# Patient Record
Sex: MALE | Race: BLACK OR AFRICAN AMERICAN | NOT HISPANIC OR LATINO | Employment: OTHER | URBAN - METROPOLITAN AREA
[De-identification: names, ages, dates, MRNs, and addresses within clinical notes are randomized per-mention and may not be internally consistent; named-entity substitution may affect disease eponyms.]

---

## 2023-01-01 ENCOUNTER — TELEPHONE (OUTPATIENT)
Dept: TRANSPLANT | Facility: HOSPITAL | Age: 68
End: 2023-01-01
Payer: COMMERCIAL

## 2023-01-01 ENCOUNTER — APPOINTMENT (OUTPATIENT)
Dept: RADIOLOGY | Facility: HOSPITAL | Age: 68
DRG: 326 | End: 2023-01-01
Payer: COMMERCIAL

## 2023-01-01 ENCOUNTER — ANESTHESIA (OUTPATIENT)
Dept: OPERATING ROOM | Facility: HOSPITAL | Age: 68
DRG: 326 | End: 2023-01-01
Payer: COMMERCIAL

## 2023-01-01 ENCOUNTER — ANESTHESIA (OUTPATIENT)
Dept: GASTROENTEROLOGY | Facility: HOSPITAL | Age: 68
DRG: 326 | End: 2023-01-01
Payer: COMMERCIAL

## 2023-01-01 ENCOUNTER — APPOINTMENT (OUTPATIENT)
Dept: RADIOLOGY | Facility: HOSPITAL | Age: 68
End: 2023-01-01
Payer: COMMERCIAL

## 2023-01-01 ENCOUNTER — APPOINTMENT (OUTPATIENT)
Dept: HEMATOLOGY/ONCOLOGY | Facility: CLINIC | Age: 68
End: 2023-01-01
Payer: MEDICARE

## 2023-01-01 ENCOUNTER — APPOINTMENT (OUTPATIENT)
Dept: HEMATOLOGY/ONCOLOGY | Facility: CLINIC | Age: 68
End: 2023-01-01
Payer: COMMERCIAL

## 2023-01-01 ENCOUNTER — ANESTHESIA EVENT (OUTPATIENT)
Dept: GASTROENTEROLOGY | Facility: HOSPITAL | Age: 68
DRG: 326 | End: 2023-01-01
Payer: COMMERCIAL

## 2023-01-01 ENCOUNTER — HOSPITAL ENCOUNTER (INPATIENT)
Facility: HOSPITAL | Age: 68
LOS: 20 days | Discharge: NURSING, REHAB OR LONG-TERM CARE | DRG: 326 | End: 2023-10-27
Attending: HOSPITALIST | Admitting: SURGERY
Payer: COMMERCIAL

## 2023-01-01 ENCOUNTER — HOSPITAL ENCOUNTER (OUTPATIENT)
Dept: DATA CONVERSION | Facility: HOSPITAL | Age: 68
End: 2023-08-15
Attending: STUDENT IN AN ORGANIZED HEALTH CARE EDUCATION/TRAINING PROGRAM | Admitting: STUDENT IN AN ORGANIZED HEALTH CARE EDUCATION/TRAINING PROGRAM
Payer: COMMERCIAL

## 2023-01-01 ENCOUNTER — HOSPITAL ENCOUNTER (INPATIENT)
Dept: DATA CONVERSION | Facility: HOSPITAL | Age: 68
Discharge: HOME HEALTH | End: 2023-05-31
Attending: SURGERY | Admitting: SURGERY

## 2023-01-01 ENCOUNTER — APPOINTMENT (OUTPATIENT)
Dept: LAB | Facility: LAB | Age: 68
End: 2023-01-01

## 2023-01-01 ENCOUNTER — APPOINTMENT (OUTPATIENT)
Dept: GASTROENTEROLOGY | Facility: HOSPITAL | Age: 68
DRG: 326 | End: 2023-01-01
Payer: COMMERCIAL

## 2023-01-01 ENCOUNTER — ANESTHESIA EVENT (OUTPATIENT)
Dept: OPERATING ROOM | Facility: HOSPITAL | Age: 68
DRG: 326 | End: 2023-01-01
Payer: COMMERCIAL

## 2023-01-01 ENCOUNTER — HOSPITAL ENCOUNTER (EMERGENCY)
Facility: HOSPITAL | Age: 68
Discharge: OTHER INSTITUTION | End: 2023-10-06
Attending: EMERGENCY MEDICINE
Payer: COMMERCIAL

## 2023-01-01 ENCOUNTER — HOSPITAL ENCOUNTER (INPATIENT)
Dept: DATA CONVERSION | Facility: HOSPITAL | Age: 68
LOS: 6 days | Discharge: HOME | End: 2023-09-29
Attending: EMERGENCY MEDICINE | Admitting: STUDENT IN AN ORGANIZED HEALTH CARE EDUCATION/TRAINING PROGRAM
Payer: COMMERCIAL

## 2023-01-01 VITALS
HEIGHT: 69 IN | RESPIRATION RATE: 10 BRPM | TEMPERATURE: 99.3 F | DIASTOLIC BLOOD PRESSURE: 82 MMHG | WEIGHT: 171.96 LBS | OXYGEN SATURATION: 95 % | BODY MASS INDEX: 25.47 KG/M2 | SYSTOLIC BLOOD PRESSURE: 106 MMHG | HEART RATE: 72 BPM

## 2023-01-01 VITALS — BODY MASS INDEX: 26.12 KG/M2 | HEIGHT: 69 IN | WEIGHT: 176.37 LBS

## 2023-01-01 VITALS
SYSTOLIC BLOOD PRESSURE: 148 MMHG | TEMPERATURE: 98.4 F | HEIGHT: 69 IN | RESPIRATION RATE: 20 BRPM | WEIGHT: 155.42 LBS | BODY MASS INDEX: 23.02 KG/M2 | OXYGEN SATURATION: 95 % | DIASTOLIC BLOOD PRESSURE: 81 MMHG | HEART RATE: 95 BPM

## 2023-01-01 VITALS — HEIGHT: 69 IN | BODY MASS INDEX: 26.12 KG/M2 | WEIGHT: 176.37 LBS

## 2023-01-01 DIAGNOSIS — K21.00 GASTRO-ESOPHAGEAL REFLUX DISEASE WITH ESOPHAGITIS, WITHOUT BLEEDING: ICD-10-CM

## 2023-01-01 DIAGNOSIS — Z79.621 LONG TERM (CURRENT) USE OF CALCINEURIN INHIBITOR: ICD-10-CM

## 2023-01-01 DIAGNOSIS — B37.0 THRUSH: ICD-10-CM

## 2023-01-01 DIAGNOSIS — G47.00 INSOMNIA, UNSPECIFIED TYPE: ICD-10-CM

## 2023-01-01 DIAGNOSIS — T81.30XS: ICD-10-CM

## 2023-01-01 DIAGNOSIS — J06.9 ACUTE RESPIRATORY DISEASE: ICD-10-CM

## 2023-01-01 DIAGNOSIS — Z79.52 LONG TERM (CURRENT) USE OF SYSTEMIC STEROIDS: ICD-10-CM

## 2023-01-01 DIAGNOSIS — C79.89 SECONDARY MALIGNANT NEOPLASM OF OTHER SPECIFIED SITES (MULTI): ICD-10-CM

## 2023-01-01 DIAGNOSIS — R78.81 GRAM-NEGATIVE BACTEREMIA: ICD-10-CM

## 2023-01-01 DIAGNOSIS — K83.09 CHOLANGITIS (CMS-HCC): ICD-10-CM

## 2023-01-01 DIAGNOSIS — E86.0 DEHYDRATION: ICD-10-CM

## 2023-01-01 DIAGNOSIS — K56.609 SBO (SMALL BOWEL OBSTRUCTION) (MULTI): Primary | ICD-10-CM

## 2023-01-01 DIAGNOSIS — Z87.891 PERSONAL HISTORY OF NICOTINE DEPENDENCE: ICD-10-CM

## 2023-01-01 DIAGNOSIS — E46 PROTEIN-CALORIE MALNUTRITION, UNSPECIFIED SEVERITY (MULTI): ICD-10-CM

## 2023-01-01 DIAGNOSIS — E87.70 FLUID OVERLOAD, UNSPECIFIED: ICD-10-CM

## 2023-01-01 DIAGNOSIS — E11.40 TYPE 2 DIABETES MELLITUS WITH DIABETIC NEUROPATHY, UNSPECIFIED (MULTI): ICD-10-CM

## 2023-01-01 DIAGNOSIS — R19.8 PERFORATED ABDOMINAL VISCUS: ICD-10-CM

## 2023-01-01 DIAGNOSIS — T46.2X5A ADVERSE EFFECT OF OTHER ANTIDYSRHYTHMIC DRUGS, INITIAL ENCOUNTER: ICD-10-CM

## 2023-01-01 DIAGNOSIS — I12.9 HYPERTENSIVE CHRONIC KIDNEY DISEASE WITH STAGE 1 THROUGH STAGE 4 CHRONIC KIDNEY DISEASE, OR UNSPECIFIED CHRONIC KIDNEY DISEASE: ICD-10-CM

## 2023-01-01 DIAGNOSIS — Z94.1 HEART TRANSPLANT STATUS (MULTI): ICD-10-CM

## 2023-01-01 DIAGNOSIS — K56.609 UNSPECIFIED INTESTINAL OBSTRUCTION, UNSPECIFIED AS TO PARTIAL VERSUS COMPLETE OBSTRUCTION (MULTI): ICD-10-CM

## 2023-01-01 DIAGNOSIS — E11.22 TYPE 2 DIABETES MELLITUS WITH DIABETIC CHRONIC KIDNEY DISEASE (MULTI): ICD-10-CM

## 2023-01-01 DIAGNOSIS — Z94.1 HISTORY OF HEART TRANSPLANT (MULTI): ICD-10-CM

## 2023-01-01 DIAGNOSIS — F32.A DEPRESSION, UNSPECIFIED: ICD-10-CM

## 2023-01-01 DIAGNOSIS — K31.1: ICD-10-CM

## 2023-01-01 DIAGNOSIS — E11.9 TYPE 2 DIABETES MELLITUS WITHOUT COMPLICATIONS (MULTI): ICD-10-CM

## 2023-01-01 DIAGNOSIS — Z79.4 TYPE 2 DIABETES MELLITUS WITH STAGE 3A CHRONIC KIDNEY DISEASE, WITH LONG-TERM CURRENT USE OF INSULIN (MULTI): ICD-10-CM

## 2023-01-01 DIAGNOSIS — R41.82 ALTERED MENTAL STATUS, UNSPECIFIED ALTERED MENTAL STATUS TYPE: ICD-10-CM

## 2023-01-01 DIAGNOSIS — C25.9 MALIGNANT NEOPLASM OF PANCREAS, UNSPECIFIED (MULTI): ICD-10-CM

## 2023-01-01 DIAGNOSIS — T81.30XA DEHISCENCE OF FASCIA, INITIAL ENCOUNTER: ICD-10-CM

## 2023-01-01 DIAGNOSIS — E05.80 OTHER THYROTOXICOSIS WITHOUT THYROTOXIC CRISIS OR STORM: ICD-10-CM

## 2023-01-01 DIAGNOSIS — N17.0 ACUTE KIDNEY FAILURE WITH TUBULAR NECROSIS (CMS-HCC): ICD-10-CM

## 2023-01-01 DIAGNOSIS — T86.19 OTHER COMPLICATION OF KIDNEY TRANSPLANT (HHS-HCC): ICD-10-CM

## 2023-01-01 DIAGNOSIS — I25.10 ATHEROSCLEROTIC HEART DISEASE OF NATIVE CORONARY ARTERY WITHOUT ANGINA PECTORIS: ICD-10-CM

## 2023-01-01 DIAGNOSIS — R09.02 HYPOXIA: ICD-10-CM

## 2023-01-01 DIAGNOSIS — E11.22 TYPE 2 DIABETES MELLITUS WITH STAGE 3A CHRONIC KIDNEY DISEASE, WITH LONG-TERM CURRENT USE OF INSULIN (MULTI): ICD-10-CM

## 2023-01-01 DIAGNOSIS — C25.9 MALIGNANT NEOPLASM OF PANCREAS, UNSPECIFIED LOCATION OF MALIGNANCY (MULTI): Primary | ICD-10-CM

## 2023-01-01 DIAGNOSIS — C25.0 MALIGNANT NEOPLASM OF HEAD OF PANCREAS (MULTI): ICD-10-CM

## 2023-01-01 DIAGNOSIS — K83.1 OBSTRUCTION OF BILE DUCT (CMS-HCC): ICD-10-CM

## 2023-01-01 DIAGNOSIS — D62 ACUTE POSTHEMORRHAGIC ANEMIA: ICD-10-CM

## 2023-01-01 DIAGNOSIS — C77.2 SECONDARY AND UNSPECIFIED MALIGNANT NEOPLASM OF INTRA-ABDOMINAL LYMPH NODES (MULTI): ICD-10-CM

## 2023-01-01 DIAGNOSIS — N18.31 TYPE 2 DIABETES MELLITUS WITH STAGE 3A CHRONIC KIDNEY DISEASE, WITH LONG-TERM CURRENT USE OF INSULIN (MULTI): ICD-10-CM

## 2023-01-01 DIAGNOSIS — E87.5 HYPERKALEMIA: ICD-10-CM

## 2023-01-01 DIAGNOSIS — K56.609 SMALL BOWEL OBSTRUCTION (MULTI): Primary | ICD-10-CM

## 2023-01-01 DIAGNOSIS — I95.9 HYPOTENSION, UNSPECIFIED: ICD-10-CM

## 2023-01-01 DIAGNOSIS — M10.30 GOUT DUE TO RENAL IMPAIRMENT, UNSPECIFIED SITE: ICD-10-CM

## 2023-01-01 DIAGNOSIS — I97.711 INTRAOPERATIVE CARDIAC ARREST DURING OTHER SURGERY: ICD-10-CM

## 2023-01-01 DIAGNOSIS — Z79.82 LONG TERM (CURRENT) USE OF ASPIRIN: ICD-10-CM

## 2023-01-01 DIAGNOSIS — C25.0 MALIGNANT NEOPLASM OF HEAD OF PANCREAS (MULTI): Primary | ICD-10-CM

## 2023-01-01 DIAGNOSIS — Z79.4 LONG TERM (CURRENT) USE OF INSULIN (MULTI): ICD-10-CM

## 2023-01-01 DIAGNOSIS — E78.5 HYPERLIPIDEMIA, UNSPECIFIED: ICD-10-CM

## 2023-01-01 DIAGNOSIS — C78.4 SECONDARY MALIGNANT NEOPLASM OF SMALL INTESTINE (MULTI): ICD-10-CM

## 2023-01-01 DIAGNOSIS — C25.9 MALIGNANT NEOPLASM OF PANCREAS, UNSPECIFIED LOCATION OF MALIGNANCY (MULTI): ICD-10-CM

## 2023-01-01 DIAGNOSIS — Z29.9 ENCOUNTER FOR DEEP VEIN THROMBOSIS (DVT) PROPHYLAXIS: ICD-10-CM

## 2023-01-01 DIAGNOSIS — F41.1 GENERALIZED ANXIETY DISORDER: ICD-10-CM

## 2023-01-01 DIAGNOSIS — K86.1 OTHER CHRONIC PANCREATITIS (MULTI): ICD-10-CM

## 2023-01-01 DIAGNOSIS — K86.81 EXOCRINE PANCREATIC INSUFFICIENCY (HHS-HCC): ICD-10-CM

## 2023-01-01 DIAGNOSIS — K63.1 BOWEL PERFORATION (MULTI): ICD-10-CM

## 2023-01-01 DIAGNOSIS — D63.1 ANEMIA IN CHRONIC KIDNEY DISEASE (CODE): ICD-10-CM

## 2023-01-01 DIAGNOSIS — R10.84 GENERALIZED ABDOMINAL PAIN: ICD-10-CM

## 2023-01-01 DIAGNOSIS — N18.32 CHRONIC KIDNEY DISEASE, STAGE 3B (MULTI): ICD-10-CM

## 2023-01-01 DIAGNOSIS — Z45.2 ENCOUNTER FOR CENTRAL LINE CARE: ICD-10-CM

## 2023-01-01 DIAGNOSIS — D84.821 IMMUNODEFICIENCY DUE TO DRUGS (CODE) (MULTI): ICD-10-CM

## 2023-01-01 LAB
25(OH)D3 SERPL-MCNC: 53 NG/ML (ref 30–100)
ABO GROUP (TYPE) IN BLOOD: NORMAL
ACTIVATED PARTIAL THROMBOPLASTIN TIME IN PPP BY COAGULATION ASSAY: 23 SEC (ref 26–39)
ACTIVATED PARTIAL THROMBOPLASTIN TIME IN PPP BY COAGULATION ASSAY: 24 SEC (ref 26–39)
ACTIVATED PARTIAL THROMBOPLASTIN TIME IN PPP BY COAGULATION ASSAY: 24 SEC (ref 26–39)
ACTIVATED PARTIAL THROMBOPLASTIN TIME IN PPP BY COAGULATION ASSAY: 26 SEC (ref 26–39)
ACTIVATED PARTIAL THROMBOPLASTIN TIME IN PPP BY COAGULATION ASSAY: 27 SEC (ref 26–39)
ACTIVATED PARTIAL THROMBOPLASTIN TIME IN PPP BY COAGULATION ASSAY: 29 SEC (ref 26–39)
ACTIVATED PARTIAL THROMBOPLASTIN TIME IN PPP BY COAGULATION ASSAY: 30 SEC (ref 26–39)
ACTIVATED PARTIAL THROMBOPLASTIN TIME IN PPP BY COAGULATION ASSAY: 31 SEC (ref 26–39)
ALANINE AMINOTRANSFERASE (SGPT) (U/L) IN SER/PLAS: 117 U/L (ref 10–52)
ALANINE AMINOTRANSFERASE (SGPT) (U/L) IN SER/PLAS: 134 U/L (ref 10–52)
ALANINE AMINOTRANSFERASE (SGPT) (U/L) IN SER/PLAS: 143 U/L (ref 10–52)
ALANINE AMINOTRANSFERASE (SGPT) (U/L) IN SER/PLAS: 158 U/L (ref 10–52)
ALANINE AMINOTRANSFERASE (SGPT) (U/L) IN SER/PLAS: 19 U/L (ref 10–52)
ALANINE AMINOTRANSFERASE (SGPT) (U/L) IN SER/PLAS: 19 U/L (ref 10–52)
ALANINE AMINOTRANSFERASE (SGPT) (U/L) IN SER/PLAS: 23 U/L (ref 10–52)
ALANINE AMINOTRANSFERASE (SGPT) (U/L) IN SER/PLAS: 23 U/L (ref 10–52)
ALANINE AMINOTRANSFERASE (SGPT) (U/L) IN SER/PLAS: 26 U/L (ref 10–52)
ALANINE AMINOTRANSFERASE (SGPT) (U/L) IN SER/PLAS: 29 U/L (ref 10–52)
ALANINE AMINOTRANSFERASE (SGPT) (U/L) IN SER/PLAS: 34 U/L (ref 10–52)
ALANINE AMINOTRANSFERASE (SGPT) (U/L) IN SER/PLAS: 37 U/L (ref 10–52)
ALANINE AMINOTRANSFERASE (SGPT) (U/L) IN SER/PLAS: 37 U/L (ref 10–52)
ALANINE AMINOTRANSFERASE (SGPT) (U/L) IN SER/PLAS: 374 U/L (ref 10–52)
ALANINE AMINOTRANSFERASE (SGPT) (U/L) IN SER/PLAS: 39 U/L (ref 10–52)
ALANINE AMINOTRANSFERASE (SGPT) (U/L) IN SER/PLAS: 42 U/L (ref 10–52)
ALANINE AMINOTRANSFERASE (SGPT) (U/L) IN SER/PLAS: 45 U/L (ref 10–52)
ALANINE AMINOTRANSFERASE (SGPT) (U/L) IN SER/PLAS: 45 U/L (ref 10–52)
ALANINE AMINOTRANSFERASE (SGPT) (U/L) IN SER/PLAS: 57 U/L (ref 10–52)
ALANINE AMINOTRANSFERASE (SGPT) (U/L) IN SER/PLAS: 77 U/L (ref 10–52)
ALANINE AMINOTRANSFERASE (SGPT) (U/L) IN SER/PLAS: NORMAL
ALBUMIN (G/DL) IN SER/PLAS: 2.7 G/DL (ref 3.4–5)
ALBUMIN (G/DL) IN SER/PLAS: 2.8 G/DL (ref 3.4–5)
ALBUMIN (G/DL) IN SER/PLAS: 2.9 G/DL (ref 3.4–5)
ALBUMIN (G/DL) IN SER/PLAS: 3 G/DL (ref 3.4–5)
ALBUMIN (G/DL) IN SER/PLAS: 3.1 G/DL (ref 3.4–5)
ALBUMIN (G/DL) IN SER/PLAS: 3.2 G/DL (ref 3.4–5)
ALBUMIN (G/DL) IN SER/PLAS: 3.3 G/DL (ref 3.4–5)
ALBUMIN (G/DL) IN SER/PLAS: 3.5 G/DL (ref 3.4–5)
ALBUMIN (G/DL) IN SER/PLAS: 3.6 G/DL (ref 3.4–5)
ALBUMIN (G/DL) IN SER/PLAS: 3.7 G/DL (ref 3.4–5)
ALBUMIN (G/DL) IN SER/PLAS: 4 G/DL (ref 3.4–5)
ALBUMIN (G/DL) IN SER/PLAS: 4.1 G/DL (ref 3.4–5)
ALBUMIN (G/DL) IN SER/PLAS: NORMAL
ALBUMIN SERPL BCP-MCNC: 2.4 G/DL (ref 3.4–5)
ALBUMIN SERPL BCP-MCNC: 2.5 G/DL (ref 3.4–5)
ALBUMIN SERPL BCP-MCNC: 2.5 G/DL (ref 3.4–5)
ALBUMIN SERPL BCP-MCNC: 2.6 G/DL (ref 3.4–5)
ALBUMIN SERPL BCP-MCNC: 2.7 G/DL (ref 3.4–5)
ALBUMIN SERPL BCP-MCNC: 2.8 G/DL (ref 3.4–5)
ALBUMIN SERPL BCP-MCNC: 2.8 G/DL (ref 3.4–5)
ALBUMIN SERPL BCP-MCNC: 2.9 G/DL (ref 3.4–5)
ALBUMIN SERPL BCP-MCNC: 3 G/DL (ref 3.4–5)
ALBUMIN SERPL BCP-MCNC: 3.1 G/DL (ref 3.4–5)
ALBUMIN SERPL BCP-MCNC: 3.2 G/DL (ref 3.4–5)
ALBUMIN SERPL BCP-MCNC: 3.6 G/DL (ref 3.4–5)
ALBUMIN SERPL BCP-MCNC: 3.7 G/DL (ref 3.4–5)
ALBUMIN SERPL BCP-MCNC: 3.7 G/DL (ref 3.4–5)
ALKALINE PHOSPHATASE (U/L) IN SER/PLAS: 152 U/L (ref 33–136)
ALKALINE PHOSPHATASE (U/L) IN SER/PLAS: 463 U/L (ref 33–136)
ALKALINE PHOSPHATASE (U/L) IN SER/PLAS: 51 U/L (ref 33–136)
ALKALINE PHOSPHATASE (U/L) IN SER/PLAS: 60 U/L (ref 33–136)
ALKALINE PHOSPHATASE (U/L) IN SER/PLAS: 62 U/L (ref 33–136)
ALKALINE PHOSPHATASE (U/L) IN SER/PLAS: 63 U/L (ref 33–136)
ALKALINE PHOSPHATASE (U/L) IN SER/PLAS: 67 U/L (ref 33–136)
ALKALINE PHOSPHATASE (U/L) IN SER/PLAS: 68 U/L (ref 33–136)
ALKALINE PHOSPHATASE (U/L) IN SER/PLAS: 68 U/L (ref 33–136)
ALKALINE PHOSPHATASE (U/L) IN SER/PLAS: 70 U/L (ref 33–136)
ALKALINE PHOSPHATASE (U/L) IN SER/PLAS: 72 U/L (ref 33–136)
ALKALINE PHOSPHATASE (U/L) IN SER/PLAS: 77 U/L (ref 33–136)
ALKALINE PHOSPHATASE (U/L) IN SER/PLAS: 79 U/L (ref 33–136)
ALKALINE PHOSPHATASE (U/L) IN SER/PLAS: 79 U/L (ref 33–136)
ALKALINE PHOSPHATASE (U/L) IN SER/PLAS: 80 U/L (ref 33–136)
ALKALINE PHOSPHATASE (U/L) IN SER/PLAS: 80 U/L (ref 33–136)
ALKALINE PHOSPHATASE (U/L) IN SER/PLAS: 83 U/L (ref 33–136)
ALKALINE PHOSPHATASE (U/L) IN SER/PLAS: 85 U/L (ref 33–136)
ALKALINE PHOSPHATASE (U/L) IN SER/PLAS: NORMAL
ALP SERPL-CCNC: 110 U/L (ref 33–136)
ALP SERPL-CCNC: 111 U/L (ref 33–136)
ALP SERPL-CCNC: 114 U/L (ref 33–136)
ALP SERPL-CCNC: 145 U/L (ref 33–136)
ALP SERPL-CCNC: 169 U/L (ref 33–136)
ALP SERPL-CCNC: 170 U/L (ref 33–136)
ALP SERPL-CCNC: 172 U/L (ref 33–136)
ALP SERPL-CCNC: 202 U/L (ref 33–136)
ALP SERPL-CCNC: 238 U/L (ref 33–136)
ALP SERPL-CCNC: 239 U/L (ref 33–136)
ALP SERPL-CCNC: 263 U/L (ref 33–136)
ALP SERPL-CCNC: 319 U/L (ref 33–136)
ALP SERPL-CCNC: 365 U/L (ref 33–136)
ALP SERPL-CCNC: 490 U/L (ref 33–136)
ALP SERPL-CCNC: 494 U/L (ref 33–136)
ALP SERPL-CCNC: 601 U/L (ref 33–136)
ALP SERPL-CCNC: 601 U/L (ref 33–136)
ALT SERPL W P-5'-P-CCNC: 108 U/L (ref 10–52)
ALT SERPL W P-5'-P-CCNC: 115 U/L (ref 10–52)
ALT SERPL W P-5'-P-CCNC: 14 U/L (ref 10–52)
ALT SERPL W P-5'-P-CCNC: 140 U/L (ref 10–52)
ALT SERPL W P-5'-P-CCNC: 16 U/L (ref 10–52)
ALT SERPL W P-5'-P-CCNC: 161 U/L (ref 10–52)
ALT SERPL W P-5'-P-CCNC: 163 U/L (ref 10–52)
ALT SERPL W P-5'-P-CCNC: 17 U/L (ref 10–52)
ALT SERPL W P-5'-P-CCNC: 23 U/L (ref 10–52)
ALT SERPL W P-5'-P-CCNC: 28 U/L (ref 10–52)
ALT SERPL W P-5'-P-CCNC: 30 U/L (ref 10–52)
ALT SERPL W P-5'-P-CCNC: 38 U/L (ref 10–52)
ALT SERPL W P-5'-P-CCNC: 44 U/L (ref 10–52)
ALT SERPL W P-5'-P-CCNC: 68 U/L (ref 10–52)
ALT SERPL W P-5'-P-CCNC: 79 U/L (ref 10–52)
ALT SERPL W P-5'-P-CCNC: 88 U/L (ref 10–52)
ALT SERPL W P-5'-P-CCNC: 92 U/L (ref 10–52)
AMYLASE (U/L) IN BODY FLUID: 102 U/L
AMYLASE (U/L) IN BODY FLUID: 356 U/L
AMYLASE (U/L) IN BODY FLUID: 411 U/L
AMYLASE (U/L) IN BODY FLUID: >6000 U/L
ANION GAP BLDA CALCULATED.4IONS-SCNC: 10 MMO/L (ref 10–25)
ANION GAP BLDA CALCULATED.4IONS-SCNC: 11 MMO/L (ref 10–25)
ANION GAP BLDA CALCULATED.4IONS-SCNC: 12 MMO/L (ref 10–25)
ANION GAP BLDA CALCULATED.4IONS-SCNC: 7 MMO/L (ref 10–25)
ANION GAP BLDA CALCULATED.4IONS-SCNC: 8 MMO/L (ref 10–25)
ANION GAP BLDV CALCULATED.4IONS-SCNC: 10 MMOL/L (ref 10–25)
ANION GAP BLDV CALCULATED.4IONS-SCNC: 11 MMOL/L (ref 10–25)
ANION GAP IN SER/PLAS: 10 MMOL/L (ref 10–20)
ANION GAP IN SER/PLAS: 10 MMOL/L (ref 10–20)
ANION GAP IN SER/PLAS: 11 MMOL/L (ref 10–20)
ANION GAP IN SER/PLAS: 12 MMOL/L (ref 10–20)
ANION GAP IN SER/PLAS: 13 MMOL/L (ref 10–20)
ANION GAP IN SER/PLAS: 14 MMOL/L (ref 10–20)
ANION GAP IN SER/PLAS: 15 MMOL/L (ref 10–20)
ANION GAP IN SER/PLAS: 16 MMOL/L (ref 10–20)
ANION GAP IN SER/PLAS: 17 MMOL/L (ref 10–20)
ANION GAP IN SER/PLAS: 19 MMOL/L (ref 10–20)
ANION GAP IN SER/PLAS: 21 MMOL/L (ref 10–20)
ANION GAP IN SER/PLAS: NORMAL
ANION GAP SERPL CALC-SCNC: 12 MMOL/L (ref 10–20)
ANION GAP SERPL CALC-SCNC: 14 MMOL/L (ref 10–20)
ANION GAP SERPL CALC-SCNC: 14 MMOL/L (ref 10–20)
ANION GAP SERPL CALC-SCNC: 15 MMOL/L (ref 10–20)
ANION GAP SERPL CALC-SCNC: 16 MMOL/L (ref 10–20)
ANION GAP SERPL CALC-SCNC: 17 MMOL/L (ref 10–20)
ANION GAP SERPL CALC-SCNC: 18 MMOL/L (ref 10–20)
ANION GAP SERPL CALC-SCNC: 19 MMOL/L (ref 10–20)
ANION GAP SERPL CALC-SCNC: 22 MMOL/L (ref 10–20)
ANION GAP SERPL CALC-SCNC: 24 MMOL/L (ref 10–20)
ANION GAP SERPL CALC-SCNC: 30 MMOL/L (ref 10–20)
ANTIBODY SCREEN: NORMAL
APPEARANCE UR: ABNORMAL
APPEARANCE UR: CLEAR
APTT PPP: 25 SECONDS (ref 27–38)
APTT PPP: 26 SECONDS (ref 27–38)
APTT PPP: 27 SECONDS (ref 27–38)
APTT PPP: 27 SECONDS (ref 27–38)
APTT PPP: 28 SECONDS (ref 27–38)
APTT PPP: 28 SECONDS (ref 27–38)
APTT PPP: 30 SECONDS (ref 27–38)
APTT PPP: 32 SECONDS (ref 27–38)
ASPARTATE AMINOTRANSFERASE (SGOT) (U/L) IN SER/PLAS: 133 U/L (ref 9–39)
ASPARTATE AMINOTRANSFERASE (SGOT) (U/L) IN SER/PLAS: 155 U/L (ref 9–39)
ASPARTATE AMINOTRANSFERASE (SGOT) (U/L) IN SER/PLAS: 205 U/L (ref 9–39)
ASPARTATE AMINOTRANSFERASE (SGOT) (U/L) IN SER/PLAS: 24 U/L (ref 9–39)
ASPARTATE AMINOTRANSFERASE (SGOT) (U/L) IN SER/PLAS: 24 U/L (ref 9–39)
ASPARTATE AMINOTRANSFERASE (SGOT) (U/L) IN SER/PLAS: 25 U/L (ref 9–39)
ASPARTATE AMINOTRANSFERASE (SGOT) (U/L) IN SER/PLAS: 25 U/L (ref 9–39)
ASPARTATE AMINOTRANSFERASE (SGOT) (U/L) IN SER/PLAS: 29 U/L (ref 9–39)
ASPARTATE AMINOTRANSFERASE (SGOT) (U/L) IN SER/PLAS: 29 U/L (ref 9–39)
ASPARTATE AMINOTRANSFERASE (SGOT) (U/L) IN SER/PLAS: 30 U/L (ref 9–39)
ASPARTATE AMINOTRANSFERASE (SGOT) (U/L) IN SER/PLAS: 32 U/L (ref 9–39)
ASPARTATE AMINOTRANSFERASE (SGOT) (U/L) IN SER/PLAS: 34 U/L (ref 9–39)
ASPARTATE AMINOTRANSFERASE (SGOT) (U/L) IN SER/PLAS: 36 U/L (ref 9–39)
ASPARTATE AMINOTRANSFERASE (SGOT) (U/L) IN SER/PLAS: 375 U/L (ref 9–39)
ASPARTATE AMINOTRANSFERASE (SGOT) (U/L) IN SER/PLAS: 39 U/L (ref 9–39)
ASPARTATE AMINOTRANSFERASE (SGOT) (U/L) IN SER/PLAS: 40 U/L (ref 9–39)
ASPARTATE AMINOTRANSFERASE (SGOT) (U/L) IN SER/PLAS: 42 U/L (ref 9–39)
ASPARTATE AMINOTRANSFERASE (SGOT) (U/L) IN SER/PLAS: 43 U/L (ref 9–39)
ASPARTATE AMINOTRANSFERASE (SGOT) (U/L) IN SER/PLAS: 52 U/L (ref 9–39)
ASPARTATE AMINOTRANSFERASE (SGOT) (U/L) IN SER/PLAS: 99 U/L (ref 9–39)
ASPARTATE AMINOTRANSFERASE (SGOT) (U/L) IN SER/PLAS: NORMAL
AST SERPL W P-5'-P-CCNC: 125 U/L (ref 9–39)
AST SERPL W P-5'-P-CCNC: 15 U/L (ref 9–39)
AST SERPL W P-5'-P-CCNC: 150 U/L (ref 9–39)
AST SERPL W P-5'-P-CCNC: 162 U/L (ref 9–39)
AST SERPL W P-5'-P-CCNC: 17 U/L (ref 9–39)
AST SERPL W P-5'-P-CCNC: 177 U/L (ref 9–39)
AST SERPL W P-5'-P-CCNC: 188 U/L (ref 9–39)
AST SERPL W P-5'-P-CCNC: 19 U/L (ref 9–39)
AST SERPL W P-5'-P-CCNC: 20 U/L (ref 9–39)
AST SERPL W P-5'-P-CCNC: 21 U/L (ref 9–39)
AST SERPL W P-5'-P-CCNC: 22 U/L (ref 9–39)
AST SERPL W P-5'-P-CCNC: 221 U/L (ref 9–39)
AST SERPL W P-5'-P-CCNC: 228 U/L (ref 9–39)
AST SERPL W P-5'-P-CCNC: 28 U/L (ref 9–39)
AST SERPL W P-5'-P-CCNC: 38 U/L (ref 9–39)
AST SERPL W P-5'-P-CCNC: 72 U/L (ref 9–39)
AST SERPL W P-5'-P-CCNC: 80 U/L (ref 9–39)
ATRIAL RATE: 101 BPM
ATRIAL RATE: 107 BPM
BACTERIA BLD AEROBE CULT: ABNORMAL
BACTERIA BLD AEROBE CULT: ABNORMAL
BACTERIA BLD CULT: ABNORMAL
BACTERIA BLD CULT: ABNORMAL
BACTERIA BLD CULT: NORMAL
BASE EXCESS BLDA CALC-SCNC: -1.6 MMOL/L (ref -2–3)
BASE EXCESS BLDA CALC-SCNC: 1.2 MMOL/L (ref -2–3)
BASE EXCESS BLDA CALC-SCNC: 1.3 MMOL/L (ref -2–3)
BASE EXCESS BLDA CALC-SCNC: 1.7 MMOL/L (ref -2–3)
BASE EXCESS BLDA CALC-SCNC: 1.8 MMOL/L (ref -2–3)
BASE EXCESS BLDA CALC-SCNC: 2.2 MMOL/L (ref -2–3)
BASE EXCESS BLDA CALC-SCNC: 2.9 MMOL/L (ref -2–3)
BASE EXCESS BLDA CALC-SCNC: 4 MMOL/L (ref -2–3)
BASE EXCESS BLDA CALC-SCNC: 4.1 MMOL/L (ref -2–3)
BASE EXCESS BLDA CALC-SCNC: 4.4 MMOL/L (ref -2–3)
BASE EXCESS BLDA CALC-SCNC: 4.4 MMOL/L (ref -2–3)
BASE EXCESS BLDA CALC-SCNC: 6.2 MMOL/L (ref -2–3)
BASE EXCESS BLDV CALC-SCNC: -0.1 MMOL/L (ref -2–3)
BASE EXCESS BLDV CALC-SCNC: 4.2 MMOL/L (ref -2–3)
BASOPHILS # BLD AUTO: 0.01 X10*3/UL (ref 0–0.1)
BASOPHILS # BLD AUTO: 0.01 X10*3/UL (ref 0–0.1)
BASOPHILS # BLD AUTO: 0.03 X10*3/UL (ref 0–0.1)
BASOPHILS # BLD MANUAL: 0 X10*3/UL (ref 0–0.1)
BASOPHILS (10*3/UL) IN BLOOD BY AUTOMATED COUNT: 0.02 X10E9/L (ref 0–0.1)
BASOPHILS NFR BLD AUTO: 0.2 %
BASOPHILS NFR BLD AUTO: 0.2 %
BASOPHILS NFR BLD AUTO: 0.5 %
BASOPHILS NFR BLD MANUAL: 0 %
BASOPHILS/100 LEUKOCYTES IN BLOOD BY AUTOMATED COUNT: 0.3 % (ref 0–2)
BASOPHILS/100 LEUKOCYTES IN BLOOD BY AUTOMATED COUNT: 0.3 % (ref 0–2)
BASOPHILS/100 LEUKOCYTES IN BLOOD BY AUTOMATED COUNT: 0.4 % (ref 0–2)
BILIRUB DIRECT SERPL-MCNC: 0.3 MG/DL (ref 0–0.3)
BILIRUB DIRECT SERPL-MCNC: 0.7 MG/DL (ref 0–0.3)
BILIRUB DIRECT SERPL-MCNC: 0.8 MG/DL (ref 0–0.3)
BILIRUB DIRECT SERPL-MCNC: 0.8 MG/DL (ref 0–0.3)
BILIRUB DIRECT SERPL-MCNC: 1.4 MG/DL (ref 0–0.3)
BILIRUB DIRECT SERPL-MCNC: 1.8 MG/DL (ref 0–0.3)
BILIRUB DIRECT SERPL-MCNC: 1.8 MG/DL (ref 0–0.3)
BILIRUB DIRECT SERPL-MCNC: 2.6 MG/DL (ref 0–0.3)
BILIRUB SERPL-MCNC: 0.5 MG/DL (ref 0–1.2)
BILIRUB SERPL-MCNC: 0.9 MG/DL (ref 0–1.2)
BILIRUB SERPL-MCNC: 0.9 MG/DL (ref 0–1.2)
BILIRUB SERPL-MCNC: 1.1 MG/DL (ref 0–1.2)
BILIRUB SERPL-MCNC: 1.2 MG/DL (ref 0–1.2)
BILIRUB SERPL-MCNC: 1.2 MG/DL (ref 0–1.2)
BILIRUB SERPL-MCNC: 1.5 MG/DL (ref 0–1.2)
BILIRUB SERPL-MCNC: 1.6 MG/DL (ref 0–1.2)
BILIRUB SERPL-MCNC: 2.4 MG/DL (ref 0–1.2)
BILIRUB SERPL-MCNC: 2.9 MG/DL (ref 0–1.2)
BILIRUB SERPL-MCNC: 3.8 MG/DL (ref 0–1.2)
BILIRUB SERPL-MCNC: 3.8 MG/DL (ref 0–1.2)
BILIRUB SERPL-MCNC: 3.9 MG/DL (ref 0–1.2)
BILIRUB SERPL-MCNC: 4.2 MG/DL (ref 0–1.2)
BILIRUB SERPL-MCNC: 5.2 MG/DL (ref 0–1.2)
BILIRUB UR STRIP.AUTO-MCNC: NEGATIVE MG/DL
BILIRUB UR STRIP.AUTO-MCNC: NEGATIVE MG/DL
BILIRUBIN DIRECT (MG/DL) IN SER/PLAS: 0.3 MG/DL (ref 0–0.3)
BILIRUBIN DIRECT (MG/DL) IN SER/PLAS: 0.5 MG/DL (ref 0–0.3)
BILIRUBIN DIRECT (MG/DL) IN SER/PLAS: 0.5 MG/DL (ref 0–0.3)
BILIRUBIN DIRECT (MG/DL) IN SER/PLAS: 0.6 MG/DL (ref 0–0.3)
BILIRUBIN DIRECT (MG/DL) IN SER/PLAS: 0.6 MG/DL (ref 0–0.3)
BILIRUBIN TOTAL (MG/DL) IN SER/PLAS: 0.3 MG/DL (ref 0–1.2)
BILIRUBIN TOTAL (MG/DL) IN SER/PLAS: 0.6 MG/DL (ref 0–1.2)
BILIRUBIN TOTAL (MG/DL) IN SER/PLAS: 0.7 MG/DL (ref 0–1.2)
BILIRUBIN TOTAL (MG/DL) IN SER/PLAS: 0.8 MG/DL (ref 0–1.2)
BILIRUBIN TOTAL (MG/DL) IN SER/PLAS: 0.8 MG/DL (ref 0–1.2)
BILIRUBIN TOTAL (MG/DL) IN SER/PLAS: 0.9 MG/DL (ref 0–1.2)
BILIRUBIN TOTAL (MG/DL) IN SER/PLAS: 1 MG/DL (ref 0–1.2)
BILIRUBIN TOTAL (MG/DL) IN SER/PLAS: 1.1 MG/DL (ref 0–1.2)
BILIRUBIN TOTAL (MG/DL) IN SER/PLAS: 1.2 MG/DL (ref 0–1.2)
BILIRUBIN TOTAL (MG/DL) IN SER/PLAS: 1.2 MG/DL (ref 0–1.2)
BILIRUBIN TOTAL (MG/DL) IN SER/PLAS: 1.4 MG/DL (ref 0–1.2)
BILIRUBIN TOTAL (MG/DL) IN SER/PLAS: 4.7 MG/DL (ref 0–1.2)
BILIRUBIN TOTAL (MG/DL) IN SER/PLAS: NORMAL
BLOOD EXPIRATION DATE: NORMAL
BODY TEMPERATURE: 37 DEGREES CELSIUS
BUN SERPL-MCNC: 34 MG/DL (ref 6–23)
BUN SERPL-MCNC: 39 MG/DL (ref 6–23)
BUN SERPL-MCNC: 40 MG/DL (ref 6–23)
BUN SERPL-MCNC: 41 MG/DL (ref 6–23)
BUN SERPL-MCNC: 41 MG/DL (ref 6–23)
BUN SERPL-MCNC: 44 MG/DL (ref 6–23)
BUN SERPL-MCNC: 44 MG/DL (ref 6–23)
BUN SERPL-MCNC: 47 MG/DL (ref 6–23)
BUN SERPL-MCNC: 48 MG/DL (ref 6–23)
BUN SERPL-MCNC: 48 MG/DL (ref 6–23)
BUN SERPL-MCNC: 50 MG/DL (ref 6–23)
BUN SERPL-MCNC: 51 MG/DL (ref 6–23)
BUN SERPL-MCNC: 52 MG/DL (ref 6–23)
BUN SERPL-MCNC: 53 MG/DL (ref 6–23)
BUN SERPL-MCNC: 54 MG/DL (ref 6–23)
BUN SERPL-MCNC: 54 MG/DL (ref 6–23)
BUN SERPL-MCNC: 57 MG/DL (ref 6–23)
BUN SERPL-MCNC: 58 MG/DL (ref 6–23)
BUN SERPL-MCNC: 59 MG/DL (ref 6–23)
BUN SERPL-MCNC: 61 MG/DL (ref 6–23)
BUN SERPL-MCNC: 62 MG/DL (ref 6–23)
BUN SERPL-MCNC: 64 MG/DL (ref 6–23)
BUN SERPL-MCNC: 65 MG/DL (ref 6–23)
BUN SERPL-MCNC: 66 MG/DL (ref 6–23)
BUN SERPL-MCNC: 70 MG/DL (ref 6–23)
BUN SERPL-MCNC: 76 MG/DL (ref 6–23)
CA-I BLD-SCNC: 1.11 MMOL/L (ref 1.1–1.33)
CA-I BLD-SCNC: 1.12 MMOL/L (ref 1.1–1.33)
CA-I BLD-SCNC: 1.14 MMOL/L (ref 1.1–1.33)
CA-I BLD-SCNC: 1.17 MMOL/L (ref 1.1–1.33)
CA-I BLD-SCNC: 1.26 MMOL/L (ref 1.1–1.33)
CA-I BLD-SCNC: 1.34 MMOL/L (ref 1.1–1.33)
CA-I BLD-SCNC: 1.35 MMOL/L (ref 1.1–1.33)
CA-I BLD-SCNC: 1.36 MMOL/L (ref 1.1–1.33)
CA-I BLDA-SCNC: 1.12 MMOL/L (ref 1.1–1.33)
CA-I BLDA-SCNC: 1.13 MMOL/L (ref 1.1–1.33)
CA-I BLDA-SCNC: 1.15 MMOL/L (ref 1.1–1.33)
CA-I BLDA-SCNC: 1.21 MMOL/L (ref 1.1–1.33)
CA-I BLDA-SCNC: 1.23 MMOL/L (ref 1.1–1.33)
CA-I BLDA-SCNC: 1.23 MMOL/L (ref 1.1–1.33)
CA-I BLDA-SCNC: 1.31 MMOL/L (ref 1.1–1.33)
CA-I BLDA-SCNC: 1.35 MMOL/L (ref 1.1–1.33)
CA-I BLDA-SCNC: 1.36 MMOL/L (ref 1.1–1.33)
CA-I BLDA-SCNC: 1.37 MMOL/L (ref 1.1–1.33)
CA-I BLDA-SCNC: 1.37 MMOL/L (ref 1.1–1.33)
CA-I BLDA-SCNC: 1.38 MMOL/L (ref 1.1–1.33)
CA-I BLDV-SCNC: 1.18 MMOL/L (ref 1.1–1.33)
CA-I BLDV-SCNC: 1.28 MMOL/L (ref 1.1–1.33)
CALCIDIOL (25 OH VITAMIN D3) (NG/ML) IN SER/PLAS: 50 NG/ML
CALCITONIN: <2 PG/ML (ref 0–7.5)
CALCIUM (MG/DL) IN SER/PLAS: 10.1 MG/DL (ref 8.6–10.6)
CALCIUM (MG/DL) IN SER/PLAS: 7.9 MG/DL (ref 8.6–10.6)
CALCIUM (MG/DL) IN SER/PLAS: 8 MG/DL (ref 8.6–10.6)
CALCIUM (MG/DL) IN SER/PLAS: 8.1 MG/DL (ref 8.6–10.6)
CALCIUM (MG/DL) IN SER/PLAS: 8.1 MG/DL (ref 8.6–10.6)
CALCIUM (MG/DL) IN SER/PLAS: 8.2 MG/DL (ref 8.6–10.6)
CALCIUM (MG/DL) IN SER/PLAS: 8.3 MG/DL (ref 8.6–10.6)
CALCIUM (MG/DL) IN SER/PLAS: 8.3 MG/DL (ref 8.6–10.6)
CALCIUM (MG/DL) IN SER/PLAS: 8.4 MG/DL (ref 8.6–10.3)
CALCIUM (MG/DL) IN SER/PLAS: 8.4 MG/DL (ref 8.6–10.6)
CALCIUM (MG/DL) IN SER/PLAS: 8.4 MG/DL (ref 8.6–10.6)
CALCIUM (MG/DL) IN SER/PLAS: 8.6 MG/DL (ref 8.6–10.6)
CALCIUM (MG/DL) IN SER/PLAS: 8.7 MG/DL (ref 8.6–10.6)
CALCIUM (MG/DL) IN SER/PLAS: 8.8 MG/DL (ref 8.6–10.6)
CALCIUM (MG/DL) IN SER/PLAS: 8.9 MG/DL (ref 8.6–10.6)
CALCIUM (MG/DL) IN SER/PLAS: 9.1 MG/DL (ref 8.6–10.6)
CALCIUM (MG/DL) IN SER/PLAS: 9.3 MG/DL (ref 8.6–10.6)
CALCIUM (MG/DL) IN SER/PLAS: 9.4 MG/DL (ref 8.6–10.6)
CALCIUM (MG/DL) IN SER/PLAS: 9.8 MG/DL (ref 8.6–10.3)
CALCIUM (MG/DL) IN SER/PLAS: NORMAL
CALCIUM SERPL-MCNC: 10 MG/DL (ref 8.6–10.6)
CALCIUM SERPL-MCNC: 10.5 MG/DL (ref 8.6–10.6)
CALCIUM SERPL-MCNC: 8.2 MG/DL (ref 8.6–10.6)
CALCIUM SERPL-MCNC: 8.4 MG/DL (ref 8.6–10.6)
CALCIUM SERPL-MCNC: 8.5 MG/DL (ref 8.6–10.6)
CALCIUM SERPL-MCNC: 8.6 MG/DL (ref 8.6–10.6)
CALCIUM SERPL-MCNC: 8.7 MG/DL (ref 8.6–10.6)
CALCIUM SERPL-MCNC: 8.8 MG/DL (ref 8.6–10.3)
CALCIUM SERPL-MCNC: 8.9 MG/DL (ref 8.6–10.6)
CALCIUM SERPL-MCNC: 9 MG/DL (ref 8.6–10.6)
CALCIUM SERPL-MCNC: 9.1 MG/DL (ref 8.6–10.6)
CALCIUM SERPL-MCNC: 9.2 MG/DL (ref 8.6–10.6)
CALCIUM SERPL-MCNC: 9.4 MG/DL (ref 8.6–10.6)
CALCIUM SERPL-MCNC: 9.4 MG/DL (ref 8.6–10.6)
CALCIUM SERPL-MCNC: 9.5 MG/DL (ref 8.6–10.6)
CALCIUM SERPL-MCNC: 9.5 MG/DL (ref 8.6–10.6)
CALCIUM SERPL-MCNC: 9.6 MG/DL (ref 8.6–10.6)
CALCIUM SERPL-MCNC: 9.7 MG/DL (ref 8.6–10.6)
CALCIUM SERPL-MCNC: 9.8 MG/DL (ref 8.6–10.6)
CALCIUM SERPL-MCNC: 9.8 MG/DL (ref 8.6–10.6)
CALCIUM SERPL-MCNC: 9.9 MG/DL (ref 8.6–10.3)
CALCIUM SERPL-MCNC: 9.9 MG/DL (ref 8.6–10.6)
CANCER AG 19-9 (U/ML) IN SER/PLAS: 450.8 U/ML
CARBON DIOXIDE, TOTAL (MMOL/L) IN SER/PLAS: 19 MMOL/L (ref 21–32)
CARBON DIOXIDE, TOTAL (MMOL/L) IN SER/PLAS: 23 MMOL/L (ref 21–32)
CARBON DIOXIDE, TOTAL (MMOL/L) IN SER/PLAS: 23 MMOL/L (ref 21–32)
CARBON DIOXIDE, TOTAL (MMOL/L) IN SER/PLAS: 24 MMOL/L (ref 21–32)
CARBON DIOXIDE, TOTAL (MMOL/L) IN SER/PLAS: 25 MMOL/L (ref 21–32)
CARBON DIOXIDE, TOTAL (MMOL/L) IN SER/PLAS: 26 MMOL/L (ref 21–32)
CARBON DIOXIDE, TOTAL (MMOL/L) IN SER/PLAS: 27 MMOL/L (ref 21–32)
CARBON DIOXIDE, TOTAL (MMOL/L) IN SER/PLAS: 28 MMOL/L (ref 21–32)
CARBON DIOXIDE, TOTAL (MMOL/L) IN SER/PLAS: 29 MMOL/L (ref 21–32)
CARBON DIOXIDE, TOTAL (MMOL/L) IN SER/PLAS: NORMAL
CHLORIDE (MMOL/L) IN SER/PLAS: 102 MMOL/L (ref 98–107)
CHLORIDE (MMOL/L) IN SER/PLAS: 102 MMOL/L (ref 98–107)
CHLORIDE (MMOL/L) IN SER/PLAS: 103 MMOL/L (ref 98–107)
CHLORIDE (MMOL/L) IN SER/PLAS: 104 MMOL/L (ref 98–107)
CHLORIDE (MMOL/L) IN SER/PLAS: 104 MMOL/L (ref 98–107)
CHLORIDE (MMOL/L) IN SER/PLAS: 105 MMOL/L (ref 98–107)
CHLORIDE (MMOL/L) IN SER/PLAS: 106 MMOL/L (ref 98–107)
CHLORIDE (MMOL/L) IN SER/PLAS: 106 MMOL/L (ref 98–107)
CHLORIDE (MMOL/L) IN SER/PLAS: 107 MMOL/L (ref 98–107)
CHLORIDE (MMOL/L) IN SER/PLAS: 107 MMOL/L (ref 98–107)
CHLORIDE (MMOL/L) IN SER/PLAS: 108 MMOL/L (ref 98–107)
CHLORIDE (MMOL/L) IN SER/PLAS: 109 MMOL/L (ref 98–107)
CHLORIDE (MMOL/L) IN SER/PLAS: 110 MMOL/L (ref 98–107)
CHLORIDE (MMOL/L) IN SER/PLAS: 110 MMOL/L (ref 98–107)
CHLORIDE (MMOL/L) IN SER/PLAS: 111 MMOL/L (ref 98–107)
CHLORIDE (MMOL/L) IN SER/PLAS: 111 MMOL/L (ref 98–107)
CHLORIDE (MMOL/L) IN SER/PLAS: 112 MMOL/L (ref 98–107)
CHLORIDE (MMOL/L) IN SER/PLAS: 112 MMOL/L (ref 98–107)
CHLORIDE (MMOL/L) IN SER/PLAS: 113 MMOL/L (ref 98–107)
CHLORIDE (MMOL/L) IN SER/PLAS: 98 MMOL/L (ref 98–107)
CHLORIDE (MMOL/L) IN SER/PLAS: NORMAL
CHLORIDE BLDA-SCNC: 107 MMOL/L (ref 98–107)
CHLORIDE BLDA-SCNC: 109 MMOL/L (ref 98–107)
CHLORIDE BLDA-SCNC: 110 MMOL/L (ref 98–107)
CHLORIDE BLDA-SCNC: 111 MMOL/L (ref 98–107)
CHLORIDE BLDA-SCNC: 111 MMOL/L (ref 98–107)
CHLORIDE BLDA-SCNC: 112 MMOL/L (ref 98–107)
CHLORIDE BLDA-SCNC: 112 MMOL/L (ref 98–107)
CHLORIDE BLDV-SCNC: 106 MMOL/L (ref 98–107)
CHLORIDE BLDV-SCNC: 99 MMOL/L (ref 98–107)
CHLORIDE SERPL-SCNC: 100 MMOL/L (ref 98–107)
CHLORIDE SERPL-SCNC: 103 MMOL/L (ref 98–107)
CHLORIDE SERPL-SCNC: 104 MMOL/L (ref 98–107)
CHLORIDE SERPL-SCNC: 105 MMOL/L (ref 98–107)
CHLORIDE SERPL-SCNC: 106 MMOL/L (ref 98–107)
CHLORIDE SERPL-SCNC: 106 MMOL/L (ref 98–107)
CHLORIDE SERPL-SCNC: 107 MMOL/L (ref 98–107)
CHLORIDE SERPL-SCNC: 107 MMOL/L (ref 98–107)
CHLORIDE SERPL-SCNC: 108 MMOL/L (ref 98–107)
CHLORIDE SERPL-SCNC: 109 MMOL/L (ref 98–107)
CHLORIDE SERPL-SCNC: 109 MMOL/L (ref 98–107)
CHLORIDE SERPL-SCNC: 110 MMOL/L (ref 98–107)
CHLORIDE SERPL-SCNC: 111 MMOL/L (ref 98–107)
CHLORIDE SERPL-SCNC: 111 MMOL/L (ref 98–107)
CHLORIDE SERPL-SCNC: 112 MMOL/L (ref 98–107)
CHLORIDE SERPL-SCNC: 112 MMOL/L (ref 98–107)
CHLORIDE SERPL-SCNC: 96 MMOL/L (ref 98–107)
CHOLESTEROL (MG/DL) IN SER/PLAS: 121 MG/DL (ref 0–199)
CHOLESTEROL (MG/DL) IN SER/PLAS: 172 MG/DL (ref 0–199)
CHOLESTEROL (MG/DL) IN SER/PLAS: 200 MG/DL (ref 0–199)
CHOLESTEROL IN HDL (MG/DL) IN SER/PLAS: 45.2 MG/DL
CHOLESTEROL IN HDL (MG/DL) IN SER/PLAS: 45.8 MG/DL
CHOLESTEROL IN HDL (MG/DL) IN SER/PLAS: 57.3 MG/DL
CHOLESTEROL/HDL RATIO: 2.6
CHOLESTEROL/HDL RATIO: 3
CHOLESTEROL/HDL RATIO: 4.4
CO2 SERPL-SCNC: 19 MMOL/L (ref 21–32)
CO2 SERPL-SCNC: 20 MMOL/L (ref 21–32)
CO2 SERPL-SCNC: 20 MMOL/L (ref 21–32)
CO2 SERPL-SCNC: 21 MMOL/L (ref 21–32)
CO2 SERPL-SCNC: 22 MMOL/L (ref 21–32)
CO2 SERPL-SCNC: 23 MMOL/L (ref 21–32)
CO2 SERPL-SCNC: 23 MMOL/L (ref 21–32)
CO2 SERPL-SCNC: 24 MMOL/L (ref 21–32)
CO2 SERPL-SCNC: 25 MMOL/L (ref 21–32)
CO2 SERPL-SCNC: 26 MMOL/L (ref 21–32)
CO2 SERPL-SCNC: 27 MMOL/L (ref 21–32)
CO2 SERPL-SCNC: 28 MMOL/L (ref 21–32)
CO2 SERPL-SCNC: 29 MMOL/L (ref 21–32)
COLOR UR: ABNORMAL
COLOR UR: YELLOW
COMPLETE PATHOLOGY REPORT: NORMAL
COMPLETE PATHOLOGY REPORT: NORMAL
CONVERTED ADDENDUM COMMENT 2: NORMAL
CONVERTED CLINICAL DIAGNOSIS-HISTORY: NORMAL
CONVERTED CLINICAL DIAGNOSIS-HISTORY: NORMAL
CONVERTED FINAL DIAGNOSIS: NORMAL
CONVERTED FINAL DIAGNOSIS: NORMAL
CONVERTED FINAL REPORT PDF LINK TO COPY AND PASTE: NORMAL
CONVERTED FINAL REPORT PDF LINK TO COPY AND PASTE: NORMAL
CONVERTED GROSS DESCRIPTION: NORMAL
CONVERTED GROSS DESCRIPTION: NORMAL
CONVERTED INTRAOPERATIVE DIAGNOSIS: NORMAL
CONVERTED PHYSICIAN NOTIFICATION: NORMAL
CONVERTED SYNOPTIC DIAGNOSIS: NORMAL
CREAT SERPL-MCNC: 2.14 MG/DL (ref 0.5–1.3)
CREAT SERPL-MCNC: 2.17 MG/DL (ref 0.5–1.3)
CREAT SERPL-MCNC: 2.23 MG/DL (ref 0.5–1.3)
CREAT SERPL-MCNC: 2.28 MG/DL (ref 0.5–1.3)
CREAT SERPL-MCNC: 2.36 MG/DL (ref 0.5–1.3)
CREAT SERPL-MCNC: 2.36 MG/DL (ref 0.5–1.3)
CREAT SERPL-MCNC: 2.4 MG/DL (ref 0.5–1.3)
CREAT SERPL-MCNC: 2.48 MG/DL (ref 0.5–1.3)
CREAT SERPL-MCNC: 2.53 MG/DL (ref 0.5–1.3)
CREAT SERPL-MCNC: 2.53 MG/DL (ref 0.5–1.3)
CREAT SERPL-MCNC: 2.54 MG/DL (ref 0.5–1.3)
CREAT SERPL-MCNC: 2.59 MG/DL (ref 0.5–1.3)
CREAT SERPL-MCNC: 2.68 MG/DL (ref 0.5–1.3)
CREAT SERPL-MCNC: 2.69 MG/DL (ref 0.5–1.3)
CREAT SERPL-MCNC: 2.76 MG/DL (ref 0.5–1.3)
CREAT SERPL-MCNC: 2.8 MG/DL (ref 0.5–1.3)
CREAT SERPL-MCNC: 2.87 MG/DL (ref 0.5–1.3)
CREAT SERPL-MCNC: 2.89 MG/DL (ref 0.5–1.3)
CREAT SERPL-MCNC: 2.98 MG/DL (ref 0.5–1.3)
CREAT SERPL-MCNC: 3.19 MG/DL (ref 0.5–1.3)
CREAT SERPL-MCNC: 3.21 MG/DL (ref 0.5–1.3)
CREAT SERPL-MCNC: 3.22 MG/DL (ref 0.5–1.3)
CREAT SERPL-MCNC: 3.39 MG/DL (ref 0.5–1.3)
CREAT SERPL-MCNC: 3.39 MG/DL (ref 0.5–1.3)
CREAT SERPL-MCNC: 3.53 MG/DL (ref 0.5–1.3)
CREAT SERPL-MCNC: 3.53 MG/DL (ref 0.5–1.3)
CREATININE (MG/DL) IN SER/PLAS: 1.8 MG/DL (ref 0.5–1.3)
CREATININE (MG/DL) IN SER/PLAS: 1.82 MG/DL (ref 0.5–1.3)
CREATININE (MG/DL) IN SER/PLAS: 1.87 MG/DL (ref 0.5–1.3)
CREATININE (MG/DL) IN SER/PLAS: 1.99 MG/DL (ref 0.5–1.3)
CREATININE (MG/DL) IN SER/PLAS: 2.09 MG/DL (ref 0.5–1.3)
CREATININE (MG/DL) IN SER/PLAS: 2.13 MG/DL (ref 0.5–1.3)
CREATININE (MG/DL) IN SER/PLAS: 2.13 MG/DL (ref 0.5–1.3)
CREATININE (MG/DL) IN SER/PLAS: 2.14 MG/DL (ref 0.5–1.3)
CREATININE (MG/DL) IN SER/PLAS: 2.16 MG/DL (ref 0.5–1.3)
CREATININE (MG/DL) IN SER/PLAS: 2.16 MG/DL (ref 0.5–1.3)
CREATININE (MG/DL) IN SER/PLAS: 2.18 MG/DL (ref 0.5–1.3)
CREATININE (MG/DL) IN SER/PLAS: 2.2 MG/DL (ref 0.5–1.3)
CREATININE (MG/DL) IN SER/PLAS: 2.21 MG/DL (ref 0.5–1.3)
CREATININE (MG/DL) IN SER/PLAS: 2.29 MG/DL (ref 0.5–1.3)
CREATININE (MG/DL) IN SER/PLAS: 2.56 MG/DL (ref 0.5–1.3)
CREATININE (MG/DL) IN SER/PLAS: 2.57 MG/DL (ref 0.5–1.3)
CREATININE (MG/DL) IN SER/PLAS: 2.62 MG/DL (ref 0.5–1.3)
CREATININE (MG/DL) IN SER/PLAS: 2.69 MG/DL (ref 0.5–1.3)
CREATININE (MG/DL) IN SER/PLAS: 2.75 MG/DL (ref 0.5–1.3)
CREATININE (MG/DL) IN SER/PLAS: 2.81 MG/DL (ref 0.5–1.3)
CREATININE (MG/DL) IN SER/PLAS: 2.93 MG/DL (ref 0.5–1.3)
CREATININE (MG/DL) IN SER/PLAS: 2.94 MG/DL (ref 0.5–1.3)
CREATININE (MG/DL) IN SER/PLAS: 2.96 MG/DL (ref 0.5–1.3)
CREATININE (MG/DL) IN SER/PLAS: 3.06 MG/DL (ref 0.5–1.3)
CREATININE (MG/DL) IN SER/PLAS: 3.15 MG/DL (ref 0.5–1.3)
CREATININE (MG/DL) IN SER/PLAS: 3.21 MG/DL (ref 0.5–1.3)
CREATININE (MG/DL) IN SER/PLAS: 3.22 MG/DL (ref 0.5–1.3)
CREATININE (MG/DL) IN SER/PLAS: NORMAL
DISPENSE STATUS: NORMAL
EOSINOPHIL # BLD AUTO: 0.02 X10*3/UL (ref 0–0.7)
EOSINOPHIL # BLD AUTO: 0.19 X10*3/UL (ref 0–0.7)
EOSINOPHIL # BLD AUTO: 0.29 X10*3/UL (ref 0–0.7)
EOSINOPHIL # BLD MANUAL: 0 X10*3/UL (ref 0–0.7)
EOSINOPHIL NFR BLD AUTO: 0.5 %
EOSINOPHIL NFR BLD AUTO: 3.3 %
EOSINOPHIL NFR BLD AUTO: 5.1 %
EOSINOPHIL NFR BLD MANUAL: 0 %
EOSINOPHILS (10*3/UL) IN BLOOD BY AUTOMATED COUNT: 0.17 X10E9/L (ref 0–0.7)
EOSINOPHILS (10*3/UL) IN BLOOD BY AUTOMATED COUNT: 0.18 X10E9/L (ref 0–0.7)
EOSINOPHILS (10*3/UL) IN BLOOD BY AUTOMATED COUNT: 0.18 X10E9/L (ref 0–0.7)
EOSINOPHILS/100 LEUKOCYTES IN BLOOD BY AUTOMATED COUNT: 2.9 % (ref 0–6)
EOSINOPHILS/100 LEUKOCYTES IN BLOOD BY AUTOMATED COUNT: 2.9 % (ref 0–6)
EOSINOPHILS/100 LEUKOCYTES IN BLOOD BY AUTOMATED COUNT: 3.3 % (ref 0–6)
ERYTHROCYTE DISTRIBUTION WIDTH (RATIO) BY AUTOMATED COUNT: 13.1 % (ref 11.5–14.5)
ERYTHROCYTE DISTRIBUTION WIDTH (RATIO) BY AUTOMATED COUNT: 13.1 % (ref 11.5–14.5)
ERYTHROCYTE DISTRIBUTION WIDTH (RATIO) BY AUTOMATED COUNT: 13.2 % (ref 11.5–14.5)
ERYTHROCYTE DISTRIBUTION WIDTH (RATIO) BY AUTOMATED COUNT: 13.3 % (ref 11.5–14.5)
ERYTHROCYTE DISTRIBUTION WIDTH (RATIO) BY AUTOMATED COUNT: 13.5 % (ref 11.5–14.5)
ERYTHROCYTE DISTRIBUTION WIDTH (RATIO) BY AUTOMATED COUNT: 13.6 % (ref 11.5–14.5)
ERYTHROCYTE DISTRIBUTION WIDTH (RATIO) BY AUTOMATED COUNT: 13.7 % (ref 11.5–14.5)
ERYTHROCYTE DISTRIBUTION WIDTH (RATIO) BY AUTOMATED COUNT: 13.9 % (ref 11.5–14.5)
ERYTHROCYTE DISTRIBUTION WIDTH (RATIO) BY AUTOMATED COUNT: 14 % (ref 11.5–14.5)
ERYTHROCYTE DISTRIBUTION WIDTH (RATIO) BY AUTOMATED COUNT: 14.1 % (ref 11.5–14.5)
ERYTHROCYTE DISTRIBUTION WIDTH (RATIO) BY AUTOMATED COUNT: 14.1 % (ref 11.5–14.5)
ERYTHROCYTE DISTRIBUTION WIDTH (RATIO) BY AUTOMATED COUNT: 14.2 % (ref 11.5–14.5)
ERYTHROCYTE DISTRIBUTION WIDTH (RATIO) BY AUTOMATED COUNT: 14.3 % (ref 11.5–14.5)
ERYTHROCYTE DISTRIBUTION WIDTH (RATIO) BY AUTOMATED COUNT: 14.4 % (ref 11.5–14.5)
ERYTHROCYTE DISTRIBUTION WIDTH (RATIO) BY AUTOMATED COUNT: 14.5 % (ref 11.5–14.5)
ERYTHROCYTE DISTRIBUTION WIDTH (RATIO) BY AUTOMATED COUNT: 14.6 % (ref 11.5–14.5)
ERYTHROCYTE DISTRIBUTION WIDTH (RATIO) BY AUTOMATED COUNT: 15.1 % (ref 11.5–14.5)
ERYTHROCYTE DISTRIBUTION WIDTH (RATIO) BY AUTOMATED COUNT: NORMAL
ERYTHROCYTE MEAN CORPUSCULAR HEMOGLOBIN CONCENTRATION (G/DL) BY AUTOMATED: 30.5 G/DL (ref 32–36)
ERYTHROCYTE MEAN CORPUSCULAR HEMOGLOBIN CONCENTRATION (G/DL) BY AUTOMATED: 30.6 G/DL (ref 32–36)
ERYTHROCYTE MEAN CORPUSCULAR HEMOGLOBIN CONCENTRATION (G/DL) BY AUTOMATED: 30.6 G/DL (ref 32–36)
ERYTHROCYTE MEAN CORPUSCULAR HEMOGLOBIN CONCENTRATION (G/DL) BY AUTOMATED: 31.8 G/DL (ref 32–36)
ERYTHROCYTE MEAN CORPUSCULAR HEMOGLOBIN CONCENTRATION (G/DL) BY AUTOMATED: 32.1 G/DL (ref 32–36)
ERYTHROCYTE MEAN CORPUSCULAR HEMOGLOBIN CONCENTRATION (G/DL) BY AUTOMATED: 32.1 G/DL (ref 32–36)
ERYTHROCYTE MEAN CORPUSCULAR HEMOGLOBIN CONCENTRATION (G/DL) BY AUTOMATED: 32.2 G/DL (ref 32–36)
ERYTHROCYTE MEAN CORPUSCULAR HEMOGLOBIN CONCENTRATION (G/DL) BY AUTOMATED: 32.7 G/DL (ref 32–36)
ERYTHROCYTE MEAN CORPUSCULAR HEMOGLOBIN CONCENTRATION (G/DL) BY AUTOMATED: 32.8 G/DL (ref 32–36)
ERYTHROCYTE MEAN CORPUSCULAR HEMOGLOBIN CONCENTRATION (G/DL) BY AUTOMATED: 32.9 G/DL (ref 32–36)
ERYTHROCYTE MEAN CORPUSCULAR HEMOGLOBIN CONCENTRATION (G/DL) BY AUTOMATED: 33 G/DL (ref 32–36)
ERYTHROCYTE MEAN CORPUSCULAR HEMOGLOBIN CONCENTRATION (G/DL) BY AUTOMATED: 33 G/DL (ref 32–36)
ERYTHROCYTE MEAN CORPUSCULAR HEMOGLOBIN CONCENTRATION (G/DL) BY AUTOMATED: 33.2 G/DL (ref 32–36)
ERYTHROCYTE MEAN CORPUSCULAR HEMOGLOBIN CONCENTRATION (G/DL) BY AUTOMATED: 33.2 G/DL (ref 32–36)
ERYTHROCYTE MEAN CORPUSCULAR HEMOGLOBIN CONCENTRATION (G/DL) BY AUTOMATED: 33.5 G/DL (ref 32–36)
ERYTHROCYTE MEAN CORPUSCULAR HEMOGLOBIN CONCENTRATION (G/DL) BY AUTOMATED: 33.6 G/DL (ref 32–36)
ERYTHROCYTE MEAN CORPUSCULAR HEMOGLOBIN CONCENTRATION (G/DL) BY AUTOMATED: 33.6 G/DL (ref 32–36)
ERYTHROCYTE MEAN CORPUSCULAR HEMOGLOBIN CONCENTRATION (G/DL) BY AUTOMATED: 33.7 G/DL (ref 32–36)
ERYTHROCYTE MEAN CORPUSCULAR HEMOGLOBIN CONCENTRATION (G/DL) BY AUTOMATED: 33.8 G/DL (ref 32–36)
ERYTHROCYTE MEAN CORPUSCULAR HEMOGLOBIN CONCENTRATION (G/DL) BY AUTOMATED: 34.2 G/DL (ref 32–36)
ERYTHROCYTE MEAN CORPUSCULAR HEMOGLOBIN CONCENTRATION (G/DL) BY AUTOMATED: 34.7 G/DL (ref 32–36)
ERYTHROCYTE MEAN CORPUSCULAR HEMOGLOBIN CONCENTRATION (G/DL) BY AUTOMATED: 35.7 G/DL (ref 32–36)
ERYTHROCYTE MEAN CORPUSCULAR HEMOGLOBIN CONCENTRATION (G/DL) BY AUTOMATED: 36 G/DL (ref 32–36)
ERYTHROCYTE MEAN CORPUSCULAR HEMOGLOBIN CONCENTRATION (G/DL) BY AUTOMATED: NORMAL
ERYTHROCYTE MEAN CORPUSCULAR VOLUME (FL) BY AUTOMATED COUNT: 85 FL (ref 80–100)
ERYTHROCYTE MEAN CORPUSCULAR VOLUME (FL) BY AUTOMATED COUNT: 86 FL (ref 80–100)
ERYTHROCYTE MEAN CORPUSCULAR VOLUME (FL) BY AUTOMATED COUNT: 87 FL (ref 80–100)
ERYTHROCYTE MEAN CORPUSCULAR VOLUME (FL) BY AUTOMATED COUNT: 88 FL (ref 80–100)
ERYTHROCYTE MEAN CORPUSCULAR VOLUME (FL) BY AUTOMATED COUNT: 89 FL (ref 80–100)
ERYTHROCYTE MEAN CORPUSCULAR VOLUME (FL) BY AUTOMATED COUNT: 89 FL (ref 80–100)
ERYTHROCYTE MEAN CORPUSCULAR VOLUME (FL) BY AUTOMATED COUNT: 90 FL (ref 80–100)
ERYTHROCYTE MEAN CORPUSCULAR VOLUME (FL) BY AUTOMATED COUNT: 91 FL (ref 80–100)
ERYTHROCYTE MEAN CORPUSCULAR VOLUME (FL) BY AUTOMATED COUNT: 92 FL (ref 80–100)
ERYTHROCYTE MEAN CORPUSCULAR VOLUME (FL) BY AUTOMATED COUNT: 95 FL (ref 80–100)
ERYTHROCYTE MEAN CORPUSCULAR VOLUME (FL) BY AUTOMATED COUNT: 96 FL (ref 80–100)
ERYTHROCYTE MEAN CORPUSCULAR VOLUME (FL) BY AUTOMATED COUNT: 97 FL (ref 80–100)
ERYTHROCYTE MEAN CORPUSCULAR VOLUME (FL) BY AUTOMATED COUNT: 98 FL (ref 80–100)
ERYTHROCYTE MEAN CORPUSCULAR VOLUME (FL) BY AUTOMATED COUNT: NORMAL
ERYTHROCYTE [DISTWIDTH] IN BLOOD BY AUTOMATED COUNT: 14.5 % (ref 11.5–14.5)
ERYTHROCYTE [DISTWIDTH] IN BLOOD BY AUTOMATED COUNT: 14.7 % (ref 11.5–14.5)
ERYTHROCYTE [DISTWIDTH] IN BLOOD BY AUTOMATED COUNT: 14.8 % (ref 11.5–14.5)
ERYTHROCYTE [DISTWIDTH] IN BLOOD BY AUTOMATED COUNT: 15 % (ref 11.5–14.5)
ERYTHROCYTE [DISTWIDTH] IN BLOOD BY AUTOMATED COUNT: 15.1 % (ref 11.5–14.5)
ERYTHROCYTE [DISTWIDTH] IN BLOOD BY AUTOMATED COUNT: 15.4 % (ref 11.5–14.5)
ERYTHROCYTE [DISTWIDTH] IN BLOOD BY AUTOMATED COUNT: 15.8 % (ref 11.5–14.5)
ERYTHROCYTE [DISTWIDTH] IN BLOOD BY AUTOMATED COUNT: 16.3 % (ref 11.5–14.5)
ERYTHROCYTE [DISTWIDTH] IN BLOOD BY AUTOMATED COUNT: 16.3 % (ref 11.5–14.5)
ERYTHROCYTE [DISTWIDTH] IN BLOOD BY AUTOMATED COUNT: 16.4 % (ref 11.5–14.5)
ERYTHROCYTE [DISTWIDTH] IN BLOOD BY AUTOMATED COUNT: 16.8 % (ref 11.5–14.5)
ERYTHROCYTE [DISTWIDTH] IN BLOOD BY AUTOMATED COUNT: 16.8 % (ref 11.5–14.5)
ERYTHROCYTE [DISTWIDTH] IN BLOOD BY AUTOMATED COUNT: 16.9 % (ref 11.5–14.5)
ERYTHROCYTE [DISTWIDTH] IN BLOOD BY AUTOMATED COUNT: 16.9 % (ref 11.5–14.5)
ERYTHROCYTE [DISTWIDTH] IN BLOOD BY AUTOMATED COUNT: 17 % (ref 11.5–14.5)
ERYTHROCYTE [DISTWIDTH] IN BLOOD BY AUTOMATED COUNT: 17.3 % (ref 11.5–14.5)
ERYTHROCYTE [DISTWIDTH] IN BLOOD BY AUTOMATED COUNT: 17.4 % (ref 11.5–14.5)
ERYTHROCYTE [DISTWIDTH] IN BLOOD BY AUTOMATED COUNT: 17.7 % (ref 11.5–14.5)
ERYTHROCYTE [DISTWIDTH] IN BLOOD BY AUTOMATED COUNT: 17.7 % (ref 11.5–14.5)
ERYTHROCYTE [DISTWIDTH] IN BLOOD BY AUTOMATED COUNT: 17.9 % (ref 11.5–14.5)
ERYTHROCYTE [DISTWIDTH] IN BLOOD BY AUTOMATED COUNT: 18 % (ref 11.5–14.5)
ERYTHROCYTE [DISTWIDTH] IN BLOOD BY AUTOMATED COUNT: 18.1 % (ref 11.5–14.5)
ERYTHROCYTE [DISTWIDTH] IN BLOOD BY AUTOMATED COUNT: 18.4 % (ref 11.5–14.5)
ERYTHROCYTE [DISTWIDTH] IN BLOOD BY AUTOMATED COUNT: 18.6 % (ref 11.5–14.5)
ERYTHROCYTES (10*6/UL) IN BLOOD BY AUTOMATED COUNT: 2.33 X10E12/L (ref 4.5–5.9)
ERYTHROCYTES (10*6/UL) IN BLOOD BY AUTOMATED COUNT: 2.41 X10E12/L (ref 4.5–5.9)
ERYTHROCYTES (10*6/UL) IN BLOOD BY AUTOMATED COUNT: 2.45 X10E12/L (ref 4.5–5.9)
ERYTHROCYTES (10*6/UL) IN BLOOD BY AUTOMATED COUNT: 2.48 X10E12/L (ref 4.5–5.9)
ERYTHROCYTES (10*6/UL) IN BLOOD BY AUTOMATED COUNT: 2.5 X10E12/L (ref 4.5–5.9)
ERYTHROCYTES (10*6/UL) IN BLOOD BY AUTOMATED COUNT: 2.53 X10E12/L (ref 4.5–5.9)
ERYTHROCYTES (10*6/UL) IN BLOOD BY AUTOMATED COUNT: 2.56 X10E12/L (ref 4.5–5.9)
ERYTHROCYTES (10*6/UL) IN BLOOD BY AUTOMATED COUNT: 2.76 X10E12/L (ref 4.5–5.9)
ERYTHROCYTES (10*6/UL) IN BLOOD BY AUTOMATED COUNT: 2.76 X10E12/L (ref 4.5–5.9)
ERYTHROCYTES (10*6/UL) IN BLOOD BY AUTOMATED COUNT: 2.8 X10E12/L (ref 4.5–5.9)
ERYTHROCYTES (10*6/UL) IN BLOOD BY AUTOMATED COUNT: 3.04 X10E12/L (ref 4.5–5.9)
ERYTHROCYTES (10*6/UL) IN BLOOD BY AUTOMATED COUNT: 3.08 X10E12/L (ref 4.5–5.9)
ERYTHROCYTES (10*6/UL) IN BLOOD BY AUTOMATED COUNT: 3.11 X10E12/L (ref 4.5–5.9)
ERYTHROCYTES (10*6/UL) IN BLOOD BY AUTOMATED COUNT: 3.12 X10E12/L (ref 4.5–5.9)
ERYTHROCYTES (10*6/UL) IN BLOOD BY AUTOMATED COUNT: 3.19 X10E12/L (ref 4.5–5.9)
ERYTHROCYTES (10*6/UL) IN BLOOD BY AUTOMATED COUNT: 3.22 X10E12/L (ref 4.5–5.9)
ERYTHROCYTES (10*6/UL) IN BLOOD BY AUTOMATED COUNT: 3.26 X10E12/L (ref 4.5–5.9)
ERYTHROCYTES (10*6/UL) IN BLOOD BY AUTOMATED COUNT: 3.29 X10E12/L (ref 4.5–5.9)
ERYTHROCYTES (10*6/UL) IN BLOOD BY AUTOMATED COUNT: 3.41 X10E12/L (ref 4.5–5.9)
ERYTHROCYTES (10*6/UL) IN BLOOD BY AUTOMATED COUNT: 3.66 X10E12/L (ref 4.5–5.9)
ERYTHROCYTES (10*6/UL) IN BLOOD BY AUTOMATED COUNT: 3.7 X10E12/L (ref 4.5–5.9)
ERYTHROCYTES (10*6/UL) IN BLOOD BY AUTOMATED COUNT: 3.72 X10E12/L (ref 4.5–5.9)
ERYTHROCYTES (10*6/UL) IN BLOOD BY AUTOMATED COUNT: 4.27 X10E12/L (ref 4.5–5.9)
ERYTHROCYTES (10*6/UL) IN BLOOD BY AUTOMATED COUNT: NORMAL
EST. AVERAGE GLUCOSE BLD GHB EST-MCNC: 163 MG/DL
ESTIMATED AVERAGE GLUCOSE FOR HBA1C: 166 MG/DL
ESTIMATED AVERAGE GLUCOSE FOR HBA1C: 183 MG/DL
ESTIMATED AVERAGE GLUCOSE FOR HBA1C: 192 MG/DL
FIBRINOGEN (MG/DL) IN PPP BY COAGULATION ASSAY: 362 MG/DL (ref 200–400)
FOLATE SERPL-MCNC: 11.1 NG/ML
FRUCTOSAMINE SER/PLAS: 302 UMOL/L (ref 205–285)
GFR FEMALE: NORMAL
GFR MALE: 20 ML/MIN/1.73M2
GFR MALE: 20 ML/MIN/1.73M2
GFR MALE: 21 ML/MIN/1.73M2
GFR MALE: 21 ML/MIN/1.73M2
GFR MALE: 22 ML/MIN/1.73M2
GFR MALE: 23 ML/MIN/1.73M2
GFR MALE: 23 ML/MIN/1.73M2
GFR MALE: 24 ML/MIN/1.73M2
GFR MALE: 24 ML/MIN/1.73M2
GFR MALE: 25 ML/MIN/1.73M2
GFR MALE: 26 ML/MIN/1.73M2
GFR MALE: 27 ML/MIN/1.73M2
GFR MALE: 27 ML/MIN/1.73M2
GFR MALE: 30 ML/MIN/1.73M2
GFR MALE: 32 ML/MIN/1.73M2
GFR MALE: 33 ML/MIN/1.73M2
GFR MALE: 34 ML/MIN/1.73M2
GFR MALE: 36 ML/MIN/1.73M2
GFR MALE: 39 ML/MIN/1.73M2
GFR MALE: 40 ML/MIN/1.73M2
GFR MALE: 41 ML/MIN/1.73M2
GFR MALE: NORMAL
GFR SERPL CREATININE-BSD FRML MDRD: 18 ML/MIN/1.73M*2
GFR SERPL CREATININE-BSD FRML MDRD: 18 ML/MIN/1.73M*2
GFR SERPL CREATININE-BSD FRML MDRD: 19 ML/MIN/1.73M*2
GFR SERPL CREATININE-BSD FRML MDRD: 19 ML/MIN/1.73M*2
GFR SERPL CREATININE-BSD FRML MDRD: 20 ML/MIN/1.73M*2
GFR SERPL CREATININE-BSD FRML MDRD: 20 ML/MIN/1.73M*2
GFR SERPL CREATININE-BSD FRML MDRD: 21 ML/MIN/1.73M*2
GFR SERPL CREATININE-BSD FRML MDRD: 22 ML/MIN/1.73M*2
GFR SERPL CREATININE-BSD FRML MDRD: 23 ML/MIN/1.73M*2
GFR SERPL CREATININE-BSD FRML MDRD: 23 ML/MIN/1.73M*2
GFR SERPL CREATININE-BSD FRML MDRD: 24 ML/MIN/1.73M*2
GFR SERPL CREATININE-BSD FRML MDRD: 24 ML/MIN/1.73M*2
GFR SERPL CREATININE-BSD FRML MDRD: 25 ML/MIN/1.73M*2
GFR SERPL CREATININE-BSD FRML MDRD: 25 ML/MIN/1.73M*2
GFR SERPL CREATININE-BSD FRML MDRD: 26 ML/MIN/1.73M*2
GFR SERPL CREATININE-BSD FRML MDRD: 27 ML/MIN/1.73M*2
GFR SERPL CREATININE-BSD FRML MDRD: 28 ML/MIN/1.73M*2
GFR SERPL CREATININE-BSD FRML MDRD: 29 ML/MIN/1.73M*2
GFR SERPL CREATININE-BSD FRML MDRD: 31 ML/MIN/1.73M*2
GFR SERPL CREATININE-BSD FRML MDRD: 32 ML/MIN/1.73M*2
GFR SERPL CREATININE-BSD FRML MDRD: 33 ML/MIN/1.73M*2
GFR SERPL CREATININE-BSD FRML MDRD: 33 ML/MIN/1.73M*2
GLUCOSE (MG/DL) IN SER/PLAS: 102 MG/DL (ref 74–99)
GLUCOSE (MG/DL) IN SER/PLAS: 109 MG/DL (ref 74–99)
GLUCOSE (MG/DL) IN SER/PLAS: 109 MG/DL (ref 74–99)
GLUCOSE (MG/DL) IN SER/PLAS: 113 MG/DL (ref 74–99)
GLUCOSE (MG/DL) IN SER/PLAS: 115 MG/DL (ref 74–99)
GLUCOSE (MG/DL) IN SER/PLAS: 118 MG/DL (ref 74–99)
GLUCOSE (MG/DL) IN SER/PLAS: 121 MG/DL (ref 74–99)
GLUCOSE (MG/DL) IN SER/PLAS: 123 MG/DL (ref 74–99)
GLUCOSE (MG/DL) IN SER/PLAS: 124 MG/DL (ref 74–99)
GLUCOSE (MG/DL) IN SER/PLAS: 130 MG/DL (ref 74–99)
GLUCOSE (MG/DL) IN SER/PLAS: 136 MG/DL (ref 74–99)
GLUCOSE (MG/DL) IN SER/PLAS: 138 MG/DL (ref 74–99)
GLUCOSE (MG/DL) IN SER/PLAS: 139 MG/DL (ref 74–99)
GLUCOSE (MG/DL) IN SER/PLAS: 144 MG/DL (ref 74–99)
GLUCOSE (MG/DL) IN SER/PLAS: 145 MG/DL (ref 74–99)
GLUCOSE (MG/DL) IN SER/PLAS: 153 MG/DL (ref 74–99)
GLUCOSE (MG/DL) IN SER/PLAS: 159 MG/DL (ref 74–99)
GLUCOSE (MG/DL) IN SER/PLAS: 163 MG/DL (ref 74–99)
GLUCOSE (MG/DL) IN SER/PLAS: 164 MG/DL (ref 74–99)
GLUCOSE (MG/DL) IN SER/PLAS: 179 MG/DL (ref 74–99)
GLUCOSE (MG/DL) IN SER/PLAS: 179 MG/DL (ref 74–99)
GLUCOSE (MG/DL) IN SER/PLAS: 199 MG/DL (ref 74–99)
GLUCOSE (MG/DL) IN SER/PLAS: 206 MG/DL (ref 74–99)
GLUCOSE (MG/DL) IN SER/PLAS: 233 MG/DL (ref 74–99)
GLUCOSE (MG/DL) IN SER/PLAS: 245 MG/DL (ref 74–99)
GLUCOSE (MG/DL) IN SER/PLAS: 275 MG/DL (ref 74–99)
GLUCOSE (MG/DL) IN SER/PLAS: 95 MG/DL (ref 74–99)
GLUCOSE (MG/DL) IN SER/PLAS: NORMAL
GLUCOSE BLD MANUAL STRIP-MCNC: 101 MG/DL (ref 74–99)
GLUCOSE BLD MANUAL STRIP-MCNC: 102 MG/DL (ref 74–99)
GLUCOSE BLD MANUAL STRIP-MCNC: 103 MG/DL (ref 74–99)
GLUCOSE BLD MANUAL STRIP-MCNC: 103 MG/DL (ref 74–99)
GLUCOSE BLD MANUAL STRIP-MCNC: 105 MG/DL (ref 74–99)
GLUCOSE BLD MANUAL STRIP-MCNC: 106 MG/DL (ref 74–99)
GLUCOSE BLD MANUAL STRIP-MCNC: 107 MG/DL (ref 74–99)
GLUCOSE BLD MANUAL STRIP-MCNC: 108 MG/DL (ref 74–99)
GLUCOSE BLD MANUAL STRIP-MCNC: 109 MG/DL (ref 74–99)
GLUCOSE BLD MANUAL STRIP-MCNC: 109 MG/DL (ref 74–99)
GLUCOSE BLD MANUAL STRIP-MCNC: 112 MG/DL (ref 74–99)
GLUCOSE BLD MANUAL STRIP-MCNC: 113 MG/DL (ref 74–99)
GLUCOSE BLD MANUAL STRIP-MCNC: 114 MG/DL (ref 74–99)
GLUCOSE BLD MANUAL STRIP-MCNC: 115 MG/DL (ref 74–99)
GLUCOSE BLD MANUAL STRIP-MCNC: 116 MG/DL (ref 74–99)
GLUCOSE BLD MANUAL STRIP-MCNC: 116 MG/DL (ref 74–99)
GLUCOSE BLD MANUAL STRIP-MCNC: 117 MG/DL (ref 74–99)
GLUCOSE BLD MANUAL STRIP-MCNC: 118 MG/DL (ref 74–99)
GLUCOSE BLD MANUAL STRIP-MCNC: 127 MG/DL (ref 74–99)
GLUCOSE BLD MANUAL STRIP-MCNC: 128 MG/DL (ref 74–99)
GLUCOSE BLD MANUAL STRIP-MCNC: 128 MG/DL (ref 74–99)
GLUCOSE BLD MANUAL STRIP-MCNC: 129 MG/DL (ref 74–99)
GLUCOSE BLD MANUAL STRIP-MCNC: 130 MG/DL (ref 74–99)
GLUCOSE BLD MANUAL STRIP-MCNC: 130 MG/DL (ref 74–99)
GLUCOSE BLD MANUAL STRIP-MCNC: 131 MG/DL (ref 74–99)
GLUCOSE BLD MANUAL STRIP-MCNC: 132 MG/DL (ref 74–99)
GLUCOSE BLD MANUAL STRIP-MCNC: 135 MG/DL (ref 74–99)
GLUCOSE BLD MANUAL STRIP-MCNC: 137 MG/DL (ref 74–99)
GLUCOSE BLD MANUAL STRIP-MCNC: 138 MG/DL (ref 74–99)
GLUCOSE BLD MANUAL STRIP-MCNC: 138 MG/DL (ref 74–99)
GLUCOSE BLD MANUAL STRIP-MCNC: 139 MG/DL (ref 74–99)
GLUCOSE BLD MANUAL STRIP-MCNC: 141 MG/DL (ref 74–99)
GLUCOSE BLD MANUAL STRIP-MCNC: 141 MG/DL (ref 74–99)
GLUCOSE BLD MANUAL STRIP-MCNC: 145 MG/DL (ref 74–99)
GLUCOSE BLD MANUAL STRIP-MCNC: 146 MG/DL (ref 74–99)
GLUCOSE BLD MANUAL STRIP-MCNC: 148 MG/DL (ref 74–99)
GLUCOSE BLD MANUAL STRIP-MCNC: 149 MG/DL (ref 74–99)
GLUCOSE BLD MANUAL STRIP-MCNC: 149 MG/DL (ref 74–99)
GLUCOSE BLD MANUAL STRIP-MCNC: 151 MG/DL (ref 74–99)
GLUCOSE BLD MANUAL STRIP-MCNC: 152 MG/DL (ref 74–99)
GLUCOSE BLD MANUAL STRIP-MCNC: 155 MG/DL (ref 74–99)
GLUCOSE BLD MANUAL STRIP-MCNC: 156 MG/DL (ref 74–99)
GLUCOSE BLD MANUAL STRIP-MCNC: 157 MG/DL (ref 74–99)
GLUCOSE BLD MANUAL STRIP-MCNC: 157 MG/DL (ref 74–99)
GLUCOSE BLD MANUAL STRIP-MCNC: 159 MG/DL (ref 74–99)
GLUCOSE BLD MANUAL STRIP-MCNC: 159 MG/DL (ref 74–99)
GLUCOSE BLD MANUAL STRIP-MCNC: 160 MG/DL (ref 74–99)
GLUCOSE BLD MANUAL STRIP-MCNC: 161 MG/DL (ref 74–99)
GLUCOSE BLD MANUAL STRIP-MCNC: 161 MG/DL (ref 74–99)
GLUCOSE BLD MANUAL STRIP-MCNC: 162 MG/DL (ref 74–99)
GLUCOSE BLD MANUAL STRIP-MCNC: 164 MG/DL (ref 74–99)
GLUCOSE BLD MANUAL STRIP-MCNC: 167 MG/DL (ref 74–99)
GLUCOSE BLD MANUAL STRIP-MCNC: 168 MG/DL (ref 74–99)
GLUCOSE BLD MANUAL STRIP-MCNC: 168 MG/DL (ref 74–99)
GLUCOSE BLD MANUAL STRIP-MCNC: 169 MG/DL (ref 74–99)
GLUCOSE BLD MANUAL STRIP-MCNC: 169 MG/DL (ref 74–99)
GLUCOSE BLD MANUAL STRIP-MCNC: 172 MG/DL (ref 74–99)
GLUCOSE BLD MANUAL STRIP-MCNC: 173 MG/DL (ref 74–99)
GLUCOSE BLD MANUAL STRIP-MCNC: 175 MG/DL (ref 74–99)
GLUCOSE BLD MANUAL STRIP-MCNC: 176 MG/DL (ref 74–99)
GLUCOSE BLD MANUAL STRIP-MCNC: 176 MG/DL (ref 74–99)
GLUCOSE BLD MANUAL STRIP-MCNC: 177 MG/DL (ref 74–99)
GLUCOSE BLD MANUAL STRIP-MCNC: 177 MG/DL (ref 74–99)
GLUCOSE BLD MANUAL STRIP-MCNC: 178 MG/DL (ref 74–99)
GLUCOSE BLD MANUAL STRIP-MCNC: 179 MG/DL (ref 74–99)
GLUCOSE BLD MANUAL STRIP-MCNC: 184 MG/DL (ref 74–99)
GLUCOSE BLD MANUAL STRIP-MCNC: 184 MG/DL (ref 74–99)
GLUCOSE BLD MANUAL STRIP-MCNC: 186 MG/DL (ref 74–99)
GLUCOSE BLD MANUAL STRIP-MCNC: 187 MG/DL (ref 74–99)
GLUCOSE BLD MANUAL STRIP-MCNC: 188 MG/DL (ref 74–99)
GLUCOSE BLD MANUAL STRIP-MCNC: 188 MG/DL (ref 74–99)
GLUCOSE BLD MANUAL STRIP-MCNC: 189 MG/DL (ref 74–99)
GLUCOSE BLD MANUAL STRIP-MCNC: 190 MG/DL (ref 74–99)
GLUCOSE BLD MANUAL STRIP-MCNC: 193 MG/DL (ref 74–99)
GLUCOSE BLD MANUAL STRIP-MCNC: 194 MG/DL (ref 74–99)
GLUCOSE BLD MANUAL STRIP-MCNC: 196 MG/DL (ref 74–99)
GLUCOSE BLD MANUAL STRIP-MCNC: 196 MG/DL (ref 74–99)
GLUCOSE BLD MANUAL STRIP-MCNC: 198 MG/DL (ref 74–99)
GLUCOSE BLD MANUAL STRIP-MCNC: 200 MG/DL (ref 74–99)
GLUCOSE BLD MANUAL STRIP-MCNC: 202 MG/DL (ref 74–99)
GLUCOSE BLD MANUAL STRIP-MCNC: 202 MG/DL (ref 74–99)
GLUCOSE BLD MANUAL STRIP-MCNC: 204 MG/DL (ref 74–99)
GLUCOSE BLD MANUAL STRIP-MCNC: 205 MG/DL (ref 74–99)
GLUCOSE BLD MANUAL STRIP-MCNC: 207 MG/DL (ref 74–99)
GLUCOSE BLD MANUAL STRIP-MCNC: 207 MG/DL (ref 74–99)
GLUCOSE BLD MANUAL STRIP-MCNC: 209 MG/DL (ref 74–99)
GLUCOSE BLD MANUAL STRIP-MCNC: 209 MG/DL (ref 74–99)
GLUCOSE BLD MANUAL STRIP-MCNC: 211 MG/DL (ref 74–99)
GLUCOSE BLD MANUAL STRIP-MCNC: 215 MG/DL (ref 74–99)
GLUCOSE BLD MANUAL STRIP-MCNC: 216 MG/DL (ref 74–99)
GLUCOSE BLD MANUAL STRIP-MCNC: 219 MG/DL (ref 74–99)
GLUCOSE BLD MANUAL STRIP-MCNC: 219 MG/DL (ref 74–99)
GLUCOSE BLD MANUAL STRIP-MCNC: 222 MG/DL (ref 74–99)
GLUCOSE BLD MANUAL STRIP-MCNC: 222 MG/DL (ref 74–99)
GLUCOSE BLD MANUAL STRIP-MCNC: 225 MG/DL (ref 74–99)
GLUCOSE BLD MANUAL STRIP-MCNC: 229 MG/DL (ref 74–99)
GLUCOSE BLD MANUAL STRIP-MCNC: 229 MG/DL (ref 74–99)
GLUCOSE BLD MANUAL STRIP-MCNC: 231 MG/DL (ref 74–99)
GLUCOSE BLD MANUAL STRIP-MCNC: 235 MG/DL (ref 74–99)
GLUCOSE BLD MANUAL STRIP-MCNC: 242 MG/DL (ref 74–99)
GLUCOSE BLD MANUAL STRIP-MCNC: 243 MG/DL (ref 74–99)
GLUCOSE BLD MANUAL STRIP-MCNC: 244 MG/DL (ref 74–99)
GLUCOSE BLD MANUAL STRIP-MCNC: 248 MG/DL (ref 74–99)
GLUCOSE BLD MANUAL STRIP-MCNC: 253 MG/DL (ref 74–99)
GLUCOSE BLD MANUAL STRIP-MCNC: 253 MG/DL (ref 74–99)
GLUCOSE BLD MANUAL STRIP-MCNC: 258 MG/DL (ref 74–99)
GLUCOSE BLD MANUAL STRIP-MCNC: 258 MG/DL (ref 74–99)
GLUCOSE BLD MANUAL STRIP-MCNC: 269 MG/DL (ref 74–99)
GLUCOSE BLD MANUAL STRIP-MCNC: 279 MG/DL (ref 74–99)
GLUCOSE BLD MANUAL STRIP-MCNC: 281 MG/DL (ref 74–99)
GLUCOSE BLD MANUAL STRIP-MCNC: 283 MG/DL (ref 74–99)
GLUCOSE BLD MANUAL STRIP-MCNC: 284 MG/DL (ref 74–99)
GLUCOSE BLD MANUAL STRIP-MCNC: 294 MG/DL (ref 74–99)
GLUCOSE BLD MANUAL STRIP-MCNC: 300 MG/DL (ref 74–99)
GLUCOSE BLD MANUAL STRIP-MCNC: 309 MG/DL (ref 74–99)
GLUCOSE BLD MANUAL STRIP-MCNC: 310 MG/DL (ref 74–99)
GLUCOSE BLD MANUAL STRIP-MCNC: 311 MG/DL (ref 74–99)
GLUCOSE BLD MANUAL STRIP-MCNC: 314 MG/DL (ref 74–99)
GLUCOSE BLD MANUAL STRIP-MCNC: 317 MG/DL (ref 74–99)
GLUCOSE BLD MANUAL STRIP-MCNC: 318 MG/DL (ref 74–99)
GLUCOSE BLD MANUAL STRIP-MCNC: 326 MG/DL (ref 74–99)
GLUCOSE BLD MANUAL STRIP-MCNC: 338 MG/DL (ref 74–99)
GLUCOSE BLD MANUAL STRIP-MCNC: 341 MG/DL (ref 74–99)
GLUCOSE BLD MANUAL STRIP-MCNC: 349 MG/DL (ref 74–99)
GLUCOSE BLD MANUAL STRIP-MCNC: 368 MG/DL (ref 74–99)
GLUCOSE BLD MANUAL STRIP-MCNC: 378 MG/DL (ref 74–99)
GLUCOSE BLD MANUAL STRIP-MCNC: 51 MG/DL (ref 74–99)
GLUCOSE BLD MANUAL STRIP-MCNC: 59 MG/DL (ref 74–99)
GLUCOSE BLD MANUAL STRIP-MCNC: 68 MG/DL (ref 74–99)
GLUCOSE BLD MANUAL STRIP-MCNC: 69 MG/DL (ref 74–99)
GLUCOSE BLD MANUAL STRIP-MCNC: 76 MG/DL (ref 74–99)
GLUCOSE BLD MANUAL STRIP-MCNC: 79 MG/DL (ref 74–99)
GLUCOSE BLD MANUAL STRIP-MCNC: 88 MG/DL (ref 74–99)
GLUCOSE BLD MANUAL STRIP-MCNC: 97 MG/DL (ref 74–99)
GLUCOSE BLD MANUAL STRIP-MCNC: 97 MG/DL (ref 74–99)
GLUCOSE BLD MANUAL STRIP-MCNC: 98 MG/DL (ref 74–99)
GLUCOSE BLDA-MCNC: 107 MG/DL (ref 74–99)
GLUCOSE BLDA-MCNC: 121 MG/DL (ref 74–99)
GLUCOSE BLDA-MCNC: 136 MG/DL (ref 74–99)
GLUCOSE BLDA-MCNC: 141 MG/DL (ref 74–99)
GLUCOSE BLDA-MCNC: 152 MG/DL (ref 74–99)
GLUCOSE BLDA-MCNC: 166 MG/DL (ref 74–99)
GLUCOSE BLDA-MCNC: 199 MG/DL (ref 74–99)
GLUCOSE BLDA-MCNC: 222 MG/DL (ref 74–99)
GLUCOSE BLDA-MCNC: 230 MG/DL (ref 74–99)
GLUCOSE BLDA-MCNC: 258 MG/DL (ref 74–99)
GLUCOSE BLDA-MCNC: 293 MG/DL (ref 74–99)
GLUCOSE BLDA-MCNC: 336 MG/DL (ref 74–99)
GLUCOSE BLDV-MCNC: 117 MG/DL (ref 74–99)
GLUCOSE BLDV-MCNC: 233 MG/DL (ref 74–99)
GLUCOSE SERPL-MCNC: 102 MG/DL (ref 74–99)
GLUCOSE SERPL-MCNC: 107 MG/DL (ref 74–99)
GLUCOSE SERPL-MCNC: 119 MG/DL (ref 74–99)
GLUCOSE SERPL-MCNC: 122 MG/DL (ref 74–99)
GLUCOSE SERPL-MCNC: 128 MG/DL (ref 74–99)
GLUCOSE SERPL-MCNC: 133 MG/DL (ref 74–99)
GLUCOSE SERPL-MCNC: 136 MG/DL (ref 74–99)
GLUCOSE SERPL-MCNC: 138 MG/DL (ref 74–99)
GLUCOSE SERPL-MCNC: 140 MG/DL (ref 74–99)
GLUCOSE SERPL-MCNC: 147 MG/DL (ref 74–99)
GLUCOSE SERPL-MCNC: 166 MG/DL (ref 74–99)
GLUCOSE SERPL-MCNC: 174 MG/DL (ref 74–99)
GLUCOSE SERPL-MCNC: 175 MG/DL (ref 74–99)
GLUCOSE SERPL-MCNC: 186 MG/DL (ref 74–99)
GLUCOSE SERPL-MCNC: 189 MG/DL (ref 74–99)
GLUCOSE SERPL-MCNC: 210 MG/DL (ref 74–99)
GLUCOSE SERPL-MCNC: 218 MG/DL (ref 74–99)
GLUCOSE SERPL-MCNC: 225 MG/DL (ref 74–99)
GLUCOSE SERPL-MCNC: 239 MG/DL (ref 74–99)
GLUCOSE SERPL-MCNC: 240 MG/DL (ref 74–99)
GLUCOSE SERPL-MCNC: 240 MG/DL (ref 74–99)
GLUCOSE SERPL-MCNC: 255 MG/DL (ref 74–99)
GLUCOSE SERPL-MCNC: 279 MG/DL (ref 74–99)
GLUCOSE SERPL-MCNC: 304 MG/DL (ref 74–99)
GLUCOSE SERPL-MCNC: 385 MG/DL (ref 74–99)
GLUCOSE SERPL-MCNC: 396 MG/DL (ref 74–99)
GLUCOSE UR STRIP.AUTO-MCNC: ABNORMAL MG/DL
GLUCOSE UR STRIP.AUTO-MCNC: NEGATIVE MG/DL
GRAM STN SPEC: ABNORMAL
GRAM STN SPEC: ABNORMAL
HBA1C MFR BLD: 7.3 %
HCO3 BLDA-SCNC: 22.8 MMOL/L (ref 22–26)
HCO3 BLDA-SCNC: 24.9 MMOL/L (ref 22–26)
HCO3 BLDA-SCNC: 25.3 MMOL/L (ref 22–26)
HCO3 BLDA-SCNC: 25.7 MMOL/L (ref 22–26)
HCO3 BLDA-SCNC: 25.9 MMOL/L (ref 22–26)
HCO3 BLDA-SCNC: 26.5 MMOL/L (ref 22–26)
HCO3 BLDA-SCNC: 27.1 MMOL/L (ref 22–26)
HCO3 BLDA-SCNC: 27.3 MMOL/L (ref 22–26)
HCO3 BLDA-SCNC: 28.4 MMOL/L (ref 22–26)
HCO3 BLDA-SCNC: 28.5 MMOL/L (ref 22–26)
HCO3 BLDA-SCNC: 29.2 MMOL/L (ref 22–26)
HCO3 BLDA-SCNC: 30.6 MMOL/L (ref 22–26)
HCO3 BLDV-SCNC: 25.9 MMOL/L (ref 22–26)
HCO3 BLDV-SCNC: 26.9 MMOL/L (ref 22–26)
HCT VFR BLD AUTO: 20.9 % (ref 41–52)
HCT VFR BLD AUTO: 21.9 % (ref 41–52)
HCT VFR BLD AUTO: 22.5 % (ref 41–52)
HCT VFR BLD AUTO: 22.7 % (ref 41–52)
HCT VFR BLD AUTO: 23.3 % (ref 41–52)
HCT VFR BLD AUTO: 23.3 % (ref 41–52)
HCT VFR BLD AUTO: 23.4 % (ref 41–52)
HCT VFR BLD AUTO: 23.5 % (ref 41–52)
HCT VFR BLD AUTO: 23.8 % (ref 41–52)
HCT VFR BLD AUTO: 24.2 % (ref 41–52)
HCT VFR BLD AUTO: 24.4 % (ref 41–52)
HCT VFR BLD AUTO: 24.4 % (ref 41–52)
HCT VFR BLD AUTO: 24.7 % (ref 41–52)
HCT VFR BLD AUTO: 24.8 % (ref 41–52)
HCT VFR BLD AUTO: 24.9 % (ref 41–52)
HCT VFR BLD AUTO: 25.2 % (ref 41–52)
HCT VFR BLD AUTO: 25.5 % (ref 41–52)
HCT VFR BLD AUTO: 25.5 % (ref 41–52)
HCT VFR BLD AUTO: 25.8 % (ref 41–52)
HCT VFR BLD AUTO: 26.5 % (ref 41–52)
HCT VFR BLD AUTO: 26.9 % (ref 41–52)
HCT VFR BLD AUTO: 27.2 % (ref 41–52)
HCT VFR BLD AUTO: 27.6 % (ref 41–52)
HCT VFR BLD AUTO: 28.4 % (ref 41–52)
HCT VFR BLD AUTO: 28.9 % (ref 41–52)
HCT VFR BLD AUTO: 32.2 % (ref 41–52)
HCT VFR BLD AUTO: 34.4 % (ref 41–52)
HCT VFR BLD EST: 24 % (ref 41–52)
HCT VFR BLD EST: 24 % (ref 41–52)
HCT VFR BLD EST: 25 % (ref 41–52)
HCT VFR BLD EST: 26 % (ref 41–52)
HCT VFR BLD EST: 28 % (ref 41–52)
HCT VFR BLD EST: 29 % (ref 41–52)
HCT VFR BLD EST: 30 % (ref 41–52)
HCT VFR BLD EST: 33 % (ref 41–52)
HCT VFR BLD EST: 35 % (ref 41–52)
HCT VFR BLD EST: 35 % (ref 41–52)
HCT VFR BLD EST: 41 % (ref 41–52)
HCT VFR BLD EST: 43 % (ref 41–52)
HEMATOCRIT (%) IN BLOOD BY AUTOMATED COUNT: 21.1 % (ref 41–52)
HEMATOCRIT (%) IN BLOOD BY AUTOMATED COUNT: 21.5 % (ref 41–52)
HEMATOCRIT (%) IN BLOOD BY AUTOMATED COUNT: 21.8 % (ref 41–52)
HEMATOCRIT (%) IN BLOOD BY AUTOMATED COUNT: 22.1 % (ref 41–52)
HEMATOCRIT (%) IN BLOOD BY AUTOMATED COUNT: 22.6 % (ref 41–52)
HEMATOCRIT (%) IN BLOOD BY AUTOMATED COUNT: 23.3 % (ref 41–52)
HEMATOCRIT (%) IN BLOOD BY AUTOMATED COUNT: 24.2 % (ref 41–52)
HEMATOCRIT (%) IN BLOOD BY AUTOMATED COUNT: 24.7 % (ref 41–52)
HEMATOCRIT (%) IN BLOOD BY AUTOMATED COUNT: 24.9 % (ref 41–52)
HEMATOCRIT (%) IN BLOOD BY AUTOMATED COUNT: 26.8 % (ref 41–52)
HEMATOCRIT (%) IN BLOOD BY AUTOMATED COUNT: 26.9 % (ref 41–52)
HEMATOCRIT (%) IN BLOOD BY AUTOMATED COUNT: 27 % (ref 41–52)
HEMATOCRIT (%) IN BLOOD BY AUTOMATED COUNT: 27.2 % (ref 41–52)
HEMATOCRIT (%) IN BLOOD BY AUTOMATED COUNT: 27.7 % (ref 41–52)
HEMATOCRIT (%) IN BLOOD BY AUTOMATED COUNT: 27.7 % (ref 41–52)
HEMATOCRIT (%) IN BLOOD BY AUTOMATED COUNT: 28.7 % (ref 41–52)
HEMATOCRIT (%) IN BLOOD BY AUTOMATED COUNT: 29.4 % (ref 41–52)
HEMATOCRIT (%) IN BLOOD BY AUTOMATED COUNT: 29.6 % (ref 41–52)
HEMATOCRIT (%) IN BLOOD BY AUTOMATED COUNT: 30.2 % (ref 41–52)
HEMATOCRIT (%) IN BLOOD BY AUTOMATED COUNT: 31.4 % (ref 41–52)
HEMATOCRIT (%) IN BLOOD BY AUTOMATED COUNT: 33.9 % (ref 41–52)
HEMATOCRIT (%) IN BLOOD BY AUTOMATED COUNT: 35.2 % (ref 41–52)
HEMATOCRIT (%) IN BLOOD BY AUTOMATED COUNT: 40.9 % (ref 41–52)
HEMATOCRIT (%) IN BLOOD BY AUTOMATED COUNT: NORMAL
HEMOGLOBIN (G/DL) IN BLOOD: 10 G/DL (ref 13.5–17.5)
HEMOGLOBIN (G/DL) IN BLOOD: 11.1 G/DL (ref 13.5–17.5)
HEMOGLOBIN (G/DL) IN BLOOD: 11.2 G/DL (ref 13.5–17.5)
HEMOGLOBIN (G/DL) IN BLOOD: 11.3 G/DL (ref 13.5–17.5)
HEMOGLOBIN (G/DL) IN BLOOD: 12.5 G/DL (ref 13.5–17.5)
HEMOGLOBIN (G/DL) IN BLOOD: 6.9 G/DL (ref 13.5–17.5)
HEMOGLOBIN (G/DL) IN BLOOD: 7.1 G/DL (ref 13.5–17.5)
HEMOGLOBIN (G/DL) IN BLOOD: 7.2 G/DL (ref 13.5–17.5)
HEMOGLOBIN (G/DL) IN BLOOD: 7.4 G/DL (ref 13.5–17.5)
HEMOGLOBIN (G/DL) IN BLOOD: 7.4 G/DL (ref 13.5–17.5)
HEMOGLOBIN (G/DL) IN BLOOD: 7.5 G/DL (ref 13.5–17.5)
HEMOGLOBIN (G/DL) IN BLOOD: 7.5 G/DL (ref 13.5–17.5)
HEMOGLOBIN (G/DL) IN BLOOD: 8.2 G/DL (ref 13.5–17.5)
HEMOGLOBIN (G/DL) IN BLOOD: 9 G/DL (ref 13.5–17.5)
HEMOGLOBIN (G/DL) IN BLOOD: 9.1 G/DL (ref 13.5–17.5)
HEMOGLOBIN (G/DL) IN BLOOD: 9.3 G/DL (ref 13.5–17.5)
HEMOGLOBIN (G/DL) IN BLOOD: 9.4 G/DL (ref 13.5–17.5)
HEMOGLOBIN (G/DL) IN BLOOD: 9.6 G/DL (ref 13.5–17.5)
HEMOGLOBIN (G/DL) IN BLOOD: 9.7 G/DL (ref 13.5–17.5)
HEMOGLOBIN (G/DL) IN BLOOD: 9.7 G/DL (ref 13.5–17.5)
HEMOGLOBIN (G/DL) IN BLOOD: 9.9 G/DL (ref 13.5–17.5)
HEMOGLOBIN (G/DL) IN BLOOD: NORMAL
HEMOGLOBIN A1C/HEMOGLOBIN TOTAL IN BLOOD: 7.4 %
HEMOGLOBIN A1C/HEMOGLOBIN TOTAL IN BLOOD: 8 %
HEMOGLOBIN A1C/HEMOGLOBIN TOTAL IN BLOOD: 8.3 %
HGB BLD-MCNC: 10.8 G/DL (ref 13.5–17.5)
HGB BLD-MCNC: 11.2 G/DL (ref 13.5–17.5)
HGB BLD-MCNC: 6.9 G/DL (ref 13.5–17.5)
HGB BLD-MCNC: 6.9 G/DL (ref 13.5–17.5)
HGB BLD-MCNC: 7 G/DL (ref 13.5–17.5)
HGB BLD-MCNC: 7.2 G/DL (ref 13.5–17.5)
HGB BLD-MCNC: 7.3 G/DL (ref 13.5–17.5)
HGB BLD-MCNC: 7.3 G/DL (ref 13.5–17.5)
HGB BLD-MCNC: 7.4 G/DL (ref 13.5–17.5)
HGB BLD-MCNC: 7.5 G/DL (ref 13.5–17.5)
HGB BLD-MCNC: 7.6 G/DL (ref 13.5–17.5)
HGB BLD-MCNC: 8 G/DL (ref 13.5–17.5)
HGB BLD-MCNC: 8.1 G/DL (ref 13.5–17.5)
HGB BLD-MCNC: 8.2 G/DL (ref 13.5–17.5)
HGB BLD-MCNC: 8.2 G/DL (ref 13.5–17.5)
HGB BLD-MCNC: 8.5 G/DL (ref 13.5–17.5)
HGB BLD-MCNC: 8.7 G/DL (ref 13.5–17.5)
HGB BLD-MCNC: 8.9 G/DL (ref 13.5–17.5)
HGB BLD-MCNC: 8.9 G/DL (ref 13.5–17.5)
HGB BLD-MCNC: 9.1 G/DL (ref 13.5–17.5)
HGB BLD-MCNC: 9.2 G/DL (ref 13.5–17.5)
HGB BLD-MCNC: 9.6 G/DL (ref 13.5–17.5)
HGB BLDA-MCNC: 11.6 G/DL (ref 13.5–17.5)
HGB BLDA-MCNC: 13.8 G/DL (ref 13.5–17.5)
HGB BLDA-MCNC: 14.3 G/DL (ref 13.5–17.5)
HGB BLDA-MCNC: 8 G/DL (ref 13.5–17.5)
HGB BLDA-MCNC: 8.1 G/DL (ref 13.5–17.5)
HGB BLDA-MCNC: 8.2 G/DL (ref 13.5–17.5)
HGB BLDA-MCNC: 8.6 G/DL (ref 13.5–17.5)
HGB BLDA-MCNC: 9.2 G/DL (ref 13.5–17.5)
HGB BLDA-MCNC: 9.3 G/DL (ref 13.5–17.5)
HGB BLDA-MCNC: 9.3 G/DL (ref 13.5–17.5)
HGB BLDA-MCNC: 9.6 G/DL (ref 13.5–17.5)
HGB BLDA-MCNC: 9.9 G/DL (ref 13.5–17.5)
HGB BLDV-MCNC: 11 G/DL (ref 13.5–17.5)
HGB BLDV-MCNC: 11.5 G/DL (ref 13.5–17.5)
HOLD SPECIMEN: NORMAL
IMM GRANULOCYTES # BLD AUTO: 0.02 X10*3/UL (ref 0–0.7)
IMM GRANULOCYTES # BLD AUTO: 0.02 X10*3/UL (ref 0–0.7)
IMM GRANULOCYTES # BLD AUTO: 0.03 X10*3/UL (ref 0–0.7)
IMM GRANULOCYTES # BLD AUTO: 0.04 X10*3/UL (ref 0–0.7)
IMM GRANULOCYTES NFR BLD AUTO: 0.2 % (ref 0–0.9)
IMM GRANULOCYTES NFR BLD AUTO: 0.4 % (ref 0–0.9)
IMM GRANULOCYTES NFR BLD AUTO: 0.5 % (ref 0–0.9)
IMM GRANULOCYTES NFR BLD AUTO: 0.7 % (ref 0–0.9)
IMMATURE GRANULOCYTES/100 LEUKOCYTES IN BLOOD BY AUTOMATED COUNT: 0.3 % (ref 0–0.9)
IMMATURE GRANULOCYTES/100 LEUKOCYTES IN BLOOD BY AUTOMATED COUNT: 0.4 % (ref 0–0.9)
IMMATURE GRANULOCYTES/100 LEUKOCYTES IN BLOOD BY AUTOMATED COUNT: 0.7 % (ref 0–0.9)
INHALED O2 CONCENTRATION: 0 %
INHALED O2 CONCENTRATION: 30 %
INHALED O2 CONCENTRATION: 32 %
INHALED O2 CONCENTRATION: 32 %
INHALED O2 CONCENTRATION: 50 %
INR IN PPP BY COAGULATION ASSAY: 1.1 (ref 0.9–1.1)
INR IN PPP BY COAGULATION ASSAY: 1.2 (ref 0.9–1.1)
INR IN PPP BY COAGULATION ASSAY: 1.3 (ref 0.9–1.1)
INR IN PPP BY COAGULATION ASSAY: 1.5 (ref 0.9–1.1)
INR PPP: 1 (ref 0.9–1.1)
INR PPP: 1.1 (ref 0.9–1.1)
INR PPP: 1.2 (ref 0.9–1.1)
INR PPP: 1.2 (ref 0.9–1.1)
INR PPP: 1.3 (ref 0.9–1.1)
KETONES UR STRIP.AUTO-MCNC: NEGATIVE MG/DL
KETONES UR STRIP.AUTO-MCNC: NEGATIVE MG/DL
LABORATORY COMMENT REPORT: NORMAL
LACTATE BLDA-SCNC: 0.8 MMOL/L (ref 0.4–2)
LACTATE BLDA-SCNC: 0.9 MMOL/L (ref 0.4–2)
LACTATE BLDA-SCNC: 0.9 MMOL/L (ref 0.4–2)
LACTATE BLDA-SCNC: 1.1 MMOL/L (ref 0.4–2)
LACTATE BLDA-SCNC: 1.2 MMOL/L (ref 0.4–2)
LACTATE BLDA-SCNC: 1.4 MMOL/L (ref 0.4–2)
LACTATE BLDA-SCNC: 1.5 MMOL/L (ref 0.4–2)
LACTATE BLDA-SCNC: 1.5 MMOL/L (ref 0.4–2)
LACTATE BLDA-SCNC: 1.6 MMOL/L (ref 0.4–2)
LACTATE BLDA-SCNC: 1.7 MMOL/L (ref 0.4–2)
LACTATE BLDA-SCNC: 2.1 MMOL/L (ref 0.4–2)
LACTATE BLDA-SCNC: 2.2 MMOL/L (ref 0.4–2)
LACTATE BLDV-SCNC: 0.9 MMOL/L (ref 0.4–2)
LACTATE BLDV-SCNC: 1.6 MMOL/L (ref 0.4–2)
LACTATE BLDV-SCNC: 2.5 MMOL/L (ref 0.4–2)
LACTATE DEHYDROGENASE (U/L) IN SER/PLAS BY LAC->PYR RXN: 147 U/L (ref 84–246)
LACTATE SERPL-SCNC: 0.9 MMOL/L (ref 0.4–2)
LACTATE SERPL-SCNC: 1 MMOL/L (ref 0.4–2)
LACTATE SERPL-SCNC: 3.4 MMOL/L (ref 0.4–2)
LACTATE SERPL-SCNC: 4.2 MMOL/L (ref 0.4–2)
LDH SERPL L TO P-CCNC: 305 U/L (ref 84–246)
LDL: 117 MG/DL (ref 0–99)
LDL: 61 MG/DL (ref 0–99)
LDL: 95 MG/DL (ref 0–99)
LEUKOCYTE ESTERASE UR QL STRIP.AUTO: NEGATIVE
LEUKOCYTE ESTERASE UR QL STRIP.AUTO: NEGATIVE
LEUKOCYTES (10*3/UL) IN BLOOD BY AUTOMATED COUNT: 10.4 X10E9/L (ref 4.4–11.3)
LEUKOCYTES (10*3/UL) IN BLOOD BY AUTOMATED COUNT: 10.9 X10E9/L (ref 4.4–11.3)
LEUKOCYTES (10*3/UL) IN BLOOD BY AUTOMATED COUNT: 14.6 X10E9/L (ref 4.4–11.3)
LEUKOCYTES (10*3/UL) IN BLOOD BY AUTOMATED COUNT: 18.5 X10E9/L (ref 4.4–11.3)
LEUKOCYTES (10*3/UL) IN BLOOD BY AUTOMATED COUNT: 21 X10E9/L (ref 4.4–11.3)
LEUKOCYTES (10*3/UL) IN BLOOD BY AUTOMATED COUNT: 21.2 X10E9/L (ref 4.4–11.3)
LEUKOCYTES (10*3/UL) IN BLOOD BY AUTOMATED COUNT: 24.4 X10E9/L (ref 4.4–11.3)
LEUKOCYTES (10*3/UL) IN BLOOD BY AUTOMATED COUNT: 3.9 X10E9/L (ref 4.4–11.3)
LEUKOCYTES (10*3/UL) IN BLOOD BY AUTOMATED COUNT: 4.7 X10E9/L (ref 4.4–11.3)
LEUKOCYTES (10*3/UL) IN BLOOD BY AUTOMATED COUNT: 4.8 X10E9/L (ref 4.4–11.3)
LEUKOCYTES (10*3/UL) IN BLOOD BY AUTOMATED COUNT: 4.9 X10E9/L (ref 4.4–11.3)
LEUKOCYTES (10*3/UL) IN BLOOD BY AUTOMATED COUNT: 5.2 X10E9/L (ref 4.4–11.3)
LEUKOCYTES (10*3/UL) IN BLOOD BY AUTOMATED COUNT: 6.1 X10E9/L (ref 4.4–11.3)
LEUKOCYTES (10*3/UL) IN BLOOD BY AUTOMATED COUNT: 6.2 X10E9/L (ref 4.4–11.3)
LEUKOCYTES (10*3/UL) IN BLOOD BY AUTOMATED COUNT: 6.7 X10E9/L (ref 4.4–11.3)
LEUKOCYTES (10*3/UL) IN BLOOD BY AUTOMATED COUNT: 7 X10E9/L (ref 4.4–11.3)
LEUKOCYTES (10*3/UL) IN BLOOD BY AUTOMATED COUNT: 7.1 X10E9/L (ref 4.4–11.3)
LEUKOCYTES (10*3/UL) IN BLOOD BY AUTOMATED COUNT: 7.2 X10E9/L (ref 4.4–11.3)
LEUKOCYTES (10*3/UL) IN BLOOD BY AUTOMATED COUNT: 7.3 X10E9/L (ref 4.4–11.3)
LEUKOCYTES (10*3/UL) IN BLOOD BY AUTOMATED COUNT: 8.1 X10E9/L (ref 4.4–11.3)
LEUKOCYTES (10*3/UL) IN BLOOD BY AUTOMATED COUNT: 8.5 X10E9/L (ref 4.4–11.3)
LEUKOCYTES (10*3/UL) IN BLOOD BY AUTOMATED COUNT: 8.8 X10E9/L (ref 4.4–11.3)
LEUKOCYTES (10*3/UL) IN BLOOD BY AUTOMATED COUNT: 9.6 X10E9/L (ref 4.4–11.3)
LEUKOCYTES (10*3/UL) IN BLOOD BY AUTOMATED COUNT: NORMAL
LIPASE (U/L) IN SER/PLAS: 2036 U/L (ref 9–82)
LIPASE (U/L) IN SER/PLAS: 3297 U/L (ref 9–82)
LIPASE SERPL-CCNC: 210 U/L (ref 9–82)
LYMPHOCYTES # BLD AUTO: 0.18 X10*3/UL (ref 1.2–4.8)
LYMPHOCYTES # BLD AUTO: 0.53 X10*3/UL (ref 1.2–4.8)
LYMPHOCYTES # BLD AUTO: 0.64 X10*3/UL (ref 1.2–4.8)
LYMPHOCYTES # BLD MANUAL: 0.25 X10*3/UL (ref 1.2–4.8)
LYMPHOCYTES (10*3/UL) IN BLOOD BY AUTOMATED COUNT: 1 X10E9/L (ref 1.2–4.8)
LYMPHOCYTES (10*3/UL) IN BLOOD BY AUTOMATED COUNT: 1.1 X10E9/L (ref 1.2–4.8)
LYMPHOCYTES (10*3/UL) IN BLOOD BY AUTOMATED COUNT: 1.11 X10E9/L (ref 1.2–4.8)
LYMPHOCYTES NFR BLD AUTO: 11.3 %
LYMPHOCYTES NFR BLD AUTO: 4.2 %
LYMPHOCYTES NFR BLD AUTO: 9.3 %
LYMPHOCYTES NFR BLD MANUAL: 1.6 %
LYMPHOCYTES/100 LEUKOCYTES IN BLOOD BY AUTOMATED COUNT: 16.3 % (ref 13–44)
LYMPHOCYTES/100 LEUKOCYTES IN BLOOD BY AUTOMATED COUNT: 17.9 % (ref 13–44)
LYMPHOCYTES/100 LEUKOCYTES IN BLOOD BY AUTOMATED COUNT: 21.3 % (ref 13–44)
Lab: NORMAL
MAGNESIUM (MG/DL) IN SER/PLAS: 1.36 MG/DL (ref 1.6–2.4)
MAGNESIUM (MG/DL) IN SER/PLAS: 1.46 MG/DL (ref 1.6–2.4)
MAGNESIUM (MG/DL) IN SER/PLAS: 1.59 MG/DL (ref 1.6–2.4)
MAGNESIUM (MG/DL) IN SER/PLAS: 1.7 MG/DL (ref 1.6–2.4)
MAGNESIUM (MG/DL) IN SER/PLAS: 1.7 MG/DL (ref 1.6–2.4)
MAGNESIUM (MG/DL) IN SER/PLAS: 1.8 MG/DL (ref 1.6–2.4)
MAGNESIUM (MG/DL) IN SER/PLAS: 1.84 MG/DL (ref 1.6–2.4)
MAGNESIUM (MG/DL) IN SER/PLAS: 1.9 MG/DL (ref 1.6–2.4)
MAGNESIUM (MG/DL) IN SER/PLAS: 1.9 MG/DL (ref 1.6–2.4)
MAGNESIUM (MG/DL) IN SER/PLAS: 1.97 MG/DL (ref 1.6–2.4)
MAGNESIUM (MG/DL) IN SER/PLAS: 1.98 MG/DL (ref 1.6–2.4)
MAGNESIUM (MG/DL) IN SER/PLAS: 1.99 MG/DL (ref 1.6–2.4)
MAGNESIUM (MG/DL) IN SER/PLAS: 2.05 MG/DL (ref 1.6–2.4)
MAGNESIUM (MG/DL) IN SER/PLAS: 2.06 MG/DL (ref 1.6–2.4)
MAGNESIUM (MG/DL) IN SER/PLAS: 2.07 MG/DL (ref 1.6–2.4)
MAGNESIUM (MG/DL) IN SER/PLAS: 2.17 MG/DL (ref 1.6–2.4)
MAGNESIUM (MG/DL) IN SER/PLAS: 2.22 MG/DL (ref 1.6–2.4)
MAGNESIUM (MG/DL) IN SER/PLAS: 2.34 MG/DL (ref 1.6–2.4)
MAGNESIUM (MG/DL) IN SER/PLAS: 2.44 MG/DL (ref 1.6–2.4)
MAGNESIUM (MG/DL) IN SER/PLAS: 2.46 MG/DL (ref 1.6–2.4)
MAGNESIUM (MG/DL) IN SER/PLAS: 2.82 MG/DL (ref 1.6–2.4)
MAGNESIUM (MG/DL) IN SER/PLAS: NORMAL
MAGNESIUM (MG/DL) IN SER/PLAS: NORMAL
MAGNESIUM SERPL-MCNC: 1.36 MG/DL (ref 1.6–2.4)
MAGNESIUM SERPL-MCNC: 1.5 MG/DL (ref 1.6–2.4)
MAGNESIUM SERPL-MCNC: 1.65 MG/DL (ref 1.6–2.4)
MAGNESIUM SERPL-MCNC: 1.75 MG/DL (ref 1.6–2.4)
MAGNESIUM SERPL-MCNC: 1.76 MG/DL (ref 1.6–2.4)
MAGNESIUM SERPL-MCNC: 1.77 MG/DL (ref 1.6–2.4)
MAGNESIUM SERPL-MCNC: 1.84 MG/DL (ref 1.6–2.4)
MAGNESIUM SERPL-MCNC: 1.88 MG/DL (ref 1.6–2.4)
MAGNESIUM SERPL-MCNC: 1.91 MG/DL (ref 1.6–2.4)
MAGNESIUM SERPL-MCNC: 1.91 MG/DL (ref 1.6–2.4)
MAGNESIUM SERPL-MCNC: 1.98 MG/DL (ref 1.6–2.4)
MAGNESIUM SERPL-MCNC: 2.03 MG/DL (ref 1.6–2.4)
MAGNESIUM SERPL-MCNC: 2.03 MG/DL (ref 1.6–2.4)
MAGNESIUM SERPL-MCNC: 2.05 MG/DL (ref 1.6–2.4)
MAGNESIUM SERPL-MCNC: 2.08 MG/DL (ref 1.6–2.4)
MAGNESIUM SERPL-MCNC: 2.19 MG/DL (ref 1.6–2.4)
MAGNESIUM SERPL-MCNC: 2.34 MG/DL (ref 1.6–2.4)
MAGNESIUM SERPL-MCNC: 2.36 MG/DL (ref 1.6–2.4)
MAGNESIUM SERPL-MCNC: 2.36 MG/DL (ref 1.6–2.4)
MAGNESIUM SERPL-MCNC: 2.43 MG/DL (ref 1.6–2.4)
MAGNESIUM SERPL-MCNC: 2.58 MG/DL (ref 1.6–2.4)
MAGNESIUM SERPL-MCNC: 2.93 MG/DL (ref 1.6–2.4)
MCH RBC QN AUTO: 28.5 PG (ref 26–34)
MCH RBC QN AUTO: 28.7 PG (ref 26–34)
MCH RBC QN AUTO: 28.8 PG (ref 26–34)
MCH RBC QN AUTO: 28.8 PG (ref 26–34)
MCH RBC QN AUTO: 28.9 PG (ref 26–34)
MCH RBC QN AUTO: 29.1 PG (ref 26–34)
MCH RBC QN AUTO: 29.1 PG (ref 26–34)
MCH RBC QN AUTO: 29.2 PG (ref 26–34)
MCH RBC QN AUTO: 29.3 PG (ref 26–34)
MCH RBC QN AUTO: 29.4 PG (ref 26–34)
MCH RBC QN AUTO: 29.5 PG (ref 26–34)
MCH RBC QN AUTO: 29.6 PG (ref 26–34)
MCH RBC QN AUTO: 29.7 PG (ref 26–34)
MCH RBC QN AUTO: 29.8 PG (ref 26–34)
MCH RBC QN AUTO: 30 PG (ref 26–34)
MCH RBC QN AUTO: 30 PG (ref 26–34)
MCH RBC QN AUTO: 30.3 PG (ref 26–34)
MCH RBC QN AUTO: 30.6 PG (ref 26–34)
MCH RBC QN AUTO: 31 PG (ref 26–34)
MCH RBC QN AUTO: 31.9 PG (ref 26–34)
MCHC RBC AUTO-ENTMCNC: 30.4 G/DL (ref 32–36)
MCHC RBC AUTO-ENTMCNC: 30.8 G/DL (ref 32–36)
MCHC RBC AUTO-ENTMCNC: 30.9 G/DL (ref 32–36)
MCHC RBC AUTO-ENTMCNC: 31.3 G/DL (ref 32–36)
MCHC RBC AUTO-ENTMCNC: 31.5 G/DL (ref 32–36)
MCHC RBC AUTO-ENTMCNC: 31.8 G/DL (ref 32–36)
MCHC RBC AUTO-ENTMCNC: 31.9 G/DL (ref 32–36)
MCHC RBC AUTO-ENTMCNC: 32.3 G/DL (ref 32–36)
MCHC RBC AUTO-ENTMCNC: 32.4 G/DL (ref 32–36)
MCHC RBC AUTO-ENTMCNC: 32.5 G/DL (ref 32–36)
MCHC RBC AUTO-ENTMCNC: 32.6 G/DL (ref 32–36)
MCHC RBC AUTO-ENTMCNC: 32.7 G/DL (ref 32–36)
MCHC RBC AUTO-ENTMCNC: 32.8 G/DL (ref 32–36)
MCHC RBC AUTO-ENTMCNC: 32.9 G/DL (ref 32–36)
MCHC RBC AUTO-ENTMCNC: 33 G/DL (ref 32–36)
MCHC RBC AUTO-ENTMCNC: 33 G/DL (ref 32–36)
MCHC RBC AUTO-ENTMCNC: 33.1 G/DL (ref 32–36)
MCHC RBC AUTO-ENTMCNC: 33.2 G/DL (ref 32–36)
MCHC RBC AUTO-ENTMCNC: 33.3 G/DL (ref 32–36)
MCHC RBC AUTO-ENTMCNC: 33.3 G/DL (ref 32–36)
MCHC RBC AUTO-ENTMCNC: 33.5 G/DL (ref 32–36)
MCHC RBC AUTO-ENTMCNC: 33.5 G/DL (ref 32–36)
MCHC RBC AUTO-ENTMCNC: 33.6 G/DL (ref 32–36)
MCHC RBC AUTO-ENTMCNC: 33.7 G/DL (ref 32–36)
MCHC RBC AUTO-ENTMCNC: 33.8 G/DL (ref 32–36)
MCHC RBC AUTO-ENTMCNC: 35 G/DL (ref 32–36)
MCV RBC AUTO: 86 FL (ref 80–100)
MCV RBC AUTO: 86 FL (ref 80–100)
MCV RBC AUTO: 87 FL (ref 80–100)
MCV RBC AUTO: 88 FL (ref 80–100)
MCV RBC AUTO: 89 FL (ref 80–100)
MCV RBC AUTO: 90 FL (ref 80–100)
MCV RBC AUTO: 91 FL (ref 80–100)
MCV RBC AUTO: 92 FL (ref 80–100)
MCV RBC AUTO: 92 FL (ref 80–100)
MCV RBC AUTO: 93 FL (ref 80–100)
MCV RBC AUTO: 93 FL (ref 80–100)
MCV RBC AUTO: 95 FL (ref 80–100)
MCV RBC AUTO: 97 FL (ref 80–100)
MCV RBC AUTO: 97 FL (ref 80–100)
MONOCYTES # BLD AUTO: 0.32 X10*3/UL (ref 0.1–1)
MONOCYTES # BLD AUTO: 0.69 X10*3/UL (ref 0.1–1)
MONOCYTES # BLD AUTO: 0.83 X10*3/UL (ref 0.1–1)
MONOCYTES # BLD MANUAL: 0.6 X10*3/UL (ref 0.1–1)
MONOCYTES (10*3/UL) IN BLOOD BY AUTOMATED COUNT: 0.41 X10E9/L (ref 0.1–1)
MONOCYTES (10*3/UL) IN BLOOD BY AUTOMATED COUNT: 0.5 X10E9/L (ref 0.1–1)
MONOCYTES (10*3/UL) IN BLOOD BY AUTOMATED COUNT: 0.63 X10E9/L (ref 0.1–1)
MONOCYTES NFR BLD AUTO: 12.1 %
MONOCYTES NFR BLD AUTO: 14.6 %
MONOCYTES NFR BLD AUTO: 7.4 %
MONOCYTES NFR BLD MANUAL: 3.9 %
MONOCYTES/100 LEUKOCYTES IN BLOOD BY AUTOMATED COUNT: 12.1 % (ref 2–10)
MONOCYTES/100 LEUKOCYTES IN BLOOD BY AUTOMATED COUNT: 6.7 % (ref 2–10)
MONOCYTES/100 LEUKOCYTES IN BLOOD BY AUTOMATED COUNT: 8.1 % (ref 2–10)
MUCOUS THREADS #/AREA URNS AUTO: NORMAL /LPF
MYELOCYTES # BLD MANUAL: 0.12 X10*3/UL
MYELOCYTES NFR BLD MANUAL: 0.8 %
NATRIURETIC PEPTIDE B (PG/ML) IN SER/PLAS: 373 PG/ML (ref 0–99)
NEUTROPHILS # BLD AUTO: 3.76 X10*3/UL (ref 1.2–7.7)
NEUTROPHILS # BLD AUTO: 3.85 X10*3/UL (ref 1.2–7.7)
NEUTROPHILS # BLD AUTO: 4.24 X10*3/UL (ref 1.2–7.7)
NEUTROPHILS # BLD MANUAL: 14.53 X10*3/UL (ref 1.2–7.7)
NEUTROPHILS (10*3/UL) IN BLOOD BY AUTOMATED COUNT: 3.27 X10E9/L (ref 1.2–7.7)
NEUTROPHILS (10*3/UL) IN BLOOD BY AUTOMATED COUNT: 4.31 X10E9/L (ref 1.2–7.7)
NEUTROPHILS (10*3/UL) IN BLOOD BY AUTOMATED COUNT: 4.51 X10E9/L (ref 1.2–7.7)
NEUTROPHILS NFR BLD AUTO: 67.8 %
NEUTROPHILS NFR BLD AUTO: 74.7 %
NEUTROPHILS NFR BLD AUTO: 87.2 %
NEUTROPHILS/100 LEUKOCYTES IN BLOOD BY AUTOMATED COUNT: 62.5 % (ref 40–80)
NEUTROPHILS/100 LEUKOCYTES IN BLOOD BY AUTOMATED COUNT: 70.1 % (ref 40–80)
NEUTROPHILS/100 LEUKOCYTES IN BLOOD BY AUTOMATED COUNT: 73.5 % (ref 40–80)
NEUTS BAND # BLD MANUAL: 3.88 X10*3/UL (ref 0–0.7)
NEUTS BAND NFR BLD MANUAL: 25 %
NEUTS SEG # BLD MANUAL: 10.65 X10*3/UL (ref 1.2–7)
NEUTS SEG NFR BLD MANUAL: 68.7 %
NITRITE UR QL STRIP.AUTO: NEGATIVE
NITRITE UR QL STRIP.AUTO: NEGATIVE
NRBC (PER 100 WBCS) BY AUTOMATED COUNT: 0 /100 WBC (ref 0–0)
NRBC (PER 100 WBCS) BY AUTOMATED COUNT: 0.2 /100 WBC (ref 0–0)
NRBC (PER 100 WBCS) BY AUTOMATED COUNT: 0.3 /100 WBC (ref 0–0)
NRBC (PER 100 WBCS) BY AUTOMATED COUNT: NORMAL
NRBC BLD-RTO: 0 /100 WBCS (ref 0–0)
NRBC BLD-RTO: 0.1 /100 WBCS (ref 0–0)
NRBC BLD-RTO: 0.1 /100 WBCS (ref 0–0)
NRBC BLD-RTO: 0.2 /100 WBCS (ref 0–0)
OXYHGB MFR BLDA: 96.4 % (ref 94–98)
OXYHGB MFR BLDA: 96.5 % (ref 94–98)
OXYHGB MFR BLDA: 96.7 % (ref 94–98)
OXYHGB MFR BLDA: 96.7 % (ref 94–98)
OXYHGB MFR BLDA: 97 % (ref 94–98)
OXYHGB MFR BLDA: 97 % (ref 94–98)
OXYHGB MFR BLDA: 97.1 % (ref 94–98)
OXYHGB MFR BLDA: 97.3 % (ref 94–98)
OXYHGB MFR BLDA: 97.3 % (ref 94–98)
OXYHGB MFR BLDA: 97.4 % (ref 94–98)
OXYHGB MFR BLDA: 97.5 % (ref 94–98)
OXYHGB MFR BLDA: 97.7 % (ref 94–98)
OXYHGB MFR BLDV: 64.7 % (ref 45–75)
OXYHGB MFR BLDV: 97.7 % (ref 45–75)
P AXIS: 59 DEGREES
P AXIS: 60 DEGREES
P OFFSET: 192 MS
P OFFSET: 197 MS
P ONSET: 129 MS
P ONSET: 141 MS
PARATHYRIN INTACT (PG/ML) IN SER/PLAS: 63.1 PG/ML (ref 18.5–88)
PATH REPORT.COMMENTS IMP SPEC: NORMAL
PATH REPORT.FINAL DX SPEC: NORMAL
PATH REPORT.GROSS SPEC: NORMAL
PATH REPORT.RELEVANT HX SPEC: NORMAL
PATH REPORT.TOTAL CANCER: NORMAL
PATIENT TEMPERATURE: 37 DEGREES C
PCO2 BLDA: 34 MM HG (ref 38–42)
PCO2 BLDA: 35 MM HG (ref 38–42)
PCO2 BLDA: 35 MM HG (ref 38–42)
PCO2 BLDA: 36 MM HG (ref 38–42)
PCO2 BLDA: 37 MM HG (ref 38–42)
PCO2 BLDA: 39 MM HG (ref 38–42)
PCO2 BLDA: 40 MM HG (ref 38–42)
PCO2 BLDA: 41 MM HG (ref 38–42)
PCO2 BLDA: 42 MM HG (ref 38–42)
PCO2 BLDA: 43 MM HG (ref 38–42)
PCO2 BLDV: 33 MM HG (ref 41–51)
PCO2 BLDV: 47 MM HG (ref 41–51)
PH BLDA: 7.41 PH (ref 7.38–7.42)
PH BLDA: 7.42 PH (ref 7.38–7.42)
PH BLDA: 7.44 PH (ref 7.38–7.42)
PH BLDA: 7.44 PH (ref 7.38–7.42)
PH BLDA: 7.45 PH (ref 7.38–7.42)
PH BLDA: 7.46 PH (ref 7.38–7.42)
PH BLDA: 7.47 PH (ref 7.38–7.42)
PH BLDA: 7.47 PH (ref 7.38–7.42)
PH BLDA: 7.48 PH (ref 7.38–7.42)
PH BLDA: 7.5 PH (ref 7.38–7.42)
PH BLDV: 7.35 PH (ref 7.33–7.43)
PH BLDV: 7.52 PH (ref 7.33–7.43)
PH UR STRIP.AUTO: 5 [PH]
PH UR STRIP.AUTO: 6 [PH]
PHOSPHATE (MG/DL) IN SER/PLAS: 2.3 MG/DL (ref 2.5–4.9)
PHOSPHATE (MG/DL) IN SER/PLAS: 2.4 MG/DL (ref 2.5–4.9)
PHOSPHATE (MG/DL) IN SER/PLAS: 2.5 MG/DL (ref 2.5–4.9)
PHOSPHATE (MG/DL) IN SER/PLAS: 2.6 MG/DL (ref 2.5–4.9)
PHOSPHATE (MG/DL) IN SER/PLAS: 2.9 MG/DL (ref 2.5–4.9)
PHOSPHATE (MG/DL) IN SER/PLAS: 3 MG/DL (ref 2.5–4.9)
PHOSPHATE (MG/DL) IN SER/PLAS: 3 MG/DL (ref 2.5–4.9)
PHOSPHATE (MG/DL) IN SER/PLAS: 3.4 MG/DL (ref 2.5–4.9)
PHOSPHATE (MG/DL) IN SER/PLAS: 3.9 MG/DL (ref 2.5–4.9)
PHOSPHATE (MG/DL) IN SER/PLAS: 4 MG/DL (ref 2.5–4.9)
PHOSPHATE SERPL-MCNC: 2.7 MG/DL (ref 2.5–4.9)
PHOSPHATE SERPL-MCNC: 2.7 MG/DL (ref 2.5–4.9)
PHOSPHATE SERPL-MCNC: 2.8 MG/DL (ref 2.5–4.9)
PHOSPHATE SERPL-MCNC: 2.9 MG/DL (ref 2.5–4.9)
PHOSPHATE SERPL-MCNC: 3 MG/DL (ref 2.5–4.9)
PHOSPHATE SERPL-MCNC: 3.1 MG/DL (ref 2.5–4.9)
PHOSPHATE SERPL-MCNC: 3.4 MG/DL (ref 2.5–4.9)
PHOSPHATE SERPL-MCNC: 3.4 MG/DL (ref 2.5–4.9)
PHOSPHATE SERPL-MCNC: 3.7 MG/DL (ref 2.5–4.9)
PHOSPHATE SERPL-MCNC: 3.8 MG/DL (ref 2.5–4.9)
PHOSPHATE SERPL-MCNC: 3.9 MG/DL (ref 2.5–4.9)
PHOSPHATE SERPL-MCNC: 4 MG/DL (ref 2.5–4.9)
PHOSPHATE SERPL-MCNC: 4.2 MG/DL (ref 2.5–4.9)
PHOSPHATE SERPL-MCNC: 4.3 MG/DL (ref 2.5–4.9)
PHOSPHATE SERPL-MCNC: 4.4 MG/DL (ref 2.5–4.9)
PHOSPHATE SERPL-MCNC: 4.5 MG/DL (ref 2.5–4.9)
PHOSPHATE SERPL-MCNC: 4.6 MG/DL (ref 2.5–4.9)
PHOSPHATE SERPL-MCNC: 4.8 MG/DL (ref 2.5–4.9)
PHOSPHATE SERPL-MCNC: 4.8 MG/DL (ref 2.5–4.9)
PHOSPHATE SERPL-MCNC: 5.2 MG/DL (ref 2.5–4.9)
PHOSPHATE SERPL-MCNC: 6.3 MG/DL (ref 2.5–4.9)
PLATELET # BLD AUTO: 182 X10*3/UL (ref 150–450)
PLATELET # BLD AUTO: 189 X10*3/UL (ref 150–450)
PLATELET # BLD AUTO: 191 X10*3/UL (ref 150–450)
PLATELET # BLD AUTO: 193 X10*3/UL (ref 150–450)
PLATELET # BLD AUTO: 198 X10*3/UL (ref 150–450)
PLATELET # BLD AUTO: 200 X10*3/UL (ref 150–450)
PLATELET # BLD AUTO: 205 X10*3/UL (ref 150–450)
PLATELET # BLD AUTO: 208 X10*3/UL (ref 150–450)
PLATELET # BLD AUTO: 209 X10*3/UL (ref 150–450)
PLATELET # BLD AUTO: 232 X10*3/UL (ref 150–450)
PLATELET # BLD AUTO: 261 X10*3/UL (ref 150–450)
PLATELET # BLD AUTO: 266 X10*3/UL (ref 150–450)
PLATELET # BLD AUTO: 276 X10*3/UL (ref 150–450)
PLATELET # BLD AUTO: 284 X10*3/UL (ref 150–450)
PLATELET # BLD AUTO: 288 X10*3/UL (ref 150–450)
PLATELET # BLD AUTO: 292 X10*3/UL (ref 150–450)
PLATELET # BLD AUTO: 316 X10*3/UL (ref 150–450)
PLATELET # BLD AUTO: 317 X10*3/UL (ref 150–450)
PLATELET # BLD AUTO: 339 X10*3/UL (ref 150–450)
PLATELET # BLD AUTO: 356 X10*3/UL (ref 150–450)
PLATELET # BLD AUTO: 357 X10*3/UL (ref 150–450)
PLATELET # BLD AUTO: 374 X10*3/UL (ref 150–450)
PLATELET # BLD AUTO: 382 X10*3/UL (ref 150–450)
PLATELET # BLD AUTO: 418 X10*3/UL (ref 150–450)
PLATELET # BLD AUTO: 424 X10*3/UL (ref 150–450)
PLATELET # BLD AUTO: 433 X10*3/UL (ref 150–450)
PLATELETS (10*3/UL) IN BLOOD AUTOMATED COUNT: 175 X10E9/L (ref 150–450)
PLATELETS (10*3/UL) IN BLOOD AUTOMATED COUNT: 179 X10E9/L (ref 150–450)
PLATELETS (10*3/UL) IN BLOOD AUTOMATED COUNT: 187 X10E9/L (ref 150–450)
PLATELETS (10*3/UL) IN BLOOD AUTOMATED COUNT: 194 X10E9/L (ref 150–450)
PLATELETS (10*3/UL) IN BLOOD AUTOMATED COUNT: 196 X10E9/L (ref 150–450)
PLATELETS (10*3/UL) IN BLOOD AUTOMATED COUNT: 206 X10E9/L (ref 150–450)
PLATELETS (10*3/UL) IN BLOOD AUTOMATED COUNT: 209 X10E9/L (ref 150–450)
PLATELETS (10*3/UL) IN BLOOD AUTOMATED COUNT: 211 X10E9/L (ref 150–450)
PLATELETS (10*3/UL) IN BLOOD AUTOMATED COUNT: 214 X10E9/L (ref 150–450)
PLATELETS (10*3/UL) IN BLOOD AUTOMATED COUNT: 225 X10E9/L (ref 150–450)
PLATELETS (10*3/UL) IN BLOOD AUTOMATED COUNT: 228 X10E9/L (ref 150–450)
PLATELETS (10*3/UL) IN BLOOD AUTOMATED COUNT: 229 X10E9/L (ref 150–450)
PLATELETS (10*3/UL) IN BLOOD AUTOMATED COUNT: 235 X10E9/L (ref 150–450)
PLATELETS (10*3/UL) IN BLOOD AUTOMATED COUNT: 236 X10E9/L (ref 150–450)
PLATELETS (10*3/UL) IN BLOOD AUTOMATED COUNT: 247 X10E9/L (ref 150–450)
PLATELETS (10*3/UL) IN BLOOD AUTOMATED COUNT: 248 X10E9/L (ref 150–450)
PLATELETS (10*3/UL) IN BLOOD AUTOMATED COUNT: 256 X10E9/L (ref 150–450)
PLATELETS (10*3/UL) IN BLOOD AUTOMATED COUNT: 277 X10E9/L (ref 150–450)
PLATELETS (10*3/UL) IN BLOOD AUTOMATED COUNT: 280 X10E9/L (ref 150–450)
PLATELETS (10*3/UL) IN BLOOD AUTOMATED COUNT: 285 X10E9/L (ref 150–450)
PLATELETS (10*3/UL) IN BLOOD AUTOMATED COUNT: 288 X10E9/L (ref 150–450)
PLATELETS (10*3/UL) IN BLOOD AUTOMATED COUNT: 296 X10E9/L (ref 150–450)
PLATELETS (10*3/UL) IN BLOOD AUTOMATED COUNT: 325 X10E9/L (ref 150–450)
PLATELETS (10*3/UL) IN BLOOD AUTOMATED COUNT: NORMAL
PMV BLD AUTO: 10.1 FL (ref 7.5–11.5)
PMV BLD AUTO: 10.2 FL (ref 7.5–11.5)
PMV BLD AUTO: 10.3 FL (ref 7.5–11.5)
PMV BLD AUTO: 10.4 FL (ref 7.5–11.5)
PMV BLD AUTO: 10.5 FL (ref 7.5–11.5)
PMV BLD AUTO: 10.5 FL (ref 7.5–11.5)
PMV BLD AUTO: 10.7 FL (ref 7.5–11.5)
PMV BLD AUTO: 10.9 FL (ref 7.5–11.5)
PMV BLD AUTO: 10.9 FL (ref 7.5–11.5)
PMV BLD AUTO: 11 FL (ref 7.5–11.5)
PMV BLD AUTO: 11.1 FL (ref 7.5–11.5)
PMV BLD AUTO: 11.2 FL (ref 7.5–11.5)
PMV BLD AUTO: 11.3 FL (ref 7.5–11.5)
PMV BLD AUTO: 11.4 FL (ref 7.5–11.5)
PMV BLD AUTO: 11.6 FL (ref 7.5–11.5)
PMV BLD AUTO: 9.9 FL (ref 7.5–11.5)
PO2 BLDA: 109 MM HG (ref 85–95)
PO2 BLDA: 110 MM HG (ref 85–95)
PO2 BLDA: 110 MM HG (ref 85–95)
PO2 BLDA: 127 MM HG (ref 85–95)
PO2 BLDA: 135 MM HG (ref 85–95)
PO2 BLDA: 142 MM HG (ref 85–95)
PO2 BLDA: 144 MM HG (ref 85–95)
PO2 BLDA: 145 MM HG (ref 85–95)
PO2 BLDA: 151 MM HG (ref 85–95)
PO2 BLDA: 155 MM HG (ref 85–95)
PO2 BLDA: 165 MM HG (ref 85–95)
PO2 BLDA: 187 MM HG (ref 85–95)
PO2 BLDV: 129 MM HG (ref 35–45)
PO2 BLDV: 45 MM HG (ref 35–45)
POC CALCIUM IONIZED (MMOL/L) IN BLOOD: 1.12 MMOL/L (ref 1.1–1.33)
POC CALCIUM IONIZED (MMOL/L) IN BLOOD: 1.12 MMOL/L (ref 1.1–1.33)
POC CALCIUM IONIZED (MMOL/L) IN BLOOD: 1.14 MMOL/L (ref 1.1–1.33)
POC CALCIUM IONIZED (MMOL/L) IN BLOOD: 1.14 MMOL/L (ref 1.1–1.33)
POC CALCIUM IONIZED (MMOL/L) IN BLOOD: 1.15 MMOL/L (ref 1.1–1.33)
POCT ANION GAP, ARTERIAL: 10 MMOL/L (ref 10–25)
POCT ANION GAP, ARTERIAL: 11 MMOL/L (ref 10–25)
POCT ANION GAP, ARTERIAL: 12 MMOL/L (ref 10–25)
POCT ANION GAP, ARTERIAL: 12 MMOL/L (ref 10–25)
POCT ANION GAP, ARTERIAL: 6 MMOL/L (ref 10–25)
POCT ANION GAP, ARTERIAL: 7 MMOL/L (ref 10–25)
POCT ANION GAP, ARTERIAL: 8 MMOL/L (ref 10–25)
POCT ANION GAP, ARTERIAL: 9 MMOL/L (ref 10–25)
POCT BASE EXCESS, ARTERIAL: -0.5 MMOL/L (ref -2–3)
POCT BASE EXCESS, ARTERIAL: -1 MMOL/L (ref -2–3)
POCT BASE EXCESS, ARTERIAL: -3.1 MMOL/L (ref -2–3)
POCT BASE EXCESS, ARTERIAL: -3.2 MMOL/L (ref -2–3)
POCT BASE EXCESS, ARTERIAL: -3.7 MMOL/L (ref -2–3)
POCT BASE EXCESS, ARTERIAL: -4.3 MMOL/L (ref -2–3)
POCT BASE EXCESS, ARTERIAL: -4.7 MMOL/L (ref -2–3)
POCT BASE EXCESS, ARTERIAL: -6.1 MMOL/L (ref -2–3)
POCT BASE EXCESS, ARTERIAL: 0.5 MMOL/L (ref -2–3)
POCT BASE EXCESS, ARTERIAL: 1.1 MMOL/L (ref -2–3)
POCT CHLORIDE, ARTERIAL: 103 MMOL/L (ref 98–107)
POCT CHLORIDE, ARTERIAL: 104 MMOL/L (ref 98–107)
POCT CHLORIDE, ARTERIAL: 104 MMOL/L (ref 98–107)
POCT CHLORIDE, ARTERIAL: 105 MMOL/L (ref 98–107)
POCT CHLORIDE, ARTERIAL: 106 MMOL/L (ref 98–107)
POCT CHLORIDE, ARTERIAL: 108 MMOL/L (ref 98–107)
POCT CHLORIDE, ARTERIAL: 111 MMOL/L (ref 98–107)
POCT GLUCOSE, ARTERIAL: 111 MG/DL (ref 74–99)
POCT GLUCOSE, ARTERIAL: 171 MG/DL (ref 74–99)
POCT GLUCOSE, ARTERIAL: 173 MG/DL (ref 74–99)
POCT GLUCOSE, ARTERIAL: 184 MG/DL (ref 74–99)
POCT GLUCOSE, ARTERIAL: 211 MG/DL (ref 74–99)
POCT GLUCOSE, ARTERIAL: 212 MG/DL (ref 74–99)
POCT GLUCOSE, ARTERIAL: 218 MG/DL (ref 74–99)
POCT GLUCOSE, ARTERIAL: 232 MG/DL (ref 74–99)
POCT GLUCOSE, ARTERIAL: 247 MG/DL (ref 74–99)
POCT GLUCOSE, ARTERIAL: 92 MG/DL (ref 74–99)
POCT GLUCOSE: 106 MG/DL (ref 74–99)
POCT GLUCOSE: 106 MG/DL (ref 74–99)
POCT GLUCOSE: 109 MG/DL (ref 74–99)
POCT GLUCOSE: 109 MG/DL (ref 74–99)
POCT GLUCOSE: 110 MG/DL (ref 74–99)
POCT GLUCOSE: 113 MG/DL (ref 74–99)
POCT GLUCOSE: 114 MG/DL (ref 74–99)
POCT GLUCOSE: 114 MG/DL (ref 74–99)
POCT GLUCOSE: 115 MG/DL (ref 74–99)
POCT GLUCOSE: 118 MG/DL (ref 74–99)
POCT GLUCOSE: 118 MG/DL (ref 74–99)
POCT GLUCOSE: 119 MG/DL (ref 74–99)
POCT GLUCOSE: 121 MG/DL (ref 74–99)
POCT GLUCOSE: 122 MG/DL (ref 74–99)
POCT GLUCOSE: 123 MG/DL (ref 74–99)
POCT GLUCOSE: 123 MG/DL (ref 74–99)
POCT GLUCOSE: 124 MG/DL (ref 74–99)
POCT GLUCOSE: 125 MG/DL (ref 74–99)
POCT GLUCOSE: 125 MG/DL (ref 74–99)
POCT GLUCOSE: 128 MG/DL (ref 74–99)
POCT GLUCOSE: 129 MG/DL (ref 74–99)
POCT GLUCOSE: 130 MG/DL (ref 74–99)
POCT GLUCOSE: 131 MG/DL (ref 74–99)
POCT GLUCOSE: 131 MG/DL (ref 74–99)
POCT GLUCOSE: 132 MG/DL (ref 74–99)
POCT GLUCOSE: 132 MG/DL (ref 74–99)
POCT GLUCOSE: 133 MG/DL (ref 74–99)
POCT GLUCOSE: 133 MG/DL (ref 74–99)
POCT GLUCOSE: 134 MG/DL (ref 74–99)
POCT GLUCOSE: 135 MG/DL (ref 74–99)
POCT GLUCOSE: 135 MG/DL (ref 74–99)
POCT GLUCOSE: 136 MG/DL (ref 74–99)
POCT GLUCOSE: 137 MG/DL (ref 74–99)
POCT GLUCOSE: 138 MG/DL (ref 74–99)
POCT GLUCOSE: 139 MG/DL (ref 74–99)
POCT GLUCOSE: 140 MG/DL (ref 74–99)
POCT GLUCOSE: 141 MG/DL (ref 74–99)
POCT GLUCOSE: 144 MG/DL (ref 74–99)
POCT GLUCOSE: 145 MG/DL (ref 74–99)
POCT GLUCOSE: 146 MG/DL (ref 74–99)
POCT GLUCOSE: 146 MG/DL (ref 74–99)
POCT GLUCOSE: 147 MG/DL (ref 74–99)
POCT GLUCOSE: 148 MG/DL (ref 74–99)
POCT GLUCOSE: 149 MG/DL (ref 74–99)
POCT GLUCOSE: 150 MG/DL (ref 74–99)
POCT GLUCOSE: 151 MG/DL (ref 74–99)
POCT GLUCOSE: 151 MG/DL (ref 74–99)
POCT GLUCOSE: 152 MG/DL (ref 74–99)
POCT GLUCOSE: 152 MG/DL (ref 74–99)
POCT GLUCOSE: 153 MG/DL (ref 74–99)
POCT GLUCOSE: 154 MG/DL (ref 74–99)
POCT GLUCOSE: 155 MG/DL (ref 74–99)
POCT GLUCOSE: 155 MG/DL (ref 74–99)
POCT GLUCOSE: 162 MG/DL (ref 74–99)
POCT GLUCOSE: 164 MG/DL (ref 74–99)
POCT GLUCOSE: 166 MG/DL (ref 74–99)
POCT GLUCOSE: 167 MG/DL (ref 74–99)
POCT GLUCOSE: 168 MG/DL (ref 74–99)
POCT GLUCOSE: 168 MG/DL (ref 74–99)
POCT GLUCOSE: 169 MG/DL (ref 74–99)
POCT GLUCOSE: 169 MG/DL (ref 74–99)
POCT GLUCOSE: 170 MG/DL (ref 74–99)
POCT GLUCOSE: 172 MG/DL (ref 74–99)
POCT GLUCOSE: 172 MG/DL (ref 74–99)
POCT GLUCOSE: 176 MG/DL (ref 74–99)
POCT GLUCOSE: 178 MG/DL (ref 74–99)
POCT GLUCOSE: 178 MG/DL (ref 74–99)
POCT GLUCOSE: 186 MG/DL (ref 74–99)
POCT GLUCOSE: 188 MG/DL (ref 74–99)
POCT GLUCOSE: 197 MG/DL (ref 74–99)
POCT GLUCOSE: 201 MG/DL (ref 74–99)
POCT GLUCOSE: 202 MG/DL (ref 74–99)
POCT GLUCOSE: 205 MG/DL (ref 74–99)
POCT GLUCOSE: 207 MG/DL (ref 74–99)
POCT GLUCOSE: 207 MG/DL (ref 74–99)
POCT GLUCOSE: 209 MG/DL (ref 74–99)
POCT GLUCOSE: 210 MG/DL (ref 74–99)
POCT GLUCOSE: 211 MG/DL (ref 74–99)
POCT GLUCOSE: 215 MG/DL (ref 74–99)
POCT GLUCOSE: 217 MG/DL (ref 74–99)
POCT GLUCOSE: 217 MG/DL (ref 74–99)
POCT GLUCOSE: 221 MG/DL (ref 74–99)
POCT GLUCOSE: 227 MG/DL (ref 74–99)
POCT GLUCOSE: 238 MG/DL (ref 74–99)
POCT GLUCOSE: 252 MG/DL (ref 74–99)
POCT GLUCOSE: 319 MG/DL (ref 74–99)
POCT GLUCOSE: 78 MG/DL (ref 74–99)
POCT HCO3, ARTERIAL: 21.1 MMOL/L (ref 22–26)
POCT HCO3, ARTERIAL: 21.4 MMOL/L (ref 22–26)
POCT HCO3, ARTERIAL: 21.6 MMOL/L (ref 22–26)
POCT HCO3, ARTERIAL: 22 MMOL/L (ref 22–26)
POCT HCO3, ARTERIAL: 22.2 MMOL/L (ref 22–26)
POCT HCO3, ARTERIAL: 22.7 MMOL/L (ref 22–26)
POCT HCO3, ARTERIAL: 24.2 MMOL/L (ref 22–26)
POCT HCO3, ARTERIAL: 24.3 MMOL/L (ref 22–26)
POCT HCO3, ARTERIAL: 25.4 MMOL/L (ref 22–26)
POCT HCO3, ARTERIAL: 26 MMOL/L (ref 22–26)
POCT HEMATOCRIT, ARTERIAL: 24 % (ref 41–52)
POCT HEMATOCRIT, ARTERIAL: 25 % (ref 41–52)
POCT HEMATOCRIT, ARTERIAL: 28 % (ref 41–52)
POCT HEMATOCRIT, ARTERIAL: 28 % (ref 41–52)
POCT HEMATOCRIT, ARTERIAL: 29 % (ref 41–52)
POCT HEMATOCRIT, ARTERIAL: 29 % (ref 41–52)
POCT HEMATOCRIT, ARTERIAL: 31 % (ref 41–52)
POCT HEMATOCRIT, ARTERIAL: 32 % (ref 41–52)
POCT HEMOGLOBIN, ARTERIAL: 10.4 G/DL (ref 13.5–17.5)
POCT HEMOGLOBIN, ARTERIAL: 10.5 G/DL (ref 13.5–17.5)
POCT HEMOGLOBIN, ARTERIAL: 10.5 G/DL (ref 13.5–17.5)
POCT HEMOGLOBIN, ARTERIAL: 10.8 G/DL (ref 13.5–17.5)
POCT HEMOGLOBIN, ARTERIAL: 7.9 G/DL (ref 13.5–17.5)
POCT HEMOGLOBIN, ARTERIAL: 8.4 G/DL (ref 13.5–17.5)
POCT HEMOGLOBIN, ARTERIAL: 9.3 G/DL (ref 13.5–17.5)
POCT HEMOGLOBIN, ARTERIAL: 9.4 G/DL (ref 13.5–17.5)
POCT HEMOGLOBIN, ARTERIAL: 9.7 G/DL (ref 13.5–17.5)
POCT HEMOGLOBIN, ARTERIAL: 9.7 G/DL (ref 13.5–17.5)
POCT IONIZED CALCIUM, ARTERIAL: 1.12 MMOL/L (ref 1.1–1.33)
POCT IONIZED CALCIUM, ARTERIAL: 1.13 MMOL/L (ref 1.1–1.33)
POCT IONIZED CALCIUM, ARTERIAL: 1.14 MMOL/L (ref 1.1–1.33)
POCT IONIZED CALCIUM, ARTERIAL: 1.14 MMOL/L (ref 1.1–1.33)
POCT IONIZED CALCIUM, ARTERIAL: 1.15 MMOL/L (ref 1.1–1.33)
POCT IONIZED CALCIUM, ARTERIAL: 1.17 MMOL/L (ref 1.1–1.33)
POCT IONIZED CALCIUM, ARTERIAL: 1.18 MMOL/L (ref 1.1–1.33)
POCT IONIZED CALCIUM, ARTERIAL: 1.2 MMOL/L (ref 1.1–1.33)
POCT IONIZED CALCIUM, ARTERIAL: 1.24 MMOL/L (ref 1.1–1.33)
POCT IONIZED CALCIUM, ARTERIAL: 1.26 MMOL/L (ref 1.1–1.33)
POCT LACTATE, ARTERIAL: 0.9 MMOL/L (ref 0.4–2)
POCT LACTATE, ARTERIAL: 1.1 MMOL/L (ref 0.4–2)
POCT LACTATE, ARTERIAL: 1.5 MMOL/L (ref 0.4–2)
POCT LACTATE, ARTERIAL: 1.5 MMOL/L (ref 0.4–2)
POCT LACTATE, ARTERIAL: 1.6 MMOL/L (ref 0.4–2)
POCT LACTATE, ARTERIAL: 2.3 MMOL/L (ref 0.4–2)
POCT LACTATE, ARTERIAL: 2.5 MMOL/L (ref 0.4–2)
POCT LACTATE, ARTERIAL: 3.2 MMOL/L (ref 0.4–2)
POCT LACTATE, ARTERIAL: 3.2 MMOL/L (ref 0.4–2)
POCT LACTATE, ARTERIAL: 3.7 MMOL/L (ref 0.4–2)
POCT OXY HEMOGLOBIN, ARTERIAL: 90.7 % (ref 94–98)
POCT OXY HEMOGLOBIN, ARTERIAL: 92.1 % (ref 94–98)
POCT OXY HEMOGLOBIN, ARTERIAL: 93.4 % (ref 94–98)
POCT OXY HEMOGLOBIN, ARTERIAL: 94.6 % (ref 94–98)
POCT OXY HEMOGLOBIN, ARTERIAL: 97.3 % (ref 94–98)
POCT OXY HEMOGLOBIN, ARTERIAL: 97.4 % (ref 94–98)
POCT OXY HEMOGLOBIN, ARTERIAL: 97.4 % (ref 94–98)
POCT OXY HEMOGLOBIN, ARTERIAL: 97.7 % (ref 94–98)
POCT OXY HEMOGLOBIN, ARTERIAL: 97.7 % (ref 94–98)
POCT OXY HEMOGLOBIN, ARTERIAL: 98.4 % (ref 94–98)
POCT PCO2, ARTERIAL: 39 MMHG (ref 38–42)
POCT PCO2, ARTERIAL: 39 MMHG (ref 38–42)
POCT PCO2, ARTERIAL: 41 MMHG (ref 38–42)
POCT PCO2, ARTERIAL: 41 MMHG (ref 38–42)
POCT PCO2, ARTERIAL: 42 MMHG (ref 38–42)
POCT PCO2, ARTERIAL: 43 MMHG (ref 38–42)
POCT PCO2, ARTERIAL: 43 MMHG (ref 38–42)
POCT PCO2, ARTERIAL: 51 MMHG (ref 38–42)
POCT PH, ARTERIAL: 7.23 (ref 7.38–7.42)
POCT PH, ARTERIAL: 7.32 (ref 7.38–7.42)
POCT PH, ARTERIAL: 7.33 (ref 7.38–7.42)
POCT PH, ARTERIAL: 7.36 (ref 7.38–7.42)
POCT PH, ARTERIAL: 7.37 (ref 7.38–7.42)
POCT PH, ARTERIAL: 7.4 (ref 7.38–7.42)
POCT PO2, ARTERIAL: 100 MMHG (ref 85–95)
POCT PO2, ARTERIAL: 100 MMHG (ref 85–95)
POCT PO2, ARTERIAL: 119 MMHG (ref 85–95)
POCT PO2, ARTERIAL: 161 MMHG (ref 85–95)
POCT PO2, ARTERIAL: 163 MMHG (ref 85–95)
POCT PO2, ARTERIAL: 234 MMHG (ref 85–95)
POCT PO2, ARTERIAL: 66 MMHG (ref 85–95)
POCT PO2, ARTERIAL: 67 MMHG (ref 85–95)
POCT PO2, ARTERIAL: 75 MMHG (ref 85–95)
POCT PO2, ARTERIAL: 83 MMHG (ref 85–95)
POCT POTASSIUM, ARTERIAL: 3.8 MMOL/L (ref 3.5–5.3)
POCT POTASSIUM, ARTERIAL: 3.8 MMOL/L (ref 3.5–5.3)
POCT POTASSIUM, ARTERIAL: 4.4 MMOL/L (ref 3.5–5.3)
POCT POTASSIUM, ARTERIAL: 4.5 MMOL/L (ref 3.5–5.3)
POCT POTASSIUM, ARTERIAL: 4.7 MMOL/L (ref 3.5–5.3)
POCT POTASSIUM, ARTERIAL: 5.2 MMOL/L (ref 3.5–5.3)
POCT POTASSIUM, ARTERIAL: 5.4 MMOL/L (ref 3.5–5.3)
POCT POTASSIUM, ARTERIAL: 5.4 MMOL/L (ref 3.5–5.3)
POCT POTASSIUM, ARTERIAL: 5.8 MMOL/L (ref 3.5–5.3)
POCT POTASSIUM, ARTERIAL: 6.6 MMOL/L (ref 3.5–5.3)
POCT SO2, ARTERIAL: 100 % (ref 94–100)
POCT SO2, ARTERIAL: 94 % (ref 94–100)
POCT SO2, ARTERIAL: 94 % (ref 94–100)
POCT SO2, ARTERIAL: 95 % (ref 94–100)
POCT SO2, ARTERIAL: 96 % (ref 94–100)
POCT SO2, ARTERIAL: 99 % (ref 94–100)
POCT SODIUM, ARTERIAL: 132 MMOL/L (ref 136–145)
POCT SODIUM, ARTERIAL: 133 MMOL/L (ref 136–145)
POCT SODIUM, ARTERIAL: 135 MMOL/L (ref 136–145)
POCT SODIUM, ARTERIAL: 135 MMOL/L (ref 136–145)
POCT SODIUM, ARTERIAL: 136 MMOL/L (ref 136–145)
POCT SODIUM, ARTERIAL: 140 MMOL/L (ref 136–145)
POTASSIUM (MMOL/L) IN SER/PLAS: 3.6 MMOL/L (ref 3.5–5.3)
POTASSIUM (MMOL/L) IN SER/PLAS: 3.7 MMOL/L (ref 3.5–5.3)
POTASSIUM (MMOL/L) IN SER/PLAS: 3.8 MMOL/L (ref 3.5–5.3)
POTASSIUM (MMOL/L) IN SER/PLAS: 3.9 MMOL/L (ref 3.5–5.3)
POTASSIUM (MMOL/L) IN SER/PLAS: 3.9 MMOL/L (ref 3.5–5.3)
POTASSIUM (MMOL/L) IN SER/PLAS: 4 MMOL/L (ref 3.5–5.3)
POTASSIUM (MMOL/L) IN SER/PLAS: 4 MMOL/L (ref 3.5–5.3)
POTASSIUM (MMOL/L) IN SER/PLAS: 4.1 MMOL/L (ref 3.5–5.3)
POTASSIUM (MMOL/L) IN SER/PLAS: 4.1 MMOL/L (ref 3.5–5.3)
POTASSIUM (MMOL/L) IN SER/PLAS: 4.2 MMOL/L (ref 3.5–5.3)
POTASSIUM (MMOL/L) IN SER/PLAS: 4.4 MMOL/L (ref 3.5–5.3)
POTASSIUM (MMOL/L) IN SER/PLAS: 4.6 MMOL/L (ref 3.5–5.3)
POTASSIUM (MMOL/L) IN SER/PLAS: 4.8 MMOL/L (ref 3.5–5.3)
POTASSIUM (MMOL/L) IN SER/PLAS: 5.1 MMOL/L (ref 3.5–5.3)
POTASSIUM (MMOL/L) IN SER/PLAS: 6.3 MMOL/L (ref 3.5–5.3)
POTASSIUM (MMOL/L) IN SER/PLAS: NORMAL
POTASSIUM BLDA-SCNC: 3.2 MMOL/L (ref 3.5–5.3)
POTASSIUM BLDA-SCNC: 3.2 MMOL/L (ref 3.5–5.3)
POTASSIUM BLDA-SCNC: 3.4 MMOL/L (ref 3.5–5.3)
POTASSIUM BLDA-SCNC: 3.5 MMOL/L (ref 3.5–5.3)
POTASSIUM BLDA-SCNC: 3.6 MMOL/L (ref 3.5–5.3)
POTASSIUM BLDA-SCNC: 3.6 MMOL/L (ref 3.5–5.3)
POTASSIUM BLDA-SCNC: 3.8 MMOL/L (ref 3.5–5.3)
POTASSIUM BLDA-SCNC: 3.8 MMOL/L (ref 3.5–5.3)
POTASSIUM BLDA-SCNC: 4 MMOL/L (ref 3.5–5.3)
POTASSIUM BLDA-SCNC: 4.2 MMOL/L (ref 3.5–5.3)
POTASSIUM BLDA-SCNC: 4.3 MMOL/L (ref 3.5–5.3)
POTASSIUM BLDA-SCNC: 4.9 MMOL/L (ref 3.5–5.3)
POTASSIUM BLDV-SCNC: 4 MMOL/L (ref 3.5–5.3)
POTASSIUM BLDV-SCNC: 4.6 MMOL/L (ref 3.5–5.3)
POTASSIUM SERPL-SCNC: 3.4 MMOL/L (ref 3.5–5.3)
POTASSIUM SERPL-SCNC: 3.4 MMOL/L (ref 3.5–5.3)
POTASSIUM SERPL-SCNC: 3.6 MMOL/L (ref 3.5–5.3)
POTASSIUM SERPL-SCNC: 3.8 MMOL/L (ref 3.5–5.3)
POTASSIUM SERPL-SCNC: 3.8 MMOL/L (ref 3.5–5.3)
POTASSIUM SERPL-SCNC: 4 MMOL/L (ref 3.5–5.3)
POTASSIUM SERPL-SCNC: 4.1 MMOL/L (ref 3.5–5.3)
POTASSIUM SERPL-SCNC: 4.3 MMOL/L (ref 3.5–5.3)
POTASSIUM SERPL-SCNC: 4.4 MMOL/L (ref 3.5–5.3)
POTASSIUM SERPL-SCNC: 4.4 MMOL/L (ref 3.5–5.3)
POTASSIUM SERPL-SCNC: 4.5 MMOL/L (ref 3.5–5.3)
POTASSIUM SERPL-SCNC: 4.5 MMOL/L (ref 3.5–5.3)
POTASSIUM SERPL-SCNC: 4.7 MMOL/L (ref 3.5–5.3)
POTASSIUM SERPL-SCNC: 4.7 MMOL/L (ref 3.5–5.3)
POTASSIUM SERPL-SCNC: 4.9 MMOL/L (ref 3.5–5.3)
POTASSIUM SERPL-SCNC: 5 MMOL/L (ref 3.5–5.3)
POTASSIUM SERPL-SCNC: 5 MMOL/L (ref 3.5–5.3)
PR INTERVAL: 162 MS
PR INTERVAL: 166 MS
PRODUCT BLOOD TYPE: 7300
PRODUCT CODE: NORMAL
PROSTATE SPECIFIC AG (NG/ML) IN SER/PLAS: 0.47 NG/ML (ref 0–4)
PROT SERPL-MCNC: 4.6 G/DL (ref 6.4–8.2)
PROT SERPL-MCNC: 4.7 G/DL (ref 6.4–8.2)
PROT SERPL-MCNC: 5 G/DL (ref 6.4–8.2)
PROT SERPL-MCNC: 5 G/DL (ref 6.4–8.2)
PROT SERPL-MCNC: 5.2 G/DL (ref 6.4–8.2)
PROT SERPL-MCNC: 5.3 G/DL (ref 6.4–8.2)
PROT SERPL-MCNC: 5.4 G/DL (ref 6.4–8.2)
PROT SERPL-MCNC: 5.5 G/DL (ref 6.4–8.2)
PROT SERPL-MCNC: 5.6 G/DL (ref 6.4–8.2)
PROT SERPL-MCNC: 5.6 G/DL (ref 6.4–8.2)
PROT SERPL-MCNC: 5.7 G/DL (ref 6.4–8.2)
PROT SERPL-MCNC: 6.5 G/DL (ref 6.4–8.2)
PROT SERPL-MCNC: 6.7 G/DL (ref 6.4–8.2)
PROT SERPL-MCNC: 6.9 G/DL (ref 6.4–8.2)
PROT UR STRIP.AUTO-MCNC: ABNORMAL MG/DL
PROT UR STRIP.AUTO-MCNC: ABNORMAL MG/DL
PROTEIN TOTAL: 4.8 G/DL (ref 6.4–8.2)
PROTEIN TOTAL: 5 G/DL (ref 6.4–8.2)
PROTEIN TOTAL: 5.1 G/DL (ref 6.4–8.2)
PROTEIN TOTAL: 5.1 G/DL (ref 6.4–8.2)
PROTEIN TOTAL: 5.2 G/DL (ref 6.4–8.2)
PROTEIN TOTAL: 5.5 G/DL (ref 6.4–8.2)
PROTEIN TOTAL: 5.6 G/DL (ref 6.4–8.2)
PROTEIN TOTAL: 5.7 G/DL (ref 6.4–8.2)
PROTEIN TOTAL: 5.9 G/DL (ref 6.4–8.2)
PROTEIN TOTAL: 6 G/DL (ref 6.4–8.2)
PROTEIN TOTAL: 6.2 G/DL (ref 6.4–8.2)
PROTEIN TOTAL: 7.2 G/DL (ref 6.4–8.2)
PROTEIN TOTAL: NORMAL
PROTHROMBIN TIME (PT) IN PPP BY COAGULATION ASSAY: 12.5 SEC (ref 9.8–13.4)
PROTHROMBIN TIME (PT) IN PPP BY COAGULATION ASSAY: 12.9 SEC (ref 9.8–13.4)
PROTHROMBIN TIME (PT) IN PPP BY COAGULATION ASSAY: 13.2 SEC (ref 9.8–13.4)
PROTHROMBIN TIME (PT) IN PPP BY COAGULATION ASSAY: 13.3 SEC (ref 9.8–13.4)
PROTHROMBIN TIME (PT) IN PPP BY COAGULATION ASSAY: 13.4 SEC (ref 9.8–13.4)
PROTHROMBIN TIME (PT) IN PPP BY COAGULATION ASSAY: 13.9 SEC (ref 9.8–13.4)
PROTHROMBIN TIME (PT) IN PPP BY COAGULATION ASSAY: 14.4 SEC (ref 9.8–13.4)
PROTHROMBIN TIME (PT) IN PPP BY COAGULATION ASSAY: 15.3 SEC (ref 9.8–13.4)
PROTHROMBIN TIME (PT) IN PPP BY COAGULATION ASSAY: 17.6 SEC (ref 9.8–13.4)
PROTHROMBIN TIME: 11.5 SECONDS (ref 9.8–12.8)
PROTHROMBIN TIME: 11.9 SECONDS (ref 9.8–12.8)
PROTHROMBIN TIME: 12.1 SECONDS (ref 9.8–12.8)
PROTHROMBIN TIME: 12.6 SECONDS (ref 9.8–12.8)
PROTHROMBIN TIME: 12.7 SECONDS (ref 9.8–12.8)
PROTHROMBIN TIME: 13.1 SECONDS (ref 9.8–12.8)
PROTHROMBIN TIME: 14 SECONDS (ref 9.8–12.8)
PROTHROMBIN TIME: 15.2 SECONDS (ref 9.8–12.8)
Q ONSET: 210 MS
Q ONSET: 224 MS
QRS COUNT: 17 BEATS
QRS COUNT: 17 BEATS
QRS DURATION: 104 MS
QRS DURATION: 104 MS
QT INTERVAL: 356 MS
QT INTERVAL: 382 MS
QTC CALCULATION(BAZETT): 475 MS
QTC CALCULATION(BAZETT): 495 MS
QTC FREDERICIA: 431 MS
QTC FREDERICIA: 454 MS
R AXIS: -43 DEGREES
R AXIS: -49 DEGREES
RBC # BLD AUTO: 2.32 X10*6/UL (ref 4.5–5.9)
RBC # BLD AUTO: 2.33 X10*6/UL (ref 4.5–5.9)
RBC # BLD AUTO: 2.35 X10*6/UL (ref 4.5–5.9)
RBC # BLD AUTO: 2.45 X10*6/UL (ref 4.5–5.9)
RBC # BLD AUTO: 2.46 X10*6/UL (ref 4.5–5.9)
RBC # BLD AUTO: 2.5 X10*6/UL (ref 4.5–5.9)
RBC # BLD AUTO: 2.56 X10*6/UL (ref 4.5–5.9)
RBC # BLD AUTO: 2.57 X10*6/UL (ref 4.5–5.9)
RBC # BLD AUTO: 2.58 X10*6/UL (ref 4.5–5.9)
RBC # BLD AUTO: 2.75 X10*6/UL (ref 4.5–5.9)
RBC # BLD AUTO: 2.77 X10*6/UL (ref 4.5–5.9)
RBC # BLD AUTO: 2.78 X10*6/UL (ref 4.5–5.9)
RBC # BLD AUTO: 2.81 X10*6/UL (ref 4.5–5.9)
RBC # BLD AUTO: 2.84 X10*6/UL (ref 4.5–5.9)
RBC # BLD AUTO: 2.87 X10*6/UL (ref 4.5–5.9)
RBC # BLD AUTO: 2.87 X10*6/UL (ref 4.5–5.9)
RBC # BLD AUTO: 2.92 X10*6/UL (ref 4.5–5.9)
RBC # BLD AUTO: 2.92 X10*6/UL (ref 4.5–5.9)
RBC # BLD AUTO: 2.97 X10*6/UL (ref 4.5–5.9)
RBC # BLD AUTO: 2.97 X10*6/UL (ref 4.5–5.9)
RBC # BLD AUTO: 2.98 X10*6/UL (ref 4.5–5.9)
RBC # BLD AUTO: 3.03 X10*6/UL (ref 4.5–5.9)
RBC # BLD AUTO: 3.14 X10*6/UL (ref 4.5–5.9)
RBC # BLD AUTO: 3.25 X10*6/UL (ref 4.5–5.9)
RBC # BLD AUTO: 3.56 X10*6/UL (ref 4.5–5.9)
RBC # BLD AUTO: 3.84 X10*6/UL (ref 4.5–5.9)
RBC # UR STRIP.AUTO: ABNORMAL /UL
RBC # UR STRIP.AUTO: NEGATIVE /UL
RBC #/AREA URNS AUTO: NORMAL /HPF
RBC #/AREA URNS AUTO: NORMAL /HPF
RBC MORPH BLD: ABNORMAL
RH FACTOR (ANTIGEN D): NORMAL
RH FACTOR: NORMAL
RH FACTOR: NORMAL
SAO2 % BLDA: 100 % (ref 94–100)
SAO2 % BLDA: 99 % (ref 94–100)
SAO2 % BLDV: 100 % (ref 45–75)
SAO2 % BLDV: 66 % (ref 45–75)
SARS-COV-2 RNA RESP QL NAA+PROBE: NOT DETECTED
SODIUM (MMOL/L) IN SER/PLAS: 134 MMOL/L (ref 136–145)
SODIUM (MMOL/L) IN SER/PLAS: 135 MMOL/L (ref 136–145)
SODIUM (MMOL/L) IN SER/PLAS: 137 MMOL/L (ref 136–145)
SODIUM (MMOL/L) IN SER/PLAS: 137 MMOL/L (ref 136–145)
SODIUM (MMOL/L) IN SER/PLAS: 138 MMOL/L (ref 136–145)
SODIUM (MMOL/L) IN SER/PLAS: 139 MMOL/L (ref 136–145)
SODIUM (MMOL/L) IN SER/PLAS: 140 MMOL/L (ref 136–145)
SODIUM (MMOL/L) IN SER/PLAS: 140 MMOL/L (ref 136–145)
SODIUM (MMOL/L) IN SER/PLAS: 141 MMOL/L (ref 136–145)
SODIUM (MMOL/L) IN SER/PLAS: 142 MMOL/L (ref 136–145)
SODIUM (MMOL/L) IN SER/PLAS: 145 MMOL/L (ref 136–145)
SODIUM (MMOL/L) IN SER/PLAS: 146 MMOL/L (ref 136–145)
SODIUM (MMOL/L) IN SER/PLAS: 147 MMOL/L (ref 136–145)
SODIUM (MMOL/L) IN SER/PLAS: 148 MMOL/L (ref 136–145)
SODIUM (MMOL/L) IN SER/PLAS: NORMAL
SODIUM BLDA-SCNC: 139 MMOL/L (ref 136–145)
SODIUM BLDA-SCNC: 141 MMOL/L (ref 136–145)
SODIUM BLDA-SCNC: 142 MMOL/L (ref 136–145)
SODIUM BLDA-SCNC: 143 MMOL/L (ref 136–145)
SODIUM BLDA-SCNC: 143 MMOL/L (ref 136–145)
SODIUM BLDA-SCNC: 144 MMOL/L (ref 136–145)
SODIUM BLDA-SCNC: 144 MMOL/L (ref 136–145)
SODIUM BLDA-SCNC: 145 MMOL/L (ref 136–145)
SODIUM BLDV-SCNC: 132 MMOL/L (ref 136–145)
SODIUM BLDV-SCNC: 138 MMOL/L (ref 136–145)
SODIUM SERPL-SCNC: 133 MMOL/L (ref 136–145)
SODIUM SERPL-SCNC: 136 MMOL/L (ref 136–145)
SODIUM SERPL-SCNC: 138 MMOL/L (ref 136–145)
SODIUM SERPL-SCNC: 139 MMOL/L (ref 136–145)
SODIUM SERPL-SCNC: 141 MMOL/L (ref 136–145)
SODIUM SERPL-SCNC: 141 MMOL/L (ref 136–145)
SODIUM SERPL-SCNC: 142 MMOL/L (ref 136–145)
SODIUM SERPL-SCNC: 143 MMOL/L (ref 136–145)
SODIUM SERPL-SCNC: 143 MMOL/L (ref 136–145)
SODIUM SERPL-SCNC: 144 MMOL/L (ref 136–145)
SODIUM SERPL-SCNC: 145 MMOL/L (ref 136–145)
SODIUM SERPL-SCNC: 146 MMOL/L (ref 136–145)
SODIUM SERPL-SCNC: 147 MMOL/L (ref 136–145)
SODIUM SERPL-SCNC: 147 MMOL/L (ref 136–145)
SODIUM SERPL-SCNC: 148 MMOL/L (ref 136–145)
SODIUM SERPL-SCNC: 149 MMOL/L (ref 136–145)
SP GR UR STRIP.AUTO: 1.02
SP GR UR STRIP.AUTO: 1.02
SQUAMOUS #/AREA URNS AUTO: NORMAL /HPF
STAPHYLOCOCCUS SPEC CULT: NORMAL
T AXIS: -10 DEGREES
T AXIS: 59 DEGREES
T OFFSET: 401 MS
T OFFSET: 402 MS
TACROLIMUS (NG/ML) IN BLOOD: 2.7 NG/ML (ref 2–15)
TACROLIMUS (NG/ML) IN BLOOD: 3 NG/ML (ref 2–15)
TACROLIMUS (NG/ML) IN BLOOD: 3.7 NG/ML (ref 2–15)
TACROLIMUS (NG/ML) IN BLOOD: 4.1 NG/ML (ref 2–15)
TACROLIMUS (NG/ML) IN BLOOD: 4.4 NG/ML (ref 2–15)
TACROLIMUS (NG/ML) IN BLOOD: 4.5 NG/ML (ref 2–15)
TACROLIMUS (NG/ML) IN BLOOD: 4.6 NG/ML (ref 2–15)
TACROLIMUS (NG/ML) IN BLOOD: 4.9 NG/ML (ref 2–15)
TACROLIMUS (NG/ML) IN BLOOD: 5.1 NG/ML (ref 2–15)
TACROLIMUS (NG/ML) IN BLOOD: 5.5 NG/ML (ref 2–15)
TACROLIMUS (NG/ML) IN BLOOD: 5.5 NG/ML (ref 2–15)
TACROLIMUS (NG/ML) IN BLOOD: 5.6 NG/ML (ref 2–15)
TACROLIMUS (NG/ML) IN BLOOD: 5.8 NG/ML (ref 2–15)
TACROLIMUS (NG/ML) IN BLOOD: 7.1 NG/ML (ref 2–15)
TACROLIMUS (NG/ML) IN BLOOD: 7.6 NG/ML (ref 2–15)
TACROLIMUS (NG/ML) IN BLOOD: 8 NG/ML (ref 2–15)
TACROLIMUS (NG/ML) IN BLOOD: 8 NG/ML (ref 2–15)
TACROLIMUS (NG/ML) IN BLOOD: 8.8 NG/ML (ref 2–15)
TACROLIMUS (NG/ML) IN BLOOD: NORMAL
TACROLIMUS BLD-MCNC: 10.1 NG/ML
TACROLIMUS BLD-MCNC: 3.6 NG/ML
TACROLIMUS BLD-MCNC: 4 NG/ML
TACROLIMUS BLD-MCNC: 4 NG/ML
TACROLIMUS BLD-MCNC: 4.5 NG/ML
TACROLIMUS BLD-MCNC: 4.6 NG/ML
TACROLIMUS BLD-MCNC: 4.9 NG/ML
TACROLIMUS BLD-MCNC: 4.9 NG/ML
TACROLIMUS BLD-MCNC: 5.3 NG/ML
TACROLIMUS BLD-MCNC: 5.5 NG/ML
TACROLIMUS BLD-MCNC: 5.7 NG/ML
TACROLIMUS BLD-MCNC: 5.9 NG/ML
TACROLIMUS BLD-MCNC: 6.1 NG/ML
TACROLIMUS BLD-MCNC: 6.1 NG/ML
TACROLIMUS BLD-MCNC: 6.2 NG/ML
TACROLIMUS BLD-MCNC: 6.6 NG/ML
TACROLIMUS BLD-MCNC: 7.7 NG/ML
TACROLIMUS BLD-MCNC: 7.9 NG/ML
TACROLIMUS BLD-MCNC: 8.4 NG/ML
TACROLIMUS BLD-MCNC: 8.5 NG/ML
TACROLIMUS BLD-MCNC: 9.1 NG/ML
TARGETS BLD QL SMEAR: ABNORMAL
THYROTROPIN (MIU/L) IN SER/PLAS BY DETECTION LIMIT <= 0.05 MIU/L: 0.69 MIU/L (ref 0.44–3.98)
THYROTROPIN (MIU/L) IN SER/PLAS BY DETECTION LIMIT <= 0.05 MIU/L: 1.21 MIU/L (ref 0.44–3.98)
TOTAL CELLS COUNTED BLD: 128
TRIGL SERPL-MCNC: 213 MG/DL (ref 0–149)
TRIGL SERPL-MCNC: 345 MG/DL (ref 0–149)
TRIGLYCERIDE (MG/DL) IN SER/PLAS: 189 MG/DL (ref 0–149)
TRIGLYCERIDE (MG/DL) IN SER/PLAS: 73 MG/DL (ref 0–149)
TRIGLYCERIDE (MG/DL) IN SER/PLAS: 99 MG/DL (ref 0–149)
TROPONIN I, HIGH SENSITIVITY: 18 NG/L (ref 0–53)
TSH SERPL-ACNC: 0.79 MIU/L (ref 0.44–3.98)
UNIT ABO: NORMAL
UNIT NUMBER: NORMAL
UNIT RH: NORMAL
UNIT VOLUME: 350
UNIT VOLUME: 400
UREA NITROGEN (MG/DL) IN SER/PLAS: 19 MG/DL (ref 6–23)
UREA NITROGEN (MG/DL) IN SER/PLAS: 21 MG/DL (ref 6–23)
UREA NITROGEN (MG/DL) IN SER/PLAS: 27 MG/DL (ref 6–23)
UREA NITROGEN (MG/DL) IN SER/PLAS: 29 MG/DL (ref 6–23)
UREA NITROGEN (MG/DL) IN SER/PLAS: 30 MG/DL (ref 6–23)
UREA NITROGEN (MG/DL) IN SER/PLAS: 31 MG/DL (ref 6–23)
UREA NITROGEN (MG/DL) IN SER/PLAS: 31 MG/DL (ref 6–23)
UREA NITROGEN (MG/DL) IN SER/PLAS: 33 MG/DL (ref 6–23)
UREA NITROGEN (MG/DL) IN SER/PLAS: 33 MG/DL (ref 6–23)
UREA NITROGEN (MG/DL) IN SER/PLAS: 34 MG/DL (ref 6–23)
UREA NITROGEN (MG/DL) IN SER/PLAS: 34 MG/DL (ref 6–23)
UREA NITROGEN (MG/DL) IN SER/PLAS: 37 MG/DL (ref 6–23)
UREA NITROGEN (MG/DL) IN SER/PLAS: 38 MG/DL (ref 6–23)
UREA NITROGEN (MG/DL) IN SER/PLAS: 44 MG/DL (ref 6–23)
UREA NITROGEN (MG/DL) IN SER/PLAS: 45 MG/DL (ref 6–23)
UREA NITROGEN (MG/DL) IN SER/PLAS: 46 MG/DL (ref 6–23)
UREA NITROGEN (MG/DL) IN SER/PLAS: 49 MG/DL (ref 6–23)
UREA NITROGEN (MG/DL) IN SER/PLAS: 50 MG/DL (ref 6–23)
UREA NITROGEN (MG/DL) IN SER/PLAS: 51 MG/DL (ref 6–23)
UREA NITROGEN (MG/DL) IN SER/PLAS: 53 MG/DL (ref 6–23)
UREA NITROGEN (MG/DL) IN SER/PLAS: NORMAL
UROBILINOGEN UR STRIP.AUTO-MCNC: <2 MG/DL
UROBILINOGEN UR STRIP.AUTO-MCNC: <2 MG/DL
VENTRICULAR RATE: 101 BPM
VENTRICULAR RATE: 107 BPM
VIT B12 SERPL-MCNC: 1954 PG/ML (ref 211–911)
VLDL: 15 MG/DL (ref 0–40)
VLDL: 20 MG/DL (ref 0–40)
VLDL: 38 MG/DL (ref 0–40)
WBC # BLD AUTO: 11.3 X10*3/UL (ref 4.4–11.3)
WBC # BLD AUTO: 12.5 X10*3/UL (ref 4.4–11.3)
WBC # BLD AUTO: 12.9 X10*3/UL (ref 4.4–11.3)
WBC # BLD AUTO: 12.9 X10*3/UL (ref 4.4–11.3)
WBC # BLD AUTO: 14.7 X10*3/UL (ref 4.4–11.3)
WBC # BLD AUTO: 15 X10*3/UL (ref 4.4–11.3)
WBC # BLD AUTO: 15.1 X10*3/UL (ref 4.4–11.3)
WBC # BLD AUTO: 15.4 X10*3/UL (ref 4.4–11.3)
WBC # BLD AUTO: 15.5 X10*3/UL (ref 4.4–11.3)
WBC # BLD AUTO: 15.8 X10*3/UL (ref 4.4–11.3)
WBC # BLD AUTO: 16 X10*3/UL (ref 4.4–11.3)
WBC # BLD AUTO: 16.3 X10*3/UL (ref 4.4–11.3)
WBC # BLD AUTO: 16.4 X10*3/UL (ref 4.4–11.3)
WBC # BLD AUTO: 16.5 X10*3/UL (ref 4.4–11.3)
WBC # BLD AUTO: 16.7 X10*3/UL (ref 4.4–11.3)
WBC # BLD AUTO: 17 X10*3/UL (ref 4.4–11.3)
WBC # BLD AUTO: 17.3 X10*3/UL (ref 4.4–11.3)
WBC # BLD AUTO: 17.4 X10*3/UL (ref 4.4–11.3)
WBC # BLD AUTO: 17.9 X10*3/UL (ref 4.4–11.3)
WBC # BLD AUTO: 17.9 X10*3/UL (ref 4.4–11.3)
WBC # BLD AUTO: 18.2 X10*3/UL (ref 4.4–11.3)
WBC # BLD AUTO: 4.3 X10*3/UL (ref 4.4–11.3)
WBC # BLD AUTO: 5.5 X10*3/UL (ref 4.4–11.3)
WBC # BLD AUTO: 5.7 X10*3/UL (ref 4.4–11.3)
WBC # BLD AUTO: 5.7 X10*3/UL (ref 4.4–11.3)
WBC # BLD AUTO: 8.7 X10*3/UL (ref 4.4–11.3)
WBC #/AREA URNS AUTO: NORMAL /HPF
WBC #/AREA URNS AUTO: NORMAL /HPF
XM INTEP: NORMAL

## 2023-01-01 PROCEDURE — 85025 COMPLETE CBC W/AUTO DIFF WBC: CPT | Performed by: EMERGENCY MEDICINE

## 2023-01-01 PROCEDURE — 85610 PROTHROMBIN TIME: CPT

## 2023-01-01 PROCEDURE — 2500000005 HC RX 250 GENERAL PHARMACY W/O HCPCS

## 2023-01-01 PROCEDURE — 2500000004 HC RX 250 GENERAL PHARMACY W/ HCPCS (ALT 636 FOR OP/ED)

## 2023-01-01 PROCEDURE — 2500000004 HC RX 250 GENERAL PHARMACY W/ HCPCS (ALT 636 FOR OP/ED): Performed by: STUDENT IN AN ORGANIZED HEALTH CARE EDUCATION/TRAINING PROGRAM

## 2023-01-01 PROCEDURE — 96366 THER/PROPH/DIAG IV INF ADDON: CPT

## 2023-01-01 PROCEDURE — 83735 ASSAY OF MAGNESIUM: CPT | Mod: CMCLAB

## 2023-01-01 PROCEDURE — 85027 COMPLETE CBC AUTOMATED: CPT | Performed by: STUDENT IN AN ORGANIZED HEALTH CARE EDUCATION/TRAINING PROGRAM

## 2023-01-01 PROCEDURE — 84100 ASSAY OF PHOSPHORUS: CPT | Mod: CMCLAB

## 2023-01-01 PROCEDURE — 84132 ASSAY OF SERUM POTASSIUM: CPT | Mod: CMCLAB

## 2023-01-01 PROCEDURE — 83605 ASSAY OF LACTIC ACID: CPT | Mod: 91 | Performed by: EMERGENCY MEDICINE

## 2023-01-01 PROCEDURE — 99233 SBSQ HOSP IP/OBS HIGH 50: CPT

## 2023-01-01 PROCEDURE — 2500000005 HC RX 250 GENERAL PHARMACY W/O HCPCS: Performed by: STUDENT IN AN ORGANIZED HEALTH CARE EDUCATION/TRAINING PROGRAM

## 2023-01-01 PROCEDURE — 2580000001 HC RX 258 IV SOLUTIONS

## 2023-01-01 PROCEDURE — 36569 INSJ PICC 5 YR+ W/O IMAGING: CPT | Mod: 52

## 2023-01-01 PROCEDURE — 96372 THER/PROPH/DIAG INJ SC/IM: CPT

## 2023-01-01 PROCEDURE — 80197 ASSAY OF TACROLIMUS: CPT

## 2023-01-01 PROCEDURE — 84100 ASSAY OF PHOSPHORUS: CPT

## 2023-01-01 PROCEDURE — 85610 PROTHROMBIN TIME: CPT | Mod: CMCLAB

## 2023-01-01 PROCEDURE — 37799 UNLISTED PX VASCULAR SURGERY: CPT | Performed by: STUDENT IN AN ORGANIZED HEALTH CARE EDUCATION/TRAINING PROGRAM

## 2023-01-01 PROCEDURE — 2020000001 HC ICU ROOM DAILY

## 2023-01-01 PROCEDURE — 2500000001 HC RX 250 WO HCPCS SELF ADMINISTERED DRUGS (ALT 637 FOR MEDICARE OP)

## 2023-01-01 PROCEDURE — 74240 X-RAY XM UPR GI TRC 1CNTRST: CPT | Performed by: RADIOLOGY

## 2023-01-01 PROCEDURE — 99221 1ST HOSP IP/OBS SF/LOW 40: CPT | Performed by: STUDENT IN AN ORGANIZED HEALTH CARE EDUCATION/TRAINING PROGRAM

## 2023-01-01 PROCEDURE — 96372 THER/PROPH/DIAG INJ SC/IM: CPT | Performed by: STUDENT IN AN ORGANIZED HEALTH CARE EDUCATION/TRAINING PROGRAM

## 2023-01-01 PROCEDURE — C1751 CATH, INF, PER/CENT/MIDLINE: HCPCS

## 2023-01-01 PROCEDURE — 74018 RADEX ABDOMEN 1 VIEW: CPT | Performed by: STUDENT IN AN ORGANIZED HEALTH CARE EDUCATION/TRAINING PROGRAM

## 2023-01-01 PROCEDURE — 83735 ASSAY OF MAGNESIUM: CPT

## 2023-01-01 PROCEDURE — 1170000001 HC PRIVATE ONCOLOGY ROOM DAILY

## 2023-01-01 PROCEDURE — 44120 REMOVAL OF SMALL INTESTINE: CPT | Performed by: SURGERY

## 2023-01-01 PROCEDURE — 80069 RENAL FUNCTION PANEL: CPT | Mod: CCI

## 2023-01-01 PROCEDURE — 96375 TX/PRO/DX INJ NEW DRUG ADDON: CPT

## 2023-01-01 PROCEDURE — 2500000004 HC RX 250 GENERAL PHARMACY W/ HCPCS (ALT 636 FOR OP/ED): Performed by: NURSE PRACTITIONER

## 2023-01-01 PROCEDURE — 2500000002 HC RX 250 W HCPCS SELF ADMINISTERED DRUGS (ALT 637 FOR MEDICARE OP, ALT 636 FOR OP/ED): Performed by: STUDENT IN AN ORGANIZED HEALTH CARE EDUCATION/TRAINING PROGRAM

## 2023-01-01 PROCEDURE — 80048 BASIC METABOLIC PNL TOTAL CA: CPT | Mod: CMCLAB

## 2023-01-01 PROCEDURE — 74018 RADEX ABDOMEN 1 VIEW: CPT | Performed by: RADIOLOGY

## 2023-01-01 PROCEDURE — 97162 PT EVAL MOD COMPLEX 30 MIN: CPT | Mod: GP | Performed by: PHYSICAL THERAPIST

## 2023-01-01 PROCEDURE — 80069 RENAL FUNCTION PANEL: CPT

## 2023-01-01 PROCEDURE — 99232 SBSQ HOSP IP/OBS MODERATE 35: CPT | Performed by: INTERNAL MEDICINE

## 2023-01-01 PROCEDURE — 82435 ASSAY OF BLOOD CHLORIDE: CPT

## 2023-01-01 PROCEDURE — 99233 SBSQ HOSP IP/OBS HIGH 50: CPT | Performed by: PHYSICIAN ASSISTANT

## 2023-01-01 PROCEDURE — 2500000002 HC RX 250 W HCPCS SELF ADMINISTERED DRUGS (ALT 637 FOR MEDICARE OP, ALT 636 FOR OP/ED): Performed by: NURSE PRACTITIONER

## 2023-01-01 PROCEDURE — 99233 SBSQ HOSP IP/OBS HIGH 50: CPT | Performed by: SURGERY

## 2023-01-01 PROCEDURE — 85027 COMPLETE CBC AUTOMATED: CPT | Mod: CMCLAB

## 2023-01-01 PROCEDURE — 82805 BLOOD GASES W/O2 SATURATION: CPT | Mod: CMCLAB

## 2023-01-01 PROCEDURE — 82947 ASSAY GLUCOSE BLOOD QUANT: CPT

## 2023-01-01 PROCEDURE — G0316 PR PROLONGED INPATIENT/OBSERVATION EM SVC EA 15 MIN: HCPCS

## 2023-01-01 PROCEDURE — 74176 CT ABD & PELVIS W/O CONTRAST: CPT | Mod: MG

## 2023-01-01 PROCEDURE — 2500000001 HC RX 250 WO HCPCS SELF ADMINISTERED DRUGS (ALT 637 FOR MEDICARE OP): Performed by: EMERGENCY MEDICINE

## 2023-01-01 PROCEDURE — 74176 CT ABD & PELVIS W/O CONTRAST: CPT | Performed by: RADIOLOGY

## 2023-01-01 PROCEDURE — 99232 SBSQ HOSP IP/OBS MODERATE 35: CPT | Performed by: STUDENT IN AN ORGANIZED HEALTH CARE EDUCATION/TRAINING PROGRAM

## 2023-01-01 PROCEDURE — 85730 THROMBOPLASTIN TIME PARTIAL: CPT

## 2023-01-01 PROCEDURE — 2500000004 HC RX 250 GENERAL PHARMACY W/ HCPCS (ALT 636 FOR OP/ED): Performed by: SURGERY

## 2023-01-01 PROCEDURE — C9113 INJ PANTOPRAZOLE SODIUM, VIA: HCPCS

## 2023-01-01 PROCEDURE — 94002 VENT MGMT INPAT INIT DAY: CPT

## 2023-01-01 PROCEDURE — C1769 GUIDE WIRE: HCPCS | Performed by: INTERNAL MEDICINE

## 2023-01-01 PROCEDURE — 86850 RBC ANTIBODY SCREEN: CPT | Performed by: NURSE PRACTITIONER

## 2023-01-01 PROCEDURE — 99223 1ST HOSP IP/OBS HIGH 75: CPT | Performed by: INTERNAL MEDICINE

## 2023-01-01 PROCEDURE — 99231 SBSQ HOSP IP/OBS SF/LOW 25: CPT

## 2023-01-01 PROCEDURE — 88307 TISSUE EXAM BY PATHOLOGIST: CPT | Performed by: STUDENT IN AN ORGANIZED HEALTH CARE EDUCATION/TRAINING PROGRAM

## 2023-01-01 PROCEDURE — 99291 CRITICAL CARE FIRST HOUR: CPT | Performed by: EMERGENCY MEDICINE

## 2023-01-01 PROCEDURE — 2500000004 HC RX 250 GENERAL PHARMACY W/ HCPCS (ALT 636 FOR OP/ED): Performed by: PHYSICIAN ASSISTANT

## 2023-01-01 PROCEDURE — 93010 ELECTROCARDIOGRAM REPORT: CPT | Performed by: INTERNAL MEDICINE

## 2023-01-01 PROCEDURE — 71045 X-RAY EXAM CHEST 1 VIEW: CPT

## 2023-01-01 PROCEDURE — 88305 TISSUE EXAM BY PATHOLOGIST: CPT | Performed by: STUDENT IN AN ORGANIZED HEALTH CARE EDUCATION/TRAINING PROGRAM

## 2023-01-01 PROCEDURE — 37799 UNLISTED PX VASCULAR SURGERY: CPT | Mod: CMCLAB

## 2023-01-01 PROCEDURE — P9045 ALBUMIN (HUMAN), 5%, 250 ML: HCPCS | Mod: JZ

## 2023-01-01 PROCEDURE — 99233 SBSQ HOSP IP/OBS HIGH 50: CPT | Performed by: INTERNAL MEDICINE

## 2023-01-01 PROCEDURE — 80197 ASSAY OF TACROLIMUS: CPT | Performed by: STUDENT IN AN ORGANIZED HEALTH CARE EDUCATION/TRAINING PROGRAM

## 2023-01-01 PROCEDURE — 2500000001 HC RX 250 WO HCPCS SELF ADMINISTERED DRUGS (ALT 637 FOR MEDICARE OP): Performed by: STUDENT IN AN ORGANIZED HEALTH CARE EDUCATION/TRAINING PROGRAM

## 2023-01-01 PROCEDURE — 87075 CULTR BACTERIA EXCEPT BLOOD: CPT

## 2023-01-01 PROCEDURE — 71045 X-RAY EXAM CHEST 1 VIEW: CPT | Performed by: STUDENT IN AN ORGANIZED HEALTH CARE EDUCATION/TRAINING PROGRAM

## 2023-01-01 PROCEDURE — 2500000004 HC RX 250 GENERAL PHARMACY W/ HCPCS (ALT 636 FOR OP/ED): Mod: JZ

## 2023-01-01 PROCEDURE — 82330 ASSAY OF CALCIUM: CPT

## 2023-01-01 PROCEDURE — 82330 ASSAY OF CALCIUM: CPT | Performed by: STUDENT IN AN ORGANIZED HEALTH CARE EDUCATION/TRAINING PROGRAM

## 2023-01-01 PROCEDURE — 74018 RADEX ABDOMEN 1 VIEW: CPT | Mod: FOREIGN READ | Performed by: RADIOLOGY

## 2023-01-01 PROCEDURE — 85027 COMPLETE CBC AUTOMATED: CPT | Performed by: SURGERY

## 2023-01-01 PROCEDURE — 83735 ASSAY OF MAGNESIUM: CPT | Performed by: STUDENT IN AN ORGANIZED HEALTH CARE EDUCATION/TRAINING PROGRAM

## 2023-01-01 PROCEDURE — 85027 COMPLETE CBC AUTOMATED: CPT

## 2023-01-01 PROCEDURE — 96372 THER/PROPH/DIAG INJ SC/IM: CPT | Performed by: SURGERY

## 2023-01-01 PROCEDURE — 2580000001 HC RX 258 IV SOLUTIONS: Performed by: STUDENT IN AN ORGANIZED HEALTH CARE EDUCATION/TRAINING PROGRAM

## 2023-01-01 PROCEDURE — 94762 N-INVAS EAR/PLS OXIMTRY CONT: CPT

## 2023-01-01 PROCEDURE — 3600000008 HC OR TIME - EACH INCREMENTAL 1 MINUTE - PROCEDURE LEVEL THREE: Performed by: SURGERY

## 2023-01-01 PROCEDURE — 74018 RADEX ABDOMEN 1 VIEW: CPT | Mod: 76,FY

## 2023-01-01 PROCEDURE — 99497 ADVNCD CARE PLAN 30 MIN: CPT | Performed by: INTERNAL MEDICINE

## 2023-01-01 PROCEDURE — 2500000004 HC RX 250 GENERAL PHARMACY W/ HCPCS (ALT 636 FOR OP/ED): Performed by: EMERGENCY MEDICINE

## 2023-01-01 PROCEDURE — 84075 ASSAY ALKALINE PHOSPHATASE: CPT

## 2023-01-01 PROCEDURE — C1760 CLOSURE DEV, VASC: HCPCS | Performed by: SURGERY

## 2023-01-01 PROCEDURE — 82248 BILIRUBIN DIRECT: CPT

## 2023-01-01 PROCEDURE — 85018 HEMOGLOBIN: CPT

## 2023-01-01 PROCEDURE — 82330 ASSAY OF CALCIUM: CPT | Mod: CMCLAB

## 2023-01-01 PROCEDURE — 37799 UNLISTED PX VASCULAR SURGERY: CPT

## 2023-01-01 PROCEDURE — 3600000003 HC OR TIME - INITIAL BASE CHARGE - PROCEDURE LEVEL THREE: Performed by: SURGERY

## 2023-01-01 PROCEDURE — 99233 SBSQ HOSP IP/OBS HIGH 50: CPT | Performed by: STUDENT IN AN ORGANIZED HEALTH CARE EDUCATION/TRAINING PROGRAM

## 2023-01-01 PROCEDURE — 80053 COMPREHEN METABOLIC PANEL: CPT | Performed by: EMERGENCY MEDICINE

## 2023-01-01 PROCEDURE — 36569 INSJ PICC 5 YR+ W/O IMAGING: CPT | Performed by: RADIOLOGY

## 2023-01-01 PROCEDURE — 74018 RADEX ABDOMEN 1 VIEW: CPT | Mod: FY

## 2023-01-01 PROCEDURE — 2500000002 HC RX 250 W HCPCS SELF ADMINISTERED DRUGS (ALT 637 FOR MEDICARE OP, ALT 636 FOR OP/ED)

## 2023-01-01 PROCEDURE — 94003 VENT MGMT INPAT SUBQ DAY: CPT

## 2023-01-01 PROCEDURE — 84443 ASSAY THYROID STIM HORMONE: CPT | Performed by: STUDENT IN AN ORGANIZED HEALTH CARE EDUCATION/TRAINING PROGRAM

## 2023-01-01 PROCEDURE — 83605 ASSAY OF LACTIC ACID: CPT

## 2023-01-01 PROCEDURE — 88331 PATH CONSLTJ SURG 1 BLK 1SPC: CPT | Mod: TC,SUR,PARLAB | Performed by: STUDENT IN AN ORGANIZED HEALTH CARE EDUCATION/TRAINING PROGRAM

## 2023-01-01 PROCEDURE — 7100000002 HC RECOVERY ROOM TIME - EACH INCREMENTAL 1 MINUTE: Performed by: SURGERY

## 2023-01-01 PROCEDURE — 82947 ASSAY GLUCOSE BLOOD QUANT: CPT | Mod: CMCLAB

## 2023-01-01 PROCEDURE — 86850 RBC ANTIBODY SCREEN: CPT

## 2023-01-01 PROCEDURE — 85007 BL SMEAR W/DIFF WBC COUNT: CPT | Performed by: SURGERY

## 2023-01-01 PROCEDURE — 96372 THER/PROPH/DIAG INJ SC/IM: CPT | Performed by: PHYSICIAN ASSISTANT

## 2023-01-01 PROCEDURE — 0D9600Z DRAINAGE OF STOMACH WITH DRAINAGE DEVICE, OPEN APPROACH: ICD-10-PCS | Performed by: SURGERY

## 2023-01-01 PROCEDURE — P9040 RBC LEUKOREDUCED IRRADIATED: HCPCS

## 2023-01-01 PROCEDURE — 86901 BLOOD TYPING SEROLOGIC RH(D): CPT

## 2023-01-01 PROCEDURE — 82746 ASSAY OF FOLIC ACID SERUM: CPT | Mod: CMCLAB | Performed by: STUDENT IN AN ORGANIZED HEALTH CARE EDUCATION/TRAINING PROGRAM

## 2023-01-01 PROCEDURE — 49905 OMENTAL FLAP INTRA-ABDOM: CPT | Performed by: SURGERY

## 2023-01-01 PROCEDURE — 94640 AIRWAY INHALATION TREATMENT: CPT

## 2023-01-01 PROCEDURE — 86900 BLOOD TYPING SEROLOGIC ABO: CPT | Performed by: PHYSICIAN ASSISTANT

## 2023-01-01 PROCEDURE — 43830 GSTRST OPEN WO CONSTJ TUBE: CPT | Performed by: SURGERY

## 2023-01-01 PROCEDURE — S0166 INJ OLANZAPINE 2.5MG: HCPCS

## 2023-01-01 PROCEDURE — 80053 COMPREHEN METABOLIC PANEL: CPT

## 2023-01-01 PROCEDURE — 36415 COLL VENOUS BLD VENIPUNCTURE: CPT

## 2023-01-01 PROCEDURE — 0DH60UZ INSERTION OF FEEDING DEVICE INTO STOMACH, OPEN APPROACH: ICD-10-PCS | Performed by: SURGERY

## 2023-01-01 PROCEDURE — 84132 ASSAY OF SERUM POTASSIUM: CPT

## 2023-01-01 PROCEDURE — 71045 X-RAY EXAM CHEST 1 VIEW: CPT | Performed by: RADIOLOGY

## 2023-01-01 PROCEDURE — 87040 BLOOD CULTURE FOR BACTERIA: CPT

## 2023-01-01 PROCEDURE — 2720000007 HC OR 272 NO HCPCS: Performed by: SURGERY

## 2023-01-01 PROCEDURE — 99253 IP/OBS CNSLTJ NEW/EST LOW 45: CPT | Performed by: SURGERY

## 2023-01-01 PROCEDURE — 81001 URINALYSIS AUTO W/SCOPE: CPT | Performed by: STUDENT IN AN ORGANIZED HEALTH CARE EDUCATION/TRAINING PROGRAM

## 2023-01-01 PROCEDURE — 36430 TRANSFUSION BLD/BLD COMPNT: CPT

## 2023-01-01 PROCEDURE — 36556 INSERT NON-TUNNEL CV CATH: CPT | Performed by: STUDENT IN AN ORGANIZED HEALTH CARE EDUCATION/TRAINING PROGRAM

## 2023-01-01 PROCEDURE — 83735 ASSAY OF MAGNESIUM: CPT | Mod: CMCLAB | Performed by: STUDENT IN AN ORGANIZED HEALTH CARE EDUCATION/TRAINING PROGRAM

## 2023-01-01 PROCEDURE — 96372 THER/PROPH/DIAG INJ SC/IM: CPT | Performed by: NURSE PRACTITIONER

## 2023-01-01 PROCEDURE — 99291 CRITICAL CARE FIRST HOUR: CPT | Performed by: ANESTHESIOLOGY

## 2023-01-01 PROCEDURE — 85610 PROTHROMBIN TIME: CPT | Performed by: STUDENT IN AN ORGANIZED HEALTH CARE EDUCATION/TRAINING PROGRAM

## 2023-01-01 PROCEDURE — 3700000001 HC GENERAL ANESTHESIA TIME - INITIAL BASE CHARGE: Performed by: SURGERY

## 2023-01-01 PROCEDURE — 71045 X-RAY EXAM CHEST 1 VIEW: CPT | Mod: FY

## 2023-01-01 PROCEDURE — 74018 RADEX ABDOMEN 1 VIEW: CPT

## 2023-01-01 PROCEDURE — 2580000001 HC RX 258 IV SOLUTIONS: Performed by: ANESTHESIOLOGY

## 2023-01-01 PROCEDURE — 43235 EGD DIAGNOSTIC BRUSH WASH: CPT | Performed by: INTERNAL MEDICINE

## 2023-01-01 PROCEDURE — 0DBA0ZZ EXCISION OF JEJUNUM, OPEN APPROACH: ICD-10-PCS | Performed by: SURGERY

## 2023-01-01 PROCEDURE — 71046 X-RAY EXAM CHEST 2 VIEWS: CPT | Performed by: STUDENT IN AN ORGANIZED HEALTH CARE EDUCATION/TRAINING PROGRAM

## 2023-01-01 PROCEDURE — 2500000005 HC RX 250 GENERAL PHARMACY W/O HCPCS: Performed by: ANESTHESIOLOGY

## 2023-01-01 PROCEDURE — 83735 ASSAY OF MAGNESIUM: CPT | Performed by: NURSE PRACTITIONER

## 2023-01-01 PROCEDURE — 80053 COMPREHEN METABOLIC PANEL: CPT | Performed by: STUDENT IN AN ORGANIZED HEALTH CARE EDUCATION/TRAINING PROGRAM

## 2023-01-01 PROCEDURE — 80069 RENAL FUNCTION PANEL: CPT | Performed by: STUDENT IN AN ORGANIZED HEALTH CARE EDUCATION/TRAINING PROGRAM

## 2023-01-01 PROCEDURE — 84478 ASSAY OF TRIGLYCERIDES: CPT

## 2023-01-01 PROCEDURE — 2500000002 HC RX 250 W HCPCS SELF ADMINISTERED DRUGS (ALT 637 FOR MEDICARE OP, ALT 636 FOR OP/ED): Performed by: PHYSICIAN ASSISTANT

## 2023-01-01 PROCEDURE — 99223 1ST HOSP IP/OBS HIGH 75: CPT | Performed by: STUDENT IN AN ORGANIZED HEALTH CARE EDUCATION/TRAINING PROGRAM

## 2023-01-01 PROCEDURE — 2720000007 HC OR 272 NO HCPCS: Performed by: INTERNAL MEDICINE

## 2023-01-01 PROCEDURE — 86920 COMPATIBILITY TEST SPIN: CPT

## 2023-01-01 PROCEDURE — A4217 STERILE WATER/SALINE, 500 ML: HCPCS | Performed by: SURGERY

## 2023-01-01 PROCEDURE — S0166 INJ OLANZAPINE 2.5MG: HCPCS | Performed by: SURGERY

## 2023-01-01 PROCEDURE — A4217 STERILE WATER/SALINE, 500 ML: HCPCS

## 2023-01-01 PROCEDURE — 87186 SC STD MICRODIL/AGAR DIL: CPT | Mod: AHULAB,CMCLAB | Performed by: EMERGENCY MEDICINE

## 2023-01-01 PROCEDURE — 99291 CRITICAL CARE FIRST HOUR: CPT | Performed by: STUDENT IN AN ORGANIZED HEALTH CARE EDUCATION/TRAINING PROGRAM

## 2023-01-01 PROCEDURE — 99238 HOSP IP/OBS DSCHRG MGMT 30/<: CPT | Performed by: NURSE PRACTITIONER

## 2023-01-01 PROCEDURE — 74019 RADEX ABDOMEN 2 VIEWS: CPT | Mod: FY

## 2023-01-01 PROCEDURE — 36415 COLL VENOUS BLD VENIPUNCTURE: CPT | Performed by: EMERGENCY MEDICINE

## 2023-01-01 PROCEDURE — C9113 INJ PANTOPRAZOLE SODIUM, VIA: HCPCS | Performed by: STUDENT IN AN ORGANIZED HEALTH CARE EDUCATION/TRAINING PROGRAM

## 2023-01-01 PROCEDURE — 82947 ASSAY GLUCOSE BLOOD QUANT: CPT | Mod: 59

## 2023-01-01 PROCEDURE — A49000 PR EXPLORATORY OF ABDOMEN: Performed by: ANESTHESIOLOGY

## 2023-01-01 PROCEDURE — 99222 1ST HOSP IP/OBS MODERATE 55: CPT | Performed by: STUDENT IN AN ORGANIZED HEALTH CARE EDUCATION/TRAINING PROGRAM

## 2023-01-01 PROCEDURE — 88331 PATH CONSLTJ SURG 1 BLK 1SPC: CPT | Performed by: STUDENT IN AN ORGANIZED HEALTH CARE EDUCATION/TRAINING PROGRAM

## 2023-01-01 PROCEDURE — 80197 ASSAY OF TACROLIMUS: CPT | Mod: CMCLAB | Performed by: STUDENT IN AN ORGANIZED HEALTH CARE EDUCATION/TRAINING PROGRAM

## 2023-01-01 PROCEDURE — 80197 ASSAY OF TACROLIMUS: CPT | Mod: CMCLAB

## 2023-01-01 PROCEDURE — 3700000002 HC GENERAL ANESTHESIA TIME - EACH INCREMENTAL 1 MINUTE: Performed by: SURGERY

## 2023-01-01 PROCEDURE — 97110 THERAPEUTIC EXERCISES: CPT | Mod: GP

## 2023-01-01 PROCEDURE — 97530 THERAPEUTIC ACTIVITIES: CPT | Mod: GO

## 2023-01-01 PROCEDURE — 99140 ANES COMP EMERGENCY COND: CPT | Performed by: ANESTHESIOLOGY

## 2023-01-01 PROCEDURE — 7100000001 HC RECOVERY ROOM TIME - INITIAL BASE CHARGE: Performed by: SURGERY

## 2023-01-01 PROCEDURE — 2550000001 HC RX 255 CONTRASTS: Performed by: SURGERY

## 2023-01-01 PROCEDURE — 71250 CT THORAX DX C-: CPT | Performed by: RADIOLOGY

## 2023-01-01 PROCEDURE — 97110 THERAPEUTIC EXERCISES: CPT | Mod: GP,CQ

## 2023-01-01 PROCEDURE — 82040 ASSAY OF SERUM ALBUMIN: CPT

## 2023-01-01 PROCEDURE — 85025 COMPLETE CBC W/AUTO DIFF WBC: CPT

## 2023-01-01 PROCEDURE — 97116 GAIT TRAINING THERAPY: CPT | Mod: GP,CQ

## 2023-01-01 PROCEDURE — 83690 ASSAY OF LIPASE: CPT

## 2023-01-01 PROCEDURE — 96376 TX/PRO/DX INJ SAME DRUG ADON: CPT

## 2023-01-01 PROCEDURE — 87635 SARS-COV-2 COVID-19 AMP PRB: CPT | Performed by: NURSE PRACTITIONER

## 2023-01-01 PROCEDURE — 96361 HYDRATE IV INFUSION ADD-ON: CPT

## 2023-01-01 PROCEDURE — 82947 ASSAY GLUCOSE BLOOD QUANT: CPT | Mod: 91 | Performed by: EMERGENCY MEDICINE

## 2023-01-01 PROCEDURE — 2780000003 HC OR 278 NO HCPCS

## 2023-01-01 PROCEDURE — 74240 X-RAY XM UPR GI TRC 1CNTRST: CPT

## 2023-01-01 PROCEDURE — 82805 BLOOD GASES W/O2 SATURATION: CPT

## 2023-01-01 PROCEDURE — 96365 THER/PROPH/DIAG IV INF INIT: CPT

## 2023-01-01 PROCEDURE — 2500000004 HC RX 250 GENERAL PHARMACY W/ HCPCS (ALT 636 FOR OP/ED): Performed by: RADIOLOGY

## 2023-01-01 PROCEDURE — 99223 1ST HOSP IP/OBS HIGH 75: CPT

## 2023-01-01 PROCEDURE — 85027 COMPLETE CBC AUTOMATED: CPT | Performed by: NURSE PRACTITIONER

## 2023-01-01 PROCEDURE — 82306 VITAMIN D 25 HYDROXY: CPT | Mod: CMCLAB | Performed by: STUDENT IN AN ORGANIZED HEALTH CARE EDUCATION/TRAINING PROGRAM

## 2023-01-01 PROCEDURE — 99497 ADVNCD CARE PLAN 30 MIN: CPT | Performed by: SURGERY

## 2023-01-01 PROCEDURE — 71045 X-RAY EXAM CHEST 1 VIEW: CPT | Mod: FOREIGN READ | Performed by: RADIOLOGY

## 2023-01-01 PROCEDURE — 83036 HEMOGLOBIN GLYCOSYLATED A1C: CPT | Mod: CMCLAB | Performed by: STUDENT IN AN ORGANIZED HEALTH CARE EDUCATION/TRAINING PROGRAM

## 2023-01-01 PROCEDURE — 85027 COMPLETE CBC AUTOMATED: CPT | Mod: CMCLAB | Performed by: STUDENT IN AN ORGANIZED HEALTH CARE EDUCATION/TRAINING PROGRAM

## 2023-01-01 PROCEDURE — 84075 ASSAY ALKALINE PHOSPHATASE: CPT | Mod: CMCLAB

## 2023-01-01 PROCEDURE — 2500000005 HC RX 250 GENERAL PHARMACY W/O HCPCS: Performed by: SURGERY

## 2023-01-01 PROCEDURE — 84295 ASSAY OF SERUM SODIUM: CPT

## 2023-01-01 PROCEDURE — 87081 CULTURE SCREEN ONLY: CPT | Mod: CMCLAB

## 2023-01-01 PROCEDURE — 87075 CULTR BACTERIA EXCEPT BLOOD: CPT | Mod: 59,AHULAB,CMCLAB | Performed by: EMERGENCY MEDICINE

## 2023-01-01 PROCEDURE — 99231 SBSQ HOSP IP/OBS SF/LOW 25: CPT | Performed by: INTERNAL MEDICINE

## 2023-01-01 PROCEDURE — 77001 FLUOROGUIDE FOR VEIN DEVICE: CPT | Performed by: RADIOLOGY

## 2023-01-01 PROCEDURE — 74360 X-RAY GUIDE GI DILATION: CPT | Performed by: INTERNAL MEDICINE

## 2023-01-01 PROCEDURE — 71250 CT THORAX DX C-: CPT | Mod: MG

## 2023-01-01 PROCEDURE — 81001 URINALYSIS AUTO W/SCOPE: CPT | Performed by: EMERGENCY MEDICINE

## 2023-01-01 PROCEDURE — 2500000001 HC RX 250 WO HCPCS SELF ADMINISTERED DRUGS (ALT 637 FOR MEDICARE OP): Performed by: NURSE PRACTITIONER

## 2023-01-01 PROCEDURE — 80076 HEPATIC FUNCTION PANEL: CPT

## 2023-01-01 PROCEDURE — 82607 VITAMIN B-12: CPT | Mod: CMCLAB | Performed by: STUDENT IN AN ORGANIZED HEALTH CARE EDUCATION/TRAINING PROGRAM

## 2023-01-01 PROCEDURE — 82805 BLOOD GASES W/O2 SATURATION: CPT | Performed by: EMERGENCY MEDICINE

## 2023-01-01 PROCEDURE — 49084 PERITONEAL LAVAGE: CPT | Performed by: SURGERY

## 2023-01-01 PROCEDURE — 83615 LACTATE (LD) (LDH) ENZYME: CPT | Performed by: EMERGENCY MEDICINE

## 2023-01-01 PROCEDURE — 80053 COMPREHEN METABOLIC PANEL: CPT | Mod: CMCLAB | Performed by: NURSE PRACTITIONER

## 2023-01-01 PROCEDURE — 87040 BLOOD CULTURE FOR BACTERIA: CPT | Mod: AHULAB | Performed by: EMERGENCY MEDICINE

## 2023-01-01 PROCEDURE — 99231 SBSQ HOSP IP/OBS SF/LOW 25: CPT | Performed by: PHYSICIAN ASSISTANT

## 2023-01-01 PROCEDURE — 97166 OT EVAL MOD COMPLEX 45 MIN: CPT | Mod: GO

## 2023-01-01 PROCEDURE — 99222 1ST HOSP IP/OBS MODERATE 55: CPT

## 2023-01-01 PROCEDURE — 2580000001 HC RX 258 IV SOLUTIONS: Performed by: PHYSICIAN ASSISTANT

## 2023-01-01 PROCEDURE — 37799 UNLISTED PX VASCULAR SURGERY: CPT | Performed by: PHYSICIAN ASSISTANT

## 2023-01-01 PROCEDURE — 74019 RADEX ABDOMEN 2 VIEWS: CPT | Performed by: RADIOLOGY

## 2023-01-01 PROCEDURE — 36558 INSERT TUNNELED CV CATH: CPT | Mod: RT,GC | Performed by: RADIOLOGY

## 2023-01-01 PROCEDURE — 0DQL0ZZ REPAIR TRANSVERSE COLON, OPEN APPROACH: ICD-10-PCS | Performed by: SURGERY

## 2023-01-01 PROCEDURE — 99232 SBSQ HOSP IP/OBS MODERATE 35: CPT | Performed by: PHYSICIAN ASSISTANT

## 2023-01-01 PROCEDURE — 99232 SBSQ HOSP IP/OBS MODERATE 35: CPT

## 2023-01-01 PROCEDURE — 83605 ASSAY OF LACTIC ACID: CPT | Mod: 59 | Performed by: EMERGENCY MEDICINE

## 2023-01-01 RX ORDER — DEXTROSE MONOHYDRATE 100 MG/ML
0.3 INJECTION, SOLUTION INTRAVENOUS ONCE AS NEEDED
Status: DISCONTINUED | OUTPATIENT
Start: 2023-01-01 | End: 2023-01-01 | Stop reason: HOSPADM

## 2023-01-01 RX ORDER — ACETAMINOPHEN 10 MG/ML
1000 INJECTION, SOLUTION INTRAVENOUS EVERY 6 HOURS SCHEDULED
Status: DISPENSED | OUTPATIENT
Start: 2023-01-01 | End: 2023-01-01

## 2023-01-01 RX ORDER — HYDROMORPHONE HYDROCHLORIDE 1 MG/ML
1 INJECTION, SOLUTION INTRAMUSCULAR; INTRAVENOUS; SUBCUTANEOUS
Status: DISCONTINUED | OUTPATIENT
Start: 2023-01-01 | End: 2023-01-01

## 2023-01-01 RX ORDER — HYDRALAZINE HYDROCHLORIDE 20 MG/ML
10 INJECTION INTRAMUSCULAR; INTRAVENOUS ONCE
Status: COMPLETED | OUTPATIENT
Start: 2023-01-01 | End: 2023-01-01

## 2023-01-01 RX ORDER — FENOFIBRATE 160 MG/1
1 TABLET ORAL DAILY
COMMUNITY
Start: 2017-12-12 | End: 2023-01-01 | Stop reason: HOSPADM

## 2023-01-01 RX ORDER — CALCIUM GLUCONATE 20 MG/ML
2 INJECTION, SOLUTION INTRAVENOUS EVERY 6 HOURS PRN
Status: DISCONTINUED | OUTPATIENT
Start: 2023-01-01 | End: 2023-01-01

## 2023-01-01 RX ORDER — OXYCODONE HYDROCHLORIDE 5 MG/1
5 TABLET ORAL EVERY 6 HOURS PRN
Qty: 28 TABLET | Refills: 0 | Status: SHIPPED | OUTPATIENT
Start: 2023-01-01 | End: 2023-01-01 | Stop reason: ENTERED-IN-ERROR

## 2023-01-01 RX ORDER — DEXMEDETOMIDINE HYDROCHLORIDE 4 UG/ML
.1-1.5 INJECTION, SOLUTION INTRAVENOUS CONTINUOUS
Status: DISCONTINUED | OUTPATIENT
Start: 2023-01-01 | End: 2023-01-01

## 2023-01-01 RX ORDER — CHOLECALCIFEROL (VITAMIN D3) 125 MCG
1 CAPSULE ORAL DAILY
COMMUNITY
Start: 2017-09-29 | End: 2023-01-01 | Stop reason: HOSPADM

## 2023-01-01 RX ORDER — ROCURONIUM BROMIDE 10 MG/ML
INJECTION, SOLUTION INTRAVENOUS AS NEEDED
Status: DISCONTINUED | OUTPATIENT
Start: 2023-01-01 | End: 2023-01-01

## 2023-01-01 RX ORDER — CITALOPRAM 40 MG/1
TABLET, FILM COATED ORAL
COMMUNITY
End: 2023-01-01 | Stop reason: HOSPADM

## 2023-01-01 RX ORDER — SODIUM CHLORIDE 9 MG/ML
INJECTION, SOLUTION INTRAVENOUS
COMMUNITY
End: 2023-01-01 | Stop reason: ENTERED-IN-ERROR

## 2023-01-01 RX ORDER — INSULIN HUMAN 100 [IU]/ML
INJECTION, SUSPENSION SUBCUTANEOUS
COMMUNITY
End: 2023-01-01 | Stop reason: ENTERED-IN-ERROR

## 2023-01-01 RX ORDER — PANTOPRAZOLE SODIUM 40 MG/10ML
40 INJECTION, POWDER, LYOPHILIZED, FOR SOLUTION INTRAVENOUS EVERY 12 HOURS
Status: DISCONTINUED | OUTPATIENT
Start: 2023-01-01 | End: 2023-01-01

## 2023-01-01 RX ORDER — NAPROXEN SODIUM 220 MG/1
TABLET, FILM COATED ORAL
COMMUNITY
Start: 2017-05-11 | End: 2023-01-01 | Stop reason: HOSPADM

## 2023-01-01 RX ORDER — HYDROMORPHONE HYDROCHLORIDE 1 MG/ML
0.2 INJECTION, SOLUTION INTRAMUSCULAR; INTRAVENOUS; SUBCUTANEOUS
Status: DISCONTINUED | OUTPATIENT
Start: 2023-01-01 | End: 2023-01-01

## 2023-01-01 RX ORDER — HEPARIN SODIUM 5000 [USP'U]/ML
5000 INJECTION, SOLUTION INTRAVENOUS; SUBCUTANEOUS EVERY 8 HOURS
Start: 2023-01-01 | End: 2023-12-20

## 2023-01-01 RX ORDER — NYSTATIN 100000 [USP'U]/ML
4 SUSPENSION ORAL 2 TIMES DAILY
Status: CANCELLED
Start: 2023-01-01

## 2023-01-01 RX ORDER — SCOLOPAMINE TRANSDERMAL SYSTEM 1 MG/1
1 PATCH, EXTENDED RELEASE TRANSDERMAL ONCE
Status: DISCONTINUED | OUTPATIENT
Start: 2023-01-01 | End: 2023-01-01

## 2023-01-01 RX ORDER — FENTANYL 50 UG/1
1 PATCH TRANSDERMAL
Status: CANCELLED
Start: 2023-01-01

## 2023-01-01 RX ORDER — PROPOFOL 10 MG/ML
INJECTION, EMULSION INTRAVENOUS AS NEEDED
Status: DISCONTINUED | OUTPATIENT
Start: 2023-01-01 | End: 2023-01-01

## 2023-01-01 RX ORDER — ALBUMIN HUMAN 50 G/1000ML
SOLUTION INTRAVENOUS
Status: COMPLETED
Start: 2023-01-01 | End: 2023-01-01

## 2023-01-01 RX ORDER — TACROLIMUS 1 MG/1
2 CAPSULE ORAL DAILY
Status: DISCONTINUED | OUTPATIENT
Start: 2023-01-01 | End: 2023-01-01 | Stop reason: WASHOUT

## 2023-01-01 RX ORDER — PIPERACILLIN SODIUM, TAZOBACTAM SODIUM 3; .375 G/15ML; G/15ML
3.38 INJECTION, POWDER, LYOPHILIZED, FOR SOLUTION INTRAVENOUS EVERY 6 HOURS
Start: 2023-01-01 | End: 2023-12-20

## 2023-01-01 RX ORDER — ISOSORBIDE MONONITRATE 20 MG/1
TABLET ORAL
COMMUNITY
Start: 2023-01-01 | End: 2023-01-01 | Stop reason: ENTERED-IN-ERROR

## 2023-01-01 RX ORDER — HYDROMORPHONE HYDROCHLORIDE 1 MG/ML
1 INJECTION, SOLUTION INTRAMUSCULAR; INTRAVENOUS; SUBCUTANEOUS EVERY 2 HOUR PRN
Status: COMPLETED | OUTPATIENT
Start: 2023-01-01 | End: 2023-01-01

## 2023-01-01 RX ORDER — ACETAMINOPHEN 500 MG
5 TABLET ORAL NIGHTLY PRN
Status: DISCONTINUED | OUTPATIENT
Start: 2023-01-01 | End: 2023-01-01 | Stop reason: HOSPADM

## 2023-01-01 RX ORDER — HYDROMORPHONE HYDROCHLORIDE 1 MG/ML
0.4 INJECTION, SOLUTION INTRAMUSCULAR; INTRAVENOUS; SUBCUTANEOUS EVERY 4 HOURS PRN
Status: DISCONTINUED | OUTPATIENT
Start: 2023-01-01 | End: 2023-01-01

## 2023-01-01 RX ORDER — PROPOFOL 10 MG/ML
5-50 INJECTION, EMULSION INTRAVENOUS CONTINUOUS
Status: DISCONTINUED | OUTPATIENT
Start: 2023-01-01 | End: 2023-01-01

## 2023-01-01 RX ORDER — HYDRALAZINE HYDROCHLORIDE 50 MG/1
1 TABLET, FILM COATED ORAL 2 TIMES DAILY
COMMUNITY
Start: 2021-06-07 | End: 2023-01-01 | Stop reason: HOSPADM

## 2023-01-01 RX ORDER — MYCOPHENOLATE MOFETIL 500 MG/1
TABLET ORAL
COMMUNITY
Start: 2019-06-13 | End: 2023-01-01 | Stop reason: ENTERED-IN-ERROR

## 2023-01-01 RX ORDER — WATER 1000 ML/1000ML
INJECTION, SOLUTION INTRAVENOUS
Status: COMPLETED
Start: 2023-01-01 | End: 2023-01-01

## 2023-01-01 RX ORDER — HYDROMORPHONE HCL/0.9% NACL/PF 15 MG/30ML
PATIENT CONTROLLED ANALGESIA SYRINGE INTRAVENOUS CONTINUOUS
Status: DISCONTINUED | OUTPATIENT
Start: 2023-01-01 | End: 2023-01-01

## 2023-01-01 RX ORDER — OLANZAPINE 10 MG/2ML
2.5 INJECTION, POWDER, FOR SOLUTION INTRAMUSCULAR ONCE
Status: COMPLETED | OUTPATIENT
Start: 2023-01-01 | End: 2023-01-01

## 2023-01-01 RX ORDER — KETOCONAZOLE 20 MG/G
CREAM TOPICAL
COMMUNITY
Start: 2019-04-02 | End: 2023-01-01 | Stop reason: ENTERED-IN-ERROR

## 2023-01-01 RX ORDER — METOCLOPRAMIDE HYDROCHLORIDE 5 MG/ML
10 INJECTION INTRAMUSCULAR; INTRAVENOUS EVERY 6 HOURS SCHEDULED
Status: DISCONTINUED | OUTPATIENT
Start: 2023-01-01 | End: 2023-01-01

## 2023-01-01 RX ORDER — HYDROMORPHONE HYDROCHLORIDE 1 MG/ML
0.2 INJECTION, SOLUTION INTRAMUSCULAR; INTRAVENOUS; SUBCUTANEOUS EVERY 2 HOUR PRN
Status: DISCONTINUED | OUTPATIENT
Start: 2023-01-01 | End: 2023-01-01

## 2023-01-01 RX ORDER — INSULIN LISPRO 100 [IU]/ML
0-20 INJECTION, SOLUTION INTRAVENOUS; SUBCUTANEOUS EVERY 4 HOURS
Status: DISCONTINUED | OUTPATIENT
Start: 2023-01-01 | End: 2023-01-01

## 2023-01-01 RX ORDER — DEXTROSE MONOHYDRATE 100 MG/ML
0.3 INJECTION, SOLUTION INTRAVENOUS ONCE AS NEEDED
Status: DISCONTINUED | OUTPATIENT
Start: 2023-01-01 | End: 2023-01-01

## 2023-01-01 RX ORDER — FENTANYL CITRATE 50 UG/ML
INJECTION, SOLUTION INTRAMUSCULAR; INTRAVENOUS AS NEEDED
Status: DISCONTINUED | OUTPATIENT
Start: 2023-01-01 | End: 2023-01-01

## 2023-01-01 RX ORDER — ACETAMINOPHEN 500 MG
5 TABLET ORAL NIGHTLY
Status: CANCELLED
Start: 2023-01-01

## 2023-01-01 RX ORDER — TACROLIMUS 1 MG/1
CAPSULE ORAL
COMMUNITY
Start: 2021-10-12 | End: 2023-01-01 | Stop reason: HOSPADM

## 2023-01-01 RX ORDER — ALBUMIN HUMAN 50 G/1000ML
SOLUTION INTRAVENOUS AS NEEDED
Status: DISCONTINUED | OUTPATIENT
Start: 2023-01-01 | End: 2023-01-01

## 2023-01-01 RX ORDER — PREDNISONE 5 MG/1
TABLET ORAL
COMMUNITY
Start: 2023-01-01 | End: 2023-01-01 | Stop reason: ENTERED-IN-ERROR

## 2023-01-01 RX ORDER — INSULIN LISPRO 100 [IU]/ML
INJECTION, SOLUTION INTRAVENOUS; SUBCUTANEOUS
COMMUNITY
End: 2023-01-01 | Stop reason: HOSPADM

## 2023-01-01 RX ORDER — DAPSONE 100 MG/1
TABLET ORAL
COMMUNITY
End: 2023-01-01 | Stop reason: ENTERED-IN-ERROR

## 2023-01-01 RX ORDER — LIDOCAINE HYDROCHLORIDE 10 MG/ML
0.1 INJECTION, SOLUTION EPIDURAL; INFILTRATION; INTRACAUDAL; PERINEURAL ONCE
Status: DISCONTINUED | OUTPATIENT
Start: 2023-01-01 | End: 2023-01-01 | Stop reason: HOSPADM

## 2023-01-01 RX ORDER — SODIUM CHLORIDE 9 MG/ML
125 INJECTION, SOLUTION INTRAVENOUS CONTINUOUS
Status: DISCONTINUED | OUTPATIENT
Start: 2023-01-01 | End: 2023-01-01 | Stop reason: HOSPADM

## 2023-01-01 RX ORDER — OXYCODONE HCL 5 MG/5 ML
10 SOLUTION, ORAL ORAL EVERY 6 HOURS PRN
Qty: 90 ML | Refills: 0 | Status: CANCELLED
Start: 2023-01-01

## 2023-01-01 RX ORDER — TACROLIMUS 1 MG/1
3 CAPSULE ORAL NIGHTLY
Status: DISCONTINUED | OUTPATIENT
Start: 2023-01-01 | End: 2023-01-01

## 2023-01-01 RX ORDER — INSULIN LISPRO 100 [IU]/ML
0-10 INJECTION, SOLUTION INTRAVENOUS; SUBCUTANEOUS EVERY 4 HOURS
Status: DISCONTINUED | OUTPATIENT
Start: 2023-01-01 | End: 2023-01-01 | Stop reason: HOSPADM

## 2023-01-01 RX ORDER — IPRATROPIUM BROMIDE AND ALBUTEROL SULFATE 2.5; .5 MG/3ML; MG/3ML
3 SOLUTION RESPIRATORY (INHALATION)
Status: DISCONTINUED | OUTPATIENT
Start: 2023-01-01 | End: 2023-01-01

## 2023-01-01 RX ORDER — MAGNESIUM SULFATE HEPTAHYDRATE 40 MG/ML
2 INJECTION, SOLUTION INTRAVENOUS EVERY 6 HOURS PRN
Status: DISCONTINUED | OUTPATIENT
Start: 2023-01-01 | End: 2023-01-01

## 2023-01-01 RX ORDER — LIDOCAINE HYDROCHLORIDE 10 MG/ML
1 INJECTION INFILTRATION; PERINEURAL ONCE
Status: DISCONTINUED | OUTPATIENT
Start: 2023-01-01 | End: 2023-01-01

## 2023-01-01 RX ORDER — POTASSIUM CHLORIDE 14.9 MG/ML
20 INJECTION INTRAVENOUS
Status: COMPLETED | OUTPATIENT
Start: 2023-01-01 | End: 2023-01-01

## 2023-01-01 RX ORDER — HYDRALAZINE HYDROCHLORIDE 20 MG/ML
20 INJECTION INTRAMUSCULAR; INTRAVENOUS EVERY 6 HOURS PRN
Qty: 1 ML | Status: CANCELLED
Start: 2023-01-01

## 2023-01-01 RX ORDER — INSULIN LISPRO 200 [IU]/ML
INJECTION, SOLUTION SUBCUTANEOUS
COMMUNITY
Start: 2021-03-18 | End: 2023-01-01 | Stop reason: ENTERED-IN-ERROR

## 2023-01-01 RX ORDER — DEXTROSE MONOHYDRATE 50 MG/ML
100 INJECTION, SOLUTION INTRAVENOUS CONTINUOUS
Status: DISCONTINUED | OUTPATIENT
Start: 2023-01-01 | End: 2023-01-01

## 2023-01-01 RX ORDER — HYDROMORPHONE HYDROCHLORIDE 1 MG/ML
1 INJECTION, SOLUTION INTRAMUSCULAR; INTRAVENOUS; SUBCUTANEOUS
Status: DISCONTINUED | OUTPATIENT
Start: 2023-01-01 | End: 2023-01-01 | Stop reason: HOSPADM

## 2023-01-01 RX ORDER — SODIUM CHLORIDE, SODIUM LACTATE, POTASSIUM CHLORIDE, CALCIUM CHLORIDE 600; 310; 30; 20 MG/100ML; MG/100ML; MG/100ML; MG/100ML
100 INJECTION, SOLUTION INTRAVENOUS CONTINUOUS
Status: DISCONTINUED | OUTPATIENT
Start: 2023-01-01 | End: 2023-01-01

## 2023-01-01 RX ORDER — SODIUM CHLORIDE, SODIUM LACTATE, POTASSIUM CHLORIDE, CALCIUM CHLORIDE 600; 310; 30; 20 MG/100ML; MG/100ML; MG/100ML; MG/100ML
75 INJECTION, SOLUTION INTRAVENOUS CONTINUOUS
Status: ACTIVE | OUTPATIENT
Start: 2023-01-01 | End: 2023-01-01

## 2023-01-01 RX ORDER — DIVALPROEX SODIUM 500 MG/1
TABLET, FILM COATED, EXTENDED RELEASE ORAL
COMMUNITY
End: 2023-01-01 | Stop reason: ENTERED-IN-ERROR

## 2023-01-01 RX ORDER — NALOXONE HYDROCHLORIDE 0.4 MG/ML
0.2 INJECTION, SOLUTION INTRAMUSCULAR; INTRAVENOUS; SUBCUTANEOUS AS NEEDED
Status: DISCONTINUED | OUTPATIENT
Start: 2023-01-01 | End: 2023-01-01

## 2023-01-01 RX ORDER — ONDANSETRON HYDROCHLORIDE 2 MG/ML
4 INJECTION, SOLUTION INTRAVENOUS EVERY 30 MIN PRN
Status: COMPLETED | OUTPATIENT
Start: 2023-01-01 | End: 2023-01-01

## 2023-01-01 RX ORDER — OXYCODONE HCL 5 MG/5 ML
5 SOLUTION, ORAL ORAL EVERY 6 HOURS PRN
Status: DISCONTINUED | OUTPATIENT
Start: 2023-01-01 | End: 2023-01-01 | Stop reason: HOSPADM

## 2023-01-01 RX ORDER — ACETAMINOPHEN 10 MG/ML
1000 INJECTION, SOLUTION INTRAVENOUS ONCE
Status: COMPLETED | OUTPATIENT
Start: 2023-01-01 | End: 2023-01-01

## 2023-01-01 RX ORDER — LABETALOL HYDROCHLORIDE 5 MG/ML
10 INJECTION, SOLUTION INTRAVENOUS EVERY 4 HOURS PRN
Status: DISCONTINUED | OUTPATIENT
Start: 2023-01-01 | End: 2023-01-01

## 2023-01-01 RX ORDER — HYDROMORPHONE HYDROCHLORIDE 1 MG/ML
0.4 INJECTION, SOLUTION INTRAMUSCULAR; INTRAVENOUS; SUBCUTANEOUS EVERY 4 HOURS PRN
Status: DISCONTINUED | OUTPATIENT
Start: 2023-01-01 | End: 2023-01-01 | Stop reason: HOSPADM

## 2023-01-01 RX ORDER — SODIUM CHLORIDE, SODIUM LACTATE, POTASSIUM CHLORIDE, CALCIUM CHLORIDE 600; 310; 30; 20 MG/100ML; MG/100ML; MG/100ML; MG/100ML
75 INJECTION, SOLUTION INTRAVENOUS CONTINUOUS
Status: DISCONTINUED | OUTPATIENT
Start: 2023-01-01 | End: 2023-01-01

## 2023-01-01 RX ORDER — ALBUMIN HUMAN 50 G/1000ML
12.5 SOLUTION INTRAVENOUS ONCE
Status: COMPLETED | OUTPATIENT
Start: 2023-01-01 | End: 2023-01-01

## 2023-01-01 RX ORDER — CICLOPIROX 80 MG/ML
SOLUTION TOPICAL
COMMUNITY
End: 2023-01-01 | Stop reason: ENTERED-IN-ERROR

## 2023-01-01 RX ORDER — MULTIVITAMIN
1 TABLET ORAL DAILY
COMMUNITY
End: 2023-01-01 | Stop reason: ENTERED-IN-ERROR

## 2023-01-01 RX ORDER — HYDROMORPHONE HYDROCHLORIDE 1 MG/ML
0.4 INJECTION, SOLUTION INTRAMUSCULAR; INTRAVENOUS; SUBCUTANEOUS
Status: DISCONTINUED | OUTPATIENT
Start: 2023-01-01 | End: 2023-01-01

## 2023-01-01 RX ORDER — OXYCODONE HYDROCHLORIDE 5 MG/1
TABLET ORAL
COMMUNITY
Start: 2023-01-01 | End: 2023-01-01 | Stop reason: ENTERED-IN-ERROR

## 2023-01-01 RX ORDER — LIDOCAINE HYDROCHLORIDE 20 MG/ML
INJECTION, SOLUTION INFILTRATION; PERINEURAL AS NEEDED
Status: DISCONTINUED | OUTPATIENT
Start: 2023-01-01 | End: 2023-01-01

## 2023-01-01 RX ORDER — LABETALOL HYDROCHLORIDE 5 MG/ML
20 INJECTION, SOLUTION INTRAVENOUS ONCE
Status: COMPLETED | OUTPATIENT
Start: 2023-01-01 | End: 2023-01-01

## 2023-01-01 RX ORDER — MAGNESIUM SULFATE HEPTAHYDRATE 40 MG/ML
2 INJECTION, SOLUTION INTRAVENOUS ONCE
Status: COMPLETED | OUTPATIENT
Start: 2023-01-01 | End: 2023-01-01

## 2023-01-01 RX ORDER — ICOSAPENT ETHYL 1 G/1
1 CAPSULE ORAL 2 TIMES DAILY
COMMUNITY
End: 2023-01-01 | Stop reason: HOSPADM

## 2023-01-01 RX ORDER — HEPARIN SODIUM (PORCINE) LOCK FLUSH IV SOLN 100 UNIT/ML 100 UNIT/ML
500 SOLUTION INTRAVENOUS ONCE AS NEEDED
Status: DISCONTINUED | OUTPATIENT
Start: 2023-01-01 | End: 2023-01-01 | Stop reason: HOSPADM

## 2023-01-01 RX ORDER — METOPROLOL SUCCINATE 100 MG/1
TABLET, EXTENDED RELEASE ORAL
COMMUNITY
End: 2023-01-01 | Stop reason: ENTERED-IN-ERROR

## 2023-01-01 RX ORDER — INSULIN GLARGINE 100 [IU]/ML
10 INJECTION, SOLUTION SUBCUTANEOUS NIGHTLY
Status: DISCONTINUED | OUTPATIENT
Start: 2023-01-01 | End: 2023-01-01

## 2023-01-01 RX ORDER — INSULIN GLARGINE 100 [IU]/ML
15 INJECTION, SOLUTION SUBCUTANEOUS NIGHTLY
Status: CANCELLED | OUTPATIENT
Start: 2023-01-01

## 2023-01-01 RX ORDER — SODIUM CHLORIDE, SODIUM LACTATE, POTASSIUM CHLORIDE, CALCIUM CHLORIDE 600; 310; 30; 20 MG/100ML; MG/100ML; MG/100ML; MG/100ML
100 INJECTION, SOLUTION INTRAVENOUS CONTINUOUS
Status: ACTIVE | OUTPATIENT
Start: 2023-01-01 | End: 2023-01-01

## 2023-01-01 RX ORDER — PANCRELIPASE LIPASE, PANCRELIPASE PROTEASE, PANCRELIPASE AMYLASE 40000; 126000; 168000 [USP'U]/1; [USP'U]/1; [USP'U]/1
CAPSULE, DELAYED RELEASE ORAL
COMMUNITY
Start: 2023-01-01 | End: 2023-01-01 | Stop reason: ENTERED-IN-ERROR

## 2023-01-01 RX ORDER — HEPARIN SODIUM,PORCINE/PF 10 UNIT/ML
50 SYRINGE (ML) INTRAVENOUS EVERY 12 HOURS
Status: DISCONTINUED | OUTPATIENT
Start: 2023-01-01 | End: 2023-01-01

## 2023-01-01 RX ORDER — SODIUM CHLORIDE 0.9 % (FLUSH) 0.9 %
10 SYRINGE (ML) INJECTION AS NEEDED
Status: DISCONTINUED | OUTPATIENT
Start: 2023-01-01 | End: 2023-01-01 | Stop reason: HOSPADM

## 2023-01-01 RX ORDER — PANTOPRAZOLE SODIUM 40 MG/1
40 TABLET, DELAYED RELEASE ORAL 2 TIMES DAILY
COMMUNITY
Start: 2019-11-07

## 2023-01-01 RX ORDER — SIMETHICONE 80 MG
TABLET,CHEWABLE ORAL
COMMUNITY
Start: 2022-05-26 | End: 2023-01-01 | Stop reason: ENTERED-IN-ERROR

## 2023-01-01 RX ORDER — PROPOFOL 10 MG/ML
INJECTION, EMULSION INTRAVENOUS CONTINUOUS PRN
Status: DISCONTINUED | OUTPATIENT
Start: 2023-01-01 | End: 2023-01-01

## 2023-01-01 RX ORDER — PANTOPRAZOLE SODIUM 40 MG/10ML
40 INJECTION, POWDER, LYOPHILIZED, FOR SOLUTION INTRAVENOUS 2 TIMES DAILY
Status: DISCONTINUED | OUTPATIENT
Start: 2023-01-01 | End: 2023-01-01 | Stop reason: HOSPADM

## 2023-01-01 RX ORDER — INSULIN LISPRO 100 [IU]/ML
0-5 INJECTION, SOLUTION INTRAVENOUS; SUBCUTANEOUS EVERY 4 HOURS
Status: DISCONTINUED | OUTPATIENT
Start: 2023-01-01 | End: 2023-01-01 | Stop reason: HOSPADM

## 2023-01-01 RX ORDER — FUROSEMIDE 10 MG/ML
40 INJECTION INTRAMUSCULAR; INTRAVENOUS ONCE
Status: COMPLETED | OUTPATIENT
Start: 2023-01-01 | End: 2023-01-01

## 2023-01-01 RX ORDER — MICAFUNGIN 10 MG/ML
100 INJECTION, POWDER, LYOPHILIZED, FOR SOLUTION INTRAVENOUS EVERY 24 HOURS
Status: DISCONTINUED | OUTPATIENT
Start: 2023-01-01 | End: 2023-01-01

## 2023-01-01 RX ORDER — INSULIN LISPRO 100 [IU]/ML
0-5 INJECTION, SOLUTION INTRAVENOUS; SUBCUTANEOUS
Status: DISCONTINUED | OUTPATIENT
Start: 2023-01-01 | End: 2023-01-01

## 2023-01-01 RX ORDER — TACROLIMUS 1 MG/1
4 CAPSULE ORAL DAILY
Status: DISCONTINUED | OUTPATIENT
Start: 2023-01-01 | End: 2023-01-01 | Stop reason: HOSPADM

## 2023-01-01 RX ORDER — OLANZAPINE 10 MG/2ML
2.5 INJECTION, POWDER, FOR SOLUTION INTRAMUSCULAR EVERY 6 HOURS PRN
Qty: 1 EACH
Start: 2023-01-01 | End: 2023-12-20

## 2023-01-01 RX ORDER — LABETALOL HYDROCHLORIDE 5 MG/ML
10 INJECTION, SOLUTION INTRAVENOUS ONCE
Status: DISCONTINUED | OUTPATIENT
Start: 2023-01-01 | End: 2023-01-01

## 2023-01-01 RX ORDER — HYDRALAZINE HYDROCHLORIDE 10 MG/1
TABLET, FILM COATED ORAL
COMMUNITY
End: 2023-01-01 | Stop reason: ENTERED-IN-ERROR

## 2023-01-01 RX ORDER — GLIMEPIRIDE 1 MG/1
TABLET ORAL
COMMUNITY
End: 2023-01-01 | Stop reason: ENTERED-IN-ERROR

## 2023-01-01 RX ORDER — IPRATROPIUM BROMIDE AND ALBUTEROL SULFATE 2.5; .5 MG/3ML; MG/3ML
3 SOLUTION RESPIRATORY (INHALATION) EVERY 4 HOURS PRN
Status: DISCONTINUED | OUTPATIENT
Start: 2023-01-01 | End: 2023-01-01

## 2023-01-01 RX ORDER — OXYCODONE HCL 5 MG/5 ML
5 SOLUTION, ORAL ORAL EVERY 6 HOURS PRN
Qty: 90 ML | Refills: 0
Start: 2023-01-01 | End: 2023-12-20

## 2023-01-01 RX ORDER — HYDROMORPHONE HYDROCHLORIDE 1 MG/ML
0.2 INJECTION, SOLUTION INTRAMUSCULAR; INTRAVENOUS; SUBCUTANEOUS EVERY 2 HOUR PRN
Status: DISCONTINUED | OUTPATIENT
Start: 2023-01-01 | End: 2023-01-01 | Stop reason: HOSPADM

## 2023-01-01 RX ORDER — HEPARIN SODIUM,PORCINE/PF 10 UNIT/ML
50 SYRINGE (ML) INTRAVENOUS EVERY 12 HOURS
Status: DISCONTINUED | OUTPATIENT
Start: 2023-01-01 | End: 2023-01-01 | Stop reason: HOSPADM

## 2023-01-01 RX ORDER — ONDANSETRON HYDROCHLORIDE 2 MG/ML
4 INJECTION, SOLUTION INTRAVENOUS ONCE AS NEEDED
Status: DISCONTINUED | OUTPATIENT
Start: 2023-01-01 | End: 2023-01-01 | Stop reason: HOSPADM

## 2023-01-01 RX ORDER — TORSEMIDE 20 MG/1
TABLET ORAL
COMMUNITY
End: 2023-01-01 | Stop reason: ENTERED-IN-ERROR

## 2023-01-01 RX ORDER — AMLODIPINE BESYLATE 5 MG/1
TABLET ORAL
COMMUNITY
End: 2023-01-01 | Stop reason: ENTERED-IN-ERROR

## 2023-01-01 RX ORDER — TACROLIMUS 1 MG/1
4 CAPSULE ORAL EVERY MORNING
Status: DISCONTINUED | OUTPATIENT
Start: 2023-01-01 | End: 2023-01-01

## 2023-01-01 RX ORDER — GUAIFENESIN 600 MG/1
TABLET, EXTENDED RELEASE ORAL
COMMUNITY
Start: 2009-11-18 | End: 2023-01-01 | Stop reason: ENTERED-IN-ERROR

## 2023-01-01 RX ORDER — HYDROMORPHONE HYDROCHLORIDE 1 MG/ML
0.4 INJECTION, SOLUTION INTRAMUSCULAR; INTRAVENOUS; SUBCUTANEOUS ONCE
Status: COMPLETED | OUTPATIENT
Start: 2023-01-01 | End: 2023-01-01

## 2023-01-01 RX ORDER — DEXTROSE 50 % IN WATER (D50W) INTRAVENOUS SYRINGE
25
Status: DISCONTINUED | OUTPATIENT
Start: 2023-01-01 | End: 2023-01-01 | Stop reason: HOSPADM

## 2023-01-01 RX ORDER — ASPIRIN 300 MG/1
150 SUPPOSITORY RECTAL DAILY
Status: DISCONTINUED | OUTPATIENT
Start: 2023-01-01 | End: 2023-01-01 | Stop reason: HOSPADM

## 2023-01-01 RX ORDER — OLANZAPINE 10 MG/2ML
2.5 INJECTION, POWDER, FOR SOLUTION INTRAMUSCULAR EVERY 6 HOURS PRN
Qty: 1 EACH | Status: CANCELLED
Start: 2023-01-01

## 2023-01-01 RX ORDER — ROSUVASTATIN CALCIUM 40 MG/1
40 TABLET, COATED ORAL NIGHTLY
COMMUNITY
Start: 2017-10-11

## 2023-01-01 RX ORDER — ACETAMINOPHEN 500 MG
5 TABLET ORAL NIGHTLY
Start: 2023-01-01 | End: 2023-12-20

## 2023-01-01 RX ORDER — INSULIN LISPRO 100 [IU]/ML
INJECTION, SOLUTION INTRAVENOUS; SUBCUTANEOUS
COMMUNITY
End: 2023-01-01 | Stop reason: ENTERED-IN-ERROR

## 2023-01-01 RX ORDER — SUCCINYLCHOLINE CHLORIDE 20 MG/ML
INJECTION INTRAMUSCULAR; INTRAVENOUS AS NEEDED
Status: DISCONTINUED | OUTPATIENT
Start: 2023-01-01 | End: 2023-01-01

## 2023-01-01 RX ORDER — EZETIMIBE 10 MG/1
1 TABLET ORAL DAILY
COMMUNITY
Start: 2019-11-07 | End: 2023-01-01 | Stop reason: ENTERED-IN-ERROR

## 2023-01-01 RX ORDER — OLANZAPINE 10 MG/2ML
2.5 INJECTION, POWDER, FOR SOLUTION INTRAMUSCULAR EVERY 6 HOURS PRN
Status: DISCONTINUED | OUTPATIENT
Start: 2023-01-01 | End: 2023-01-01 | Stop reason: HOSPADM

## 2023-01-01 RX ORDER — INSULIN GLARGINE 100 [IU]/ML
15 INJECTION, SOLUTION SUBCUTANEOUS NIGHTLY
Status: DISCONTINUED | OUTPATIENT
Start: 2023-01-01 | End: 2023-01-01

## 2023-01-01 RX ORDER — METHOCARBAMOL 500 MG/1
500 TABLET, FILM COATED ORAL EVERY 8 HOURS SCHEDULED
Status: DISCONTINUED | OUTPATIENT
Start: 2023-01-01 | End: 2023-01-01

## 2023-01-01 RX ORDER — LORAZEPAM 0.5 MG/1
TABLET ORAL
COMMUNITY
End: 2023-01-01 | Stop reason: ENTERED-IN-ERROR

## 2023-01-01 RX ORDER — PRAVASTATIN SODIUM 20 MG/1
TABLET ORAL
COMMUNITY
End: 2023-01-01 | Stop reason: ENTERED-IN-ERROR

## 2023-01-01 RX ORDER — HEPARIN SODIUM (PORCINE) LOCK FLUSH IV SOLN 100 UNIT/ML 100 UNIT/ML
500 SOLUTION INTRAVENOUS ONCE AS NEEDED
Status: DISCONTINUED | OUTPATIENT
Start: 2023-01-01 | End: 2023-01-01

## 2023-01-01 RX ORDER — TACROLIMUS 0.5 MG/1
1.5 CAPSULE, GELATIN COATED ORAL
Start: 2023-01-01 | End: 2023-12-20

## 2023-01-01 RX ORDER — FENTANYL 50 UG/1
1 PATCH TRANSDERMAL
Start: 2023-01-01 | End: 2023-12-20

## 2023-01-01 RX ORDER — FUROSEMIDE 10 MG/ML
20 INJECTION INTRAMUSCULAR; INTRAVENOUS ONCE
Status: COMPLETED | OUTPATIENT
Start: 2023-01-01 | End: 2023-01-01

## 2023-01-01 RX ORDER — METHOCARBAMOL 100 MG/ML
1000 INJECTION, SOLUTION INTRAMUSCULAR; INTRAVENOUS 3 TIMES DAILY
Status: DISCONTINUED | OUTPATIENT
Start: 2023-01-01 | End: 2023-01-01

## 2023-01-01 RX ORDER — OXYCODONE HCL 5 MG/5 ML
5 SOLUTION, ORAL ORAL EVERY 6 HOURS PRN
Status: DISCONTINUED | OUTPATIENT
Start: 2023-01-01 | End: 2023-01-01

## 2023-01-01 RX ORDER — SODIUM CHLORIDE 0.9 G/100ML
IRRIGANT IRRIGATION AS NEEDED
Status: DISCONTINUED | OUTPATIENT
Start: 2023-01-01 | End: 2023-01-01 | Stop reason: HOSPADM

## 2023-01-01 RX ORDER — IPRATROPIUM BROMIDE AND ALBUTEROL SULFATE 2.5; .5 MG/3ML; MG/3ML
3 SOLUTION RESPIRATORY (INHALATION) EVERY 4 HOURS PRN
Qty: 180 ML | Refills: 11 | Status: SHIPPED | OUTPATIENT
Start: 2023-01-01 | End: 2023-12-20

## 2023-01-01 RX ORDER — PROPOFOL 10 MG/ML
INJECTION, EMULSION INTRAVENOUS
Status: COMPLETED
Start: 2023-01-01 | End: 2023-01-01

## 2023-01-01 RX ORDER — ONDANSETRON HYDROCHLORIDE 2 MG/ML
INJECTION, SOLUTION INTRAVENOUS AS NEEDED
Status: DISCONTINUED | OUTPATIENT
Start: 2023-01-01 | End: 2023-01-01

## 2023-01-01 RX ORDER — ACETAMINOPHEN 325 MG/1
975 TABLET ORAL ONCE
Status: COMPLETED | OUTPATIENT
Start: 2023-01-01 | End: 2023-01-01

## 2023-01-01 RX ORDER — SODIUM CHLORIDE, SODIUM LACTATE, POTASSIUM CHLORIDE, CALCIUM CHLORIDE 600; 310; 30; 20 MG/100ML; MG/100ML; MG/100ML; MG/100ML
INJECTION, SOLUTION INTRAVENOUS CONTINUOUS PRN
Status: DISCONTINUED | OUTPATIENT
Start: 2023-01-01 | End: 2023-01-01

## 2023-01-01 RX ORDER — POTASSIUM CHLORIDE 14.9 MG/ML
20 INJECTION INTRAVENOUS EVERY 6 HOURS PRN
Status: DISCONTINUED | OUTPATIENT
Start: 2023-01-01 | End: 2023-01-01

## 2023-01-01 RX ORDER — ACETAMINOPHEN 500 MG
5 TABLET ORAL NIGHTLY
Status: DISCONTINUED | OUTPATIENT
Start: 2023-01-01 | End: 2023-01-01 | Stop reason: HOSPADM

## 2023-01-01 RX ORDER — FUROSEMIDE 10 MG/ML
INJECTION INTRAMUSCULAR; INTRAVENOUS
Status: COMPLETED
Start: 2023-01-01 | End: 2023-01-01

## 2023-01-01 RX ORDER — SOTALOL HYDROCHLORIDE 120 MG/1
TABLET ORAL
COMMUNITY
End: 2023-01-01 | Stop reason: ENTERED-IN-ERROR

## 2023-01-01 RX ORDER — IPRATROPIUM BROMIDE AND ALBUTEROL SULFATE 2.5; .5 MG/3ML; MG/3ML
3 SOLUTION RESPIRATORY (INHALATION) EVERY 4 HOURS PRN
Status: DISCONTINUED | OUTPATIENT
Start: 2023-01-01 | End: 2023-01-01 | Stop reason: HOSPADM

## 2023-01-01 RX ORDER — HEPARIN SODIUM,PORCINE/PF 10 UNIT/ML
50 SYRINGE (ML) INTRAVENOUS AS NEEDED
Status: DISCONTINUED | OUTPATIENT
Start: 2023-01-01 | End: 2023-01-01 | Stop reason: HOSPADM

## 2023-01-01 RX ORDER — METHOCARBAMOL 100 MG/ML
1000 INJECTION, SOLUTION INTRAMUSCULAR; INTRAVENOUS 3 TIMES DAILY PRN
Status: DISCONTINUED | OUTPATIENT
Start: 2023-01-01 | End: 2023-01-01

## 2023-01-01 RX ORDER — OXYCODONE HCL 5 MG/5 ML
10 SOLUTION, ORAL ORAL EVERY 6 HOURS PRN
Status: DISCONTINUED | OUTPATIENT
Start: 2023-01-01 | End: 2023-01-01 | Stop reason: HOSPADM

## 2023-01-01 RX ORDER — MICAFUNGIN SODIUM 100 MG/5ML
100 INJECTION, POWDER, LYOPHILIZED, FOR SOLUTION INTRAVENOUS EVERY 24 HOURS
Status: DISCONTINUED | OUTPATIENT
Start: 2023-01-01 | End: 2023-01-01

## 2023-01-01 RX ORDER — NYSTATIN 100000 [USP'U]/ML
4 SUSPENSION ORAL 2 TIMES DAILY
Start: 2023-01-01 | End: 2023-12-20

## 2023-01-01 RX ORDER — HYDROMORPHONE HYDROCHLORIDE 1 MG/ML
0.4 INJECTION, SOLUTION INTRAMUSCULAR; INTRAVENOUS; SUBCUTANEOUS EVERY 2 HOUR PRN
Status: DISCONTINUED | OUTPATIENT
Start: 2023-01-01 | End: 2023-01-01

## 2023-01-01 RX ORDER — ALBUMIN HUMAN 50 G/1000ML
25 SOLUTION INTRAVENOUS ONCE
Status: COMPLETED | OUTPATIENT
Start: 2023-01-01 | End: 2023-01-01

## 2023-01-01 RX ORDER — PREDNISONE 5 MG/1
5 TABLET ORAL DAILY
Status: DISCONTINUED | OUTPATIENT
Start: 2023-01-01 | End: 2023-01-01 | Stop reason: HOSPADM

## 2023-01-01 RX ORDER — ONDANSETRON HYDROCHLORIDE 2 MG/ML
4 INJECTION, SOLUTION INTRAVENOUS ONCE AS NEEDED
Status: DISCONTINUED | OUTPATIENT
Start: 2023-01-01 | End: 2023-01-01

## 2023-01-01 RX ORDER — LIDOCAINE 560 MG/1
1 PATCH PERCUTANEOUS; TOPICAL; TRANSDERMAL DAILY
Status: DISCONTINUED | OUTPATIENT
Start: 2023-01-01 | End: 2023-01-01 | Stop reason: HOSPADM

## 2023-01-01 RX ORDER — ALBUTEROL SULFATE 90 UG/1
2 AEROSOL, METERED RESPIRATORY (INHALATION) EVERY 4 HOURS PRN
Status: DISCONTINUED | OUTPATIENT
Start: 2023-01-01 | End: 2023-01-01

## 2023-01-01 RX ORDER — TACROLIMUS 0.5 MG/1
1.5 CAPSULE, GELATIN COATED ORAL
Status: CANCELLED
Start: 2023-01-01

## 2023-01-01 RX ORDER — CITALOPRAM 20 MG/1
20 TABLET, FILM COATED ORAL
COMMUNITY
Start: 2023-01-01 | End: 2023-01-01 | Stop reason: ENTERED-IN-ERROR

## 2023-01-01 RX ORDER — SPIRONOLACTONE 25 MG/1
TABLET ORAL
COMMUNITY
End: 2023-01-01 | Stop reason: ENTERED-IN-ERROR

## 2023-01-01 RX ORDER — LABETALOL HYDROCHLORIDE 5 MG/ML
10 INJECTION, SOLUTION INTRAVENOUS ONCE
Status: COMPLETED | OUTPATIENT
Start: 2023-01-01 | End: 2023-01-01

## 2023-01-01 RX ORDER — NALOXONE HYDROCHLORIDE 0.4 MG/ML
0.2 INJECTION, SOLUTION INTRAMUSCULAR; INTRAVENOUS; SUBCUTANEOUS AS NEEDED
Status: DISCONTINUED | OUTPATIENT
Start: 2023-01-01 | End: 2023-01-01 | Stop reason: HOSPADM

## 2023-01-01 RX ORDER — VANCOMYCIN HYDROCHLORIDE 1 G/200ML
1 INJECTION, SOLUTION INTRAVENOUS ONCE AS NEEDED
Status: DISCONTINUED | OUTPATIENT
Start: 2023-01-01 | End: 2023-01-01

## 2023-01-01 RX ORDER — FUROSEMIDE 20 MG/1
TABLET ORAL
COMMUNITY
End: 2023-01-01 | Stop reason: ENTERED-IN-ERROR

## 2023-01-01 RX ORDER — METHYLPREDNISOLONE SODIUM SUCCINATE 40 MG/ML
4 INJECTION INTRAMUSCULAR; INTRAVENOUS DAILY
Status: DISCONTINUED | OUTPATIENT
Start: 2023-01-01 | End: 2023-01-01 | Stop reason: WASHOUT

## 2023-01-01 RX ORDER — SODIUM CHLORIDE, SODIUM LACTATE, POTASSIUM CHLORIDE, CALCIUM CHLORIDE 600; 310; 30; 20 MG/100ML; MG/100ML; MG/100ML; MG/100ML
100 INJECTION, SOLUTION INTRAVENOUS CONTINUOUS
Status: DISCONTINUED | OUTPATIENT
Start: 2023-01-01 | End: 2023-01-01 | Stop reason: HOSPADM

## 2023-01-01 RX ORDER — TACROLIMUS 1 MG/1
2 CAPSULE ORAL
Status: DISCONTINUED | OUTPATIENT
Start: 2023-01-01 | End: 2023-01-01

## 2023-01-01 RX ORDER — METOCLOPRAMIDE HYDROCHLORIDE 5 MG/ML
5 INJECTION INTRAMUSCULAR; INTRAVENOUS EVERY 6 HOURS SCHEDULED
Status: DISCONTINUED | OUTPATIENT
Start: 2023-01-01 | End: 2023-01-01

## 2023-01-01 RX ORDER — GABAPENTIN 100 MG/1
200 CAPSULE ORAL NIGHTLY
Status: DISCONTINUED | OUTPATIENT
Start: 2023-01-01 | End: 2023-01-01 | Stop reason: HOSPADM

## 2023-01-01 RX ORDER — INSULIN LISPRO 100 [IU]/ML
0-15 INJECTION, SOLUTION INTRAVENOUS; SUBCUTANEOUS EVERY 4 HOURS
Status: DISCONTINUED | OUTPATIENT
Start: 2023-01-01 | End: 2023-01-01

## 2023-01-01 RX ORDER — POTASSIUM CHLORIDE 29.8 MG/ML
40 INJECTION INTRAVENOUS ONCE
Status: COMPLETED | OUTPATIENT
Start: 2023-01-01 | End: 2023-01-01

## 2023-01-01 RX ORDER — HYDRALAZINE HYDROCHLORIDE 20 MG/ML
20 INJECTION INTRAMUSCULAR; INTRAVENOUS EVERY 6 HOURS PRN
Status: DISCONTINUED | OUTPATIENT
Start: 2023-01-01 | End: 2023-01-01 | Stop reason: HOSPADM

## 2023-01-01 RX ORDER — CALCIUM GLUCONATE 20 MG/ML
1 INJECTION, SOLUTION INTRAVENOUS EVERY 6 HOURS PRN
Status: DISCONTINUED | OUTPATIENT
Start: 2023-01-01 | End: 2023-01-01

## 2023-01-01 RX ORDER — VANCOMYCIN HYDROCHLORIDE 1 G/20ML
INJECTION, POWDER, LYOPHILIZED, FOR SOLUTION INTRAVENOUS AS NEEDED
Status: DISCONTINUED | OUTPATIENT
Start: 2023-01-01 | End: 2023-01-01

## 2023-01-01 RX ORDER — ACETAMINOPHEN 325 MG/1
TABLET ORAL
COMMUNITY
Start: 2019-03-29 | End: 2023-01-01 | Stop reason: HOSPADM

## 2023-01-01 RX ORDER — MYCOPHENOLIC ACID 360 MG/1
1 TABLET, DELAYED RELEASE ORAL 2 TIMES DAILY
COMMUNITY
Start: 2020-08-27 | End: 2023-01-01 | Stop reason: ENTERED-IN-ERROR

## 2023-01-01 RX ORDER — OXYCODONE HCL 5 MG/5 ML
10 SOLUTION, ORAL ORAL EVERY 6 HOURS PRN
Qty: 90 ML | Refills: 0
Start: 2023-01-01 | End: 2023-12-20

## 2023-01-01 RX ORDER — DIPHENOXYLATE HYDROCHLORIDE AND ATROPINE SULFATE 2.5; .025 MG/1; MG/1
2 TABLET ORAL 4 TIMES DAILY PRN
COMMUNITY
Start: 2023-01-01 | End: 2023-01-01 | Stop reason: HOSPADM

## 2023-01-01 RX ORDER — PROPOFOL 10 MG/ML
5-20 INJECTION, EMULSION INTRAVENOUS CONTINUOUS
Status: DISCONTINUED | OUTPATIENT
Start: 2023-01-01 | End: 2023-01-01

## 2023-01-01 RX ORDER — HEPARIN SODIUM,PORCINE/PF 10 UNIT/ML
50 SYRINGE (ML) INTRAVENOUS ONCE AS NEEDED
Status: DISCONTINUED | OUTPATIENT
Start: 2023-01-01 | End: 2023-01-01 | Stop reason: HOSPADM

## 2023-01-01 RX ORDER — FUROSEMIDE 40 MG/1
TABLET ORAL
COMMUNITY
End: 2023-01-01 | Stop reason: ENTERED-IN-ERROR

## 2023-01-01 RX ORDER — PIPERACILLIN SODIUM, TAZOBACTAM SODIUM 4; .5 G/20ML; G/20ML
INJECTION, POWDER, LYOPHILIZED, FOR SOLUTION INTRAVENOUS
COMMUNITY
End: 2023-01-01 | Stop reason: ENTERED-IN-ERROR

## 2023-01-01 RX ORDER — MAGNESIUM SULFATE HEPTAHYDRATE 40 MG/ML
4 INJECTION, SOLUTION INTRAVENOUS ONCE
Status: DISCONTINUED | OUTPATIENT
Start: 2023-01-01 | End: 2023-01-01 | Stop reason: HOSPADM

## 2023-01-01 RX ORDER — DIPHENHYDRAMINE HYDROCHLORIDE 50 MG/ML
25 INJECTION INTRAMUSCULAR; INTRAVENOUS ONCE
Status: COMPLETED | OUTPATIENT
Start: 2023-01-01 | End: 2023-01-01

## 2023-01-01 RX ORDER — TACROLIMUS 0.5 MG/1
0.5 CAPSULE ORAL EVERY 24 HOURS
Status: DISCONTINUED | OUTPATIENT
Start: 2023-01-01 | End: 2023-01-01 | Stop reason: HOSPADM

## 2023-01-01 RX ORDER — HEPARIN SODIUM (PORCINE) LOCK FLUSH IV SOLN 100 UNIT/ML 100 UNIT/ML
5 SOLUTION INTRAVENOUS ONCE
Status: COMPLETED | OUTPATIENT
Start: 2023-01-01 | End: 2023-01-01

## 2023-01-01 RX ORDER — PIPERACILLIN SODIUM, TAZOBACTAM SODIUM 3; .375 G/15ML; G/15ML
INJECTION, POWDER, LYOPHILIZED, FOR SOLUTION INTRAVENOUS AS NEEDED
Status: DISCONTINUED | OUTPATIENT
Start: 2023-01-01 | End: 2023-01-01

## 2023-01-01 RX ORDER — ONDANSETRON HYDROCHLORIDE 2 MG/ML
4 INJECTION, SOLUTION INTRAVENOUS EVERY 6 HOURS PRN
Status: DISCONTINUED | OUTPATIENT
Start: 2023-01-01 | End: 2023-01-01 | Stop reason: HOSPADM

## 2023-01-01 RX ORDER — INSULIN GLARGINE 100 [IU]/ML
10 INJECTION, SOLUTION SUBCUTANEOUS EVERY MORNING
COMMUNITY
End: 2023-01-01 | Stop reason: HOSPADM

## 2023-01-01 RX ORDER — TACROLIMUS 0.5 MG/1
0.5 CAPSULE, GELATIN COATED ORAL EVERY 24 HOURS
Start: 2023-01-01 | End: 2023-12-20

## 2023-01-01 RX ORDER — POLYETHYLENE GLYCOL 3350 17 G/17G
POWDER, FOR SOLUTION ORAL
COMMUNITY
End: 2023-01-01 | Stop reason: ENTERED-IN-ERROR

## 2023-01-01 RX ORDER — ESOMEPRAZOLE MAGNESIUM 40 MG/1
40 GRANULE, DELAYED RELEASE ORAL
Start: 2023-01-01 | End: 2023-12-20

## 2023-01-01 RX ORDER — DIGOXIN 125 MCG
TABLET ORAL
COMMUNITY
End: 2023-01-01 | Stop reason: ENTERED-IN-ERROR

## 2023-01-01 RX ORDER — LIDOCAINE HYDROCHLORIDE 20 MG/ML
1 JELLY TOPICAL ONCE
Status: COMPLETED | OUTPATIENT
Start: 2023-01-01 | End: 2023-01-01

## 2023-01-01 RX ORDER — WATER 1000 ML/1000ML
INJECTION, SOLUTION INTRAVENOUS
Status: DISPENSED
Start: 2023-01-01 | End: 2023-01-01

## 2023-01-01 RX ORDER — FERROUS SULFATE 325(65) MG
1 TABLET ORAL DAILY
COMMUNITY
Start: 2017-12-12 | End: 2023-01-01 | Stop reason: HOSPADM

## 2023-01-01 RX ORDER — BENZONATATE 100 MG/1
100 CAPSULE ORAL 3 TIMES DAILY PRN
Status: DISCONTINUED | OUTPATIENT
Start: 2023-01-01 | End: 2023-01-01

## 2023-01-01 RX ORDER — WARFARIN SODIUM 5 MG/1
TABLET ORAL
COMMUNITY
End: 2023-01-01 | Stop reason: ENTERED-IN-ERROR

## 2023-01-01 RX ORDER — HEPARIN SODIUM 5000 [USP'U]/ML
5000 INJECTION, SOLUTION INTRAVENOUS; SUBCUTANEOUS EVERY 8 HOURS
Status: DISPENSED | OUTPATIENT
Start: 2023-01-01 | End: 2023-01-01

## 2023-01-01 RX ORDER — BACITRACIN 500 [USP'U]/G
OINTMENT OPHTHALMIC
COMMUNITY
End: 2023-01-01 | Stop reason: ENTERED-IN-ERROR

## 2023-01-01 RX ORDER — VORICONAZOLE 200 MG/1
TABLET, FILM COATED ORAL
COMMUNITY
End: 2023-01-01 | Stop reason: ENTERED-IN-ERROR

## 2023-01-01 RX ORDER — HEPARIN SODIUM 5000 [USP'U]/ML
5000 INJECTION, SOLUTION INTRAVENOUS; SUBCUTANEOUS EVERY 8 HOURS
Status: DISCONTINUED | OUTPATIENT
Start: 2023-01-01 | End: 2023-01-01 | Stop reason: HOSPADM

## 2023-01-01 RX ORDER — DEXTROSE 50 % IN WATER (D50W) INTRAVENOUS SYRINGE
25
Status: DISCONTINUED | OUTPATIENT
Start: 2023-01-01 | End: 2023-01-01

## 2023-01-01 RX ORDER — LIDOCAINE HYDROCHLORIDE 10 MG/ML
5 INJECTION INFILTRATION; PERINEURAL ONCE
Status: COMPLETED | OUTPATIENT
Start: 2023-01-01 | End: 2023-01-01

## 2023-01-01 RX ORDER — OXYCODONE HCL 5 MG/5 ML
5 SOLUTION, ORAL ORAL EVERY 6 HOURS PRN
Qty: 90 ML | Refills: 0 | Status: CANCELLED
Start: 2023-01-01

## 2023-01-01 RX ORDER — ALBUTEROL SULFATE 90 UG/1
AEROSOL, METERED RESPIRATORY (INHALATION)
COMMUNITY
Start: 2022-01-01

## 2023-01-01 RX ORDER — INSULIN GLARGINE 100 [IU]/ML
20 INJECTION, SOLUTION SUBCUTANEOUS NIGHTLY
Status: DISCONTINUED | OUTPATIENT
Start: 2023-01-01 | End: 2023-01-01

## 2023-01-01 RX ORDER — HYDROMORPHONE HYDROCHLORIDE 1 MG/ML
0.1 INJECTION, SOLUTION INTRAMUSCULAR; INTRAVENOUS; SUBCUTANEOUS EVERY 6 HOURS PRN
Status: DISCONTINUED | OUTPATIENT
Start: 2023-01-01 | End: 2023-01-01

## 2023-01-01 RX ORDER — NYSTATIN 100000 [USP'U]/ML
4 SUSPENSION ORAL 2 TIMES DAILY
Status: DISCONTINUED | OUTPATIENT
Start: 2023-01-01 | End: 2023-01-01 | Stop reason: HOSPADM

## 2023-01-01 RX ORDER — QUETIAPINE FUMARATE 25 MG/1
TABLET, FILM COATED ORAL
COMMUNITY
End: 2023-01-01 | Stop reason: ENTERED-IN-ERROR

## 2023-01-01 RX ORDER — TACROLIMUS 0.5 MG/1
0.5 CAPSULE, GELATIN COATED ORAL EVERY 24 HOURS
Status: CANCELLED
Start: 2023-01-01

## 2023-01-01 RX ORDER — BUSPIRONE HYDROCHLORIDE 5 MG/1
5 TABLET ORAL 3 TIMES DAILY
COMMUNITY
Start: 2021-05-14 | End: 2023-01-01 | Stop reason: HOSPADM

## 2023-01-01 RX ORDER — ACYCLOVIR 200 MG/1
CAPSULE ORAL
COMMUNITY
End: 2023-01-01 | Stop reason: ENTERED-IN-ERROR

## 2023-01-01 RX ORDER — HALOPERIDOL 5 MG/ML
5 INJECTION INTRAMUSCULAR ONCE
Status: COMPLETED | OUTPATIENT
Start: 2023-01-01 | End: 2023-01-01

## 2023-01-01 RX ORDER — HYDRALAZINE HYDROCHLORIDE 25 MG/1
50 TABLET, FILM COATED ORAL EVERY 12 HOURS
Status: DISCONTINUED | OUTPATIENT
Start: 2023-01-01 | End: 2023-01-01 | Stop reason: HOSPADM

## 2023-01-01 RX ORDER — HYDROMORPHONE HYDROCHLORIDE 1 MG/ML
1 INJECTION, SOLUTION INTRAMUSCULAR; INTRAVENOUS; SUBCUTANEOUS EVERY 30 MIN PRN
Status: COMPLETED | OUTPATIENT
Start: 2023-01-01 | End: 2023-01-01

## 2023-01-01 RX ORDER — LIDOCAINE HYDROCHLORIDE 10 MG/ML
1 INJECTION INFILTRATION; PERINEURAL ONCE
Status: DISCONTINUED | OUTPATIENT
Start: 2023-01-01 | End: 2023-01-01 | Stop reason: HOSPADM

## 2023-01-01 RX ORDER — FENOFIBRATE 48 MG/1
TABLET, FILM COATED ORAL
COMMUNITY
End: 2023-01-01 | Stop reason: ENTERED-IN-ERROR

## 2023-01-01 RX ORDER — OMEGA-3-ACID ETHYL ESTERS 1 G/1
1 CAPSULE, LIQUID FILLED ORAL
COMMUNITY
Start: 2022-08-09 | End: 2023-01-01 | Stop reason: ENTERED-IN-ERROR

## 2023-01-01 RX ORDER — CALCIUM CARBONATE 600 MG
1 TABLET ORAL 2 TIMES DAILY
COMMUNITY
Start: 2022-05-24 | End: 2023-01-01 | Stop reason: HOSPADM

## 2023-01-01 RX ORDER — SUCRALFATE 1 G/10ML
SUSPENSION ORAL
COMMUNITY
End: 2023-01-01 | Stop reason: ENTERED-IN-ERROR

## 2023-01-01 RX ORDER — HEPARIN SODIUM,PORCINE/PF 10 UNIT/ML
5 SYRINGE (ML) INTRAVENOUS EVERY 12 HOURS
Start: 2023-01-01 | End: 2023-12-20

## 2023-01-01 RX ORDER — LIDOCAINE AND PRILOCAINE 25; 25 MG/G; MG/G
CREAM TOPICAL
COMMUNITY
Start: 2023-01-01

## 2023-01-01 RX ORDER — VANCOMYCIN HYDROCHLORIDE 1 G/200ML
1000 INJECTION, SOLUTION INTRAVENOUS ONCE
Status: COMPLETED | OUTPATIENT
Start: 2023-01-01 | End: 2023-01-01

## 2023-01-01 RX ORDER — ENOXAPARIN SODIUM 100 MG/ML
INJECTION SUBCUTANEOUS
COMMUNITY
Start: 2023-01-01 | End: 2023-01-01 | Stop reason: ENTERED-IN-ERROR

## 2023-01-01 RX ORDER — BUPROPION HYDROCHLORIDE 150 MG/1
150 TABLET ORAL
COMMUNITY
Start: 2023-01-01 | End: 2023-01-01 | Stop reason: HOSPADM

## 2023-01-01 RX ORDER — ALENDRONATE SODIUM 70 MG/1
TABLET ORAL
COMMUNITY
End: 2023-01-01 | Stop reason: ENTERED-IN-ERROR

## 2023-01-01 RX ORDER — METHIMAZOLE 10 MG/1
TABLET ORAL
COMMUNITY
End: 2023-01-01 | Stop reason: ENTERED-IN-ERROR

## 2023-01-01 RX ORDER — ATORVASTATIN CALCIUM 80 MG/1
TABLET, FILM COATED ORAL
COMMUNITY
End: 2023-01-01 | Stop reason: ENTERED-IN-ERROR

## 2023-01-01 RX ORDER — GABAPENTIN 100 MG/1
200 CAPSULE ORAL NIGHTLY
COMMUNITY
Start: 2019-04-15 | End: 2023-01-01 | Stop reason: HOSPADM

## 2023-01-01 RX ORDER — METHIMAZOLE 5 MG/1
TABLET ORAL
COMMUNITY
End: 2023-01-01 | Stop reason: ENTERED-IN-ERROR

## 2023-01-01 RX ORDER — ACETAMINOPHEN 325 MG/1
975 TABLET ORAL ONCE
Status: DISCONTINUED | OUTPATIENT
Start: 2023-01-01 | End: 2023-01-01

## 2023-01-01 RX ORDER — FENTANYL CITRATE 50 UG/ML
INJECTION, SOLUTION INTRAMUSCULAR; INTRAVENOUS AS NEEDED
Status: COMPLETED | OUTPATIENT
Start: 2023-01-01 | End: 2023-01-01

## 2023-01-01 RX ORDER — SODIUM CHLORIDE, SODIUM LACTATE, POTASSIUM CHLORIDE, CALCIUM CHLORIDE 600; 310; 30; 20 MG/100ML; MG/100ML; MG/100ML; MG/100ML
100 INJECTION, SOLUTION INTRAVENOUS CONTINUOUS
Status: DISCONTINUED | OUTPATIENT
Start: 2023-01-01 | End: 2023-01-01 | Stop reason: WASHOUT

## 2023-01-01 RX ORDER — PANTOPRAZOLE SODIUM 40 MG/10ML
40 INJECTION, POWDER, LYOPHILIZED, FOR SOLUTION INTRAVENOUS
Status: DISCONTINUED | OUTPATIENT
Start: 2023-01-01 | End: 2023-01-01

## 2023-01-01 RX ORDER — HYDRALAZINE HYDROCHLORIDE 20 MG/ML
10 INJECTION INTRAMUSCULAR; INTRAVENOUS EVERY 6 HOURS PRN
Status: DISCONTINUED | OUTPATIENT
Start: 2023-01-01 | End: 2023-01-01 | Stop reason: WASHOUT

## 2023-01-01 RX ORDER — OXYCODONE HCL 5 MG/5 ML
10 SOLUTION, ORAL ORAL EVERY 6 HOURS PRN
Status: DISCONTINUED | OUTPATIENT
Start: 2023-01-01 | End: 2023-01-01

## 2023-01-01 RX ORDER — TACROLIMUS 1 MG/1
1 CAPSULE ORAL EVERY 24 HOURS
Status: DISCONTINUED | OUTPATIENT
Start: 2023-01-01 | End: 2023-01-01

## 2023-01-01 RX ORDER — HYDRALAZINE HYDROCHLORIDE 20 MG/ML
10 INJECTION INTRAMUSCULAR; INTRAVENOUS EVERY 6 HOURS
Status: DISCONTINUED | OUTPATIENT
Start: 2023-01-01 | End: 2023-01-01

## 2023-01-01 RX ORDER — PANTOPRAZOLE SODIUM 40 MG/10ML
40 INJECTION, POWDER, LYOPHILIZED, FOR SOLUTION INTRAVENOUS 2 TIMES DAILY
Status: CANCELLED
Start: 2023-01-01

## 2023-01-01 RX ORDER — LANOLIN ALCOHOL/MO/W.PET/CERES
800 CREAM (GRAM) TOPICAL 2 TIMES DAILY
COMMUNITY
Start: 2017-09-28 | End: 2023-01-01 | Stop reason: HOSPADM

## 2023-01-01 RX ORDER — HYDROMORPHONE HYDROCHLORIDE 1 MG/ML
0.2 INJECTION, SOLUTION INTRAMUSCULAR; INTRAVENOUS; SUBCUTANEOUS EVERY 4 HOURS PRN
Status: DISCONTINUED | OUTPATIENT
Start: 2023-01-01 | End: 2023-01-01

## 2023-01-01 RX ORDER — HYDROMORPHONE HCL/0.9% NACL/PF 15 MG/30ML
PATIENT CONTROLLED ANALGESIA SYRINGE INTRAVENOUS CONTINUOUS
Status: CANCELLED | OUTPATIENT
Start: 2023-01-01

## 2023-01-01 RX ORDER — WARFARIN 7.5 MG/1
TABLET ORAL
COMMUNITY
End: 2023-01-01 | Stop reason: ENTERED-IN-ERROR

## 2023-01-01 RX ORDER — POTASSIUM CHLORIDE 20 MEQ/1
TABLET, EXTENDED RELEASE ORAL
COMMUNITY
Start: 2008-04-16 | End: 2023-01-01 | Stop reason: ENTERED-IN-ERROR

## 2023-01-01 RX ORDER — FENTANYL 50 UG/1
1 PATCH TRANSDERMAL
Status: DISCONTINUED | OUTPATIENT
Start: 2023-01-01 | End: 2023-01-01 | Stop reason: HOSPADM

## 2023-01-01 RX ORDER — MAGNESIUM SULFATE HEPTAHYDRATE 40 MG/ML
4 INJECTION, SOLUTION INTRAVENOUS EVERY 6 HOURS PRN
Status: DISCONTINUED | OUTPATIENT
Start: 2023-01-01 | End: 2023-01-01

## 2023-01-01 RX ORDER — TACROLIMUS 1 MG/1
3 CAPSULE ORAL DAILY
Status: DISCONTINUED | OUTPATIENT
Start: 2023-01-01 | End: 2023-01-01 | Stop reason: HOSPADM

## 2023-01-01 RX ORDER — MAGNESIUM SULFATE 1 G/100ML
1 INJECTION INTRAVENOUS ONCE
Status: COMPLETED | OUTPATIENT
Start: 2023-01-01 | End: 2023-01-01

## 2023-01-01 RX ORDER — CALCIUM CARB/VITAMIN D3/VIT K1 500-100-40
TABLET,CHEWABLE ORAL
COMMUNITY
End: 2023-01-01 | Stop reason: ENTERED-IN-ERROR

## 2023-01-01 RX ORDER — VANCOMYCIN HYDROCHLORIDE 1 G/200ML
1 INJECTION, SOLUTION INTRAVENOUS ONCE AS NEEDED
Status: COMPLETED | OUTPATIENT
Start: 2023-01-01 | End: 2023-01-01

## 2023-01-01 RX ORDER — LABETALOL HYDROCHLORIDE 5 MG/ML
5 INJECTION, SOLUTION INTRAVENOUS ONCE AS NEEDED
Status: COMPLETED | OUTPATIENT
Start: 2023-01-01 | End: 2023-01-01

## 2023-01-01 RX ORDER — CALCIUM CARBONATE/VITAMIN D3 600MG-5MCG
1 TABLET ORAL 2 TIMES DAILY
COMMUNITY
End: 2023-01-01 | Stop reason: ENTERED-IN-ERROR

## 2023-01-01 RX ORDER — ALLOPURINOL 100 MG/1
TABLET ORAL
COMMUNITY
Start: 2019-11-07 | End: 2023-01-01 | Stop reason: HOSPADM

## 2023-01-01 RX ORDER — LIDOCAINE HYDROCHLORIDE 10 MG/ML
0.1 INJECTION, SOLUTION EPIDURAL; INFILTRATION; INTRACAUDAL; PERINEURAL ONCE
Status: DISCONTINUED | OUTPATIENT
Start: 2023-01-01 | End: 2023-01-01

## 2023-01-01 RX ORDER — HYDROMORPHONE HYDROCHLORIDE 1 MG/ML
1 INJECTION, SOLUTION INTRAMUSCULAR; INTRAVENOUS; SUBCUTANEOUS ONCE
Status: COMPLETED | OUTPATIENT
Start: 2023-01-01 | End: 2023-01-01

## 2023-01-01 RX ORDER — TRAMADOL HYDROCHLORIDE 50 MG/1
50 TABLET ORAL EVERY 12 HOURS PRN
COMMUNITY
Start: 2023-01-01 | End: 2023-01-01 | Stop reason: ENTERED-IN-ERROR

## 2023-01-01 RX ORDER — TERBUTALINE SULFATE 5 MG/1
TABLET ORAL
COMMUNITY
End: 2023-01-01 | Stop reason: ENTERED-IN-ERROR

## 2023-01-01 RX ORDER — HYDROMORPHONE HYDROCHLORIDE 1 MG/ML
0.2 INJECTION, SOLUTION INTRAMUSCULAR; INTRAVENOUS; SUBCUTANEOUS EVERY 4 HOURS PRN
Status: DISCONTINUED | OUTPATIENT
Start: 2023-01-01 | End: 2023-01-01 | Stop reason: HOSPADM

## 2023-01-01 RX ORDER — BLOOD-GLUCOSE METER
EACH MISCELLANEOUS
COMMUNITY
Start: 2021-04-05

## 2023-01-01 RX ORDER — POLYETHYLENE GLYCOL 3350 17 G/17G
17 POWDER, FOR SOLUTION ORAL DAILY
Status: DISCONTINUED | OUTPATIENT
Start: 2023-01-01 | End: 2023-01-01

## 2023-01-01 RX ORDER — TORSEMIDE 100 MG/1
TABLET ORAL
COMMUNITY
End: 2023-01-01 | Stop reason: ENTERED-IN-ERROR

## 2023-01-01 RX ADMIN — TACROLIMUS 1.5 MG: 0.5 CAPSULE ORAL at 09:14

## 2023-01-01 RX ADMIN — INSULIN LISPRO 6 UNITS: 100 INJECTION, SOLUTION INTRAVENOUS; SUBCUTANEOUS at 15:57

## 2023-01-01 RX ADMIN — NYSTATIN 400000 UNITS: 100000 SUSPENSION ORAL at 21:53

## 2023-01-01 RX ADMIN — SODIUM CHLORIDE, POTASSIUM CHLORIDE, SODIUM LACTATE AND CALCIUM CHLORIDE: 600; 310; 30; 20 INJECTION, SOLUTION INTRAVENOUS at 05:16

## 2023-01-01 RX ADMIN — INSULIN HUMAN 4 UNITS: 100 INJECTION, SOLUTION PARENTERAL at 06:01

## 2023-01-01 RX ADMIN — PIPERACILLIN SODIUM AND TAZOBACTAM SODIUM 3.38 G: 3; .375 INJECTION, SOLUTION INTRAVENOUS at 02:54

## 2023-01-01 RX ADMIN — SODIUM CHLORIDE, POTASSIUM CHLORIDE, SODIUM LACTATE AND CALCIUM CHLORIDE 75 ML/HR: 600; 310; 30; 20 INJECTION, SOLUTION INTRAVENOUS at 22:04

## 2023-01-01 RX ADMIN — HEPARIN SODIUM 5000 UNITS: 5000 INJECTION INTRAVENOUS; SUBCUTANEOUS at 13:40

## 2023-01-01 RX ADMIN — TACROLIMUS 2 MG: 1 CAPSULE ORAL at 06:10

## 2023-01-01 RX ADMIN — TACROLIMUS 2 MG: 1 CAPSULE ORAL at 06:45

## 2023-01-01 RX ADMIN — INSULIN LISPRO 9 UNITS: 100 INJECTION, SOLUTION INTRAVENOUS; SUBCUTANEOUS at 23:42

## 2023-01-01 RX ADMIN — LIDOCAINE HYDROCHLORIDE 1 APPLICATION: 20 JELLY TOPICAL at 10:20

## 2023-01-01 RX ADMIN — INSULIN GLARGINE 20 UNITS: 100 INJECTION, SOLUTION SUBCUTANEOUS at 21:31

## 2023-01-01 RX ADMIN — TACROLIMUS 2 MG: 1 CAPSULE ORAL at 07:13

## 2023-01-01 RX ADMIN — ASCORBIC ACID, VITAMIN A PALMITATE, CHOLECALCIFEROL, THIAMINE HYDROCHLORIDE, RIBOFLAVIN-5 PHOSPHATE SODIUM, PYRIDOXINE HYDROCHLORIDE, NIACINAMIDE, DEXPANTHENOL, ALPHA-TOCOPHEROL ACETATE, VITAMIN K1, FOLIC ACID, BIOTIN, CYANOCOBALAMIN: 200; 3300; 200; 6; 3.6; 6; 40; 15; 10; 150; 600; 60; 5 INJECTION, SOLUTION INTRAVENOUS at 20:00

## 2023-01-01 RX ADMIN — DEXAMETHASONE SODIUM PHOSPHATE 4 MG: 4 INJECTION, SOLUTION INTRAMUSCULAR; INTRAVENOUS at 05:39

## 2023-01-01 RX ADMIN — ASPIRIN 150 MG: 300 SUPPOSITORY RECTAL at 14:00

## 2023-01-01 RX ADMIN — HEPARIN SODIUM 5000 UNITS: 5000 INJECTION INTRAVENOUS; SUBCUTANEOUS at 11:22

## 2023-01-01 RX ADMIN — PIPERACILLIN SODIUM AND TAZOBACTAM SODIUM 3.38 G: 3; .375 INJECTION, SOLUTION INTRAVENOUS at 04:16

## 2023-01-01 RX ADMIN — PIPERACILLIN SODIUM AND TAZOBACTAM SODIUM 3.38 G: 3; .375 INJECTION, SOLUTION INTRAVENOUS at 09:43

## 2023-01-01 RX ADMIN — INSULIN LISPRO 6 UNITS: 100 INJECTION, SOLUTION INTRAVENOUS; SUBCUTANEOUS at 23:56

## 2023-01-01 RX ADMIN — PIPERACILLIN SODIUM AND TAZOBACTAM SODIUM 3.38 G: 3; .375 INJECTION, SOLUTION INTRAVENOUS at 04:15

## 2023-01-01 RX ADMIN — TACROLIMUS 2 MG: 1 CAPSULE ORAL at 06:27

## 2023-01-01 RX ADMIN — SMOFLIPID 50 G: 6; 6; 5; 3 INJECTION, EMULSION INTRAVENOUS at 23:12

## 2023-01-01 RX ADMIN — FUROSEMIDE 40 MG: 10 INJECTION, SOLUTION INTRAVENOUS at 22:52

## 2023-01-01 RX ADMIN — DEXAMETHASONE SODIUM PHOSPHATE 4 MG: 4 INJECTION, SOLUTION INTRA-ARTICULAR; INTRALESIONAL; INTRAMUSCULAR; INTRAVENOUS; SOFT TISSUE at 12:36

## 2023-01-01 RX ADMIN — HEPARIN SODIUM 5000 UNITS: 5000 INJECTION INTRAVENOUS; SUBCUTANEOUS at 02:09

## 2023-01-01 RX ADMIN — SMOFLIPID 50 G: 6; 6; 5; 3 INJECTION, EMULSION INTRAVENOUS at 20:46

## 2023-01-01 RX ADMIN — INSULIN HUMAN 4 UNITS: 100 INJECTION, SOLUTION PARENTERAL at 17:35

## 2023-01-01 RX ADMIN — OLANZAPINE 2.5 MG: 10 INJECTION, POWDER, LYOPHILIZED, FOR SOLUTION INTRAMUSCULAR at 21:55

## 2023-01-01 RX ADMIN — IPRATROPIUM BROMIDE AND ALBUTEROL SULFATE 3 ML: .5; 3 SOLUTION RESPIRATORY (INHALATION) at 09:01

## 2023-01-01 RX ADMIN — HYDROMORPHONE HYDROCHLORIDE 0.4 MG: 1 INJECTION, SOLUTION INTRAMUSCULAR; INTRAVENOUS; SUBCUTANEOUS at 07:14

## 2023-01-01 RX ADMIN — METOCLOPRAMIDE 10 MG: 5 INJECTION, SOLUTION INTRAMUSCULAR; INTRAVENOUS at 23:36

## 2023-01-01 RX ADMIN — HYDROMORPHONE HYDROCHLORIDE 0.2 MG: 1 INJECTION, SOLUTION INTRAMUSCULAR; INTRAVENOUS; SUBCUTANEOUS at 17:55

## 2023-01-01 RX ADMIN — HYDROMORPHONE HYDROCHLORIDE 1 MG: 1 INJECTION, SOLUTION INTRAMUSCULAR; INTRAVENOUS; SUBCUTANEOUS at 16:29

## 2023-01-01 RX ADMIN — INSULIN HUMAN 10 UNITS: 100 INJECTION, SUSPENSION SUBCUTANEOUS at 23:54

## 2023-01-01 RX ADMIN — FENTANYL 1 PATCH: 50 PATCH TRANSDERMAL at 15:30

## 2023-01-01 RX ADMIN — HYDROMORPHONE HYDROCHLORIDE 0.4 MG: 1 INJECTION, SOLUTION INTRAMUSCULAR; INTRAVENOUS; SUBCUTANEOUS at 19:09

## 2023-01-01 RX ADMIN — LIDOCAINE HYDROCHLORIDE 80 MG: 20 INJECTION, SOLUTION INFILTRATION; PERINEURAL at 05:20

## 2023-01-01 RX ADMIN — OLANZAPINE 2.5 MG: 10 INJECTION, POWDER, LYOPHILIZED, FOR SOLUTION INTRAMUSCULAR at 02:44

## 2023-01-01 RX ADMIN — FENTANYL CITRATE 50 MCG: 50 INJECTION, SOLUTION INTRAMUSCULAR; INTRAVENOUS at 16:52

## 2023-01-01 RX ADMIN — PANTOPRAZOLE SODIUM 40 MG: 40 INJECTION, POWDER, FOR SOLUTION INTRAVENOUS at 08:16

## 2023-01-01 RX ADMIN — INSULIN HUMAN 2 UNITS: 100 INJECTION, SOLUTION PARENTERAL at 01:22

## 2023-01-01 RX ADMIN — Medication: at 09:00

## 2023-01-01 RX ADMIN — SMOFLIPID 50 G: 6; 6; 5; 3 INJECTION, EMULSION INTRAVENOUS at 08:21

## 2023-01-01 RX ADMIN — ASCORBIC ACID, VITAMIN A PALMITATE, CHOLECALCIFEROL, THIAMINE HYDROCHLORIDE, RIBOFLAVIN-5 PHOSPHATE SODIUM, PYRIDOXINE HYDROCHLORIDE, NIACINAMIDE, DEXPANTHENOL, ALPHA-TOCOPHEROL ACETATE, VITAMIN K1, FOLIC ACID, BIOTIN, CYANOCOBALAMIN: 200; 3300; 200; 6; 3.6; 6; 40; 15; 10; 150; 600; 60; 5 INJECTION, SOLUTION INTRAVENOUS at 23:13

## 2023-01-01 RX ADMIN — PANTOPRAZOLE SODIUM 40 MG: 40 INJECTION, POWDER, FOR SOLUTION INTRAVENOUS at 21:02

## 2023-01-01 RX ADMIN — HYDROMORPHONE HYDROCHLORIDE 1 MG: 1 INJECTION, SOLUTION INTRAMUSCULAR; INTRAVENOUS; SUBCUTANEOUS at 23:47

## 2023-01-01 RX ADMIN — TACROLIMUS 0.5 MG: 0.5 CAPSULE ORAL at 18:49

## 2023-01-01 RX ADMIN — PIPERACILLIN SODIUM AND TAZOBACTAM SODIUM 3.38 G: 3; .375 INJECTION, SOLUTION INTRAVENOUS at 10:29

## 2023-01-01 RX ADMIN — PANTOPRAZOLE SODIUM 40 MG: 40 INJECTION, POWDER, FOR SOLUTION INTRAVENOUS at 09:33

## 2023-01-01 RX ADMIN — HYDROMORPHONE HYDROCHLORIDE 0.2 MG: 1 INJECTION, SOLUTION INTRAMUSCULAR; INTRAVENOUS; SUBCUTANEOUS at 10:50

## 2023-01-01 RX ADMIN — HEPARIN SODIUM 5000 UNITS: 5000 INJECTION INTRAVENOUS; SUBCUTANEOUS at 20:03

## 2023-01-01 RX ADMIN — WATER 10 ML: 1 INJECTION INTRAMUSCULAR; INTRAVENOUS; SUBCUTANEOUS at 01:19

## 2023-01-01 RX ADMIN — TRACE ELEMENTS INJECTION 4: 7.4; .75; 98; 151 INJECTION, SOLUTION INTRAVENOUS at 22:12

## 2023-01-01 RX ADMIN — INSULIN LISPRO 3 UNITS: 100 INJECTION, SOLUTION INTRAVENOUS; SUBCUTANEOUS at 07:41

## 2023-01-01 RX ADMIN — HYDRALAZINE HYDROCHLORIDE 20 MG: 20 INJECTION INTRAMUSCULAR; INTRAVENOUS at 13:52

## 2023-01-01 RX ADMIN — PIPERACILLIN SODIUM AND TAZOBACTAM SODIUM 3.38 G: 3; .375 INJECTION, SOLUTION INTRAVENOUS at 10:25

## 2023-01-01 RX ADMIN — HEPARIN SODIUM 5000 UNITS: 5000 INJECTION INTRAVENOUS; SUBCUTANEOUS at 11:21

## 2023-01-01 RX ADMIN — ACETAMINOPHEN 1000 MG: 10 INJECTION INTRAVENOUS at 11:53

## 2023-01-01 RX ADMIN — METOCLOPRAMIDE 5 MG: 5 INJECTION, SOLUTION INTRAMUSCULAR; INTRAVENOUS at 12:11

## 2023-01-01 RX ADMIN — TRACE ELEMENTS INJECTION 4: 7.4; .75; 98; 151 INJECTION, SOLUTION INTRAVENOUS at 22:06

## 2023-01-01 RX ADMIN — HYDROMORPHONE HYDROCHLORIDE 0.4 MG: 1 INJECTION, SOLUTION INTRAMUSCULAR; INTRAVENOUS; SUBCUTANEOUS at 18:32

## 2023-01-01 RX ADMIN — ACETAMINOPHEN 1000 MG: 10 INJECTION INTRAVENOUS at 17:24

## 2023-01-01 RX ADMIN — ACETAMINOPHEN 1000 MG: 10 INJECTION INTRAVENOUS at 13:26

## 2023-01-01 RX ADMIN — HEPARIN SODIUM 5000 UNITS: 5000 INJECTION INTRAVENOUS; SUBCUTANEOUS at 04:42

## 2023-01-01 RX ADMIN — LIDOCAINE 1 PATCH: 4 PATCH TOPICAL at 09:31

## 2023-01-01 RX ADMIN — INSULIN HUMAN 3 UNITS: 100 INJECTION, SOLUTION PARENTERAL at 14:21

## 2023-01-01 RX ADMIN — MAGNESIUM SULFATE HEPTAHYDRATE 2 G: 40 INJECTION, SOLUTION INTRAVENOUS at 08:20

## 2023-01-01 RX ADMIN — PROPOFOL 90 MG: 10 INJECTION, EMULSION INTRAVENOUS at 05:20

## 2023-01-01 RX ADMIN — ASCORBIC ACID, VITAMIN A PALMITATE, CHOLECALCIFEROL, THIAMINE HYDROCHLORIDE, RIBOFLAVIN-5 PHOSPHATE SODIUM, PYRIDOXINE HYDROCHLORIDE, NIACINAMIDE, DEXPANTHENOL, ALPHA-TOCOPHEROL ACETATE, VITAMIN K1, FOLIC ACID, BIOTIN, CYANOCOBALAMIN: 200; 3300; 200; 6; 3.6; 6; 40; 15; 10; 150; 600; 60; 5 INJECTION, SOLUTION INTRAVENOUS at 19:56

## 2023-01-01 RX ADMIN — PROPOFOL 150 MG: 10 INJECTION, EMULSION INTRAVENOUS at 12:23

## 2023-01-01 RX ADMIN — Medication 30 PERCENT: at 07:59

## 2023-01-01 RX ADMIN — Medication 250 MG: at 23:36

## 2023-01-01 RX ADMIN — METHYLPREDNISOLONE SODIUM SUCCINATE 4 MG: 40 INJECTION, POWDER, LYOPHILIZED, FOR SOLUTION INTRAMUSCULAR; INTRAVENOUS at 00:56

## 2023-01-01 RX ADMIN — NYSTATIN 400000 UNITS: 100000 SUSPENSION ORAL at 09:30

## 2023-01-01 RX ADMIN — HYDROMORPHONE HYDROCHLORIDE 1 MG: 1 INJECTION, SOLUTION INTRAMUSCULAR; INTRAVENOUS; SUBCUTANEOUS at 04:43

## 2023-01-01 RX ADMIN — INSULIN HUMAN 2 UNITS: 100 INJECTION, SOLUTION PARENTERAL at 17:13

## 2023-01-01 RX ADMIN — METOCLOPRAMIDE 10 MG: 5 INJECTION, SOLUTION INTRAMUSCULAR; INTRAVENOUS at 07:12

## 2023-01-01 RX ADMIN — HYDROMORPHONE HYDROCHLORIDE 1 MG: 1 INJECTION, SOLUTION INTRAMUSCULAR; INTRAVENOUS; SUBCUTANEOUS at 18:45

## 2023-01-01 RX ADMIN — Medication 10 L/MIN: at 07:00

## 2023-01-01 RX ADMIN — Medication 100 MG: at 14:05

## 2023-01-01 RX ADMIN — HYDROMORPHONE HYDROCHLORIDE 0.2 MG: 1 INJECTION, SOLUTION INTRAMUSCULAR; INTRAVENOUS; SUBCUTANEOUS at 16:44

## 2023-01-01 RX ADMIN — Medication: at 14:15

## 2023-01-01 RX ADMIN — FENTANYL CITRATE 25 MCG: 50 INJECTION, SOLUTION INTRAMUSCULAR; INTRAVENOUS at 13:00

## 2023-01-01 RX ADMIN — HEPARIN SODIUM 5000 UNITS: 5000 INJECTION INTRAVENOUS; SUBCUTANEOUS at 06:44

## 2023-01-01 RX ADMIN — TACROLIMUS 3 MG: 1 CAPSULE ORAL at 22:09

## 2023-01-01 RX ADMIN — HYDROMORPHONE HYDROCHLORIDE 0.2 MG: 1 INJECTION, SOLUTION INTRAMUSCULAR; INTRAVENOUS; SUBCUTANEOUS at 06:53

## 2023-01-01 RX ADMIN — ONDANSETRON 4 MG: 2 INJECTION INTRAMUSCULAR; INTRAVENOUS at 08:38

## 2023-01-01 RX ADMIN — Medication 100 MG: at 14:25

## 2023-01-01 RX ADMIN — INSULIN HUMAN 4 UNITS: 100 INJECTION, SOLUTION PARENTERAL at 00:16

## 2023-01-01 RX ADMIN — INSULIN HUMAN 4 UNITS: 100 INJECTION, SOLUTION PARENTERAL at 17:47

## 2023-01-01 RX ADMIN — HEPARIN SODIUM 5000 UNITS: 5000 INJECTION INTRAVENOUS; SUBCUTANEOUS at 05:49

## 2023-01-01 RX ADMIN — PIPERACILLIN SODIUM AND TAZOBACTAM SODIUM 3.38 G: 3; .375 INJECTION, SOLUTION INTRAVENOUS at 16:09

## 2023-01-01 RX ADMIN — INSULIN LISPRO 6 UNITS: 100 INJECTION, SOLUTION INTRAVENOUS; SUBCUTANEOUS at 04:46

## 2023-01-01 RX ADMIN — METHYLPREDNISOLONE SODIUM SUCCINATE 4 MG: 40 INJECTION, POWDER, LYOPHILIZED, FOR SOLUTION INTRAMUSCULAR; INTRAVENOUS at 00:00

## 2023-01-01 RX ADMIN — SMOFLIPID 50 G: 6; 6; 5; 3 INJECTION, EMULSION INTRAVENOUS at 20:00

## 2023-01-01 RX ADMIN — METHYLPREDNISOLONE SODIUM SUCCINATE 4 MG: 40 INJECTION, POWDER, LYOPHILIZED, FOR SOLUTION INTRAMUSCULAR; INTRAVENOUS at 01:25

## 2023-01-01 RX ADMIN — SMOFLIPID 50 G: 6; 6; 5; 3 INJECTION, EMULSION INTRAVENOUS at 22:35

## 2023-01-01 RX ADMIN — SODIUM CHLORIDE, POTASSIUM CHLORIDE, SODIUM LACTATE AND CALCIUM CHLORIDE 500 ML: 600; 310; 30; 20 INJECTION, SOLUTION INTRAVENOUS at 01:58

## 2023-01-01 RX ADMIN — PANTOPRAZOLE SODIUM 40 MG: 40 INJECTION, POWDER, FOR SOLUTION INTRAVENOUS at 22:35

## 2023-01-01 RX ADMIN — LABETALOL HYDROCHLORIDE 10 MG: 5 INJECTION, SOLUTION INTRAVENOUS at 23:42

## 2023-01-01 RX ADMIN — HYDRALAZINE HYDROCHLORIDE 10 MG: 20 INJECTION INTRAMUSCULAR; INTRAVENOUS at 16:08

## 2023-01-01 RX ADMIN — HYDRALAZINE HYDROCHLORIDE 10 MG: 20 INJECTION INTRAMUSCULAR; INTRAVENOUS at 16:50

## 2023-01-01 RX ADMIN — PIPERACILLIN SODIUM AND TAZOBACTAM SODIUM 3.38 G: 3; .375 INJECTION, SOLUTION INTRAVENOUS at 14:37

## 2023-01-01 RX ADMIN — PIPERACILLIN SODIUM AND TAZOBACTAM SODIUM 3.38 G: 3; .375 INJECTION, SOLUTION INTRAVENOUS at 04:45

## 2023-01-01 RX ADMIN — TACROLIMUS 2 MG: 1 CAPSULE ORAL at 06:40

## 2023-01-01 RX ADMIN — ROCURONIUM BROMIDE 10 MG: 10 INJECTION, SOLUTION INTRAVENOUS at 18:48

## 2023-01-01 RX ADMIN — HYDROMORPHONE HYDROCHLORIDE 1 MG: 1 INJECTION, SOLUTION INTRAMUSCULAR; INTRAVENOUS; SUBCUTANEOUS at 16:49

## 2023-01-01 RX ADMIN — ASPIRIN 150 MG: 300 SUPPOSITORY RECTAL at 14:09

## 2023-01-01 RX ADMIN — TACROLIMUS 2 MG: 1 CAPSULE ORAL at 06:49

## 2023-01-01 RX ADMIN — Medication 250 MG: at 18:38

## 2023-01-01 RX ADMIN — HYDROMORPHONE HYDROCHLORIDE 0.2 MG: 1 INJECTION, SOLUTION INTRAMUSCULAR; INTRAVENOUS; SUBCUTANEOUS at 17:48

## 2023-01-01 RX ADMIN — THIAMINE HYDROCHLORIDE 500 MG: 100 INJECTION, SOLUTION INTRAMUSCULAR; INTRAVENOUS at 22:55

## 2023-01-01 RX ADMIN — TACROLIMUS 2 MG: 1 CAPSULE ORAL at 06:26

## 2023-01-01 RX ADMIN — TACROLIMUS 1 MG: 1 CAPSULE ORAL at 17:56

## 2023-01-01 RX ADMIN — INSULIN LISPRO 6 UNITS: 100 INJECTION, SOLUTION INTRAVENOUS; SUBCUTANEOUS at 23:54

## 2023-01-01 RX ADMIN — INSULIN HUMAN 5 UNITS: 100 INJECTION, SOLUTION PARENTERAL at 17:13

## 2023-01-01 RX ADMIN — Medication 100 MG: at 14:11

## 2023-01-01 RX ADMIN — SUGAMMADEX 200 MG: 100 INJECTION, SOLUTION INTRAVENOUS at 20:33

## 2023-01-01 RX ADMIN — HYDROMORPHONE HYDROCHLORIDE 1 MG: 1 INJECTION, SOLUTION INTRAMUSCULAR; INTRAVENOUS; SUBCUTANEOUS at 14:16

## 2023-01-01 RX ADMIN — HYDROMORPHONE HYDROCHLORIDE 0.2 MG: 1 INJECTION, SOLUTION INTRAMUSCULAR; INTRAVENOUS; SUBCUTANEOUS at 17:12

## 2023-01-01 RX ADMIN — MAGNESIUM SULFATE HEPTAHYDRATE 2 G: 40 INJECTION, SOLUTION INTRAVENOUS at 09:34

## 2023-01-01 RX ADMIN — HYDRALAZINE HYDROCHLORIDE 10 MG: 20 INJECTION INTRAMUSCULAR; INTRAVENOUS at 11:11

## 2023-01-01 RX ADMIN — HEPARIN SODIUM 5000 UNITS: 5000 INJECTION INTRAVENOUS; SUBCUTANEOUS at 18:27

## 2023-01-01 RX ADMIN — IPRATROPIUM BROMIDE AND ALBUTEROL SULFATE 3 ML: .5; 3 SOLUTION RESPIRATORY (INHALATION) at 09:11

## 2023-01-01 RX ADMIN — HEPARIN SODIUM 5000 UNITS: 5000 INJECTION INTRAVENOUS; SUBCUTANEOUS at 04:16

## 2023-01-01 RX ADMIN — HYDROMORPHONE HYDROCHLORIDE 0.2 MG: 1 INJECTION, SOLUTION INTRAMUSCULAR; INTRAVENOUS; SUBCUTANEOUS at 08:21

## 2023-01-01 RX ADMIN — INSULIN HUMAN 4 UNITS: 100 INJECTION, SOLUTION PARENTERAL at 05:44

## 2023-01-01 RX ADMIN — PIPERACILLIN SODIUM AND TAZOBACTAM SODIUM 3.38 G: 3; .375 INJECTION, SOLUTION INTRAVENOUS at 06:15

## 2023-01-01 RX ADMIN — ONDANSETRON 4 MG: 2 INJECTION INTRAMUSCULAR; INTRAVENOUS at 19:23

## 2023-01-01 RX ADMIN — SODIUM CHLORIDE, POTASSIUM CHLORIDE, SODIUM LACTATE AND CALCIUM CHLORIDE 500 ML: 600; 310; 30; 20 INJECTION, SOLUTION INTRAVENOUS at 06:25

## 2023-01-01 RX ADMIN — TACROLIMUS 2 MG: 1 CAPSULE ORAL at 06:44

## 2023-01-01 RX ADMIN — Medication 10 L/MIN: at 06:59

## 2023-01-01 RX ADMIN — INSULIN HUMAN 4 UNITS: 100 INJECTION, SOLUTION PARENTERAL at 13:34

## 2023-01-01 RX ADMIN — LIDOCAINE 1 PATCH: 4 PATCH TOPICAL at 09:00

## 2023-01-01 RX ADMIN — IPRATROPIUM BROMIDE AND ALBUTEROL SULFATE 3 ML: .5; 3 SOLUTION RESPIRATORY (INHALATION) at 04:00

## 2023-01-01 RX ADMIN — INSULIN HUMAN 2 UNITS: 100 INJECTION, SOLUTION PARENTERAL at 11:47

## 2023-01-01 RX ADMIN — INSULIN LISPRO 6 UNITS: 100 INJECTION, SOLUTION INTRAVENOUS; SUBCUTANEOUS at 23:31

## 2023-01-01 RX ADMIN — HYDROMORPHONE HYDROCHLORIDE 0.2 MG: 1 INJECTION, SOLUTION INTRAMUSCULAR; INTRAVENOUS; SUBCUTANEOUS at 01:06

## 2023-01-01 RX ADMIN — DEXTROSE MONOHYDRATE 25 G: 25 INJECTION, SOLUTION INTRAVENOUS at 16:28

## 2023-01-01 RX ADMIN — MICAFUNGIN SODIUM 100 MG: 100 INJECTION, POWDER, LYOPHILIZED, FOR SOLUTION INTRAVENOUS at 03:30

## 2023-01-01 RX ADMIN — HEPARIN SODIUM 5000 UNITS: 5000 INJECTION INTRAVENOUS; SUBCUTANEOUS at 22:35

## 2023-01-01 RX ADMIN — METHYLPREDNISOLONE SODIUM SUCCINATE 40 MG: 40 INJECTION, POWDER, FOR SOLUTION INTRAMUSCULAR; INTRAVENOUS at 10:29

## 2023-01-01 RX ADMIN — LABETALOL HYDROCHLORIDE 10 MG: 5 INJECTION, SOLUTION INTRAVENOUS at 07:07

## 2023-01-01 RX ADMIN — HYDROMORPHONE HYDROCHLORIDE 0.2 MG: 1 INJECTION, SOLUTION INTRAMUSCULAR; INTRAVENOUS; SUBCUTANEOUS at 20:03

## 2023-01-01 RX ADMIN — TACROLIMUS 1.5 MG: 1 CAPSULE ORAL at 18:35

## 2023-01-01 RX ADMIN — ALBUMIN HUMAN 250 ML: 0.05 INJECTION, SOLUTION INTRAVENOUS at 14:51

## 2023-01-01 RX ADMIN — SMOFLIPID 50 G: 6; 6; 5; 3 INJECTION, EMULSION INTRAVENOUS at 20:45

## 2023-01-01 RX ADMIN — INSULIN HUMAN 5 UNITS: 100 INJECTION, SOLUTION PARENTERAL at 14:22

## 2023-01-01 RX ADMIN — ACETAMINOPHEN 1000 MG: 10 INJECTION INTRAVENOUS at 01:10

## 2023-01-01 RX ADMIN — ACETAMINOPHEN 1000 MG: 10 INJECTION INTRAVENOUS at 18:37

## 2023-01-01 RX ADMIN — HYDROMORPHONE HYDROCHLORIDE 0.2 MG: 1 INJECTION, SOLUTION INTRAMUSCULAR; INTRAVENOUS; SUBCUTANEOUS at 20:27

## 2023-01-01 RX ADMIN — METOCLOPRAMIDE 10 MG: 5 INJECTION, SOLUTION INTRAMUSCULAR; INTRAVENOUS at 23:48

## 2023-01-01 RX ADMIN — TRACE ELEMENTS INJECTION 4: 7.4; .75; 98; 151 INJECTION, SOLUTION INTRAVENOUS at 20:40

## 2023-01-01 RX ADMIN — PANTOPRAZOLE SODIUM 40 MG: 40 INJECTION, POWDER, FOR SOLUTION INTRAVENOUS at 20:25

## 2023-01-01 RX ADMIN — METOCLOPRAMIDE 5 MG: 5 INJECTION, SOLUTION INTRAMUSCULAR; INTRAVENOUS at 17:56

## 2023-01-01 RX ADMIN — INSULIN LISPRO 6 UNITS: 100 INJECTION, SOLUTION INTRAVENOUS; SUBCUTANEOUS at 12:17

## 2023-01-01 RX ADMIN — PIPERACILLIN SODIUM AND TAZOBACTAM SODIUM 3.38 G: 3; .375 INJECTION, SOLUTION INTRAVENOUS at 05:34

## 2023-01-01 RX ADMIN — PIPERACILLIN SODIUM AND TAZOBACTAM SODIUM 3.38 G: 3; .375 INJECTION, SOLUTION INTRAVENOUS at 16:27

## 2023-01-01 RX ADMIN — PIPERACILLIN SODIUM AND TAZOBACTAM SODIUM 3.38 G: 3; .375 INJECTION, SOLUTION INTRAVENOUS at 04:51

## 2023-01-01 RX ADMIN — IPRATROPIUM BROMIDE AND ALBUTEROL SULFATE 3 ML: .5; 3 SOLUTION RESPIRATORY (INHALATION) at 20:17

## 2023-01-01 RX ADMIN — HYDRALAZINE HYDROCHLORIDE 10 MG: 20 INJECTION INTRAMUSCULAR; INTRAVENOUS at 09:30

## 2023-01-01 RX ADMIN — INSULIN HUMAN 20 UNITS: 100 INJECTION, SUSPENSION SUBCUTANEOUS at 11:52

## 2023-01-01 RX ADMIN — SODIUM CHLORIDE, POTASSIUM CHLORIDE, SODIUM LACTATE AND CALCIUM CHLORIDE 75 ML/HR: 600; 310; 30; 20 INJECTION, SOLUTION INTRAVENOUS at 10:00

## 2023-01-01 RX ADMIN — INSULIN HUMAN 4 UNITS: 100 INJECTION, SOLUTION PARENTERAL at 18:16

## 2023-01-01 RX ADMIN — INSULIN HUMAN 6 UNITS: 100 INJECTION, SOLUTION PARENTERAL at 11:00

## 2023-01-01 RX ADMIN — HEPARIN SODIUM 5000 UNITS: 5000 INJECTION INTRAVENOUS; SUBCUTANEOUS at 18:31

## 2023-01-01 RX ADMIN — METHYLPREDNISOLONE SODIUM SUCCINATE 4 MG: 40 INJECTION, POWDER, LYOPHILIZED, FOR SOLUTION INTRAMUSCULAR; INTRAVENOUS at 00:32

## 2023-01-01 RX ADMIN — INSULIN HUMAN 4 UNITS: 100 INJECTION, SOLUTION PARENTERAL at 12:17

## 2023-01-01 RX ADMIN — LIDOCAINE 1 PATCH: 4 PATCH TOPICAL at 09:29

## 2023-01-01 RX ADMIN — Medication: at 13:34

## 2023-01-01 RX ADMIN — PANTOPRAZOLE SODIUM 40 MG: 40 INJECTION, POWDER, FOR SOLUTION INTRAVENOUS at 10:40

## 2023-01-01 RX ADMIN — INSULIN HUMAN 4 UNITS: 100 INJECTION, SOLUTION PARENTERAL at 00:17

## 2023-01-01 RX ADMIN — PIPERACILLIN SODIUM AND TAZOBACTAM SODIUM 3.38 G: 3; .375 INJECTION, SOLUTION INTRAVENOUS at 18:12

## 2023-01-01 RX ADMIN — HEPARIN SODIUM 5000 UNITS: 5000 INJECTION INTRAVENOUS; SUBCUTANEOUS at 11:18

## 2023-01-01 RX ADMIN — METOCLOPRAMIDE 5 MG: 5 INJECTION, SOLUTION INTRAMUSCULAR; INTRAVENOUS at 06:39

## 2023-01-01 RX ADMIN — ASPIRIN 150 MG: 300 SUPPOSITORY RECTAL at 14:22

## 2023-01-01 RX ADMIN — HYDRALAZINE HYDROCHLORIDE 10 MG: 20 INJECTION INTRAMUSCULAR; INTRAVENOUS at 14:22

## 2023-01-01 RX ADMIN — METHOCARBAMOL 1000 MG: 1000 INJECTION, SOLUTION INTRAMUSCULAR; INTRAVENOUS at 23:50

## 2023-01-01 RX ADMIN — INSULIN LISPRO 12 UNITS: 100 INJECTION, SOLUTION INTRAVENOUS; SUBCUTANEOUS at 20:00

## 2023-01-01 RX ADMIN — TACROLIMUS 0.5 MG: 0.5 CAPSULE ORAL at 18:43

## 2023-01-01 RX ADMIN — HYDROMORPHONE HYDROCHLORIDE 0.4 MG: 1 INJECTION, SOLUTION INTRAMUSCULAR; INTRAVENOUS; SUBCUTANEOUS at 09:08

## 2023-01-01 RX ADMIN — IPRATROPIUM BROMIDE AND ALBUTEROL SULFATE 3 ML: .5; 3 SOLUTION RESPIRATORY (INHALATION) at 07:59

## 2023-01-01 RX ADMIN — NYSTATIN 400000 UNITS: 100000 SUSPENSION ORAL at 20:43

## 2023-01-01 RX ADMIN — INSULIN LISPRO 3 UNITS: 100 INJECTION, SOLUTION INTRAVENOUS; SUBCUTANEOUS at 16:22

## 2023-01-01 RX ADMIN — PANTOPRAZOLE SODIUM 40 MG: 40 INJECTION, POWDER, FOR SOLUTION INTRAVENOUS at 20:44

## 2023-01-01 RX ADMIN — SUGAMMADEX 400 MG: 100 INJECTION, SOLUTION INTRAVENOUS at 06:47

## 2023-01-01 RX ADMIN — HEPARIN SODIUM 5000 UNITS: 5000 INJECTION INTRAVENOUS; SUBCUTANEOUS at 05:45

## 2023-01-01 RX ADMIN — PIPERACILLIN SODIUM AND TAZOBACTAM SODIUM 3.38 G: 3; .375 INJECTION, SOLUTION INTRAVENOUS at 03:46

## 2023-01-01 RX ADMIN — OXYCODONE HYDROCHLORIDE 10 MG: 5 SOLUTION ORAL at 08:35

## 2023-01-01 RX ADMIN — PIPERACILLIN SODIUM AND TAZOBACTAM SODIUM 3.38 G: 3; .375 INJECTION, SOLUTION INTRAVENOUS at 10:40

## 2023-01-01 RX ADMIN — ACETAMINOPHEN 1000 MG: 10 INJECTION INTRAVENOUS at 03:06

## 2023-01-01 RX ADMIN — PANTOPRAZOLE SODIUM 40 MG: 40 INJECTION, POWDER, FOR SOLUTION INTRAVENOUS at 20:53

## 2023-01-01 RX ADMIN — HYDROMORPHONE HYDROCHLORIDE 1 MG: 1 INJECTION, SOLUTION INTRAMUSCULAR; INTRAVENOUS; SUBCUTANEOUS at 04:00

## 2023-01-01 RX ADMIN — HYDRALAZINE HYDROCHLORIDE 10 MG: 20 INJECTION INTRAMUSCULAR; INTRAVENOUS at 21:43

## 2023-01-01 RX ADMIN — INSULIN GLARGINE 10 UNITS: 100 INJECTION, SOLUTION SUBCUTANEOUS at 20:25

## 2023-01-01 RX ADMIN — INSULIN HUMAN 2 UNITS: 100 INJECTION, SOLUTION PARENTERAL at 17:14

## 2023-01-01 RX ADMIN — HEPARIN, PORCINE (PF) 10 UNIT/ML INTRAVENOUS SYRINGE 50 UNITS: at 21:16

## 2023-01-01 RX ADMIN — ASCORBIC ACID, VITAMIN A PALMITATE, CHOLECALCIFEROL, THIAMINE HYDROCHLORIDE, RIBOFLAVIN-5 PHOSPHATE SODIUM, PYRIDOXINE HYDROCHLORIDE, NIACINAMIDE, DEXPANTHENOL, ALPHA-TOCOPHEROL ACETATE, VITAMIN K1, FOLIC ACID, BIOTIN, CYANOCOBALAMIN: 200; 3300; 200; 6; 3.6; 6; 40; 15; 10; 150; 600; 60; 5 INJECTION, SOLUTION INTRAVENOUS at 21:14

## 2023-01-01 RX ADMIN — NYSTATIN 400000 UNITS: 100000 SUSPENSION ORAL at 22:57

## 2023-01-01 RX ADMIN — PANTOPRAZOLE SODIUM 40 MG: 40 INJECTION, POWDER, FOR SOLUTION INTRAVENOUS at 20:30

## 2023-01-01 RX ADMIN — ROCURONIUM BROMIDE 10 MG: 10 INJECTION, SOLUTION INTRAVENOUS at 14:47

## 2023-01-01 RX ADMIN — PIPERACILLIN SODIUM AND TAZOBACTAM SODIUM 3.38 G: 3; .375 INJECTION, SOLUTION INTRAVENOUS at 10:10

## 2023-01-01 RX ADMIN — HYDRALAZINE HYDROCHLORIDE 10 MG: 20 INJECTION INTRAMUSCULAR; INTRAVENOUS at 10:40

## 2023-01-01 RX ADMIN — HYDRALAZINE HYDROCHLORIDE 10 MG: 20 INJECTION INTRAMUSCULAR; INTRAVENOUS at 01:14

## 2023-01-01 RX ADMIN — IPRATROPIUM BROMIDE AND ALBUTEROL SULFATE 3 ML: .5; 3 SOLUTION RESPIRATORY (INHALATION) at 13:34

## 2023-01-01 RX ADMIN — Medication: at 03:00

## 2023-01-01 RX ADMIN — NYSTATIN 400000 UNITS: 100000 SUSPENSION ORAL at 22:35

## 2023-01-01 RX ADMIN — HEPARIN SODIUM 5000 UNITS: 5000 INJECTION INTRAVENOUS; SUBCUTANEOUS at 18:30

## 2023-01-01 RX ADMIN — HEPARIN SODIUM 5000 UNITS: 5000 INJECTION INTRAVENOUS; SUBCUTANEOUS at 10:29

## 2023-01-01 RX ADMIN — SODIUM CHLORIDE 125 ML/HR: 9 INJECTION, SOLUTION INTRAVENOUS at 00:00

## 2023-01-01 RX ADMIN — TACROLIMUS 1 MG: 1 CAPSULE ORAL at 18:30

## 2023-01-01 RX ADMIN — LIDOCAINE 1 PATCH: 4 PATCH TOPICAL at 08:32

## 2023-01-01 RX ADMIN — HYDROMORPHONE HYDROCHLORIDE 0.2 MG: 1 INJECTION, SOLUTION INTRAMUSCULAR; INTRAVENOUS; SUBCUTANEOUS at 20:34

## 2023-01-01 RX ADMIN — ROCURONIUM BROMIDE 10 MG: 10 INJECTION, SOLUTION INTRAVENOUS at 15:24

## 2023-01-01 RX ADMIN — METOCLOPRAMIDE 5 MG: 5 INJECTION, SOLUTION INTRAMUSCULAR; INTRAVENOUS at 18:37

## 2023-01-01 RX ADMIN — INSULIN HUMAN 4 UNITS: 100 INJECTION, SOLUTION PARENTERAL at 17:42

## 2023-01-01 RX ADMIN — PANTOPRAZOLE SODIUM 40 MG: 40 INJECTION, POWDER, FOR SOLUTION INTRAVENOUS at 08:57

## 2023-01-01 RX ADMIN — INSULIN HUMAN 4 UNITS: 100 INJECTION, SOLUTION PARENTERAL at 12:13

## 2023-01-01 RX ADMIN — ASPIRIN 150 MG: 300 SUPPOSITORY RECTAL at 16:03

## 2023-01-01 RX ADMIN — HEPARIN SODIUM 5000 UNITS: 5000 INJECTION INTRAVENOUS; SUBCUTANEOUS at 22:57

## 2023-01-01 RX ADMIN — TACROLIMUS 1.5 MG: 0.5 CAPSULE ORAL at 06:16

## 2023-01-01 RX ADMIN — ASCORBIC ACID, VITAMIN A PALMITATE, CHOLECALCIFEROL, THIAMINE HYDROCHLORIDE, RIBOFLAVIN-5 PHOSPHATE SODIUM, PYRIDOXINE HYDROCHLORIDE, NIACINAMIDE, DEXPANTHENOL, ALPHA-TOCOPHEROL ACETATE, VITAMIN K1, FOLIC ACID, BIOTIN, CYANOCOBALAMIN: 200; 3300; 200; 6; 3.6; 6; 40; 15; 10; 150; 600; 60; 5 INJECTION, SOLUTION INTRAVENOUS at 20:46

## 2023-01-01 RX ADMIN — ROCURONIUM BROMIDE 50 MG: 10 INJECTION, SOLUTION INTRAVENOUS at 05:20

## 2023-01-01 RX ADMIN — LABETALOL HYDROCHLORIDE 10 MG: 5 INJECTION, SOLUTION INTRAVENOUS at 14:08

## 2023-01-01 RX ADMIN — HYDRALAZINE HYDROCHLORIDE 10 MG: 20 INJECTION INTRAMUSCULAR; INTRAVENOUS at 16:21

## 2023-01-01 RX ADMIN — METOCLOPRAMIDE 10 MG: 5 INJECTION, SOLUTION INTRAMUSCULAR; INTRAVENOUS at 11:46

## 2023-01-01 RX ADMIN — HEPARIN SODIUM 5000 UNITS: 5000 INJECTION INTRAVENOUS; SUBCUTANEOUS at 10:19

## 2023-01-01 RX ADMIN — PANTOPRAZOLE SODIUM 40 MG: 40 INJECTION, POWDER, FOR SOLUTION INTRAVENOUS at 08:47

## 2023-01-01 RX ADMIN — FENTANYL 1 PATCH: 50 PATCH TRANSDERMAL at 15:40

## 2023-01-01 RX ADMIN — INSULIN HUMAN 4 UNITS: 100 INJECTION, SOLUTION PARENTERAL at 01:21

## 2023-01-01 RX ADMIN — TACROLIMUS 1 MG: 1 CAPSULE ORAL at 18:37

## 2023-01-01 RX ADMIN — HEPARIN SODIUM 5000 UNITS: 5000 INJECTION INTRAVENOUS; SUBCUTANEOUS at 20:56

## 2023-01-01 RX ADMIN — SMOFLIPID 50 G: 6; 6; 5; 3 INJECTION, EMULSION INTRAVENOUS at 08:31

## 2023-01-01 RX ADMIN — INSULIN HUMAN 5 UNITS: 100 INJECTION, SOLUTION PARENTERAL at 05:47

## 2023-01-01 RX ADMIN — SODIUM CHLORIDE, SODIUM LACTATE, POTASSIUM CHLORIDE, AND CALCIUM CHLORIDE: 600; 310; 30; 20 INJECTION, SOLUTION INTRAVENOUS at 16:06

## 2023-01-01 RX ADMIN — OXYCODONE HYDROCHLORIDE 5 MG: 5 SOLUTION ORAL at 12:22

## 2023-01-01 RX ADMIN — HYDROMORPHONE HYDROCHLORIDE 0.1 MG: 1 INJECTION, SOLUTION INTRAMUSCULAR; INTRAVENOUS; SUBCUTANEOUS at 22:53

## 2023-01-01 RX ADMIN — OLANZAPINE 2.5 MG: 10 INJECTION, POWDER, LYOPHILIZED, FOR SOLUTION INTRAMUSCULAR at 01:20

## 2023-01-01 RX ADMIN — PIPERACILLIN SODIUM AND TAZOBACTAM SODIUM 3.38 G: 3; .375 INJECTION, SOLUTION INTRAVENOUS at 22:40

## 2023-01-01 RX ADMIN — PANTOPRAZOLE SODIUM 40 MG: 40 INJECTION, POWDER, FOR SOLUTION INTRAVENOUS at 23:12

## 2023-01-01 RX ADMIN — PIPERACILLIN SODIUM AND TAZOBACTAM SODIUM 3.38 G: 3; .375 INJECTION, SOLUTION INTRAVENOUS at 04:24

## 2023-01-01 RX ADMIN — Medication 5 MG: at 21:40

## 2023-01-01 RX ADMIN — HEPARIN SODIUM 5000 UNITS: 5000 INJECTION INTRAVENOUS; SUBCUTANEOUS at 12:14

## 2023-01-01 RX ADMIN — HEPARIN SODIUM 5000 UNITS: 5000 INJECTION INTRAVENOUS; SUBCUTANEOUS at 02:48

## 2023-01-01 RX ADMIN — LIDOCAINE 1 PATCH: 4 PATCH TOPICAL at 09:33

## 2023-01-01 RX ADMIN — TACROLIMUS 0.5 MG: 0.5 CAPSULE ORAL at 18:13

## 2023-01-01 RX ADMIN — METOCLOPRAMIDE 10 MG: 5 INJECTION, SOLUTION INTRAMUSCULAR; INTRAVENOUS at 05:34

## 2023-01-01 RX ADMIN — ONDANSETRON 4 MG: 2 INJECTION INTRAMUSCULAR; INTRAVENOUS at 14:16

## 2023-01-01 RX ADMIN — POTASSIUM CHLORIDE 20 MEQ: 14.9 INJECTION, SOLUTION INTRAVENOUS at 08:54

## 2023-01-01 RX ADMIN — HYDROMORPHONE HYDROCHLORIDE 0.2 MG: 1 INJECTION, SOLUTION INTRAMUSCULAR; INTRAVENOUS; SUBCUTANEOUS at 06:09

## 2023-01-01 RX ADMIN — METOCLOPRAMIDE 10 MG: 5 INJECTION, SOLUTION INTRAMUSCULAR; INTRAVENOUS at 11:53

## 2023-01-01 RX ADMIN — PIPERACILLIN SODIUM AND TAZOBACTAM SODIUM 3.38 G: 3; .375 INJECTION, SOLUTION INTRAVENOUS at 16:50

## 2023-01-01 RX ADMIN — METOCLOPRAMIDE 10 MG: 5 INJECTION, SOLUTION INTRAMUSCULAR; INTRAVENOUS at 17:12

## 2023-01-01 RX ADMIN — OLANZAPINE 2.5 MG: 10 INJECTION, POWDER, LYOPHILIZED, FOR SOLUTION INTRAMUSCULAR at 20:36

## 2023-01-01 RX ADMIN — SODIUM CHLORIDE, SODIUM LACTATE, POTASSIUM CHLORIDE, AND CALCIUM CHLORIDE 500 ML: 600; 310; 30; 20 INJECTION, SOLUTION INTRAVENOUS at 05:15

## 2023-01-01 RX ADMIN — HEPARIN SODIUM 5000 UNITS: 5000 INJECTION INTRAVENOUS; SUBCUTANEOUS at 05:33

## 2023-01-01 RX ADMIN — PANTOPRAZOLE SODIUM 40 MG: 40 INJECTION, POWDER, FOR SOLUTION INTRAVENOUS at 20:56

## 2023-01-01 RX ADMIN — PIPERACILLIN SODIUM AND TAZOBACTAM SODIUM 3.38 G: 3; .375 INJECTION, SOLUTION INTRAVENOUS at 16:08

## 2023-01-01 RX ADMIN — METHYLPREDNISOLONE SODIUM SUCCINATE 40 MG: 40 INJECTION, POWDER, FOR SOLUTION INTRAMUSCULAR; INTRAVENOUS at 11:15

## 2023-01-01 RX ADMIN — HYDRALAZINE HYDROCHLORIDE 10 MG: 20 INJECTION INTRAMUSCULAR; INTRAVENOUS at 10:25

## 2023-01-01 RX ADMIN — INSULIN HUMAN 4 UNITS: 100 INJECTION, SOLUTION PARENTERAL at 00:28

## 2023-01-01 RX ADMIN — HYDROMORPHONE HYDROCHLORIDE 0.2 MG: 1 INJECTION, SOLUTION INTRAMUSCULAR; INTRAVENOUS; SUBCUTANEOUS at 12:30

## 2023-01-01 RX ADMIN — LIDOCAINE 1 PATCH: 4 PATCH TOPICAL at 09:13

## 2023-01-01 RX ADMIN — HYDRALAZINE HYDROCHLORIDE 10 MG: 20 INJECTION INTRAMUSCULAR; INTRAVENOUS at 05:34

## 2023-01-01 RX ADMIN — HYDROMORPHONE HYDROCHLORIDE 0.2 MG: 1 INJECTION, SOLUTION INTRAMUSCULAR; INTRAVENOUS; SUBCUTANEOUS at 11:39

## 2023-01-01 RX ADMIN — SMOFLIPID 50 G: 6; 6; 5; 3 INJECTION, EMULSION INTRAVENOUS at 21:27

## 2023-01-01 RX ADMIN — PANTOPRAZOLE SODIUM 40 MG: 40 INJECTION, POWDER, FOR SOLUTION INTRAVENOUS at 09:08

## 2023-01-01 RX ADMIN — HYDRALAZINE HYDROCHLORIDE 10 MG: 20 INJECTION INTRAMUSCULAR; INTRAVENOUS at 16:29

## 2023-01-01 RX ADMIN — INSULIN HUMAN 4 UNITS: 100 INJECTION, SOLUTION PARENTERAL at 13:39

## 2023-01-01 RX ADMIN — METHYLPREDNISOLONE SODIUM SUCCINATE 4 MG: 40 INJECTION, POWDER, LYOPHILIZED, FOR SOLUTION INTRAMUSCULAR; INTRAVENOUS at 00:07

## 2023-01-01 RX ADMIN — METOCLOPRAMIDE 5 MG: 5 INJECTION, SOLUTION INTRAMUSCULAR; INTRAVENOUS at 01:24

## 2023-01-01 RX ADMIN — PANTOPRAZOLE SODIUM 40 MG: 40 INJECTION, POWDER, FOR SOLUTION INTRAVENOUS at 08:24

## 2023-01-01 RX ADMIN — PANTOPRAZOLE SODIUM 40 MG: 40 INJECTION, POWDER, FOR SOLUTION INTRAVENOUS at 22:48

## 2023-01-01 RX ADMIN — LABETALOL HYDROCHLORIDE 10 MG: 5 INJECTION, SOLUTION INTRAVENOUS at 06:56

## 2023-01-01 RX ADMIN — HEPARIN SODIUM 5000 UNITS: 5000 INJECTION INTRAVENOUS; SUBCUTANEOUS at 21:02

## 2023-01-01 RX ADMIN — Medication 3 L/MIN: at 08:15

## 2023-01-01 RX ADMIN — FUROSEMIDE 40 MG: 10 INJECTION, SOLUTION INTRAVENOUS at 16:39

## 2023-01-01 RX ADMIN — ROCURONIUM BROMIDE 10 MG: 10 INJECTION, SOLUTION INTRAVENOUS at 06:09

## 2023-01-01 RX ADMIN — TACROLIMUS 1.5 MG: 1 CAPSULE ORAL at 18:32

## 2023-01-01 RX ADMIN — HYDROMORPHONE HYDROCHLORIDE 0.2 MG: 1 INJECTION, SOLUTION INTRAMUSCULAR; INTRAVENOUS; SUBCUTANEOUS at 14:30

## 2023-01-01 RX ADMIN — PIPERACILLIN SODIUM AND TAZOBACTAM SODIUM 3.38 G: 3; .375 INJECTION, SOLUTION INTRAVENOUS at 15:45

## 2023-01-01 RX ADMIN — INSULIN HUMAN 2 UNITS: 100 INJECTION, SOLUTION PARENTERAL at 00:10

## 2023-01-01 RX ADMIN — INSULIN HUMAN 4 UNITS: 100 INJECTION, SOLUTION PARENTERAL at 00:58

## 2023-01-01 RX ADMIN — DIATRIZOATE MEGLUMINE AND DIATRIZOATE SODIUM 60 ML: 600; 100 SOLUTION ORAL; RECTAL at 11:00

## 2023-01-01 RX ADMIN — TACROLIMUS 1 MG: 1 CAPSULE ORAL at 18:08

## 2023-01-01 RX ADMIN — PIPERACILLIN SODIUM AND TAZOBACTAM SODIUM 3.38 G: 3; .375 INJECTION, SOLUTION INTRAVENOUS at 16:39

## 2023-01-01 RX ADMIN — LIDOCAINE HYDROCHLORIDE 5 ML: 10 INJECTION, SOLUTION INFILTRATION; PERINEURAL at 17:10

## 2023-01-01 RX ADMIN — NYSTATIN 400000 UNITS: 100000 SUSPENSION ORAL at 20:44

## 2023-01-01 RX ADMIN — Medication 5 MG: at 21:54

## 2023-01-01 RX ADMIN — HEPARIN SODIUM 5000 UNITS: 5000 INJECTION INTRAVENOUS; SUBCUTANEOUS at 13:52

## 2023-01-01 RX ADMIN — PANTOPRAZOLE SODIUM 40 MG: 40 INJECTION, POWDER, FOR SOLUTION INTRAVENOUS at 08:48

## 2023-01-01 RX ADMIN — PANTOPRAZOLE SODIUM 40 MG: 40 INJECTION, POWDER, FOR SOLUTION INTRAVENOUS at 08:32

## 2023-01-01 RX ADMIN — INSULIN HUMAN 2 UNITS: 100 INJECTION, SOLUTION PARENTERAL at 11:55

## 2023-01-01 RX ADMIN — PANTOPRAZOLE SODIUM 40 MG: 40 INJECTION, POWDER, FOR SOLUTION INTRAVENOUS at 08:35

## 2023-01-01 RX ADMIN — IPRATROPIUM BROMIDE AND ALBUTEROL SULFATE 3 ML: .5; 3 SOLUTION RESPIRATORY (INHALATION) at 19:36

## 2023-01-01 RX ADMIN — LIDOCAINE 1 PATCH: 4 PATCH TOPICAL at 08:37

## 2023-01-01 RX ADMIN — HEPARIN SODIUM 5000 UNITS: 5000 INJECTION INTRAVENOUS; SUBCUTANEOUS at 21:25

## 2023-01-01 RX ADMIN — PIPERACILLIN SODIUM AND TAZOBACTAM SODIUM 3.38 G: 3; .375 INJECTION, SOLUTION INTRAVENOUS at 13:56

## 2023-01-01 RX ADMIN — METHYLPREDNISOLONE SODIUM SUCCINATE 4 MG: 40 INJECTION, POWDER, LYOPHILIZED, FOR SOLUTION INTRAMUSCULAR; INTRAVENOUS at 00:30

## 2023-01-01 RX ADMIN — INSULIN HUMAN 2 UNITS: 100 INJECTION, SOLUTION PARENTERAL at 12:12

## 2023-01-01 RX ADMIN — FENTANYL CITRATE 100 MCG: 50 INJECTION, SOLUTION INTRAMUSCULAR; INTRAVENOUS at 05:20

## 2023-01-01 RX ADMIN — HYDRALAZINE HYDROCHLORIDE 10 MG: 20 INJECTION INTRAMUSCULAR; INTRAVENOUS at 05:33

## 2023-01-01 RX ADMIN — ROCURONIUM BROMIDE 10 MG: 10 INJECTION, SOLUTION INTRAVENOUS at 14:07

## 2023-01-01 RX ADMIN — OLANZAPINE 2.5 MG: 10 INJECTION, POWDER, LYOPHILIZED, FOR SOLUTION INTRAMUSCULAR at 22:52

## 2023-01-01 RX ADMIN — PIPERACILLIN SODIUM AND TAZOBACTAM SODIUM 3.38 G: 3; .375 INJECTION, SOLUTION INTRAVENOUS at 08:26

## 2023-01-01 RX ADMIN — INSULIN LISPRO 12 UNITS: 100 INJECTION, SOLUTION INTRAVENOUS; SUBCUTANEOUS at 00:41

## 2023-01-01 RX ADMIN — SODIUM CHLORIDE, POTASSIUM CHLORIDE, SODIUM LACTATE AND CALCIUM CHLORIDE 100 ML/HR: 600; 310; 30; 20 INJECTION, SOLUTION INTRAVENOUS at 07:00

## 2023-01-01 RX ADMIN — PANTOPRAZOLE SODIUM 40 MG: 40 INJECTION, POWDER, FOR SOLUTION INTRAVENOUS at 20:37

## 2023-01-01 RX ADMIN — HYDROMORPHONE HYDROCHLORIDE 1 MG: 1 INJECTION, SOLUTION INTRAMUSCULAR; INTRAVENOUS; SUBCUTANEOUS at 08:41

## 2023-01-01 RX ADMIN — HEPARIN SODIUM 5000 UNITS: 5000 INJECTION INTRAVENOUS; SUBCUTANEOUS at 03:10

## 2023-01-01 RX ADMIN — PANTOPRAZOLE SODIUM 40 MG: 40 INJECTION, POWDER, FOR SOLUTION INTRAVENOUS at 09:29

## 2023-01-01 RX ADMIN — HYDRALAZINE HYDROCHLORIDE 10 MG: 20 INJECTION INTRAMUSCULAR; INTRAVENOUS at 22:04

## 2023-01-01 RX ADMIN — HEPARIN SODIUM 5000 UNITS: 5000 INJECTION INTRAVENOUS; SUBCUTANEOUS at 03:59

## 2023-01-01 RX ADMIN — PANTOPRAZOLE SODIUM 40 MG: 40 INJECTION, POWDER, FOR SOLUTION INTRAVENOUS at 09:58

## 2023-01-01 RX ADMIN — DEXMEDETOMIDINE HYDROCHLORIDE 0.2 MCG/KG/HR: 400 INJECTION INTRAVENOUS at 12:14

## 2023-01-01 RX ADMIN — ALBUMIN HUMAN 12.5 G: 0.05 INJECTION, SOLUTION INTRAVENOUS at 17:39

## 2023-01-01 RX ADMIN — PIPERACILLIN SODIUM AND TAZOBACTAM SODIUM 3.38 G: 3; .375 INJECTION, SOLUTION INTRAVENOUS at 20:46

## 2023-01-01 RX ADMIN — HALOPERIDOL LACTATE 5 MG: 5 INJECTION, SOLUTION INTRAMUSCULAR at 04:38

## 2023-01-01 RX ADMIN — PIPERACILLIN SODIUM AND TAZOBACTAM SODIUM 3.38 G: 3; .375 INJECTION, SOLUTION INTRAVENOUS at 08:34

## 2023-01-01 RX ADMIN — TACROLIMUS 2 MG: 1 CAPSULE ORAL at 06:25

## 2023-01-01 RX ADMIN — PIPERACILLIN SODIUM AND TAZOBACTAM SODIUM 3.38 G: 3; .375 INJECTION, SOLUTION INTRAVENOUS at 23:08

## 2023-01-01 RX ADMIN — THIAMINE HYDROCHLORIDE 500 MG: 100 INJECTION, SOLUTION INTRAMUSCULAR; INTRAVENOUS at 15:28

## 2023-01-01 RX ADMIN — PIPERACILLIN SODIUM AND TAZOBACTAM SODIUM 3.38 G: 3; .375 INJECTION, SOLUTION INTRAVENOUS at 10:39

## 2023-01-01 RX ADMIN — INSULIN HUMAN 4 UNITS: 100 INJECTION, SOLUTION PARENTERAL at 11:48

## 2023-01-01 RX ADMIN — HYDROMORPHONE HYDROCHLORIDE 0.4 MG: 1 INJECTION, SOLUTION INTRAMUSCULAR; INTRAVENOUS; SUBCUTANEOUS at 17:59

## 2023-01-01 RX ADMIN — TRACE ELEMENTS INJECTION 4: 7.4; .75; 98; 151 INJECTION, SOLUTION INTRAVENOUS at 22:28

## 2023-01-01 RX ADMIN — IPRATROPIUM BROMIDE AND ALBUTEROL SULFATE 3 ML: .5; 3 SOLUTION RESPIRATORY (INHALATION) at 02:48

## 2023-01-01 RX ADMIN — PIPERACILLIN SODIUM AND TAZOBACTAM SODIUM 3.38 G: 3; .375 INJECTION, SOLUTION INTRAVENOUS at 04:20

## 2023-01-01 RX ADMIN — PIPERACILLIN SODIUM AND TAZOBACTAM SODIUM 3.38 G: 3; .375 INJECTION, SOLUTION INTRAVENOUS at 16:21

## 2023-01-01 RX ADMIN — ASPIRIN 150 MG: 300 SUPPOSITORY RECTAL at 13:32

## 2023-01-01 RX ADMIN — IPRATROPIUM BROMIDE AND ALBUTEROL SULFATE 3 ML: .5; 3 SOLUTION RESPIRATORY (INHALATION) at 03:23

## 2023-01-01 RX ADMIN — ACETAMINOPHEN 1000 MG: 10 INJECTION INTRAVENOUS at 01:52

## 2023-01-01 RX ADMIN — PIPERACILLIN SODIUM AND TAZOBACTAM SODIUM 3.38 G: 3; .375 INJECTION, SOLUTION INTRAVENOUS at 22:30

## 2023-01-01 RX ADMIN — Medication 5 MG: at 22:57

## 2023-01-01 RX ADMIN — TACROLIMUS 1.5 MG: 1 CAPSULE ORAL at 18:30

## 2023-01-01 RX ADMIN — DEXTROSE MONOHYDRATE 25 G: 25 INJECTION, SOLUTION INTRAVENOUS at 06:06

## 2023-01-01 RX ADMIN — HYDRALAZINE HYDROCHLORIDE 10 MG: 20 INJECTION INTRAMUSCULAR; INTRAVENOUS at 16:28

## 2023-01-01 RX ADMIN — THIAMINE HYDROCHLORIDE 500 MG: 100 INJECTION, SOLUTION INTRAMUSCULAR; INTRAVENOUS at 16:00

## 2023-01-01 RX ADMIN — DIPHENHYDRAMINE HYDROCHLORIDE 25 MG: 50 INJECTION, SOLUTION INTRAMUSCULAR; INTRAVENOUS at 19:47

## 2023-01-01 RX ADMIN — NYSTATIN 400000 UNITS: 100000 SUSPENSION ORAL at 08:57

## 2023-01-01 RX ADMIN — HEPARIN SODIUM 5000 UNITS: 5000 INJECTION INTRAVENOUS; SUBCUTANEOUS at 21:36

## 2023-01-01 RX ADMIN — ROCURONIUM BROMIDE 40 MG: 10 INJECTION, SOLUTION INTRAVENOUS at 15:53

## 2023-01-01 RX ADMIN — PROPOFOL 50 MCG/KG/MIN: 10 INJECTION, EMULSION INTRAVENOUS at 22:07

## 2023-01-01 RX ADMIN — METHYLPREDNISOLONE SODIUM SUCCINATE 4 MG: 40 INJECTION, POWDER, FOR SOLUTION INTRAMUSCULAR; INTRAVENOUS at 14:31

## 2023-01-01 RX ADMIN — INSULIN HUMAN 2 UNITS: 100 INJECTION, SOLUTION PARENTERAL at 11:52

## 2023-01-01 RX ADMIN — PANTOPRAZOLE SODIUM 40 MG: 40 INJECTION, POWDER, FOR SOLUTION INTRAVENOUS at 08:21

## 2023-01-01 RX ADMIN — PIPERACILLIN SODIUM AND TAZOBACTAM SODIUM 3.38 G: 3; .375 INJECTION, SOLUTION INTRAVENOUS at 16:22

## 2023-01-01 RX ADMIN — Medication 100 MG: at 15:36

## 2023-01-01 RX ADMIN — HYDROMORPHONE HYDROCHLORIDE 0.2 MG: 1 INJECTION, SOLUTION INTRAMUSCULAR; INTRAVENOUS; SUBCUTANEOUS at 09:49

## 2023-01-01 RX ADMIN — TACROLIMUS 1 MG: 1 CAPSULE ORAL at 17:47

## 2023-01-01 RX ADMIN — HYDROMORPHONE HYDROCHLORIDE 1 MG: 1 INJECTION, SOLUTION INTRAMUSCULAR; INTRAVENOUS; SUBCUTANEOUS at 08:24

## 2023-01-01 RX ADMIN — SUCCINYLCHOLINE CHLORIDE 120 MG: 20 INJECTION, SOLUTION INTRAMUSCULAR; INTRAVENOUS at 12:23

## 2023-01-01 RX ADMIN — PANTOPRAZOLE SODIUM 40 MG: 40 INJECTION, POWDER, FOR SOLUTION INTRAVENOUS at 20:45

## 2023-01-01 RX ADMIN — METOCLOPRAMIDE 10 MG: 5 INJECTION, SOLUTION INTRAMUSCULAR; INTRAVENOUS at 00:07

## 2023-01-01 RX ADMIN — PROPOFOL 50 MCG/KG/MIN: 10 INJECTION, EMULSION INTRAVENOUS at 01:55

## 2023-01-01 RX ADMIN — TACROLIMUS 1.5 MG: 0.5 CAPSULE ORAL at 05:30

## 2023-01-01 RX ADMIN — METHYLPREDNISOLONE SODIUM SUCCINATE 4 MG: 40 INJECTION, POWDER, LYOPHILIZED, FOR SOLUTION INTRAMUSCULAR; INTRAVENOUS at 00:15

## 2023-01-01 RX ADMIN — HYDRALAZINE HYDROCHLORIDE 20 MG: 20 INJECTION INTRAMUSCULAR; INTRAVENOUS at 01:25

## 2023-01-01 RX ADMIN — INSULIN LISPRO 6 UNITS: 100 INJECTION, SOLUTION INTRAVENOUS; SUBCUTANEOUS at 04:04

## 2023-01-01 RX ADMIN — METOCLOPRAMIDE 10 MG: 5 INJECTION, SOLUTION INTRAMUSCULAR; INTRAVENOUS at 12:15

## 2023-01-01 RX ADMIN — FUROSEMIDE 40 MG: 10 INJECTION, SOLUTION INTRAVENOUS at 12:17

## 2023-01-01 RX ADMIN — ACETAMINOPHEN 1000 MG: 10 INJECTION INTRAVENOUS at 13:50

## 2023-01-01 RX ADMIN — HEPARIN SODIUM 5000 UNITS: 5000 INJECTION INTRAVENOUS; SUBCUTANEOUS at 19:59

## 2023-01-01 RX ADMIN — MAGNESIUM SULFATE HEPTAHYDRATE 2 G: 40 INJECTION, SOLUTION INTRAVENOUS at 18:56

## 2023-01-01 RX ADMIN — ASPIRIN 150 MG: 300 SUPPOSITORY RECTAL at 14:04

## 2023-01-01 RX ADMIN — HEPARIN SODIUM 5000 UNITS: 5000 INJECTION INTRAVENOUS; SUBCUTANEOUS at 04:12

## 2023-01-01 RX ADMIN — INSULIN HUMAN 11.25 UNITS/HR: 1 INJECTION, SOLUTION INTRAVENOUS at 17:56

## 2023-01-01 RX ADMIN — HYDRALAZINE HYDROCHLORIDE 10 MG: 20 INJECTION INTRAMUSCULAR; INTRAVENOUS at 22:21

## 2023-01-01 RX ADMIN — PIPERACILLIN SODIUM AND TAZOBACTAM SODIUM 3.38 G: 3; .375 INJECTION, SOLUTION INTRAVENOUS at 15:39

## 2023-01-01 RX ADMIN — SMOFLIPID 50 G: 6; 6; 5; 3 INJECTION, EMULSION INTRAVENOUS at 21:43

## 2023-01-01 RX ADMIN — HEPARIN SODIUM 5000 UNITS: 5000 INJECTION INTRAVENOUS; SUBCUTANEOUS at 22:22

## 2023-01-01 RX ADMIN — PIPERACILLIN SODIUM AND TAZOBACTAM SODIUM 3.38 G: 3; .375 INJECTION, SOLUTION INTRAVENOUS at 11:01

## 2023-01-01 RX ADMIN — INSULIN HUMAN 4 UNITS: 100 INJECTION, SOLUTION PARENTERAL at 22:57

## 2023-01-01 RX ADMIN — Medication: at 03:28

## 2023-01-01 RX ADMIN — PIPERACILLIN SODIUM AND TAZOBACTAM SODIUM 3.38 G: 3; .375 INJECTION, SOLUTION INTRAVENOUS at 22:09

## 2023-01-01 RX ADMIN — SODIUM CHLORIDE, POTASSIUM CHLORIDE, SODIUM LACTATE AND CALCIUM CHLORIDE 100 ML/HR: 600; 310; 30; 20 INJECTION, SOLUTION INTRAVENOUS at 04:58

## 2023-01-01 RX ADMIN — TRACE ELEMENTS INJECTION 4: 7.4; .75; 98; 151 INJECTION, SOLUTION INTRAVENOUS at 22:46

## 2023-01-01 RX ADMIN — INSULIN HUMAN 4 UNITS: 100 INJECTION, SOLUTION PARENTERAL at 00:12

## 2023-01-01 RX ADMIN — Medication: at 03:11

## 2023-01-01 RX ADMIN — INSULIN LISPRO 3 UNITS: 100 INJECTION, SOLUTION INTRAVENOUS; SUBCUTANEOUS at 11:40

## 2023-01-01 RX ADMIN — HYDROMORPHONE HYDROCHLORIDE 0.2 MG: 1 INJECTION, SOLUTION INTRAMUSCULAR; INTRAVENOUS; SUBCUTANEOUS at 10:24

## 2023-01-01 RX ADMIN — TACROLIMUS 1.5 MG: 0.5 CAPSULE ORAL at 08:22

## 2023-01-01 RX ADMIN — INSULIN LISPRO 1 UNITS: 100 INJECTION, SOLUTION INTRAVENOUS; SUBCUTANEOUS at 22:09

## 2023-01-01 RX ADMIN — PIPERACILLIN SODIUM AND TAZOBACTAM SODIUM 3.38 G: 3; .375 INJECTION, SOLUTION INTRAVENOUS at 23:18

## 2023-01-01 RX ADMIN — HEPARIN SODIUM 5000 UNITS: 5000 INJECTION INTRAVENOUS; SUBCUTANEOUS at 20:43

## 2023-01-01 RX ADMIN — HYDRALAZINE HYDROCHLORIDE 10 MG: 20 INJECTION INTRAMUSCULAR; INTRAVENOUS at 22:46

## 2023-01-01 RX ADMIN — HEPARIN SODIUM 5000 UNITS: 5000 INJECTION INTRAVENOUS; SUBCUTANEOUS at 05:18

## 2023-01-01 RX ADMIN — INSULIN LISPRO 6 UNITS: 100 INJECTION, SOLUTION INTRAVENOUS; SUBCUTANEOUS at 09:35

## 2023-01-01 RX ADMIN — INSULIN HUMAN 2 UNITS: 100 INJECTION, SOLUTION PARENTERAL at 23:54

## 2023-01-01 RX ADMIN — METOCLOPRAMIDE 10 MG: 5 INJECTION, SOLUTION INTRAMUSCULAR; INTRAVENOUS at 17:26

## 2023-01-01 RX ADMIN — TACROLIMUS 2 MG: 1 CAPSULE ORAL at 06:04

## 2023-01-01 RX ADMIN — Medication 100 MG: at 14:10

## 2023-01-01 RX ADMIN — HYDROMORPHONE HYDROCHLORIDE 1 MG: 1 INJECTION, SOLUTION INTRAMUSCULAR; INTRAVENOUS; SUBCUTANEOUS at 09:37

## 2023-01-01 RX ADMIN — Medication 100 MG: at 13:50

## 2023-01-01 RX ADMIN — METOCLOPRAMIDE 10 MG: 5 INJECTION, SOLUTION INTRAMUSCULAR; INTRAVENOUS at 17:24

## 2023-01-01 RX ADMIN — INSULIN LISPRO 6 UNITS: 100 INJECTION, SOLUTION INTRAVENOUS; SUBCUTANEOUS at 15:54

## 2023-01-01 RX ADMIN — VANCOMYCIN HYDROCHLORIDE 1 G: 1 INJECTION, SOLUTION INTRAVENOUS at 11:20

## 2023-01-01 RX ADMIN — METOCLOPRAMIDE 5 MG: 5 INJECTION, SOLUTION INTRAMUSCULAR; INTRAVENOUS at 06:25

## 2023-01-01 RX ADMIN — TACROLIMUS 1 MG: 1 CAPSULE ORAL at 18:38

## 2023-01-01 RX ADMIN — SODIUM CHLORIDE, POTASSIUM CHLORIDE, SODIUM LACTATE AND CALCIUM CHLORIDE 100 ML/HR: 600; 310; 30; 20 INJECTION, SOLUTION INTRAVENOUS at 02:56

## 2023-01-01 RX ADMIN — HYDROMORPHONE HYDROCHLORIDE 0.4 MG: 1 INJECTION, SOLUTION INTRAMUSCULAR; INTRAVENOUS; SUBCUTANEOUS at 10:56

## 2023-01-01 RX ADMIN — HYDRALAZINE HYDROCHLORIDE 10 MG: 20 INJECTION INTRAMUSCULAR; INTRAVENOUS at 00:28

## 2023-01-01 RX ADMIN — SODIUM CHLORIDE, POTASSIUM CHLORIDE, SODIUM LACTATE AND CALCIUM CHLORIDE 500 ML: 600; 310; 30; 20 INJECTION, SOLUTION INTRAVENOUS at 18:30

## 2023-01-01 RX ADMIN — PANTOPRAZOLE SODIUM 40 MG: 40 INJECTION, POWDER, FOR SOLUTION INTRAVENOUS at 20:43

## 2023-01-01 RX ADMIN — HEPARIN SODIUM 5000 UNITS: 5000 INJECTION INTRAVENOUS; SUBCUTANEOUS at 10:10

## 2023-01-01 RX ADMIN — INSULIN LISPRO 3 UNITS: 100 INJECTION, SOLUTION INTRAVENOUS; SUBCUTANEOUS at 20:25

## 2023-01-01 RX ADMIN — HYDROMORPHONE HYDROCHLORIDE 0.4 MG: 1 INJECTION, SOLUTION INTRAMUSCULAR; INTRAVENOUS; SUBCUTANEOUS at 22:53

## 2023-01-01 RX ADMIN — HYDROMORPHONE HYDROCHLORIDE 0.2 MG: 1 INJECTION, SOLUTION INTRAMUSCULAR; INTRAVENOUS; SUBCUTANEOUS at 08:38

## 2023-01-01 RX ADMIN — ASPIRIN 150 MG: 300 SUPPOSITORY RECTAL at 14:18

## 2023-01-01 RX ADMIN — IPRATROPIUM BROMIDE AND ALBUTEROL SULFATE 3 ML: .5; 3 SOLUTION RESPIRATORY (INHALATION) at 19:59

## 2023-01-01 RX ADMIN — POTASSIUM CHLORIDE 40 MEQ: 29.8 INJECTION, SOLUTION INTRAVENOUS at 15:54

## 2023-01-01 RX ADMIN — HYDROMORPHONE HYDROCHLORIDE 0.2 MG: 1 INJECTION, SOLUTION INTRAMUSCULAR; INTRAVENOUS; SUBCUTANEOUS at 06:33

## 2023-01-01 RX ADMIN — HYDROMORPHONE HYDROCHLORIDE 0.2 MG: 1 INJECTION, SOLUTION INTRAMUSCULAR; INTRAVENOUS; SUBCUTANEOUS at 04:20

## 2023-01-01 RX ADMIN — MAGNESIUM SULFATE HEPTAHYDRATE 2 G: 40 INJECTION, SOLUTION INTRAVENOUS at 09:33

## 2023-01-01 RX ADMIN — PIPERACILLIN SODIUM AND TAZOBACTAM SODIUM 3.38 G: 3; .375 INJECTION, SOLUTION INTRAVENOUS at 08:48

## 2023-01-01 RX ADMIN — LIDOCAINE 1 PATCH: 4 PATCH TOPICAL at 08:47

## 2023-01-01 RX ADMIN — PIPERACILLIN SODIUM AND TAZOBACTAM SODIUM 3.38 G: 3; .375 INJECTION, SOLUTION INTRAVENOUS at 02:10

## 2023-01-01 RX ADMIN — NYSTATIN 400000 UNITS: 100000 SUSPENSION ORAL at 08:46

## 2023-01-01 RX ADMIN — INSULIN GLARGINE 15 UNITS: 100 INJECTION, SOLUTION SUBCUTANEOUS at 22:37

## 2023-01-01 RX ADMIN — PIPERACILLIN SODIUM AND TAZOBACTAM SODIUM 4.5 G: 4; .5 INJECTION, SOLUTION INTRAVENOUS at 09:34

## 2023-01-01 RX ADMIN — HEPARIN SODIUM (PORCINE) LOCK FLUSH IV SOLN 100 UNIT/ML 500 UNITS: 100 SOLUTION at 10:45

## 2023-01-01 RX ADMIN — Medication 250 MG: at 14:52

## 2023-01-01 RX ADMIN — INSULIN LISPRO 9 UNITS: 100 INJECTION, SOLUTION INTRAVENOUS; SUBCUTANEOUS at 21:14

## 2023-01-01 RX ADMIN — ACETAMINOPHEN 975 MG: 325 TABLET ORAL at 08:34

## 2023-01-01 RX ADMIN — INSULIN HUMAN 4 UNITS: 100 INJECTION, SOLUTION PARENTERAL at 11:10

## 2023-01-01 RX ADMIN — HYDROMORPHONE HYDROCHLORIDE 0.4 MG: 1 INJECTION, SOLUTION INTRAMUSCULAR; INTRAVENOUS; SUBCUTANEOUS at 19:46

## 2023-01-01 RX ADMIN — HYDROMORPHONE HYDROCHLORIDE 0.4 MG: 1 INJECTION, SOLUTION INTRAMUSCULAR; INTRAVENOUS; SUBCUTANEOUS at 02:45

## 2023-01-01 RX ADMIN — NYSTATIN 400000 UNITS: 100000 SUSPENSION ORAL at 08:31

## 2023-01-01 RX ADMIN — ASPIRIN 150 MG: 300 SUPPOSITORY RECTAL at 20:44

## 2023-01-01 RX ADMIN — INSULIN LISPRO 6 UNITS: 100 INJECTION, SOLUTION INTRAVENOUS; SUBCUTANEOUS at 12:31

## 2023-01-01 RX ADMIN — PANTOPRAZOLE SODIUM 40 MG: 40 INJECTION, POWDER, FOR SOLUTION INTRAVENOUS at 21:27

## 2023-01-01 RX ADMIN — PIPERACILLIN SODIUM AND TAZOBACTAM SODIUM 3.38 G: 3; .375 INJECTION, SOLUTION INTRAVENOUS at 22:04

## 2023-01-01 RX ADMIN — HEPARIN SODIUM 5000 UNITS: 5000 INJECTION INTRAVENOUS; SUBCUTANEOUS at 11:43

## 2023-01-01 RX ADMIN — PIPERACILLIN SODIUM AND TAZOBACTAM SODIUM 3.38 G: 3; .375 INJECTION, SOLUTION INTRAVENOUS at 22:49

## 2023-01-01 RX ADMIN — POTASSIUM CHLORIDE 40 MEQ: 29.8 INJECTION, SOLUTION INTRAVENOUS at 09:50

## 2023-01-01 RX ADMIN — INSULIN HUMAN 4 UNITS: 100 INJECTION, SOLUTION PARENTERAL at 05:54

## 2023-01-01 RX ADMIN — INSULIN GLARGINE 20 UNITS: 100 INJECTION, SOLUTION SUBCUTANEOUS at 20:00

## 2023-01-01 RX ADMIN — INSULIN HUMAN 4 UNITS: 100 INJECTION, SOLUTION PARENTERAL at 18:50

## 2023-01-01 RX ADMIN — SMOFLIPID 50 G: 6; 6; 5; 3 INJECTION, EMULSION INTRAVENOUS at 03:47

## 2023-01-01 RX ADMIN — PIPERACILLIN SODIUM AND TAZOBACTAM SODIUM 3.38 G: 3; .375 INJECTION, SOLUTION INTRAVENOUS at 10:06

## 2023-01-01 RX ADMIN — SMOFLIPID 50 G: 6; 6; 5; 3 INJECTION, EMULSION INTRAVENOUS at 19:55

## 2023-01-01 RX ADMIN — MAGNESIUM SULFATE HEPTAHYDRATE 4 G: 40 INJECTION, SOLUTION INTRAVENOUS at 01:38

## 2023-01-01 RX ADMIN — LABETALOL HYDROCHLORIDE 5 MG: 5 INJECTION INTRAVENOUS at 07:29

## 2023-01-01 RX ADMIN — HYDROMORPHONE HYDROCHLORIDE 1 MG: 1 INJECTION, SOLUTION INTRAMUSCULAR; INTRAVENOUS; SUBCUTANEOUS at 08:48

## 2023-01-01 RX ADMIN — METOCLOPRAMIDE 5 MG: 5 INJECTION, SOLUTION INTRAMUSCULAR; INTRAVENOUS at 00:31

## 2023-01-01 RX ADMIN — HYDRALAZINE HYDROCHLORIDE 10 MG: 20 INJECTION INTRAMUSCULAR; INTRAVENOUS at 04:45

## 2023-01-01 RX ADMIN — GABAPENTIN 200 MG: 100 CAPSULE ORAL at 22:09

## 2023-01-01 RX ADMIN — PIPERACILLIN SODIUM AND TAZOBACTAM SODIUM 3.38 G: 3; .375 INJECTION, SOLUTION INTRAVENOUS at 16:35

## 2023-01-01 RX ADMIN — IPRATROPIUM BROMIDE AND ALBUTEROL SULFATE 3 ML: .5; 3 SOLUTION RESPIRATORY (INHALATION) at 11:27

## 2023-01-01 RX ADMIN — PHENOL 1 SPRAY: 1.5 LIQUID ORAL at 01:05

## 2023-01-01 RX ADMIN — INSULIN HUMAN 2 UNITS: 100 INJECTION, SOLUTION PARENTERAL at 17:39

## 2023-01-01 RX ADMIN — METHYLPREDNISOLONE SODIUM SUCCINATE 4 MG: 40 INJECTION, POWDER, LYOPHILIZED, FOR SOLUTION INTRAMUSCULAR; INTRAVENOUS at 00:51

## 2023-01-01 RX ADMIN — TACROLIMUS 2 MG: 1 CAPSULE ORAL at 05:49

## 2023-01-01 RX ADMIN — SODIUM CHLORIDE, POTASSIUM CHLORIDE, SODIUM LACTATE AND CALCIUM CHLORIDE 100 ML/HR: 600; 310; 30; 20 INJECTION, SOLUTION INTRAVENOUS at 16:49

## 2023-01-01 RX ADMIN — SUGAMMADEX 200 MG: 100 INJECTION, SOLUTION INTRAVENOUS at 20:45

## 2023-01-01 RX ADMIN — INSULIN LISPRO 12 UNITS: 100 INJECTION, SOLUTION INTRAVENOUS; SUBCUTANEOUS at 01:12

## 2023-01-01 RX ADMIN — METOCLOPRAMIDE 10 MG: 5 INJECTION, SOLUTION INTRAMUSCULAR; INTRAVENOUS at 06:45

## 2023-01-01 RX ADMIN — FUROSEMIDE 40 MG: 10 INJECTION INTRAMUSCULAR; INTRAVENOUS at 04:25

## 2023-01-01 RX ADMIN — INSULIN HUMAN 2 UNITS: 100 INJECTION, SOLUTION PARENTERAL at 06:44

## 2023-01-01 RX ADMIN — HYDROMORPHONE HYDROCHLORIDE 0.2 MG: 1 INJECTION, SOLUTION INTRAMUSCULAR; INTRAVENOUS; SUBCUTANEOUS at 01:42

## 2023-01-01 RX ADMIN — PIPERACILLIN SODIUM AND TAZOBACTAM SODIUM 3.38 G: 3; .375 INJECTION, SOLUTION INTRAVENOUS at 20:36

## 2023-01-01 RX ADMIN — PIPERACILLIN SODIUM AND TAZOBACTAM SODIUM 3.38 G: 3; .375 INJECTION, SOLUTION INTRAVENOUS at 02:12

## 2023-01-01 RX ADMIN — ACETAMINOPHEN 1000 MG: 10 INJECTION INTRAVENOUS at 17:11

## 2023-01-01 RX ADMIN — PIPERACILLIN SODIUM AND TAZOBACTAM SODIUM 3.38 G: 3; .375 INJECTION, SOLUTION INTRAVENOUS at 11:00

## 2023-01-01 RX ADMIN — IPRATROPIUM BROMIDE AND ALBUTEROL SULFATE 3 ML: .5; 3 SOLUTION RESPIRATORY (INHALATION) at 16:41

## 2023-01-01 RX ADMIN — HEPARIN SODIUM 5000 UNITS: 5000 INJECTION INTRAVENOUS; SUBCUTANEOUS at 18:34

## 2023-01-01 RX ADMIN — HEPARIN SODIUM 5000 UNITS: 5000 INJECTION INTRAVENOUS; SUBCUTANEOUS at 04:51

## 2023-01-01 RX ADMIN — PANTOPRAZOLE SODIUM 40 MG: 40 INJECTION, POWDER, FOR SOLUTION INTRAVENOUS at 08:59

## 2023-01-01 RX ADMIN — THIAMINE HYDROCHLORIDE 500 MG: 100 INJECTION, SOLUTION INTRAMUSCULAR; INTRAVENOUS at 09:00

## 2023-01-01 RX ADMIN — INSULIN GLARGINE 20 UNITS: 100 INJECTION, SOLUTION SUBCUTANEOUS at 21:21

## 2023-01-01 RX ADMIN — HYDROMORPHONE HYDROCHLORIDE 0.2 MG: 1 INJECTION, SOLUTION INTRAMUSCULAR; INTRAVENOUS; SUBCUTANEOUS at 21:35

## 2023-01-01 RX ADMIN — HEPARIN SODIUM 5000 UNITS: 5000 INJECTION INTRAVENOUS; SUBCUTANEOUS at 15:37

## 2023-01-01 RX ADMIN — HYDROMORPHONE HYDROCHLORIDE 0.4 MG: 1 INJECTION, SOLUTION INTRAMUSCULAR; INTRAVENOUS; SUBCUTANEOUS at 13:20

## 2023-01-01 RX ADMIN — PANTOPRAZOLE SODIUM 40 MG: 40 INJECTION, POWDER, FOR SOLUTION INTRAVENOUS at 08:31

## 2023-01-01 RX ADMIN — NYSTATIN 400000 UNITS: 100000 SUSPENSION ORAL at 09:40

## 2023-01-01 RX ADMIN — PIPERACILLIN SODIUM AND TAZOBACTAM SODIUM 3.38 G: 3; .375 INJECTION, SOLUTION INTRAVENOUS at 14:30

## 2023-01-01 RX ADMIN — LIDOCAINE 1 PATCH: 4 PATCH TOPICAL at 08:51

## 2023-01-01 RX ADMIN — FUROSEMIDE 20 MG: 10 INJECTION, SOLUTION INTRAVENOUS at 11:22

## 2023-01-01 RX ADMIN — INSULIN HUMAN 2 UNITS: 100 INJECTION, SOLUTION PARENTERAL at 18:54

## 2023-01-01 RX ADMIN — PIPERACILLIN SODIUM AND TAZOBACTAM SODIUM 3.38 G: 3; .375 INJECTION, SOLUTION INTRAVENOUS at 22:43

## 2023-01-01 RX ADMIN — INSULIN LISPRO 9 UNITS: 100 INJECTION, SOLUTION INTRAVENOUS; SUBCUTANEOUS at 09:33

## 2023-01-01 RX ADMIN — PIPERACILLIN SODIUM AND TAZOBACTAM SODIUM 3.38 G: 3; .375 INJECTION, SOLUTION INTRAVENOUS at 17:53

## 2023-01-01 RX ADMIN — PIPERACILLIN SODIUM AND TAZOBACTAM SODIUM 3.38 G: 3; .375 INJECTION, SOLUTION INTRAVENOUS at 05:24

## 2023-01-01 RX ADMIN — INSULIN GLARGINE 20 UNITS: 100 INJECTION, SOLUTION SUBCUTANEOUS at 21:04

## 2023-01-01 RX ADMIN — TACROLIMUS 2 MG: 1 CAPSULE ORAL at 06:09

## 2023-01-01 RX ADMIN — INSULIN HUMAN 4 UNITS: 100 INJECTION, SOLUTION PARENTERAL at 23:55

## 2023-01-01 RX ADMIN — INSULIN HUMAN 10 UNITS: 100 INJECTION, SUSPENSION SUBCUTANEOUS at 14:08

## 2023-01-01 RX ADMIN — HEPARIN SODIUM 5000 UNITS: 5000 INJECTION INTRAVENOUS; SUBCUTANEOUS at 14:34

## 2023-01-01 RX ADMIN — INSULIN HUMAN 2 UNITS: 100 INJECTION, SOLUTION PARENTERAL at 00:27

## 2023-01-01 RX ADMIN — Medication 100 MG: at 14:04

## 2023-01-01 RX ADMIN — METOCLOPRAMIDE 10 MG: 5 INJECTION, SOLUTION INTRAMUSCULAR; INTRAVENOUS at 23:51

## 2023-01-01 RX ADMIN — HYDRALAZINE HYDROCHLORIDE 10 MG: 20 INJECTION INTRAMUSCULAR; INTRAVENOUS at 04:50

## 2023-01-01 RX ADMIN — PIPERACILLIN SODIUM AND TAZOBACTAM SODIUM 3.38 G: 3; .375 INJECTION, SOLUTION INTRAVENOUS at 08:24

## 2023-01-01 RX ADMIN — ALBUMIN HUMAN 250 ML: 0.05 INJECTION, SOLUTION INTRAVENOUS at 20:27

## 2023-01-01 RX ADMIN — FENTANYL CITRATE 50 MCG: 50 INJECTION, SOLUTION INTRAMUSCULAR; INTRAVENOUS at 16:31

## 2023-01-01 RX ADMIN — SODIUM CHLORIDE 2000 ML: 9 INJECTION, SOLUTION INTRAVENOUS at 08:36

## 2023-01-01 RX ADMIN — LABETALOL HYDROCHLORIDE 10 MG: 5 INJECTION, SOLUTION INTRAVENOUS at 09:48

## 2023-01-01 RX ADMIN — LIDOCAINE 1 PATCH: 4 PATCH TOPICAL at 08:57

## 2023-01-01 RX ADMIN — Medication 100 MG: at 14:41

## 2023-01-01 RX ADMIN — Medication: at 17:45

## 2023-01-01 RX ADMIN — ASCORBIC ACID, VITAMIN A PALMITATE, CHOLECALCIFEROL, THIAMINE HYDROCHLORIDE, RIBOFLAVIN-5 PHOSPHATE SODIUM, PYRIDOXINE HYDROCHLORIDE, NIACINAMIDE, DEXPANTHENOL, ALPHA-TOCOPHEROL ACETATE, VITAMIN K1, FOLIC ACID, BIOTIN, CYANOCOBALAMIN: 200; 3300; 200; 6; 3.6; 6; 40; 15; 10; 150; 600; 60; 5 INJECTION, SOLUTION INTRAVENOUS at 21:41

## 2023-01-01 RX ADMIN — LABETALOL HYDROCHLORIDE 10 MG: 5 INJECTION, SOLUTION INTRAVENOUS at 04:43

## 2023-01-01 RX ADMIN — ALBUMIN HUMAN 25 G: 50 SOLUTION INTRAVENOUS at 09:09

## 2023-01-01 RX ADMIN — PIPERACILLIN SODIUM AND TAZOBACTAM SODIUM 3.38 G: 3; .375 INJECTION, SOLUTION INTRAVENOUS at 15:43

## 2023-01-01 RX ADMIN — PIPERACILLIN SODIUM AND TAZOBACTAM SODIUM 3.38 G: 3; .375 INJECTION, SOLUTION INTRAVENOUS at 22:46

## 2023-01-01 RX ADMIN — TACROLIMUS 1 MG: 1 CAPSULE ORAL at 18:11

## 2023-01-01 RX ADMIN — VANCOMYCIN HYDROCHLORIDE 1000 MG: 1 INJECTION, SOLUTION INTRAVENOUS at 00:04

## 2023-01-01 RX ADMIN — PIPERACILLIN SODIUM AND TAZOBACTAM SODIUM 3.38 G: 3; .375 INJECTION, SOLUTION INTRAVENOUS at 22:23

## 2023-01-01 RX ADMIN — PIPERACILLIN SODIUM AND TAZOBACTAM SODIUM 3.38 G: 3; .375 INJECTION, SOLUTION INTRAVENOUS at 02:44

## 2023-01-01 RX ADMIN — INSULIN HUMAN 5 UNITS: 100 INJECTION, SOLUTION PARENTERAL at 22:45

## 2023-01-01 RX ADMIN — Medication 100 MG: at 17:59

## 2023-01-01 RX ADMIN — HEPARIN, PORCINE (PF) 10 UNIT/ML INTRAVENOUS SYRINGE 50 UNITS: at 21:15

## 2023-01-01 RX ADMIN — INSULIN GLARGINE 20 UNITS: 100 INJECTION, SOLUTION SUBCUTANEOUS at 20:31

## 2023-01-01 RX ADMIN — LIDOCAINE HYDROCHLORIDE 40 MG: 20 INJECTION, SOLUTION INFILTRATION; PERINEURAL at 12:23

## 2023-01-01 RX ADMIN — PIPERACILLIN SODIUM AND TAZOBACTAM SODIUM 3.38 G: 3; .375 INJECTION, SOLUTION INTRAVENOUS at 09:45

## 2023-01-01 RX ADMIN — PIPERACILLIN SODIUM AND TAZOBACTAM SODIUM 3.38 G: 3; .375 INJECTION, SOLUTION INTRAVENOUS at 09:58

## 2023-01-01 RX ADMIN — HYDROMORPHONE HYDROCHLORIDE 0.2 MG: 1 INJECTION, SOLUTION INTRAMUSCULAR; INTRAVENOUS; SUBCUTANEOUS at 20:00

## 2023-01-01 RX ADMIN — Medication 1 APPLICATION: at 14:21

## 2023-01-01 RX ADMIN — PANTOPRAZOLE SODIUM 40 MG: 40 INJECTION, POWDER, FOR SOLUTION INTRAVENOUS at 20:03

## 2023-01-01 RX ADMIN — ALBUMIN HUMAN 25 G: 0.05 INJECTION, SOLUTION INTRAVENOUS at 09:09

## 2023-01-01 RX ADMIN — PIPERACILLIN SODIUM AND TAZOBACTAM SODIUM 3.38 G: 3; .375 INJECTION, SOLUTION INTRAVENOUS at 08:59

## 2023-01-01 RX ADMIN — ALBUMIN HUMAN 250 ML: 0.05 INJECTION, SOLUTION INTRAVENOUS at 17:46

## 2023-01-01 RX ADMIN — INSULIN HUMAN 4 UNITS: 100 INJECTION, SOLUTION PARENTERAL at 05:23

## 2023-01-01 RX ADMIN — METHYLPREDNISOLONE SODIUM SUCCINATE 4 MG: 40 INJECTION, POWDER, LYOPHILIZED, FOR SOLUTION INTRAMUSCULAR; INTRAVENOUS at 23:51

## 2023-01-01 RX ADMIN — INSULIN HUMAN 4 UNITS: 100 INJECTION, SOLUTION PARENTERAL at 11:13

## 2023-01-01 RX ADMIN — INSULIN HUMAN 4 UNITS: 100 INJECTION, SOLUTION PARENTERAL at 01:29

## 2023-01-01 RX ADMIN — DEXMEDETOMIDINE HYDROCHLORIDE 0.2 MCG/KG/HR: 400 INJECTION INTRAVENOUS at 16:39

## 2023-01-01 RX ADMIN — Medication 100 MG: at 15:07

## 2023-01-01 RX ADMIN — PIPERACILLIN SODIUM AND TAZOBACTAM SODIUM 3.38 G: 3; .375 INJECTION, SOLUTION INTRAVENOUS at 20:19

## 2023-01-01 RX ADMIN — PANTOPRAZOLE SODIUM 40 MG: 40 INJECTION, POWDER, FOR SOLUTION INTRAVENOUS at 08:51

## 2023-01-01 RX ADMIN — INSULIN LISPRO 6 UNITS: 100 INJECTION, SOLUTION INTRAVENOUS; SUBCUTANEOUS at 04:56

## 2023-01-01 RX ADMIN — SMOFLIPID 50 G: 6; 6; 5; 3 INJECTION, EMULSION INTRAVENOUS at 20:02

## 2023-01-01 RX ADMIN — PANTOPRAZOLE SODIUM 40 MG: 40 INJECTION, POWDER, FOR SOLUTION INTRAVENOUS at 20:29

## 2023-01-01 RX ADMIN — PANTOPRAZOLE SODIUM 40 MG: 40 INJECTION, POWDER, FOR SOLUTION INTRAVENOUS at 22:58

## 2023-01-01 RX ADMIN — PANTOPRAZOLE SODIUM 40 MG: 40 INJECTION, POWDER, FOR SOLUTION INTRAVENOUS at 09:31

## 2023-01-01 RX ADMIN — NYSTATIN 400000 UNITS: 100000 SUSPENSION ORAL at 21:42

## 2023-01-01 RX ADMIN — PIPERACILLIN SODIUM AND TAZOBACTAM SODIUM 3.38 G: 3; .375 INJECTION, SOLUTION INTRAVENOUS at 22:03

## 2023-01-01 RX ADMIN — OXYCODONE HYDROCHLORIDE 10 MG: 5 SOLUTION ORAL at 17:23

## 2023-01-01 RX ADMIN — HYDROMORPHONE HYDROCHLORIDE 0.2 MG: 1 INJECTION, SOLUTION INTRAMUSCULAR; INTRAVENOUS; SUBCUTANEOUS at 17:18

## 2023-01-01 RX ADMIN — ACETAMINOPHEN 1000 MG: 10 INJECTION INTRAVENOUS at 23:51

## 2023-01-01 RX ADMIN — IPRATROPIUM BROMIDE AND ALBUTEROL SULFATE 3 ML: .5; 3 SOLUTION RESPIRATORY (INHALATION) at 09:06

## 2023-01-01 RX ADMIN — Medication 100 MG: at 14:31

## 2023-01-01 RX ADMIN — HYDROMORPHONE HYDROCHLORIDE 0.4 MG: 1 INJECTION, SOLUTION INTRAMUSCULAR; INTRAVENOUS; SUBCUTANEOUS at 06:15

## 2023-01-01 RX ADMIN — PIPERACILLIN SODIUM AND TAZOBACTAM SODIUM 3.38 G: 3; .375 INJECTION, SOLUTION INTRAVENOUS at 10:44

## 2023-01-01 RX ADMIN — PIPERACILLIN AND TAZOBACTAM 3.38 G: 3; .375 INJECTION, POWDER, LYOPHILIZED, FOR SOLUTION INTRAVENOUS at 14:39

## 2023-01-01 RX ADMIN — PIPERACILLIN SODIUM AND TAZOBACTAM SODIUM 3.38 G: 3; .375 INJECTION, SOLUTION INTRAVENOUS at 20:37

## 2023-01-01 RX ADMIN — SODIUM CHLORIDE, POTASSIUM CHLORIDE, SODIUM LACTATE AND CALCIUM CHLORIDE 100 ML/HR: 600; 310; 30; 20 INJECTION, SOLUTION INTRAVENOUS at 21:45

## 2023-01-01 RX ADMIN — LIDOCAINE 1 PATCH: 4 PATCH TOPICAL at 16:14

## 2023-01-01 RX ADMIN — INSULIN HUMAN 2 UNITS: 100 INJECTION, SOLUTION PARENTERAL at 06:00

## 2023-01-01 RX ADMIN — SODIUM CHLORIDE, POTASSIUM CHLORIDE, SODIUM LACTATE AND CALCIUM CHLORIDE 75 ML/HR: 600; 310; 30; 20 INJECTION, SOLUTION INTRAVENOUS at 19:37

## 2023-01-01 RX ADMIN — PANTOPRAZOLE SODIUM 40 MG: 40 INJECTION, POWDER, FOR SOLUTION INTRAVENOUS at 08:28

## 2023-01-01 RX ADMIN — HYDROMORPHONE HYDROCHLORIDE 0.2 MG: 1 INJECTION, SOLUTION INTRAMUSCULAR; INTRAVENOUS; SUBCUTANEOUS at 03:47

## 2023-01-01 RX ADMIN — INSULIN LISPRO 15 UNITS: 100 INJECTION, SOLUTION INTRAVENOUS; SUBCUTANEOUS at 04:21

## 2023-01-01 RX ADMIN — TACROLIMUS 1 MG: 1 CAPSULE ORAL at 18:04

## 2023-01-01 RX ADMIN — INSULIN LISPRO 12 UNITS: 100 INJECTION, SOLUTION INTRAVENOUS; SUBCUTANEOUS at 17:48

## 2023-01-01 RX ADMIN — INSULIN LISPRO 12 UNITS: 100 INJECTION, SOLUTION INTRAVENOUS; SUBCUTANEOUS at 20:32

## 2023-01-01 RX ADMIN — PANTOPRAZOLE SODIUM 40 MG: 40 INJECTION, POWDER, FOR SOLUTION INTRAVENOUS at 08:37

## 2023-01-01 RX ADMIN — ALBUMIN HUMAN 250 ML: 0.05 INJECTION, SOLUTION INTRAVENOUS at 13:08

## 2023-01-01 RX ADMIN — ROCURONIUM BROMIDE 20 MG: 10 INJECTION, SOLUTION INTRAVENOUS at 13:42

## 2023-01-01 RX ADMIN — PANTOPRAZOLE SODIUM 40 MG: 40 INJECTION, POWDER, FOR SOLUTION INTRAVENOUS at 20:00

## 2023-01-01 RX ADMIN — Medication: at 01:15

## 2023-01-01 RX ADMIN — INSULIN LISPRO 6 UNITS: 100 INJECTION, SOLUTION INTRAVENOUS; SUBCUTANEOUS at 07:32

## 2023-01-01 RX ADMIN — PIPERACILLIN SODIUM AND TAZOBACTAM SODIUM 3.38 G: 3; .375 INJECTION, SOLUTION INTRAVENOUS at 11:11

## 2023-01-01 RX ADMIN — PROPOFOL 45 MCG/KG/MIN: 10 INJECTION, EMULSION INTRAVENOUS at 15:35

## 2023-01-01 RX ADMIN — HYDRALAZINE HYDROCHLORIDE 10 MG: 20 INJECTION INTRAMUSCULAR; INTRAVENOUS at 09:31

## 2023-01-01 RX ADMIN — ASPIRIN 150 MG: 300 SUPPOSITORY RECTAL at 14:56

## 2023-01-01 RX ADMIN — HYDROMORPHONE HYDROCHLORIDE 0.2 MG: 1 INJECTION, SOLUTION INTRAMUSCULAR; INTRAVENOUS; SUBCUTANEOUS at 22:21

## 2023-01-01 RX ADMIN — HYDRALAZINE HYDROCHLORIDE 10 MG: 20 INJECTION INTRAMUSCULAR; INTRAVENOUS at 10:30

## 2023-01-01 RX ADMIN — PANTOPRAZOLE SODIUM 40 MG: 40 INJECTION, POWDER, FOR SOLUTION INTRAVENOUS at 21:43

## 2023-01-01 RX ADMIN — TACROLIMUS 1 MG: 1 CAPSULE ORAL at 18:27

## 2023-01-01 RX ADMIN — VANCOMYCIN HYDROCHLORIDE 1 G: 1 INJECTION, POWDER, LYOPHILIZED, FOR SOLUTION INTRAVENOUS at 12:06

## 2023-01-01 RX ADMIN — HYDRALAZINE HYDROCHLORIDE 10 MG: 20 INJECTION INTRAMUSCULAR; INTRAVENOUS at 22:03

## 2023-01-01 RX ADMIN — PIPERACILLIN SODIUM AND TAZOBACTAM SODIUM 3.38 G: 3; .375 INJECTION, SOLUTION INTRAVENOUS at 17:47

## 2023-01-01 RX ADMIN — PIPERACILLIN SODIUM AND TAZOBACTAM SODIUM 3.38 G: 3; .375 INJECTION, SOLUTION INTRAVENOUS at 00:51

## 2023-01-01 RX ADMIN — OXYCODONE HYDROCHLORIDE 10 MG: 5 SOLUTION ORAL at 00:20

## 2023-01-01 RX ADMIN — THIAMINE HYDROCHLORIDE 500 MG: 100 INJECTION, SOLUTION INTRAMUSCULAR; INTRAVENOUS at 22:40

## 2023-01-01 RX ADMIN — INSULIN HUMAN 4 UNITS: 100 INJECTION, SOLUTION PARENTERAL at 06:39

## 2023-01-01 RX ADMIN — HYDRALAZINE HYDROCHLORIDE 10 MG: 20 INJECTION INTRAMUSCULAR; INTRAVENOUS at 05:18

## 2023-01-01 RX ADMIN — HEPARIN SODIUM 5000 UNITS: 5000 INJECTION INTRAVENOUS; SUBCUTANEOUS at 13:26

## 2023-01-01 RX ADMIN — HYDRALAZINE HYDROCHLORIDE 10 MG: 20 INJECTION INTRAMUSCULAR; INTRAVENOUS at 10:39

## 2023-01-01 RX ADMIN — PIPERACILLIN SODIUM AND TAZOBACTAM SODIUM 3.38 G: 3; .375 INJECTION, SOLUTION INTRAVENOUS at 20:30

## 2023-01-01 RX ADMIN — HYDROMORPHONE HYDROCHLORIDE 0.2 MG: 1 INJECTION, SOLUTION INTRAMUSCULAR; INTRAVENOUS; SUBCUTANEOUS at 22:14

## 2023-01-01 RX ADMIN — LIDOCAINE 1 PATCH: 4 PATCH TOPICAL at 08:19

## 2023-01-01 RX ADMIN — METHYLPREDNISOLONE SODIUM SUCCINATE 40 MG: 40 INJECTION, POWDER, FOR SOLUTION INTRAMUSCULAR; INTRAVENOUS at 11:21

## 2023-01-01 RX ADMIN — HYDRALAZINE HYDROCHLORIDE 10 MG: 20 INJECTION INTRAMUSCULAR; INTRAVENOUS at 04:41

## 2023-01-01 RX ADMIN — HEPARIN SODIUM 5000 UNITS: 5000 INJECTION INTRAVENOUS; SUBCUTANEOUS at 15:00

## 2023-01-01 RX ADMIN — METHYLPREDNISOLONE SODIUM SUCCINATE 40 MG: 40 INJECTION, POWDER, FOR SOLUTION INTRAMUSCULAR; INTRAVENOUS at 11:22

## 2023-01-01 RX ADMIN — ROCURONIUM BROMIDE 20 MG: 10 INJECTION, SOLUTION INTRAVENOUS at 17:41

## 2023-01-01 RX ADMIN — ONDANSETRON 4 MG: 2 INJECTION INTRAMUSCULAR; INTRAVENOUS at 06:31

## 2023-01-01 RX ADMIN — HEPARIN SODIUM 5000 UNITS: 5000 INJECTION INTRAVENOUS; SUBCUTANEOUS at 20:45

## 2023-01-01 RX ADMIN — TACROLIMUS 2 MG: 1 CAPSULE ORAL at 06:23

## 2023-01-01 RX ADMIN — SODIUM CHLORIDE, POTASSIUM CHLORIDE, SODIUM LACTATE AND CALCIUM CHLORIDE 100 ML/HR: 600; 310; 30; 20 INJECTION, SOLUTION INTRAVENOUS at 04:49

## 2023-01-01 RX ADMIN — ASPIRIN 150 MG: 300 SUPPOSITORY RECTAL at 14:11

## 2023-01-01 RX ADMIN — TRACE ELEMENTS INJECTION 4: 7.4; .75; 98; 151 INJECTION, SOLUTION INTRAVENOUS at 20:46

## 2023-01-01 RX ADMIN — ROCURONIUM BROMIDE 10 MG: 10 INJECTION, SOLUTION INTRAVENOUS at 15:09

## 2023-01-01 RX ADMIN — ONDANSETRON 4 MG: 2 INJECTION INTRAMUSCULAR; INTRAVENOUS at 11:32

## 2023-01-01 RX ADMIN — ASCORBIC ACID, VITAMIN A PALMITATE, CHOLECALCIFEROL, THIAMINE HYDROCHLORIDE, RIBOFLAVIN-5 PHOSPHATE SODIUM, PYRIDOXINE HYDROCHLORIDE, NIACINAMIDE, DEXPANTHENOL, ALPHA-TOCOPHEROL ACETATE, VITAMIN K1, FOLIC ACID, BIOTIN, CYANOCOBALAMIN: 200; 3300; 200; 6; 3.6; 6; 40; 15; 10; 150; 600; 60; 5 INJECTION, SOLUTION INTRAVENOUS at 00:53

## 2023-01-01 RX ADMIN — SUGAMMADEX 200 MG: 100 INJECTION, SOLUTION INTRAVENOUS at 10:13

## 2023-01-01 RX ADMIN — PIPERACILLIN SODIUM AND TAZOBACTAM SODIUM 3.38 G: 3; .375 INJECTION, SOLUTION INTRAVENOUS at 02:41

## 2023-01-01 RX ADMIN — INSULIN HUMAN 2 UNITS: 100 INJECTION, SOLUTION PARENTERAL at 12:17

## 2023-01-01 RX ADMIN — MAGNESIUM SULFATE HEPTAHYDRATE 1 G: 1 INJECTION, SOLUTION INTRAVENOUS at 11:15

## 2023-01-01 RX ADMIN — ACETAMINOPHEN 1000 MG: 10 INJECTION INTRAVENOUS at 01:05

## 2023-01-01 RX ADMIN — ACETAMINOPHEN 975 MG: 325 TABLET, FILM COATED ORAL at 19:44

## 2023-01-01 RX ADMIN — HYDROMORPHONE HYDROCHLORIDE 1 MG: 1 INJECTION, SOLUTION INTRAMUSCULAR; INTRAVENOUS; SUBCUTANEOUS at 18:20

## 2023-01-01 RX ADMIN — HYDRALAZINE HYDROCHLORIDE 10 MG: 20 INJECTION INTRAMUSCULAR; INTRAVENOUS at 22:40

## 2023-01-01 RX ADMIN — HEPARIN SODIUM 5000 UNITS: 5000 INJECTION INTRAVENOUS; SUBCUTANEOUS at 10:43

## 2023-01-01 RX ADMIN — HYDROMORPHONE HYDROCHLORIDE 1 MG: 1 INJECTION, SOLUTION INTRAMUSCULAR; INTRAVENOUS; SUBCUTANEOUS at 11:31

## 2023-01-01 RX ADMIN — PIPERACILLIN SODIUM AND TAZOBACTAM SODIUM 3.38 G: 3; .375 INJECTION, SOLUTION INTRAVENOUS at 09:14

## 2023-01-01 RX ADMIN — Medication 5 MG: at 20:55

## 2023-01-01 RX ADMIN — HEPARIN SODIUM 5000 UNITS: 5000 INJECTION INTRAVENOUS; SUBCUTANEOUS at 12:11

## 2023-01-01 RX ADMIN — LABETALOL HYDROCHLORIDE 10 MG: 5 INJECTION, SOLUTION INTRAVENOUS at 04:54

## 2023-01-01 RX ADMIN — OLANZAPINE 2.5 MG: 10 INJECTION, POWDER, LYOPHILIZED, FOR SOLUTION INTRAMUSCULAR at 05:46

## 2023-01-01 RX ADMIN — METHYLPREDNISOLONE SODIUM SUCCINATE 4 MG: 40 INJECTION, POWDER, LYOPHILIZED, FOR SOLUTION INTRAMUSCULAR; INTRAVENOUS at 23:48

## 2023-01-01 RX ADMIN — HYDRALAZINE HYDROCHLORIDE 10 MG: 20 INJECTION INTRAMUSCULAR; INTRAVENOUS at 22:35

## 2023-01-01 RX ADMIN — PANTOPRAZOLE SODIUM 40 MG: 40 INJECTION, POWDER, FOR SOLUTION INTRAVENOUS at 21:22

## 2023-01-01 RX ADMIN — PIPERACILLIN SODIUM AND TAZOBACTAM SODIUM 3.38 G: 3; .375 INJECTION, SOLUTION INTRAVENOUS at 09:55

## 2023-01-01 RX ADMIN — HYDRALAZINE HYDROCHLORIDE 10 MG: 20 INJECTION INTRAMUSCULAR; INTRAVENOUS at 17:48

## 2023-01-01 RX ADMIN — HEPARIN SODIUM 5000 UNITS: 5000 INJECTION INTRAVENOUS; SUBCUTANEOUS at 13:32

## 2023-01-01 RX ADMIN — INSULIN LISPRO 12 UNITS: 100 INJECTION, SOLUTION INTRAVENOUS; SUBCUTANEOUS at 09:35

## 2023-01-01 RX ADMIN — TACROLIMUS 1 MG: 1 CAPSULE ORAL at 18:12

## 2023-01-01 RX ADMIN — TACROLIMUS 2 MG: 1 CAPSULE ORAL at 12:39

## 2023-01-01 RX ADMIN — LABETALOL HYDROCHLORIDE 20 MG: 5 INJECTION, SOLUTION INTRAVENOUS at 09:10

## 2023-01-01 RX ADMIN — AZITHROMYCIN 500 MG: 500 INJECTION, POWDER, LYOPHILIZED, FOR SOLUTION INTRAVENOUS at 15:31

## 2023-01-01 RX ADMIN — ACETAMINOPHEN 1000 MG: 10 INJECTION INTRAVENOUS at 07:25

## 2023-01-01 RX ADMIN — ACETAMINOPHEN 1000 MG: 10 INJECTION INTRAVENOUS at 06:43

## 2023-01-01 RX ADMIN — HEPARIN SODIUM 5000 UNITS: 5000 INJECTION INTRAVENOUS; SUBCUTANEOUS at 12:27

## 2023-01-01 RX ADMIN — INSULIN HUMAN 1 UNITS/HR: 1 INJECTION, SOLUTION INTRAVENOUS at 06:23

## 2023-01-01 RX ADMIN — POTASSIUM CHLORIDE 20 MEQ: 14.9 INJECTION, SOLUTION INTRAVENOUS at 06:46

## 2023-01-01 RX ADMIN — PIPERACILLIN SODIUM AND TAZOBACTAM SODIUM 3.38 G: 3; .375 INJECTION, SOLUTION INTRAVENOUS at 16:29

## 2023-01-01 RX ADMIN — TACROLIMUS 1.5 MG: 1 CAPSULE ORAL at 18:31

## 2023-01-01 RX ADMIN — MAGNESIUM SULFATE HEPTAHYDRATE 2 G: 40 INJECTION, SOLUTION INTRAVENOUS at 10:40

## 2023-01-01 RX ADMIN — OXYCODONE HYDROCHLORIDE 10 MG: 5 SOLUTION ORAL at 18:42

## 2023-01-01 RX ADMIN — FUROSEMIDE 40 MG: 10 INJECTION, SOLUTION INTRAVENOUS at 04:25

## 2023-01-01 RX ADMIN — INSULIN HUMAN 4 UNITS: 100 INJECTION, SOLUTION PARENTERAL at 06:45

## 2023-01-01 RX ADMIN — THIAMINE HYDROCHLORIDE 500 MG: 100 INJECTION, SOLUTION INTRAMUSCULAR; INTRAVENOUS at 21:38

## 2023-01-01 RX ADMIN — TRACE ELEMENTS INJECTION 4: 7.4; .75; 98; 151 INJECTION, SOLUTION INTRAVENOUS at 22:35

## 2023-01-01 RX ADMIN — PIPERACILLIN SODIUM AND TAZOBACTAM SODIUM 3.38 G: 3; .375 INJECTION, SOLUTION INTRAVENOUS at 17:02

## 2023-01-01 RX ADMIN — SODIUM CHLORIDE, POTASSIUM CHLORIDE, SODIUM LACTATE AND CALCIUM CHLORIDE 1000 ML: 600; 310; 30; 20 INJECTION, SOLUTION INTRAVENOUS at 02:32

## 2023-01-01 RX ADMIN — PANTOPRAZOLE SODIUM 40 MG: 40 INJECTION, POWDER, FOR SOLUTION INTRAVENOUS at 21:30

## 2023-01-01 RX ADMIN — ONDANSETRON 4 MG: 2 INJECTION INTRAMUSCULAR; INTRAVENOUS at 09:36

## 2023-01-01 RX ADMIN — THIAMINE HYDROCHLORIDE 500 MG: 100 INJECTION, SOLUTION INTRAMUSCULAR; INTRAVENOUS at 09:30

## 2023-01-01 RX ADMIN — Medication 100 MG: at 14:19

## 2023-01-01 RX ADMIN — ASCORBIC ACID, VITAMIN A PALMITATE, CHOLECALCIFEROL, THIAMINE HYDROCHLORIDE, RIBOFLAVIN-5 PHOSPHATE SODIUM, PYRIDOXINE HYDROCHLORIDE, NIACINAMIDE, DEXPANTHENOL, ALPHA-TOCOPHEROL ACETATE, VITAMIN K1, FOLIC ACID, BIOTIN, CYANOCOBALAMIN: 200; 3300; 200; 6; 3.6; 6; 40; 15; 10; 150; 600; 60; 5 INJECTION, SOLUTION INTRAVENOUS at 21:03

## 2023-01-01 RX ADMIN — INSULIN HUMAN 4 UNITS: 100 INJECTION, SOLUTION PARENTERAL at 11:52

## 2023-01-01 RX ADMIN — THIAMINE HYDROCHLORIDE 500 MG: 100 INJECTION, SOLUTION INTRAMUSCULAR; INTRAVENOUS at 14:34

## 2023-01-01 RX ADMIN — HYDROMORPHONE HYDROCHLORIDE 1 MG: 1 INJECTION, SOLUTION INTRAMUSCULAR; INTRAVENOUS; SUBCUTANEOUS at 20:28

## 2023-01-01 RX ADMIN — ASCORBIC ACID, VITAMIN A PALMITATE, CHOLECALCIFEROL, THIAMINE HYDROCHLORIDE, RIBOFLAVIN-5 PHOSPHATE SODIUM, PYRIDOXINE HYDROCHLORIDE, NIACINAMIDE, DEXPANTHENOL, ALPHA-TOCOPHEROL ACETATE, VITAMIN K1, FOLIC ACID, BIOTIN, CYANOCOBALAMIN: 200; 3300; 200; 6; 3.6; 6; 40; 15; 10; 150; 600; 60; 5 INJECTION, SOLUTION INTRAVENOUS at 20:05

## 2023-01-01 RX ADMIN — HYDRALAZINE HYDROCHLORIDE 10 MG: 20 INJECTION INTRAMUSCULAR; INTRAVENOUS at 03:59

## 2023-01-01 RX ADMIN — NYSTATIN 400000 UNITS: 100000 SUSPENSION ORAL at 09:00

## 2023-01-01 RX ADMIN — HYDRALAZINE HYDROCHLORIDE 10 MG: 20 INJECTION INTRAMUSCULAR; INTRAVENOUS at 16:09

## 2023-01-01 RX ADMIN — PIPERACILLIN SODIUM AND TAZOBACTAM SODIUM 3.38 G: 3; .375 INJECTION, SOLUTION INTRAVENOUS at 03:57

## 2023-01-01 RX ADMIN — THIAMINE HYDROCHLORIDE 500 MG: 100 INJECTION, SOLUTION INTRAMUSCULAR; INTRAVENOUS at 08:48

## 2023-01-01 RX ADMIN — HYDROCORTISONE SODIUM SUCCINATE 50 MG: 100 INJECTION, POWDER, FOR SOLUTION INTRAMUSCULAR; INTRAVENOUS at 16:31

## 2023-01-01 RX ADMIN — HYDROMORPHONE HYDROCHLORIDE 0.2 MG: 1 INJECTION, SOLUTION INTRAMUSCULAR; INTRAVENOUS; SUBCUTANEOUS at 04:51

## 2023-01-01 RX ADMIN — NYSTATIN 400000 UNITS: 100000 SUSPENSION ORAL at 09:11

## 2023-01-01 RX ADMIN — INSULIN LISPRO 6 UNITS: 100 INJECTION, SOLUTION INTRAVENOUS; SUBCUTANEOUS at 04:15

## 2023-01-01 RX ADMIN — LIDOCAINE 1 PATCH: 4 PATCH TOPICAL at 08:16

## 2023-01-01 RX ADMIN — MAGNESIUM SULFATE HEPTAHYDRATE 2 G: 40 INJECTION, SOLUTION INTRAVENOUS at 04:59

## 2023-01-01 RX ADMIN — SMOFLIPID 50 G: 6; 6; 5; 3 INJECTION, EMULSION INTRAVENOUS at 20:50

## 2023-01-01 RX ADMIN — PIPERACILLIN SODIUM AND TAZOBACTAM SODIUM 3.38 G: 3; .375 INJECTION, SOLUTION INTRAVENOUS at 03:59

## 2023-01-01 RX ADMIN — LABETALOL HYDROCHLORIDE 10 MG: 5 INJECTION, SOLUTION INTRAVENOUS at 07:36

## 2023-01-01 RX ADMIN — HEPARIN SODIUM 5000 UNITS: 5000 INJECTION INTRAVENOUS; SUBCUTANEOUS at 21:20

## 2023-01-01 RX ADMIN — HYDRALAZINE HYDROCHLORIDE 10 MG: 20 INJECTION INTRAMUSCULAR; INTRAVENOUS at 09:50

## 2023-01-01 RX ADMIN — FENTANYL CITRATE 25 MCG: 50 INJECTION, SOLUTION INTRAMUSCULAR; INTRAVENOUS at 17:41

## 2023-01-01 RX ADMIN — PIPERACILLIN SODIUM AND TAZOBACTAM SODIUM 3.38 G: 3; .375 INJECTION, SOLUTION INTRAVENOUS at 03:15

## 2023-01-01 RX ADMIN — HEPARIN SODIUM 5000 UNITS: 5000 INJECTION INTRAVENOUS; SUBCUTANEOUS at 05:34

## 2023-01-01 RX ADMIN — NYSTATIN 400000 UNITS: 100000 SUSPENSION ORAL at 22:19

## 2023-01-01 SDOH — SOCIAL STABILITY: SOCIAL INSECURITY: DO YOU FEEL UNSAFE GOING BACK TO THE PLACE WHERE YOU ARE LIVING?: NO

## 2023-01-01 SDOH — SOCIAL STABILITY: SOCIAL INSECURITY: ARE THERE ANY APPARENT SIGNS OF INJURIES/BEHAVIORS THAT COULD BE RELATED TO ABUSE/NEGLECT?: NO

## 2023-01-01 SDOH — HEALTH STABILITY: MENTAL HEALTH: HOW OFTEN DO YOU HAVE 6 OR MORE DRINKS ON ONE OCCASION?: NEVER

## 2023-01-01 SDOH — SOCIAL STABILITY: SOCIAL INSECURITY: DOES ANYONE TRY TO KEEP YOU FROM HAVING/CONTACTING OTHER FRIENDS OR DOING THINGS OUTSIDE YOUR HOME?: NO

## 2023-01-01 SDOH — ECONOMIC STABILITY: INCOME INSECURITY: IN THE LAST 12 MONTHS, WAS THERE A TIME WHEN YOU WERE NOT ABLE TO PAY THE MORTGAGE OR RENT ON TIME?: NO

## 2023-01-01 SDOH — ECONOMIC STABILITY: HOUSING INSECURITY
IN THE LAST 12 MONTHS, WAS THERE A TIME WHEN YOU DID NOT HAVE A STEADY PLACE TO SLEEP OR SLEPT IN A SHELTER (INCLUDING NOW)?: NO

## 2023-01-01 SDOH — ECONOMIC STABILITY: HOUSING INSECURITY: IN THE LAST 12 MONTHS, HOW MANY PLACES HAVE YOU LIVED?: 1

## 2023-01-01 SDOH — SOCIAL STABILITY: SOCIAL INSECURITY: HAVE YOU HAD THOUGHTS OF HARMING ANYONE ELSE?: NO

## 2023-01-01 SDOH — ECONOMIC STABILITY: INCOME INSECURITY: HOW HARD IS IT FOR YOU TO PAY FOR THE VERY BASICS LIKE FOOD, HOUSING, MEDICAL CARE, AND HEATING?: NOT HARD AT ALL

## 2023-01-01 SDOH — SOCIAL STABILITY: SOCIAL INSECURITY: HAS ANYONE EVER THREATENED TO HURT YOUR FAMILY OR YOUR PETS?: NO

## 2023-01-01 SDOH — ECONOMIC STABILITY: TRANSPORTATION INSECURITY
IN THE PAST 12 MONTHS, HAS THE LACK OF TRANSPORTATION KEPT YOU FROM MEDICAL APPOINTMENTS OR FROM GETTING MEDICATIONS?: NO

## 2023-01-01 SDOH — HEALTH STABILITY: MENTAL HEALTH: HOW MANY STANDARD DRINKS CONTAINING ALCOHOL DO YOU HAVE ON A TYPICAL DAY?: PATIENT DOES NOT DRINK

## 2023-01-01 SDOH — HEALTH STABILITY: MENTAL HEALTH: CURRENT SMOKER: 0

## 2023-01-01 SDOH — SOCIAL STABILITY: SOCIAL INSECURITY: DO YOU FEEL ANYONE HAS EXPLOITED OR TAKEN ADVANTAGE OF YOU FINANCIALLY OR OF YOUR PERSONAL PROPERTY?: NO

## 2023-01-01 SDOH — SOCIAL STABILITY: SOCIAL INSECURITY: ARE YOU OR HAVE YOU BEEN THREATENED OR ABUSED PHYSICALLY, EMOTIONALLY, OR SEXUALLY BY ANYONE?: NO

## 2023-01-01 SDOH — HEALTH STABILITY: MENTAL HEALTH: HOW OFTEN DO YOU HAVE A DRINK CONTAINING ALCOHOL?: NEVER

## 2023-01-01 ASSESSMENT — PAIN SCALES - PAIN ASSESSMENT IN ADVANCED DEMENTIA (PAINAD)
TOTALSCORE: REST
BREATHING: NORMAL
CONSOLABILITY: NO NEED TO CONSOLE
TOTALSCORE: ENVIRONMENTAL CHANGES;REPOSITIONED
CONSOLABILITY: DISTRACTED OR REASSURED BY VOICE/TOUCH
FACIALEXPRESSION: SMILING OR INEXPRESSIVE
NEGVOCALIZATION: OCCASIONAL MOAN/GROAN, LOW SPEECH, NEGATIVE/DISAPPROVING QUALITY
BREATHING: NORMAL
CONSOLABILITY: NO NEED TO CONSOLE
TOTALSCORE: 1
TOTALSCORE: 0
CONSOLABILITY: NO NEED TO CONSOLE
TOTALSCORE: 0
TOTALSCORE: EMOTIONAL SUPPORT;REPOSITIONED
CONSOLABILITY: NO NEED TO CONSOLE
FACIALEXPRESSION: SMILING OR INEXPRESSIVE
BREATHING: NORMAL
FACIALEXPRESSION: SMILING OR INEXPRESSIVE
TOTALSCORE: 0
BODYLANGUAGE: RELAXED
BODYLANGUAGE: RIGID, FISTS CLENCHED, KNEES UP, PUSHING/PULLING AWAY, STRIKES OUT
NEGVOCALIZATION: OCCASIONAL MOAN/GROAN, LOW SPEECH, NEGATIVE/DISAPPROVING QUALITY
BREATHING: NORMAL
FACIALEXPRESSION: FACIAL GRIMACING
BODYLANGUAGE: TENSE, DISTRESSED PACING, FIDGETING
BODYLANGUAGE: TENSE, DISTRESSED PACING, FIDGETING
FACIALEXPRESSION: SAD, FRIGHTENED, FROWN
TOTALSCORE: MEDICATION (SEE MAR)
TOTALSCORE: ENVIRONMENTAL CHANGES
TOTALSCORE: REPOSITIONED
CONSOLABILITY: NO NEED TO CONSOLE
TOTALSCORE: 1
FACIALEXPRESSION: FACIAL GRIMACING
TOTALSCORE: 4
CONSOLABILITY: DISTRACTED OR REASSURED BY VOICE/TOUCH
CONSOLABILITY: NO NEED TO CONSOLE
TOTALSCORE: MEDICATION (SEE MAR);REPOSITIONED
BODYLANGUAGE: RELAXED
CONSOLABILITY: DISTRACTED OR REASSURED BY VOICE/TOUCH
CONSOLABILITY: DISTRACTED OR REASSURED BY VOICE/TOUCH
TOTALSCORE: 0
CONSOLABILITY: DISTRACTED OR REASSURED BY VOICE/TOUCH
BODYLANGUAGE: TENSE, DISTRESSED PACING, FIDGETING
TOTALSCORE: REST
BREATHING: OCCASIONAL LABORED BREATHING, SHORT PERIOD OF HYPERVENTILATION
BODYLANGUAGE: RELAXED
FACIALEXPRESSION: SMILING OR INEXPRESSIVE
FACIALEXPRESSION: SMILING OR INEXPRESSIVE
TOTALSCORE: 4
FACIALEXPRESSION: FACIAL GRIMACING
CONSOLABILITY: NO NEED TO CONSOLE
TOTALSCORE: 0
TOTALSCORE: MEDICATION (SEE MAR)
BREATHING: NORMAL
BODYLANGUAGE: RELAXED
TOTALSCORE: EMOTIONAL SUPPORT;REPOSITIONED
BREATHING: NORMAL
BODYLANGUAGE: RIGID, FISTS CLENCHED, KNEES UP, PUSHING/PULLING AWAY, STRIKES OUT
NEGVOCALIZATION: OCCASIONAL MOAN/GROAN, LOW SPEECH, NEGATIVE/DISAPPROVING QUALITY
BREATHING: NORMAL
NEGVOCALIZATION: OCCASIONAL MOAN/GROAN, LOW SPEECH, NEGATIVE/DISAPPROVING QUALITY
FACIALEXPRESSION: SMILING OR INEXPRESSIVE
BODYLANGUAGE: RELAXED
CONSOLABILITY: NO NEED TO CONSOLE
BODYLANGUAGE: RELAXED
BODYLANGUAGE: RELAXED
TOTALSCORE: 1
FACIALEXPRESSION: SMILING OR INEXPRESSIVE
BODYLANGUAGE: TENSE, DISTRESSED PACING, FIDGETING
BREATHING: NORMAL
CONSOLABILITY: NO NEED TO CONSOLE
BODYLANGUAGE: RELAXED
BREATHING: NORMAL
BREATHING: NORMAL
TOTALSCORE: 0
BREATHING: OCCASIONAL LABORED BREATHING, SHORT PERIOD OF HYPERVENTILATION
FACIALEXPRESSION: SMILING OR INEXPRESSIVE
BREATHING: NORMAL
TOTALSCORE: 0
TOTALSCORE: MEDICATION (SEE MAR)
NEGVOCALIZATION: OCCASIONAL MOAN/GROAN, LOW SPEECH, NEGATIVE/DISAPPROVING QUALITY
FACIALEXPRESSION: SAD, FRIGHTENED, FROWN
TOTALSCORE: 0
FACIALEXPRESSION: SAD, FRIGHTENED, FROWN
BREATHING: NORMAL
NEGVOCALIZATION: OCCASIONAL MOAN/GROAN, LOW SPEECH, NEGATIVE/DISAPPROVING QUALITY
BREATHING: NORMAL
FACIALEXPRESSION: SMILING OR INEXPRESSIVE
CONSOLABILITY: NO NEED TO CONSOLE
TOTALSCORE: 1
CONSOLABILITY: NO NEED TO CONSOLE
BREATHING: NORMAL
FACIALEXPRESSION: SMILING OR INEXPRESSIVE
BODYLANGUAGE: TENSE, DISTRESSED PACING, FIDGETING
TOTALSCORE: 0
CONSOLABILITY: NO NEED TO CONSOLE
BREATHING: OCCASIONAL LABORED BREATHING, SHORT PERIOD OF HYPERVENTILATION
TOTALSCORE: 4
TOTALSCORE: 5
BODYLANGUAGE: RELAXED
BREATHING: NORMAL
BODYLANGUAGE: RELAXED
BODYLANGUAGE: RELAXED
FACIALEXPRESSION: SMILING OR INEXPRESSIVE
NEGVOCALIZATION: OCCASIONAL MOAN/GROAN, LOW SPEECH, NEGATIVE/DISAPPROVING QUALITY
TOTALSCORE: MEDICATION (SEE MAR)
BODYLANGUAGE: RELAXED
BREATHING: NORMAL
CONSOLABILITY: NO NEED TO CONSOLE
CONSOLABILITY: NO NEED TO CONSOLE
TOTALSCORE: 0
FACIALEXPRESSION: SMILING OR INEXPRESSIVE
FACIALEXPRESSION: SAD, FRIGHTENED, FROWN
BODYLANGUAGE: RELAXED
BREATHING: NORMAL
BREATHING: NORMAL
TOTALSCORE: 5
BODYLANGUAGE: TENSE, DISTRESSED PACING, FIDGETING
CONSOLABILITY: DISTRACTED OR REASSURED BY VOICE/TOUCH

## 2023-01-01 ASSESSMENT — COLUMBIA-SUICIDE SEVERITY RATING SCALE - C-SSRS
1. IN THE PAST MONTH, HAVE YOU WISHED YOU WERE DEAD OR WISHED YOU COULD GO TO SLEEP AND NOT WAKE UP?: NO
2. HAVE YOU ACTUALLY HAD ANY THOUGHTS OF KILLING YOURSELF?: NO
2. HAVE YOU ACTUALLY HAD ANY THOUGHTS OF KILLING YOURSELF?: NO
6. HAVE YOU EVER DONE ANYTHING, STARTED TO DO ANYTHING, OR PREPARED TO DO ANYTHING TO END YOUR LIFE?: NO
1. IN THE PAST MONTH, HAVE YOU WISHED YOU WERE DEAD OR WISHED YOU COULD GO TO SLEEP AND NOT WAKE UP?: NO
6. HAVE YOU EVER DONE ANYTHING, STARTED TO DO ANYTHING, OR PREPARED TO DO ANYTHING TO END YOUR LIFE?: NO

## 2023-01-01 ASSESSMENT — COGNITIVE AND FUNCTIONAL STATUS - GENERAL
DAILY ACTIVITIY SCORE: 12
DRESSING REGULAR LOWER BODY CLOTHING: A LOT
WALKING IN HOSPITAL ROOM: A LOT
WALKING IN HOSPITAL ROOM: A LOT
TURNING FROM BACK TO SIDE WHILE IN FLAT BAD: A LOT
DRESSING REGULAR UPPER BODY CLOTHING: TOTAL
TURNING FROM BACK TO SIDE WHILE IN FLAT BAD: A LITTLE
DRESSING REGULAR LOWER BODY CLOTHING: TOTAL
PERSONAL GROOMING: A LITTLE
HELP NEEDED FOR BATHING: A LITTLE
TURNING FROM BACK TO SIDE WHILE IN FLAT BAD: A LOT
TOILETING: TOTAL
CLIMB 3 TO 5 STEPS WITH RAILING: TOTAL
WALKING IN HOSPITAL ROOM: A LITTLE
CLIMB 3 TO 5 STEPS WITH RAILING: A LOT
MOVING FROM LYING ON BACK TO SITTING ON SIDE OF FLAT BED WITH BEDRAILS: A LOT
EATING MEALS: A LOT
MOBILITY SCORE: 19
EATING MEALS: TOTAL
TURNING FROM BACK TO SIDE WHILE IN FLAT BAD: A LITTLE
MOBILITY SCORE: 6
MOVING TO AND FROM BED TO CHAIR: A LITTLE
TOILETING: TOTAL
MOBILITY SCORE: 24
DRESSING REGULAR UPPER BODY CLOTHING: TOTAL
CLIMB 3 TO 5 STEPS WITH RAILING: A LOT
MOBILITY SCORE: 6
DRESSING REGULAR UPPER BODY CLOTHING: A LITTLE
MOBILITY SCORE: 12
MOBILITY SCORE: 19
DAILY ACTIVITIY SCORE: 10
DAILY ACTIVITIY SCORE: 7
DAILY ACTIVITIY SCORE: 24
HELP NEEDED FOR BATHING: A LOT
EATING MEALS: A LOT
DRESSING REGULAR LOWER BODY CLOTHING: TOTAL
CLIMB 3 TO 5 STEPS WITH RAILING: TOTAL
EATING MEALS: A LOT
MOVING FROM LYING ON BACK TO SITTING ON SIDE OF FLAT BED WITH BEDRAILS: TOTAL
DAILY ACTIVITIY SCORE: 6
MOVING FROM LYING ON BACK TO SITTING ON SIDE OF FLAT BED WITH BEDRAILS: A LOT
EATING MEALS: A LOT
DRESSING REGULAR LOWER BODY CLOTHING: TOTAL
DRESSING REGULAR UPPER BODY CLOTHING: A LOT
DRESSING REGULAR LOWER BODY CLOTHING: A LOT
HELP NEEDED FOR BATHING: A LOT
MOBILITY SCORE: 12
EATING MEALS: TOTAL
DRESSING REGULAR LOWER BODY CLOTHING: TOTAL
MOBILITY SCORE: 12
WALKING IN HOSPITAL ROOM: A LOT
HELP NEEDED FOR BATHING: A LOT
CLIMB 3 TO 5 STEPS WITH RAILING: A LOT
EATING MEALS: TOTAL
TURNING FROM BACK TO SIDE WHILE IN FLAT BAD: TOTAL
TOILETING: TOTAL
STANDING UP FROM CHAIR USING ARMS: A LOT
CLIMB 3 TO 5 STEPS WITH RAILING: TOTAL
TOILETING: TOTAL
CLIMB 3 TO 5 STEPS WITH RAILING: A LOT
STANDING UP FROM CHAIR USING ARMS: TOTAL
MOBILITY SCORE: 10
DAILY ACTIVITIY SCORE: 16
TURNING FROM BACK TO SIDE WHILE IN FLAT BAD: A LOT
DAILY ACTIVITIY SCORE: 6
STANDING UP FROM CHAIR USING ARMS: A LITTLE
EATING MEALS: A LITTLE
HELP NEEDED FOR BATHING: TOTAL
DRESSING REGULAR LOWER BODY CLOTHING: A LOT
PERSONAL GROOMING: A LOT
STANDING UP FROM CHAIR USING ARMS: A LOT
EATING MEALS: A LOT
CLIMB 3 TO 5 STEPS WITH RAILING: A LOT
TOILETING: TOTAL
DRESSING REGULAR UPPER BODY CLOTHING: TOTAL
TURNING FROM BACK TO SIDE WHILE IN FLAT BAD: TOTAL
TOILETING: TOTAL
TURNING FROM BACK TO SIDE WHILE IN FLAT BAD: A LITTLE
PERSONAL GROOMING: A LOT
PERSONAL GROOMING: TOTAL
DAILY ACTIVITIY SCORE: 6
MOVING FROM LYING ON BACK TO SITTING ON SIDE OF FLAT BED WITH BEDRAILS: A LITTLE
WALKING IN HOSPITAL ROOM: TOTAL
EATING MEALS: TOTAL
CLIMB 3 TO 5 STEPS WITH RAILING: TOTAL
DRESSING REGULAR LOWER BODY CLOTHING: A LITTLE
MOVING TO AND FROM BED TO CHAIR: TOTAL
WALKING IN HOSPITAL ROOM: A LOT
MOVING TO AND FROM BED TO CHAIR: A LOT
TURNING FROM BACK TO SIDE WHILE IN FLAT BAD: A LOT
WALKING IN HOSPITAL ROOM: TOTAL
STANDING UP FROM CHAIR USING ARMS: TOTAL
DRESSING REGULAR LOWER BODY CLOTHING: A LOT
DRESSING REGULAR UPPER BODY CLOTHING: A LOT
STANDING UP FROM CHAIR USING ARMS: A LOT
MOVING TO AND FROM BED TO CHAIR: A LOT
CLIMB 3 TO 5 STEPS WITH RAILING: TOTAL
DRESSING REGULAR LOWER BODY CLOTHING: A LOT
MOBILITY SCORE: 14
WALKING IN HOSPITAL ROOM: TOTAL
DRESSING REGULAR LOWER BODY CLOTHING: A LOT
MOBILITY SCORE: 6
HELP NEEDED FOR BATHING: TOTAL
TOILETING: A LOT
WALKING IN HOSPITAL ROOM: TOTAL
STANDING UP FROM CHAIR USING ARMS: A LOT
STANDING UP FROM CHAIR USING ARMS: TOTAL
TOILETING: TOTAL
DAILY ACTIVITIY SCORE: 24
MOVING FROM LYING ON BACK TO SITTING ON SIDE OF FLAT BED WITH BEDRAILS: A LOT
DRESSING REGULAR LOWER BODY CLOTHING: TOTAL
PERSONAL GROOMING: TOTAL
TURNING FROM BACK TO SIDE WHILE IN FLAT BAD: TOTAL
DAILY ACTIVITIY SCORE: 24
DRESSING REGULAR UPPER BODY CLOTHING: TOTAL
HELP NEEDED FOR BATHING: A LOT
DRESSING REGULAR UPPER BODY CLOTHING: TOTAL
TURNING FROM BACK TO SIDE WHILE IN FLAT BAD: A LITTLE
TURNING FROM BACK TO SIDE WHILE IN FLAT BAD: A LITTLE
EATING MEALS: TOTAL
TURNING FROM BACK TO SIDE WHILE IN FLAT BAD: A LITTLE
MOVING FROM LYING ON BACK TO SITTING ON SIDE OF FLAT BED WITH BEDRAILS: TOTAL
MOBILITY SCORE: 13
MOVING FROM LYING ON BACK TO SITTING ON SIDE OF FLAT BED WITH BEDRAILS: TOTAL
DAILY ACTIVITIY SCORE: 6
CLIMB 3 TO 5 STEPS WITH RAILING: A LITTLE
STANDING UP FROM CHAIR USING ARMS: A LOT
CLIMB 3 TO 5 STEPS WITH RAILING: A LITTLE
MOVING TO AND FROM BED TO CHAIR: TOTAL
HELP NEEDED FOR BATHING: A LOT
STANDING UP FROM CHAIR USING ARMS: A LITTLE
MOBILITY SCORE: 9
TURNING FROM BACK TO SIDE WHILE IN FLAT BAD: A LOT
MOBILITY SCORE: 24
MOVING TO AND FROM BED TO CHAIR: A LITTLE
DRESSING REGULAR UPPER BODY CLOTHING: A LOT
DRESSING REGULAR LOWER BODY CLOTHING: A LITTLE
PERSONAL GROOMING: TOTAL
PERSONAL GROOMING: A LOT
DRESSING REGULAR LOWER BODY CLOTHING: A LOT
DRESSING REGULAR LOWER BODY CLOTHING: TOTAL
MOVING TO AND FROM BED TO CHAIR: TOTAL
MOVING FROM LYING ON BACK TO SITTING ON SIDE OF FLAT BED WITH BEDRAILS: A LOT
MOVING FROM LYING ON BACK TO SITTING ON SIDE OF FLAT BED WITH BEDRAILS: A LOT
TURNING FROM BACK TO SIDE WHILE IN FLAT BAD: TOTAL
STANDING UP FROM CHAIR USING ARMS: TOTAL
TURNING FROM BACK TO SIDE WHILE IN FLAT BAD: TOTAL
STANDING UP FROM CHAIR USING ARMS: A LOT
MOVING TO AND FROM BED TO CHAIR: A LOT
MOBILITY SCORE: 12
DAILY ACTIVITIY SCORE: 11
DAILY ACTIVITIY SCORE: 22
PERSONAL GROOMING: A LOT
PERSONAL GROOMING: A LOT
PERSONAL GROOMING: TOTAL
MOVING TO AND FROM BED TO CHAIR: A LITTLE
DRESSING REGULAR UPPER BODY CLOTHING: TOTAL
DAILY ACTIVITIY SCORE: 12
WALKING IN HOSPITAL ROOM: A LITTLE
DAILY ACTIVITIY SCORE: 12
HELP NEEDED FOR BATHING: TOTAL
MOVING TO AND FROM BED TO CHAIR: A LITTLE
TURNING FROM BACK TO SIDE WHILE IN FLAT BAD: A LOT
EATING MEALS: A LOT
HELP NEEDED FOR BATHING: TOTAL
MOVING TO AND FROM BED TO CHAIR: A LITTLE
PERSONAL GROOMING: TOTAL
MOVING FROM LYING ON BACK TO SITTING ON SIDE OF FLAT BED WITH BEDRAILS: TOTAL
MOVING TO AND FROM BED TO CHAIR: TOTAL
HELP NEEDED FOR BATHING: TOTAL
EATING MEALS: TOTAL
STANDING UP FROM CHAIR USING ARMS: TOTAL
EATING MEALS: A LITTLE
DRESSING REGULAR LOWER BODY CLOTHING: TOTAL
PERSONAL GROOMING: A LOT
DRESSING REGULAR UPPER BODY CLOTHING: A LOT
WALKING IN HOSPITAL ROOM: A LOT
MOVING FROM LYING ON BACK TO SITTING ON SIDE OF FLAT BED WITH BEDRAILS: A LOT
PERSONAL GROOMING: A LOT
MOVING FROM LYING ON BACK TO SITTING ON SIDE OF FLAT BED WITH BEDRAILS: TOTAL
TOILETING: TOTAL
WALKING IN HOSPITAL ROOM: A LOT
TOILETING: TOTAL
MOBILITY SCORE: 6
HELP NEEDED FOR BATHING: A LITTLE
TOILETING: A LOT
MOVING TO AND FROM BED TO CHAIR: A LOT
STANDING UP FROM CHAIR USING ARMS: A LOT
DAILY ACTIVITIY SCORE: 12
WALKING IN HOSPITAL ROOM: A LITTLE
STANDING UP FROM CHAIR USING ARMS: TOTAL
HELP NEEDED FOR BATHING: TOTAL
MOBILITY SCORE: 6
CLIMB 3 TO 5 STEPS WITH RAILING: A LOT
PERSONAL GROOMING: TOTAL
HELP NEEDED FOR BATHING: A LOT
STANDING UP FROM CHAIR USING ARMS: TOTAL
TOILETING: A LOT
MOBILITY SCORE: 11
WALKING IN HOSPITAL ROOM: TOTAL
DRESSING REGULAR UPPER BODY CLOTHING: A LOT
MOVING TO AND FROM BED TO CHAIR: A LOT
DAILY ACTIVITIY SCORE: 12
MOVING FROM LYING ON BACK TO SITTING ON SIDE OF FLAT BED WITH BEDRAILS: A LOT
HELP NEEDED FOR BATHING: TOTAL
DRESSING REGULAR UPPER BODY CLOTHING: A LOT
EATING MEALS: A LOT
MOVING FROM LYING ON BACK TO SITTING ON SIDE OF FLAT BED WITH BEDRAILS: A LITTLE
TURNING FROM BACK TO SIDE WHILE IN FLAT BAD: A LOT
HELP NEEDED FOR BATHING: TOTAL
EATING MEALS: A LOT
TOILETING: A LOT
MOVING FROM LYING ON BACK TO SITTING ON SIDE OF FLAT BED WITH BEDRAILS: A LITTLE
MOVING FROM LYING ON BACK TO SITTING ON SIDE OF FLAT BED WITH BEDRAILS: A LITTLE
TOILETING: TOTAL
CLIMB 3 TO 5 STEPS WITH RAILING: A LITTLE
TOILETING: TOTAL
CLIMB 3 TO 5 STEPS WITH RAILING: A LITTLE
PERSONAL GROOMING: TOTAL
MOVING FROM LYING ON BACK TO SITTING ON SIDE OF FLAT BED WITH BEDRAILS: TOTAL
DRESSING REGULAR LOWER BODY CLOTHING: TOTAL
HELP NEEDED FOR BATHING: A LOT
WALKING IN HOSPITAL ROOM: TOTAL
PERSONAL GROOMING: TOTAL
MOBILITY SCORE: 24
PATIENT BASELINE BEDBOUND: NO
PERSONAL GROOMING: TOTAL
MOVING TO AND FROM BED TO CHAIR: TOTAL
DAILY ACTIVITIY SCORE: 6
TOILETING: A LOT
WALKING IN HOSPITAL ROOM: TOTAL
STANDING UP FROM CHAIR USING ARMS: A LOT
DRESSING REGULAR UPPER BODY CLOTHING: TOTAL
TURNING FROM BACK TO SIDE WHILE IN FLAT BAD: A LOT
CLIMB 3 TO 5 STEPS WITH RAILING: TOTAL
CLIMB 3 TO 5 STEPS WITH RAILING: TOTAL
DRESSING REGULAR UPPER BODY CLOTHING: TOTAL
MOVING FROM LYING ON BACK TO SITTING ON SIDE OF FLAT BED WITH BEDRAILS: A LITTLE
CLIMB 3 TO 5 STEPS WITH RAILING: A LOT
TURNING FROM BACK TO SIDE WHILE IN FLAT BAD: A LOT
EATING MEALS: TOTAL
MOVING TO AND FROM BED TO CHAIR: TOTAL
DAILY ACTIVITIY SCORE: 22
MOVING TO AND FROM BED TO CHAIR: A LOT
HELP NEEDED FOR BATHING: A LOT
MOVING TO AND FROM BED TO CHAIR: A LOT
MOVING TO AND FROM BED TO CHAIR: TOTAL
WALKING IN HOSPITAL ROOM: TOTAL
DRESSING REGULAR UPPER BODY CLOTHING: A LOT
WALKING IN HOSPITAL ROOM: TOTAL
STANDING UP FROM CHAIR USING ARMS: A LOT
DRESSING REGULAR LOWER BODY CLOTHING: A LITTLE
CLIMB 3 TO 5 STEPS WITH RAILING: TOTAL
MOBILITY SCORE: 20
DRESSING REGULAR UPPER BODY CLOTHING: A LOT
MOVING TO AND FROM BED TO CHAIR: A LOT
MOBILITY SCORE: 18
TOILETING: A LOT
WALKING IN HOSPITAL ROOM: TOTAL
DAILY ACTIVITIY SCORE: 12
TURNING FROM BACK TO SIDE WHILE IN FLAT BAD: TOTAL
MOBILITY SCORE: 19
TURNING FROM BACK TO SIDE WHILE IN FLAT BAD: TOTAL
STANDING UP FROM CHAIR USING ARMS: A LITTLE
MOVING FROM LYING ON BACK TO SITTING ON SIDE OF FLAT BED WITH BEDRAILS: TOTAL
MOVING TO AND FROM BED TO CHAIR: A LITTLE
CLIMB 3 TO 5 STEPS WITH RAILING: TOTAL
MOVING TO AND FROM BED TO CHAIR: TOTAL
HELP NEEDED FOR BATHING: A LITTLE
STANDING UP FROM CHAIR USING ARMS: TOTAL
CLIMB 3 TO 5 STEPS WITH RAILING: TOTAL
MOBILITY SCORE: 6
DRESSING REGULAR LOWER BODY CLOTHING: TOTAL
CLIMB 3 TO 5 STEPS WITH RAILING: TOTAL
DAILY ACTIVITIY SCORE: 6

## 2023-01-01 ASSESSMENT — ENCOUNTER SYMPTOMS
CHILLS: 0
NAUSEA: 1
NEW SYMPTOMS OF CORONARY ARTERY DISEASE: 1
PSYCHIATRIC NEGATIVE: 1
PSYCHIATRIC NEGATIVE: 1
DYSURIA: 0
SHORTNESS OF BREATH: 0
HYPOTENSION AFTER FLUID ADMINISTRATION: 0
DIZZINESS: 0
COUGH: 0
FEVER: 1
NEW SYMPTOMS OF CORONARY ARTERY DISEASE: 1
APPETITE CHANGE: 1
CARDIOVASCULAR NEGATIVE: 1
MUSCULOSKELETAL NEGATIVE: 1
EYES NEGATIVE: 1
NEUROLOGICAL NEGATIVE: 1
WOUND: 0
GASTROINTESTINAL NEGATIVE: 1
CONSTITUTIONAL NEGATIVE: 1
VOMITING: 1
EYE DISCHARGE: 0
PALPITATIONS: 0
EYE PAIN: 0
GASTROINTESTINAL NEGATIVE: 1
NEW SYMPTOMS OF CORONARY ARTERY DISEASE: 1
CARDIOVASCULAR NEGATIVE: 1
JOINT SWELLING: 0
AGITATION: 0
NEW SYMPTOMS OF CORONARY ARTERY DISEASE: 1
MUSCULOSKELETAL NEGATIVE: 1
HEADACHES: 0
CONSTITUTIONAL NEGATIVE: 1
NEUROLOGICAL NEGATIVE: 1
ENDOCRINE NEGATIVE: 1
EYES NEGATIVE: 1
ABDOMINAL PAIN: 1
CONFUSION: 0
VOMITING: 1
NEW SYMPTOMS OF CORONARY ARTERY DISEASE: 1
FEVER: 0
NAUSEA: 1
ABDOMINAL PAIN: 1
RHINORRHEA: 0
RESPIRATORY NEGATIVE: 1
FATIGUE: 1
ENDOCRINE NEGATIVE: 1
ABDOMINAL DISTENTION: 1
RESPIRATORY NEGATIVE: 1
NEW SYMPTOMS OF CORONARY ARTERY DISEASE: 1
SORE THROAT: 0

## 2023-01-01 ASSESSMENT — LIFESTYLE VARIABLES
SKIP TO QUESTIONS 9-10: 1
HOW OFTEN DO YOU HAVE A DRINK CONTAINING ALCOHOL: NEVER
AUDIT-C TOTAL SCORE: 0
SKIP TO QUESTIONS 9-10: 1
AUDIT-C TOTAL SCORE: 0
HOW OFTEN DO YOU HAVE 6 OR MORE DRINKS ON ONE OCCASION: NEVER
AUDIT-C TOTAL SCORE: 0
HOW MANY STANDARD DRINKS CONTAINING ALCOHOL DO YOU HAVE ON A TYPICAL DAY: PATIENT DOES NOT DRINK

## 2023-01-01 ASSESSMENT — PAIN - FUNCTIONAL ASSESSMENT
PAIN_FUNCTIONAL_ASSESSMENT: CPOT (CRITICAL CARE PAIN OBSERVATION TOOL)
PAIN_FUNCTIONAL_ASSESSMENT: PAINAD (PAIN ASSESSMENT IN ADVANCED DEMENTIA SCALE)
PAIN_FUNCTIONAL_ASSESSMENT: 0-10
PAIN_FUNCTIONAL_ASSESSMENT: CPOT (CRITICAL CARE PAIN OBSERVATION TOOL)
PAIN_FUNCTIONAL_ASSESSMENT: 0-10
PAIN_FUNCTIONAL_ASSESSMENT: 0-10
PAIN_FUNCTIONAL_ASSESSMENT: CPOT (CRITICAL CARE PAIN OBSERVATION TOOL)
PAIN_FUNCTIONAL_ASSESSMENT: 0-10
PAIN_FUNCTIONAL_ASSESSMENT: CPOT (CRITICAL CARE PAIN OBSERVATION TOOL)
PAIN_FUNCTIONAL_ASSESSMENT: 0-10
PAIN_FUNCTIONAL_ASSESSMENT: 0-10
PAIN_FUNCTIONAL_ASSESSMENT: CPOT (CRITICAL CARE PAIN OBSERVATION TOOL)
PAIN_FUNCTIONAL_ASSESSMENT: 0-10
PAIN_FUNCTIONAL_ASSESSMENT: 0-10
PAIN_FUNCTIONAL_ASSESSMENT: CPOT (CRITICAL CARE PAIN OBSERVATION TOOL)
PAIN_FUNCTIONAL_ASSESSMENT: 0-10
PAIN_FUNCTIONAL_ASSESSMENT: 0-10
PAIN_FUNCTIONAL_ASSESSMENT: CPOT (CRITICAL CARE PAIN OBSERVATION TOOL)
PAIN_FUNCTIONAL_ASSESSMENT: 0-10
PAIN_FUNCTIONAL_ASSESSMENT: 0-10
PAIN_FUNCTIONAL_ASSESSMENT: PAINAD (PAIN ASSESSMENT IN ADVANCED DEMENTIA SCALE)
PAIN_FUNCTIONAL_ASSESSMENT: 0-10
PAIN_FUNCTIONAL_ASSESSMENT: PAINAD (PAIN ASSESSMENT IN ADVANCED DEMENTIA SCALE)
PAIN_FUNCTIONAL_ASSESSMENT: 0-10
PAIN_FUNCTIONAL_ASSESSMENT: CPOT (CRITICAL CARE PAIN OBSERVATION TOOL)
PAIN_FUNCTIONAL_ASSESSMENT: PAINAD (PAIN ASSESSMENT IN ADVANCED DEMENTIA SCALE)
PAIN_FUNCTIONAL_ASSESSMENT: 0-10
PAIN_FUNCTIONAL_ASSESSMENT: CPOT (CRITICAL CARE PAIN OBSERVATION TOOL)
PAIN_FUNCTIONAL_ASSESSMENT: PAINAD (PAIN ASSESSMENT IN ADVANCED DEMENTIA SCALE)
PAIN_FUNCTIONAL_ASSESSMENT: 0-10
PAIN_FUNCTIONAL_ASSESSMENT: CPOT (CRITICAL CARE PAIN OBSERVATION TOOL)
PAIN_FUNCTIONAL_ASSESSMENT: 0-10
PAIN_FUNCTIONAL_ASSESSMENT: PAINAD (PAIN ASSESSMENT IN ADVANCED DEMENTIA SCALE)
PAIN_FUNCTIONAL_ASSESSMENT: 0-10
PAIN_FUNCTIONAL_ASSESSMENT: 0-10
PAIN_FUNCTIONAL_ASSESSMENT: PAINAD (PAIN ASSESSMENT IN ADVANCED DEMENTIA SCALE)
PAIN_FUNCTIONAL_ASSESSMENT: 0-10
PAIN_FUNCTIONAL_ASSESSMENT: CPOT (CRITICAL CARE PAIN OBSERVATION TOOL)
PAIN_FUNCTIONAL_ASSESSMENT: 0-10
PAIN_FUNCTIONAL_ASSESSMENT: CPOT (CRITICAL CARE PAIN OBSERVATION TOOL)
PAIN_FUNCTIONAL_ASSESSMENT: 0-10
PAIN_FUNCTIONAL_ASSESSMENT: 0-10
PAIN_FUNCTIONAL_ASSESSMENT: PAINAD (PAIN ASSESSMENT IN ADVANCED DEMENTIA SCALE)
PAIN_FUNCTIONAL_ASSESSMENT: 0-10
PAIN_FUNCTIONAL_ASSESSMENT: CPOT (CRITICAL CARE PAIN OBSERVATION TOOL)
PAIN_FUNCTIONAL_ASSESSMENT: 0-10
PAIN_FUNCTIONAL_ASSESSMENT: 0-10
PAIN_FUNCTIONAL_ASSESSMENT: CPOT (CRITICAL CARE PAIN OBSERVATION TOOL)
PAIN_FUNCTIONAL_ASSESSMENT: 0-10
PAIN_FUNCTIONAL_ASSESSMENT: CPOT (CRITICAL CARE PAIN OBSERVATION TOOL)
PAIN_FUNCTIONAL_ASSESSMENT: 0-10
PAIN_FUNCTIONAL_ASSESSMENT: CPOT (CRITICAL CARE PAIN OBSERVATION TOOL)
PAIN_FUNCTIONAL_ASSESSMENT: 0-10

## 2023-01-01 ASSESSMENT — PAIN SCALES - GENERAL
PAINLEVEL_OUTOF10: 9
PAINLEVEL_OUTOF10: 0 - NO PAIN
PAINLEVEL_OUTOF10: 6
PAINLEVEL_OUTOF10: 8
PAINLEVEL_OUTOF10: 5 - MODERATE PAIN
PAINLEVEL_OUTOF10: 10 - WORST POSSIBLE PAIN
PAINLEVEL_OUTOF10: 8
PAINLEVEL_OUTOF10: 0 - NO PAIN
PAINLEVEL_OUTOF10: 0 - NO PAIN
PAINLEVEL_OUTOF10: 10 - WORST POSSIBLE PAIN
PAINLEVEL_OUTOF10: 0 - NO PAIN
PAINLEVEL_OUTOF10: 7
PAINLEVEL_OUTOF10: 0 - NO PAIN
PAINLEVEL_OUTOF10: 8
PAINLEVEL_OUTOF10: 0 - NO PAIN
PAINLEVEL_OUTOF10: 10 - WORST POSSIBLE PAIN
PAINLEVEL_OUTOF10: 0 - NO PAIN
PAINLEVEL_OUTOF10: 4
PAINLEVEL_OUTOF10: 0 - NO PAIN
PAINLEVEL_OUTOF10: 3
PAINLEVEL_OUTOF10: 8
PAINLEVEL_OUTOF10: 0 - NO PAIN
PAINLEVEL_OUTOF10: 10 - WORST POSSIBLE PAIN
PAINLEVEL_OUTOF10: 0 - NO PAIN
PAINLEVEL_OUTOF10: 8
PAINLEVEL_OUTOF10: 8
PAINLEVEL_OUTOF10: 0 - NO PAIN
PAINLEVEL_OUTOF10: 0 - NO PAIN
PAINLEVEL_OUTOF10: 3
PAINLEVEL_OUTOF10: 10 - WORST POSSIBLE PAIN
PAINLEVEL_OUTOF10: 9
PAINLEVEL_OUTOF10: 0 - NO PAIN
PAINLEVEL_OUTOF10: 7
PAINLEVEL_OUTOF10: 0 - NO PAIN
PAINLEVEL_OUTOF10: 7
PAINLEVEL_OUTOF10: 0 - NO PAIN
PAINLEVEL_OUTOF10: 10 - WORST POSSIBLE PAIN
PAINLEVEL_OUTOF10: 8
PAINLEVEL_OUTOF10: 7
PAINLEVEL_OUTOF10: 0 - NO PAIN
PAINLEVEL_OUTOF10: 10 - WORST POSSIBLE PAIN
PAINLEVEL_OUTOF10: 0 - NO PAIN
PAINLEVEL_OUTOF10: 6
PAINLEVEL_OUTOF10: 0 - NO PAIN
PAINLEVEL_OUTOF10: 9
PAINLEVEL_OUTOF10: 6
PAINLEVEL_OUTOF10: 5 - MODERATE PAIN
PAINLEVEL_OUTOF10: 10 - WORST POSSIBLE PAIN
PAINLEVEL_OUTOF10: 0 - NO PAIN
PAINLEVEL_OUTOF10: 3
PAINLEVEL_OUTOF10: 0 - NO PAIN
PAINLEVEL_OUTOF10: 0 - NO PAIN
PAINLEVEL_OUTOF10: 3
PAINLEVEL_OUTOF10: 0 - NO PAIN
PAINLEVEL_OUTOF10: 0 - NO PAIN
PAINLEVEL_OUTOF10: 6
PAINLEVEL_OUTOF10: 5 - MODERATE PAIN
PAINLEVEL_OUTOF10: 8
PAINLEVEL_OUTOF10: 0 - NO PAIN
PAINLEVEL_OUTOF10: 10 - WORST POSSIBLE PAIN
PAINLEVEL_OUTOF10: 0 - NO PAIN
PAINLEVEL_OUTOF10: 6
PAINLEVEL_OUTOF10: 8
PAINLEVEL_OUTOF10: 5 - MODERATE PAIN
PAINLEVEL_OUTOF10: 8
PAINLEVEL_OUTOF10: 9
PAINLEVEL_OUTOF10: 0 - NO PAIN
PAINLEVEL_OUTOF10: 0 - NO PAIN
PAINLEVEL_OUTOF10: 6
PAINLEVEL_OUTOF10: 0 - NO PAIN
PAINLEVEL_OUTOF10: 5 - MODERATE PAIN
PAINLEVEL_OUTOF10: 0 - NO PAIN
PAINLEVEL_OUTOF10: 0 - NO PAIN
PAINLEVEL_OUTOF10: 9
PAINLEVEL_OUTOF10: 2
PAINLEVEL_OUTOF10: 0 - NO PAIN

## 2023-01-01 ASSESSMENT — ACTIVITIES OF DAILY LIVING (ADL)
DRESSING YOURSELF: INDEPENDENT
BATHING_ASSISTANCE: MAXIMAL
GROOMING: INDEPENDENT
ASSISTIVE_DEVICE: DENTURES UPPER;DENTURES LOWER
JUDGMENT_ADEQUATE_SAFELY_COMPLETE_DAILY_ACTIVITIES: YES
TOILETING: INDEPENDENT
PATIENT'S MEMORY ADEQUATE TO SAFELY COMPLETE DAILY ACTIVITIES?: YES
WALKS IN HOME: INDEPENDENT
HEARING - LEFT EAR: FUNCTIONAL
BATHING: INDEPENDENT
ADEQUATE_TO_COMPLETE_ADL: YES
HEARING - RIGHT EAR: FUNCTIONAL
ADL_ASSISTANCE: INDEPENDENT
FEEDING YOURSELF: INDEPENDENT
LACK_OF_TRANSPORTATION: NO

## 2023-01-01 ASSESSMENT — PAIN DESCRIPTION - DESCRIPTORS
DESCRIPTORS: ACHING;CRAMPING
DESCRIPTORS: ACHING;CRAMPING
DESCRIPTORS: DISCOMFORT

## 2023-01-01 ASSESSMENT — PAIN DESCRIPTION - FREQUENCY: FREQUENCY: CONSTANT/CONTINUOUS

## 2023-01-01 ASSESSMENT — PAIN SCALES - WONG BAKER
WONGBAKER_NUMERICALRESPONSE: HURTS LITTLE BIT
WONGBAKER_NUMERICALRESPONSE: HURTS LITTLE MORE
WONGBAKER_NUMERICALRESPONSE: HURTS WHOLE LOT
WONGBAKER_NUMERICALRESPONSE: HURTS LITTLE MORE
WONGBAKER_NUMERICALRESPONSE: HURTS LITTLE BIT
WONGBAKER_NUMERICALRESPONSE: HURTS LITTLE BIT
WONGBAKER_NUMERICALRESPONSE: HURTS WHOLE LOT

## 2023-01-01 ASSESSMENT — PAIN DESCRIPTION - PROGRESSION: CLINICAL_PROGRESSION: NOT CHANGED

## 2023-01-01 ASSESSMENT — PATIENT HEALTH QUESTIONNAIRE - PHQ9
2. FEELING DOWN, DEPRESSED OR HOPELESS: SEVERAL DAYS
1. LITTLE INTEREST OR PLEASURE IN DOING THINGS: SEVERAL DAYS
SUM OF ALL RESPONSES TO PHQ9 QUESTIONS 1 & 2: 2

## 2023-01-01 ASSESSMENT — PAIN DESCRIPTION - PAIN TYPE: TYPE: CHRONIC PAIN;ACUTE PAIN

## 2023-01-01 ASSESSMENT — PAIN DESCRIPTION - ONSET: ONSET: AWAKENED FROM SLEEP

## 2023-01-01 ASSESSMENT — PAIN DESCRIPTION - LOCATION: LOCATION: ABDOMEN

## 2023-01-01 ASSESSMENT — PAIN DESCRIPTION - ORIENTATION: ORIENTATION: LOWER

## 2023-09-21 PROBLEM — C25.9 PANCREATIC CANCER (MULTI): Status: ACTIVE | Noted: 2023-01-01

## 2023-09-28 PROBLEM — K56.609 SMALL BOWEL OBSTRUCTION (MULTI): Status: ACTIVE | Noted: 2023-01-01

## 2023-09-29 PROBLEM — Z79.82 LONG TERM (CURRENT) USE OF ASPIRIN: Status: ACTIVE | Noted: 2023-01-01

## 2023-09-29 PROBLEM — Z94.1 HISTORY OF HEART TRANSPLANT (MULTI): Status: ACTIVE | Noted: 2018-01-31

## 2023-09-29 PROBLEM — T38.0X5A ADVERSE EFFECT OF CORTICOSTEROIDS: Status: ACTIVE | Noted: 2023-01-01

## 2023-09-29 PROBLEM — I42.9 CARDIOMYOPATHY (MULTI): Status: ACTIVE | Noted: 2023-01-01

## 2023-09-29 PROBLEM — D18.01 HEMANGIOMA OF SKIN AND SUBCUTANEOUS TISSUE: Status: ACTIVE | Noted: 2022-10-06

## 2023-09-29 PROBLEM — Z86.59 HISTORY OF DEPRESSION: Status: ACTIVE | Noted: 2023-01-01

## 2023-09-29 PROBLEM — D48.5 NEOPLASM OF UNCERTAIN BEHAVIOR OF SKIN: Status: ACTIVE | Noted: 2022-10-06

## 2023-09-29 PROBLEM — H52.4 MYOPIA OF BOTH EYES WITH ASTIGMATISM AND PRESBYOPIA: Status: ACTIVE | Noted: 2023-01-01

## 2023-09-29 PROBLEM — I25.5 ISCHEMIC CARDIOMYOPATHY: Status: ACTIVE | Noted: 2023-01-01

## 2023-09-29 PROBLEM — L90.5 SCAR CONDITION AND FIBROSIS OF SKIN: Status: ACTIVE | Noted: 2022-10-06

## 2023-09-29 PROBLEM — N18.30 ANEMIA DUE TO STAGE 3 CHRONIC KIDNEY DISEASE (MULTI): Status: ACTIVE | Noted: 2019-06-13

## 2023-09-29 PROBLEM — D22.39 MELANOCYTIC NEVI OF OTHER PARTS OF FACE: Status: ACTIVE | Noted: 2022-10-06

## 2023-09-29 PROBLEM — F32.9 REACTIVE DEPRESSION: Status: RESOLVED | Noted: 2018-01-31 | Resolved: 2023-01-01

## 2023-09-29 PROBLEM — Z85.828 PERSONAL HISTORY OF OTHER MALIGNANT NEOPLASM OF SKIN: Status: ACTIVE | Noted: 2022-10-06

## 2023-09-29 PROBLEM — I51.9 LV DYSFUNCTION: Status: ACTIVE | Noted: 2023-01-01

## 2023-09-29 PROBLEM — F41.1 GENERALIZED ANXIETY DISORDER: Status: ACTIVE | Noted: 2023-01-01

## 2023-09-29 PROBLEM — N18.4 CHRONIC KIDNEY DISEASE, STAGE 4, SEVERELY DECREASED GFR (MULTI): Status: ACTIVE | Noted: 2020-10-22

## 2023-09-29 PROBLEM — R30.0 DYSURIA: Status: ACTIVE | Noted: 2023-01-01

## 2023-09-29 PROBLEM — R60.0 EDEMA OF LEFT LOWER EXTREMITY: Status: ACTIVE | Noted: 2023-01-01

## 2023-09-29 PROBLEM — L91.8 OTHER HYPERTROPHIC DISORDERS OF THE SKIN: Status: ACTIVE | Noted: 2022-10-06

## 2023-09-29 PROBLEM — M79.89 SWELLING OF LOWER EXTREMITY: Status: ACTIVE | Noted: 2023-01-01

## 2023-09-29 PROBLEM — Z95.810 CARDIAC DEFIBRILLATOR IN PLACE: Status: ACTIVE | Noted: 2018-01-31

## 2023-09-29 PROBLEM — Z87.898 HISTORY OF BRADYCARDIA: Status: ACTIVE | Noted: 2023-01-01

## 2023-09-29 PROBLEM — I10 BENIGN ESSENTIAL HTN: Status: ACTIVE | Noted: 2018-01-31

## 2023-09-29 PROBLEM — E05.80 AMIODARONE-INDUCED HYPERTHYROIDISM: Status: ACTIVE | Noted: 2018-01-31

## 2023-09-29 PROBLEM — E87.70 HYPERVOLEMIA: Status: ACTIVE | Noted: 2023-01-01

## 2023-09-29 PROBLEM — G62.9 NEUROPATHY: Status: ACTIVE | Noted: 2023-01-01

## 2023-09-29 PROBLEM — L57.9 SKIN CHANGES DUE TO CHRONIC EXPOSURE TO NONIONIZING RADIATION, UNSPECIFIED: Status: ACTIVE | Noted: 2022-10-06

## 2023-09-29 PROBLEM — D50.9 IRON DEFICIENCY ANEMIA: Status: ACTIVE | Noted: 2023-01-01

## 2023-09-29 PROBLEM — R79.89 LOW VITAMIN D LEVEL: Status: ACTIVE | Noted: 2023-01-01

## 2023-09-29 PROBLEM — C44.319 BASAL CELL CARCINOMA OF SKIN OF OTHER PARTS OF FACE: Status: ACTIVE | Noted: 2022-10-06

## 2023-09-29 PROBLEM — T46.2X5A AMIODARONE-INDUCED HYPERTHYROIDISM: Status: ACTIVE | Noted: 2018-01-31

## 2023-09-29 PROBLEM — D84.9 IMMUNOSUPPRESSION (MULTI): Status: ACTIVE | Noted: 2023-01-01

## 2023-09-29 PROBLEM — D22.60 MELANOCYTIC NEVI OF UNSPECIFIED UPPER LIMB, INCLUDING SHOULDER: Status: ACTIVE | Noted: 2022-10-06

## 2023-09-29 PROBLEM — G45.9 TIA (TRANSIENT ISCHEMIC ATTACK): Status: ACTIVE | Noted: 2023-01-01

## 2023-09-29 PROBLEM — E83.42 HYPOMAGNESEMIA: Status: ACTIVE | Noted: 2023-01-01

## 2023-09-29 PROBLEM — F32.9 REACTIVE DEPRESSION: Status: ACTIVE | Noted: 2018-01-31

## 2023-09-29 PROBLEM — Z87.891 PERSONAL HISTORY OF NICOTINE DEPENDENCE: Status: ACTIVE | Noted: 2023-01-01

## 2023-09-29 PROBLEM — H52.13 MYOPIA OF BOTH EYES WITH ASTIGMATISM AND PRESBYOPIA: Status: ACTIVE | Noted: 2023-01-01

## 2023-09-29 PROBLEM — E11.22 TYPE 2 DIABETES MELLITUS WITH DIABETIC CHRONIC KIDNEY DISEASE (MULTI): Status: ACTIVE | Noted: 2023-01-01

## 2023-09-29 PROBLEM — R19.5 CHANGE IN CONSISTENCY OF STOOL: Status: ACTIVE | Noted: 2023-01-01

## 2023-09-29 PROBLEM — L98.0 PYOGENIC GRANULOMA: Status: ACTIVE | Noted: 2023-01-01

## 2023-09-29 PROBLEM — I10 HYPERTENSION: Status: ACTIVE | Noted: 2023-01-01

## 2023-09-29 PROBLEM — Z91.81 HISTORY OF FALLING: Status: ACTIVE | Noted: 2023-01-01

## 2023-09-29 PROBLEM — K83.1 OBSTRUCTION OF BILE DUCT (CMS-HCC): Status: ACTIVE | Noted: 2023-01-01

## 2023-09-29 PROBLEM — L82.1 OTHER SEBORRHEIC KERATOSIS: Status: ACTIVE | Noted: 2022-10-06

## 2023-09-29 PROBLEM — H50.30 EXOTROPIA, INTERMITTENT: Status: ACTIVE | Noted: 2023-01-01

## 2023-09-29 PROBLEM — N17.9 ACUTE ON CHRONIC RENAL FAILURE (CMS-HCC): Status: ACTIVE | Noted: 2023-01-01

## 2023-09-29 PROBLEM — N18.9 ACUTE ON CHRONIC RENAL FAILURE (CMS-HCC): Status: ACTIVE | Noted: 2023-01-01

## 2023-09-29 PROBLEM — R14.0 ABDOMINAL BLOATING: Status: ACTIVE | Noted: 2023-01-01

## 2023-09-29 PROBLEM — H25.13 NUCLEAR SCLEROSIS OF BOTH EYES: Status: ACTIVE | Noted: 2023-01-01

## 2023-09-29 PROBLEM — G62.9 PERIPHERAL POLYNEUROPATHY: Status: ACTIVE | Noted: 2023-01-01

## 2023-09-29 PROBLEM — H50.15 ALTERNATING EXOTROPIA: Status: ACTIVE | Noted: 2023-01-01

## 2023-09-29 PROBLEM — H52.4 PRESBYOPIA OF BOTH EYES: Status: ACTIVE | Noted: 2023-01-01

## 2023-09-29 PROBLEM — J44.9 COPD (CHRONIC OBSTRUCTIVE PULMONARY DISEASE) (MULTI): Status: ACTIVE | Noted: 2023-01-01

## 2023-09-29 PROBLEM — H52.203 MYOPIA OF BOTH EYES WITH ASTIGMATISM AND PRESBYOPIA: Status: ACTIVE | Noted: 2023-01-01

## 2023-09-29 PROBLEM — N52.9 ERECTILE DISORDER: Status: ACTIVE | Noted: 2023-01-01

## 2023-09-29 PROBLEM — E03.9 HYPOTHYROIDISM: Status: RESOLVED | Noted: 2023-01-01 | Resolved: 2023-01-01

## 2023-09-29 PROBLEM — L81.4 OTHER MELANIN HYPERPIGMENTATION: Status: ACTIVE | Noted: 2022-10-06

## 2023-09-29 PROBLEM — E03.9 HYPOTHYROIDISM: Status: ACTIVE | Noted: 2023-01-01

## 2023-09-29 PROBLEM — D63.1 ANEMIA DUE TO STAGE 3 CHRONIC KIDNEY DISEASE (MULTI): Status: ACTIVE | Noted: 2019-06-13

## 2023-09-29 PROBLEM — E05.80 OTHER THYROTOXICOSIS WITHOUT THYROTOXIC CRISIS OR STORM: Status: ACTIVE | Noted: 2023-01-01

## 2023-09-29 PROBLEM — I48.92 ATRIAL FLUTTER (MULTI): Status: ACTIVE | Noted: 2023-01-01

## 2023-09-29 PROBLEM — E04.0 GOITER DIFFUSE: Status: ACTIVE | Noted: 2023-01-01

## 2023-09-29 PROBLEM — L91.0 HYPERTROPHIC SCAR: Status: ACTIVE | Noted: 2022-10-06

## 2023-09-29 PROBLEM — M89.9 BONE DISORDER: Status: ACTIVE | Noted: 2023-01-01

## 2023-09-29 PROBLEM — L03.039 CELLULITIS OF TOE: Status: ACTIVE | Noted: 2023-01-01

## 2023-09-29 PROBLEM — D22.5 MELANOCYTIC NEVI OF TRUNK: Status: ACTIVE | Noted: 2022-10-06

## 2023-09-29 PROBLEM — M85.80 OSTEOPENIA: Status: ACTIVE | Noted: 2023-01-01

## 2023-09-29 PROBLEM — L21.8 OTHER SEBORRHEIC DERMATITIS: Status: ACTIVE | Noted: 2022-10-06

## 2023-09-29 PROBLEM — D22.70 MELANOCYTIC NEVI OF UNSPECIFIED LOWER LIMB, INCLUDING HIP: Status: ACTIVE | Noted: 2022-10-06

## 2023-09-30 NOTE — PROGRESS NOTES
Service: Nephrology     Subjective Data:   ABRIL PÉREZ is a 67 year old Male who is Hospital Day # 7 and POD #6 for Whipple procedure (pylorus procedure).    Overnight Events: Patient had an uneventful night.   Additional Information:    No acute events overnight  Doing better   PICC line placed yesterday    Objective Data:     Objective Information:      T   P  R  BP   MAP  SpO2   Value  36.6  78  20  183/82      96%  Date/Time 5/25 13:02 5/25 13:02 5/25 13:02 5/25 13:02    5/25 13:02  Range  (36C - 36.8C )  (69 - 89 )  (18 - 26 )  (145 - 195 )/ (77 - 98 )    (91% - 100% )   As of 25-May-2023 13:02:00, patient is on 2 L/min of oxygen via nasal cannula.      Pain reported at 5/25 9:41: 3 = Mild    ---- Intake and Output  -----  Mn/Dy/Year Time  Intake   Output  Net  May 25, 2023 2:00 pm  270   0  270  May 25, 2023 6:00 am  0   910  -910  May 24, 2023 10:00 pm  555   610  -55    The Intake and Output Totals for the last 24 hours are:      Intake   Output  Net      604   1520  -916    Physical Exam Narrative:  ·  Physical Exam:    Neurological: Awake, alert, visibly uncomfortable, oriented x 3   Cardiovascular: RRR  Head/Neck: NCAT  Respiratory/Thorax: even, unlabored, well healed prior sternotomy scar  Genitourinary: Portillo not in place  Gastrointestinal: abdomen soft, appropriately tender to palpation, persistent moderate distension. Incision c/d/i without bleeding. RLQ IRVING drain with serosanguinous output, LLQ IRVING brown/murky.  Skin: warm, dry  Musculoskeletal: BAILEY  Eyes: non-icteric  Extremities: BAILEY x 3  Psychological: lethargic      Medication:    Medications:          Continuous Medications       --------------------------------  No continuous medications are active       Scheduled Medications       --------------------------------    1. Acetaminophen:  650  mg  Oral  Every 6 Hours    2. Allopurinol:  100  mg  Oral  Every 24 Hours    3. Citalopram (CELEXA):  40  mg  Oral  Daily    4. Cyclobenzaprine:   5  mg  Oral  3 Times a Day    5. Fenofibrate:  160  mg  Oral  Daily    6. Gabapentin:  200  mg  Oral  At Bedtime    7. Heparin SubCutaneous:  5000  unit(s)  SubCutaneous  Every 8 Hours    8. hydrALAZINE (APRESOLINE):  100  mg  Oral  Every 12 Hours    9. Insulin Glargine (Lantus) Injectable:  10  unit(s)  SubCutaneous  Every 24 Hours    10. Insulin Lispro Moderate Corrective Scale:  unit(s)  SubCutaneous  Every 4 Hours    11. Isosorbide Dinitrate:  20  mg  Oral  3 Times a Day    12. Lidocaine 4% TransDermal:  1  patch  TransDermal  Every 24 Hours    13. Pantoprazole:  40  mg  Oral  Daily    14. Piperacillin - Tazobactam 2.25 grams/Iso-osmotic 50 mL Premix IVPB:  50  mL  IntraVenous Piggyback  Every 6 Hours    15. predniSONE:  5  mg  Oral  Every 24 Hours    16. Rosuvastatin:  40  mg  Oral  Every Night    17. Sodium Chloride 0.9% Injectable Flush:  10  mL  IntraVenous Flush  Every 12 Hours    18. Tacrolimus:  3  mg  Oral  <User Schedule>    19. Tacrolimus:  4  mg  Oral  <User Schedule>         PRN Medications       --------------------------------    1. Albuterol 2.5 mg - Ipratropium 0.5 mg/ 3 mL Neb Soln:  3  mL  Inhalation  Every 6 Hours    2. Dextrose 50% in Water Injectable:  25  gram(s)  IntraVenous Push  Every 15 Minutes    3. Glucagon Injectable:  1  mg  IntraMuscular  Every 15 Minutes    4. Heparin Flush 10 unit/ mL PF Injectable:  5  mL  IntraVenous Flush  Every 12 Hours    5. Heparin Flush 10 unit/ mL PF Injectable PRN:  5  mL  IntraVenous Flush  According to Flush Policy    6. HYDROmorphone:  2  mg  Oral  Every 4 Hours    7. HYDROmorphone:  4  mg  Oral  Every 4 Hours    8. HYDROmorphone Injectable:  0.4  mg  IntraVenous Push  Every 4 Hours    9. Naloxone Injectable:  0.2  mg  IntraVenous Push  Once    10. Ondansetron Injectable:  4  mg  IntraVenous Push  Every 6 Hours    11. Sodium Chloride 0.9% Injectable Flush:  10  mL  IntraVenous Flush  Every 8 Hours and as Needed    12. Sodium Chloride 0.9% Injectable  Flush PRN:  10  mL  IntraVenous Flush  According to Flush Policy    13. Sodium Chloride 0.9% Injectable Flush PRN:  20  mL  IntraVenous Flush  According to Flush Policy         Conditional Medication Orders       --------------------------------    1. Perflutren Lipid Microsphere (Activated) 1.3 mL / NaCL 0.9% T.V. 10 mL Injectable:  0.5  mL  IntraVenous Push  Once      Recent Lab Results:    Results:    CBC: 5/25/2023 05:35              \     Hgb     /                              \     7.5 L    /  WBC  ----------------  Plt               8.5       ----------------    179              /     Hct     \                              /     22.6 L    \            RBC: 2.53 L    MCV: 89           CMP: 5/25/2023 05:35  NA+        Cl-     BUN  /                         147 H   112 H    50 H /  --------------------------------  Glucose                ---------------------------  130 H    K+     HCO3-   Creat \                         4.2    25    2.56 H \           \  T Bili  /                    \  0.8  /  AST  x ---- x ALT        32 x ---- x 45         /  Alk P   \               /  70  \  Calcium : 8.2 L    Anion Gap : 14     Albumin : 2.8 L     T Protein : 5.1 L          Radiology Results:    Results:        Impression:    1.  Moderate clearing bibasilar atelectatic changes.        Xray Chest 1 View [May 25 2023 11:46AM]      Assessment and Plan:   Code Status:  ·  Code Status Full Code     Assessment:    ABRIL PÉREZ is a 67 year old Male with a past medical history of nonischemic cardiomyopathy secondary to valvular disease s/p bicaval heart transplant on  3/24/2017, diabetes mellitus type 2, amiodarone induced hyperthyroidism, GERD, gout, CKD stage III (baseline creatinine around 2 ) likely from chronic CNI use, pancreatic cancer admitted for   whipple?s with  done on 5/19.  Had an episode of 5 to10   seconds PEA arrest during the operation and ROSC with epi and admitted to SICU postop for close  monitoring. Transplant nephrology consulted for JOSY on CKD with oliguria and hyperkalemia for assistance in management    #JOSY on CKD stage IIIb: Improving  JOSY likely secondary to hemodynamic JOSY in the setting of suspected brief PEA arrest and hypotension Intra-Op and poor oral intake leading to acute tubular injury  -CKD from chronic CNI use with baseline creatinine around 2   Today Cr is down to 2.5 after blood transfusion    Recommendations:  CXR still looks wet  - Albumin/Lasix 40 mg iv today  -     #NICM s/p bicaval heart transplantation: Immunosuppression as per heart transplant team    #Anemia: Likely secondary to recent surgery.  Check iron studies and will give AMANDA as indicated once iron replete    #BMD: Corrected calcium magnesium levels are optimal.  Slightly low phos levels continue to monitor and replete as needed    #Adenocarcinoma for pancreas s/p Whipple surgery  consider reduced immunosuppression, but will defer to heart tx team    Discussed with primary team.        Electronic Signatures:  Rebekah Godfrey)  (Signed 25-May-2023 14:33)   Authored: Service, Subjective Data, Objective Data, Assessment  and Plan, Note Completion      Last Updated: 25-May-2023 14:33 by Rebekah Godfrey)

## 2023-09-30 NOTE — PROGRESS NOTES
Service: Surgical Oncology     Subjective Data:   ABRIL PÉREZ is a 67 year old Male who is Hospital Day # 2.     Patient seen and examined. Feeling better after NGT placement. He is passing flatus and had a BM this morning.    Objective Data:     Objective Information:      T   P  R  BP   MAP  SpO2   Value  36.7  77  18  153/86   87  96%  Date/Time 9/24 5:03 9/24 5:03 9/24 5:03 9/24 5:03  9/23 18:09 9/24 5:03  Range  (36C - 36.7C )  (74 - 86 )  (16 - 18 )  (108 - 153 )/ (70 - 88 )  (84 - 90 )  (95% - 98% )      Pain reported at 9/24 8:25: 8 = Severe    Physical Exam Narrative:  ·  Physical Exam:    General: A&Ox3. No acute distress.  HEENT: Normocephalic/atraumatic. Moist mucous membranes.   Neck: Supple. Normal ROM  Cardiac: Regular rate  Respiratory: No acute respiratory distress.  GI: Soft. nondistended, minimally TTP, NGT in place. non bilious  Extremities: No edema noted.     Recent Lab Results:    Results:    CBC: 9/24/2023 07:50              \     Hgb     /                              \     9.3 L    /  WBC  ----------------  Plt               9.6       ----------------    194              /     Hct     \                              /     27.2 L    \            RBC: 3.08 L    MCV: 88           BMP: 9/24/2023 07:50  NA+        Cl-     BUN  /                         137    103    37 H /  --------------------------------  Glucose                ---------------------------  164 H    K+     HCO3-   Creat \                         4.6  25    2.62 H \  Calcium : 8.9     Anion Gap : 14      CMP: 9/23/2023 18:06  NA+        Cl-     BUN  /                         Canceled    Canceled    Canceled  /  --------------------------------  Glucose                ---------------------------  Canceled    K+     HCO3-   Creat \                         Canceled    Canceled    Canceled  \           \  T Bili  /                    \  Canceled  /  AST  x ---- x ALT        Canceled x ---- x Canceled  Patients treated with Sulfasalazine may generate    falsely decreased results for ALT.         /  Alk P   \               /  Canceled  \  Calcium : Canceled     Anion Gap : Canceled     Albumin : Canceled     T Protein : Canceled           Radiology Results:    Results:        Impression:     NG tube present with tip in mid stomach.        Signed by Haroldo James MD     Xray Abdomen AP View [Sep 23 2023  2:19PM]      Impression:    Findings consistent with mid and distal multifocal at least partial  small bowel obstruction, most likely from adhesions.     Previous Whipple procedure. The previous stent in the pancreatic  remnant is now in a loop of jejunum in the mid right abdomen.     Eventration of the right diaphragm. Stable scarring at the lung  bases, right greater than left, and along the right minor fissure.     Cardiomegaly.     Very mild stable nonspecific retroperitoneal adenopathy, most likely  reactive.     Nonspecific edematous changes in the mesenteric fat of the abdomen  and upper pelvis, right greater than left. Small amount of  nonspecific perihepatic ascites.     MACRO:  None     CT Abdomen and Pelvis with IV Contrast [Sep 23 2023 10:41AM]      Assessment and Plan:   Daily Risk Screen:  ·  Does patient have a central line? yes   ·  Central Line Type mediport     Code Status:  ·  Code Status Full Code     Assessment:    ABRIL PÉREZ is a 67 year old Male with history of heart transplant in 2017 and whipple for pancreatic cancer on 5/19/2023 who presents as a transfer from  [facility] for SBO. Patient presentation and imaging consistent with SBO.     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 Will need GI consulted for advanced endoscopy/EGD and possible stent. Will discuss with them    Plan:  Continue NGT to [facility]  -IVF  - heart failure consult given cardiac transplantation    Will discuss with GI      Patient discussed with Dr. Sarkar    Patient's history,  labs, imaging, and treatment plan will be discussed with attending    This note is incomplete until it is finalized by the attending. Please do not make medical decisions based on it until it is finalized.     Iglesia Barahona DO PGY-4  Surgery  [facility]       Attestation:   Note Completion:  I am a:  Resident/Fellow   Attending Attestation I saw and evaluated the patient.  I personally obtained the key and critical portions of the history and physical exam or was physically present for key and  critical portions performed by the resident/fellow. I reviewed the resident/fellow?s documentation and discussed the patient with the resident/fellow.  I agree with the resident/fellow?s medical decision making as documented in the note.     I personally evaluated the patient on 24-Sep-2023         Electronic Signatures:  Yunier Sarkar)  (Signed 24-Sep-2023 18:42)   Authored: Note Completion   Co-Signer: Service, Subjective Data, Objective Data, Assessment and Plan, Note Completion  Iglseia Barahona ( (Resident))  (Signed 24-Sep-2023 11:48)   Authored: Service, Subjective Data, Objective Data, Assessment  and Plan, Note Completion      Last Updated: 24-Sep-2023 18:42 by Yunier Sarkar)

## 2023-09-30 NOTE — PROGRESS NOTES
Service: Heart Failure     Subjective Data:   ABRIL PÉREZ is a 67 year old Male who is Hospital Day # 6.     Patient is visibly frustrated today and expresses wish to go home. Denies chest pain, shortness of breath, cough, palpitations, dizziness. Denies abdominal pain with full liquids this morning.    Objective Data:     Objective Information:      T   P  R  BP   MAP  SpO2   Value  36.5  85  18  125/81      97%  Date/Time 9/28 9:02 9/28 9:02 9/28 9:02 9/28 9:02 9/28 9:02  Range  (36.1C - 36.9C )  (71 - 95 )  (16 - 18 )  (122 - 159 )/ (66 - 93 )    (96% - 99% )  Highest temp of 36.9 C was recorded at 9/27 1:44      Pain reported at 9/28 9:02: 0 = None    ---- Intake and Output  -----  Mn/Dy/Year Time  Intake   Output  Net  Sep 28, 2023 6:00 am  900   0  900  Sep 27, 2023 10:00 pm  1200   0  1200    The Intake and Output Totals for the last 24 hours are:      Intake   Output  Net      2100   null  null    Physical Exam by System:    Constitutional: Well developed, middle-aged man,  awake/alert/oriented x3, no acute distress, alert and cooperative   Eyes: PERRL, EOMI, clear sclera   ENMT: NG tube has been removed, brown liquid in suction  container   Head/Neck: Neck supple, no apparent injury, no JVD,  no lymphadenopathy   Respiratory/Thorax: Clear to auscultation bilaterally,  no wheezes or crackles   Cardiovascular: Regular, rate and rhythm, no murmurs,  2+ equal pulses of the extremities, normal S1 and S2   Gastrointestinal: Nondistended, soft, mild tenderness  to palpation LLQ   Musculoskeletal: ROM intact, normal strength   Extremities: normal extremities, no cyanosis, edema,  contusions or wounds, no clubbing. Left foot it nontender to palpation, DP pulse is 2+ and equal.   Neurological: alert and oriented x3, intact senses,  motor, response and reflexes, normal strength   Psychological: Frustrated mood and behavior   Skin: Warm and dry, no lesions, no rashes     Medication:    Medications:           Continuous Medications       --------------------------------    1. Lactated Ringers Infusion:  1000  mL  IntraVenous  <Continuous>         Scheduled Medications       --------------------------------    1. Heparin Flush 100 unit/ mL Injectable:  5  mL  IntraVenous Flush  Once    2. Heparin SubCutaneous:  5000  unit(s)  SubCutaneous  Every 8 Hours    3. Insulin Lispro Mild Corrective Scale:  unit(s)  SubCutaneous  Every 4 Hours    4. methylPREDNISolone Sodium Succinate Injectable:  4  mg  IntraVenous Push  Every Morning    5. Pantoprazole Injectable:  40  mg  IntraVenous Push  Every 12 Hours    6. Tacrolimus:  2  mg  SubLingual  <User Schedule>    7. Tacrolimus:  1.5  mg  SubLingual  <User Schedule>         PRN Medications       --------------------------------    1. Glucagon Injectable:  1  mg  IntraMuscular  Every 15 Minutes    2. Heparin Flush 10 unit/ mL Injectable PRN:  5  mL  IntraVenous Flush  According to Flush Policy    3. hydrALAZINE (APRESOLINE) Injectable:  10  mg  IntraVenous Push  Every 6 Hours    4. HYDROmorphone Injectable:  0.2  mg  IntraVenous Push  Every 4 Hours    5. HYDROmorphone Injectable:  0.4  mg  IntraVenous Push  Every 4 Hours    6. Melatonin:  5  mg  Oral  Daily 1800    7. Sodium Chloride 0.9% Injectable Flush:  10  mL  IntraVenous Flush  Every 8 Hours and as Needed    8. Sodium Chloride 0.9% Injectable Flush PRN:  10  mL  IntraVenous Flush  According to Flush Policy        Recent Lab Results:    Results:    CBC: 9/28/2023 05:41              \     Hgb     /                              \     9.1 L    /  WBC  ----------------  Plt               4.7       ----------------    209              /     Hct     \                              /     27.0 L    \            RBC: 3.12 L    MCV: 87           CMP: 9/28/2023 05:41  NA+        Cl-     BUN  /                         141    105    27 H /  --------------------------------  Glucose                ---------------------------  109  H    K+     HCO3-   Creat \                         3.6    25    2.18 H \           \  T Bili  /                    \  0.6  /  AST  x ---- x ALT        34 x ---- x 34         /  Alk P   \               /  68  \  Calcium : 9.1     Anion Gap : 15     Albumin : 3.1 L    T Protein : 5.1 L           Radiology Results:    Results:        Impression:    1. Nonspecific relative paucity bowel gas.  2. Nonspecific bibasilar opacities which may reflect a component of  chronic lung disease with or without superimposed  infectious/inflammatory infiltrateand atelectasis.  3. Medical devices as above.      Xray Abdomen AP View [Sep 24 2023  8:19AM]      Impression:  Xray Abdomen AP View [Sep 24 2023  3:53AM]      Impression:  Xray Abdomen AP View [Sep 24 2023  3:51AM]      Impression:     NG tube present with tip in mid stomach.        Signed by Haroldo James MD     Xray Abdomen AP View [Sep 23 2023  2:19PM]      Impression:    Findings consistent with mid and distal multifocal at least partial  small bowel obstruction, most likely from adhesions.     Previous Whipple procedure. The previous stent in the pancreatic  remnant is now in a loop of jejunum in the mid right abdomen.     Eventration of the right diaphragm. Stable scarring at the lung  bases, right greater than left, and along the right minor fissure.     Cardiomegaly.     Very mild stable nonspecific retroperitoneal adenopathy, most likely  reactive.     Nonspecific edematous changes in the mesenteric fat of the abdomen  and upper pelvis, right greater than left. Small amount of  nonspecific perihepatic ascites.     MACRO:  None     CT Abdomen and Pelvis with IV Contrast [Sep 23 2023 10:41AM]      Impression:  CT Abdomen and Pelvis with IV Contrast [Sep 23 2023  9:33AM]      Assessment and Plan:   Daily Risk Screen:  ·  Does patient have an indwelling urinary catheter? n/a consulting service   ·  Does patient have a central line? n/a consulting service      Comorbidities:  ·  Comorbidity Other     Code Status:  ·  Code Status Full Code     Assessment:    Chung Goodman is a 67-year-old man with history of  NICM secondary to valvular disease status post bicaval OHT (3/24/2017; status post mitral valve repair with [employer] AnnuloFlex  band #36 mm with induction) complicated by atrial arrhythmias, volume overload, right ventricular failure, acute on chronic kidney disease requiring RRT, chronotropic incompetence,  CKD, T2DM, amiodarone induced hyperthyroidism, psychosis, and most recently pancreatic cancer status post Whipple procedure (5/19/2023),  who presented to [facility] for abdominal pain, admitted for small bowel obstruction. Heart Failure/Transplant service has been consulted for further management of post-cardiac transplant medications.     9/26/2023 TTE  CONCLUSIONS:  1. Left ventricular systolic function is normal with a 55-60% estimated ejection fraction.  2. Abnormal septal motion consistent with post-operative status.   3. There is mildly reduced right ventricular systolic function.  4. S/p MV repair with 36mm [employer] Annuloflex band with mean MV gradient of 3mmHg and mild MR.  5. Compared with the prior exam from 5/30/2023 there are no significant  changes, though the RV was better seen today.      Assessment  Acute small bowel obstruction - clinically appears to be improving  Pancreatic adenocarcinoma status post Whipple procedure and on chemotherapy  NICM status post bicaval OHT (2017)  CKD  Gastric ulcer      Recommendations    - Switch to home dose tacrolimus 4mg PO @0630 and tacrolimus 3mg PO @1830 since patient is tolerating full liquids well  - Check tacrolimus level 0600 daily  - Switch to home dose prednisone 5mg daily  - Switch from IV hydralazine to PO hydralazine 50mg BID (patient is prescribed hydralazine 100mg BID at home however he has not been taking it as prescribed, if blood not well-controlled with hydralazine 50mg  BID, then can increase dose to 100mg BID)  - Gastroenterology recommendations after EGD yesterday have been reviewed  - Heart Failure/Transplant service will follow along with you very closely   - Since patient may require invasive procedures, agree with remaining on surgical oncology service for now until stable for medical management at which time patient can be transferred to Heart Failure/Transplant service        Patient examined and discussed with attending physician, Dr. Lazarus Landa.    Signed,  Cheryl Velasco MD  Heart Failure Fellow PGY4          Attestation:   Note Completion:  I am a:  Resident/Fellow   Attending Attestation I saw and evaluated the patient.  I personally obtained the key and critical portions of the history and physical exam or was physically present for key and  critical portions performed by the resident/fellow. I reviewed the resident/fellow?s documentation and discussed the patient with the resident/fellow.  I agree with the resident/fellow?s medical decision making as documented in the note.     I personally evaluated the patient on 28-Sep-2023   Comments/ Additional Findings    67M s/p OHT 2017 and pancreatic CA s/p Whipple (May 2023) now admitted with SBO s/p NGT decompression and endoscopy with improvement in obstruction.  Advancing diet as tolerated.  On appropriate IS therapy (though converting between PO to SL/IV). Would resume home oral medications.     OF note quite frustrated/yelling at staff on examination and evaluation today, adamant to go home. Defer to surgical service on advisability. Would continue home medications if able to tolerate diet.           Electronic Signatures:  Lazarus Landa (MD)  (Signed 28-Sep-2023 16:25)   Authored: Note Completion   Co-Signer: Service, Subjective Data, Objective Data, Assessment and Plan, Note Completion  Cheryl Velasco (Fellow))  (Signed 28-Sep-2023 13:15)   Authored: Service, Subjective Data,  Objective Data, Assessment  and Plan, Note Completion      Last Updated: 28-Sep-2023 16:25 by Lazarus Landa)

## 2023-09-30 NOTE — DISCHARGE SUMMARY
Send Summary:   Discharge Summary Providers:  Provider Role Provider Name   · Attending Luis Armando Johnson   · Primary Elena Rodriguez       Note Recipients: Luis Armando Johnson MD       Discharge:    Summary:   Admission Date: .23-Sep-2023 14:31:00   Discharge Date: 29-Sep-2023   Attending Physician at Discharge: Luis Armando Johnson   Admission Reason: abdominal pain (1)   Final Discharge Diagnoses: Small bowel obstruction   Nutrition Diagnosis:      Agree with dietitian?s assessment and diagnoses as stated.  A new diagnosis of Moderate malnutrition related to chronic disease or condition related  to pancreatic cancer s/p whipple on chemo (c/b obstructive symptoms x 1 month)  as evidence by reported < 75% estimated energy requirement x at least 1 month, significant 15% weight loss x 7 months, moderate muscle wasting, & moderate fat loss.  Procedures: EGD   Condition at Discharge: Satisfactory   Disposition at Discharge: .Home   Vital Signs:        T   P  R  BP   MAP  SpO2   Value  36.4  86  18  161/87      96%  Date/Time 9/29 2:51 9/29 2:51 9/28 22:44 9/29 2:51    9/29 2:51  Range  (36.1C - 36.5C )  (71 - 86 )  (18 - 18 )  (125 - 166 )/ (66 - 87 )    (96% - 100% )    Date:            Weight/Scale Type:  Height:   26-Sep-2023 11:16  80  kg         176.5  cm  Physical Exam:    Neurological: Awake, alert, conversive  Respiratory/Thorax: even, unlabored  Genitourinary: voiding  Gastrointestinal: soft, NT, ND  Skin: warm, dry  Musculoskeletal: BAILEY  Eyes: non-icteric  Extremities: no edema  Psychological: appropriate mood/affect   Hospital Course:    67 year old Male with history of heart transplant in 2017 and whipple for pancreatic cancer on 5/19/2023 who presents as a transfer from [facility] for SBO. Patient presentation  and imaging consistent with SBO.  NGT placed for decompression.  GI consulted and underwent EGD.  EGD 9/26/23 with concern for benign stenosis of afferent limb that was traversed with gastroscope with noted  upstream jejunal dilation, afferent limb stenosis  much improved after endoscope removal with likely dilation effect from endoscope and unclear if narrowing was intrinsic or extrinsic in nature, no endoscopic evidence of malignant involvement in stenosis. no additional jejunal stenotic regions noted in  the afferent limb but unable to reach hepaticojejunostomy or pancreaticojejunostomy due to significant scope looping, patent efferent limb anastomosis that was easily traversable, clean based ulcer with pigmented spots noted in gastric body that was likely  related to recent NGT placement.  NGT removed and diet advanced as tolerated.  NM home 9/29.    Immunizations:    Immunizations:  22-Mar-2017   Hepatitis A and B: Immunizations, 22-Mar-2017  09-Feb-2017   Pneumonia- Pneumococcal polysaccharide vaccine: Immunizations,  09-Feb-2017 09-Feb-2017   .Influenza- Influenza Virus: Immunizations, 09-Feb-2017      Discharge Information:    and Continuing Care:   Lab Results - Pending:    None  Radiology Results - Pending: None   Discharge Instructions:    Activity:           activity as tolerated.          May shower..            May not return to school/work.            May not drive while taking narcotics.      Nutrition/Diet:           Diet Consistency/Texture:   soft    Follow Up Appointments:    Follow-Up Appointment 01:           Physician/Dept/Service:   Dr Sarkar          Reason for Referral:   follow up appointment          Call to Schedule in:   2 weeks          Phone Number:   [phone]    Discharge Medications: Home Medication   ferrous sulfate 325 mg oral tablet - 1 tab(s) oral once a day  citalopram 40 mg oral tablet - 1 tab(s) oral once a day  rosuvastatin 40 mg oral tablet - 1 tab(s) oral once a day  gabapentin 100 mg oral capsule - 2 cap(s) oral once a day (at bedtime)  pantoprazole 40 mg oral delayed release tablet - 1 tab(s) oral 2 times a day  HumaLOG 100 units/mL injectable solution - use per sliding  scale with meals (max 40 units/day)  allopurinol 100 mg oral tablet - 1 tab(s) oral once a day  calcium carbonate 1.5 g oral tablet, chewable - 1 tab(s) oral 2 times a day  aspirin 81 mg oral delayed release tablet - 1 tab(s) oral once a day  fenofibrate 160 mg oral tablet - 1 tab(s) oral once a day  hydrALAZINE 50 mg oral tablet - 2 tab(s) oral 2 times a day  Multiple Vitamins with Minerals oral tablet - 1 tab(s) oral once a day  omega-3 polyunsaturated fatty acids 1000 mg oral capsule - 1 cap(s) oral 2 times a day  tacrolimus 1 mg oral capsule - 4 cap(s) oral once a day (in the morning)  cholecalciferol 125 mcg oral capsule - 1 cap(s) oral once a day  acetaminophen 325 mg oral tablet - 2 tab(s) orally every 6 hours  insulin glargine 100 units/mL subcutaneous solution - 10 unit(s) subcutaneous every 24 hours  amLODIPine 5 mg oral tablet - 1 tab(s) orally once a day   predniSONE 5 mg oral tablet - 1 tab(s) oral once a day  Zenpep 40,000 units-126,000 units-168,000 units oral delayed release capsule - 2 cap(s) oral 2 times a day (with meals)  lidocaine-prilocaine 2.5%-2.5% topical cream - Apply topically to affected area 30 min prior to port   Lomotil 2.5 mg-0.025 mg oral tablet - 2 tab(s) orally four times a day     C25 - pancreas cancer   ondansetron 8 mg oral tablet - 1 tab(s) orally every 8 hrs as needed for nausea 30  prochlorperazine 10 mg oral tablet - 1 tab(s) orally every 6 hrs as needed for nausea   tacrolimus 1 mg oral capsule - 3.5 cap(s) oral once a day (in the evening)  magnesium oxide 400 mg oral tablet - 2 tab(s) orally 2 times a day  Carafate 1 g/10 mL oral suspension - 10 milliliter(s) orally 3 times a day      PRN Medication     DNR Status:   ·  Code Status Code Status order at time of discharge: Full Code       Electronic Signatures:  Tigist Veras (APRN-CNP)  (Signed 29-Sep-2023 10:22)   Authored: Send Summary, Summary Content, Immunizations,  Ongoing Care, DNR Status, Note  "Completion      Last Updated: 29-Sep-2023 10:22 by Tigist Veras (APRN-CNP)    References:  1.  Data Referenced From \"Consult-Heart Failure\" 24-Sep-2023 11:47   "

## 2023-09-30 NOTE — PROGRESS NOTES
Service: Heart Failure     Subjective Data:   ABRIL PÉREZ is a 67 year old Male who is Hospital Day # 4.     Overnight patient awoke from sleep with substernal chest pressure 10/10, non radiating, not associated with shortness of breath, diaphoresis, dizziness. The chest pressure lasted for 5 minutes then resolved  without nitroglycerin. His Blood pressures I was elevated to 190s/100s at that time, he received hydralazine 10mg IV x1 which seemed to have helped. Denies further similar episodes of chest pain, denies burning chest pain.    Patient also endorses foot pain this morning, states he fractured his left foot when he fell at home on Saturday. He endorses significant improvement of abdominal pain, denies nausea, vomiting. He endorses one loose bowel movement this morning, non-bloody.    Objective Data:     Objective Information:      T   P  R  BP   MAP  SpO2   Value  36.4  83  18  161/91   113  97%  Date/Time 9/26 5:18 9/26 5:30 9/26 5:18 9/26 5:30  9/25 9:19 9/26 5:30  Range  (36C - 36.9C )  (67 - 88 )  (16 - 19 )  (147 - 178 )/ (89 - 101 )  (113 - 113 )  (95% - 99% )  Highest temp of 36.9 C was recorded at 9/25 13:29      Pain reported at 9/26 11:27: 3 = Mild    ---- Intake and Output  -----  Mn/Dy/Year Time  Intake   Output  Net  Sep 26, 2023 6:00 am  800   500  300  Sep 25, 2023 10:00 pm  400   [phone]  [phone]    The Intake and Output Totals for the last 24 hours are:      Intake   Output  Net      [phone]  [phone]    Physical Exam by System:    Constitutional: Well developed, middle-aged man,  awake/alert/oriented x3, no acute distress, alert and cooperative   Eyes: PERRL, EOMI, clear sclera   ENMT: NG tube in place draining brown liquid to suction  container   Head/Neck: Neck supple, no apparent injury, no JVD,  no lymphadenopathy   Respiratory/Thorax: Clear to auscultation bilaterally,  no wheezes or crackles   Cardiovascular: Regular, rate and rhythm, no murmurs,  2+ equal pulses of the  extremities, normal S1 and S2   Gastrointestinal: Nondistended, soft, mild tenderness  to palpation LLQ   Musculoskeletal: ROM intact, normal strength   Extremities: normal extremities, no cyanosis, edema,  contusions or wounds, no clubbing. Left foot it nontender to palpation, DP pulse is 2+ and equal.   Neurological: alert and oriented x3, intact senses,  motor, response and reflexes, normal strength   Psychological: Appropriate mood and behavior   Skin: Warm and dry, no lesions, no rashes     Medication:    Medications:          Continuous Medications       --------------------------------    1. Lactated Ringers Infusion:  1000  mL  IntraVenous  <Continuous>    2. Lactated Ringers Infusion:  1000  mL  IntraVenous  <Continuous>         Scheduled Medications       --------------------------------    1. Insulin Lispro Mild Corrective Scale:  unit(s)  SubCutaneous  Every 4 Hours    2. methylPREDNISolone Sodium Succinate Injectable:  4  mg  IntraVenous Push  Every Morning    3. Pantoprazole Injectable:  40  mg  IntraVenous Push  Every 24 Hours    4. Tacrolimus:  2  mg  SubLingual  <User Schedule>    5. Tacrolimus:  1.5  mg  SubLingual  <User Schedule>         PRN Medications       --------------------------------    1. Glucagon Injectable:  1  mg  IntraMuscular  Every 15 Minutes    2. hydrALAZINE (APRESOLINE) Injectable:  10  mg  IntraVenous Push  Every 6 Hours    3. HYDROmorphone Injectable:  0.2  mg  IntraVenous Push  Every 4 Hours    4. Melatonin:  5  mg  Oral  Daily 1800    5. Naloxone Injectable:  0.2  mg  IntraVenous Push  Once    6. Phenol Topical Spray:  1  spray(s)  Topical  4 Times a Day    7. Sodium Chloride 0.9% Injectable Flush:  10  mL  IntraVenous Flush  Every 8 Hours and as Needed           Currently Suspended Medications       --------------------------------    1. Heparin SubCutaneous:  5000  unit(s)  SubCutaneous  Every 8 Hours      Recent Lab Results:    Results:    CBC: 9/26/2023 05:31               \     Hgb     /                              \     11.3 L    /  WBC  ----------------  Plt               4.8       ----------------    288              /     Hct     \                              /     31.4 L    \            RBC: 3.66 L    MCV: 86           CMP: 9/26/2023 05:31  NA+        Cl-     BUN  /                         142    102    30 H /  --------------------------------  Glucose                ---------------------------  113 H    K+     HCO3-   Creat \                         3.9    25    2.09 H \           \  T Bili  /                    \  0.7  /  AST  x ---- x ALT        36 x ---- x 37         /  Alk P   \               /  83  \  Calcium : 10.1     Anion Gap : 19     Albumin : 3.7     T Protein : 6.0 L          Radiology Results:    Results:        Impression:    1. Nonspecific relative paucity bowel gas.  2. Nonspecific bibasilar opacities which may reflect a component of  chronic lung disease with or without superimposed  infectious/inflammatory infiltrateand atelectasis.  3. Medical devices as above.      Xray Abdomen AP View [Sep 24 2023  8:19AM]      Impression:  Xray Abdomen AP View [Sep 24 2023  3:53AM]      Impression:  Xray Abdomen AP View [Sep 24 2023  3:51AM]      Impression:     NG tube present with tip in mid stomach.        Signed by Haroldo James MD     Xray Abdomen AP View [Sep 23 2023  2:19PM]      Impression:    Findings consistent with mid and distal multifocal at least partial  small bowel obstruction, most likely from adhesions.     Previous Whipple procedure. The previous stent in the pancreatic  remnant is now in a loop of jejunum in the mid right abdomen.     Eventration of the right diaphragm. Stable scarring at the lung  bases, right greater than left, and along the right minor fissure.     Cardiomegaly.     Very mild stable nonspecific retroperitoneal adenopathy, most likely  reactive.     Nonspecific edematous changes in the mesenteric fat of the abdomen  and  upper pelvis, right greater than left. Small amount of  nonspecific perihepatic ascites.     MACRO:  None     CT Abdomen and Pelvis with IV Contrast [Sep 23 2023 10:41AM]      Impression:  CT Abdomen and Pelvis with IV Contrast [Sep 23 2023  9:33AM]      Assessment and Plan:   Daily Risk Screen:  ·  Does patient have an indwelling urinary catheter? n/a consulting service   ·  Does patient have a central line? n/a consulting service     Code Status:  ·  Code Status Full Code     Assessment:    Chung Goodman is a 67-year-old man with history of  NICM secondary to valvular disease status post bicaval OHT (3/24/2017; status post mitral valve repair with [employer] AnnuloFlex  band #36 mm with induction) complicated by atrial arrhythmias, volume overload, right ventricular failure, acute on chronic kidney disease requiring RRT, chronotropic incompetence,  CKD, T2DM, amiodarone induced hyperthyroidism, psychosis, and most recently pancreatic cancer status post Whipple procedure (5/19/2023),  who presented to [facility] for abdominal pain, admitted for small bowel obstruction. Heart Failure/Transplant service has been consulted for further management of post-cardiac transplant medications.     9/26/2023 TTE  CONCLUSIONS:  1. Left ventricular systolic function is normal with a 55-60% estimated ejection fraction.  2. Abnormal septal motion consistent with post-operative status.   3. There is mildly reduced right ventricular systolic function.  4. S/p MV repair with 36mm [employer] Annuloflex band with mean MV gradient of 3mmHg and mild MR.  5. Compared with the prior exam from 5/30/2023 there are no significant  changes, though the RV was better seen today.      Assessment  Acute small bowel obstruction  Pancreatic adenocarcinoma status post Whipple procedure and on chemotherapy  NICM status post bicaval OHT (2017)  CKD      Recommendations    - Continue tacrolimus 2mg sublingual @0630 and 1.5mg @1830  - Check  tacrolimus level 0600 daily  - Continue methylprednisolone 4mg IV daily  - See separate clinical event note-HF update regarding RRT overnight for chest pain. At this time chest pressure may have been related to elevated blood pressure (190s/100s), troponin level and echocardiogram findings are reassuring, less likely ACS/graft  dysfunction.   - Agree with blood pressure control with hydralazine 10mg IV q6h PRN for SBP >160  - Plan is for EGD today, for which patient's risk is average (see separate clinical event note-HF update)  - Heart Failure/Transplant service will follow along with you very closely   - Since patient may require invasive procedures, agree with remaining on surgical oncology service for now until stable for medical management at which time patient can be transferred to Heart Failure/Transplant service        Patient examined and discussed with attending physician, Dr. Lazarus Landa.    Signed,  Cheryl Velasco MD  Heart Failure Fellow PGY4          Attestation:   Note Completion:  I am a:  Resident/Fellow   Attending Attestation I saw and evaluated the patient.  I personally obtained the key and critical portions of the history and physical exam or was physically present for key and  critical portions performed by the resident/fellow. I reviewed the resident/fellow?s documentation and discussed the patient with the resident/fellow.  I agree with the resident/fellow?s medical decision making as documented in the note.     I personally evaluated the patient on 26-Sep-2023   Comments/ Additional Findings    67M s/p OHT 2017 and pancreatic CA s/p Whipple (May 2023) now admitted with SBO with NGT decompression. On appropriate IS therapy (though converted  to SL/IV). BP elevated off oral antihypertensives. Defer to surgery for management of SBO. No transplant/cardiac contraindication to endoscopy.           Electronic Signatures:  Lazarus Landa)  (Signed 26-Sep-2023  14:56)   Authored: Note Completion   Co-Signer: Service, Subjective Data, Objective Data, Assessment and Plan, Note Completion  Cheryl Velasco (MD (Fellow))  (Signed 26-Sep-2023 13:41)   Authored: Service, Subjective Data, Objective Data, Assessment  and Plan, Note Completion      Last Updated: 26-Sep-2023 14:56 by Lazarus Landa)

## 2023-09-30 NOTE — H&P
Service:   Critical Care Service:  ·  Service SICU  Purple     History of Present Illness:   HPI:    Mr. Goodman is a 66 y/o male who presents to SICU sp a whipple with  for pancreatic adenocarcinoma    His PMHx is significant for CKD, IDDM2 DM, amiodarone induced hyperthyroidism, and NICM secondary to valvular  disease who is s/p bicaval a heart transplant (3/24/17; s/p mitral valve repair )        OR course:  EBL: 200 ml  UOP: 420 ml  Crystalloid: 4.5 L  Colloid: 500 ml  Products: None  Intubation: easy intubation  Access: PIV, Left brachial A line        PMHx: As above  PSHx  Laparoscopic converted to open appendectomy, repair of terminal ileum small bowel injury ( 2019)  Heart transplant ( 2017)  AVR ( 2012)  MVr 1992   FHx: None contributory   SHx: former smoker, no ETOH or illicits    Home meds:  Allopurinol 100 mg daily  Aspirin 81 mg daily  Citalopram 40 mg daily  Fenofibrate 160 mg daily  Ferrous sulfate 325 mg daily  Gabapentin 200 mg qhs  Hydralazine 100 mg BID  Lantus 18 units daily  Magnesium Oxide 800 mg BID  Multivitamins with minerals daily  Oxycodone PRN  Pantoprazole 40 mg BID  Prednisone 5 mg daily  Rosuvastatin 40 mg daily  Tacrolimus 4 mg qAM and 3 mg q PM  Zenpep 40.000 units-126.000- 168.000, takes 2 caps BID         Comorbidities:   Comorbidites:  ·  Comorbid Conditions chronic kidney disease, diabetes   ·  Chronic Kidney Disease diabetic   ·  CKD Stage Stage 3   ·  Diabetes Type Type 2   ·  Insulin Dependent unknown   ·  DM Acuity or Status unknown            Allergies:  ·  morphine : Itching    Medications Prior to Admission:   Admission Medication Reconciliation has not been completed for this patient.    Review of Systems:   Incomplete ROS: Drowsy post op       Objective:   Objective Information:    ·  Objective Information      T   P  R  BP   MAP  SpO2   Value  35.9  83  20  112/88   98  100%  Date/Time 5/19 18:00 5/19 18:00 5/19 18:00 5/19 18:00  5/19 18:00 5/19  18:00  Range  (35.9C - 35.9C )  (83 - 83 )  (20 - 20 )  (112 - 112 )/ (88 - 88 )  (98 - 98 )  (100% - 100% )      Pain reported at 5/19 8:25: 0 = None        Date:            Weight/Scale Type:  19-May-2023 08:25  86.1  kg           Physical Exam by System:    Neurological: Drowsy post op , moves all extremities   Cardiovascular: Regular, rate and rhythm, no murmurs,  2+ equal pulses of the extremities, normal S 1and S 2   Respiratory/Thorax: Patent airways, CTAB, normal  breath sounds with good chest expansion, thorax symmetric. on 10 L SFM   Genitourinary: Portillo draining clear urine   Gastrointestinal: Abdomen soft, NT, 2 drains with  serosanguinous drainage   Skin: No rashes or lesions   Constitutional: Drowsy   Head/Neck: AT/NC   Extremities: No edema   Psychological: unable to assess     Medications:    Medications:          Continuous Medications       --------------------------------    1. Lactated Ringers Infusion:  1000  mL  IntraVenous  <Continuous>    2. Ropivacaine 0.2%/ Ambit Pump 500 mL:  1000  mg  Peripheral Nerve  <Continuous>         Scheduled Medications       --------------------------------    1. Insulin Lispro Moderate Corrective Scale:  unit(s)  SubCutaneous  Every 4 Hours    2. Lactated Ringers IV Bolus:  500  mL  IntraVenous Piggyback  Once    3. Piperacillin - Tazobactam 3.375 gram/Iso-osmotic 50 mL Premix IVPB:  50  mL  IntraVenous Piggyback  Every 6 Hours         PRN Medications       --------------------------------    1. Calcium Gluconate 1 gram/ NaCL 0.67% 50 mL Premix IVPB:  50  mL  IntraVenous Piggyback  Every 6 Hours    2. Calcium Gluconate 2 gram/ NaCL 0.67% 100 mL Premix IVPB:  100  mL  IntraVenous Piggyback  Every 6 Hours    3. Dextrose 50% in Water Injectable:  25  gram(s)  IntraVenous Push  Every 15 Minutes    4. Glucagon Injectable:  1  mg  IntraMuscular  Every 15 Minutes    5. Magnesium Sulfate 2 gram/Sterile Water 50 mL Premix Soln:  2  gram(s)  IntraVenous Piggyback  Every 6  Hours    6. Magnesium Sulfate 4 gram/Sterile Water 100 mL Premix Soln:  4  gram(s)  IntraVenous Piggyback  Every 6 Hours    7. Potassium Chloride 20 mEq/Sterile Water 100 mL Premix IVPB:  20  mEq  IntraVenous Piggyback  Every 6 Hours    8. Sodium Chloride 0.9% Injectable Flush:  10  mL  IntraVenous Flush  Every 8 Hours and as Needed          Recent Lab Results:    Results:    CBC: 5/19/2023 18:10              \     Hgb     /                              \     9.4 L    /  WBC  ----------------  Plt               14.6 H    ----------------    248              /     Hct     \                              /     28.7 L    \            RBC: 3.11 L    MCV: 92             ---------- Recent Arterial Blood Gas Results----------     5/19/2023 18:06  pO2 100  24 h range: ( 100 - 234 )  pH 7.23  24 h range: ( 7.23 - 7.33 )  pCO2 51  24 h range: ( 41 - 51 )  SO2 100  24 h range: ( 99 - 100 )  Base Excess -6.1  24 h range: ( -6.1 - -3.1 )null    Assessment and Plan:   Other:  Diagnosis:    Mr. Goodman is a 68 y/o male who presents to SICU [facility]   with  for pancreatic adenocarcinoma    His PMHx is significant for CKD, IDDM2 DM, amiodarone induced hyperthyroidism, and NICM secondary to valvular  disease who is s/p bicaval a heart transplant (3/24/17)      NEURO: Drowsy but arousable. Hx Gout and depression.   - Start Toradol POD1 if appropriate.  - Ongoing neuro and pain assessments  -Hold home : Gabapentin, Citalpram, and Allopurinol     CV: NICM secondary to valvular s/p bicaval a heart transplant March 2017. Last Echo (4/29/23): TTE showed LVEF 50-55%, no wall  motion abnormality  - Goals MAP 65-85.  - Continuous EKG, ABP monitoring  -Hold home Aspirin, Fenofibrate, and Hydralazine  -Immunosuppression with : Tacrolimus 4 mg qAM/ 3 mg q PM and prednisone 5 mg daily à transplant service for consulted IS management  . Hold IS for tononight. Send a tacrolimus level at 0600 AM    PULM: Extubated postoperatively  without difficulty  - Wean FiO2 as tolerated.    - Q1h incentive spirometer while awake  - additional pulm toilet prn    - F/U post op CXR    GI: Pancreatic cancer sp a whipple 5/19/23 with  . Hx GERD  - NPO  - Advance diet per surgical service   - Do not manipulate NGT  - PPI for GI prophylaxis  - Obtain post op and daily LFTs.    : CKD stage III, baseline creatinine ~ 2-2.2   - Maintenance IVF.    - Volume resuscitation as needed.    - Maintain U/O >0.5ml/kg/hr.    - Check renal function panel post op and daily.   - Replete electrolytes to goal K>4, Mg>2, Phos>2.5, ionized Ca>1.10.    HEME: Acute surgical blood loss anemia  - Check CBC and coags post op, q6h overnight, then daily if stable  - lovenox sc starting POD 1 if appropriate    ENDO: IDDM2. At home 18 units lantus + lispro SSI. Received lantus 20 units intraop   - Q4h BG checks and SSI Lispro per ICU protocol.    ID: Afebrile. Post WBC pending   -Zosyn x 5 days per SurgOnc     Lines:    5/19 Left brachial A line   PIV      Dispo: continue ICU care. Reassess dispo in am    Pita Terry PA-C    Team PHONE [phone]                Code Status:  ·  Code Status Full Code       Electronic Signatures:  Pita Terry (PAC)  (Signed 19-May-2023 19:16)   Authored: Service, History of Present Illness, Comorbidities,  Allergies, Medications Prior to Admission, Review of Systems, Objective, Assessment and Plan, Note Completion      Last Updated: 19-May-2023 19:16 by Pita Terry (PAC)

## 2023-09-30 NOTE — PROGRESS NOTES
Service: Surgical Oncology     Subjective Data:   ABRIL PÉREZ is a 67 year old Male who is Hospital Day # 7 and POD #6 for Whipple procedure (pylorus procedure).    Overnight Events: Patient had an uneventful night.   Additional Information:    No acute events overnight  patient continuing to have issues with hypertension  pain better controlled  continues to complain of feeling bloated; is having BM; denies nausea/vomiting    Objective Data:     Objective Information:      T   P  R  BP   MAP  SpO2   Value  36.4  79  20  164/92      93%  Date/Time 5/25 15:36 5/25 15:36 5/25 15:36 5/25 15:36    5/25 15:36  Range  (36C - 36.8C )  (69 - 89 )  (18 - 26 )  (145 - 195 )/ (77 - 98 )    (91% - 100% )   As of 25-May-2023 13:02:00, patient is on 2 L/min of oxygen via nasal cannula.      Pain reported at 5/25 18:16: 8 = Severe    ---- Intake and Output  -----  Mn/Dy/Year Time  Intake   Output  Net  May 25, 2023 2:00 pm  270   0  270  May 25, 2023 6:00 am  0   910  -910  May 24, 2023 10:00 pm  555   610  -55    The Intake and Output Totals for the last 24 hours are:      Intake   Output  Net      608   1520  -917    Physical Exam Narrative:  ·  Physical Exam:    Neurological: Awake, alert, visibly uncomfortable, oriented x 3   Cardiovascular: RRR  Head/Neck: NCAT; nasal cannula  Respiratory/Thorax: even, shallow breaths, mildly improving, well healed prior sternotomy scar  Genitourinary: Portillo not in place  Gastrointestinal: abdomen soft, appropriately tender to palpation, persistent moderate distension. Incision c/d/i without bleeding. RLQ IRVING drain with serosanguinous output, LLQ IRVING brown/murky.  Skin: warm, dry  Musculoskeletal: BAILEY  Eyes: non-icteric  Extremities: BAILEY x 3  Psychological: lethargic      Medication:    Medications:          Continuous Medications       --------------------------------  No continuous medications are active       Scheduled Medications       --------------------------------    1.  Acetaminophen:  650  mg  Oral  Every 6 Hours    2. Allopurinol:  100  mg  Oral  Every 24 Hours    3. Citalopram (CELEXA):  40  mg  Oral  Daily    4. Cyclobenzaprine:  5  mg  Oral  3 Times a Day    5. Fenofibrate:  160  mg  Oral  Daily    6. Gabapentin:  200  mg  Oral  At Bedtime    7. Heparin SubCutaneous:  5000  unit(s)  SubCutaneous  Every 8 Hours    8. hydrALAZINE (APRESOLINE):  100  mg  Oral  Every 12 Hours    9. Insulin Glargine (Lantus) Injectable:  10  unit(s)  SubCutaneous  Every 24 Hours    10. Insulin Lispro Moderate Corrective Scale:  unit(s)  SubCutaneous  Every 4 Hours    11. Isosorbide Dinitrate:  20  mg  Oral  3 Times a Day    12. Lidocaine 4% TransDermal:  1  patch  TransDermal  Every 24 Hours    13. Pantoprazole:  40  mg  Oral  Daily    14. Piperacillin - Tazobactam 2.25 grams/Iso-osmotic 50 mL Premix IVPB:  50  mL  IntraVenous Piggyback  Every 6 Hours    15. predniSONE:  5  mg  Oral  Every 24 Hours    16. Rosuvastatin:  40  mg  Oral  Every Night    17. Sodium Chloride 0.9% Injectable Flush:  10  mL  IntraVenous Flush  Every 12 Hours    18. Tacrolimus:  3  mg  Oral  <User Schedule>    19. Tacrolimus:  4  mg  Oral  <User Schedule>         PRN Medications       --------------------------------    1. Albuterol 2.5 mg - Ipratropium 0.5 mg/ 3 mL Neb Soln:  3  mL  Inhalation  Every 6 Hours    2. Dextrose 50% in Water Injectable:  25  gram(s)  IntraVenous Push  Every 15 Minutes    3. Glucagon Injectable:  1  mg  IntraMuscular  Every 15 Minutes    4. Heparin Flush 10 unit/ mL PF Injectable:  5  mL  IntraVenous Flush  Every 12 Hours    5. Heparin Flush 10 unit/ mL PF Injectable PRN:  5  mL  IntraVenous Flush  According to Flush Policy    6. HYDROmorphone:  2  mg  Oral  Every 4 Hours    7. HYDROmorphone:  4  mg  Oral  Every 4 Hours    8. HYDROmorphone Injectable:  0.4  mg  IntraVenous Push  Every 4 Hours    9. Naloxone Injectable:  0.2  mg  IntraVenous Push  Once    10. Ondansetron Injectable:  4  mg   IntraVenous Push  Every 6 Hours    11. Sodium Chloride 0.9% Injectable Flush:  10  mL  IntraVenous Flush  Every 8 Hours and as Needed    12. Sodium Chloride 0.9% Injectable Flush PRN:  10  mL  IntraVenous Flush  According to Flush Policy    13. Sodium Chloride 0.9% Injectable Flush PRN:  20  mL  IntraVenous Flush  According to Flush Policy         Conditional Medication Orders       --------------------------------    1. Perflutren Lipid Microsphere (Activated) 1.3 mL / NaCL 0.9% T.V. 10 mL Injectable:  0.5  mL  IntraVenous Push  Once      Recent Lab Results:    Results:    CBC: 5/25/2023 05:35              \     Hgb     /                              \     7.5 L    /  WBC  ----------------  Plt               8.5       ----------------    179              /     Hct     \                              /     22.6 L    \            RBC: 2.53 L    MCV: 89           CMP: 5/25/2023 05:35  NA+        Cl-     BUN  /                         147 H   112 H    50 H /  --------------------------------  Glucose                ---------------------------  130 H    K+     HCO3-   Creat \                         4.2    25    2.56 H \           \  T Bili  /                    \  0.8  /  AST  x ---- x ALT        32 x ---- x 45         /  Alk P   \               /  70  \  Calcium : 8.2 L    Anion Gap : 14     Albumin : 2.8 L     T Protein : 5.1 L          Assessment and Plan:   Comorbidities:  ·  Comorbidity Other     Code Status:  ·  Code Status Full Code     Assessment:    68 yo M s/p paulette with Dr. Johnson for pancreatic adenocarcinoma Patient has a hx of heart transplant. Did have an episode of possible 5-10 second PEA arrest during  operation, regular rhythm resumed with epi. In ICU post-op for close monitoring given cardiac history. VSS, afebrile.    PLAN:  Neuro - d/c PCA, PO pain meds - hydromorphone per HF team,  tylenol, gabapentin, home Celexa, delirium precautions  CV - tacro management per transplant team, pred 5,  rosuvastatin, hydralazine 100 BID, adding isosorbide mononitrate per HF team; fenofibrate, cards  transplant team following, appreciate recs, echo pending; midline for access for tacrolimus  Resp - OOB, encourage IS, wean O2 as tolerated  GI - full liquid diet, PPI  /Renal - HLIVF, strict I/Os, appreciate nephrology recs, 40 IV lasix + albumin 25% 12.5g  Heme - no acute transfusion needs  ID - Zosyn x 5 days (end date 5/24)  Endo - SSI, home allopurinol, lantus 10u  Prophy - SCDs, Lovenox, Incentive Spirometer  Dispo - RNF    Discussed with attending physician Dr. Johnson.   Izabela Bowden MD [facility] PGY1  Surgical Oncology/uck pager [phone]      Attestation:   Note Completion:  I am a:  Resident/Fellow   Attending Attestation I saw and evaluated the patient.  I personally obtained the key and critical portions of the history and physical exam or was physically present for key and  critical portions performed by the resident/fellow. I reviewed the resident/fellow?s documentation and discussed the patient with the resident/fellow.  I agree with the resident/fellow?s medical decision making as documented in the note.     I personally evaluated the patient on 25-May-2023         Electronic Signatures:  Izabela Bowden (Resident))  (Signed 25-May-2023 18:58)   Authored: Service, Subjective Data, Objective Data, Assessment  and Plan, Note Completion  Luis Armando Johnson)  (Signed 26-May-2023 16:50)   Authored: Note Completion   Co-Signer: Service, Subjective Data, Objective Data, Assessment and Plan, Note Completion      Last Updated: 26-May-2023 16:50 by Luis Armando Johnson)

## 2023-09-30 NOTE — PROGRESS NOTES
Service: Heart Failure     Subjective Data:   ABRIL PÉREZ is a 67 year old Male who is Hospital Day # 5 and POD #4 for Whipple procedure (pylorus procedure).     off oxygen this AM   tolerating CLD   BP much better controlled.   FK AM 3.7 >>4.4   no stool documented since surgery  c/p abdominal pain/increasing.    Objective Data:     Objective Information:      T   P  R  BP   MAP  SpO2   Value  36.1  96  18  174/77   94  94%  Date/Time 5/23 13:12 5/23 13:12 5/23 13:12 5/23 13:12  5/22 20:22 5/23 13:12  Range  (35.9C - 36.8C )  (94 - 108 )  (18 - 32 )  (134 - 175 )/ (56 - 99 )  (83 - 110 )  (91% - 100% )   As of 23-May-2023 09:00:00, patient is on 2 L/min of oxygen via nasal cannula.      Pain reported at 5/23 12:14: 0 = None    ---- Intake and Output  -----  Mn/Dy/Year Time  Intake   Output  Net  May 23, 2023 6:00 am  128   475  -347  May 22, 2023 10:00 pm  80   180  -100  May 22, 2023 2:00 pm  275   415  -140    The Intake and Output Totals for the last 24 hours are:      Intake   Output  Net      242   1070  -587    Physical Exam Narrative:  ·  Physical Exam:    Gen: comfortably laying in hospital bed. No acute distress  HEENT: trachea midline. no JVD  CV: S1 S2 heard no murmur  Pulm: good air movement. Crackles b/l   Abd: soft, tender s/p surgery   Ext: warm and well-perfused no edema.   Neuro: CN II-XII grossly intact     Physical Exam by System:    Constitutional: Well developed, awake/alert/oriented  x3, no distress, alert and cooperative   Head/Neck: Neck supple, no apparent injury, thyroid  without mass or tenderness, No JVD, trachea midline, no bruits   Respiratory/Thorax: Patent airways, CTAB, normal  breath sounds with good chest expansion, thorax symmetric   Cardiovascular: Regular, rate and rhythm, no murmurs,  2+ equal pulses of the extremities, normal S 1and S 2   Gastrointestinal: Surgical drain in place with SS  fluid   Extremities: normal extremities, no cyanosis edema,  contusions or  wounds, no clubbing     Medication:    Medications:          Continuous Medications       --------------------------------    1. HYDROmorphone PCA 15 mg/ NaCL 0.9% 30 mL:  2.6  IV PCA  <Continuous>    2. Lactated Ringers Infusion:  1000  mL  IntraVenous  <Continuous>    3. Sodium Chloride 0.9% Infusion:  1000  mL  IntraVenous  <Continuous>         Scheduled Medications       --------------------------------    1. Acetaminophen:  650  mg  Oral  Every 6 Hours    2. Allopurinol:  100  mg  Oral  Every 24 Hours    3. Citalopram (CELEXA):  40  mg  Oral  Daily    4. Cyclobenzaprine:  5  mg  Oral  3 Times a Day    5. Fenofibrate:  160  mg  Oral  Daily    6. Gabapentin:  200  mg  Oral  At Bedtime    7. Heparin SubCutaneous:  5000  unit(s)  SubCutaneous  Every 8 Hours    8. hydrALAZINE (APRESOLINE):  100  mg  Oral  Every 12 Hours    9. Insulin Glargine (Lantus) Injectable:  10  unit(s)  SubCutaneous  Every 24 Hours    10. Insulin Lispro Moderate Corrective Scale:  unit(s)  SubCutaneous  Every 4 Hours    11. Lidocaine 4% TransDermal:  1  patch  TransDermal  Every 24 Hours    12. Pantoprazole:  40  mg  Oral  Daily    13. Piperacillin - Tazobactam 2.25 grams/Iso-osmotic 50 mL Premix IVPB:  50  mL  IntraVenous Piggyback  Every 6 Hours    14. predniSONE:  5  mg  Oral  Every 24 Hours    15. Rosuvastatin:  40  mg  Oral  Every Night    16. Sodium Chloride 0.9% Injectable Flush:  10  mL  IntraVenous Flush  Every 12 Hours    17. Tacrolimus:  3  mg  Oral  <User Schedule>    18. Tacrolimus:  4  mg  Oral  <User Schedule>         PRN Medications       --------------------------------    1. Dextrose 50% in Water Injectable:  25  gram(s)  IntraVenous Push  Every 15 Minutes    2. Glucagon Injectable:  1  mg  IntraMuscular  Every 15 Minutes    3. Heparin Flush 10 unit/ mL PF Injectable:  5  mL  IntraVenous Flush  Every 12 Hours    4. Heparin Flush 10 unit/ mL PF Injectable PRN:  5  mL  IntraVenous Flush  According to Flush Policy    5.  Naloxone Injectable:  0.2  mg  IntraVenous Push  Once    6. Ondansetron Injectable:  4  mg  IntraVenous Push  Every 6 Hours    7. oxyCODONE Immediate Release:  5  mg  Oral  Every 4 Hours    8. Sodium Chloride 0.9% Injectable Flush:  10  mL  IntraVenous Flush  Every 8 Hours and as Needed    9. Sodium Chloride 0.9% Injectable Flush PRN:  10  mL  IntraVenous Flush  According to Flush Policy    10. Sodium Chloride 0.9% Injectable Flush PRN:  20  mL  IntraVenous Flush  According to Flush Policy         Conditional Medication Orders       --------------------------------    1. Perflutren Lipid Microsphere (Activated) 1.3 mL / NaCL 0.9% T.V. 10 mL Injectable:  0.5  mL  IntraVenous Push  Once      Recent Lab Results:    Results:    CBC: 5/23/2023 06:49              \     Hgb     /                              \     6.9 L    /  WBC  ----------------  Plt               10.9       ----------------    196              /     Hct     \                              /     21.5 L    \            RBC: 2.33 L    MCV: 92           CMP: 5/23/2023 06:49  NA+        Cl-     BUN  /                         146 H   112 H    51 H /  --------------------------------  Glucose                ---------------------------  139 H    K+     HCO3-   Creat \                         4.6    26    2.94 H \           \  T Bili  /                    \  0.7  /  AST  x ---- x ALT        52 Hx ---- x 77 H         /  Alk P   \               /  72  \  Calcium : 8.0 L    Anion Gap : 13     Albumin : 2.9 L     T Protein : 5.0 L          Radiology Results:    Results:        Conclusion:  CONCLUSIONS:  1. Poorly visualized anatomical structures due to suboptimal image quality.  2. Left ventricular systolic function is low normal with a 50% estimated ejection fraction.  3. Abnormal septal motion consistent with post-operative status.    QUANTITATIVE DATA SUMMARY:  2D MEASUREMENTS:  Normal Ranges:  Ao Root d:     3.55 cm   (2.0-3.7cm)  LAs:           6.00 cm    (2.7-4.0cm)  IVSd:          1.10 cm   (0.6-1.1cm)  LVPWd:         0.80 cm   (0.6-1.1cm)  LVIDd:         5.30 cm   (3.9-5.9cm)  LVIDs:         4.30 cm  LV Mass Index: 91.9 g/m2  LV % FS        18.9 %    LA VOLUME:  Normal Ranges:  LA Vol A4C:        148.3 ml  (22+/-6mL/m2)  LA Vol Index A4C:  72.8ml/m2  LA Area A4C:       34.6 cm2  LA Major Axis A4C: 6.9 cm    M-MODE MEASUREMENTS:  Normal Ranges:  Ao Root: 4.20 cm (2.0-3.7cm)  LAs:     5.90 cm (2.7-4.0cm)    AORTA MEASUREMENTS:  Normal Ranges:  Ao Sinus, d: 4.00 cm (2.1-3.5cm)  Asc Ao, d:   4.00 cm (2.1-3.4cm)  Ao Arch:     3.00 cm (2.0-3.6cm)    LV SYSTOLIC FUNCTION BY 2D PLANIMETRY (MOD):  Normal Ranges:  EF-A4C View: 35.9 % (>=55%)  EF-A2C View: 53.2 %  EF-Biplane:  45.0 %    LV DIASTOLIC FUNCTION:  Normal Ranges:  MV Peak E:      1.22 m/s    (0.7-1.2 m/s)  MV Peak A:      0.66 m/s    (0.42-0.7 m/s)  E/A Ratio:      1.85        (1.0-2.2)  MV e'           0.08 m/s    (>8.0)  MV lateral e'   0.08 m/s  MV medial e'    0.09 m/s  MV A Dur:       114.00 msec  E/e' Ratio:     14.35       (<8.0)  a'              0.06 m/s  PulmV Sys Severo:  22.50 cm/s  PulmV Dolan Severo: 26.60 cm/s  PulmV S/D Severo:  0.80    MITRAL VALVE:  Normal Ranges:  MV DT: 148 msec (150-240msec)    MITRAL INSUFFICIENCY:  Normal Ranges:  MR VTI:  101.80 cm  MR Vmax: 465.25 cm/s    AORTIC VALVE:  Normal Ranges:  AoV Vmax:      1.35 m/s (<=1.7m/s)  AoV Peak P.3 mmHg (<20mmHg)  LVOT Max Severo:  0.92 m/s (<=1.1m/s)  LVOT VTI:      11.50 cm  LVOT Diameter: 2.30 cm  (1.8-2.4cm)  AoV Area,Vmax: 2.85 cm2 (2.5-4.5cm2)    RIGHT VENTRICLE:  RV 1   3.77 cm  RV 2   3.43 cm  RV 3   6.44 cm  TAPSE: 11.3 mm  RV s'  0.23 m/s    TRICUSPID VALVE/RVSP:  Normal Ranges:  IVC Diam: 1.50 cm    PULMONIC VALVE:  Normal Ranges:  PV Accel Time: 86 msec  (>120ms)  PV Max Severo:    1.4 m/s  (0.6-0.9m/s)  PV Max P.3 mmHg    Pulmonary Veins:  PulmV Dolan Severo: 26.60 cm/s  PulmV S/D Severo:  0.80  PulmV Sys Severo:  22.50 cm/s      [id number]  Frank Bui MD  Electronically signed on 5/20/2023 at 3:40:05 PM        *** Final ***     Echocardiogram [May 20 2023  3:40PM]      Assessment and Plan:   Daily Risk Screen:  ·  Does patient have an indwelling urinary catheter? n/a consulting service   ·  Does patient have a central line? 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   #recurrent Obstructive pancreatitis   -Recurrent admissions w/ pancreatitis (1/2023 and early 5/2023)   -Labs last admission suggestive of cholestatic pattern of liver injury, worsening LFTs/elevated lipase.   -RUQ US 3/2023 with body and tail of Pancreas obscured by gas. No gallstones visible   -RUQ US 4/28/23 showing dilation of CBD and intrahepatic bile ducts, no cholecystitis  -MRI pancreas showing high grade stricture of inferior and common bile ducts, and possible pancreatic mass  -ERCP EUS done which showed a 2.8 cm pancreatic mass in the pancreatic head s/p stent was placed to improve biliary drainage.   -Surgical oncology :EUS FNB: adenocarinoma.   -s/p Whipple surgery/ laparoscopic 5/19    - c/w clear liquid diet  - surgery following.   - awaiting return of bowel function/tolerating CLD  - pain/bowel regimen as per ACS team     # NICM 2/2 to valvular disease s/p bicaval OHT (3/24/17)  # s/p mitral valve repair w/[employer] AnnuloFlex band #36mm w/induction)   # HTN  -  Post transplant issues/complications: atrial arrhythmias, volume overload, right ventricular failure, acute on chronic kidney disease  requiring RRT, chronotropic incompetence, and psychosis  -Cath with mild CAD, no CAV (3/2019). Echo with mild allograft dysfunction (LVEF 45-50%); similar to prior imaging  -stress test 2022: negative.   -TTE 5/2- showed LVEF low normal 50-55%, no wall motion abnormality NRVEF, mild MR LVIDD 5.3 cm   -Lipids (7/2022): tChol 127, HDL 47, LDL 61, Trigs 97,    #Immunosuppression  -c/w tacrolimus 4 mg/3 at home (target 5-8) /CKD FK 4.4 (inaccurate - awaiting tomorrow's AM level ),  -prednisone 5 mg daily     #Antinfectives:  [CMV -/-; Toxo (-)]   none  post op abx: zosyn. x5 days     #other :  -C/w home rosuvastatin   -c/w home fenofibrate   -c/w home 100 BID hydralazine for elevated BP/MAP, if continues to be hypertensive >130 systolic can consider addition of ISDN, although current BP's may be elevated s/t pain. hold off on escalation,  untill optimal pain control   -Continue fish oil 1 gram bid  -continue ASA if okay with ACS team  -C/w Rafat/vitamin D and MVT    #Alisha on CKD stage IIIB/post op ?ATN  -Cr 3.2 peak   -AM crea 2.81 >>2.94  BL crea ~2 (last Cr 2.4/GFR 29 (1/27/23)  Nephrology following.     # Anemia/blood loss anemia likely post op  hgb 6.9<<<7.4   CTM, hold off on blood transfusion(IRINEO population).   if persistently trending down can consider leukoreduced PRBC's.      #T2DM  - At home 22 units lantus + lispro SSI  - Mild SSI  - CLD presently   - reduced dose lantus 10 units +mild SSI     #Depression/anxiety  -c/w citalopram, gabapentin(renal dosed)    #Gout  -allopurinol    #GERD/esophagitis  -continue home PPI    CODE STATUS: FULL CODE   NOK: Wife Krystle Goodman [phone]    This case was seen and discussed with Dr. Barone.    Attestation:   Note Completion:  I am a:  Resident/Fellow   Attending Attestation I saw and evaluated the patient.  I personally obtained the  key and critical portions of the history and physical exam or was physically present for key and  critical portions performed by the resident/fellow. I reviewed the resident/fellow?s documentation and discussed the patient with the resident/fellow.  I agree with the resident/fellow?s medical decision making as documented in the note.     I personally evaluated the patient on 23-May-2023         Electronic Signatures:  Tahira Pedro (Fellow))  (Signed 23-May-2023 13:57)   Authored: Service, Subjective Data, Objective Data, Assessment  and Plan, Note Completion  Daniel Barone)  (Signed 24-May-2023 18:34)   Authored: Note Completion   Co-Signer: Service, Subjective Data, Objective Data, Assessment and Plan, Note Completion      Last Updated: 24-May-2023 18:34 by Daniel Barone)

## 2023-09-30 NOTE — PROGRESS NOTES
Service: Surgical Oncology     Subjective Data:   ABRIL PÉREZ is a 67 year old Male who is Hospital Day # 12 and POD #11 for Whipple procedure (pylorus procedure).     got 1 RBC for Hb 7.1     denies n/v  feels better   passing gas, having BM.    Overnight Events: Patient had an uneventful night.     Objective Data:     Objective Information:      T   P  R  BP   MAP  SpO2   Value  36.4  79  18  154/71   100  96%  Date/Time 5/30 9:15 5/30 9:15 5/30 9:15 5/30 9:15  5/29 12:02 5/30 9:15  Range  (36.1C - 36.8C )  (71 - 93 )  (15 - 19 )  (124 - 167 )/ (67 - 79 )  (100 - 108 )  (91% - 96% )      Pain reported at 5/30 8:50: 3 = Mild    ---- Intake and Output  -----  Mn/Dy/Year Time  Intake   Output  Net  May 30, 2023 6:00 am  0   300  -300  May 29, 2023 2:00 pm  0   35  -35    The Intake and Output Totals for the last 24 hours are:      Intake   Output  Net      null   335  null    Physical Exam Narrative:  ·  Physical Exam:    Constitutional: no acute distress  Cardiac: regular rate  Respiratory: non labored breathing on room air  Abdomen: soft, not tender, baseline distended; incision c/d/i; staples - no signs of infection   Extremities: BAILEY  Skin: warm and dry  Neuro: alert and oriented x3 - currently at pt's baseline  Psych: appropriate mood  Tubes/Lines: L abdominal drain 15cc panc grey cc      Medication:    Medications:          Continuous Medications       --------------------------------  No continuous medications are active       Scheduled Medications       --------------------------------    1. Acetaminophen:  650  mg  Oral  Every 6 Hours    2. Allopurinol:  100  mg  Oral  Every 24 Hours    3. Calcitriol:  0.5  microgram(s)  Oral  Daily    4. Calcium 500 mg - Vitamin D 200 Units:  1  tablet(s)  Oral  2 Times a Day    5. Citalopram (CELEXA):  40  mg  Oral  Daily    6. Cyclobenzaprine:  5  mg  Oral  3 Times a Day    7. Fenofibrate:  160  mg  Oral  Daily    8. Gabapentin:  200  mg  Oral  At Bedtime     9. Heparin SubCutaneous:  5000  unit(s)  SubCutaneous  Every 8 Hours    10. hydrALAZINE (APRESOLINE):  100  mg  Oral  Every 8 Hours    11. Insulin Glargine (Lantus) Injectable:  10  unit(s)  SubCutaneous  Every 24 Hours    12. Insulin Lispro Moderate Corrective Scale:  unit(s)  SubCutaneous  Every 4 Hours    13. Isosorbide Dinitrate:  40  mg  Oral  3 Times a Day    14. Lidocaine 4% TransDermal:  1  patch  TransDermal  Every 24 Hours    15. Pantoprazole:  40  mg  Oral  Daily    16. predniSONE:  5  mg  Oral  Every 24 Hours    17. Rosuvastatin:  40  mg  Oral  Every Night    18. Sodium Chloride 0.9% Injectable Flush:  10  mL  IntraVenous Flush  Every 12 Hours    19. Tacrolimus:  3  mg  Oral  <User Schedule>    20. Tacrolimus:  4  mg  Oral  <User Schedule>         PRN Medications       --------------------------------    1. Albuterol 2.5 mg - Ipratropium 0.5 mg/ 3 mL Neb Soln:  3  mL  Inhalation  Every 6 Hours    2. Dextrose 50% in Water Injectable:  25  gram(s)  IntraVenous Push  Every 15 Minutes    3. Glucagon Injectable:  1  mg  IntraMuscular  Every 15 Minutes    4. Heparin Flush 10 unit/ mL PF Injectable:  5  mL  IntraVenous Flush  Every 12 Hours    5. Heparin Flush 10 unit/ mL PF Injectable PRN:  5  mL  IntraVenous Flush  According to Flush Policy    6. HYDROmorphone:  2  mg  Oral  Every 4 Hours    7. HYDROmorphone:  4  mg  Oral  Every 4 Hours    8. Naloxone Injectable:  0.2  mg  IntraVenous Push  Once    9. Ondansetron Injectable:  4  mg  IntraVenous Push  Every 6 Hours    10. Sodium Chloride 0.9% Injectable Flush:  10  mL  IntraVenous Flush  Every 8 Hours and as Needed    11. Sodium Chloride 0.9% Injectable Flush PRN:  10  mL  IntraVenous Flush  According to Flush Policy    12. Sodium Chloride 0.9% Injectable Flush PRN:  20  mL  IntraVenous Flush  According to Flush Policy         Conditional Medication Orders       --------------------------------    1. Perflutren Lipid Microsphere (Activated) 1.3 mL / NaCL  0.9% T.V. 10 mL Injectable:  0.5  mL  IntraVenous Push  Once      Recent Lab Results:    Results:    CBC: 5/30/2023 06:39              \     Hgb     /                              \     8.2 L    /  WBC  ----------------  Plt               7.2       ----------------    277              /     Hct     \                              /     24.7 L    \            RBC: 2.80 L    MCV: 88           CMP: 5/30/2023 06:39  NA+        Cl-     BUN  /                         142    106    29 H /  --------------------------------  Glucose                ---------------------------  163 H    K+     HCO3-   Creat \                         4.2    26    1.99 H \           \  T Bili  /                    \  0.7  /  AST  x ---- x ALT        24 x ---- x 19         /  Alk P   \               /  60  \  Calcium : 8.8     Anion Gap : 14     Albumin : 2.8 L    T Protein : 4.8 L           Assessment and Plan:   Code Status:  ·  Code Status Full Code     Assessment:    66 yo M s/p paulette with Dr. Johnson for pancreatic adenocarcinoma Patient has a hx of heart transplant. Did have an episode of possible 5-10 second PEA arrest during  operation, regular rhythm resumed with epi. In ICU post-op for close monitoring given cardiac history. VSS, afebrile.    PLAN:  Neuro - PO pain meds - hydromorphone per HF team, tylenol, gabapentin, home Celexa,  delirium precautions  CV - tacro management per transplant team, pred 5, rosuvastatin, hydralazine 100 BID, continue isosorbide mononitrate per HF team; fenofibrate,  cards transplant team following, appreciate recs, echo pending; midline for access for tacrolimus; repeat echo per HF team; okay to restart ASA  Resp - OOB, encourage IS, tolerating room air  GI - full liquid diet, ensures TID, PPI, CT A/P noncontrast on 5/28 showing multiple fluid collections; keep L abdominal drain in place  /Renal - strict I/Os, appreciate nephrology recs  Heme - 1 RBC yesterday, incremented appropriately   ID -  Zosyn, discontinue today (was in place for prophyaxis)   Endo - SSI, home allopurinol, lantus 10u  Prophy - SCDs, SQH, Incentive Spirometer  Dispo - RNF    Discussed with attending physician Dr. Elizabeth Chavez MD  [facility] Surgical Oncology Service  pager [phone]       Attestation:   Note Completion:  I am a:  Resident/Fellow   Attending Attestation I saw and evaluated the patient.  I personally obtained the key and critical portions of the history and physical exam or was physically present for key and  critical portions performed by the resident/fellow. I reviewed the resident/fellow?s documentation and discussed the patient with the resident/fellow.  I agree with the resident/fellow?s medical decision making as documented in the note.     I personally evaluated the patient on 30-May-2023         Electronic Signatures:  Jay Chavez (MD (Resident))  (Signed 30-May-2023 12:06)   Authored: Service, Subjective Data, Objective Data, Assessment  and Plan, Note Completion  Luis Armando Johnson)  (Signed 03-Jun-2023 07:39)   Authored: Note Completion   Co-Signer: Service, Subjective Data, Objective Data, Assessment and Plan, Note Completion      Last Updated: 03-Jun-2023 07:39 by Luis Armando Johnson)

## 2023-09-30 NOTE — PROGRESS NOTES
Service: Heart Failure     Subjective Data:   ABRIL PÉREZ is a 67 year old Male who is Hospital Day # 9 and POD #8 for Whipple procedure (pylorus procedure).     Patient still has abdominal pain 8/10, other ROS is unremarkable.    Objective Data:     Objective Information:      T   P  R  BP   MAP  SpO2   Value  36.4  88  18  156/67      93%  Date/Time 5/27 12:49 5/27 12:49 5/27 12:49 5/27 12:49    5/27 12:49  Range  (36.1C - 37C )  (75 - 88 )  (18 - 18 )  (115 - 185 )/ (64 - 95 )    (93% - 97% )   As of 26-May-2023 06:22:00, patient is on 2 L/min of oxygen via room air.  Highest temp of 37 C was recorded at 5/26 6:22      Pain reported at 5/27 8:35: 6 = Moderate    ---- Intake and Output  -----  Mn/Dy/Year Time  Intake   Output  Net  May 27, 2023 6:00 am  0   380  -380  May 26, 2023 10:00 pm  0   0  0  May 26, 2023 2:00 pm  0   470  -470    The Intake and Output Totals for the last 24 hours are:      Intake   Output  Net      null   850  null    Physical Exam Narrative:  ·  Physical Exam:    Gen: comfortably laying in hospital bed. No acute distress  HEENT: trachea midline.  CV: S1 S2 heard no murmur  Pulm: good air movement. Crackles b/l   Abd: soft, tender s/p surgery   Ext: warm and well-perfused, b/l edema upper and lower  Neuro: CN II-XII grossly intact     Recent Lab Results:    Results:    CBC: 5/27/2023 05:15              \     Hgb     /                              \     7.2 L    /  WBC  ----------------  Plt               7.0       ----------------    211              /     Hct     \                              /     21.8 L    \            RBC: 2.45 L    MCV: 89           CMP: 5/27/2023 05:15  NA+        Cl-     BUN  /                         145    111 H   46 H  /  --------------------------------  Glucose                ---------------------------  136 H    K+     HCO3-   Creat \                         4.2    25    2.16 H \           \  T Bili  /                    \  0.7  /  AST   x ---- x ALT        29 x ---- x 29         /  Alk P   \               /  62  \  Calcium : 8.2 L    Anion Gap : 13     Albumin : 2.7 L     T Protein : 5.0 L          Coagulation: 5/27/2023 05:15  PT  /                    13.2  /  -------<    INR          ----------<      1.1  PTT\                    23 L \                       Radiology Results:    Results:    Xray Chest 1 View [May 25 2023 11:46AM]  Echocardiogram [May 20 2023  3:40PM]      Assessment and Plan:   Daily Risk Screen:  ·  Does patient have an indwelling urinary catheter? n/a consulting service   ·  Does patient have a central line? 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   #recurrent Obstructive pancreatitis   -Recurrent admissions w/ pancreatitis (1/2023 and early 5/2023)   -Labs last admission suggestive of cholestatic pattern of liver injury, worsening LFTs/elevated lipase.   -RUQ US 3/2023 with body and tail of Pancreas obscured by gas. No gallstones visible   -RUQ US 4/28/23 showing dilation of CBD and intrahepatic bile ducts, no cholecystitis  -MRI pancreas showing high grade stricture of inferior and common bile ducts, and possible pancreatic mass  -ERCP EUS done which showed a 2.8 cm pancreatic mass in the pancreatic head s/p stent was placed to improve biliary drainage.   -Surgical oncology :EUS FNB: adenocarinoma.   -s/p Whipple surgery/  laparoscopic 5/19    - advanced diet, tolerating well   - BM++  - surgery following.   - pain control with dilaudid as needed, off PCA pump  - PT/OT and OOB     # NICM 2/2 to valvular disease s/p bicaval OHT (3/24/17)  # s/p mitral valve repair w/[employer] AnnuloFlex band #36mm w/induction)   # HTN  - Post transplant issues/complications: atrial arrhythmias, volume overload, right ventricular failure, acute on chronic kidney disease  requiring RRT, chronotropic incompetence, and psychosis  -Cath with mild CAD, no CAV (3/2019). Echo with mild allograft dysfunction (LVEF 45-50%); similar to prior imaging  -stress test 2022: negative.   -TTE 5/2- showed LVEF low normal 50-55%, no wall motion abnormality NRVEF, mild MR LVIDD 5.3 cm   -Lipids (7/2022): tChol 127, HDL 47, LDL 61, Trigs 97,   .     #Immunosuppression  -c/w tacrolimus 4 mg/3 at home (target 5-8) /CKD FK 8.0 at goal no changes.   -prednisone 5 mg daily     #Antinfectives:  [CMV -/-; Toxo (-)]   none  post op abx: zosyn. x 5 days     #other :  -repeat ECHO/limited echo for graft function.   -C/w home rosuvastatin   -c/w home fenofibrate   - Change hydralazine to 100 mg TID   - c/w ISDN 40 mg TID   -Continue fish oil 1 gram bid  -continue ASA if okay with ACS team  -C/w Rafat/vitamin D and MVT    #Alisha on CKD stage IIIB/post op ?ATN vs cardiorenal   -Cr 3.2 peak   -AM crea 2.81 >>2.94>>2.69>>2.56>>2.57  BL crea ~2 (last Cr 2.4/GFR 29 (1/27/23)  Nephrology following.     # Anemia/blood loss anemia likely post op  hgb 8.2<<7.5<<7.5<<<s/p PRBC<<<6.9<<<7.4     #T2DM  - At home 22 units lantus + lispro SSI  - Mild SSI  - consider increasing lantus when advancing diet and good oral intake.   - reduced dose lantus 10 units +mild SSI     #Depression/anxiety  -c/w citalopram, gabapentin(renal dosed)    #Gout  -allopurinol    #GERD/esophagitis  -continue home PPI    CODE STATUS: FULL CODE   NOK: Wife Krystle Goodman [phone]    This case was seen and  discussed with Dr. Quintanilla. Formal attestation to follow.     Dylan Styles MD.  Fellow.     Attestation:   Note Completion:  I am a:  Resident/Fellow   Attending Attestation I saw and evaluated the patient.  I personally obtained the key and critical portions of the history and physical exam or was physically present for key and  critical portions performed by the resident/fellow. I reviewed the resident/fellow?s documentation and discussed the patient with the resident/fellow.  I agree with the resident/fellow?s medical decision making as documented in their note  with the exception/addition of the following:    I personally evaluated the patient on 27-May-2023   Comments/ Additional Findings    As above.  Tacrolimus level at goal and will  continue current dose.    No immunosuppression changes today.  Plan for TTE next week, nonemergent.  We will follow with you          Electronic Signatures:  Dylan Styles (Fellow))  (Signed 27-May-2023 13:44)   Authored: Service, Subjective Data, Objective Data, Assessment  and Plan, Note Completion  Maliha Quintanilla)  (Signed 27-May-2023 17:38)   Authored: Note Completion   Co-Signer: Service, Subjective Data, Objective Data, Assessment and Plan, Note Completion      Last Updated: 27-May-2023 17:38 by Maliha Quintanilla)

## 2023-09-30 NOTE — H&P
History & Physical Reviewed:   I have reviewed the History and Physical dated:  01-Aug-2023   History and Physical reviewed and relevant findings noted. Patient examined to review pertinent physical  findings.: No significant changes   Home Medications Reviewed: no changes noted   Allergies Reviewed: no changes noted       Airway/Sedation Assessment:  ·  Emotional Status calm   ·  Neurologic alert & oriented x 3   ·  Respiratory clear to auscultation   ·  Cardiovascular rhythm & rate regular   ·  GI/ soft, nontender     · Pulses present: Pedal Right, Radial Left, Radial Right    AS [facility] phys assess pulse FT left foot in post-op shoe. left femoral pulse 2+     ·  Mouth Opening OK yes   ·  Neck Flexibility OK yes   ·  Loose Teeth no   ·  Oropharyngeal Classification Class III   ·  ASA PS Classification ASA III   ·  Sedation Plan moderate sedation       ERAS (Enhanced Recovery After Surgery):  ·  ERAS Patient: no     Consent:   COVID-19 Consent:  ·  COVID-19 Risk Consent Surgeon has reviewed key risks related to the risk of jason COVID-19 and if they contract COVID-19 what the risks are.     Assessment/Plan:   ·  Assessment and Plan    67 year old male hx of pancreatic cancer who presents for mediport placement with Dr. Shaffer      Electronic Signatures:  Mitchell Renae ([facility], APRN-CNP)  (Signed 15-Aug-2023 10:30)   Authored: History & Physical Reviewed, Airway/Sedation,  ERAS, Consent, Assessment/Plan, Note Completion      Last Updated: 15-Aug-2023 10:30 by Mitchell Renae ([facility], APRN-CNP)

## 2023-09-30 NOTE — PROGRESS NOTES
Service: Surgical Oncology     Subjective Data:   ABRIL PÉREZ is a 67 year old Male who is Hospital Day # 4 and POD #3 for Whipple procedure (pylorus procedure).    Overnight Events: Patient had an uneventful night.   Additional Information:    No acute events overnight  out of bed with nursing this morning  patient taken off of PCA and pain not well controlled over last 24 hrs    Objective Data:     Objective Information:      T   P  R  BP   MAP  SpO2   Value  36.5  97  20  170/99   96  94%  Date/Time 5/22 12:52 5/22 12:52 5/22 12:52 5/22 12:52  5/22 10:00 5/22 12:52  Range  (35.9C - 36.8C )  (97 - 108 )  (17 - 36 )  (131 - 191 )/ (56 - 99 )  (83 - 117 )  (90% - 100% )   As of 22-May-2023 11:09:00, patient is on 2 L/min of oxygen via nasal cannula.      Pain reported at 5/22 11:09: 5 = Moderate    ---- Intake and Output  -----  Mn/Dy/Year Time  Intake   Output  Net  May 22, 2023 2:00 pm  275   415  -140  May 22, 2023 6:00 am  852.5   765  87  May 21, 2023 10:00 pm  662.5   675  -13    The Intake and Output Totals for the last 24 hours are:      Intake   Output  Net      5406 [id number]  155    Physical Exam Narrative:  ·  Physical Exam:    Neurological: Awake, alert, conversive and sitting up in bed   Cardiovascular: RRR  Head/Neck: NCAT  Respiratory/Thorax: even, unlabored, well healed prior sternotomy scar  Genitourinary: Portillo in place with clear urine output  Gastrointestinal: abdomen soft, appropriately tender to palpation, mildly distended. Incision c/d/i without bleeding. Bilateral IRVING drains with SS output.  Skin: warm, dry  Musculoskeletal: BAILEY  Eyes: non-icteric  Extremities: BAILEY x 3  Psychological: appropriate mood/affect      Medication:    Medications:          Continuous Medications       --------------------------------    1. HYDROmorphone PCA 15 mg/ NaCL 0.9% 30 mL:  2.6  IV PCA  <Continuous>    2. Sodium Chloride 0.9% Infusion:  1000  mL  IntraVenous  <Continuous>         Scheduled  Medications       --------------------------------    1. Acetaminophen:  650  mg  Oral  Every 6 Hours    2. Allopurinol:  100  mg  Oral  Every 24 Hours    3. Citalopram (CELEXA):  40  mg  Oral  Daily    4. Cyclobenzaprine:  5  mg  Oral  3 Times a Day    5. Fenofibrate:  160  mg  Oral  Daily    6. Gabapentin:  200  mg  Oral  At Bedtime    7. Heparin SubCutaneous:  5000  unit(s)  SubCutaneous  Every 8 Hours    8. hydrALAZINE (APRESOLINE):  100  mg  Oral  Every 12 Hours    9. Insulin Glargine (Lantus) Injectable:  10  unit(s)  SubCutaneous  Every 24 Hours    10. Insulin Lispro Moderate Corrective Scale:  unit(s)  SubCutaneous  Every 4 Hours    11. Lidocaine 4% TransDermal:  1  patch  TransDermal  Every 24 Hours    12. Piperacillin - Tazobactam 2.25 grams/Iso-osmotic 50 mL Premix IVPB:  50  mL  IntraVenous Piggyback  Every 6 Hours    13. predniSONE:  5  mg  Oral  Every 24 Hours    14. Rosuvastatin:  40  mg  Oral  Every Night    15. Sodium Chloride 0.9% Injectable Flush:  10  mL  IntraVenous Flush  Every 12 Hours    16. Tacrolimus:  3  mg  Oral  <User Schedule>    17. Tacrolimus:  4  mg  Oral  <User Schedule>         PRN Medications       --------------------------------    1. Dextrose 50% in Water Injectable:  25  gram(s)  IntraVenous Push  Every 15 Minutes    2. Glucagon Injectable:  1  mg  IntraMuscular  Every 15 Minutes    3. Heparin Flush 10 unit/ mL PF Injectable:  5  mL  IntraVenous Flush  Every 12 Hours    4. Heparin Flush 10 unit/ mL PF Injectable PRN:  5  mL  IntraVenous Flush  According to Flush Policy    5. Naloxone Injectable:  0.2  mg  IntraVenous Push  Once    6. Ondansetron Injectable:  4  mg  IntraVenous Push  Every 6 Hours    7. oxyCODONE Immediate Release:  5  mg  Oral  Every 4 Hours    8. Sodium Chloride 0.9% Injectable Flush:  10  mL  IntraVenous Flush  Every 8 Hours and as Needed    9. Sodium Chloride 0.9% Injectable Flush PRN:  10  mL  IntraVenous Flush  According to Flush Policy    10. Sodium  Chloride 0.9% Injectable Flush PRN:  20  mL  IntraVenous Flush  According to Flush Policy         Conditional Medication Orders       --------------------------------    1. Perflutren Lipid Microsphere (Activated) 1.3 mL / NaCL 0.9% T.V. 10 mL Injectable:  0.5  mL  IntraVenous Push  Once      Recent Lab Results:    Results:    CBC: 5/22/2023 06:00              \     Hgb     /                              \     7.4 L    /  WBC  ----------------  Plt               18.5 H    ----------------    187              /     Hct     \                              /     24.2 L    \            RBC: 2.48 L    MCV: 98           RFP: 5/22/2023 06:00  NA+        Cl-     BUN  /                         142    110 H   45 H  /  --------------------------------  Glucose                ---------------------------  145 H    K+     HCO3-   Creat \                         4.4    24    2.81 H \  Calcium : 8.1 LAnion Gap : 12          Albumin : 2.9 L     Phos : 2.5      Coagulation: 5/22/2023 06:00  PT  /                    13.3  /  -------<    INR          ----------<      1.1  PTT\                    29  \                       ---------- Recent Arterial Blood Gas Results----------     5/22/2023 06:09  pO2 119  pH 7.40  pCO2 41  SO2 99  Base Excess 0.5null    Assessment and Plan:   Comorbidities:  ·  Comorbidity Other     Code Status:  ·  Code Status Full Code     Assessment:    68 yo M s/p paulette with Dr. Johnson for pancreatic adenocarcinoma Patient has a hx of heart transplant. Did have an episode of possible 5-10 second PEA arrest during  operation, regular rhythm resumed with epi. In ICU post-op for close monitoring given cardiac history. VSS, afebrile.    PLAN:  Neuro - r/s PCA, continue oxy, tylenol, gabapentin, home Celexa  CV - tacro management per transplant team, pred 5, rosuvastatin, hydralazine, fenofibrate, cards transplant team following, appreciate recs  Resp - OOB, encourage IS, wean O2 as tolerated  GI - advancing  to limited CLD, PPI  /Renal - decreased mIVF to 50/hr, strict I/Os, maintain rashid, please obtain daily LFT  Heme - no acute transfusion needs  ID - Zosyn x 5 days (end date 5/14)  Endo - SSI, home allopurinol, add lantus 10u  Prophy - SCDs, Lovenox, Incentive Spirometer  Dispo - ok to transfer to [facility]    Discussed with attending physician Dr. Johnson.   Izabela Bowden MD [facility] PGY1  Surgical Oncology/uck pager [phone]      Attestation:   Note Completion:  I am a:  Resident/Fellow   Attending Attestation I saw and evaluated the patient.  I personally obtained the key and critical portions of the history and physical exam or was physically present for key and  critical portions performed by the resident/fellow. I reviewed the resident/fellow?s documentation and discussed the patient with the resident/fellow.  I agree with the resident/fellow?s medical decision making as documented in the note.     I personally evaluated the patient on 22-May-2023         Electronic Signatures:  Izabela Bowden (Resident))  (Signed 22-May-2023 16:32)   Authored: Service, Subjective Data, Objective Data, Assessment  and Plan, Note Completion  Luis Armando Johnson)  (Signed 26-May-2023 16:46)   Authored: Note Completion   Co-Signer: Service, Subjective Data, Objective Data, Assessment and Plan, Note Completion      Last Updated: 26-May-2023 16:46 by Luis Armando Johnson)

## 2023-09-30 NOTE — PROGRESS NOTES
Service: Heart Failure     Subjective Data:   ABRIL PÉREZ is a 67 year old Male who is Hospital Day # 8 and POD #7 for Whipple procedure (pylorus procedure).     pain much better controlled   of pCA pump   on dilaudid PO/IV for pain   BM+  bloated+  FK 8.   s/p IV lasix x 40 and albumin.    Objective Data:     Objective Information:      T   P  R  BP   MAP  SpO2   Value  36.4  83  18  155/74      94%  Date/Time 5/26 12:14 5/26 12:14 5/26 12:14 5/26 12:14    5/26 12:14  Range  (36C - 37C )  (69 - 88 )  (18 - 20 )  (155 - 195 )/ (68 - 92 )    (92% - 97% )   As of 26-May-2023 06:22:00, patient is on 2 L/min of oxygen via room air.  Highest temp of 37 C was recorded at 5/26 6:22      Pain reported at 5/26 9:00: 3 = Mild    ---- Intake and Output  -----  Mn/Dy/Year Time  Intake   Output  Net  May 26, 2023 2:00 pm  0   170  -170  May 26, 2023 6:00 am  0   580  -580  May 25, 2023 10:00 pm  0   10  -10    The Intake and Output Totals for the last 24 hours are:      Intake   Output  Net      270   590  -320    Physical Exam Narrative:  ·  Physical Exam:    Gen: comfortably laying in hospital bed. No acute distress  HEENT: trachea midline.  CV: S1 S2 heard no murmur  Pulm: good air movement. Crackles b/l   Abd: soft, tender s/p surgery   Ext: warm and well-perfused, b/l edema upper and lower  Neuro: CN II-XII grossly intact     Medication:    Medications:          Continuous Medications       --------------------------------  No continuous medications are active       Scheduled Medications       --------------------------------    1. Acetaminophen:  650  mg  Oral  Every 6 Hours    2. Allopurinol:  100  mg  Oral  Every 24 Hours    3. Citalopram (CELEXA):  40  mg  Oral  Daily    4. Cyclobenzaprine:  5  mg  Oral  3 Times a Day    5. Fenofibrate:  160  mg  Oral  Daily    6. Gabapentin:  200  mg  Oral  At Bedtime    7. Heparin SubCutaneous:  5000  unit(s)  SubCutaneous  Every 8 Hours    8. hydrALAZINE (APRESOLINE):   100  mg  Oral  Every 12 Hours    9. Insulin Glargine (Lantus) Injectable:  10  unit(s)  SubCutaneous  Every 24 Hours    10. Insulin Lispro Moderate Corrective Scale:  unit(s)  SubCutaneous  Every 4 Hours    11. Isosorbide Dinitrate:  40  mg  Oral  3 Times a Day    12. Lidocaine 4% TransDermal:  1  patch  TransDermal  Every 24 Hours    13. Pantoprazole:  40  mg  Oral  Daily    14. Piperacillin - Tazobactam 2.25 grams/Iso-osmotic 50 mL Premix IVPB:  50  mL  IntraVenous Piggyback  Every 6 Hours    15. predniSONE:  5  mg  Oral  Every 24 Hours    16. Rosuvastatin:  40  mg  Oral  Every Night    17. Sodium Chloride 0.9% Injectable Flush:  10  mL  IntraVenous Flush  Every 12 Hours    18. Tacrolimus:  3  mg  Oral  <User Schedule>    19. Tacrolimus:  4  mg  Oral  <User Schedule>         PRN Medications       --------------------------------    1. Albuterol 2.5 mg - Ipratropium 0.5 mg/ 3 mL Neb Soln:  3  mL  Inhalation  Every 6 Hours    2. Dextrose 50% in Water Injectable:  25  gram(s)  IntraVenous Push  Every 15 Minutes    3. Glucagon Injectable:  1  mg  IntraMuscular  Every 15 Minutes    4. Heparin Flush 10 unit/ mL PF Injectable:  5  mL  IntraVenous Flush  Every 12 Hours    5. Heparin Flush 10 unit/ mL PF Injectable PRN:  5  mL  IntraVenous Flush  According to Flush Policy    6. HYDROmorphone:  2  mg  Oral  Every 4 Hours    7. HYDROmorphone:  4  mg  Oral  Every 4 Hours    8. HYDROmorphone Injectable:  0.4  mg  IntraVenous Push  Every 4 Hours    9. Naloxone Injectable:  0.2  mg  IntraVenous Push  Once    10. Ondansetron Injectable:  4  mg  IntraVenous Push  Every 6 Hours    11. Sodium Chloride 0.9% Injectable Flush:  10  mL  IntraVenous Flush  Every 8 Hours and as Needed    12. Sodium Chloride 0.9% Injectable Flush PRN:  10  mL  IntraVenous Flush  According to Flush Policy    13. Sodium Chloride 0.9% Injectable Flush PRN:  20  mL  IntraVenous Flush  According to Flush Policy         Conditional Medication Orders        --------------------------------    1. Perflutren Lipid Microsphere (Activated) 1.3 mL / NaCL 0.9% T.V. 10 mL Injectable:  0.5  mL  IntraVenous Push  Once      Recent Lab Results:    Results:    CBC: 5/26/2023 10:15              \     Hgb     /                              \     8.2 L    /  WBC  ----------------  Plt               10.4       ----------------    228              /     Hct     \                              /     24.9 L    \            RBC: 2.76 L    MCV: 90           CMP: 5/26/2023 10:15  NA+        Cl-     BUN  /                         148 H   111 H    46 H /  --------------------------------  Glucose                ---------------------------  115 H    K+     HCO3-   Creat \                         4.2    25    2.57 H \           \  T Bili  /                    \  0.9  /  AST  x ---- x ALT        30 x ---- x 39         /  Alk P   \               /  85  \  Calcium : 8.7     Anion Gap : 16     Albumin : 3.2 L    T Protein : 5.6 L           Coagulation: 5/26/2023 05:18  PT  /                    13.4  /  -------<    INR          ----------<      1.2 H  PTT\                              \                       Radiology Results:    Results:        Impression:    1.  Moderate clearing bibasilar atelectatic changes.        Xray Chest 1 View [May 25 2023 11:46AM]      Conclusion:  CONCLUSIONS:  1. Poorly visualized anatomical structures due to suboptimal image quality.  2. Left ventricular systolic function is low normal with a 50% estimated ejection fraction.  3. Abnormal septal motion consistent with post-operative status.    QUANTITATIVE DATA SUMMARY:  2D MEASUREMENTS:  Normal Ranges:  Ao Root d:     3.55 cm   (2.0-3.7cm)  LAs:           6.00 cm   (2.7-4.0cm)  IVSd:          1.10 cm   (0.6-1.1cm)  LVPWd:         0.80 cm   (0.6-1.1cm)  LVIDd:         5.30 cm   (3.9-5.9cm)  LVIDs:         4.30 cm  LV Mass Index: 91.9 g/m2  LV % FS        18.9 %    LA VOLUME:  Normal Ranges:  LA Vol A4C:         148.3 ml  (22+/-6mL/m2)  LA Vol Index A4C:  72.8ml/m2  LA Area A4C:       34.6 cm2  LA Major Axis A4C: 6.9 cm    M-MODE MEASUREMENTS:  Normal Ranges:  Ao Root: 4.20 cm (2.0-3.7cm)  LAs:     5.90 cm (2.7-4.0cm)    AORTA MEASUREMENTS:  Normal Ranges:  Ao Sinus, d: 4.00 cm (2.1-3.5cm)  Asc Ao, d:   4.00 cm (2.1-3.4cm)  Ao Arch:     3.00 cm (2.0-3.6cm)    LV SYSTOLIC FUNCTION BY 2D PLANIMETRY (MOD):  Normal Ranges:  EF-A4C View: 35.9 % (>=55%)  EF-A2C View: 53.2 %  EF-Biplane:  45.0 %    LV DIASTOLIC FUNCTION:  Normal Ranges:  MV Peak E:      1.22 m/s    (0.7-1.2 m/s)  MV Peak A:      0.66 m/s    (0.42-0.7 m/s)  E/A Ratio:      1.85        (1.0-2.2)  MV e'           0.08 m/s    (>8.0)  MV lateral e'   0.08 m/s  MV medial e'    0.09 m/s  MV A Dur:       114.00 msec  E/e' Ratio:     14.35       (<8.0)  a'              0.06 m/s  PulmV Sys Severo:  22.50 cm/s  PulmV Dolan Severo: 26.60 cm/s  PulmV S/D Severo:  0.80    MITRAL VALVE:  Normal Ranges:  MV DT: 148 msec (150-240msec)    MITRAL INSUFFICIENCY:  Normal Ranges:  MR VTI:  101.80 cm  MR Vmax: 465.25 cm/s    AORTIC VALVE:  Normal Ranges:  AoV Vmax:      1.35 m/s (<=1.7m/s)  AoV Peak P.3 mmHg (<20mmHg)  LVOT Max Severo:  0.92 m/s (<=1.1m/s)  LVOT VTI:      11.50 cm  LVOT Diameter: 2.30 cm  (1.8-2.4cm)  AoV Area,Vmax: 2.85 cm2 (2.5-4.5cm2)    RIGHT VENTRICLE:  RV 1   3.77 cm  RV 2   3.43 cm  RV 3   6.44 cm  TAPSE: 11.3 mm  RV s'  0.23 m/s    TRICUSPID VALVE/RVSP:  Normal Ranges:  IVC Diam: 1.50 cm    PULMONIC VALVE:  Normal Ranges:  PV Accel Time: 86 msec  (>120ms)  PV Max Severo:    1.4 m/s  (0.6-0.9m/s)  PV Max P.3 mmHg    Pulmonary Veins:  PulmV Dolan Severo: 26.60 cm/s  PulmV S/D Severo:  0.80  PulmV Sys Severo:  22.50 cm/s      [id number] Frank Bui MD  Electronically signed on 2023 at 3:40:05 PM        *** Final ***     Echocardiogram [May 20 2023  3:40PM]      Assessment and Plan:   Daily Risk Screen:  ·  Does patient have an indwelling urinary catheter? n/a consulting  service   ·  Does patient have a central line? 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   #recurrent Obstructive pancreatitis   -Recurrent admissions w/ pancreatitis (1/2023 and early 5/2023)   -Labs last admission suggestive of cholestatic pattern of liver injury, worsening LFTs/elevated lipase.   -RUQ US 3/2023 with body and tail of Pancreas obscured by gas. No gallstones visible   -RUQ US 4/28/23 showing dilation of CBD and intrahepatic bile ducts, no cholecystitis  -MRI pancreas showing high grade stricture of inferior and common bile ducts, and possible pancreatic mass  -ERCP EUS done which showed a 2.8 cm pancreatic mass in the pancreatic head s/p stent was placed to improve biliary drainage.   -Surgical oncology :EUS FNB: adenocarinoma.   -s/p Whipple surgery/ laparoscopic 5/19    - advanced diet, tolerating well   - BM++  - surgery following.   - pain control with dilaudid as needed, off PCA pump  - PT/OT and OOB     # NICM 2/2 to valvular disease s/p bicaval OHT (3/24/17)  # s/p mitral valve repair w/[employer] AnnuloFlex band #36mm w/induction)   # HTN  - Post transplant issues/complications: atrial arrhythmias, volume overload, right ventricular failure, acute on chronic kidney disease  requiring RRT, chronotropic incompetence, and psychosis  -Cath with mild CAD, no CAV (3/2019). Echo with mild  allograft dysfunction (LVEF 45-50%); similar to prior imaging  -stress test 2022: negative.   -TTE 5/2- showed LVEF low normal 50-55%, no wall motion abnormality NRVEF, mild MR LVIDD 5.3 cm   -Lipids (7/2022): tChol 127, HDL 47, LDL 61, Trigs 97,   .     #Immunosuppression  -c/w tacrolimus 4 mg/3 at home (target 5-8) /CKD FK 8.0 at goal no changes.   -prednisone 5 mg daily     #Antinfectives:  [CMV -/-; Toxo (-)]   none  post op abx: zosyn. x 5 days     #other :  -repeat ECHO/limited echo for graft function.   -C/w home rosuvastatin   -c/w home fenofibrate   -c/w home 100 BID hydralazine /home dose   - ISDN 20 mg TID>>increased to 40 mg TID   -Continue fish oil 1 gram bid  -continue ASA if okay with ACS team  -C/w Rafat/vitamin D and MVT    #Alisha on CKD stage IIIB/post op ?ATN vs cardiorenal   -Cr 3.2 peak   -AM crea 2.81 >>2.94>>2.69>>2.56>>2.57  BL crea ~2 (last Cr 2.4/GFR 29 (1/27/23)  Nephrology following.     # Anemia/blood loss anemia likely post op  hgb 8.2<<7.5<<7.5<<<s/p PRBC<<<6.9<<<7.4     #T2DM  - At home 22 units lantus + lispro SSI  - Mild SSI  - consider increasing lantus when advancing diet and good oral intake.   - reduced dose lantus 10 units +mild SSI     #Depression/anxiety  -c/w citalopram, gabapentin(renal dosed)    #Gout  -allopurinol    #GERD/esophagitis  -continue home PPI    CODE STATUS: FULL CODE   NOK: Benny Goodman [phone]    This case was seen and discussed with Dr. Barone.    Attestation:   Note Completion:  I am a:  Resident/Fellow   Attending Attestation I saw and evaluated the patient.  I personally obtained the key and critical portions of the history and physical exam or was physically present for key and  critical portions performed by the resident/fellow. I reviewed the resident/fellow?s documentation and discussed the patient with the resident/fellow.  I agree with the resident/fellow?s medical decision making as documented in the note.     I personally  evaluated the patient on 26-May-2023         Electronic Signatures:  Tahira Pedro (Fellow))  (Signed 26-May-2023 16:23)   Authored: Service, Subjective Data, Objective Data, Assessment  and Plan, Note Completion  Daniel Barone)  (Signed 26-May-2023 20:34)   Authored: Note Completion   Co-Signer: Service, Subjective Data, Objective Data, Assessment and Plan, Note Completion      Last Updated: 26-May-2023 20:34 by Daniel Barone)

## 2023-09-30 NOTE — PROGRESS NOTES
Service: Heart Failure     Subjective Data:   ABRIL PÉREZ is a 67 year old Male who is Hospital Day # 7 and POD #6 for Whipple procedure (pylorus procedure).     advanced diet, tolerating well  s/p midline placement.   hgb stable   repeat hgb this AM 7.5  hypervolemia   FK - 8.8.    Objective Data:     Objective Information:      T   P  R  BP   MAP  SpO2   Value  36.4  79  20  164/92      93%  Date/Time 5/25 15:36 5/25 15:36 5/25 15:36 5/25 15:36    5/25 15:36  Range  (36C - 36.8C )  (69 - 89 )  (18 - 26 )  (145 - 195 )/ (77 - 98 )    (91% - 100% )   As of 25-May-2023 13:02:00, patient is on 2 L/min of oxygen via nasal cannula.      Pain reported at 5/25 16:35: 7 = Severe    ---- Intake and Output  -----  Mn/Dy/Year Time  Intake   Output  Net  May 25, 2023 2:00 pm  270   0  270  May 25, 2023 6:00 am  0   910  -910  May 24, 2023 10:00 pm  555   610  -55    The Intake and Output Totals for the last 24 hours are:      Intake   Output  Net      604   1520  -916    Physical Exam Narrative:  ·  Physical Exam:    Gen: comfortably laying in hospital bed. No acute distress  HEENT: trachea midline.  CV: S1 S2 heard no murmur  Pulm: good air movement. Crackles b/l   Abd: soft, tender s/p surgery   Ext: warm and well-perfused, b/l edema upper and lower  Neuro: CN II-XII grossly intact     Physical Exam by System:    Constitutional: Well developed, awake/alert/oriented  x3, no distress, alert and cooperative   Head/Neck: Neck supple, no apparent injury, thyroid  without mass or tenderness, No JVD, trachea midline, no bruits   Respiratory/Thorax: Patent airways, CTAB, normal  breath sounds with good chest expansion, thorax symmetric   Cardiovascular: Regular, rate and rhythm, no murmurs,  2+ equal pulses of the extremities, normal S 1and S 2   Gastrointestinal: Surgical drain in place with SS  fluid   Extremities: normal extremities, no cyanosis edema,  contusions or wounds, no clubbing      Medication:    Medications:          Continuous Medications       --------------------------------  No continuous medications are active       Scheduled Medications       --------------------------------    1. Acetaminophen:  650  mg  Oral  Every 6 Hours    2. Allopurinol:  100  mg  Oral  Every 24 Hours    3. Citalopram (CELEXA):  40  mg  Oral  Daily    4. Cyclobenzaprine:  5  mg  Oral  3 Times a Day    5. Fenofibrate:  160  mg  Oral  Daily    6. Gabapentin:  200  mg  Oral  At Bedtime    7. Heparin SubCutaneous:  5000  unit(s)  SubCutaneous  Every 8 Hours    8. hydrALAZINE (APRESOLINE):  100  mg  Oral  Every 12 Hours    9. Insulin Glargine (Lantus) Injectable:  10  unit(s)  SubCutaneous  Every 24 Hours    10. Insulin Lispro Moderate Corrective Scale:  unit(s)  SubCutaneous  Every 4 Hours    11. Isosorbide Dinitrate:  20  mg  Oral  3 Times a Day    12. Lidocaine 4% TransDermal:  1  patch  TransDermal  Every 24 Hours    13. Pantoprazole:  40  mg  Oral  Daily    14. Piperacillin - Tazobactam 2.25 grams/Iso-osmotic 50 mL Premix IVPB:  50  mL  IntraVenous Piggyback  Every 6 Hours    15. predniSONE:  5  mg  Oral  Every 24 Hours    16. Rosuvastatin:  40  mg  Oral  Every Night    17. Sodium Chloride 0.9% Injectable Flush:  10  mL  IntraVenous Flush  Every 12 Hours    18. Tacrolimus:  3  mg  Oral  <User Schedule>    19. Tacrolimus:  4  mg  Oral  <User Schedule>         PRN Medications       --------------------------------    1. Albuterol 2.5 mg - Ipratropium 0.5 mg/ 3 mL Neb Soln:  3  mL  Inhalation  Every 6 Hours    2. Dextrose 50% in Water Injectable:  25  gram(s)  IntraVenous Push  Every 15 Minutes    3. Glucagon Injectable:  1  mg  IntraMuscular  Every 15 Minutes    4. Heparin Flush 10 unit/ mL PF Injectable:  5  mL  IntraVenous Flush  Every 12 Hours    5. Heparin Flush 10 unit/ mL PF Injectable PRN:  5  mL  IntraVenous Flush  According to Flush Policy    6. HYDROmorphone:  2  mg  Oral  Every 4 Hours    7.  HYDROmorphone:  4  mg  Oral  Every 4 Hours    8. HYDROmorphone Injectable:  0.4  mg  IntraVenous Push  Every 4 Hours    9. Naloxone Injectable:  0.2  mg  IntraVenous Push  Once    10. Ondansetron Injectable:  4  mg  IntraVenous Push  Every 6 Hours    11. Sodium Chloride 0.9% Injectable Flush:  10  mL  IntraVenous Flush  Every 8 Hours and as Needed    12. Sodium Chloride 0.9% Injectable Flush PRN:  10  mL  IntraVenous Flush  According to Flush Policy    13. Sodium Chloride 0.9% Injectable Flush PRN:  20  mL  IntraVenous Flush  According to Flush Policy         Conditional Medication Orders       --------------------------------    1. Perflutren Lipid Microsphere (Activated) 1.3 mL / NaCL 0.9% T.V. 10 mL Injectable:  0.5  mL  IntraVenous Push  Once      Recent Lab Results:    Results:    CBC: 5/25/2023 05:35              \     Hgb     /                              \     7.5 L    /  WBC  ----------------  Plt               8.5       ----------------    179              /     Hct     \                              /     22.6 L    \            RBC: 2.53 L    MCV: 89           CMP: 5/25/2023 05:35  NA+        Cl-     BUN  /                         147 H   112 H    50 H /  --------------------------------  Glucose                ---------------------------  130 H    K+     HCO3-   Creat \                         4.2    25    2.56 H \           \  T Bili  /                    \  0.8  /  AST  x ---- x ALT        32 x ---- x 45         /  Alk P   \               /  70  \  Calcium : 8.2 L    Anion Gap : 14     Albumin : 2.8 L     T Protein : 5.1 L          Radiology Results:    Results:        Conclusion:  CONCLUSIONS:  1. Poorly visualized anatomical structures due to suboptimal image quality.  2. Left ventricular systolic function is low normal with a 50% estimated ejection fraction.  3. Abnormal septal motion consistent with post-operative status.    QUANTITATIVE DATA SUMMARY:  2D MEASUREMENTS:  Normal Ranges:  Ao  Root d:     3.55 cm   (2.0-3.7cm)  LAs:           6.00 cm   (2.7-4.0cm)  IVSd:          1.10 cm   (0.6-1.1cm)  LVPWd:         0.80 cm   (0.6-1.1cm)  LVIDd:         5.30 cm   (3.9-5.9cm)  LVIDs:         4.30 cm  LV Mass Index: 91.9 g/m2  LV % FS        18.9 %    LA VOLUME:  Normal Ranges:  LA Vol A4C:        148.3 ml  (22+/-6mL/m2)  LA Vol Index A4C:  72.8ml/m2  LA Area A4C:       34.6 cm2  LA Major Axis A4C: 6.9 cm    M-MODE MEASUREMENTS:  Normal Ranges:  Ao Root: 4.20 cm (2.0-3.7cm)  LAs:     5.90 cm (2.7-4.0cm)    AORTA MEASUREMENTS:  Normal Ranges:  Ao Sinus, d: 4.00 cm (2.1-3.5cm)  Asc Ao, d:   4.00 cm (2.1-3.4cm)  Ao Arch:     3.00 cm (2.0-3.6cm)    LV SYSTOLIC FUNCTION BY 2D PLANIMETRY (MOD):  Normal Ranges:  EF-A4C View: 35.9 % (>=55%)  EF-A2C View: 53.2 %  EF-Biplane:  45.0 %    LV DIASTOLIC FUNCTION:  Normal Ranges:  MV Peak E:      1.22 m/s    (0.7-1.2 m/s)  MV Peak A:      0.66 m/s    (0.42-0.7 m/s)  E/A Ratio:      1.85        (1.0-2.2)  MV e'           0.08 m/s    (>8.0)  MV lateral e'   0.08 m/s  MV medial e'    0.09 m/s  MV A Dur:       114.00 msec  E/e' Ratio:     14.35       (<8.0)  a'              0.06 m/s  PulmV Sys Severo:  22.50 cm/s  PulmV Dolan Severo: 26.60 cm/s  PulmV S/D Severo:  0.80    MITRAL VALVE:  Normal Ranges:  MV DT: 148 msec (150-240msec)    MITRAL INSUFFICIENCY:  Normal Ranges:  MR VTI:  101.80 cm  MR Vmax: 465.25 cm/s    AORTIC VALVE:  Normal Ranges:  AoV Vmax:      1.35 m/s (<=1.7m/s)  AoV Peak P.3 mmHg (<20mmHg)  LVOT Max Severo:  0.92 m/s (<=1.1m/s)  LVOT VTI:      11.50 cm  LVOT Diameter: 2.30 cm  (1.8-2.4cm)  AoV Area,Vmax: 2.85 cm2 (2.5-4.5cm2)    RIGHT VENTRICLE:  RV 1   3.77 cm  RV 2   3.43 cm  RV 3   6.44 cm  TAPSE: 11.3 mm  RV s'  0.23 m/s    TRICUSPID VALVE/RVSP:  Normal Ranges:  IVC Diam: 1.50 cm    PULMONIC VALVE:  Normal Ranges:  PV Accel Time: 86 msec  (>120ms)  PV Max Severo:    1.4 m/s  (0.6-0.9m/s)  PV Max P.3 mmHg    Pulmonary Veins:  PulmV Dolan Severo: 26.60  cm/s  PulmV S/D Severo:  0.80  PulmV Sys Severo:  22.50 cm/s      [id number] Frank Bui MD  Electronically signed on 5/20/2023 at 3:40:05 PM        *** Final ***     Echocardiogram [May 20 2023  3:40PM]      Assessment and Plan:   Daily Risk Screen:  ·  Does patient have an indwelling urinary catheter? n/a consulting service   ·  Does patient have a central line? 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   #recurrent Obstructive pancreatitis   -Recurrent admissions w/ pancreatitis (1/2023 and early 5/2023)   -Labs last admission suggestive of cholestatic pattern of liver injury, worsening LFTs/elevated lipase.   -RUQ US 3/2023 with body and tail of Pancreas obscured by gas. No gallstones visible   -RUQ US 4/28/23 showing dilation of CBD and intrahepatic bile ducts, no cholecystitis  -MRI pancreas showing high grade stricture of inferior and common bile ducts, and possible pancreatic mass  -ERCP EUS done which showed a 2.8 cm pancreatic mass in the pancreatic head s/p stent was placed to improve biliary drainage.   -Surgical oncology :EUS FNB: adenocarinoma.   -s/p Whipple surgery/ laparoscopic 5/19    - advanced diet, tolerating well   - surgery following.   - pain control with PCA pump, dilaudid as needed, avoid oxycodone(for pain w/ renal function).  - PT/OT and OOB     # NICM 2/2 to valvular disease s/p bicaval OHT  (3/24/17)  # s/p mitral valve repair w/[employer] AnnuloFlex band #36mm w/induction)   # HTN  - Post transplant issues/complications: atrial arrhythmias, volume overload, right ventricular failure, acute on chronic kidney disease  requiring RRT, chronotropic incompetence, and psychosis  -Cath with mild CAD, no CAV (3/2019). Echo with mild allograft dysfunction (LVEF 45-50%); similar to prior imaging  -stress test 2022: negative.   -TTE 5/2- showed LVEF low normal 50-55%, no wall motion abnormality NRVEF, mild MR LVIDD 5.3 cm   -Lipids (7/2022): tChol 127, HDL 47, LDL 61, Trigs 97,   .     #Immunosuppression  -c/w tacrolimus 4 mg/3 at home (target 5-8) /CKD FK 8.8. inaccurate.   -prednisone 5 mg daily     #Antinfectives:  [CMV -/-; Toxo (-)]   none  post op abx: zosyn. x 5 days     #other :  -repeat ECHO/limited echo for graft function.   -C/w home rosuvastatin   -c/w home fenofibrate   -c/w home 100 BID hydralazine for elevated BP/MAP, current BP's may be elevated s/t pain. hold off on escalation, untill optimal pain control   -Continue fish oil 1 gram bid  -continue ASA if okay with ACS team  -C/w Rafat/vitamin D and MVT    #Alisha on CKD stage IIIB/post op ?ATN vs cardiorenal   -Cr 3.2 peak   -AM crea 2.81 >>2.94>>2.69>>2.56  BL crea ~2 (last Cr 2.4/GFR 29 (1/27/23)  Nephrology following.     # Anemia/blood loss anemia likely post op  hgb 7.5<<7.5<<<s/p PRBC<<<6.9<<<7.4   concern of dark stools>> spoke w/ team(not concerning at this time) CTM. hgb stable 7.5   CTM, hold off on blood transfusion(IRINEO population). no aggressive transfusions   if persistently trending down can consider leukoreduced PRBC's.      #T2DM  - At home 22 units lantus + lispro SSI  - Mild SSI  - consider increasing lantus when advancing diet and good oral intake.   - reduced dose lantus 10 units +mild SSI     #Depression/anxiety  -c/w citalopram, gabapentin(renal dosed)    #Gout  -allopurinol    #GERD/esophagitis  -continue home  PPI    CODE STATUS: FULL CODE   NOK: Benny Goodman [phone]    This case was seen and discussed with Dr. Barone.    Attestation:   Note Completion:  I am a:  Resident/Fellow   Attending Attestation I saw and evaluated the patient.  I personally obtained the key and critical portions of the history and physical exam or was physically present for key and  critical portions performed by the resident/fellow. I reviewed the resident/fellow?s documentation and discussed the patient with the resident/fellow.  I agree with the resident/fellow?s medical decision making as documented in the note.     I personally evaluated the patient on 25-May-2023         Electronic Signatures:  Tahira Pedro (Fellow))  (Signed 25-May-2023 16:51)   Authored: Service, Subjective Data, Objective Data, Assessment  and Plan, Note Completion  Daniel Barone)  (Signed 25-May-2023 18:53)   Authored: Note Completion   Co-Signer: Service, Subjective Data, Objective Data, Assessment and Plan, Note Completion      Last Updated: 25-May-2023 18:53 by Daniel Barone)

## 2023-09-30 NOTE — PROGRESS NOTES
Service: Heart Failure     Subjective Data:   ABRIL PÉREZ is a 67 year old Male who is Hospital Day # 13 and POD #12 for Whipple procedure (pylorus procedure).     no new complaints   uneventful night.    Objective Data:     Objective Information:      T   P  R  BP   MAP  SpO2   Value  36  73  16  147/72      93%  Date/Time 5/31 13:19 5/31 13:19 5/31 13:19 5/31 13:19    5/31 13:19  Range  (36C - 36.5C )  (71 - 87 )  (16 - 18 )  (147 - 197 )/ (69 - 90 )    (92% - 96% )      Pain reported at 5/31 8:21: 3 = Mild    ---- Intake and Output  -----  Mn/Dy/Year Time  Intake   Output  Net  May 31, 2023 2:00 pm  0   0  0  May 31, 2023 6:00 am  0   10  -10  May 30, 2023 10:00 pm  0   30  -30    The Intake and Output Totals for the last 24 hours are:      Intake   Output  Net      null   440  null    Physical Exam Narrative:  ·  Physical Exam:    Gen: comfortably laying in hospital bed. No acute distress  HEENT: trachea midline. no JVD  CV: S1 S2 heard no murmur appreciated  Pulm: good air movement.  Abd: soft,mild-distended.  Ext: warm and well-perfused no edema.   Psych: appropriate affect  Neuro: CN II-XII grossly intact     Recent Lab Results:    Results:    CBC: 5/31/2023 06:02              \     Hgb     /                              \     9.0 L    /  WBC  ----------------  Plt               8.1       ----------------    325              /     Hct     \                              /     26.8 L    \            RBC: 3.04 L    MCV: 88           CMP: 5/31/2023 06:02  NA+        Cl-     BUN  /                         138    105    21  /  --------------------------------  Glucose                ---------------------------  124 H    K+     HCO3-   Creat \                         4.1    27    1.82 H \           \  T Bili  /                    \  0.7  /  AST  x ---- x ALT        25 x ---- x 19         /  Alk P   \               /  67  \  Calcium : 9.3     Anion Gap : 10     Albumin : 3.2 L    T Protein : 5.7  L           Radiology Results:    Results:        Conclusion:  CONCLUSIONS:  1. Left ventricular systolic function is low normal with a 50-55% estimated ejection fraction.  2. Poorly visualized anatomical structures due to suboptimal image quality.  3. Abnormal septal motion consistent with post-operative status.  4. Left ventricular cavity size is moderately dilated.  5. Unable to determine right ventricular systolic function.  6. There is mild dilatation of the ascending aorta and aortic root.    QUANTITATIVE DATA SUMMARY:  2D MEASUREMENTS:  Normal Ranges:  Ao Root d:     4.08 cm    (2.0-3.7cm)  LAs:           4.90 cm    (2.7-4.0cm)  IVSd:          1.10 cm    (0.6-1.1cm)  LVPWd:        1.20 cm    (0.6-1.1cm)  LVIDd:         5.60 cm    (3.9-5.9cm)  LVIDs:         5.10 cm  LV Mass Index: 131.3 g/m2  LV % FS        8.9 %    LA VOLUME:  Normal Ranges:  LA Vol A4C:        192.3 ml   (22+/-6mL/m2)  LA Vol A2C:        93.9 ml  LA Vol BP:         137.4 ml  LA Vol Index A4C:  95.4ml/m2  LA Vol Index A2C:  46.5 ml/m2  LA Vol Index BP:   68.2 ml/m2  LA Area A4C:       41.0 cm2  LA Area A2C:       28.0 cm2  LA Major Axis A4C: 7.4 cm  LA Major Axis A2C: 7.1 cm  LA Volume Index:   68.2 ml/m2    RA VOLUME BY A/L METHOD:  Normal Ranges:  RA Vol A4C:        47.7 ml    (8.3-19.5ml)  RA Vol Index A4C:  23.7 ml/m2  RA Area A4C:       17.4 cm2  RA Major Axis A4C: 5.4 cm    AORTA MEASUREMENTS:  Normal Ranges:  Ao Sinus, d: 4.10 cm (2.1-3.5cm)  Asc Ao, d:   4.00 cm (2.1-3.4cm)    LV SYSTOLIC FUNCTION BY 2D PLANIMETRY (MOD):  Normal Ranges:  EF-A4C View: 53.4 % (>=55%)  EF-A2C View: 51.7 %  EF-Biplane:  50.3 %    LV DIASTOLIC FUNCTION:  Normal Ranges:  MV Peak E:    1.47 m/s    (0.7-1.2 m/s)  MV Peak A:    0.71 m/s    (0.42-0.7 m/s)  E/A Ratio:    2.08        (1.0-2.2)  MV e'         0.08 m/s    (>8.0)  MV lateral e' 0.09 m/s  MV medial e'  0.08 m/s  MV A Dur:     114.00 msec  E/e' Ratio:   17.29       (<8.0)  MV DT:        141 msec     (150-240 msec)    MITRAL VALVE:  Normal Ranges:  MV Vmax:    1.71 m/s  (<=1.3m/s)  MV peak P.7 mmHg (<5mmHg)  MV mean P.0 mmHg  (<2mmHg)  MV VTI:     35.60 cm  (10-13cm)  MV DT:      141 msec  (150-240msec)  MV PHT:     60 msec   (30-60msec)  MVA by PHT: 3.69 cm2  (4-6cm2)    MITRAL INSUFFICIENCY:  Normal Ranges:  PISA Radius:  0.3 cm  MR VTI:       162.00 cm  MR Vmax:      502.00 cm/s  MR Alias Severo: 38.5 cm/s  MR Volume:    7.03 ml  MR Flow Rt:   21.77 ml/s  MR EROA:      0.04 cm2    AORTIC VALVE:  Normal Ranges:  AoV Vmax:      1.42 m/s (<=1.7m/s)  AoV Peak P.1 mmHg (<20mmHg)  LVOT Max Severo:  1.06 m/s (<=1.1m/s)  LVOT VTI:      19.60 cm  LVOT Diameter: 2.20 cm  (1.8-2.4cm)  AoV Area,Vmax: 2.84 cm2 (2.5-4.5cm2)    RIGHT VENTRICLE:  RV 1   4.59 cm  RV 2   3.59 cm  RV 3   7.63 cm  TAPSE: 14.8 mm  RV s'  0.11 m/s    PULMONIC VALVE:  Normal Ranges:  PV Accel Time: 108 msec (>120ms)  PV Max Severo:    1.0 m/s  (0.6-0.9m/s)  PV Max PG:     3.6 mmHg      [id number] Corina Martinez MD  Electronically signed on 2023 at 3:01:34 PM        *** Final ***     Echocardiogram [May 30 2023  3:01PM]      Impression:    1.  Postsurgical changes of a Whipple procedure and drain placement  with multiple fluid collections as described above within the upper  abdomen in the region of the surgical site. Sterility of these fluid  collections is unable to be assessed. No bowel dilatation to suggest  obstruction at the anastomotic sites.  2. Small right pleural effusion.  3. Patulous distal esophagus mild circumferential wall thickening,  correlate with concern for gastroesophageal reflux.  4. Air is present within the urinary bladder, which does not  demonstrate wall thickening. Correlate with recent instrumentation  and urinalysis with concern for cystitis.  5. Other findings as above.      CT Abdomen and Pelvis without Contrast [May 28 2023 12:15PM]      Assessment and Plan:   Daily Risk Screen:  ·  Does patient have an  indwelling urinary catheter? n/a consulting service   ·  Does patient have a central line? 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   #recurrent Obstructive pancreatitis   -Recurrent admissions w/ pancreatitis (1/2023 and early 5/2023)   -Labs last admission suggestive of cholestatic pattern of liver injury, worsening LFTs/elevated lipase.   -RUQ US 3/2023 with body and tail of Pancreas obscured by gas. No gallstones visible   -RUQ US 4/28/23 showing dilation of CBD and intrahepatic bile ducts, no cholecystitis  -MRI pancreas showing high grade stricture of inferior and common bile ducts, and possible pancreatic mass  -ERCP EUS done which showed a 2.8 cm pancreatic mass in the pancreatic head s/p stent was placed to improve biliary drainage.   -Surgical oncology :EUS FNB: adenocarinoma.   -s/p Whipple surgery/ laparoscopic 5/19  - CT A/P 5/28: several fluid collections, noncharacterizable on Imaging in the upper abdomen, w/ post surgical changes and no obstruction.   - BM+/regular   - off opioids, APAP as needed.   - PT/OT and OOB   - drains out.     # NICM 2/2 to valvular disease s/p bicaval OHT (3/24/17)  # s/p mitral valve repair w/[employer] AnnuloFlex band #36mm w/induction)   # HTN  # post Op hypervolemia   - Post transplant issues/complications: atrial arrhythmias, volume overload, right ventricular  failure, acute on chronic kidney disease  requiring RRT, chronotropic incompetence, and psychosis  -Cath with mild CAD, no CAV (3/2019). Echo with mild allograft dysfunction (LVEF 45-50%); similar to prior imaging  -stress test 2022: negative.   -TTE 5/2- showed LVEF low normal 50-55%, no wall motion abnormality NRVEF, mild MR LVIDD 5.3 cm   -Lipids (7/2022): tChol 127, HDL 47, LDL 61, Trigs 97,  -.   - repeat TTE 5/30/2023: LVEF 50-55%, no RWMA, NRVEF LVIDD 5.6cm.     #Immunosuppression  -c/w tacrolimus 4 mg/3 at home (target 5-8) /CKD FK 5.1 No changes at discharge.   -plan to resume Myfortic 360 BID/although patient refused.   -c/w prednisone 5 mg daily   -per previous discussion, patient does not want to transition to sirolimus despite CKD    #Anti infectives:  [CMV -/-; Toxo (-)]   none  post op abx: zosyn/completed.     #other :  -C/w home rosuvastatin   -c/w home fenofibrate   -c/w hydralazine 100 mg TID   -c/w ISDN 40 mg TID   - start amlodipine 5 mg daily for persistent elevated BP of 150's.   -Continue fish oil 1 gram bid  -C/w Rafat/vitamin D and MVT  -c/w ASA    #Alisha on CKD stage IIIB/post op ?ATN vs cardiorenal /resolved   - Cr 3.2 peak   - Crea AM 2.16 >1.99   - BL crea 2.0-2.5.   - s/p multiple IV lasix doses+ albumin for hypervolemia.   - volume status much improved. /euvolemic at discharge     # Anemia/blood loss anemia likely post op   hgb down to 6.9 s/p 1 unit PRBC (post op), w/ appropriate rise  slow downtrending hgb, no overt bleeding concerns.   s/p 1 unit 5/29 >>hgb 8.2.     #T2DM  - At home 22 units lantus + lispro SSI  - Mild SSI  - reduced dose lantus 10 units +mild SSI    #Depression/anxiety  -c/w citalopram, gabapentin(renal dosed)    #Gout  -allopurinol    #GERD/esophagitis  -continue home PPI    CODE STATUS: FULL CODE   NOK: Wife Krystle Goodman [phone]    Plan for discharge today     This case was seen and discussed with Dr. Guillermo.     Attestation:   Note  Completion:  I am a:  Resident/Fellow   Attending Attestation I saw and evaluated the patient.  I personally obtained the key and critical portions of the history and physical exam or was physically present for key and  critical portions performed by the resident/fellow. I reviewed the resident/fellow?s documentation and discussed the patient with the resident/fellow.  I agree with the resident/fellow?s medical decision making as documented in the note.     I personally evaluated the patient on 31-May-2023         Electronic Signatures:  Tahira Pedro (Fellow))  (Signed 31-May-2023 15:54)   Authored: Service, Subjective Data, Objective Data, Assessment  and Plan, Note Completion  Santhosh Guillermo)  (Signed 31-May-2023 22:05)   Authored: Note Completion   Co-Signer: Service, Subjective Data, Objective Data, Assessment and Plan, Note Completion      Last Updated: 31-May-2023 22:05 by Santhosh Guillermo)

## 2023-09-30 NOTE — PROGRESS NOTES
Service: Heart Failure     Subjective Data:   ABRIL PÉREZ is a 67 year old Male who is Hospital Day # 5.     Patient denies any further episodes of chest pain. Denies shortness of breath, cough, palpitations, dizziness. Endorses mild abdominal pain with eating liquids today. Happy to have NG tube removed.    Objective Data:     Objective Information:      T   P  R  BP   MAP  SpO2   Value  36.1  76  16  133/79      96%  Date/Time 9/27 9:11 9/27 9:11 9/27 9:11 9/27 9:11    9/27 9:11  Range  (36.1C - 37.1C )  (76 - 96 )  (16 - 18 )  (122 - 178 )/ (79 - 101 )    (96% - 98% )  Highest temp of 37.1 C was recorded at 9/26 21:25      Pain reported at 9/27 10:00: 7 = Severe    ---- Intake and Output  -----  Mn/Dy/Year Time  Intake   Output  Net  Sep 27, 2023 6:00 am  1400   0  1400  Sep 26, 2023 10:00 pm  0   0  0  Sep 26, 2023 2:00 pm  500   0  500    The Intake and Output Totals for the last 24 hours are:      Intake   Output  Net      1900   null  null    Physical Exam by System:    Constitutional: Well developed, middle-aged man,  awake/alert/oriented x3, no acute distress, alert and cooperative   Eyes: PERRL, EOMI, clear sclera   ENMT: NG tube has been removed, brown liquid in suction  container   Head/Neck: Neck supple, no apparent injury, no JVD,  no lymphadenopathy   Respiratory/Thorax: Clear to auscultation bilaterally,  no wheezes or crackles   Cardiovascular: Regular, rate and rhythm, no murmurs,  2+ equal pulses of the extremities, normal S1 and S2   Gastrointestinal: Nondistended, soft, mild tenderness  to palpation LLQ   Musculoskeletal: ROM intact, normal strength   Extremities: normal extremities, no cyanosis, edema,  contusions or wounds, no clubbing. Left foot it nontender to palpation, DP pulse is 2+ and equal.   Neurological: alert and oriented x3, intact senses,  motor, response and reflexes, normal strength   Psychological: Appropriate mood and behavior   Skin: Warm and dry, no lesions,  no rashes     Medication:    Medications:          Continuous Medications       --------------------------------    1. Lactated Ringers Infusion:  1000  mL  IntraVenous  <Continuous>         Scheduled Medications       --------------------------------    1. Heparin Flush 100 unit/ mL Injectable:  5  mL  IntraVenous Flush  Once    2. Heparin SubCutaneous:  5000  unit(s)  SubCutaneous  Every 8 Hours    3. Insulin Lispro Mild Corrective Scale:  unit(s)  SubCutaneous  Every 4 Hours    4. methylPREDNISolone Sodium Succinate Injectable:  4  mg  IntraVenous Push  Every Morning    5. Pantoprazole Injectable:  40  mg  IntraVenous Push  Every 12 Hours    6. Tacrolimus:  2  mg  SubLingual  <User Schedule>    7. Tacrolimus:  1.5  mg  SubLingual  <User Schedule>         PRN Medications       --------------------------------    1. Glucagon Injectable:  1  mg  IntraMuscular  Every 15 Minutes    2. Heparin Flush 10 unit/ mL Injectable PRN:  5  mL  IntraVenous Flush  According to Flush Policy    3. hydrALAZINE (APRESOLINE) Injectable:  10  mg  IntraVenous Push  Every 6 Hours    4. HYDROmorphone Injectable:  0.2  mg  IntraVenous Push  Every 4 Hours    5. HYDROmorphone Injectable:  0.4  mg  IntraVenous Push  Every 4 Hours    6. Melatonin:  5  mg  Oral  Daily 1800    7. Sodium Chloride 0.9% Injectable Flush:  10  mL  IntraVenous Flush  Every 8 Hours and as Needed    8. Sodium Chloride 0.9% Injectable Flush PRN:  10  mL  IntraVenous Flush  According to Flush Policy        Recent Lab Results:    Results:    CBC: 9/27/2023 06:16              \     Hgb     /                              \     10.0 L    /  WBC  ----------------  Plt               6.7       ----------------    236              /     Hct     \                              /     29.6 L    \            RBC: 3.41 L    MCV: 87           BMP: 9/27/2023 01:02  NA+        Cl-     BUN  /                         Canceled    Canceled    Canceled  /  --------------------------------   Glucose                ---------------------------  Canceled    K+     HCO3-   Creat \                         Canceled  Canceled    Canceled  \  Calcium : Canceled     Anion Gap : Canceled      CMP: 9/27/2023 06:16  NA+        Cl-     BUN  /                         141    102    34 H /  --------------------------------  Glucose                ---------------------------  121 H    K+     HCO3-   Creat \                         3.8    29    2.20 H \           \  T Bili  /                    \  0.8  /  AST  x ---- x ALT        39 x ---- x 37         /  Alk P   \               /  80  \  Calcium : 9.3     Anion Gap : 14     Albumin : 3.3 L    T Protein : 5.5 L             I have reviewed these laboratory results:    Tacrolimus, Blood  27-Sep-2023 06:18:00      Result Value    Tacrolimus, Blood  4.1        Radiology Results:    Results:        Impression:    1. Nonspecific relative paucity bowel gas.  2. Nonspecific bibasilar opacities which may reflect a component of  chronic lung disease with or without superimposed  infectious/inflammatory infiltrateand atelectasis.  3. Medical devices as above.      Xray Abdomen AP View [Sep 24 2023  8:19AM]      Impression:  Xray Abdomen AP View [Sep 24 2023  3:53AM]      Impression:  Xray Abdomen AP View [Sep 24 2023  3:51AM]      Impression:     NG tube present with tip in mid stomach.        Signed by Haroldo James MD     Xray Abdomen AP View [Sep 23 2023  2:19PM]      Impression:    Findings consistent with mid and distal multifocal at least partial  small bowel obstruction, most likely from adhesions.     Previous Whipple procedure. The previous stent in the pancreatic  remnant is now in a loop of jejunum in the mid right abdomen.     Eventration of the right diaphragm. Stable scarring at the lung  bases, right greater than left, and along the right minor fissure.     Cardiomegaly.     Very mild stable nonspecific retroperitoneal adenopathy, most  likely  reactive.     Nonspecific edematous changes in the mesenteric fat of the abdomen  and upper pelvis, right greater than left. Small amount of  nonspecific perihepatic ascites.     MACRO:  None     CT Abdomen and Pelvis with IV Contrast [Sep 23 2023 10:41AM]      Impression:  CT Abdomen and Pelvis with IV Contrast [Sep 23 2023  9:33AM]      Assessment and Plan:   Daily Risk Screen:  ·  Does patient have an indwelling urinary catheter? n/a consulting service   ·  Does patient have a central line? n/a consulting service     Code Status:  ·  Code Status Full Code     Assessment:    Chung Goodmna is a 67-year-old man with history of  NICM secondary to valvular disease status post bicaval OHT (3/24/2017; status post mitral valve repair with [employer] AnnuloFlex  band #36 mm with induction) complicated by atrial arrhythmias, volume overload, right ventricular failure, acute on chronic kidney disease requiring RRT, chronotropic incompetence,  CKD, T2DM, amiodarone induced hyperthyroidism, psychosis, and most recently pancreatic cancer status post Whipple procedure (5/19/2023),  who presented to [facility] for abdominal pain, admitted for small bowel obstruction. Heart Failure/Transplant service has been consulted for further management of post-cardiac transplant medications.     9/26/2023 TTE  CONCLUSIONS:  1. Left ventricular systolic function is normal with a 55-60% estimated ejection fraction.  2. Abnormal septal motion consistent with post-operative status.   3. There is mildly reduced right ventricular systolic function.  4. S/p MV repair with 36mm [employer] Annuloflex band with mean MV gradient of 3mmHg and mild MR.  5. Compared with the prior exam from 5/30/2023 there are no significant  changes, though the RV was better seen today.      Assessment  Acute small bowel obstruction  Pancreatic adenocarcinoma status post Whipple procedure and on chemotherapy  NICM status post bicaval OHT  (2017)  CKD  Gastric ulcer      Recommendations    - Continue tacrolimus 2mg sublingual @0630 and 1.5mg @1830 - will switch to home dose tacrolimus 4mg PO @0630 and tacrolimus 3mg PO @1830 if patient continues to tolerate liquid diet well  - Check tacrolimus level 0600 daily  - Continue methylprednisolone 4mg IV daily - will switch to home dose prednisone 5mg daily  - Agree with blood pressure control with hydralazine 10mg IV q6h PRN for SBP >160 - can switch to home doses of antihypertensives if able to tolerate PO intake  - Gastroenterology recommendations after EGD yesterday have been reviewed  - Heart Failure/Transplant service will follow along with you very closely   - Since patient may require invasive procedures, agree with remaining on surgical oncology service for now until stable for medical management at which time patient can be transferred to Heart Failure/Transplant service        Patient examined and discussed with attending physician, Dr. Lazarus Landa.    Signed,  Cheryl Velasco MD  Heart Failure Fellow PGY4          Attestation:   Note Completion:  I am a:  Resident/Fellow   Attending Attestation I saw and evaluated the patient.  I personally obtained the key and critical portions of the history and physical exam or was physically present for key and  critical portions performed by the resident/fellow. I reviewed the resident/fellow?s documentation and discussed the patient with the resident/fellow.  I agree with the resident/fellow?s medical decision making as documented in the note.     I personally evaluated the patient on 27-Sep-2023         Electronic Signatures:  Lazarus Landa (MD)  (Signed 27-Sep-2023 22:52)   Authored: Note Completion   Co-Signer: Service, Subjective Data, Objective Data, Assessment and Plan, Note Completion  Cheryl Velasco (Fellow))  (Signed 27-Sep-2023 12:59)   Authored: Service, Subjective Data, Objective Data, Assessment  and Plan,  Note Completion      Last Updated: 27-Sep-2023 22:52 by Lazarus Landa)

## 2023-09-30 NOTE — H&P
History & Physical Reviewed:   I have reviewed the History and Physical dated:  04-May-2023   History and Physical reviewed and relevant findings noted. Patient examined to review pertinent physical  findings.: No significant changes   Home Medications Reviewed: no changes noted  on 5  mg prednisone daily. prograf 4 mg in the am and 3 mg in the pm. On 22 u lantus every day and 10 u humalog   Allergies Reviewed: no changes noted       ERAS (Enhanced Recovery After Surgery):  ·  ERAS Patient: yes    ·  CPM/PAT Utilization: yes    ·  Immunonutrition Recovery Drink Utilization: yes    ·  Carbohydrate Supplement Drink Utilization: yes      Consent:   COVID-19 Consent:  ·  COVID-19 Risk Consent Surgeon has reviewed key risks related to the risk of jason COVID-19 and if they contract COVID-19 what the risks are.     Attestation:   Note Completion:  I am a:  Resident/Fellow   Attending Attestation I saw and evaluated the patient.  I personally obtained the key and critical portions of the history and physical exam or was physically present for key and  critical portions performed by the resident/fellow. I reviewed the resident/fellow?s documentation and discussed the patient with the resident/fellow.  I agree with the resident/fellow?s medical decision making as documented in the note.     I personally evaluated the patient on 19-May-2023         Electronic Signatures:  Yonny Gomez (Fellow))  (Signed 19-May-2023 10:28)   Authored: History & Physical Reviewed, Consent, Note  Completion  Luis Armando Johnson)  (Signed 19-May-2023 23:32)   Authored: CYNTHIAS, Note Completion   Co-Signer: History & Physical Reviewed, Consent, Note Completion      Last Updated: 19-May-2023 23:32 by Luis Armando Johnson)

## 2023-09-30 NOTE — DISCHARGE SUMMARY
Send Summary:   Discharge Summary Providers:  Provider Role Provider Name   · Referring Unknown, Pcp   · Attending Luis Armando Johnson   · Consulting Fang Team   · Nurse  Practitioner Tigist Veras   · Primary Elena Rodriguez       Note Recipients: Fang Team, Pseudo  Tigist Veras, APRN-Elena Hernandez MD  Unknown, Pcp, Luis Armando Servin MD       Discharge:    Summary:   Admission Date: .19-May-2023 07:52:00   Discharge Date: 31-May-2023   Attending Physician at Discharge: Luis Armando Johnson   Admission Reason: Adenocarcinoma of pancreas for  Whipple's procedure(1)   Final Discharge Diagnoses: Adenocarcinoma of pancreas  for Whipple's procedure   Procedures: Date: 19-May-2023 17:43:00  Procedure Name: Whipple procedure (pylorus procedure)   Condition at Discharge: Satisfactory   Disposition at Discharge: .Home   Vital Signs:        T   P  R  BP   MAP  SpO2   Value  36  73  16  147/72      93%  Date/Time 5/31 13:19 5/31 13:19 5/31 13:19 5/31 13:19    5/31 13:19  Range  (36C - 36.5C )  (71 - 87 )  (16 - 18 )  (147 - 197 )/ (69 - 90 )    (92% - 96% )    Date:            Weight/Scale Type:  Height:   19-May-2023 18:38  86.1  kg         177.8  cm  Physical Exam:    Constitutional: no acute distress  Cardiac: regular rate  Respiratory: non labored breathing on room air  Abdomen: soft, not tender, baseline distended; midline staples without signs of infection (clean, dry in tact   Extremities: BAILEY  Skin: warm and dry  Neuro: alert and oriented x3 - currently at pt's baseline  Psych: appropriate mood  Tubes/Lines: L abdominal drain  Hospital Course:    Pt is a 68yo M who underwent a Whipple with Dr. Johnson on 5/19 for pancreatic adenocarcinoma. Pt has a hx of heart transplant. His intraoperative course was complicated  by an episode of PEA Arrest 5-10s, achieving rosc with epinephrine. Was monitored in the ICU afterwards. Since moving to the [facility], pt has had a biochemical leak, draining out of a L  abdominal drain. He has ROBF, tolerating full liquid diet, able to ambulate,  able to take PO meds to control pain, and has immunosuppression managed by cardiology transplant team. Despite being recommended for acute rehab, pt and his wife want to be discharged home with home healthcare and home physical therapy. Follow-up will  be with Dr. Johnson 1 week from discharge.   Pt discharging on 1 month of lovenox for DVT ppx.     Immunizations:    Immunizations:  09-Feb-2017   .Influenza- Influenza Virus: Immunizations, 09-Feb-2017 09-Feb-2017   Pneumonia- Pneumococcal polysaccharide vaccine: Immunizations,  09-Feb-2017  22-Mar-2017   Hepatitis A and B: Immunizations, 22-Mar-2017      Discharge Information:    and Continuing Care:   Lab Results - Pending:    Surgical Pathology Drawn at 19-May-2023 16:55:00  Radiology Results - Pending: None   Discharge Instructions:    Activity:           activity as tolerated.          May shower..            May not return to school/work.            May not drive while taking narcotics.            No pushing, pulling, or lifting objects greater than 10 pounds.            Weight-bearing Instructions: weight-bearing as tolerated.      Nutrition/Diet:           resume normal diet,  diabetic/carbohydrate counted          Diet Consistency/Texture:   soft    Wound Care:           Wound Site:   abdomen          Wound Type:   surgical incision          Cleanse With:   soap and water          Cover With:   no dressing, leave open to air          Instructions:   no lotions, creams, or tub soaks          Other Instructions:   You have staples along your incision. These will come out in 1-2 weeks.  This incision may get wet, pat dry and cover as needed to protect your clothing, otherwise you may leave this incision open to air.    Drain/Tube Care:           Type:   cook drain          Site:   abdomen          Suction:   gravity          Cleanse With:   soap and water          Dress With:   2x2  gauze dressing          Other Instructions:   Empty the drainage bag into a measuring container by twisting/removing the cap at the bottom.  To prevent infection do not let the emptying cap touch the measuring container or any other surface.  Empty into measuring cup.   Dispose of fluid in the toilet.  Record output on flowsheet & bring with you to your follow up appointment.    Additional Orders:           Blood Glucose Monitoring:   before meals and at bedtime          Additional Instructions:   Insulin Regimen  Lantus (long acting) 10 units daily  Lispro (short acting) per sliding scale before meals  Hypoglycemia Protocol  0 unit(s) if Blood glucose is between 0 - 70  0 unit(s) if Blood Glucose is between 71 - 150  4 unit(s) if Blood Glucose is between 151 - 200  6 unit(s) if Blood Glucose is between 201 - 250  8 unit(s) if Blood Glucose is between 251 - 300  10 unit(s) if Blood Glucose is between 301 - 350  12 unit(s) if Blood Glucose is between 351 - 400  Notify Provider if Blood Glucose is greater than 400    Rehab Services:           Occupational Therapy Orders:   Eval and Treat ([facility])   daily          Physical Therapy Orders:   Eval and Treat ([facility])   daily    Home Care Certification:           Skilled Disciplines Ordered:   RN/LPN,  PT    Home Care Services:           Home Care Skilled Service:   assessment,  diabetes management,  drain care,  follow up teaching,  Rehab (PT/OT/SP eval and treat),  wound care    Care Recommendation:           I recommend that INPATIENT care is required at::   Skilled          Estimated Stay:   Convalescent stay < 30 days    Follow Up Appointments:    Follow-Up Appointment 01:           Physician/Dept/Service:   Dr Johnson          Reason for Referral:   post-op appointment          Scheduled Date/Time:   08-Jun-2023 12:00          Location:   [facility] [id number] [place] [address] [address]          Phone  Number:   [phone]    Follow-Up Appointment 02:           Physician/Dept/Service:   Dr Loya (endocrinology)          Reason for Referral:   follow up appointment          Scheduled Date/Time:   11-Jul-2023 11:40          Location:   [place] [address]          Phone Number:   [phone]    Discharge Medications: Home Medication   ferrous sulfate 325 mg oral tablet - 1 tab(s) oral once a day  citalopram 40 mg oral tablet - 1 tab(s) oral once a day  rosuvastatin 40 mg oral tablet - 1 tab(s) oral once a day  gabapentin 100 mg oral capsule - 2 cap(s) oral once a day (at bedtime)  pantoprazole 40 mg oral delayed release tablet - 1 tab(s) oral 2 times a day  HumaLOG 100 units/mL injectable solution - use per sliding scale with meals (max 40 units/day)  allopurinol 100 mg oral tablet - 1 tab(s) oral once a day  calcium carbonate 1.5 g oral tablet, chewable - 1 tab(s) oral 2 times a day  aspirin 81 mg oral delayed release tablet - 1 tab(s) oral once a day  fenofibrate 160 mg oral tablet - 1 tab(s) oral once a day  hydrALAZINE 50 mg oral tablet - 2 tab(s) oral 2 times a day  Multiple Vitamins with Minerals oral tablet - 1 tab(s) oral once a day  omega-3 polyunsaturated fatty acids 1000 mg oral capsule - 1 cap(s) oral 2 times a day  tacrolimus 1 mg oral capsule - 4 cap(s) oral once a day (in the morning)  tacrolimus 1 mg oral capsule - 3 cap(s) oral once a day (in the evening)  cholecalciferol 125 mcg oral capsule - 1 cap(s) oral once a day  Wheeled Walker - 1 unit(s)   acetaminophen 325 mg oral tablet - 2 tab(s) orally every 6 hours  cyclobenzaprine 5 mg oral tablet - 1 tab(s) orally 3 times a day  insulin glargine 100 units/mL subcutaneous solution - 10 unit(s) subcutaneous every 24 hours  amLODIPine 5 mg oral tablet - 1 tab(s) orally once a day   predniSONE 5 mg oral tablet - 1 tab(s) oral once a day  Zenpep 40,000 units-126,000 units-168,000 units oral delayed release  "capsule - 2 cap(s) oral 2 times a day (with meals)     PRN Medication   traMADol 50 mg oral tablet - 1 tab(s) orally every 4 to 6 hours x 7 days, As Needed -for pain   Dx: G89.18     DNR Status:   ·  Code Status Code Status order at time of discharge: Full Code     Attestation:   Note Completion:  I am a:  Resident/Fellow   Attending Attestation I saw and evaluated the patient.  I personally obtained the key and critical portions of the history and physical exam or was physically present for key and  critical portions performed by the resident/fellow. I reviewed the resident/fellow?s documentation and discussed the patient with the resident/fellow.  I agree with the resident/fellow?s medical decision making as documented in the note.     I personally evaluated the patient on 31-May-2023         Electronic Signatures:  Jay Chavez (Resident))  (Signed 31-May-2023 13:35)   Authored: Send Summary, Summary Content, Immunizations,  Ongoing Care, DNR Status, Note Completion  Luis Armando Johnson)  (Signed 03-Jun-2023 07:40)   Authored: Summary Content, Ongoing Care, Note Completion   Co-Signer: Send Summary, Summary Content, Immunizations, Ongoing Care, DNR Status, Note Completion      Last Updated: 03-Jun-2023 07:40 by Luis Armando Johnson)    References:  1.  Data Referenced From \"Consult-Nephrology\" 21-May-2023 15:20   "

## 2023-09-30 NOTE — PROGRESS NOTES
Service:   Critical Care Service:  ·  Service SICU     Subjective Data:   ID Statement:  ABRIL PÉREZ is a 67 year old Male who is Hospital Day # 3 and ICU Day #3 and POD #2 for Whipple procedure (pylorus procedure).     Overnight events  -Received 2.25L crystalloid, 250cc 5% albumin  -K downtrended to 4.8, Cr downtrending      This AM, reporting improved pain control relative to yesterday. Sat up at side of bed yesterday. Denies cp/sob.    Objective Data:     ·  Objective Information      T   P  R  BP   MAP  SpO2   Value  36.6  107  26  141/83   101  94%  Date/Time 5/21 4:00 5/21 6:00 5/21 6:00 5/21 6:00  5/21 6:00 5/21 6:00  Range  (35.5C - 37.2C )  (98 - 110 )  (18 - 42 )  (115 - 161 )/ (70 - 94 )  (85 - 112 )  (89% - 97% )   As of 21-May-2023 06:28:00, patient is on 4 L/min of oxygen via nasal cannula.  Highest temp of 37.2 C was recorded at 5/20 8:00      Pain reported at 5/21 5:22: 8 = Severe      ---- Intake and Output  -----  Mn/Dy/Year Time  Intake   Output  Net  May 21, 2023 6:00 am  2570   1195  1375  May 20, 2023 10:00 pm  1500   703  797  May 20, 2023 2:00 pm  1200   403  797    The Intake and Output Totals for the last 24 hours are:      Intake   Output  Net      5270   2301  2969     Drain and tube details (included in I&O totals)  2253 cc      Indwelling Catheter - Urethral( 21-May-2023 06:00:00 )     Date:            Weight/Scale Type:  19-May-2023 18:38  86.1  kg         19-May-2023 18:38  86.1  kg / bed  19-May-2023 08:25  86.1  kg           Physical Exam by System:    Neurological: alert and oriented x3, intact senses   Cardiovascular: tachycardic, regular rhythm. no murmurs  appreciated   Respiratory/Thorax: Patent airways, CTAB, normal  breath sounds with good chest expansion, thorax symmetric. on RA   Genitourinary: Portillo draining clear urine   Gastrointestinal: Abdomen soft, NT, 2 drains with  serosanguinous drainage   Skin: No rashes or lesions   Constitutional: Well developed,  awake/alert/oriented  x3, no distress, alert and cooperative   Head/Neck: AT/NC   Extremities: No edema   Psychological: Appropriate mood and behavior     Allergies:       Allergies:  ·  morphine : Itching    Medications:    Medications:          Continuous Medications       --------------------------------    1. HYDROmorphone PCA 15 mg/ NaCL 0.9% 30 mL:  2.6  IV PCA  <Continuous>    2. Sodium Chloride 0.9% Infusion:  1000  mL  IntraVenous  <Continuous>         Scheduled Medications       --------------------------------    1. Acetaminophen:  650  mg  Oral  Every 6 Hours    2. Allopurinol:  100  mg  Oral  Every 24 Hours    3. Citalopram (CELEXA):  40  mg  Oral  Daily    4. Fenofibrate:  160  mg  Oral  Daily    5. Gabapentin:  200  mg  Oral  At Bedtime    6. Heparin SubCutaneous:  5000  unit(s)  SubCutaneous  Every 8 Hours    7. hydrALAZINE (APRESOLINE):  50  mg  Oral  Every 12 Hours    8. Insulin Glargine (Lantus) Injectable:  10  unit(s)  SubCutaneous  Every 24 Hours    9. Insulin Lispro Moderate Corrective Scale:  unit(s)  SubCutaneous  Every 4 Hours    10. Pantoprazole Injectable:  40  mg  IntraVenous Push  Every 24 Hours    11. Phytonadione (Vitamin K) IV Piggy Back:  10  mg  IntraVenous Piggyback  Once    12. Piperacillin - Tazobactam 2.25 grams/Iso-osmotic 50 mL Premix IVPB:  50  mL  IntraVenous Piggyback  Every 6 Hours    13. predniSONE:  5  mg  Oral  Every 24 Hours    14. Rosuvastatin:  80  mg  Oral  Daily    15. Tacrolimus:  3  mg  Oral  <User Schedule>         PRN Medications       --------------------------------    1. Calcium Gluconate 1 gram/ NaCL 0.67% 50 mL Premix IVPB:  50  mL  IntraVenous Piggyback  Every 6 Hours    2. Calcium Gluconate 2 gram/ NaCL 0.67% 100 mL Premix IVPB:  100  mL  IntraVenous Piggyback  Every 6 Hours    3. Dextrose 50% in Water Injectable:  25  gram(s)  IntraVenous Push  Every 15 Minutes    4. Glucagon Injectable:  1  mg  IntraMuscular  Every 15 Minutes    5. Magnesium Sulfate 2  gram/Sterile Water 50 mL Premix Soln:  2  gram(s)  IntraVenous Piggyback  Every 6 Hours    6. Magnesium Sulfate 4 gram/Sterile Water 100 mL Premix Soln:  4  gram(s)  IntraVenous Piggyback  Every 6 Hours    7. Naloxone Injectable:  0.2  mg  IntraVenous Push  Once    8. oxyCODONE Immediate Release:  5  mg  Oral  Every 4 Hours    9. Sodium Chloride 0.9% Injectable Flush:  10  mL  IntraVenous Flush  Every 8 Hours and as Needed         Conditional Medication Orders       --------------------------------    1. Perflutren Lipid Microsphere (Activated) 1.3 mL / NaCL 0.9% T.V. 10 mL Injectable:  0.5  mL  IntraVenous Push  Once      Recent Lab Results:    Results:    CBC: 5/20/2023 15:15              \     Hgb     /                              \     9.7 L    /  WBC  ----------------  Plt               24.4 H    ----------------    285              /     Hct     \                              /     30.2 L    \            RBC: 3.29 L    MCV: 92           RFP: 5/21/2023 01:24  NA+        Cl-     BUN  /                         138    109 H   50 H  /  --------------------------------  Glucose                ---------------------------  233 H    K+     HCO3-   Creat \                         4.8    24    2.96 H \  Calcium : 7.9 LAnion Gap : 10          Albumin : 2.9 L     Phos : 2.3 L      Coagulation: 5/20/2023 15:15  PT  /                    15.3 H /  -------<    INR          ----------<      1.3 H  PTT\                    26  \                       ---------- Recent Arterial Blood Gas Results----------     5/21/2023 06:04  pO2 75  24 h range: ( 67 - 83 )  pH 7.40  24 h range: ( 7.36 - 7.4 )  pCO2 42  24 h range: ( 39 - 42 )  SO2 96  24 h range: ( 94 - 96 )  Base Excess 1.1  24 h range: ( -3.2 - 1.1 )null      Results:        Impression:    1.  Continued low lung volumes with bibasilar atelectasis.        Xray Chest 1 View [May 21 2023  9:24AM]      Conclusion:  CONCLUSIONS:  1. Poorly visualized anatomical structures  due to suboptimal image quality.  2. Left ventricular systolic function is low normal with a 50% estimated ejection fraction.  3. Abnormal septal motion consistent with post-operative status.    QUANTITATIVE DATA SUMMARY:  2D MEASUREMENTS:  Normal Ranges:  Ao Root d:     3.55 cm   (2.0-3.7cm)  LAs:           6.00 cm   (2.7-4.0cm)  IVSd:          1.10 cm   (0.6-1.1cm)  LVPWd:         0.80 cm   (0.6-1.1cm)  LVIDd:         5.30 cm   (3.9-5.9cm)  LVIDs:         4.30 cm  LV Mass Index: 91.9 g/m2  LV % FS        18.9 %    LA VOLUME:  Normal Ranges:  LA Vol A4C:        148.3 ml  (22+/-6mL/m2)  LA Vol Index A4C:  72.8ml/m2  LA Area A4C:       34.6 cm2  LA Major Axis A4C: 6.9 cm    M-MODE MEASUREMENTS:  Normal Ranges:  Ao Root: 4.20 cm (2.0-3.7cm)  LAs:     5.90 cm (2.7-4.0cm)    AORTA MEASUREMENTS:  Normal Ranges:  Ao Sinus, d: 4.00 cm (2.1-3.5cm)  Asc Ao, d:   4.00 cm (2.1-3.4cm)  Ao Arch:     3.00 cm (2.0-3.6cm)    LV SYSTOLIC FUNCTION BY 2D PLANIMETRY (MOD):  Normal Ranges:  EF-A4C View: 35.9 % (>=55%)  EF-A2C View: 53.2 %  EF-Biplane:  45.0 %    LV DIASTOLIC FUNCTION:  Normal Ranges:  MV Peak E:      1.22 m/s    (0.7-1.2 m/s)  MV Peak A:      0.66 m/s    (0.42-0.7 m/s)  E/A Ratio:      1.85        (1.0-2.2)  MV e'           0.08 m/s    (>8.0)  MV lateral e'   0.08 m/s  MV medial e'    0.09 m/s  MV A Dur:       114.00 msec  E/e' Ratio:     14.35       (<8.0)  a'              0.06 m/s  PulmV Sys Severo:  22.50 cm/s  PulmV Dolan Severo: 26.60 cm/s  PulmV S/D Severo:  0.80    MITRAL VALVE:  Normal Ranges:  MV DT: 148 msec (150-240msec)    MITRAL INSUFFICIENCY:  Normal Ranges:  MR VTI:  101.80 cm  MR Vmax: 465.25 cm/s    AORTIC VALVE:  Normal Ranges:  AoV Vmax:      1.35 m/s (<=1.7m/s)  AoV Peak P.3 mmHg (<20mmHg)  LVOT Max Severo:  0.92 m/s (<=1.1m/s)  LVOT VTI:      11.50 cm  LVOT Diameter: 2.30 cm  (1.8-2.4cm)  AoV Area,Vmax: 2.85 cm2 (2.5-4.5cm2)    RIGHT VENTRICLE:  RV 1   3.77 cm  RV 2   3.43 cm  RV 3   6.44 cm  TAPSE: 11.3  mm  RV s'  0.23 m/s    TRICUSPID VALVE/RVSP:  Normal Ranges:  IVC Diam: 1.50 cm    PULMONIC VALVE:  Normal Ranges:  PV Accel Time: 86 msec  (>120ms)  PV Max Severo:    1.4 m/s  (0.6-0.9m/s)  PV Max P.3 mmHg    Pulmonary Veins:  PulmV Dolan Severo: 26.60 cm/s  PulmV S/D Severo:  0.80  PulmV Sys Severo:  22.50 cm/s      [id number] Frank Bui MD  Electronically signed on 2023 at 3:40:05 PM        *** Final ***     Echocardiogram [May 20 2023  3:40PM]      Assessment and Plan:   Daily Risk Screen:  ·  Does patient have a central line? no   ·  Does patient have an indwelling urinary catheter? yes   ·  Plan for indwelling urinary catheter removal today? no   ·  The patient continues to require indwelling urinary catheterization for critically ill patients who need accurate urinary output measurements   ·  Is the patient intubated? no     Assessment/Plan:  ·  Assessment/Plan:    Mr. 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 Hx Gout and depression. Acute post op pain  - B/l ZEKE catheters with ropivacaine infusion -- APS following and managing, appreciate recs   - Pain control with dilaudid PCA + oxy 5mg PRN  - Ongoing neuro and pain assessments  - Continue home Gabapentin, Citalopram and Allopurinol     CV: NICM secondary to valvular s/p bicaval a heart transplant 2017. Last Echo (23): TTE showed LVEF 50-55%, no wall  motion abnormality. Postoperative TTE  LVEF 50, abnml septal motion consistent with postop status. Hydralazine suspended overnight for soft BP. No other intervention required. Hypertensive this  morning  - Goals MAP 65-85.  - Continuous EKG, ABP monitoring  - Continue home fenofibrate  - Resume hydralazine at reduced dose -  hydralazine 50mg BID   - F/u surgery regarding ASA restart date --> surgery states to hold for now   - Transplant service for consulted IS management , IS ordered per transplant: continue  prednisone 5mg, Tacro SL 3mg q12hrs (@ 0630, 1830) with daily 0600 tacro levels. Target is 5-9      PULM: No Hx pulmonary disease. Escalating oxygen requirements overnight to 4L NC however upon entering room, NC only in 1 nostril and patient saturating > 95%  - CXR: stable this AM  - Q1h incentive spirometer while awake  - Additional pulm toilet prn    - OOBTC    GI: Pancreatic cancer sp a whipple 5/19/23 with  . Hx GERD  - NPO except for meds with sips  - Advance diet per surgical service   - PPI for GI prophylaxis  - Trend daily LFTs    : CKD stage III, baseline creatinine ~ 2-2.2. Current JOSY likely prerenal and intrinsic in nature, improving overnight with additional volume resuscitation. +2.9L over last 24 hours. Cr peaked at  3.21, now 2.93 this AM. Additionally hyperkalemic to 6.3 5/20 afternoon for which patient received K cocktail: calcium gluconate, insulin, lasix (no response) + bumex 4mg with good UOP response. Most recent K normalized to 4.8.   - Maintenance IVF NS @ 75ml/hr   - q8hr RFP  - Volume resuscitation as needed.    - Maintain U/O >0.5ml/kg/hr.    - Replete electrolytes to goal K>4, Mg>2, Phos>2.5, ionized Ca>1.10.    HEME: Acute surgical blood loss anemia. H/H stable at 9.7. INR 1.5  - Check CBC and coags daily And PRN   - Continue SQH/SCD  - Maintain active T&S --> obtain today  - Vitamin K IV x1 per surgical team    ENDO: IDDM2. At home 18 units lantus + lispro SSI. Received 10U of SSI + 10U insulin in setting of hyperK in last 24 hours.  this AM  - Q4h BG checks and moderate SSI Lispro per ICU protocol.  - Begin Lantus 10U subcutaneous qAM    ID: Afebrile. Likely reactive leukocytosis. No current s/s infx   -Zosyn x 5 days per SurgOnc ( SD 5/19)  -trend WBC, monitor temp  q4hr    Lines:    5/19 Left brachial A line   PIVx2 --> issues with IV access. Spoke with patient and wife regarding possibility of needing midline       Dispo: continue ICU care.  Discussed with ICU attending,        Team PHONE [id number]            Code Status:  ·  Code Status Full Code     Attestation:   Note Completion:  I am a:  Resident/Fellow   Attending Attestation I saw and evaluated the patient.  I personally obtained the key and critical portions of the history and physical exam or was physically present for key and  critical portions performed by the resident/fellow. I reviewed the resident/fellow?s documentation and discussed the patient with the resident/fellow.  I agree with the resident/fellow?s medical decision making as documented in the note.     I personally evaluated the patient on 21-May-2023   Critical Care Patient I have reviewed and evaluated the most recent data and results, personally examined the patient, and formulated the plan of care as presented above.  This patient  was critically ill and required continued critical care treatment. Teaching and any separately billable procedures are not included in the time calculation.    Billing Provider Critical Care Time 38 minute(s)   Primary Critical Care Issue/Treatment (See Assessment and Plan for greater detail) -- For the nature of the critical condition and treatment, this documentation has been prepared by the attending physician/NETTIE- billing provider of these critical  care services.         Electronic Signatures:  Avtar John)  (Signed 21-May-2023 14:31)   Authored: Assessment and Plan, Note Completion   Co-Signer: Service, Subjective Data, Objective Data, Assessment and Plan, Note Completion  Yahaira Goodwin (Resident))  (Signed 21-May-2023 11:43)   Authored: Service, Subjective Data, Objective Data, Assessment  and Plan, Note Completion      Last Updated: 21-May-2023 14:31 by Avtar John)

## 2023-09-30 NOTE — PROGRESS NOTES
Service: Nephrology     Subjective Data:   ABRIL PÉREZ is a 67 year old Male who is Hospital Day # 8 and POD #7 for Whipple procedure (pylorus procedure).     pain much better controlled   of pCA pump   on dilaudid PO/IV for pain   BM+  bloated+  FK 8.   s/p IV lasix x 40 and albumin.    Objective Data:     Objective Information:      T   P  R  BP   MAP  SpO2   Value  36.9  75  18  144/73      93%  Date/Time 5/26 16:41 5/26 16:41 5/26 16:41 5/26 16:41    5/26 16:41  Range  (36C - 37C )  (69 - 88 )  (18 - 20 )  (144 - 195 )/ (68 - 92 )    (92% - 97% )   As of 26-May-2023 06:22:00, patient is on 2 L/min of oxygen via room air.  Highest temp of 37 C was recorded at 5/26 6:22      Pain reported at 5/26 9:00: 3 = Mild    ---- Intake and Output  -----  Mn/Dy/Year Time  Intake   Output  Net  May 26, 2023 2:00 pm  0   170  -170  May 26, 2023 6:00 am  0   580  -580  May 25, 2023 10:00 pm  0   10  -10    The Intake and Output Totals for the last 24 hours are:      Intake   Output  Net      270   590  -320    Physical Exam Narrative:  ·  Physical Exam:    Gen: comfortably laying in hospital bed. No acute distress  HEENT: trachea midline.  CV: S1 S2 heard no murmur  Pulm: good air movement. Crackles b/l   Abd: soft, tender s/p surgery   Ext: warm and well-perfused, b/l edema upper and lower  Neuro: CN II-XII grossly intact     Recent Lab Results:    Results:    CBC: 5/26/2023 10:15              \     Hgb     /                              \     8.2 L    /  WBC  ----------------  Plt               10.4       ----------------    228              /     Hct     \                              /     24.9 L    \            RBC: 2.76 L    MCV: 90           CMP: 5/26/2023 10:15  NA+        Cl-     BUN  /                         148 H   111 H    46 H /  --------------------------------  Glucose                ---------------------------  115 H    K+     HCO3-   Creat \                         4.2    25    2.57 H  \           \  T Bili  /                    \  0.9  /  AST  x ---- x ALT        30 x ---- x 39         /  Alk P   \               /  85  \  Calcium : 8.7     Anion Gap : 16     Albumin : 3.2 L    T Protein : 5.6 L           Coagulation: 5/26/2023 05:18  PT  /                    13.4  /  -------<    INR          ----------<      1.2 H  PTT\                              \                       Radiology Results:    Results:        Impression:    1.  Moderate clearing bibasilar atelectatic changes.        Xray Chest 1 View [May 25 2023 11:46AM]      Conclusion:  CONCLUSIONS:  1. Poorly visualized anatomical structures due to suboptimal image quality.  2. Left ventricular systolic function is low normal with a 50% estimated ejection fraction.  3. Abnormal septal motion consistent with post-operative status.    QUANTITATIVE DATA SUMMARY:  2D MEASUREMENTS:  Normal Ranges:  Ao Root d:     3.55 cm   (2.0-3.7cm)  LAs:           6.00 cm   (2.7-4.0cm)  IVSd:          1.10 cm   (0.6-1.1cm)  LVPWd:         0.80 cm   (0.6-1.1cm)  LVIDd:         5.30 cm   (3.9-5.9cm)  LVIDs:         4.30 cm  LV Mass Index: 91.9 g/m2  LV % FS        18.9 %    LA VOLUME:  Normal Ranges:  LA Vol A4C:        148.3 ml  (22+/-6mL/m2)  LA Vol Index A4C:  72.8ml/m2  LA Area A4C:       34.6 cm2  LA Major Axis A4C: 6.9 cm    M-MODE MEASUREMENTS:  Normal Ranges:  Ao Root: 4.20 cm (2.0-3.7cm)  LAs:     5.90 cm (2.7-4.0cm)    AORTA MEASUREMENTS:  Normal Ranges:  Ao Sinus, d: 4.00 cm (2.1-3.5cm)  Asc Ao, d:   4.00 cm (2.1-3.4cm)  Ao Arch:     3.00 cm (2.0-3.6cm)    LV SYSTOLIC FUNCTION BY 2D PLANIMETRY (MOD):  Normal Ranges:  EF-A4C View: 35.9 % (>=55%)  EF-A2C View: 53.2 %  EF-Biplane:  45.0 %    LV DIASTOLIC FUNCTION:  Normal Ranges:  MV Peak E:      1.22 m/s    (0.7-1.2 m/s)  MV Peak A:      0.66 m/s    (0.42-0.7 m/s)  E/A Ratio:      1.85        (1.0-2.2)  MV e'           0.08 m/s    (>8.0)  MV lateral e'   0.08 m/s  MV medial e'    0.09 m/s  MV A Dur:        114.00 msec  E/e' Ratio:     14.35       (<8.0)  a'              0.06 m/s  PulmV Sys Severo:  22.50 cm/s  PulmV Dolan Severo: 26.60 cm/s  PulmV S/D Severo:  0.80    MITRAL VALVE:  Normal Ranges:  MV DT: 148 msec (150-240msec)    MITRAL INSUFFICIENCY:  Normal Ranges:  MR VTI:  101.80 cm  MR Vmax: 465.25 cm/s    AORTIC VALVE:  Normal Ranges:  AoV Vmax:      1.35 m/s (<=1.7m/s)  AoV Peak P.3 mmHg (<20mmHg)  LVOT Max Severo:  0.92 m/s (<=1.1m/s)  LVOT VTI:      11.50 cm  LVOT Diameter: 2.30 cm  (1.8-2.4cm)  AoV Area,Vmax: 2.85 cm2 (2.5-4.5cm2)    RIGHT VENTRICLE:  RV 1   3.77 cm  RV 2   3.43 cm  RV 3   6.44 cm  TAPSE: 11.3 mm  RV s'  0.23 m/s    TRICUSPID VALVE/RVSP:  Normal Ranges:  IVC Diam: 1.50 cm    PULMONIC VALVE:  Normal Ranges:  PV Accel Time: 86 msec  (>120ms)  PV Max Severo:    1.4 m/s  (0.6-0.9m/s)  PV Max P.3 mmHg    Pulmonary Veins:  PulmV Dolan Severo: 26.60 cm/s  PulmV S/D Severo:  0.80  PulmV Sys Severo:  22.50 cm/s      [id number] Frank Bui MD  Electronically signed on 2023 at 3:40:05 PM        *** Final ***     Echocardiogram [May 20 2023  3:40PM]      Assessment and Plan:   Code Status:  ·  Code Status Full Code     Assessment:    ABRIL PÉREZ is a 67 year old Male with a past medical history of nonischemic cardiomyopathy secondary to valvular disease s/p bicaval heart transplant on  3/24/2017, diabetes mellitus type 2, amiodarone induced hyperthyroidism, GERD, gout, CKD stage III (baseline creatinine around 2 ) likely from chronic CNI use, pancreatic cancer admitted for   whipple?s with  done on .  Had an episode of 5 to10   seconds PEA arrest during the operation and ROSC with epi and admitted to SICU postop for close monitoring. Transplant nephrology consulted for JOSY on CKD with oliguria and hyperkalemia for assistance in management    #JOSY on CKD stage IIIb: Improving  JOSY likely secondary to hemodynamic JOSY in the setting of suspected brief PEA arrest and hypotension Intra-Op and  poor oral intake leading to acute tubular injury  -CKD from chronic CNI use with baseline creatinine around 2   Today Cr is down to 2.5 ; Same as yeterday    Recommendations:  Continue to monitor RFP      #NICM s/p bicaval heart transplantation: Immunosuppression as per heart transplant team    #Anemia: Likely secondary to recent surgery.  Check iron studies and will give AMANDA as indicated once iron replete    #BMD: Corrected calcium magnesium levels are optimal.  Slightly low phos levels continue to monitor and replete as needed    #Adenocarcinoma for pancreas s/p Whipple surgery  consider reduced immunosuppression, but will defer to heart tx team    Discussed with primary team.        Electronic Signatures:  Rebekah Godfrey)  (Signed 26-May-2023 17:04)   Authored: Service, Subjective Data, Objective Data, Assessment  and Plan, Note Completion      Last Updated: 26-May-2023 17:04 by Rebekah Godfrey)

## 2023-09-30 NOTE — PROGRESS NOTES
Subjective Data:   ID Statement:  ABRIL PÉREZ is a 67 year old Male who is Hospital Day # 2 and ICU Day #2 and POD #1 for Whipple procedure (pylorus procedure).    Objective Data:     ·  Objective Information      T   P  R  BP   MAP  SpO2   Value  36.8  106  27  135/82   97  94%  Date/Time 5/20 12:00 5/20 10:00 5/20 10:00 5/20 10:00  5/20 10:00 5/20 10:00  Range  (35.9C - 37.2C )  (83 - 107 )  (19 - 42 )  (112 - 189 )/ (77 - 119 )  (88 - 137 )  (89% - 100% )   As of 19-May-2023 20:00:00, patient is on 2 L/min of oxygen via room air.  Highest temp of 37.2 C was recorded at 5/20 0:00      Pain reported at 5/20 9:00: 10 = Severe      ---- Intake and Output  -----  Mn/Dy/Year Time  Intake   Output  Net  May 20, 2023 2:00 pm  1025   88  937  May 20, 2023 6:00 am  1150   765  385  May 19, 2023 10:00 pm  1200   315  885    The Intake and Output Totals for the last 24 hours are:      Intake   Output  Net      2350   1080  1270     Drain and tube details (included in I&O totals)  1035 cc      Indwelling Catheter - Urethral( 20-May-2023 06:00:00 )     Date:            Weight/Scale Type:  19-May-2023 18:38  86.1  kg         19-May-2023 18:38  86.1  kg / bed  19-May-2023 08:25  86.1  kg           Allergies:       Allergies:  ·  morphine : Itching    Recent Lab Results:    Results:    CBC: 5/20/2023 01:00              \     Hgb     /                              \     9.7 L    /  WBC  ----------------  Plt               21.0 H    ----------------    280              /     Hct     \                              /     29.4 L    \            RBC: 3.22 L    MCV: 91           CMP: 5/20/2023 01:46  NA+        Cl-     BUN  /                         139    103    44 H /  --------------------------------  Glucose                ---------------------------  199 H    K+     HCO3-   Creat \                         5.1    24    2.14 H \           \  T Bili  /                    \  1.2  /  AST  x ---- x ALT        205 Hx  ---- x 158 H         /  Alk P   \               /  79  \  Calcium : 8.8     Anion Gap : 17     Albumin : 3.5     T Protein : 5.2 L          RFP: 5/19/2023 18:10  NA+        Cl-     BUN  /                         139    108 H   46 H  /  --------------------------------  Glucose                ---------------------------  179 H    K+     HCO3-   Creat \                         5.1    24    2.13 H \  Calcium : 8.8Anion Gap : 12          Albumin : 3.3 L    Phos : 3.9      Coagulation: 5/20/2023 01:48  PT  /                    12.5  /  -------<    INR          ----------<      1.1  PTT\                    24 L \            Fibrinogen: 362           ---------- Recent Arterial Blood Gas Results----------     5/20/2023 08:24  pO2 67  24 h range: ( 66 - 163 )  pH 7.36  24 h range: ( 7.23 - 7.4 )  pCO2 39  24 h range: ( 39 - 51 )  SO2 94  24 h range: ( 94 - 100 )  Base Excess -3.2  24 h range: ( -6.1 - -0.5 )null    Assessment and Plan:   Daily Risk Screen:  ·  Does patient have a central line? no   ·  Does patient have an indwelling urinary catheter? yes   ·  Plan for indwelling urinary catheter removal today? yes   ·  Is the patient intubated? no     Assessment/Plan:  ·  Assessment/Plan:    I have discussed the case with the resident/advanced practice provider. I have personally performed a history, physical exam, and my own medical decision making.  I have reviewed the note and agree with the findings and plan with the following exceptions as identified below  I have personally provided 39 minutes of critical care time exclusive of time spent on separately billable procedures. Time includes review of laboratory data, radiology results, discussion with consultants, and monitoring for potential decompensation.  Interventions were performed as documented above.      Neuro: No Issues  Cardiovascular: post Heart TX 2017  Respiratory: No Issues  GI: Panc CA  Renal/Electrolyte:   JOSY  ENDO: DM  Heme: Anemia  ID: No  Issues      Family/Surrogate updated with plan of care  Code status addressed/up to date      Code Status:  ·  Code Status Full Code     Attestation:   Note Completion:  Critical Care Patient I have reviewed and evaluated the most recent data and results, personally examined the patient, and formulated the plan of care as presented above.  This patient  was critically ill and required continued critical care treatment. Teaching and any separately billable procedures are not included in the time calculation.   Billing Provider Critical Care Time 39 minute(s)   Primary Critical Care Issue/Treatment (See Assessment and Plan for greater detail) -- For the nature of the critical condition and treatment, this documentation has been prepared by the attending physician/NETTIE- billing provider of these critical  care services.         Electronic Signatures:  Avtar John)  (Signed 20-May-2023 14:20)   Authored: Subjective Data, Objective Data, Assessment  and Plan, Note Completion      Last Updated: 20-May-2023 14:20 by Avtar John)

## 2023-09-30 NOTE — PROGRESS NOTES
Service: Nephrology     Subjective Data:   ABRIL PÉREZ is a 67 year old Male who is Hospital Day # 9 and POD #8 for Whipple procedure (pylorus procedure).     Hypertensive overnight to 191/83, given oxy to control pain with no improvement in BP, +50 hydralazine with improvement in BP to 140s-150s systolic.    This AM, pain poorly controlled, sitting up in chair. Denies cp/sob.    Objective Data:     Objective Information:      T   P  R  BP   MAP  SpO2   Value  36.1  86  18  179/95      94%  Date/Time 5/27 9:20 5/27 9:20 5/27 9:20 5/27 9:20    5/27 9:20  Range  (36.1C - 37C )  (75 - 88 )  (18 - 18 )  (115 - 185 )/ (64 - 95 )    (93% - 97% )   As of 26-May-2023 06:22:00, patient is on 2 L/min of oxygen via room air.  Highest temp of 37 C was recorded at 5/26 6:22      Pain reported at 5/27 8:35: 6 = Moderate    ---- Intake and Output  -----  Mn/Dy/Year Time  Intake   Output  Net  May 27, 2023 6:00 am  0   380  -380  May 26, 2023 10:00 pm  0   0  0  May 26, 2023 2:00 pm  0   470  -470    The Intake and Output Totals for the last 24 hours are:      Intake   Output  Net      null   850  null    Physical Exam by System:    Constitutional: Well developed, awake/alert/oriented  x3, no distress, alert and cooperative   Head/Neck: AT/NC   Respiratory/Thorax: Patent airways, CTAB, normal  breath sounds with good chest expansion, thorax symmetric. on RA   Cardiovascular: tachycardic, regular rhythm. no murmurs  appreciated   Gastrointestinal: Abdomen soft, NT, 2 drains with  serosanguinous drainage   Genitourinary: Portillo draining clear urine   Extremities: No edema   Neurological: alert and oriented x3, intact senses   Psychological: Appropriate mood and behavior   Skin: No rashes or lesions     Medication:    Medications:          Continuous Medications       --------------------------------  No continuous medications are active       Scheduled Medications       --------------------------------    1.  Acetaminophen:  650  mg  Oral  Every 6 Hours    2. Allopurinol:  100  mg  Oral  Every 24 Hours    3. Citalopram (CELEXA):  40  mg  Oral  Daily    4. Cyclobenzaprine:  5  mg  Oral  3 Times a Day    5. Fenofibrate:  160  mg  Oral  Daily    6. Gabapentin:  200  mg  Oral  At Bedtime    7. Heparin SubCutaneous:  5000  unit(s)  SubCutaneous  Every 8 Hours    8. hydrALAZINE (APRESOLINE):  100  mg  Oral  Every 12 Hours    9. Insulin Glargine (Lantus) Injectable:  10  unit(s)  SubCutaneous  Every 24 Hours    10. Insulin Lispro Moderate Corrective Scale:  unit(s)  SubCutaneous  Every 4 Hours    11. Isosorbide Dinitrate:  40  mg  Oral  3 Times a Day    12. Lidocaine 4% TransDermal:  1  patch  TransDermal  Every 24 Hours    13. Pantoprazole:  40  mg  Oral  Daily    14. Piperacillin - Tazobactam 2.25 grams/Iso-osmotic 50 mL Premix IVPB:  50  mL  IntraVenous Piggyback  Every 6 Hours    15. predniSONE:  5  mg  Oral  Every 24 Hours    16. Rosuvastatin:  40  mg  Oral  Every Night    17. Sodium Chloride 0.9% Injectable Flush:  10  mL  IntraVenous Flush  Every 12 Hours    18. Tacrolimus:  3  mg  Oral  <User Schedule>    19. Tacrolimus:  4  mg  Oral  <User Schedule>         PRN Medications       --------------------------------    1. Albuterol 2.5 mg - Ipratropium 0.5 mg/ 3 mL Neb Soln:  3  mL  Inhalation  Every 6 Hours    2. Dextrose 50% in Water Injectable:  25  gram(s)  IntraVenous Push  Every 15 Minutes    3. Glucagon Injectable:  1  mg  IntraMuscular  Every 15 Minutes    4. Heparin Flush 10 unit/ mL PF Injectable:  5  mL  IntraVenous Flush  Every 12 Hours    5. Heparin Flush 10 unit/ mL PF Injectable PRN:  5  mL  IntraVenous Flush  According to Flush Policy    6. HYDROmorphone:  2  mg  Oral  Every 4 Hours    7. HYDROmorphone:  4  mg  Oral  Every 4 Hours    8. HYDROmorphone Injectable:  0.4  mg  IntraVenous Push  Every 4 Hours    9. Naloxone Injectable:  0.2  mg  IntraVenous Push  Once    10. Ondansetron Injectable:  4  mg   IntraVenous Push  Every 6 Hours    11. Sodium Chloride 0.9% Injectable Flush:  10  mL  IntraVenous Flush  Every 8 Hours and as Needed    12. Sodium Chloride 0.9% Injectable Flush PRN:  10  mL  IntraVenous Flush  According to Flush Policy    13. Sodium Chloride 0.9% Injectable Flush PRN:  20  mL  IntraVenous Flush  According to Flush Policy         Conditional Medication Orders       --------------------------------    1. Perflutren Lipid Microsphere (Activated) 1.3 mL / NaCL 0.9% T.V. 10 mL Injectable:  0.5  mL  IntraVenous Push  Once      Recent Lab Results:    Results:    CBC: 5/27/2023 05:15              \     Hgb     /                              \     7.2 L    /  WBC  ----------------  Plt               7.0       ----------------    211              /     Hct     \                              /     21.8 L    \            RBC: 2.45 L    MCV: 89           CMP: 5/27/2023 05:15  NA+        Cl-     BUN  /                         145    111 H   46 H  /  --------------------------------  Glucose                ---------------------------  136 H    K+     HCO3-   Creat \                         4.2    25    2.16 H \           \  T Bili  /                    \  0.7  /  AST  x ---- x ALT        29 x ---- x 29         /  Alk P   \               /  62  \  Calcium : 8.2 L    Anion Gap : 13     Albumin : 2.7 L     T Protein : 5.0 L          Coagulation: 5/27/2023 05:15  PT  /                    13.2  /  -------<    INR          ----------<      1.1  PTT\                    23 L \                       Radiology Results:    Results:        Impression:    1.  Poor inspiratory volume with increased bibasilar atelectasis,  right more than left superimposed infection not excluded.  2. Questionable small bilateral pleural effusions.  3. Medical devices as described above.      Xray Chest 1 View [May 22 2023  9:43AM]      Assessment and Plan:   Code Status:  ·  Code Status Full Code     Assessment:    ABRIL PÉREZ  is a 67 year old Male with a past medical history of nonischemic cardiomyopathy secondary to valvular disease s/p bicaval heart transplant on  3/24/2017, diabetes mellitus type 2, amiodarone induced hyperthyroidism, GERD, gout, CKD stage III (baseline creatinine around 2 ) likely from chronic CNI use, pancreatic cancer admitted for   whipple?s with  done on 5/19.  Had an episode of 5 to10   seconds PEA arrest during the operation and ROSC with epi and admitted to SICU postop for close monitoring. Transplant nephrology consulted for JOSY on CKD with oliguria and hyperkalemia for assistance in management    #JOSY on CKD stage IIIb: Improving  JOSY likely secondary to hemodynamic JOSY in the setting of suspected brief PEA arrest and hypotension Intra-Op and poor oral intake leading to acute tubular injury  -CKD from chronic CNI use with baseline creatinine around 2   Today Cr is 2.8    Recommendations:  - Continue to monitor UOP and Serum creatinine closely.   - Avoid nephrotoxic agents, NSAIDs and IV contrast   - Strict I/O.   - Renally dose all medications by the most recent CrCl from Cockcroft-Gault formula.  -Avoid tacrolimus toxicity    #NICM s/p bicaval heart transplantation: Immunosuppression as per heart transplant team    #Anemia: Likely secondary to recent surgery.  Check iron studies and will give AMANDA as indicated once iron replete    #BMD: Corrected calcium magnesium levels are optimal.  Slightly low phos levels continue to monitor and replete as needed    #Adenocarcinoma for pancreas s/p Whipple surgery  consider reduced immunosuppression, but will defer to heart tx team    Discussed with primary team.          Electronic Signatures:  Rebekah Godfrey)  (Signed 27-May-2023 10:58)   Authored: Service, Subjective Data, Objective Data, Assessment  and Plan, Note Completion      Last Updated: 27-May-2023 10:58 by Rebekah Godfrey)

## 2023-09-30 NOTE — PROGRESS NOTES
Service: Heart Failure     Subjective Data:   ABRIL PÉREZ is a 67 year old Male who is Hospital Day # 3 and POD #2 for Whipple procedure (pylorus procedure).     Patient is still having abdominal pain.    Objective Data:     Objective Information:      T   P  R  BP   MAP  SpO2   Value  36.6  101  19  148/86   105  98%  Date/Time 5/21 4:00 5/21 10:00 5/21 10:00 5/21 10:00  5/21 10:00 5/21 10:00  Range  (35.5C - 37.2C )  (98 - 110 )  (18 - 42 )  (115 - 161 )/ (70 - 94 )  (85 - 112 )  (89% - 98% )   As of 21-May-2023 09:00:00, patient is on 4 L/min of oxygen via nasal cannula.  Highest temp of 37.2 C was recorded at 5/20 8:00      Pain reported at 5/21 8:00: 8 = Severe;  RE-educated on PCA    ---- Intake and Output  -----  Mn/Dy/Year Time  Intake   Output  Net  May 21, 2023 6:00 am  2570   1545  1025  May 20, 2023 10:00 pm  1500   703  797  May 20, 2023 2:00 pm  1200   403  797    The Intake and Output Totals for the last 24 hours are:      Intake   Output  Net      [id number] [id number]    Physical Exam by System:    Constitutional: Well developed, awake/alert/oriented  x3, no distress, alert and cooperative   Head/Neck: Neck supple, no apparent injury, thyroid  without mass or tenderness, No JVD, trachea midline, no bruits   Respiratory/Thorax: Patent airways, CTAB, normal  breath sounds with good chest expansion, thorax symmetric   Cardiovascular: Regular, rate and rhythm, no murmurs,  2+ equal pulses of the extremities, normal S 1and S 2   Gastrointestinal: Surgical drain in place with SS  fluid   Extremities: normal extremities, no cyanosis edema,  contusions or wounds, no clubbing     Recent Lab Results:    Results:    CBC: 5/21/2023 06:01              \     Hgb     /                              \     8.2 L    /  WBC  ----------------  Plt               21.2 H    ----------------    214              /     Hct     \                              /     26.9 L    \            RBC: 2.76 L    MCV: 97            RFP: 5/21/2023 06:01  NA+        Cl-     BUN  /                         138    107    49 H /  --------------------------------  Glucose                ---------------------------  206 H    K+     HCO3-   Creat \                         4.6    24    2.93 H \  Calcium : 8.2 LAnion Gap : 12          Albumin : 3.2 L     Phos : 2.4 L      Coagulation: 5/21/2023 06:01  PT  /                    17.6 H /  -------<    INR          ----------<      1.5 H  PTT\                    27  \                       ---------- Recent Arterial Blood Gas Results----------     5/21/2023 06:04  pO2 75  24 h range: ( 75 - 83 )  pH 7.40  24 h range: ( 7.37 - 7.4 )  pCO2 42  24 h range: ( 42 - 42 )  SO2 96  24 h range: ( 95 - 96 )  Base Excess 1.1  24 h range: ( -1 - 1.1 )null    Assessment and Plan:   Daily Risk Screen:  ·  Does patient have an indwelling urinary catheter? n/a consulting service   ·  Does patient have a central line? n/a consulting service     Comorbidities:  ·  Comorbidity Other     Code Status:  ·  Code Status Full Code     Assessment:     Mr. 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 His  post-transplant course was complicated by atrial arrhythmias, volume overload, right ventricular failure, acute on chronic kidney  disease requiring temporary RRT, chronotropic incompetence, and psychosis.     Cardiac workup:   -Transplant Date: 3/24/17  - Donor: HCV -/-, CMV -/-, Toxo +/-  - Post transplant issues/complications: atrial arrhythmias, volume overload, right ventricular failure, acute on chronic kidney disease  requiring RRT, chronotropic incompetence, and psychosis  - TTE 4/29/23, LVEF 55%, Normal RV function, MV not assessed     Active problems:   # Pancreatic adenocarcinoma S/p Whipple   # NICM secondary to  valvular disease s/p bicaval OHT (3/24/17; s/p mitral valve repair w/[employer] AnnuloFlex band #36  mm w/induction)  # s/p mitral valve repair w/[employer] AnnuloFlex band #36 mm w/induction)  # CKD stage IIIb  # HTN/DLD   # Gout   # Type II DM   # Chronic Normocytic anemia    Recommendations:   - Continue Tacrolimus  3 mg every 12 hours (at 6:30 AM and 6:30 PM), obtain fk levels at 6 AM daily. Target is 5-8   - Continue prednisone 5 mg daily   - Change rosuvastatin to 40 mg daily   - Start Aspirin 81 mg daily when OK from a surgical standpoint       This case was seen and discussed with Dr. Barone. Formal attestation to follow.     Dylan Styles MD. HF Fellow.       Attestation:   Note Completion:  I am a:  Resident/Fellow   Attending Attestation I saw and evaluated the patient.  I personally obtained the key and critical portions of the history and physical exam or was physically present for key and  critical portions performed by the resident/fellow. I reviewed the resident/fellow?s documentation and discussed the patient with the resident/fellow.  I agree with the resident/fellow?s medical decision making as documented in the note.     I personally evaluated the patient on 21-May-2023         Electronic Signatures:  Dylan Styles (Fellow))  (Signed 21-May-2023 12:07)   Authored: Service, Subjective Data, Objective Data, Assessment  and Plan, Note Completion  Daniel Barone)  (Signed 21-May-2023 12:39)   Authored: Note Completion   Co-Signer: Service, Subjective Data, Objective Data, Assessment and Plan, Note Completion      Last Updated: 21-May-2023 12:39 by Daniel Barone)

## 2023-09-30 NOTE — PROGRESS NOTES
Service:   Critical Care Service:  ·  Service SICU  Purple     Subjective Data:   ID Statement:  ABRIL PÉREZ is a 67 year old Male who is Hospital Day # 2 and ICU Day #2 and POD #1 for Whipple procedure (pylorus procedure).    Objective Data:     ·  Objective Information      T   P  R  BP   MAP  SpO2   Value  36.8  106  27  135/82   97  94%  Date/Time 5/20 12:00 5/20 10:00 5/20 10:00 5/20 10:00  5/20 10:00 5/20 10:00  Range  (35.9C - 37.2C )  (83 - 107 )  (19 - 42 )  (112 - 189 )/ (77 - 119 )  (88 - 137 )  (89% - 100% )   As of 19-May-2023 20:00:00, patient is on 2 L/min of oxygen via room air.  Highest temp of 37.2 C was recorded at 5/20 0:00      Pain reported at 5/20 9:00: 10 = Severe      ---- Intake and Output  -----  Mn/Dy/Year Time  Intake   Output  Net  May 20, 2023 2:00 pm  1025   88  937  May 20, 2023 6:00 am  1150   765  385  May 19, 2023 10:00 pm  1200   315  885    The Intake and Output Totals for the last 24 hours are:      Intake   Output  Net      2350   1080  1270     Drain and tube details (included in I&O totals)  1035 cc      Indwelling Catheter - Urethral( 20-May-2023 06:00:00 )     Date:            Weight/Scale Type:  19-May-2023 18:38  86.1  kg         19-May-2023 18:38  86.1  kg / bed  19-May-2023 08:25  86.1  kg           Physical Exam by System:    Neurological: alert and oriented x3, intact senses,  motor, response and reflexes, normal strength   Cardiovascular: Regular, rate and rhythm, no murmurs,  2+ equal pulses of the extremities, normal S 1and S 2   Respiratory/Thorax: Patent airways, CTAB, normal  breath sounds with good chest expansion, thorax symmetric. on RA   Genitourinary: Portillo draining clear urine   Gastrointestinal: Abdomen soft, NT, 2 drains with  serosanguinous drainage   Skin: No rashes or lesions   Constitutional: Well developed, awake/alert/oriented  x3, no distress, alert and cooperative   Head/Neck: AT/NC   Extremities: No edema   Psychological:  Appropriate mood and behavior     Allergies:       Allergies:  ·  morphine : Itching    Medications:    Medications:          Continuous Medications       --------------------------------    1. HYDROmorphone PCA 15 mg/ NaCL 0.9% 30 mL:  2.6  IV PCA  <Continuous>    2. Lactated Ringers Infusion:  1000  mL  IntraVenous  <Continuous>    3. Ropivacaine 0.2%/ Ambit Pump 500 mL:  1000  mg  Peripheral Nerve  <Continuous>         Scheduled Medications       --------------------------------    1. Acetaminophen:  650  mg  Oral  Every 6 Hours    2. Allopurinol:  100  mg  Oral  Every 24 Hours    3. Citalopram (CELEXA):  40  mg  Oral  Daily    4. Fenofibrate:  160  mg  Oral  Daily    5. Gabapentin:  200  mg  Oral  At Bedtime    6. Heparin SubCutaneous:  5000  unit(s)  SubCutaneous  Every 8 Hours    7. hydrALAZINE (APRESOLINE):  100  mg  Oral  Every 12 Hours    8. Insulin Lispro Moderate Corrective Scale:  unit(s)  SubCutaneous  Every 4 Hours    9. Pantoprazole Injectable:  40  mg  IntraVenous Push  Every 24 Hours    10. Piperacillin - Tazobactam 3.375 gram/Iso-osmotic 50 mL Premix IVPB:  50  mL  IntraVenous Piggyback  Every 6 Hours    11. predniSONE:  5  mg  Oral  Every 24 Hours    12. Rosuvastatin:  80  mg  Oral  Daily    13. Tacrolimus:  3  mg  Oral  <User Schedule>         PRN Medications       --------------------------------    1. Calcium Gluconate 1 gram/ NaCL 0.67% 50 mL Premix IVPB:  50  mL  IntraVenous Piggyback  Every 6 Hours    2. Calcium Gluconate 2 gram/ NaCL 0.67% 100 mL Premix IVPB:  100  mL  IntraVenous Piggyback  Every 6 Hours    3. Dextrose 50% in Water Injectable:  25  gram(s)  IntraVenous Push  Every 15 Minutes    4. Glucagon Injectable:  1  mg  IntraMuscular  Every 15 Minutes    5. Magnesium Sulfate 2 gram/Sterile Water 50 mL Premix Soln:  2  gram(s)  IntraVenous Piggyback  Every 6 Hours    6. Magnesium Sulfate 4 gram/Sterile Water 100 mL Premix Soln:  4  gram(s)  IntraVenous Piggyback  Every 6 Hours    7.  Naloxone Injectable:  0.2  mg  IntraVenous Push  Once    8. Potassium Chloride 20 mEq/Sterile Water 100 mL Premix IVPB:  20  mEq  IntraVenous Piggyback  Every 6 Hours    9. Sodium Chloride 0.9% Injectable Flush:  10  mL  IntraVenous Flush  Every 8 Hours and as Needed         Conditional Medication Orders       --------------------------------    1. Perflutren Lipid Microsphere (Activated) 1.3 mL / NaCL 0.9% T.V. 10 mL Injectable:  0.5  mL  IntraVenous Push  Once      Recent Lab Results:    Results:    CBC: 5/20/2023 01:00              \     Hgb     /                              \     9.7 L    /  WBC  ----------------  Plt               21.0 H    ----------------    280              /     Hct     \                              /     29.4 L    \            RBC: 3.22 L    MCV: 91           CMP: 5/20/2023 01:46  NA+        Cl-     BUN  /                         139    103    44 H /  --------------------------------  Glucose                ---------------------------  199 H    K+     HCO3-   Creat \                         5.1    24    2.14 H \           \  T Bili  /                    \  1.2  /  AST  x ---- x ALT        205 Hx ---- x 158 H         /  Alk P   \               /  79  \  Calcium : 8.8     Anion Gap : 17     Albumin : 3.5     T Protein : 5.2 L          RFP: 5/19/2023 18:10  NA+        Cl-     BUN  /                         139    108 H   46 H  /  --------------------------------  Glucose                ---------------------------  179 H    K+     HCO3-   Creat \                         5.1    24    2.13 H \  Calcium : 8.8Anion Gap : 12          Albumin : 3.3 L    Phos : 3.9      Coagulation: 5/20/2023 01:48  PT  /                    12.5  /  -------<    INR          ----------<      1.1  PTT\                    24 L \            Fibrinogen: 362           ---------- Recent Arterial Blood Gas Results----------     5/20/2023 08:24  pO2 67  24 h range: ( 66 - 100 )  pH 7.36  24 h range: ( 7.23 -  7.4 )  pCO2 39  24 h range: ( 39 - 51 )  SO2 94  24 h range: ( 94 - 100 )  Base Excess -3.2  24 h range: ( -6.1 - -0.5 )null    Assessment and Plan:   Daily Risk Screen:  ·  Does patient have a central line? no   ·  Does patient have an indwelling urinary catheter? yes   ·  Plan for indwelling urinary catheter removal today? no   ·  The patient continues to require indwelling urinary catheterization for critically ill patients who need accurate urinary output measurements   ·  Is the patient intubated? no     Assessment/Plan:  ·  Assessment/Plan:    Mr. 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 Hx Gout and depression. Post op pain  - Pain control with dilaudid PCA  - Ongoing neuro and pain assessments  -Resume home : Gabapentin, Citalpram, and Allopurinol     CV: NICM secondary to valvular s/p bicaval a heart transplant March 2017. Last Echo (4/29/23): TTE showed LVEF 50-55%, no wall  motion abnormality  - Goals MAP 65-85.  - Continuous EKG, ABP monitoring  -Hold home Aspirin, Fenofibrate, and Hydralazine  -Home Immunosuppression with : Tacrolimus 4 mg qAM/ 3 mg q PM and prednisone 5 mg daily à transplant service for consulted  IS management , IS ordered per transplant  - Obtain an Echo    PULM: No Hx pulmonary disease. On RA  - Q1h incentive spirometer while awake  - Additional pulm toilet prn      GI: Pancreatic cancer sp a whipple 5/19/23 with  . Hx GERD  - NPO except for meds with sips  - Advance diet per surgical service   - PPI for GI prophylaxis    : CKD stage III, baseline creatinine ~ 2-2.2   - Maintenance IVF.    - Volume resuscitation as needed.    - Maintain U/O >0.5ml/kg/hr.    - Check renal function panel post op and daily.    - Replete electrolytes to goal K>4, Mg>2, Phos>2.5, ionized Ca>1.10.    HEME: Acute surgical blood loss anemia  - Check CBC and coags daily And PRN   - start SQH    ENDO: IDDM2. At home 18 units lantus + lispro SSI. Received lantus 20 units intraop   - Q4h BG checks and SSI Lispro per ICU protocol.    ID: Afebrile. Post WBC pending   -Zosyn x 5 days per SurgOnc ( SD 5/19)    Lines:    5/19 Left brachial A line   PIV      Dispo: continue ICU care.  Discussed with Dr.Darwish Pita Terry PAJENN    Team PHONE [phone]            Code Status:  ·  Code Status Full Code       Electronic Signatures:  Pita Terry (PAC)  (Signed 20-May-2023 15:24)   Authored: Service, Subjective Data, Objective Data, Assessment  and Plan, Note Completion      Last Updated: 20-May-2023 15:24 by Pita Terry (PAC)

## 2023-09-30 NOTE — PROGRESS NOTES
Service: Anesthesia - Pain     Subjective Data:   ABRIL PÉREZ is a 67 year old Male who is Hospital Day # 3 and POD #2 for Whipple procedure (pylorus procedure).     Postop Pain HPI -   Palliative: relieved with IV analgesics and regional local anesthetics  Provocative: movement  Quality:  burning and aching  Radiation:  none  Severity:  8/10, but resting comfortable in bed this morning and said that he slept well last night   Timing: constant    24-HOUR OPIOID CONSUMPTION: Hydromorphone PCA 2.4 mg and oxycodone 15 mg.    Objective Data:     Objective Information:      T   P  R  BP   MAP  SpO2   Value  36.6  106  25  139/84   97  97%  Date/Time 5/21 4:00 5/21 7:00 5/21 7:00 5/21 7:00  5/21 7:00 5/21 7:00  Range  (35.5C - 37.2C )  (98 - 110 )  (18 - 42 )  (115 - 161 )/ (70 - 94 )  (85 - 112 )  (89% - 97% )   As of 21-May-2023 06:28:00, patient is on 4 L/min of oxygen via nasal cannula.  Highest temp of 37.2 C was recorded at 5/20 8:00      Pain reported at 5/21 5:22: 8 = Severe    ---- Intake and Output  -----  Mn/Dy/Year Time  Intake   Output  Net  May 21, 2023 6:00 am  2570   1545  1025  May 20, 2023 10:00 pm  1500   703  797  May 20, 2023 2:00 pm  1200   403  797    The Intake and Output Totals for the last 24 hours are:      Intake   Output  Net      1903 7627 4037    Physical Exam Narrative:  ·  Physical Exam:    Physical Exam:  Constitutional:  no distress, alert and cooperative  Eyes: clear sclera  Head/Neck: No apparent injury, trachea midline  Respiratory/Thorax: Patent airways, thorax symmetric, breathing comfortably  Cardiovascular: no pitting edema  Gastrointestinal: Nondistended  Musculoskeletal: ROM intact  Extremities: no clubbing  Neurological: alert, jack x4  Psychological: Appropriate affect.    Recent Lab Results:    Results:    CBC: 5/21/2023 06:01              \     Hgb     /                              \     8.2 L    /  WBC  ----------------  Plt               21.2 H     ----------------    214              /     Hct     \                              /     26.9 L    \            RBC: 2.76 L    MCV: 97           RFP: 5/21/2023 06:01  NA+        Cl-     BUN  /                         138    107    49 H /  --------------------------------  Glucose                ---------------------------  206 H    K+     HCO3-   Creat \                         4.6    24    2.93 H \  Calcium : 8.2 LAnion Gap : 12          Albumin : 3.2 L     Phos : 2.4 L      Coagulation: 5/21/2023 06:01  PT  /                    17.6 H /  -------<    INR          ----------<      1.5 H  PTT\                    27  \                       ---------- Recent Arterial Blood Gas Results----------     5/21/2023 06:04  pO2 75  24 h range: ( 75 - 83 )  pH 7.40  24 h range: ( 7.37 - 7.4 )  pCO2 42  24 h range: ( 42 - 42 )  SO2 96  24 h range: ( 95 - 96 )  Base Excess 1.1  24 h range: ( -1 - 1.1 )null    Assessment and Plan:   Daily Risk Screen:  ·  Does patient have an indwelling urinary catheter? n/a consulting service   ·  Does patient have a central line? n/a consulting service     Code Status:  ·  Code Status Full Code     Assessment:    Mr. Goodman is a 67 year old male who presents for pancreatectomy with Dr. Johnson. Acute Pain consulted for block for postoperative pain control.     Plan/Recs:  - BL ZEKE with catheters placed preoperatively on 5/19  - Catheters removed today  - Rest of pain management per primary team  - Acute pain will sign off. Please don't hesitate to contact us with any questions or concerns.    Acute Pain Team  pg [phone] ph [phone].    Attestation:   Note Completion:  I am a:  Resident/Fellow   Attending Attestation I saw and evaluated the patient.  I personally obtained the key and critical portions of the history and physical exam or was physically present for key and  critical portions performed by the resident/fellow. I reviewed the resident/fellow?s documentation and discussed the  patient with the resident/fellow.  I agree with the resident/fellow?s medical decision making as documented in the note.     I personally evaluated the patient on 21-May-2023         Electronic Signatures:  Yunier Durán (Resident))  (Signed 21-May-2023 08:55)   Authored: Service, Subjective Data, Objective Data, Assessment  and Plan, Note Completion  Marilou Li)  (Signed 22-May-2023 10:59)   Authored: Note Completion   Co-Signer: Service, Subjective Data, Objective Data, Assessment and Plan, Note Completion      Last Updated: 22-May-2023 10:59 by Marilou Li)

## 2023-09-30 NOTE — PROGRESS NOTES
Service: Surgical Oncology     Subjective Data:   ABRIL PÉREZ is a 67 year old Male who is Hospital Day # 5 and POD #4 for Whipple procedure (pylorus procedure).    Overnight Events: Patient had an uneventful night.   Additional Information:    No acute events overnight  Patient complaining of increased pain, inadequate pain control at this time      Objective Data:     Objective Information:      T   P  R  BP   MAP  SpO2   Value  36  102  20  134/72   94  93%  Date/Time 5/23 4:54 5/23 4:54 5/23 4:54 5/23 4:54  5/22 20:22 5/23 4:54  Range  (35.9C - 36.8C )  (94 - 108 )  (18 - 32 )  (134 - 175 )/ (56 - 99 )  (83 - 110 )  (91% - 100% )   As of 22-May-2023 11:09:00, patient is on 2 L/min of oxygen via nasal cannula.      Pain reported at 5/23 6:26: 8 = Severe    ---- Intake and Output  -----  Mn/Dy/Year Time  Intake   Output  Net  May 23, 2023 6:00 am  128   475  -347  May 22, 2023 10:00 pm  80   180  -100  May 22, 2023 2:00 pm  275   415  -140    The Intake and Output Totals for the last 24 hours are:      Intake   Output  Net      251 1950  -362    Physical Exam Narrative:  ·  Physical Exam:    Neurological: Awake, alert, visibly uncomfortable, oriented x 1 (name)   Cardiovascular: RRR  Head/Neck: NCAT  Respiratory/Thorax: even, unlabored, well healed prior sternotomy scar  Genitourinary: Portillo in place with clear urine output  Gastrointestinal: abdomen soft, appropriately tender to palpation, moderately distended. Incision c/d/i without bleeding. RLQ IRVING drain with serosanguinous output, LLQ IRVING murky/serosanguinous.  Skin: warm, dry  Musculoskeletal: BAILEY  Eyes: non-icteric  Extremities: BAILEY x 3  Psychological: lethargic      Medication:    Medications:          Continuous Medications       --------------------------------    1. HYDROmorphone PCA 15 mg/ NaCL 0.9% 30 mL:  2.6  IV PCA  <Continuous>    2. Sodium Chloride 0.9% Infusion:  1000  mL  IntraVenous  <Continuous>         Scheduled Medications        --------------------------------    1. Acetaminophen:  650  mg  Oral  Every 6 Hours    2. Allopurinol:  100  mg  Oral  Every 24 Hours    3. Citalopram (CELEXA):  40  mg  Oral  Daily    4. Cyclobenzaprine:  5  mg  Oral  3 Times a Day    5. Fenofibrate:  160  mg  Oral  Daily    6. Gabapentin:  200  mg  Oral  At Bedtime    7. Heparin SubCutaneous:  5000  unit(s)  SubCutaneous  Every 8 Hours    8. hydrALAZINE (APRESOLINE):  100  mg  Oral  Every 12 Hours    9. Insulin Glargine (Lantus) Injectable:  10  unit(s)  SubCutaneous  Every 24 Hours    10. Insulin Lispro Moderate Corrective Scale:  unit(s)  SubCutaneous  Every 4 Hours    11. Lidocaine 4% TransDermal:  1  patch  TransDermal  Every 24 Hours    12. Pantoprazole:  40  mg  Oral  Daily    13. Piperacillin - Tazobactam 2.25 grams/Iso-osmotic 50 mL Premix IVPB:  50  mL  IntraVenous Piggyback  Every 6 Hours    14. predniSONE:  5  mg  Oral  Every 24 Hours    15. Rosuvastatin:  40  mg  Oral  Every Night    16. Sodium Chloride 0.9% Injectable Flush:  10  mL  IntraVenous Flush  Every 12 Hours    17. Tacrolimus:  3  mg  Oral  <User Schedule>    18. Tacrolimus:  4  mg  Oral  <User Schedule>         PRN Medications       --------------------------------    1. Dextrose 50% in Water Injectable:  25  gram(s)  IntraVenous Push  Every 15 Minutes    2. Glucagon Injectable:  1  mg  IntraMuscular  Every 15 Minutes    3. Heparin Flush 10 unit/ mL PF Injectable:  5  mL  IntraVenous Flush  Every 12 Hours    4. Heparin Flush 10 unit/ mL PF Injectable PRN:  5  mL  IntraVenous Flush  According to Flush Policy    5. Naloxone Injectable:  0.2  mg  IntraVenous Push  Once    6. Ondansetron Injectable:  4  mg  IntraVenous Push  Every 6 Hours    7. oxyCODONE Immediate Release:  5  mg  Oral  Every 4 Hours    8. Sodium Chloride 0.9% Injectable Flush:  10  mL  IntraVenous Flush  Every 8 Hours and as Needed    9. Sodium Chloride 0.9% Injectable Flush PRN:  10  mL  IntraVenous Flush  According to Flush  Policy    10. Sodium Chloride 0.9% Injectable Flush PRN:  20  mL  IntraVenous Flush  According to Flush Policy         Conditional Medication Orders       --------------------------------    1. Perflutren Lipid Microsphere (Activated) 1.3 mL / NaCL 0.9% T.V. 10 mL Injectable:  0.5  mL  IntraVenous Push  Once      Recent Lab Results:    Results:        I have reviewed these laboratory results:    Amylase, Body Fluid  Trending View      Result 23-May-2023 03:11:00  23-May-2023 02:40:00    Fluid Source Drain (R)  Drain (L)   Amylase, Body Fluid 411   >6000          Assessment and Plan:   Comorbidities:  ·  Comorbidity Other     Code Status:  ·  Code Status Full Code     Assessment:    68 yo M s/p paulette with Dr. Johnson for pancreatic adenocarcinoma Patient has a hx of heart transplant. Did have an episode of possible 5-10 second PEA arrest during  operation, regular rhythm resumed with epi. In ICU post-op for close monitoring given cardiac history. VSS, afebrile.    PLAN:  Neuro - r/s PCA, continue oxy, tylenol, gabapentin, home Celexa, delirium precautions  CV - tacro management per transplant team, pred 5, rosuvastatin, hydralazine 100 BID, fenofibrate, cards transplant team following, appreciate recs  Resp - OOB, encourage IS, wean O2 as tolerated  GI - limited CLD, PPI  /Renal - mIVF @ 50/hr, strict I/Os, d/c rashid, please obtain daily LFT  Heme - no acute transfusion needs  ID - Zosyn x 5 days (end date 5/14)  Endo - SSI, home allopurinol, add lantus 10u  Prophy - SCDs, Lovenox, Incentive Spirometer  Dispo - RNF    Discussed with attending physician Dr. Johnson.   Izabela Bowden MD HILARY PGY1  Surgical Oncology/[facility] pager [phone]      Attestation:   Note Completion:  I am a:  Resident/Fellow   Attending Attestation I saw and evaluated the patient.  I personally obtained the key and critical portions of the history and physical exam or was physically present for key and  critical portions performed by the  resident/fellow. I reviewed the resident/fellow?s documentation and discussed the patient with the resident/fellow.  I agree with the resident/fellow?s medical decision making as documented in the note.     I personally evaluated the patient on 23-May-2023         Electronic Signatures:  Izabela Bowden (Resident))  (Signed 23-May-2023 08:02)   Authored: Service, Subjective Data, Objective Data, Assessment  and Plan, Note Completion  Luis Armando Johnson)  (Signed 26-May-2023 16:47)   Authored: Note Completion   Co-Signer: Service, Subjective Data, Objective Data, Assessment and Plan, Note Completion      Last Updated: 26-May-2023 16:47 by Luis Armando Johnson)

## 2023-09-30 NOTE — PROGRESS NOTES
Service: Surgical Oncology     Subjective Data:   ABRIL PÉREZ is a 67 year old Male who is Hospital Day # 4.    Overnight Events: Acute events in the past 24 hours  include   Additional Information:    Team paged about chest pain around 5 am. NACR was present. EKG (non-specific ST changes), Trop, CBC, and CMP were unremarkable; HF was notified and requested echo  before going to EGD    GI was updated     Objective Data:     Objective Information:      T   P  R  BP   MAP  SpO2   Value  36.4  83  18  161/91   113  97%  Date/Time 9/26 5:18 9/26 5:30 9/26 5:18 9/26 5:30  9/25 9:19 9/26 5:30  Range  (36C - 36.9C )  (67 - 88 )  (16 - 19 )  (147 - 178 )/ (89 - 101 )  (113 - 113 )  (95% - 99% )  Highest temp of 36.9 C was recorded at 9/25 13:29      Pain reported at 9/26 5:17: 9 = Severe    ---- Intake and Output  -----  Mn/Dy/Year Time  Intake   Output  Net  Sep 26, 2023 6:00 am  800   500  300  Sep 25, 2023 10:00 pm  400   7990  -107    The Intake and Output Totals for the last 24 hours are:      Intake   Output  Net      1200   7258  -743    Physical Exam Narrative:  ·  Physical Exam:    Constitutional: patient resting in bed comfortably  Neurological: Awake, alert, conversive  Cardiovascular: well perfused, regular rate  Head/Neck: NCAT  Respiratory/Thorax: even, unlabored  Genitourinary: deferred  Gastrointestinal: soft, non-distended, non-tender, no guarding or rebound tenderness; Old midline scar; NGT output yellow/green  Skin: warm, dry  Eyes: non-icteric  Extremities: moving all extremities spontaneously, no swelling  Psychological: appropriate mood/affect      Recent Lab Results:    Results:    CBC: 9/26/2023 05:31              \     Hgb     /                              \     11.3 L    /  WBC  ----------------  Plt               4.8       ----------------    288              /     Hct     \                              /     31.4 L    \            RBC: 3.66 L    MCV: 86           CMP: 9/26/2023  05:31  NA+        Cl-     BUN  /                         142    102    30 H /  --------------------------------  Glucose                ---------------------------  113 H    K+     HCO3-   Creat \                         3.9    25    2.09 H \           \  T Bili  /                    \  0.7  /  AST  x ---- x ALT        36 x ---- x 37         /  Alk P   \               /  83  \  Calcium : 10.1     Anion Gap : 19     Albumin : 3.7     T Protein : 6.0 L          Radiology Results:    Results:        Conclusion:  CONCLUSIONS:  1. Left ventricular systolic function is normal with a 55-60% estimated ejection fraction.  2. Abnormal septal motion consistent with post-operative status.  3. There is mildly reduced right ventricular systolic function.  4. S/p MV repair with 36mm Jayden [employer] Annuloflex band with mean MV gradient of 3mmHg and mild MR.  5. Compared with the prior exam from 5/30/2023 there are no significant changes, though the RV was better seen today.    QUANTITATIVE DATA SUMMARY:  2D MEASUREMENTS:  Normal Ranges:  Ao Root d:     3.80 cm    (2.0-3.7cm)  LAs:           4.50 cm    (2.7-4.0cm)  IVSd:          1.30 cm    (0.6-1.1cm)  LVPWd:         0.90 cm    (0.6-1.1cm)  LVIDd: 5.30 cm    (3.9-5.9cm)  LVIDs:         4.80 cm  LV Mass Index: 115.8 g/m2  LV % FS        9.4 %    LA VOLUME:  Normal Ranges:  LA Vol A4C:        68.1 ml    (22+/-6mL/m2)  LA Vol A2C:        85.0 ml  LA Vol BP:         81.5 ml  LA Vol Index A4C:  34.6ml/m2  LA Vol Index A2C:  43.2 ml/m2  LA Vol Index BP:   41.5 ml/m2  LA Area A4C:       20.8 cm2  LA Area A2C:       24.9 cm2  LA Major Axis A4C: 5.4 cm  LA Major Axis A2C: 6.2 cm  LA Volume Index:   41.3 ml/m2    RA VOLUME BY A/L METHOD:  Normal Ranges:  RA Area A4C: 13.1 cm2    AORTA MEASUREMENTS:  Normal Ranges:  Ao Sinus, d: 3.80 cm (2.1-3.5cm)  Asc Ao, d:   3.91 cm (2.1-3.4cm)    LV SYSTOLIC FUNCTION BY 2D PLANIMETRY (MOD):  Normal Ranges:  EF-A4C View: 49.9 % (>=55%)  EF-A2C  View: 62.8 %  EF-Biplane:  58.3 %    LV DIASTOLIC FUNCTION:  Normal Ranges:  MV Peak E:  0.97 m/s    (0.7-1.2 m/s)  MV Peak A:  0.64 m/s    (0.42-0.7 m/s)  E/A Ratio:  1.51        (1.0-2.2)  MV e'       0.07 m/s    (>8.0)  MV A Dur:   124.00 msec  E/e' Ratio: 13.26      (<8.0)  MV DT:      127 msec    (150-240 msec)    MITRAL VALVE:  Normal Ranges:  MV mean PG: 3.0 mmHg (<2mmHg)  MV DT:      127 msec (150-240msec)    AORTIC VALVE:  Normal Ranges:  AoV Vmax:      1.12 m/s (<=1.7m/s)  AoV Peak P.0 mmHg (<20mmHg)  LVOT Max Severo:  0.94 m/s (<=1.1m/s)  LVOT VTI:      15.30 cm  LVOT Diameter: 2.50 cm  (1.8-2.4cm)  AoV Area,Vmax: 4.12 cm2 (2.5-4.5cm2)      RIGHT VENTRICLE:  RV Basal 3.12 cm  RV Mid   2.18 cm  RV Major 5.0 cm  TAPSE:   13.9 mm  RV s'    0.07 m/s    TRICUSPID VALVE/RVSP:  Normal Ranges:  IVC Diam: 1.20 cm    PULMONIC VALVE:  Normal Ranges:  PV Max Severo: 1.2 m/s  (0.6-0.9m/s)  PV Max P.4 mmHg      [id number] Chapis Devlin MD  Electronically signed on 2023 at 9:46:43 AM        *** Final ***     Echocardiogram [Sep 26 2023  9:46AM]      Impression:     [No evidence of acute cardiopulmonary process.    [Postsurgical changes and] medical devices as described above. ] []          UNSIGNED REPOR Xray Chest 1 View [Sep 26 2023  6:13AM]      Impression:  Xray Chest 1 View [Sep 26 2023  5:40AM]      Assessment and Plan:   Code Status:  ·  Code Status Full Code     Assessment:    ABRIL PÉREZ is a 67 year old Male with history of heart transplant in 2017 and whipple for pancreatic cancer on 2023 who presents as a transfer from  [facility] for SBO. Patient presentation and imaging consistent with SBO. Patient was assessed and decided there would be high risk for surgical intervention, thus currently no plans to operate. Patient  complained of chest pain on  AM - work-up (CBC, CMP, CXR, EKG, Trop, TTE) was negative for cardiac etiology and HF team agreed. GI was consulted and  plan to do EGD  to  evaluate obstruction.    Plan    Neuro:  -Dilaudid for pain    Cardio  -Appreciate HF recs (management of Heart transplant medications)  -TTE 9/26 - non-concerning for acute changes    Pulm  -IS    GI  -9/26: Plan for GI to perform endoscopy to evaluate obstruction  -NPO, NGT to suction  -PPI    Hem  -Hgb stable    Endocrine  -mild SSI    FEN  -NPO  -LR 100cc/hr  -replete lytes prn    Ppx: SQH held 9/26 AM for endoscopy, PPI  Disp: Patrice SYED, tbd    Discussed with Dr. Huerta, discussed with Dr. Elizabeth Hayes MD  PGY1  General Surgery  Surgical Oncology - [facility] [id number]    Nutrition Diagnosis:  ·  Nutrition Diagnosis      Agree with dietitian?s assessment and diagnoses as stated.  A new diagnosis of Moderate malnutrition related to chronic disease or condition related  to pancreatic cancer s/p whipple on chemo (c/b obstructive symptoms x 1 month)  as evidence by reported < 75% estimated energy requirement x at least 1 month, significant 15% weight loss x 7 months, moderate muscle wasting, & moderate fat loss.    Attestation:   Note Completion:  I am a:  Resident/Fellow   Attending Attestation I saw and evaluated the patient.  I personally obtained the key and critical portions of the history and physical exam or was physically present for key and  critical portions performed by the resident/fellow. I reviewed the resident/fellow?s documentation and discussed the patient with the resident/fellow.  I agree with the resident/fellow?s medical decision making as documented in the note.     I personally evaluated the patient on 26-Sep-2023   Comments/ Additional Findings    Gastric outlet obstruction. Plan for scope.          Electronic Signatures:  Seth Hayes (Resident))  (Signed 26-Sep-2023 10:46)   Authored: Service, Subjective Data, Objective Data, Assessment  and Plan, Note Completion  Luis Armando Johnson)  (Signed 29-Sep-2023 16:04)   Authored: Note Completion   Co-Signer: Objective  Data, Assessment and Plan, Note Completion      Last Updated: 29-Sep-2023 16:04 by Luis Armando Johnson)

## 2023-09-30 NOTE — PROGRESS NOTES
Service: Surgical Oncology     Subjective Data:   ABRIL PÉREZ is a 67 year old Male who is Hospital Day # 11 and POD #10 for Whipple procedure (pylorus procedure).     denies n/v  feels better   passing gas, having BM.    Overnight Events: Patient had an uneventful night.     Objective Data:     Objective Information:      T   P  R  BP   MAP  SpO2   Value  36.6  93  18  143/79   100  93%  Date/Time 5/29 12:02 5/29 12:02 5/29 12:02 5/29 12:02 5/29 12:02 5/29 12:02  Range  (36.3C - 37.2C )  (81 - 95 )  (15 - 19 )  (127 - 169 )/ (71 - 93 )  (100 - 108 )  (91% - 96% )  Highest temp of 37.2 C was recorded at 5/28 5:44      Pain reported at 5/29 8:54: 8 = Severe    ---- Intake and Output  -----  Mn/Dy/Year Time  Intake   Output  Net  May 29, 2023 6:00 am  0   58  -58  May 28, 2023 10:00 pm  0   350  -350  May 28, 2023 2:00 pm  0   10  -10    The Intake and Output Totals for the last 24 hours are:      Intake   Output  Net      null   418  null    Physical Exam Narrative:  ·  Physical Exam:    Constitutional: no acute distress  Cardiac: regular rate  Respiratory: non labored breathing on room air  Abdomen: soft, not tender, baseline distended; incision c/d/i; staples - no signs of infection   Extremities: BAILEY  Skin: warm and dry  Neuro: alert and oriented x3 - currently at pt's baseline  Psych: appropriate mood  Tubes/Lines: bilateral abdominal drains; L 38cc; R 80 cc both serous (amylase 102)       Medication:    Medications:          Continuous Medications       --------------------------------  No continuous medications are active       Scheduled Medications       --------------------------------    1. Acetaminophen:  650  mg  Oral  Every 6 Hours    2. Allopurinol:  100  mg  Oral  Every 24 Hours    3. Calcitriol:  0.5  microgram(s)  Oral  Daily    4. Calcium 500 mg - Vitamin D 200 Units:  1  tablet(s)  Oral  2 Times a Day    5. Citalopram (CELEXA):  40  mg  Oral  Daily    6. Cyclobenzaprine:  5  mg   Oral  3 Times a Day    7. Fenofibrate:  160  mg  Oral  Daily    8. Gabapentin:  200  mg  Oral  At Bedtime    9. Heparin SubCutaneous:  5000  unit(s)  SubCutaneous  Every 8 Hours    10. hydrALAZINE (APRESOLINE):  100  mg  Oral  Every 8 Hours    11. Insulin Glargine (Lantus) Injectable:  10  unit(s)  SubCutaneous  Every 24 Hours    12. Insulin Lispro Moderate Corrective Scale:  unit(s)  SubCutaneous  Every 4 Hours    13. Isosorbide Dinitrate:  40  mg  Oral  3 Times a Day    14. Lidocaine 4% TransDermal:  1  patch  TransDermal  Every 24 Hours    15. Pantoprazole:  40  mg  Oral  Daily    16. Piperacillin - Tazobactam 2.25 grams/Iso-osmotic 50 mL Premix IVPB:  50  mL  IntraVenous Piggyback  Every 6 Hours    17. predniSONE:  5  mg  Oral  Every 24 Hours    18. Rosuvastatin:  40  mg  Oral  Every Night    19. Sodium Chloride 0.9% Injectable Flush:  10  mL  IntraVenous Flush  Every 12 Hours    20. Tacrolimus:  3  mg  Oral  <User Schedule>    21. Tacrolimus:  4  mg  Oral  <User Schedule>         PRN Medications       --------------------------------    1. Albuterol 2.5 mg - Ipratropium 0.5 mg/ 3 mL Neb Soln:  3  mL  Inhalation  Every 6 Hours    2. Dextrose 50% in Water Injectable:  25  gram(s)  IntraVenous Push  Every 15 Minutes    3. Glucagon Injectable:  1  mg  IntraMuscular  Every 15 Minutes    4. Heparin Flush 10 unit/ mL PF Injectable:  5  mL  IntraVenous Flush  Every 12 Hours    5. Heparin Flush 10 unit/ mL PF Injectable PRN:  5  mL  IntraVenous Flush  According to Flush Policy    6. HYDROmorphone:  2  mg  Oral  Every 4 Hours    7. HYDROmorphone:  4  mg  Oral  Every 4 Hours    8. HYDROmorphone Injectable:  0.4  mg  IntraVenous Push  Every 4 Hours    9. Naloxone Injectable:  0.2  mg  IntraVenous Push  Once    10. Ondansetron Injectable:  4  mg  IntraVenous Push  Every 6 Hours    11. Sodium Chloride 0.9% Injectable Flush:  10  mL  IntraVenous Flush  Every 8 Hours and as Needed    12. Sodium Chloride 0.9% Injectable Flush  PRN:  10  mL  IntraVenous Flush  According to Flush Policy    13. Sodium Chloride 0.9% Injectable Flush PRN:  20  mL  IntraVenous Flush  According to Flush Policy         Conditional Medication Orders       --------------------------------    1. Perflutren Lipid Microsphere (Activated) 1.3 mL / NaCL 0.9% T.V. 10 mL Injectable:  0.5  mL  IntraVenous Push  Once      Recent Lab Results:    Results:    CBC: 5/29/2023 06:05              \     Hgb     /                              \     7.1 L    /  WBC  ----------------  Plt               7.3       ----------------    256              /     Hct     \                              /     21.1 L    \            RBC: 2.41 L    MCV: 88           CMP: 5/29/2023 06:05  NA+        Cl-     BUN  /                         142    108 H   33 H  /  --------------------------------  Glucose                ---------------------------  118 H    K+     HCO3-   Creat \                         4.0    25    2.16 H \           \  T Bili  /                    \  0.7  /  AST  x ---- x ALT        24 x ---- x 23         /  Alk P   \               /  63  \  Calcium : 8.6     Anion Gap : 13     Albumin : 2.8 L    T Protein : 4.8 L           Coagulation: 5/29/2023 06:05  PT  /                    14.4 H /  -------<    INR          ----------<      1.2 H  PTT\                    31  \                       Assessment and Plan:   Code Status:  ·  Code Status Full Code     Assessment:    68 yo M s/p paulette with Dr. Johnson for pancreatic adenocarcinoma Patient has a hx of heart transplant. Did have an episode of possible 5-10 second PEA arrest during  operation, regular rhythm resumed with epi. In ICU post-op for close monitoring given cardiac history. VSS, afebrile.    PLAN:  Neuro - PO pain meds - hydromorphone per HF team, tylenol, gabapentin, home Celexa,  delirium precautions  CV - tacro management per transplant team, pred 5, rosuvastatin, hydralazine 100 BID, continue isosorbide  mononitrate per HF team; fenofibrate,  cards transplant team following, appreciate recs, echo pending; midline for access for tacrolimus  Resp - OOB, encourage IS, wean O2 as tolerated  GI - full liquid diet, ensures TID, PPI, CT A/P noncontrast on 5/28 showing multiple fluid collections; pulling R abdomen drain today  /Renal - strict I/Os, appreciate nephrology recs  Heme - no acute transfusion needs per HF  ID - Zosyn, will discuss end date   Endo - SSI, home allopurinol, lantus 10u  Prophy - SCDs, Lovenox, Incentive Spirometer  Dispo - RNF    Discussed with attending physician Dr. Sarkar.    Jay Chavez MD   Surgical Oncology/[facility] pager [phone]      Attestation:   Note Completion:  I am a:  Resident/Fellow   Attending Attestation I saw and evaluated the patient.  I personally obtained the key and critical portions of the history and physical exam or was physically present for key and  critical portions performed by the resident/fellow. I reviewed the resident/fellow?s documentation and discussed the patient with the resident/fellow.  I agree with the resident/fellow?s medical decision making as documented in the note.     I personally evaluated the patient on 29-May-2023         Electronic Signatures:  Jay Chavez (Resident))  (Signed 29-May-2023 13:10)   Authored: Service, Subjective Data, Objective Data, Assessment  and Plan, Note Completion  Yunier Sarkar)  (Signed 29-May-2023 16:22)   Authored: Note Completion   Co-Signer: Service, Subjective Data, Objective Data, Assessment and Plan, Note Completion      Last Updated: 29-May-2023 16:22 by Yunier Sarkar)

## 2023-09-30 NOTE — PROGRESS NOTES
Service: Surgical Oncology     Subjective Data:   ABRIL PÉREZ is a 67 year old Male who is Hospital Day # 5.    Overnight Events: Patient had an uneventful night.     Objective Data:     Objective Information:      T   P  R  BP   MAP  SpO2   Value  36.1  76  16  133/79      96%  Date/Time 9/27 9:11 9/27 9:11 9/27 9:11 9/27 9:11    9/27 9:11  Range  (36.1C - 37.1C )  (76 - 96 )  (16 - 18 )  (122 - 178 )/ (79 - 101 )    (96% - 98% )  Highest temp of 37.1 C was recorded at 9/26 21:25      Pain reported at 9/27 10:00: 7 = Severe    ---- Intake and Output  -----  Mn/Dy/Year Time  Intake   Output  Net  Sep 27, 2023 6:00 am  1400   0  1400  Sep 26, 2023 10:00 pm  0   0  0  Sep 26, 2023 2:00 pm  500   0  500    The Intake and Output Totals for the last 24 hours are:      Intake   Output  Net      1900   null  null      T   P  R  BP   MAP  SpO2   Value  36.1  76  16  133/79      96%  Date/Time 9/27 9:11 9/27 9:11 9/27 9:11 9/27 9:11    9/27 9:11  Range  (36.1C - 37.1C )  (76 - 96 )  (16 - 18 )  (122 - 178 )/ (79 - 101 )    (96% - 98% )  Highest temp of 37.1 C was recorded at 9/26 21:25    Physical Exam Narrative:  ·  Physical Exam:    Constitutional: patient resting in bed comfortably  Neurological: Awake, alert, conversive  Cardiovascular: well perfused, regular rate  Head/Neck: NCAT  Respiratory/Thorax: even, unlabored  Genitourinary: deferred  Gastrointestinal: soft, non-distended, non-tender, no guarding or rebound tenderness; Old midline scar;  Skin: warm, dry  Eyes: non-icteric  Extremities: moving all extremities spontaneously, no swelling  Psychological: appropriate mood/affect      Recent Lab Results:    Results:    CBC: 9/27/2023 06:16              \     Hgb     /                              \     10.0 L    /  WBC  ----------------  Plt               6.7       ----------------    236              /     Hct     \                              /     29.6 L    \            RBC: 3.41 L    MCV: 87            BMP: 9/27/2023 01:02  NA+        Cl-     BUN  /                         Canceled    Canceled    Canceled  /  --------------------------------  Glucose                ---------------------------  Canceled    K+     HCO3-   Creat \                         Canceled  Canceled    Canceled  \  Calcium : Canceled     Anion Gap : Canceled      CMP: 9/27/2023 06:16  NA+        Cl-     BUN  /                         141    102    34 H /  --------------------------------  Glucose                ---------------------------  121 H    K+     HCO3-   Creat \                         3.8    29    2.20 H \           \  T Bili  /                    \  0.8  /  AST  x ---- x ALT        39 x ---- x 37         /  Alk P   \               /  80  \  Calcium : 9.3     Anion Gap : 14     Albumin : 3.3 L    T Protein : 5.5 L           Radiology Results:    Results:        Conclusion:  CONCLUSIONS:  1. Left ventricular systolic function is normal with a 55-60% estimated ejection fraction.  2. Abnormal septal motion consistent with post-operative status.  3. There is mildly reduced right ventricular systolic function.  4. S/p MV repair with 36mm Jayden [employer] Annuloflex band with mean MV gradient of 3mmHg and mild MR.  5. Compared with the prior exam from 5/30/2023 there are no significant changes, though the RV was better seen today.    QUANTITATIVE DATA SUMMARY:  2D MEASUREMENTS:  Normal Ranges:  Ao Root d:     3.80 cm    (2.0-3.7cm)  LAs:           4.50 cm    (2.7-4.0cm)  IVSd:          1.30 cm    (0.6-1.1cm)  LVPWd:         0.90 cm    (0.6-1.1cm)  LVIDd: 5.30 cm    (3.9-5.9cm)  LVIDs:         4.80 cm  LV Mass Index: 115.8 g/m2  LV % FS        9.4 %    LA VOLUME:  Normal Ranges:  LA Vol A4C:        68.1 ml    (22+/-6mL/m2)  LA Vol A2C:        85.0 ml  LA Vol BP:         81.5 ml  LA Vol Index A4C:  34.6ml/m2  LA Vol Index A2C:  43.2 ml/m2  LA Vol Index BP:   41.5 ml/m2  LA Area A4C:       20.8 cm2  LA Area A2C:        24.9 cm2  LA Major Axis A4C: 5.4 cm  LA Major Axis A2C: 6.2 cm  LA Volume Index:   41.3 ml/m2    RA VOLUME BY A/L METHOD:  Normal Ranges:  RA Area A4C: 13.1 cm2    AORTA MEASUREMENTS:  Normal Ranges:  Ao Sinus, d: 3.80 cm (2.1-3.5cm)  Asc Ao, d:   3.91 cm (2.1-3.4cm)    LV SYSTOLIC FUNCTION BY 2D PLANIMETRY (MOD):  Normal Ranges:  EF-A4C View: 49.9 % (>=55%)  EF-A2C View: 62.8 %  EF-Biplane:  58.3 %    LV DIASTOLIC FUNCTION:  Normal Ranges:  MV Peak E:  0.97 m/s    (0.7-1.2 m/s)  MV Peak A:  0.64 m/s    (0.42-0.7 m/s)  E/A Ratio:  1.51        (1.0-2.2)  MV e'       0.07 m/s    (>8.0)  MV A Dur:   124.00 msec  E/e' Ratio: 13.26      (<8.0)  MV DT:      127 msec    (150-240 msec)    MITRAL VALVE:  Normal Ranges:  MV mean PG: 3.0 mmHg (<2mmHg)  MV DT:      127 msec (150-240msec)    AORTIC VALVE:  Normal Ranges:  AoV Vmax:      1.12 m/s (<=1.7m/s)  AoV Peak P.0 mmHg (<20mmHg)  LVOT Max Severo:  0.94 m/s (<=1.1m/s)  LVOT VTI:      15.30 cm  LVOT Diameter: 2.50 cm  (1.8-2.4cm)  AoV Area,Vmax: 4.12 cm2 (2.5-4.5cm2)      RIGHT VENTRICLE:  RV Basal 3.12 cm  RV Mid   2.18 cm  RV Major 5.0 cm  TAPSE:   13.9 mm  RV s'    0.07 m/s    TRICUSPID VALVE/RVSP:  Normal Ranges:  IVC Diam: 1.20 cm    PULMONIC VALVE:  Normal Ranges:  PV Max Severo: 1.2 m/s  (0.6-0.9m/s)  PV Max P.4 mmHg      [id number] Chapis Devlin MD  Electronically signed on 2023 at 9:46:43 AM        *** Final ***     Echocardiogram [Sep 26 2023  9:46AM]      Impression:     [No evidence of acute cardiopulmonary process.    [Postsurgical changes and] medical devices as described above. ] []          UNSIGNED REPOR Xray Chest 1 View [Sep 26 2023  6:13AM]      Impression:  Xray Chest 1 View [Sep 26 2023  5:40AM]      Assessment and Plan:   Code Status:  ·  Code Status Full Code     Assessment:    ABRIL PÉREZ is a 67 year old Male with history of heart transplant in 2017 and whipple for pancreatic cancer on 2023 who presents as a transfer from  [facility] for  SBO. Patient presentation and imaging consistent with SBO. Patient was assessed and decided there would be high risk for surgical intervention, thus currently no plans to operate. Patient  complained of chest pain on 9/26 AM - work-up (CBC, CMP, CXR, EKG, Trop, TTE) was negative for cardiac etiology and HF team agreed. GI was consulted and  plan to do EGD 9/26 to evaluate obstruction.    Plan    Neuro:  -Dilaudid for pain    Cardio  -Appreciate HF recs (management of Heart transplant medications)  -TTE 9/26 - non-concerning for acute changes    Pulm  -IS    GI  -9/26: GI EGD - obstruction traversed, less stenotic afterwards, presumed by GI to be benign; 5 mm ulcer noted in stomach; NGT was removed  -tolerating CLD in AM, advanced to FLD in PM  -IV PPI BID    Hem  -Hgb stable    Endocrine  -mild SSI    FEN  -NPO  -LR 100cc/hr  -replete lytes prn    Ppx: SQH restarted 9/26 PM, IV PPI BID  Disp: Patrice SYED, tbd    Discussed with Dr. Huerta, discussed with Dr. Elizabeth Hayes MD  PGY1  General Surgery  Surgical Oncology - [facility] [id number]    Nutrition Diagnosis:  ·  Nutrition Diagnosis      Agree with dietitian?s assessment and diagnoses as stated.  A new diagnosis of Moderate malnutrition related to chronic disease or condition related  to pancreatic cancer s/p whipple on chemo (c/b obstructive symptoms x 1 month)  as evidence by reported < 75% estimated energy requirement x at least 1 month, significant 15% weight loss x 7 months, moderate muscle wasting, & moderate fat loss.    Attestation:   Note Completion:  I am a:  Resident/Fellow   Attending Attestation I saw and evaluated the patient.  I personally obtained the key and critical portions of the history and physical exam or was physically present for key and  critical portions performed by the resident/fellow. I reviewed the resident/fellow?s documentation and discussed the patient with the resident/fellow.  I agree with the resident/fellow?s medical  decision making as documented in the note.     I personally evaluated the patient on 27-Sep-2023         Electronic Signatures:  Seth Hayes (Resident))  (Signed 27-Sep-2023 13:19)   Authored: Service, Subjective Data, Objective Data, Assessment  and Plan, Note Completion  Luis Armando Johnson)  (Signed 29-Sep-2023 16:09)   Authored: Note Completion   Co-Signer: Assessment and Plan, Note Completion      Last Updated: 29-Sep-2023 16:09 by Luis Armando Johnson)

## 2023-09-30 NOTE — PROGRESS NOTES
Service: Nephrology     Subjective Data:   ABRIL PÉREZ is a 67 year old Male who is Hospital Day # 9 and POD #8 for Whipple procedure (pylorus procedure).    Overnight Events: Patient had an uneventful night.   Additional Information:    No acute events overnight  Patient complaining of increased pain, inadequate pain control at this time      Objective Data:     Objective Information:      T   P  R  BP   MAP  SpO2   Value  36.1  86  18  179/95      94%  Date/Time 5/27 9:20 5/27 9:20 5/27 9:20 5/27 9:20    5/27 9:20  Range  (36.1C - 37C )  (75 - 88 )  (18 - 18 )  (115 - 185 )/ (64 - 95 )    (93% - 97% )   As of 26-May-2023 06:22:00, patient is on 2 L/min of oxygen via room air.  Highest temp of 37 C was recorded at 5/26 6:22      Pain reported at 5/27 8:35: 6 = Moderate    ---- Intake and Output  -----  Mn/Dy/Year Time  Intake   Output  Net  May 27, 2023 6:00 am  0   380  -380  May 26, 2023 10:00 pm  0   0  0  May 26, 2023 2:00 pm  0   470  -470    The Intake and Output Totals for the last 24 hours are:      Intake   Output  Net      null   850  null    Physical Exam Narrative:  ·  Physical Exam:    Neurological: Awake, alert, visibly uncomfortable, oriented x 1 (name)   Cardiovascular: RRR  Head/Neck: NCAT  Respiratory/Thorax: even, unlabored, well healed prior sternotomy scar  Genitourinary: Portillo in place with clear urine output  Gastrointestinal: abdomen soft, appropriately tender to palpation, moderately distended. Incision c/d/i without bleeding. RLQ IRVING drain with serosanguinous output, LLQ IVRING murky/serosanguinous.  Skin: warm, dry  Musculoskeletal: BAILEY  Eyes: non-icteric  Extremities: BAILEY x 3  Psychological: lethargic      Medication:    Medications:          Continuous Medications       --------------------------------  No continuous medications are active       Scheduled Medications       --------------------------------    1. Acetaminophen:  650  mg  Oral  Every 6 Hours    2. Allopurinol:   100  mg  Oral  Every 24 Hours    3. Citalopram (CELEXA):  40  mg  Oral  Daily    4. Cyclobenzaprine:  5  mg  Oral  3 Times a Day    5. Fenofibrate:  160  mg  Oral  Daily    6. Gabapentin:  200  mg  Oral  At Bedtime    7. Heparin SubCutaneous:  5000  unit(s)  SubCutaneous  Every 8 Hours    8. hydrALAZINE (APRESOLINE):  100  mg  Oral  Every 12 Hours    9. Insulin Glargine (Lantus) Injectable:  10  unit(s)  SubCutaneous  Every 24 Hours    10. Insulin Lispro Moderate Corrective Scale:  unit(s)  SubCutaneous  Every 4 Hours    11. Isosorbide Dinitrate:  40  mg  Oral  3 Times a Day    12. Lidocaine 4% TransDermal:  1  patch  TransDermal  Every 24 Hours    13. Pantoprazole:  40  mg  Oral  Daily    14. Piperacillin - Tazobactam 2.25 grams/Iso-osmotic 50 mL Premix IVPB:  50  mL  IntraVenous Piggyback  Every 6 Hours    15. predniSONE:  5  mg  Oral  Every 24 Hours    16. Rosuvastatin:  40  mg  Oral  Every Night    17. Sodium Chloride 0.9% Injectable Flush:  10  mL  IntraVenous Flush  Every 12 Hours    18. Tacrolimus:  3  mg  Oral  <User Schedule>    19. Tacrolimus:  4  mg  Oral  <User Schedule>         PRN Medications       --------------------------------    1. Albuterol 2.5 mg - Ipratropium 0.5 mg/ 3 mL Neb Soln:  3  mL  Inhalation  Every 6 Hours    2. Dextrose 50% in Water Injectable:  25  gram(s)  IntraVenous Push  Every 15 Minutes    3. Glucagon Injectable:  1  mg  IntraMuscular  Every 15 Minutes    4. Heparin Flush 10 unit/ mL PF Injectable:  5  mL  IntraVenous Flush  Every 12 Hours    5. Heparin Flush 10 unit/ mL PF Injectable PRN:  5  mL  IntraVenous Flush  According to Flush Policy    6. HYDROmorphone:  2  mg  Oral  Every 4 Hours    7. HYDROmorphone:  4  mg  Oral  Every 4 Hours    8. HYDROmorphone Injectable:  0.4  mg  IntraVenous Push  Every 4 Hours    9. Naloxone Injectable:  0.2  mg  IntraVenous Push  Once    10. Ondansetron Injectable:  4  mg  IntraVenous Push  Every 6 Hours    11. Sodium Chloride 0.9% Injectable  Flush:  10  mL  IntraVenous Flush  Every 8 Hours and as Needed    12. Sodium Chloride 0.9% Injectable Flush PRN:  10  mL  IntraVenous Flush  According to Flush Policy    13. Sodium Chloride 0.9% Injectable Flush PRN:  20  mL  IntraVenous Flush  According to Flush Policy         Conditional Medication Orders       --------------------------------    1. Perflutren Lipid Microsphere (Activated) 1.3 mL / NaCL 0.9% T.V. 10 mL Injectable:  0.5  mL  IntraVenous Push  Once      Recent Lab Results:    Results:    CBC: 5/27/2023 05:15              \     Hgb     /                              \     7.2 L    /  WBC  ----------------  Plt               7.0       ----------------    211              /     Hct     \                              /     21.8 L    \            RBC: 2.45 L    MCV: 89           CMP: 5/27/2023 05:15  NA+        Cl-     BUN  /                         145    111 H   46 H  /  --------------------------------  Glucose                ---------------------------  136 H    K+     HCO3-   Creat \                         4.2    25    2.16 H \           \  T Bili  /                    \  0.7  /  AST  x ---- x ALT        29 x ---- x 29         /  Alk P   \               /  62  \  Calcium : 8.2 L    Anion Gap : 13     Albumin : 2.7 L     T Protein : 5.0 L          Coagulation: 5/27/2023 05:15  PT  /                    13.2  /  -------<    INR          ----------<      1.1  PTT\                    23 L \                       Radiology Results:    Results:        Impression:    1.  Bibasilar atelectasis. Low lung volumes.        Xray Chest 1 View [May 23 2023 11:22AM]      Assessment and Plan:   Code Status:  ·  Code Status Full Code     Assessment:    ABRIL PÉREZ is a 67 year old Male with a past medical history of nonischemic cardiomyopathy secondary to valvular disease s/p bicaval heart transplant on  3/24/2017, diabetes mellitus type 2, amiodarone induced hyperthyroidism, GERD, gout, CKD stage III  (baseline creatinine around 2 ) likely from chronic CNI use, pancreatic cancer admitted for   whipple?s with  done on 5/19.  Had an episode of 5 to10   seconds PEA arrest during the operation and ROSC with epi and admitted to SICU postop for close monitoring. Transplant nephrology consulted for JOSY on CKD with oliguria and hyperkalemia for assistance in management    #JOSY on CKD stage IIIb: Improving  JOSY likely secondary to hemodynamic JOSY in the setting of suspected brief PEA arrest and hypotension Intra-Op and poor oral intake leading to acute tubular injury  -CKD from chronic CNI use with baseline creatinine around 2     Today Cr is 2.9    Recommendations:  - Continue to monitor UOP and Serum creatinine closely.   - Avoid nephrotoxic agents, NSAIDs and IV contrast   - Strict I/O.   - Renally dose all medications by the most recent CrCl from Cockcroft-Gault formula.  -Avoid tacrolimus toxicity    #NICM s/p bicaval heart transplantation: Immunosuppression as per heart transplant team    #Anemia: Likely secondary to recent surgery.  Check iron studies and will give AMANDA as indicated once iron replete    #BMD: Corrected calcium magnesium levels are optimal.  Slightly low phos levels continue to monitor and replete as needed    #Adenocarcinoma for pancreas s/p Whipple surgery  consider reduced immunosuppression, but will defer to heart tx team    Discussed with primary team.    Noted recurrent JOSY.  Getting prbc today.   CXR  If lungs are clear, can challenge IVF with NS or LR at 60 cc per hour for total of 1L.  Monitor lab  Can strat aranesp 100 mcg subcut weekly for anemia  conservative management.  Will continue to follow  Discussed with primary team        Electronic Signatures:  Rebekah Godfrey)  (Signed 27-May-2023 11:01)   Authored: Service, Subjective Data, Objective Data, Assessment  and Plan, Note Completion      Last Updated: 27-May-2023 11:01 by Rebekah Godfrey)

## 2023-09-30 NOTE — PROGRESS NOTES
Service: Heart Failure     Subjective Data:   ABRIL PÉREZ is a 67 year old Male who is Hospital Day # 3.     Patient endorses foot pain this morning, states he fractured his left foot when he fell at home. He endorses significant improvement of abdominal pain, denies nausea, vomiting. He endorses one loose  bowel movement this morning, non-bloody.    Objective Data:     Objective Information:      T   P  R  BP   MAP  SpO2   Value  36.6  67  19  175/95   113  99%  Date/Time 9/25 9:19 9/25 9:19 9/25 9:19 9/25 9:19  9/25 9:19 9/25 9:19  Range  (36.1C - 36.7C )  (67 - 88 )  (16 - 19 )  (153 - 178 )/ (79 - 95 )  (113 - 113 )  (96% - 99% )      Pain reported at 9/25 1:30: 10 = Severe    Physical Exam by System:    Constitutional: Well developed, middle-aged man,  awake/alert/oriented x3, no acute distress, alert and cooperative   Eyes: PERRL, EOMI, clear sclera   ENMT: NG tube in place draining brown liquid to suction  container   Head/Neck: Neck supple, no apparent injury, no JVD,  no lymphadenopathy   Respiratory/Thorax: Clear to auscultation bilaterally,  no wheezes or crackles   Cardiovascular: Regular, rate and rhythm, no murmurs,  2+ equal pulses of the extremities, normal S1 and S2   Gastrointestinal: Nondistended, soft, mild tenderness  to palpation LLQ   Musculoskeletal: ROM intact, normal strength   Extremities: normal extremities, no cyanosis, edema,  contusions or wounds, no clubbing   Neurological: alert and oriented x3, intact senses,  motor, response and reflexes, normal strength   Psychological: Appropriate mood and behavior   Skin: Warm and dry, no lesions, no rashes     Medication:    Medications:          Continuous Medications       --------------------------------    1. Lactated Ringers Infusion:  1000  mL  IntraVenous  <Continuous>         Scheduled Medications       --------------------------------    1. Heparin SubCutaneous:  5000  unit(s)  SubCutaneous  Every 8 Hours    2. Insulin  Lispro Mild Corrective Scale:  unit(s)  SubCutaneous  Every 4 Hours    3. methylPREDNISolone Sodium Succinate Injectable:  4  mg  IntraVenous Push  Every Morning    4. Pantoprazole Injectable:  40  mg  IntraVenous Push  Every 24 Hours    5. Tacrolimus:  2  mg  SubLingual  <User Schedule>    6. Tacrolimus:  1.5  mg  SubLingual  <User Schedule>         PRN Medications       --------------------------------    1. Glucagon Injectable:  1  mg  IntraMuscular  Every 15 Minutes    2. hydrALAZINE (APRESOLINE) Injectable:  10  mg  IntraVenous Push  Every 6 Hours    3. HYDROmorphone Injectable:  0.2  mg  IntraVenous Push  Every 4 Hours    4. Melatonin:  5  mg  Oral  Daily 1800    5. Phenol Topical Spray:  1  spray(s)  Topical  4 Times a Day    6. Sodium Chloride 0.9% Injectable Flush:  10  mL  IntraVenous Flush  Every 8 Hours and as Needed        Recent Lab Results:    Results:    CBC: 9/25/2023 04:51              \     Hgb     /                              \     9.9 L    /  WBC  ----------------  Plt               4.9       ----------------    225              /     Hct     \                              /     27.7 L    \            RBC: 3.19 L    MCV: 87           BMP: 9/25/2023 04:51  NA+        Cl-     BUN  /                         140    103    31 H /  --------------------------------  Glucose                ---------------------------  123 H    K+     HCO3-   Creat \                         3.9  28    2.13 H \  Calcium : 9.4     Anion Gap : 13        I have reviewed these laboratory results:    Glucose_POCT  Trending View      Result 25-Sep-2023 10:23:00  25-Sep-2023 06:16:00  25-Sep-2023 03:10:00    Glucose-POCT 131   H   134   H   137   H        Hepatic Function Panel  25-Sep-2023 04:51:00      Result Value    Aspartate Transaminase, Serum  42   H   ALB  3.5    T Bili  0.7    Bilirubin, Serum Direct - Conjugated  0.3    ALKP  80    Alanine Aminotransferase, Serum  42    T Pro  5.5   L     Complete Blood Count   25-Sep-2023 04:51:00      Result Value    White Blood Cell Count  4.9    Nucleated Erythrocyte Count  0.0    Red Blood Cell Count  3.19   L   HGB  9.9   L   HCT  27.7   L   MCV  87    MCHC  35.7    PLT  225    RDW-CV  14.3      Basic Metabolic Panel  25-Sep-2023 04:51:00      Result Value    Glucose, Serum  123   H   NA  140    K  3.9    CL  103    Bicarbonate, Serum  28    Anion Gap, Serum  13    BUN  31   H   CREAT  2.13   H   GFR Male  33   A   Calcium, Serum  9.4        Radiology Results:    Results:        Impression:    1. Nonspecific relative paucity bowel gas.  2. Nonspecific bibasilar opacities which may reflect a component of  chronic lung disease with or without superimposed  infectious/inflammatory infiltrateand atelectasis.  3. Medical devices as above.      Xray Abdomen AP View [Sep 24 2023  8:19AM]      Impression:  Xray Abdomen AP View [Sep 24 2023  3:53AM]      Impression:  Xray Abdomen AP View [Sep 24 2023  3:51AM]      Impression:     NG tube present with tip in mid stomach.        Signed by Haorldo James MD     Xray Abdomen AP View [Sep 23 2023  2:19PM]      Impression:    Findings consistent with mid and distal multifocal at least partial  small bowel obstruction, most likely from adhesions.     Previous Whipple procedure. The previous stent in the pancreatic  remnant is now in a loop of jejunum in the mid right abdomen.     Eventration of the right diaphragm. Stable scarring at the lung  bases, right greater than left, and along the right minor fissure.     Cardiomegaly.     Very mild stable nonspecific retroperitoneal adenopathy, most likely  reactive.     Nonspecific edematous changes in the mesenteric fat of the abdomen  and upper pelvis, right greater than left. Small amount of  nonspecific perihepatic ascites.     MACRO:  None     CT Abdomen and Pelvis with IV Contrast [Sep 23 2023 10:41AM]      Impression:  CT Abdomen and Pelvis with IV Contrast [Sep 23 2023  9:33AM]      Assessment and  Plan:   Daily Risk Screen:  ·  Does patient have an indwelling urinary catheter? n/a consulting service   ·  Does patient have a central line? n/a consulting service     Code Status:  ·  Code Status Full Code     Assessment:    Chung Goodman is a 67-year-old man with history of  NICM secondary to valvular disease status post bicaval OHT (3/24/2017; status post mitral valve repair with [employer] AnnuloFlex  band #36 mm with induction) complicated by atrial arrhythmias, volume overload, right ventricular failure, acute on chronic kidney disease requiring RRT, chronotropic incompetence,  CKD, T2DM, amiodarone induced hyperthyroidism, psychosis, and most recently pancreatic cancer status post Whipple procedure (5/19/2023),  who presented to [facility] for abdominal pain, admitted for small bowel obstruction. Heart Failure/Transplant service has been consulted for further management of post-cardiac transplant medications.       Assessment  Acute small bowel obstruction  Pancreatic adenocarcinoma status post Whipple procedure and on chemotherapy  NICM status post bicaval OHT (2017)  CKD      Recommendations    - Continue tacrolimus 2mg sublingual @0630 and 1.5mg @1830  - Check tacrolimus level 0600 daily  - Continue methylprednisolone 4mg IV daily  - Patient is hypertensive to SBP 170s today, given hydralazine 10mg x1  - Heart Failure/Transplant service will follow along with you very closely   - Since patient may require invasive procedures, agree with remaining on surgical oncology service for now until stable for medical management at which time patient can be transferred to Heart Failure/Transplant service        Patient examined and discussed with attending physician, Dr. Lazarus Landa.    Signed,  Cheryl Velasco MD  Heart Failure Fellow PGY4          Attestation:   Note Completion:  I am a:  Resident/Fellow   Attending Attestation I saw and evaluated the patient.  I personally obtained the key and  critical portions of the history and physical exam or was physically present for key and  critical portions performed by the resident/fellow. I reviewed the resident/fellow?s documentation and discussed the patient with the resident/fellow.  I agree with the resident/fellow?s medical decision making as documented in the note.     I personally evaluated the patient on 25-Sep-2023   Comments/ Additional Findings    67M s/p OHT 2017 and pancreatic CA s/p Whipple (May 2023) now admitted with SBO with NGT decompression. On appropriate IS therapy. BP elevated off  oral antihypertensives. USing SL/IV medications pending return of bowel function.           Electronic Signatures:  Lazarus Landa)  (Signed 25-Sep-2023 20:51)   Authored: Note Completion   Co-Signer: Service, Subjective Data, Objective Data, Assessment and Plan, Note Completion  Cheryl Velasco (Fellow))  (Signed 25-Sep-2023 16:19)   Authored: Service, Subjective Data, Objective Data, Assessment  and Plan, Note Completion      Last Updated: 25-Sep-2023 20:51 by Lazarus Landa)

## 2023-09-30 NOTE — PROGRESS NOTES
Service: Heart Failure     Subjective Data:   ABRIL PÉREZ is a 67 year old Male who is Hospital Day # 4 and POD #3 for Whipple procedure (pylorus procedure).     on 2 L NC oxygen   BP elevated MAP >100 -200 systolic.   FK AM 3.7   CXR: pulmonary edema/b/l effusions.  cleared for CLD and pills  c/o abdominal pain, denies BM/flatus.    Objective Data:     Objective Information:      T   P  R  BP   MAP  SpO2   Value  36.5  97  20  170/99   96  94%  Date/Time 5/22 12:52 5/22 12:52 5/22 12:52 5/22 12:52  5/22 10:00 5/22 12:52  Range  (35.9C - 36.8C )  (97 - 108 )  (17 - 36 )  (131 - 191 )/ (56 - 99 )  (83 - 117 )  (90% - 100% )   As of 22-May-2023 11:09:00, patient is on 2 L/min of oxygen via nasal cannula.      Pain reported at 5/22 11:09: 5 = Moderate    ---- Intake and Output  -----  Mn/Dy/Year Time  Intake   Output  Net  May 22, 2023 2:00 pm  275   415  -140  May 22, 2023 6:00 am  852.5   765  87  May 21, 2023 10:00 pm  662.5   675  -13    The Intake and Output Totals for the last 24 hours are:      Intake   Output  Net      9065   2115  155    Physical Exam Narrative:  ·  Physical Exam:    Gen: comfortably laying in hospital bed. No acute distress  HEENT: trachea midline. no JVD  CV: S1 S2 heard no murmur  Pulm: good air movement. Crackles b/l   Abd: soft, tender s/p surgery   Ext: warm and well-perfused no edema.   Neuro: CN II-XII grossly intact     Physical Exam by System:    Constitutional: Well developed, awake/alert/oriented  x3, no distress, alert and cooperative   Head/Neck: Neck supple, no apparent injury, thyroid  without mass or tenderness, No JVD, trachea midline, no bruits   Respiratory/Thorax: Patent airways, CTAB, normal  breath sounds with good chest expansion, thorax symmetric   Cardiovascular: Regular, rate and rhythm, no murmurs,  2+ equal pulses of the extremities, normal S 1and S 2   Gastrointestinal: Surgical drain in place with SS  fluid   Extremities: normal extremities,  no cyanosis edema,  contusions or wounds, no clubbing     Medication:    Medications:          Continuous Medications       --------------------------------    1. HYDROmorphone PCA 15 mg/ NaCL 0.9% 30 mL:  2.6  IV PCA  <Continuous>    2. Sodium Chloride 0.9% Infusion:  1000  mL  IntraVenous  <Continuous>         Scheduled Medications       --------------------------------    1. Acetaminophen:  650  mg  Oral  Every 6 Hours    2. Allopurinol:  100  mg  Oral  Every 24 Hours    3. Citalopram (CELEXA):  40  mg  Oral  Daily    4. Cyclobenzaprine:  5  mg  Oral  3 Times a Day    5. Fenofibrate:  160  mg  Oral  Daily    6. Gabapentin:  200  mg  Oral  At Bedtime    7. Heparin SubCutaneous:  5000  unit(s)  SubCutaneous  Every 8 Hours    8. hydrALAZINE (APRESOLINE):  100  mg  Oral  Every 12 Hours    9. Insulin Glargine (Lantus) Injectable:  10  unit(s)  SubCutaneous  Every 24 Hours    10. Insulin Lispro Moderate Corrective Scale:  unit(s)  SubCutaneous  Every 4 Hours    11. Lidocaine 4% TransDermal:  1  patch  TransDermal  Every 24 Hours    12. Piperacillin - Tazobactam 2.25 grams/Iso-osmotic 50 mL Premix IVPB:  50  mL  IntraVenous Piggyback  Every 6 Hours    13. predniSONE:  5  mg  Oral  Every 24 Hours    14. Rosuvastatin:  40  mg  Oral  Every Night    15. Sodium Chloride 0.9% Injectable Flush:  10  mL  IntraVenous Flush  Every 12 Hours    16. Tacrolimus:  3  mg  Oral  <User Schedule>    17. Tacrolimus:  4  mg  Oral  <User Schedule>         PRN Medications       --------------------------------    1. Dextrose 50% in Water Injectable:  25  gram(s)  IntraVenous Push  Every 15 Minutes    2. Glucagon Injectable:  1  mg  IntraMuscular  Every 15 Minutes    3. Heparin Flush 10 unit/ mL PF Injectable:  5  mL  IntraVenous Flush  Every 12 Hours    4. Heparin Flush 10 unit/ mL PF Injectable PRN:  5  mL  IntraVenous Flush  According to Flush Policy    5. Naloxone Injectable:  0.2  mg  IntraVenous Push  Once    6. Ondansetron Injectable:   4  mg  IntraVenous Push  Every 6 Hours    7. oxyCODONE Immediate Release:  5  mg  Oral  Every 4 Hours    8. Sodium Chloride 0.9% Injectable Flush:  10  mL  IntraVenous Flush  Every 8 Hours and as Needed    9. Sodium Chloride 0.9% Injectable Flush PRN:  10  mL  IntraVenous Flush  According to Flush Policy    10. Sodium Chloride 0.9% Injectable Flush PRN:  20  mL  IntraVenous Flush  According to Flush Policy         Conditional Medication Orders       --------------------------------    1. Perflutren Lipid Microsphere (Activated) 1.3 mL / NaCL 0.9% T.V. 10 mL Injectable:  0.5  mL  IntraVenous Push  Once      Recent Lab Results:    Results:    CBC: 5/22/2023 06:00              \     Hgb     /                              \     7.4 L    /  WBC  ----------------  Plt               18.5 H    ----------------    187              /     Hct     \                              /     24.2 L    \            RBC: 2.48 L    MCV: 98           RFP: 5/22/2023 06:00  NA+        Cl-     BUN  /                         142    110 H   45 H  /  --------------------------------  Glucose                ---------------------------  145 H    K+     HCO3-   Creat \                         4.4    24    2.81 H \  Calcium : 8.1 LAnion Gap : 12          Albumin : 2.9 L     Phos : 2.5      Coagulation: 5/22/2023 06:00  PT  /                    13.3  /  -------<    INR          ----------<      1.1  PTT\                    29  \                       ---------- Recent Arterial Blood Gas Results----------     5/22/2023 06:09  pO2 119  pH 7.40  pCO2 41  SO2 99  Base Excess 0.5null    Radiology Results:    Results:        Conclusion:  CONCLUSIONS:  1. Poorly visualized anatomical structures due to suboptimal image quality.  2. Left ventricular systolic function is low normal with a 50% estimated ejection fraction.  3. Abnormal septal motion consistent with post-operative status.    QUANTITATIVE DATA SUMMARY:  2D MEASUREMENTS:  Normal Ranges:  Ao  Root d:     3.55 cm   (2.0-3.7cm)  LAs:           6.00 cm   (2.7-4.0cm)  IVSd:          1.10 cm   (0.6-1.1cm)  LVPWd:         0.80 cm   (0.6-1.1cm)  LVIDd:         5.30 cm   (3.9-5.9cm)  LVIDs:         4.30 cm  LV Mass Index: 91.9 g/m2  LV % FS        18.9 %    LA VOLUME:  Normal Ranges:  LA Vol A4C:        148.3 ml  (22+/-6mL/m2)  LA Vol Index A4C:  72.8ml/m2  LA Area A4C:       34.6 cm2  LA Major Axis A4C: 6.9 cm    M-MODE MEASUREMENTS:  Normal Ranges:  Ao Root: 4.20 cm (2.0-3.7cm)  LAs:     5.90 cm (2.7-4.0cm)    AORTA MEASUREMENTS:  Normal Ranges:  Ao Sinus, d: 4.00 cm (2.1-3.5cm)  Asc Ao, d:   4.00 cm (2.1-3.4cm)  Ao Arch:     3.00 cm (2.0-3.6cm)    LV SYSTOLIC FUNCTION BY 2D PLANIMETRY (MOD):  Normal Ranges:  EF-A4C View: 35.9 % (>=55%)  EF-A2C View: 53.2 %  EF-Biplane:  45.0 %    LV DIASTOLIC FUNCTION:  Normal Ranges:  MV Peak E:      1.22 m/s    (0.7-1.2 m/s)  MV Peak A:      0.66 m/s    (0.42-0.7 m/s)  E/A Ratio:      1.85        (1.0-2.2)  MV e'           0.08 m/s    (>8.0)  MV lateral e'   0.08 m/s  MV medial e'    0.09 m/s  MV A Dur:       114.00 msec  E/e' Ratio:     14.35       (<8.0)  a'              0.06 m/s  PulmV Sys Severo:  22.50 cm/s  PulmV Dolan Severo: 26.60 cm/s  PulmV S/D Severo:  0.80    MITRAL VALVE:  Normal Ranges:  MV DT: 148 msec (150-240msec)    MITRAL INSUFFICIENCY:  Normal Ranges:  MR VTI:  101.80 cm  MR Vmax: 465.25 cm/s    AORTIC VALVE:  Normal Ranges:  AoV Vmax:      1.35 m/s (<=1.7m/s)  AoV Peak P.3 mmHg (<20mmHg)  LVOT Max Severo:  0.92 m/s (<=1.1m/s)  LVOT VTI:      11.50 cm  LVOT Diameter: 2.30 cm  (1.8-2.4cm)  AoV Area,Vmax: 2.85 cm2 (2.5-4.5cm2)    RIGHT VENTRICLE:  RV 1   3.77 cm  RV 2   3.43 cm  RV 3   6.44 cm  TAPSE: 11.3 mm  RV s'  0.23 m/s    TRICUSPID VALVE/RVSP:  Normal Ranges:  IVC Diam: 1.50 cm    PULMONIC VALVE:  Normal Ranges:  PV Accel Time: 86 msec  (>120ms)  PV Max Severo:    1.4 m/s  (0.6-0.9m/s)  PV Max P.3 mmHg    Pulmonary Veins:  PulmV Dolan Severo: 26.60  cm/s  PulmV S/D Severo:  0.80  PulmV Sys Severo:  22.50 cm/s      [id number] Frank Bui MD  Electronically signed on 5/20/2023 at 3:40:05 PM        *** Final ***     Echocardiogram [May 20 2023  3:40PM]      Assessment and Plan:   Daily Risk Screen:  ·  Does patient have an indwelling urinary catheter? n/a consulting service   ·  Does patient have a central line? 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   #recurrent Obstructive pancreatitis   -Recurrent admissions w/ pancreatitis (1/2023 and early 5/2023)   -Labs last admission suggestive of cholestatic pattern of liver injury, worsening LFTs/elevated lipase.   -RUQ US 3/2023 with body and tail of Pancreas obscured by gas. No gallstones visible   -RUQ US 4/28/23 showing dilation of CBD and intrahepatic bile ducts, no cholecystitis  -MRI pancreas showing high grade stricture of inferior and common bile ducts, and possible pancreatic mass  -ERCP EUS done which showed a 2.8 cm pancreatic mass in the pancreatic head s/p stent was placed to improve biliary drainage.   -Surgical oncology :EUS FNB: adenocarinoma.   -s/p Whipple surgery/ laparoscopic 5/19    - c/w clear liquid diet  - Zofran prn for nausea ()  - surgery following.   - awaiting return of bowel function.  - pain/bowel regimen as per ACS team     # NICM 2/2 to valvular disease s/p bicaval OHT (3/24/17)  # s/p  mitral valve repair w/[employer] AnnuloFlex band #36mm w/induction)   # HTN  - Post transplant issues/complications: atrial arrhythmias, volume overload, right ventricular failure, acute on chronic kidney disease  requiring RRT, chronotropic incompetence, and psychosis  -Cath with mild CAD, no CAV (3/2019). Echo with mild allograft dysfunction (LVEF 45-50%); similar to prior imaging  -stress test 2022: negative.   -TTE 5/2- showed LVEF low normal 50-55%, no wall motion abnormality NRVEF, mild MR LVIDD 5.3 cm   -Lipids (7/2022): tChol 127, HDL 47, LDL 61, Trigs 97,    #Immunosuppression  [CMV -/-; Toxo (-)]   -c/w tacrolimus 4 mg/3 at home (target 5-8) /CKD   -started on 3 AM/3 PM fk 3.0>3.7, increase to home dosing.   -prednisone 5 mg daily     #Antiinfectives:  CMV -/- TOXO -/-   none  post op abx: zosyn.     #other :  -C/w home rosuvastatin   -c/w home fenofibrate   -Restart home 100 BID hydralazine for elevated BP/MAP  -Continue fish oil 1 gram bid  -continue ASA if okay with ACS team  -C/w Rafat/vitamin D and MVT    #Alisha on CKD stage IIIB/post op   -Cr 3.2 peak   -AM crea 2.81   BL crea ~2 (last Cr 2.4/GFR 29 (1/27/23)  Nephrology following.     #T2DM  - At home 22 units lantus + lispro SSI  - Mild SSI  - CLD presently   - reduced dose lantus 10 units +mild SSI     #Depression/anxiety  -c/w citalopram, gabapentin(renal dosed)    #Gout  -allopurinol    #GERD/esophagitis  -continue home PPI    CODE STATUS: FULL CODE   NOK: Wife Krystle Goodman [phone].     This case was seen and discussed with Dr. Barone.    Attestation:   Note Completion:  I am a:  Resident/Fellow   Attending Attestation I saw and evaluated the patient.  I personally obtained the key and critical portions of the history and physical exam or was physically present for key and  critical portions performed by the resident/fellow. I reviewed the resident/fellow?s documentation and discussed the patient with the resident/fellow.  I agree  with the resident/fellow?s medical decision making as documented in the note.     I personally evaluated the patient on 22-May-2023         Electronic Signatures:  Tahira Pedro (Fellow))  (Signed 22-May-2023 16:25)   Authored: Service, Subjective Data, Objective Data, Assessment  and Plan, Note Completion  Daniel Barone)  (Signed 22-May-2023 16:41)   Authored: Note Completion   Co-Signer: Service, Subjective Data, Objective Data, Assessment and Plan, Note Completion      Last Updated: 22-May-2023 16:41 by Dainel Barone)

## 2023-09-30 NOTE — PROGRESS NOTES
Service: Heart Failure     Subjective Data:   ABRIL PÉREZ is a 67 year old Male who is Hospital Day # 6 and POD #5 for Whipple procedure (pylorus procedure).     had first BM this AM (dark stools)  difficult blood draws.   Midline placed.   s/p Blood transfusion.   hypertension/pain mediated.   plan to advance diet later today.   repeat hgb this AM 7.5  hypervolemia   FK drawn at 10:00 Am 4.9.    Objective Data:     Objective Information:      T   P  R  BP   MAP  SpO2   Value  36.6  82  24  159/89      100%  Date/Time 5/24 13:08 5/24 13:08 5/24 13:08 5/24 13:08    5/24 13:08  Range  (35.9C - 36.8C )  (75 - 102 )  (18 - 26 )  (134 - 194 )/ (72 - 98 )    (91% - 100% )   As of 24-May-2023 11:53:00, patient is on 3 L/min of oxygen via nasal cannula.      Pain reported at 5/24 11:53: 8 = Severe    ---- Intake and Output  -----  Mn/Dy/Year Time  Intake   Output  Net  May 24, 2023 2:00 pm  49   0  49  May 24, 2023 6:00 am  684   200  484  May 23, 2023 10:00 pm  1143   200  943    The Intake and Output Totals for the last 24 hours are:      Intake   Output  Net      1987   102 1766    Physical Exam Narrative:  ·  Physical Exam:    Gen: comfortably laying in hospital bed. No acute distress  HEENT: trachea midline.  CV: S1 S2 heard no murmur  Pulm: good air movement. Crackles b/l   Abd: soft, tender s/p surgery   Ext: warm and well-perfused, b/l edema upper and lower  Neuro: CN II-XII grossly intact     Physical Exam by System:    Constitutional: Well developed, awake/alert/oriented  x3, no distress, alert and cooperative   Head/Neck: Neck supple, no apparent injury, thyroid  without mass or tenderness, No JVD, trachea midline, no bruits   Respiratory/Thorax: Patent airways, CTAB, normal  breath sounds with good chest expansion, thorax symmetric   Cardiovascular: Regular, rate and rhythm, no murmurs,  2+ equal pulses of the extremities, normal S 1and S 2   Gastrointestinal: Surgical drain in place with SS   fluid   Extremities: normal extremities, no cyanosis edema,  contusions or wounds, no clubbing     Medication:    Medications:          Continuous Medications       --------------------------------    1. HYDROmorphone PCA 15 mg/ NaCL 0.9% 30 mL:  1.7  IV PCA  <Continuous>    2. Sodium Chloride 0.9% Infusion:  1000  mL  IntraVenous  <Continuous>         Scheduled Medications       --------------------------------    1. Acetaminophen:  650  mg  Oral  Every 6 Hours    2. Allopurinol:  100  mg  Oral  Every 24 Hours    3. Citalopram (CELEXA):  40  mg  Oral  Daily    4. Cyclobenzaprine:  5  mg  Oral  3 Times a Day    5. Fenofibrate:  160  mg  Oral  Daily    6. Gabapentin:  200  mg  Oral  At Bedtime    7. Heparin SubCutaneous:  5000  unit(s)  SubCutaneous  Every 8 Hours    8. hydrALAZINE (APRESOLINE):  100  mg  Oral  Every 12 Hours    9. Insulin Glargine (Lantus) Injectable:  10  unit(s)  SubCutaneous  Every 24 Hours    10. Insulin Lispro Moderate Corrective Scale:  unit(s)  SubCutaneous  Every 4 Hours    11. Lidocaine 4% TransDermal:  1  patch  TransDermal  Every 24 Hours    12. oxyCODONE Immediate Release:  5  mg  Oral  Every 4 Hours    13. Pantoprazole:  40  mg  Oral  Daily    14. Piperacillin - Tazobactam 2.25 grams/Iso-osmotic 50 mL Premix IVPB:  50  mL  IntraVenous Piggyback  Every 6 Hours    15. predniSONE:  5  mg  Oral  Every 24 Hours    16. Rosuvastatin:  40  mg  Oral  Every Night    17. Sodium Chloride 0.9% Injectable Flush:  10  mL  IntraVenous Flush  Every 12 Hours    18. Tacrolimus:  3  mg  Oral  <User Schedule>    19. Tacrolimus:  4  mg  Oral  <User Schedule>         PRN Medications       --------------------------------    1. Albuterol 2.5 mg - Ipratropium 0.5 mg/ 3 mL Neb Soln:  3  mL  Inhalation  Every 6 Hours    2. Dextrose 50% in Water Injectable:  25  gram(s)  IntraVenous Push  Every 15 Minutes    3. Glucagon Injectable:  1  mg  IntraMuscular  Every 15 Minutes    4. Heparin Flush 10 unit/ mL PF  Injectable:  5  mL  IntraVenous Flush  Every 12 Hours    5. Heparin Flush 10 unit/ mL PF Injectable PRN:  5  mL  IntraVenous Flush  According to Flush Policy    6. Labetalol Injectable:  20  mg  IntraVenous Push  Once    7. Naloxone Injectable:  0.2  mg  IntraVenous Push  Once    8. Ondansetron Injectable:  4  mg  IntraVenous Push  Every 6 Hours    9. Sodium Chloride 0.9% Injectable Flush:  10  mL  IntraVenous Flush  Every 8 Hours and as Needed    10. Sodium Chloride 0.9% Injectable Flush PRN:  10  mL  IntraVenous Flush  According to Flush Policy    11. Sodium Chloride 0.9% Injectable Flush PRN:  20  mL  IntraVenous Flush  According to Flush Policy         Conditional Medication Orders       --------------------------------    1. Perflutren Lipid Microsphere (Activated) 1.3 mL / NaCL 0.9% T.V. 10 mL Injectable:  0.5  mL  IntraVenous Push  Once      Recent Lab Results:    Results:    CBC: 5/24/2023 11:54              \     Hgb     /                              \     7.5 L    /  WBC  ----------------  Plt               8.8       ----------------    175              /     Hct     \                              /     23.3 L    \            RBC: 2.56 L    MCV: 91           CMP: 5/24/2023 10:29  NA+        Cl-     BUN  /                         145    113 H   53 H  /  --------------------------------  Glucose                ---------------------------  144 H    K+     HCO3-   Creat \                         4.8    24    2.69 H \           \  T Bili  /                    \  0.7  /  AST  x ---- x ALT        40 Hx ---- x 57 H         /  Alk P   \               /  67  \  Calcium : 8.1 L    Anion Gap : 13     Albumin : 2.9 L     T Protein : 5.0 L          Radiology Results:    Results:        Conclusion:  CONCLUSIONS:  1. Poorly visualized anatomical structures due to suboptimal image quality.  2. Left ventricular systolic function is low normal with a 50% estimated ejection fraction.  3. Abnormal septal motion  consistent with post-operative status.    QUANTITATIVE DATA SUMMARY:  2D MEASUREMENTS:  Normal Ranges:  Ao Root d:     3.55 cm   (2.0-3.7cm)  LAs:           6.00 cm   (2.7-4.0cm)  IVSd:          1.10 cm   (0.6-1.1cm)  LVPWd:         0.80 cm   (0.6-1.1cm)  LVIDd:         5.30 cm   (3.9-5.9cm)  LVIDs:         4.30 cm  LV Mass Index: 91.9 g/m2  LV % FS        18.9 %    LA VOLUME:  Normal Ranges:  LA Vol A4C:        148.3 ml  (22+/-6mL/m2)  LA Vol Index A4C:  72.8ml/m2  LA Area A4C:       34.6 cm2  LA Major Axis A4C: 6.9 cm    M-MODE MEASUREMENTS:  Normal Ranges:  Ao Root: 4.20 cm (2.0-3.7cm)  LAs:     5.90 cm (2.7-4.0cm)    AORTA MEASUREMENTS:  Normal Ranges:  Ao Sinus, d: 4.00 cm (2.1-3.5cm)  Asc Ao, d:   4.00 cm (2.1-3.4cm)  Ao Arch:     3.00 cm (2.0-3.6cm)    LV SYSTOLIC FUNCTION BY 2D PLANIMETRY (MOD):  Normal Ranges:  EF-A4C View: 35.9 % (>=55%)  EF-A2C View: 53.2 %  EF-Biplane:  45.0 %    LV DIASTOLIC FUNCTION:  Normal Ranges:  MV Peak E:      1.22 m/s    (0.7-1.2 m/s)  MV Peak A:      0.66 m/s    (0.42-0.7 m/s)  E/A Ratio:      1.85        (1.0-2.2)  MV e'           0.08 m/s    (>8.0)  MV lateral e'   0.08 m/s  MV medial e'    0.09 m/s  MV A Dur:       114.00 msec  E/e' Ratio:     14.35       (<8.0)  a'              0.06 m/s  PulmV Sys Severo:  22.50 cm/s  PulmV Dolan Severo: 26.60 cm/s  PulmV S/D Severo:  0.80    MITRAL VALVE:  Normal Ranges:  MV DT: 148 msec (150-240msec)    MITRAL INSUFFICIENCY:  Normal Ranges:  MR VTI:  101.80 cm  MR Vmax: 465.25 cm/s    AORTIC VALVE:  Normal Ranges:  AoV Vmax:      1.35 m/s (<=1.7m/s)  AoV Peak P.3 mmHg (<20mmHg)  LVOT Max Severo:  0.92 m/s (<=1.1m/s)  LVOT VTI:      11.50 cm  LVOT Diameter: 2.30 cm  (1.8-2.4cm)  AoV Area,Vmax: 2.85 cm2 (2.5-4.5cm2)    RIGHT VENTRICLE:  RV 1   3.77 cm  RV 2   3.43 cm  RV 3   6.44 cm  TAPSE: 11.3 mm  RV s'  0.23 m/s    TRICUSPID VALVE/RVSP:  Normal Ranges:  IVC Diam: 1.50 cm    PULMONIC VALVE:  Normal Ranges:  PV Accel Time: 86 msec  (>120ms)  PV  Max Severo:    1.4 m/s  (0.6-0.9m/s)  PV Max P.3 mmHg    Pulmonary Veins:  PulmV Dolan Severo: 26.60 cm/s  PulmV S/D Severo:  0.80  PulmV Sys Severo:  22.50 cm/s      [id number] Frank Bui MD  Electronically signed on 2023 at 3:40:05 PM        *** Final ***     Echocardiogram [May 20 2023  3:40PM]      Assessment and Plan:   Daily Risk Screen:  ·  Does patient have an indwelling urinary catheter? n/a consulting service   ·  Does patient have a central line? 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   #recurrent Obstructive pancreatitis   -Recurrent admissions w/ pancreatitis (2023 and early 2023)   -Labs last admission suggestive of cholestatic pattern of liver injury, worsening LFTs/elevated lipase.   -RUQ US 3/2023 with body and tail of Pancreas obscured by gas. No gallstones visible   -RUQ US 23 showing dilation of CBD and intrahepatic bile ducts, no cholecystitis  -MRI pancreas showing high grade stricture of inferior and common bile ducts, and possible pancreatic mass  -ERCP EUS done which showed a 2.8 cm pancreatic mass in the pancreatic head s/p stent was placed to improve biliary drainage.   -Surgical oncology :EUS FNB: adenocarinoma.   -s/p Whipple surgery/ laparoscopic     - c/w clear liquid diet>>advance later today   - surgery following.   - had a large BM/dark stool today. (no concerns for  bleeding >>hgb repeat 7.5).   - pain/bowel regimen as per ACS team     # NICM 2/2 to valvular disease s/p bicaval OHT (3/24/17)  # s/p mitral valve repair w/[employer] AnnuloFlex band #36mm w/induction)   # HTN  - Post transplant issues/complications: atrial arrhythmias, volume overload, right ventricular failure, acute on chronic kidney disease  requiring RRT, chronotropic incompetence, and psychosis  -Cath with mild CAD, no CAV (3/2019). Echo with mild allograft dysfunction (LVEF 45-50%); similar to prior imaging  -stress test 2022: negative.   -TTE 5/2- showed LVEF low normal 50-55%, no wall motion abnormality NRVEF, mild MR LVIDD 5.3 cm   -Lipids (7/2022): tChol 127, HDL 47, LDL 61, Trigs 97,   .     #Immunosuppression  -c/w tacrolimus 4 mg/3 at home (target 5-8) /CKD FK 4.9 drawn at 10 am.   -prednisone 5 mg daily     #Antinfectives:  [CMV -/-; Toxo (-)]   none  post op abx: zosyn. x 5 days     #other :  -repeat ECHO/limited echo for graft function.   -C/w home rosuvastatin   -c/w home fenofibrate   -c/w home 100 BID hydralazine for elevated BP/MAP, current BP's may be elevated s/t pain. hold off on escalation, untill optimal pain control   -Continue fish oil 1 gram bid  -continue ASA if okay with ACS team  -C/w Rafat/vitamin D and MVT    #Alisha on CKD stage IIIB/post op ?ATN vs cardiorenal   -Cr 3.2 peak   -AM crea 2.81 >>2.94>>2.69  BL crea ~2 (last Cr 2.4/GFR 29 (1/27/23)  await repeat echo prior to consideration of diuretics.   Nephrology following.     # Anemia/blood loss anemia likely post op  hgb 7.5<<<s/p PRBC<<<6.9<<<7.4   concern of dark stools>> spoke w/ team(not concerning at this time) CTM. hgb stable 7.5   CTM, hold off on blood transfusion(IRINEO population). no aggressive transfusions   if persistently trending down can consider leukoreduced PRBC's.      #T2DM  - At home 22 units lantus + lispro SSI  - Mild SSI  - CLD presently   - reduced dose lantus 10 units +mild SSI      #Depression/anxiety  -c/w citalopram, gabapentin(renal dosed)    #Gout  -allopurinol    #GERD/esophagitis  -continue home PPI    CODE STATUS: FULL CODE   NOK: Wife Krystle Goodman [phone]    This case was seen and discussed with Dr. Barone.    Attestation:   Note Completion:  I am a:  Resident/Fellow   Attending Attestation I saw and evaluated the patient.  I personally obtained the key and critical portions of the history and physical exam or was physically present for key and  critical portions performed by the resident/fellow. I reviewed the resident/fellow?s documentation and discussed the patient with the resident/fellow.  I agree with the resident/fellow?s medical decision making as documented in the note.     I personally evaluated the patient on 24-May-2023         Electronic Signatures:  Tahira Pedro (Fellow))  (Signed 24-May-2023 16:14)   Authored: Service, Subjective Data, Objective Data, Assessment  and Plan, Note Completion  Daniel Barone)  (Signed 24-May-2023 18:34)   Authored: Note Completion   Co-Signer: Service, Subjective Data, Objective Data, Assessment and Plan, Note Completion      Last Updated: 24-May-2023 18:34 by Daniel Barone)

## 2023-09-30 NOTE — PROGRESS NOTES
Service: Surgical Oncology     Subjective Data:   ABRIL PÉREZ is a 67 year old Male who is Hospital Day # 2 and POD #1 for Whipple procedure (pylorus procedure).     No acute events overnight. Mild HTN controlled with IV hydralazone. Pain not well controlled with current PCA dosing. Received 750 cc LR for fluid resuscitation.    Objective Data:     Objective Information:      T   P  R  BP   MAP  SpO2   Value  35.9  106  27  135/82   97  94%  Date/Time 5/20 16:00 5/20 10:00 5/20 10:00 5/20 10:00  5/20 10:00 5/20 10:00  Range  (35.9C - 37.2C )  (83 - 107 )  (19 - 42 )  (112 - 189 )/ (77 - 119 )  (88 - 137 )  (89% - 100% )   As of 19-May-2023 20:00:00, patient is on 2 L/min of oxygen via room air.  Highest temp of 37.2 C was recorded at 5/20 0:00      Pain reported at 5/20 9:00: 10 = Severe    ---- Intake and Output  -----  Mn/Dy/Year Time  Intake   Output  Net  May 20, 2023 2:00 pm  1025   88  937  May 20, 2023 6:00 am  1150   765  385  May 19, 2023 10:00 pm  1200   315  885    The Intake and Output Totals for the last 24 hours are:      Intake   Output  Net      9650   1080  1270    Physical Exam Narrative:  ·  Physical Exam:    Neurological: Awake, alert, conversive  Cardiovascular: RRR  Head/Neck: NCAT  Respiratory/Thorax: even, unlabored, well healed sternotomy scar  Genitourinary: no rashid  Gastrointestinal: abdomen soft, appropriately tender to palpation non-distended. Incision with island dressing in place with minimal strikethrough. Bilateral IRVING drains with SS output.  Skin: warm, dry  Musculoskeletal: BAILEY  Eyes: non-icteric  Extremities: BAILEY x 3  Psychological: appropriate mood/affect      Medication:    Medications:          Continuous Medications       --------------------------------    1. HYDROmorphone PCA 15 mg/ NaCL 0.9% 30 mL:  2.6  IV PCA  <Continuous>    2. Lactated Ringers Infusion:  1000  mL  IntraVenous  <Continuous>    3. Ropivacaine 0.2%/ Ambit Pump 500 mL:  1000  mg  Peripheral  Nerve  <Continuous>         Scheduled Medications       --------------------------------    1. Acetaminophen:  650  mg  Oral  Every 6 Hours    2. Allopurinol:  100  mg  Oral  Every 24 Hours    3. Citalopram (CELEXA):  40  mg  Oral  Daily    4. Fenofibrate:  160  mg  Oral  Daily    5. Gabapentin:  200  mg  Oral  At Bedtime    6. Heparin SubCutaneous:  5000  unit(s)  SubCutaneous  Every 8 Hours    7. hydrALAZINE (APRESOLINE):  100  mg  Oral  Every 12 Hours    8. Insulin Lispro Moderate Corrective Scale:  unit(s)  SubCutaneous  Every 4 Hours    9. Pantoprazole Injectable:  40  mg  IntraVenous Push  Every 24 Hours    10. Piperacillin - Tazobactam 3.375 gram/Iso-osmotic 50 mL Premix IVPB:  50  mL  IntraVenous Piggyback  Every 6 Hours    11. predniSONE:  5  mg  Oral  Every 24 Hours    12. Rosuvastatin:  80  mg  Oral  Daily    13. Tacrolimus:  3  mg  Oral  <User Schedule>         PRN Medications       --------------------------------    1. Calcium Gluconate 1 gram/ NaCL 0.67% 50 mL Premix IVPB:  50  mL  IntraVenous Piggyback  Every 6 Hours    2. Calcium Gluconate 2 gram/ NaCL 0.67% 100 mL Premix IVPB:  100  mL  IntraVenous Piggyback  Every 6 Hours    3. Dextrose 50% in Water Injectable:  25  gram(s)  IntraVenous Push  Every 15 Minutes    4. Glucagon Injectable:  1  mg  IntraMuscular  Every 15 Minutes    5. Magnesium Sulfate 2 gram/Sterile Water 50 mL Premix Soln:  2  gram(s)  IntraVenous Piggyback  Every 6 Hours    6. Magnesium Sulfate 4 gram/Sterile Water 100 mL Premix Soln:  4  gram(s)  IntraVenous Piggyback  Every 6 Hours    7. Naloxone Injectable:  0.2  mg  IntraVenous Push  Once    8. Potassium Chloride 20 mEq/Sterile Water 100 mL Premix IVPB:  20  mEq  IntraVenous Piggyback  Every 6 Hours    9. Sodium Chloride 0.9% Injectable Flush:  10  mL  IntraVenous Flush  Every 8 Hours and as Needed         Conditional Medication Orders       --------------------------------    1. Perflutren Lipid Microsphere (Activated) 1.3 mL  / NaCL 0.9% T.V. 10 mL Injectable:  0.5  mL  IntraVenous Push  Once      Recent Lab Results:    Results:    CBC: 5/20/2023 15:15              \     Hgb     /                              \     9.7 L    /  WBC  ----------------  Plt               24.4 H    ----------------    285              /     Hct     \                              /     30.2 L    \            RBC: 3.29 L    MCV: 92           CMP: 5/20/2023 01:46  NA+        Cl-     BUN  /                         139    103    44 H /  --------------------------------  Glucose                ---------------------------  199 H    K+     HCO3-   Creat \                         5.1    24    2.14 H \           \  T Bili  /                    \  1.2  /  AST  x ---- x ALT        205 Hx ---- x 158 H         /  Alk P   \               /  79  \  Calcium : 8.8     Anion Gap : 17     Albumin : 3.5     T Protein : 5.2 L          RFP: 5/19/2023 18:10  NA+        Cl-     BUN  /                         139    108 H   46 H  /  --------------------------------  Glucose                ---------------------------  179 H    K+     HCO3-   Creat \                         5.1    24    2.13 H \  Calcium : 8.8Anion Gap : 12          Albumin : 3.3 L    Phos : 3.9      Coagulation: 5/20/2023 15:15  PT  /                    15.3 H /  -------<    INR          ----------<      1.3 H  PTT\                    26  \            Fibrinogen: 362           ---------- Recent Arterial Blood Gas Results----------     5/20/2023 15:18  pO2 83  24 h range: ( 66 - 100 )  pH 7.37  24 h range: ( 7.23 - 7.4 )  pCO2 42  24 h range: ( 39 - 51 )  SO2 95  24 h range: ( 94 - 100 )  Base Excess -1.0  24 h range: ( -6.1 - -0.5 )null    Assessment and Plan:   Daily Risk Screen:  ·  Does patient have an indwelling urinary catheter? yes   ·  Plan for indwelling urinary catheter removal today? no   ·  The patient continues to require indwelling urinary catheterization for critically ill patients who  need accurate urinary output measurements     Comorbidities:  ·  Comorbidity Other     Code Status:  ·  Code Status Full Code     Assessment:    68 yo M s/p paulette with Dr. Johnson for pancreatic adenocarcinoma Patient has a hx of heart transplant. Did have an episode of possible 5-10 second PEA arrest during  operation, regular rhythm resumed with epi. In ICU post-op for close monitoring given cardiac history. VSS, afebrile.    PLAN:  Neuro - increase PCA dosing, ambit in place, tylenol, gabapentin, home celexa  CV - home tacro, pred 5, rosuvastatin, hydralazine, fenofibrate, allopurinol, cards transplant team following, appreciate recs  Resp - OOB, encourage IS, wean O2 as tolerated  GI - continue NPO with sips, PPI  /Renal - mIVF, strict I/Os, maintain rashid  Heme - no acute transfusion needs  ID - Zosyn x 5 days (end date 5/14)  Endo - SSI, endo c/s  Prophy - SCDs, Lovenox, Incentive Spirometer  Dispo - continue cares per ICU    Discussed with attending physician Dr. Méndez.     Eda Nicholas MD  General Surgery, PGY1  Surgical Oncology, [id number]      Attestation:   Note Completion:  I am a:  Resident/Fellow   Attending Attestation I saw and evaluated the patient.  I personally obtained the key and critical portions of the history and physical exam or was physically present for key and  critical portions performed by the resident/fellow. I reviewed the resident/fellow?s documentation and discussed the patient with the resident/fellow.  I agree with the resident/fellow?s medical decision making as documented in their note  with the exception/addition of the following:    I personally evaluated the patient on 20-May-2023   Comments/ Additional Findings    Covering for Winter.    Pt seen in am with team.    POD 1    Looked quite good given hx    OK for sips for meds.  Xplant team to manage immunosupp    Endo to see    Zosyn          Electronic Signatures:  Andre Méndez)  (Signed 20-May-2023  17:11)   Authored: Note Completion   Co-Signer: Service, Subjective Data, Objective Data, Assessment and Plan, Note Completion  Eda Nicholas (Resident))  (Signed 20-May-2023 16:35)   Authored: Service, Subjective Data, Objective Data, Assessment  and Plan, Note Completion      Last Updated: 20-May-2023 17:11 by Andre Méndez)

## 2023-09-30 NOTE — PROGRESS NOTES
Service: Nephrology     Subjective Data:   ABRIL PÉREZ is a 67 year old Male who is Hospital Day # 9 and POD #8 for Whipple procedure (pylorus procedure).    Overnight Events: Patient had an uneventful night.   Additional Information:    No acute events overnight  on room air throughout the night  feeling better  passing gas, having bowel movements    Objective Data:     Objective Information:      T   P  R  BP   MAP  SpO2   Value  36.1  86  18  179/95      94%  Date/Time 5/27 9:20 5/27 9:20 5/27 9:20 5/27 9:20    5/27 9:20  Range  (36.1C - 37C )  (75 - 88 )  (18 - 18 )  (115 - 185 )/ (64 - 95 )    (93% - 97% )   As of 26-May-2023 06:22:00, patient is on 2 L/min of oxygen via room air.  Highest temp of 37 C was recorded at 5/26 6:22      Pain reported at 5/27 8:35: 6 = Moderate    ---- Intake and Output  -----  Mn/Dy/Year Time  Intake   Output  Net  May 27, 2023 6:00 am  0   380  -380  May 26, 2023 10:00 pm  0   0  0  May 26, 2023 2:00 pm  0   470  -470    The Intake and Output Totals for the last 24 hours are:      Intake   Output  Net      null   850  null    Physical Exam Narrative:  ·  Physical Exam:    Gen: comfortably laying in hospital bed. No acute distress  HEENT: trachea midline.  CV: S1 S2 heard no murmur  Pulm: good air movement. Crackles b/l   Abd: soft, tender s/p surgery   Ext: warm and well-perfused, b/l edema upper and lower  Neuro: CN II-XII grossly intact     Medication:    Medications:          Continuous Medications       --------------------------------  No continuous medications are active       Scheduled Medications       --------------------------------    1. Acetaminophen:  650  mg  Oral  Every 6 Hours    2. Allopurinol:  100  mg  Oral  Every 24 Hours    3. Citalopram (CELEXA):  40  mg  Oral  Daily    4. Cyclobenzaprine:  5  mg  Oral  3 Times a Day    5. Fenofibrate:  160  mg  Oral  Daily    6. Gabapentin:  200  mg  Oral  At Bedtime    7. Heparin SubCutaneous:  5000  unit(s)   SubCutaneous  Every 8 Hours    8. hydrALAZINE (APRESOLINE):  100  mg  Oral  Every 12 Hours    9. Insulin Glargine (Lantus) Injectable:  10  unit(s)  SubCutaneous  Every 24 Hours    10. Insulin Lispro Moderate Corrective Scale:  unit(s)  SubCutaneous  Every 4 Hours    11. Isosorbide Dinitrate:  40  mg  Oral  3 Times a Day    12. Lidocaine 4% TransDermal:  1  patch  TransDermal  Every 24 Hours    13. Pantoprazole:  40  mg  Oral  Daily    14. Piperacillin - Tazobactam 2.25 grams/Iso-osmotic 50 mL Premix IVPB:  50  mL  IntraVenous Piggyback  Every 6 Hours    15. predniSONE:  5  mg  Oral  Every 24 Hours    16. Rosuvastatin:  40  mg  Oral  Every Night    17. Sodium Chloride 0.9% Injectable Flush:  10  mL  IntraVenous Flush  Every 12 Hours    18. Tacrolimus:  3  mg  Oral  <User Schedule>    19. Tacrolimus:  4  mg  Oral  <User Schedule>         PRN Medications       --------------------------------    1. Albuterol 2.5 mg - Ipratropium 0.5 mg/ 3 mL Neb Soln:  3  mL  Inhalation  Every 6 Hours    2. Dextrose 50% in Water Injectable:  25  gram(s)  IntraVenous Push  Every 15 Minutes    3. Glucagon Injectable:  1  mg  IntraMuscular  Every 15 Minutes    4. Heparin Flush 10 unit/ mL PF Injectable:  5  mL  IntraVenous Flush  Every 12 Hours    5. Heparin Flush 10 unit/ mL PF Injectable PRN:  5  mL  IntraVenous Flush  According to Flush Policy    6. HYDROmorphone:  2  mg  Oral  Every 4 Hours    7. HYDROmorphone:  4  mg  Oral  Every 4 Hours    8. HYDROmorphone Injectable:  0.4  mg  IntraVenous Push  Every 4 Hours    9. Naloxone Injectable:  0.2  mg  IntraVenous Push  Once    10. Ondansetron Injectable:  4  mg  IntraVenous Push  Every 6 Hours    11. Sodium Chloride 0.9% Injectable Flush:  10  mL  IntraVenous Flush  Every 8 Hours and as Needed    12. Sodium Chloride 0.9% Injectable Flush PRN:  10  mL  IntraVenous Flush  According to Flush Policy    13. Sodium Chloride 0.9% Injectable Flush PRN:  20  mL  IntraVenous Flush  According to  Flush Policy         Conditional Medication Orders       --------------------------------    1. Perflutren Lipid Microsphere (Activated) 1.3 mL / NaCL 0.9% T.V. 10 mL Injectable:  0.5  mL  IntraVenous Push  Once      Recent Lab Results:    Results:    CBC: 5/27/2023 05:15              \     Hgb     /                              \     7.2 L    /  WBC  ----------------  Plt               7.0       ----------------    211              /     Hct     \                              /     21.8 L    \            RBC: 2.45 L    MCV: 89           CMP: 5/27/2023 05:15  NA+        Cl-     BUN  /                         145    111 H   46 H  /  --------------------------------  Glucose                ---------------------------  136 H    K+     HCO3-   Creat \                         4.2    25    2.16 H \           \  T Bili  /                    \  0.7  /  AST  x ---- x ALT        29 x ---- x 29         /  Alk P   \               /  62  \  Calcium : 8.2 L    Anion Gap : 13     Albumin : 2.7 L     T Protein : 5.0 L          Coagulation: 5/27/2023 05:15  PT  /                    13.2  /  -------<    INR          ----------<      1.1  PTT\                    23 L \                       Radiology Results:    Results:        Impression:    1.  Moderate clearing bibasilar atelectatic changes.        Xray Chest 1 View [May 25 2023 11:46AM]      Conclusion:  CONCLUSIONS:  1. Poorly visualized anatomical structures due to suboptimal image quality.  2. Left ventricular systolic function is low normal with a 50% estimated ejection fraction.  3. Abnormal septal motion consistent with post-operative status.    QUANTITATIVE DATA SUMMARY:  2D MEASUREMENTS:  Normal Ranges:  Ao Root d:     3.55 cm   (2.0-3.7cm)  LAs:           6.00 cm   (2.7-4.0cm)  IVSd:          1.10 cm   (0.6-1.1cm)  LVPWd:         0.80 cm   (0.6-1.1cm)  LVIDd:         5.30 cm   (3.9-5.9cm)  LVIDs:         4.30 cm  LV Mass Index: 91.9 g/m2  LV % FS        18.9  %    LA VOLUME:  Normal Ranges:  LA Vol A4C:        148.3 ml  (22+/-6mL/m2)  LA Vol Index A4C:  72.8ml/m2  LA Area A4C:       34.6 cm2  LA Major Axis A4C: 6.9 cm    M-MODE MEASUREMENTS:  Normal Ranges:  Ao Root: 4.20 cm (2.0-3.7cm)  LAs:     5.90 cm (2.7-4.0cm)    AORTA MEASUREMENTS:  Normal Ranges:  Ao Sinus, d: 4.00 cm (2.1-3.5cm)  Asc Ao, d:   4.00 cm (2.1-3.4cm)  Ao Arch:     3.00 cm (2.0-3.6cm)    LV SYSTOLIC FUNCTION BY 2D PLANIMETRY (MOD):  Normal Ranges:  EF-A4C View: 35.9 % (>=55%)  EF-A2C View: 53.2 %  EF-Biplane:  45.0 %    LV DIASTOLIC FUNCTION:  Normal Ranges:  MV Peak E:      1.22 m/s    (0.7-1.2 m/s)  MV Peak A:      0.66 m/s    (0.42-0.7 m/s)  E/A Ratio:      1.85        (1.0-2.2)  MV e'           0.08 m/s    (>8.0)  MV lateral e'   0.08 m/s  MV medial e'    0.09 m/s  MV A Dur:       114.00 msec  E/e' Ratio:     14.35       (<8.0)  a'              0.06 m/s  PulmV Sys Severo:  22.50 cm/s  PulmV Dolan Severo: 26.60 cm/s  PulmV S/D Severo:  0.80    MITRAL VALVE:  Normal Ranges:  MV DT: 148 msec (150-240msec)    MITRAL INSUFFICIENCY:  Normal Ranges:  MR VTI:  101.80 cm  MR Vmax: 465.25 cm/s    AORTIC VALVE:  Normal Ranges:  AoV Vmax:      1.35 m/s (<=1.7m/s)  AoV Peak P.3 mmHg (<20mmHg)  LVOT Max Severo:  0.92 m/s (<=1.1m/s)  LVOT VTI:      11.50 cm  LVOT Diameter: 2.30 cm  (1.8-2.4cm)  AoV Area,Vmax: 2.85 cm2 (2.5-4.5cm2)    RIGHT VENTRICLE:  RV 1   3.77 cm  RV 2   3.43 cm  RV 3   6.44 cm  TAPSE: 11.3 mm  RV s'  0.23 m/s    TRICUSPID VALVE/RVSP:  Normal Ranges:  IVC Diam: 1.50 cm    PULMONIC VALVE:  Normal Ranges:  PV Accel Time: 86 msec  (>120ms)  PV Max Severo:    1.4 m/s  (0.6-0.9m/s)  PV Max P.3 mmHg    Pulmonary Veins:  PulmV Dolan Severo: 26.60 cm/s  PulmV S/D Severo:  0.80  PulmV Sys Severo:  22.50 cm/s      [id number] Frank Bui MD  Electronically signed on 2023 at 3:40:05 PM        *** Final ***     Echocardiogram [May 20 2023  3:40PM]      Assessment and Plan:   Code Status:  ·  Code Status Full Code      Assessment:    ABRIL PÉREZ is a 67 year old Male with a past medical history of nonischemic cardiomyopathy secondary to valvular disease s/p bicaval heart transplant on  3/24/2017, diabetes mellitus type 2, amiodarone induced hyperthyroidism, GERD, gout, CKD stage III (baseline creatinine around 2 ) likely from chronic CNI use, pancreatic cancer admitted for   whipple?s with  done on 5/19.  Had an episode of 5 to10   seconds PEA arrest during the operation and ROSC with epi and admitted to SICU postop for close monitoring. Transplant nephrology consulted for JOSY on CKD with oliguria and hyperkalemia for assistance in management    #JOSY on CKD stage IIIb: Improving  JOSY likely secondary to hemodynamic JOSY in the setting of suspected brief PEA arrest and hypotension Intra-Op and poor oral intake leading to acute tubular injury  -CKD from chronic CNI use with baseline creatinine around 2 -2.5.  Today Cr is down to 2.1 which is back to baseline.    Recommendations:  Continue to monitor RFP and UOP  Increase hydralazine to 100 mg three times a day with holding parameters ( HOLD if SBP <110)  Replete electrolyte as needed. Keep K ~4 , Mag ~2 and ionized Ca ~1.1  Check Ionized Ca, VIT D and PTH (ordered)  Added calcitriol 0.5 mcg daily and calcium 1 tab twice a day   Replete Ca gluconate 1 gram x one.  avoid tacrolimus toxicity, defer to heart tx team  I ordered aranesp 100 mcg x once today.        #NICM s/p bicaval heart transplantation: Immunosuppression as per heart transplant team    #Anemia: Likely secondary to recent surgery.  Check iron studies and will give AMANDA as indicated once iron replete    #BMD: Corrected calcium magnesium levels are optimal.  Slightly low phos levels continue to monitor and replete as needed    #Adenocarcinoma for pancreas s/p Whipple surgery  consider reduced immunosuppression, but will defer to heart tx  team    ==============================================================    Transplant nephrology team will sign off. Please call us back if :    Patient develops recurrent JOSY with Creatinine increases >0.3 in 48 hours or > 1.5 x times  from the baseline within 7 days.     Thank you for this consultation.    Discussed with primary team and Dr. Durán.        Electronic Signatures:  Rebekah Godfrey)  (Signed 27-May-2023 11:12)   Authored: Service, Subjective Data, Objective Data, Assessment  and Plan, Note Completion      Last Updated: 27-May-2023 11:12 by Rebekah Godfrey)

## 2023-09-30 NOTE — PROGRESS NOTES
Service:   Critical Care Service:  ·  Service SICU     Subjective Data:   ID Statement:  ABRIL PÉREZ is a 67 year old Male who is Hospital Day # 4 and ICU Day #4 and POD #3 for Whipple procedure (pylorus procedure).     Hypertensive overnight to 191/83, given oxy to control pain with no improvement in BP, +50 hydralazine with improvement in BP to 140s-150s systolic.    This AM, pain poorly controlled, sitting up in chair. Denies cp/sob.    Objective Data:     ·  Objective Information      T   P  R  BP   MAP  SpO2   Value  36.2  101  29  161/84   102  98%  Date/Time 5/22 4:00 5/22 7:00 5/22 7:00 5/22 7:00  5/22 7:00 5/22 7:00  Range  (36C - 36.8C )  (101 - 108 )  (17 - 36 )  (131 - 191 )/ (56 - 96 )  (83 - 117 )  (90% - 100% )   As of 22-May-2023 04:00:00, patient is on 2 L/min of oxygen via nasal cannula.      Pain reported at 5/22 4:45: sleeping      Direct Arterial Blood Pressure  Systolic 185 (166 - 185) 5/21 22:45  Diastolic (mm Hg) 78 (71 - 78) 5/21 22:45  Mean (mm Hg) 117 (106 - 117) 5/21 22:45  Pulse Pressure (mm Hg) 107 (88 - 107) 5/21 22:45      ---- Intake and Output  -----  Mn/Dy/Year Time  Intake   Output  Net  May 22, 2023 6:00 am  852.5   765  87  May 21, 2023 10:00 pm  662.5   675  -13  May 21, 2023 2:00 pm  755   675  80    The Intake and Output Totals for the last 24 hours are:      Intake   Output  Net      2270   2115  155     Drain and tube details (included in I&O totals)  2055 cc      Indwelling Catheter - Urethral( 22-May-2023 06:00:00 )     Date:            Weight/Scale Type:  19-May-2023 18:38  86.1  kg         19-May-2023 18:38  86.1  kg / bed  19-May-2023 08:25  86.1  kg           Physical Exam by System:    Neurological: alert and oriented x3, intact senses   Cardiovascular: tachycardic, regular rhythm. no murmurs  appreciated   Respiratory/Thorax: Patent airways, CTAB, normal  breath sounds with good chest expansion, thorax symmetric. on RA   Genitourinary: Portillo draining  clear urine   Gastrointestinal: Abdomen soft, NT, 2 drains with  serosanguinous drainage   Skin: No rashes or lesions   Constitutional: Well developed, awake/alert/oriented  x3, no distress, alert and cooperative   Head/Neck: AT/NC   Extremities: No edema   Psychological: Appropriate mood and behavior     Allergies:       Allergies:  ·  morphine : Itching    Medications:    Medications:          Continuous Medications       --------------------------------    1. Sodium Chloride 0.9% Infusion:  1000  mL  IntraVenous  <Continuous>         Scheduled Medications       --------------------------------    1. Acetaminophen:  650  mg  Oral  Every 6 Hours    2. Allopurinol:  100  mg  Oral  Every 24 Hours    3. Citalopram (CELEXA):  40  mg  Oral  Daily    4. Fenofibrate:  160  mg  Oral  Daily    5. Gabapentin:  200  mg  Oral  At Bedtime    6. Heparin SubCutaneous:  5000  unit(s)  SubCutaneous  Every 8 Hours    7. hydrALAZINE (APRESOLINE):  50  mg  Oral  Every 12 Hours    8. Insulin Glargine (Lantus) Injectable:  10  unit(s)  SubCutaneous  Every 24 Hours    9. Insulin Lispro Moderate Corrective Scale:  unit(s)  SubCutaneous  Every 4 Hours    10. Lidocaine 4% TransDermal:  1  patch  TransDermal  Every 24 Hours    11. Pantoprazole Injectable:  40  mg  IntraVenous Push  Every 24 Hours    12. Piperacillin - Tazobactam 2.25 grams/Iso-osmotic 50 mL Premix IVPB:  50  mL  IntraVenous Piggyback  Every 6 Hours    13. predniSONE:  5  mg  Oral  Every 24 Hours    14. Rosuvastatin:  40  mg  Oral  Every Night    15. Sodium Chloride 0.9% Injectable Flush:  10  mL  IntraVenous Flush  Every 12 Hours    16. Tacrolimus:  3  mg  Oral  <User Schedule>         PRN Medications       --------------------------------    1. Calcium Gluconate 1 gram/ NaCL 0.67% 50 mL Premix IVPB:  50  mL  IntraVenous Piggyback  Every 6 Hours    2. Calcium Gluconate 2 gram/ NaCL 0.67% 100 mL Premix IVPB:  100  mL  IntraVenous Piggyback  Every 6 Hours    3. Dextrose 50% in  Water Injectable:  25  gram(s)  IntraVenous Push  Every 15 Minutes    4. Glucagon Injectable:  1  mg  IntraMuscular  Every 15 Minutes    5. Heparin Flush 10 unit/ mL PF Injectable:  5  mL  IntraVenous Flush  Every 12 Hours    6. Heparin Flush 10 unit/ mL PF Injectable PRN:  5  mL  IntraVenous Flush  According to Flush Policy    7. HYDROmorphone Injectable:  0.2  mg  IntraVenous Push  Every 2 Hours    8. Magnesium Sulfate 2 gram/Sterile Water 50 mL Premix Soln:  2  gram(s)  IntraVenous Piggyback  Every 6 Hours    9. Magnesium Sulfate 4 gram/Sterile Water 100 mL Premix Soln:  4  gram(s)  IntraVenous Piggyback  Every 6 Hours    10. Naloxone Injectable:  0.2  mg  IntraVenous Push  Once    11. oxyCODONE Immediate Release:  10  mg  Oral  Every 4 Hours    12. oxyCODONE Immediate Release:  5  mg  Oral  Every 4 Hours    13. Sodium Chloride 0.9% Injectable Flush:  10  mL  IntraVenous Flush  Every 8 Hours and as Needed    14. Sodium Chloride 0.9% Injectable Flush PRN:  10  mL  IntraVenous Flush  According to Flush Policy    15. Sodium Chloride 0.9% Injectable Flush PRN:  20  mL  IntraVenous Flush  According to Flush Policy         Conditional Medication Orders       --------------------------------    1. Perflutren Lipid Microsphere (Activated) 1.3 mL / NaCL 0.9% T.V. 10 mL Injectable:  0.5  mL  IntraVenous Push  Once      Recent Lab Results:    Results:          RFP: 5/21/2023 22:25  NA+        Cl-     BUN  /                         141    108 H   46 H  /  --------------------------------  Glucose                ---------------------------  159 H    K+     HCO3-   Creat \                         4.4    23    2.75 H \  Calcium : 8.2 LAnion Gap : 14          Albumin : 2.8 L     Phos : 2.6          ---------- Recent Arterial Blood Gas Results----------     5/22/2023 06:09  pO2 119  pH 7.40  pCO2 41  SO2 99  Base Excess 0.5null      Results:        Impression:    1.  Continued low lung volumes with bibasilar  atelectasis.        Xray Chest 1 View [May 21 2023  9:24AM]      Conclusion:  CONCLUSIONS:  1. Poorly visualized anatomical structures due to suboptimal image quality.  2. Left ventricular systolic function is low normal with a 50% estimated ejection fraction.  3. Abnormal septal motion consistent with post-operative status.    QUANTITATIVE DATA SUMMARY:  2D MEASUREMENTS:  Normal Ranges:  Ao Root d:     3.55 cm   (2.0-3.7cm)  LAs:           6.00 cm   (2.7-4.0cm)  IVSd:          1.10 cm   (0.6-1.1cm)  LVPWd:         0.80 cm   (0.6-1.1cm)  LVIDd:         5.30 cm   (3.9-5.9cm)  LVIDs:         4.30 cm  LV Mass Index: 91.9 g/m2  LV % FS        18.9 %    LA VOLUME:  Normal Ranges:  LA Vol A4C:        148.3 ml  (22+/-6mL/m2)  LA Vol Index A4C:  72.8ml/m2  LA Area A4C:       34.6 cm2  LA Major Axis A4C: 6.9 cm    M-MODE MEASUREMENTS:  Normal Ranges:  Ao Root: 4.20 cm (2.0-3.7cm)  LAs:     5.90 cm (2.7-4.0cm)    AORTA MEASUREMENTS:  Normal Ranges:  Ao Sinus, d: 4.00 cm (2.1-3.5cm)  Asc Ao, d:   4.00 cm (2.1-3.4cm)  Ao Arch:     3.00 cm (2.0-3.6cm)    LV SYSTOLIC FUNCTION BY 2D PLANIMETRY (MOD):  Normal Ranges:  EF-A4C View: 35.9 % (>=55%)  EF-A2C View: 53.2 %  EF-Biplane:  45.0 %    LV DIASTOLIC FUNCTION:  Normal Ranges:  MV Peak E:      1.22 m/s    (0.7-1.2 m/s)  MV Peak A:      0.66 m/s    (0.42-0.7 m/s)  E/A Ratio:      1.85        (1.0-2.2)  MV e'           0.08 m/s    (>8.0)  MV lateral e'   0.08 m/s  MV medial e'    0.09 m/s  MV A Dur:       114.00 msec  E/e' Ratio:     14.35       (<8.0)  a'              0.06 m/s  PulmV Sys Severo:  22.50 cm/s  PulmV Dolan Severo: 26.60 cm/s  PulmV S/D Severo:  0.80    MITRAL VALVE:  Normal Ranges:  MV DT: 148 msec (150-240msec)    MITRAL INSUFFICIENCY:  Normal Ranges:  MR VTI:  101.80 cm  MR Vmax: 465.25 cm/s    AORTIC VALVE:  Normal Ranges:  AoV Vmax:      1.35 m/s (<=1.7m/s)  AoV Peak P.3 mmHg (<20mmHg)  LVOT Max Severo:  0.92 m/s (<=1.1m/s)  LVOT VTI:      11.50 cm  LVOT Diameter: 2.30  cm  (1.8-2.4cm)  AoV Area,Vmax: 2.85 cm2 (2.5-4.5cm2)    RIGHT VENTRICLE:  RV 1   3.77 cm  RV 2   3.43 cm  RV 3   6.44 cm  TAPSE: 11.3 mm  RV s'  0.23 m/s    TRICUSPID VALVE/RVSP:  Normal Ranges:  IVC Diam: 1.50 cm    PULMONIC VALVE:  Normal Ranges:  PV Accel Time: 86 msec  (>120ms)  PV Max Severo:    1.4 m/s  (0.6-0.9m/s)  PV Max P.3 mmHg    Pulmonary Veins:  PulmV Dolan Severo: 26.60 cm/s  PulmV S/D Severo:  0.80  PulmV Sys Severo:  22.50 cm/s      [id number] Frank Bui MD  Electronically signed on 2023 at 3:40:05 PM        *** Final ***     Echocardiogram [May 20 2023  3:40PM]      Assessment and Plan:   Daily Risk Screen:  ·  Does patient have a central line? no   ·  Does patient have an indwelling urinary catheter? yes   ·  Plan for indwelling urinary catheter removal today? no   ·  The patient continues to require indwelling urinary catheterization for perioperative use for selected surgical procedures   ·  Is the patient intubated? no     Assessment/Plan:  ·  Assessment/Plan:    Mr. Goodman is a 66 y/o male who presents to SICU sp a [place] with   23 for pancreatic adenocarcinoma    His PMHx is significant for CKD, IDDM2 DM, amiodarone induced hyperthyroidism, and NICM secondary to valvular disease who is s/p bicaval a heart transplant (3/24/17)    NEURO: Alert and oriented. Hx Gout and depression. Acute post op pain  - B/l ZEKE catheters with ropivacaine infusion -- APS following and managing, appreciate recs   - DC?d PCA, PRN oxy and dilaudid, restart PCA, + flexeril  - Ongoing neuro and pain assessments  - Continue home Gabapentin, Citalopram and Allopurinol     CV: NICM secondary to valvular s/p bicaval a heart transplant 2017. Last Echo (23): TTE showed LVEF 50-55%, no wall  motion abnormality. Postoperative TTE  LVEF 50, abnml septal motion consistent with postop status. Hydralazine suspended overnight for soft BP. No other intervention required. Hypertensive this morning  -  Goals MAP 65-85.  - Continuous EKG, ABP monitoring  - Continue home fenofibrate  - R/s home dose hydralazine 100 BID   - F/u surgery regarding ASA restart date --> surgery states to hold for now   - Transplant service for consulted IS management , IS ordered per transplant: continue prednisone 5mg, Tacro SL 3mg q12hrs (@ 0630, 1830) with daily 0600 tacro levels. Target is 5-9      PULM: No Hx pulmonary disease. Escalating oxygen requirements overnight to 4L NC however upon entering room, NC only in 1 nostril and patient saturating > 95%  - CXR: stable this AM  - Q1h incentive spirometer while awake  - Additional pulm toilet prn    - OOBTC    GI: Pancreatic cancer sp a whipple 5/19/23 with  . Hx GERD  - +Limited CLD  - Advance diet per surgical service   - PPI for GI prophylaxis  - Trend daily LFTs    : CKD stage III, baseline creatinine ~ 2-2.2. Current JOSY likely prerenal and intrinsic in nature, improving overnight with additional volume resuscitation. +2.9L over last 24 hours. Cr peaked at 3.21, now 2.93 this AM. Additionally hyperkalemic to  6.3 5/20 afternoon for which patient received K cocktail: calcium gluconate, insulin, lasix (no response) + bumex 4mg with good UOP response. Most recent K 4.4.   - Decrease Maintenance IVF NS to 50 ml/hr   - q8hr RFP  - Volume resuscitation as needed.    - Maintain U/O >0.5ml/kg/hr.    - Replete electrolytes to goal K>4, Mg>2, Phos>2.5, ionized Ca>1.10.    HEME: Acute surgical blood loss anemia. H/H decreased from 9.7 to 8.2. INR 1.5. +1 dose Vitamin K IV 5/21.   - Check CBC and coags daily And PRN   - Continue SQH/SCD  - Maintain active T&S --> obtained 5/21    ENDO: IDDM2. At home 18 units lantus + lispro SSI. Received 10U of SSI + 10U insulin in setting of hyperK5/20. 150s-190s overnight.  - Q4h BG checks and moderate SSI Lispro per ICU protocol.  - Started Lantus 10U subcutaneous qAM 5/21    ID: Afebrile. Likely reactive leukocytosis. No current s/s infx    -Zosyn x 5 days per SurgOnc ( SD 5/19)  -trend WBC, monitor temp q4hr    Lines:    5/19 Left brachial A line   PIVx2   Midline placed 5/21     Dispo: Transfer to floor.      PENDING STAFFING WITH ICU attending.      Team PHONE [id number]            Code Status:  ·  Code Status Full Code     Attestation:   Note Completion:  I am a:  Resident/Fellow   Attending Attestation I saw and evaluated the patient.  I personally obtained the key and critical portions of the history and physical exam or was physically present for key and  critical portions performed by the resident/fellow. I reviewed the resident/fellow?s documentation and discussed the patient with the resident/fellow.  I agree with the resident/fellow?s medical decision making as documented in the note.     I personally evaluated the patient on 22-May-2023   Critical Care Patient I have reviewed and evaluated the most recent data and results, personally examined the patient, and formulated the plan of care as presented above.  This patient  was critically ill and required continued critical care treatment. Teaching and any separately billable procedures are not included in the time calculation.    Billing Provider Critical Care Time 37 minute(s)   Primary Critical Care Issue/Treatment (See Assessment and Plan for greater detail) -- For the nature of the critical condition and treatment, this documentation has been prepared by the attending physician/NETTIE- billing provider of these critical  care services.         Electronic Signatures:  Avtar John)  (Signed 22-May-2023 15:29)   Authored: Note Completion   Co-Signer: Service, Subjective Data, Objective Data, Assessment and Plan, Note Completion  Chapincito Vargas (Resident))  (Signed 22-May-2023 09:56)   Authored: Service, Subjective Data, Objective Data, Assessment  and Plan, Note Completion      Last Updated: 22-May-2023 15:29 by Avtar John)

## 2023-09-30 NOTE — PROGRESS NOTES
Service: Surgical Oncology     Subjective Data:   ABRIL PÉREZ is a 67 year old Male who is Hospital Day # 3 and POD #2 for Whipple procedure (pylorus procedure).    Overnight Events: Patient had an uneventful night.   Additional Information:    Patient reports feeling better this morning. His pain is under better control now. He was able to sit up at the edge of bed yesterday. No chest pain, shortness of  breath, nausea or vomiting.    Objective Data:     Objective Information:      T   P  R  BP   MAP  SpO2   Value  36.6  101  19  148/86   105  98%  Date/Time 5/21 4:00 5/21 10:00 5/21 10:00 5/21 10:00  5/21 10:00 5/21 10:00  Range  (35.5C - 37.2C )  (98 - 110 )  (18 - 42 )  (115 - 161 )/ (70 - 94 )  (85 - 112 )  (89% - 98% )   As of 21-May-2023 09:00:00, patient is on 4 L/min of oxygen via nasal cannula.  Highest temp of 37.2 C was recorded at 5/20 8:00      Pain reported at 5/21 8:00: 8 = Severe;  RE-educated on PCA    ---- Intake and Output  -----  Mn/Dy/Year Time  Intake   Output  Net  May 21, 2023 6:00 am  2570   1545  1025  May 20, 2023 10:00 pm  1500   703  797  May 20, 2023 2:00 pm  1200   403  797    The Intake and Output Totals for the last 24 hours are:      Intake   Output  Net      0737 [id number]    Physical Exam Narrative:  ·  Physical Exam:    Neurological: Awake, alert, conversive and sitting up in bed   Cardiovascular: RRR  Head/Neck: NCAT  Respiratory/Thorax: even, unlabored, well healed prior sternotomy scar  Genitourinary: Portillo in place with clear urine output  Gastrointestinal: abdomen soft, appropriately tender to palpation, mildly distended. Incision dressing removed, c/d/i without bleeding. Bilateral IRVING drains with SS output.  Skin: warm, dry  Musculoskeletal: BAILEY  Eyes: non-icteric  Extremities: BAILEY x 3  Psychological: appropriate mood/affect      Medication:    Medications:          Continuous Medications       --------------------------------    1. HYDROmorphone PCA 15 mg/  NaCL 0.9% 30 mL:  2.6  IV PCA  <Continuous>    2. Sodium Chloride 0.9% Infusion:  1000  mL  IntraVenous  <Continuous>         Scheduled Medications       --------------------------------    1. Acetaminophen:  650  mg  Oral  Every 6 Hours    2. Allopurinol:  100  mg  Oral  Every 24 Hours    3. Citalopram (CELEXA):  40  mg  Oral  Daily    4. Fenofibrate:  160  mg  Oral  Daily    5. Gabapentin:  200  mg  Oral  At Bedtime    6. Heparin SubCutaneous:  5000  unit(s)  SubCutaneous  Every 8 Hours    7. hydrALAZINE (APRESOLINE):  50  mg  Oral  Every 12 Hours    8. Insulin Glargine (Lantus) Injectable:  10  unit(s)  SubCutaneous  Every 24 Hours    9. Insulin Lispro Moderate Corrective Scale:  unit(s)  SubCutaneous  Every 4 Hours    10. Pantoprazole Injectable:  40  mg  IntraVenous Push  Every 24 Hours    11. Phytonadione (Vitamin K) IV Piggy Back:  10  mg  IntraVenous Piggyback  Once    12. Piperacillin - Tazobactam 2.25 grams/Iso-osmotic 50 mL Premix IVPB:  50  mL  IntraVenous Piggyback  Every 6 Hours    13. predniSONE:  5  mg  Oral  Every 24 Hours    14. Rosuvastatin:  80  mg  Oral  Daily    15. Tacrolimus:  3  mg  Oral  <User Schedule>         PRN Medications       --------------------------------    1. Calcium Gluconate 1 gram/ NaCL 0.67% 50 mL Premix IVPB:  50  mL  IntraVenous Piggyback  Every 6 Hours    2. Calcium Gluconate 2 gram/ NaCL 0.67% 100 mL Premix IVPB:  100  mL  IntraVenous Piggyback  Every 6 Hours    3. Dextrose 50% in Water Injectable:  25  gram(s)  IntraVenous Push  Every 15 Minutes    4. Glucagon Injectable:  1  mg  IntraMuscular  Every 15 Minutes    5. Magnesium Sulfate 2 gram/Sterile Water 50 mL Premix Soln:  2  gram(s)  IntraVenous Piggyback  Every 6 Hours    6. Magnesium Sulfate 4 gram/Sterile Water 100 mL Premix Soln:  4  gram(s)  IntraVenous Piggyback  Every 6 Hours    7. Naloxone Injectable:  0.2  mg  IntraVenous Push  Once    8. oxyCODONE Immediate Release:  5  mg  Oral  Every 4 Hours    9. Sodium  Chloride 0.9% Injectable Flush:  10  mL  IntraVenous Flush  Every 8 Hours and as Needed         Conditional Medication Orders       --------------------------------    1. Perflutren Lipid Microsphere (Activated) 1.3 mL / NaCL 0.9% T.V. 10 mL Injectable:  0.5  mL  IntraVenous Push  Once      Recent Lab Results:    Results:    CBC: 5/21/2023 06:01              \     Hgb     /                              \     8.2 L    /  WBC  ----------------  Plt               21.2 H    ----------------    214              /     Hct     \                              /     26.9 L    \            RBC: 2.76 L    MCV: 97           RFP: 5/21/2023 06:01  NA+        Cl-     BUN  /                         138    107    49 H /  --------------------------------  Glucose                ---------------------------  206 H    K+     HCO3-   Creat \                         4.6    24    2.93 H \  Calcium : 8.2 LAnion Gap : 12          Albumin : 3.2 L     Phos : 2.4 L      Coagulation: 5/21/2023 06:01  PT  /                    17.6 H /  -------<    INR          ----------<      1.5 H  PTT\                    27  \                       ---------- Recent Arterial Blood Gas Results----------     5/21/2023 06:04  pO2 75  24 h range: ( 75 - 83 )  pH 7.40  24 h range: ( 7.37 - 7.4 )  pCO2 42  24 h range: ( 42 - 42 )  SO2 96  24 h range: ( 95 - 96 )  Base Excess 1.1  24 h range: ( -1 - 1.1 )null    Radiology Results:    Results:        Impression:    1.  Continued low lung volumes with bibasilar atelectasis.        Xray Chest 1 View [May 21 2023  9:24AM]      Assessment and Plan:   Daily Risk Screen:  ·  Does patient have an indwelling urinary catheter? yes   ·  Plan for indwelling urinary catheter removal today? no   ·  The patient continues to require indwelling urinary catheterization for critically ill patients who need accurate urinary output measurements     Comorbidities:  ·  Comorbidity Other     Code Status:  ·  Code Status Full Code      Assessment:    68 yo M s/p paulette with Dr. Johnson for pancreatic adenocarcinoma Patient has a hx of heart transplant. Did have an episode of possible 5-10 second PEA arrest during  operation, regular rhythm resumed with epi. In ICU post-op for close monitoring given cardiac history. VSS, afebrile.    PLAN:  Neuro - continue PCA, ambit removal by anesthesia, tylenol, gabapentin, home Celexa  CV - tacro management per transplant team, pred 5, rosuvastatin, hydralazine, fenofibrate, cards transplant team following, appreciate recs  Resp - OOB, encourage IS, wean O2 as tolerated  GI - continue NPO with sips, PPI  /Renal - mIVF, strict I/Os, maintain rashid, please obtain daily LFT  Heme - no acute transfusion needs  ID - Zosyn x 5 days (end date 5/14)  Endo - SSI, home allopurinol, add lantus 10u  Prophy - SCDs, Lovenox, Incentive Spirometer  Dispo - continue cares per ICU    Discussed with attending physician Dr. Méndez.   Jesus Almazan MD  PGY3  General Surgery  Surgical Oncology/[facility] pager [phone]      Attestation:   Note Completion:  I am a:  Resident/Fellow   Attending Attestation I saw and evaluated the patient.  I personally obtained the key and critical portions of the history and physical exam or was physically present for key and  critical portions performed by the resident/fellow. I reviewed the resident/fellow?s documentation and discussed the patient with the resident/fellow.  I agree with the resident/fellow?s medical decision making as documented in their note  with the exception/addition of the following:    I personally evaluated the patient on 21-May-2023   Comments/ Additional Findings    Covering for Winter    pt seen in am with team.    He was much more comfortable today    Drains not sinister    Cr improving    No major moves    Abx    F/U xplant med recs, etc          Electronic Signatures:  Andre Méndez)  (Signed 21-May-2023 20:17)   Authored: Note Completion   Co-Signer: Service,  Subjective Data, Objective Data, Assessment and Plan, Note Completion  Jesus Almazan (Resident))  (Signed 21-May-2023 11:24)   Authored: Service, Subjective Data, Objective Data, Assessment  and Plan, Note Completion      Last Updated: 21-May-2023 20:17 by Andre Méndez)

## 2023-09-30 NOTE — PROGRESS NOTES
Service: Nephrology     Subjective Data:   ABRIL PÉREZ is a 67 year old Male who is Hospital Day # 7 and POD #6 for Whipple procedure (pylorus procedure).    Overnight Events: Patient had an uneventful night.   Additional Information:    No acute events overnight  overnight, nurse concerning for wheezing. patient satting well on 2L NC. had issues with shallow breathing; with that said, CXR was stable    Patient also had episode of SBP in 190s, thought to be pain related. Patient received 1x hydralazine and 1x labetalol and responded appropriately.     Patient denies flatus, no BM    Objective Data:     Objective Information:      T   P  R  BP   MAP  SpO2   Value  36.2  80  18  163/84      97%  Date/Time 5/24 21:03 5/24 21:03 5/24 21:03 5/24 21:03 5/24 21:03  Range  (36.1C - 36.6C )  (75 - 89 )  (18 - 26 )  (145 - 194 )/ (77 - 98 )    (91% - 100% )   As of 24-May-2023 11:53:00, patient is on 3 L/min of oxygen via nasal cannula.      Pain reported at 5/24 18:22: 0 = None    ---- Intake and Output  -----  Mn/Dy/Year Time  Intake   Output  Net  May 23, 2023 10:00 pm  1143   200  943  May 23, 2023 2:00 pm  160   375  -215  May 23, 2023 6:00 am  128   031  -347    The Intake and Output Totals for the last 24 hours are:      Intake   Output  Net      483   1070  -587    Physical Exam Narrative:  ·  Physical Exam:    Neurological: Awake, alert, visibly uncomfortable, oriented x 3   Cardiovascular: RRR  Head/Neck: NCAT  Respiratory/Thorax: even, unlabored, well healed prior sternotomy scar  Genitourinary: Portillo not in place  Gastrointestinal: abdomen soft, appropriately tender to palpation, persistent moderate distension. Incision c/d/i without bleeding. RLQ IRVING drain with serosanguinous output, LLQ IRVING brown/murky.  Skin: warm, dry  Musculoskeletal: BAILEY  Eyes: non-icteric  Extremities: BAILEY x 3  Psychological: lethargic      Medication:    Medications:          Continuous Medications        --------------------------------    1. HYDROmorphone PCA 15 mg/ NaCL 0.9% 30 mL:  1.7  IV PCA  <Continuous>    2. Sodium Chloride 0.9% Infusion:  1000  mL  IntraVenous  <Continuous>         Scheduled Medications       --------------------------------    1. Acetaminophen:  650  mg  Oral  Every 6 Hours    2. Allopurinol:  100  mg  Oral  Every 24 Hours    3. Citalopram (CELEXA):  40  mg  Oral  Daily    4. Cyclobenzaprine:  5  mg  Oral  3 Times a Day    5. Fenofibrate:  160  mg  Oral  Daily    6. Gabapentin:  200  mg  Oral  At Bedtime    7. Heparin SubCutaneous:  5000  unit(s)  SubCutaneous  Every 8 Hours    8. hydrALAZINE (APRESOLINE):  100  mg  Oral  Every 12 Hours    9. Insulin Glargine (Lantus) Injectable:  10  unit(s)  SubCutaneous  Every 24 Hours    10. Insulin Lispro Moderate Corrective Scale:  unit(s)  SubCutaneous  Every 4 Hours    11. Lidocaine 4% TransDermal:  1  patch  TransDermal  Every 24 Hours    12. oxyCODONE Immediate Release:  5  mg  Oral  Every 4 Hours    13. Pantoprazole:  40  mg  Oral  Daily    14. Piperacillin - Tazobactam 2.25 grams/Iso-osmotic 50 mL Premix IVPB:  50  mL  IntraVenous Piggyback  Every 6 Hours    15. predniSONE:  5  mg  Oral  Every 24 Hours    16. Rosuvastatin:  40  mg  Oral  Every Night    17. Sodium Chloride 0.9% Injectable Flush:  10  mL  IntraVenous Flush  Every 12 Hours    18. Tacrolimus:  3  mg  Oral  <User Schedule>    19. Tacrolimus:  4  mg  Oral  <User Schedule>         PRN Medications       --------------------------------    1. Albuterol 2.5 mg - Ipratropium 0.5 mg/ 3 mL Neb Soln:  3  mL  Inhalation  Every 6 Hours    2. Dextrose 50% in Water Injectable:  25  gram(s)  IntraVenous Push  Every 15 Minutes    3. Glucagon Injectable:  1  mg  IntraMuscular  Every 15 Minutes    4. Heparin Flush 10 unit/ mL PF Injectable:  5  mL  IntraVenous Flush  Every 12 Hours    5. Heparin Flush 10 unit/ mL PF Injectable PRN:  5  mL  IntraVenous Flush  According to Flush Policy    6. Labetalol  Injectable:  20  mg  IntraVenous Push  Once    7. Naloxone Injectable:  0.2  mg  IntraVenous Push  Once    8. Ondansetron Injectable:  4  mg  IntraVenous Push  Every 6 Hours    9. Sodium Chloride 0.9% Injectable Flush:  10  mL  IntraVenous Flush  Every 8 Hours and as Needed    10. Sodium Chloride 0.9% Injectable Flush PRN:  10  mL  IntraVenous Flush  According to Flush Policy    11. Sodium Chloride 0.9% Injectable Flush PRN:  20  mL  IntraVenous Flush  According to Flush Policy         Conditional Medication Orders       --------------------------------    1. Perflutren Lipid Microsphere (Activated) 1.3 mL / NaCL 0.9% T.V. 10 mL Injectable:  0.5  mL  IntraVenous Push  Once      Recent Lab Results:    Results:    CBC: 5/24/2023 11:54              \     Hgb     /                              \     7.5 L    /  WBC  ----------------  Plt               8.8       ----------------    175              /     Hct     \                              /     23.3 L    \            RBC: 2.56 L    MCV: 91           CMP: 5/24/2023 10:29  NA+        Cl-     BUN  /                         145    113 H   53 H  /  --------------------------------  Glucose                ---------------------------  144 H    K+     HCO3-   Creat \                         4.8    24    2.69 H \           \  T Bili  /                    \  0.7  /  AST  x ---- x ALT        40 Hx ---- x 57 H         /  Alk P   \               /  67  \  Calcium : 8.1 L    Anion Gap : 13     Albumin : 2.9 L     T Protein : 5.0 L          Radiology Results:    Results:        Impression:    1.  Low lung volumes with bronchovascular crowding.  2. Perihilar pulmonary edema, unchanged compared to prior.  3. Basilar effusions with bandlike atelectasis, superimposed  infection not excluded.      Xray Chest 1 View [May 24 2023  6:14AM]      Assessment and Plan:   Code Status:  ·  Code Status Full Code     Assessment:    ABRIL PRÉEZ is a 67 year old Male with a past  medical history of nonischemic cardiomyopathy secondary to valvular disease s/p bicaval heart transplant on  3/24/2017, diabetes mellitus type 2, amiodarone induced hyperthyroidism, GERD, gout, CKD stage III (baseline creatinine around 2 ) likely from chronic CNI use, pancreatic cancer admitted for   whipple?s with  done on 5/19.  Had an episode of 5 to10   seconds PEA arrest during the operation and ROSC with epi and admitted to SICU postop for close monitoring. Transplant nephrology consulted for JOSY on CKD with oliguria and hyperkalemia for assistance in management    #JOSY on CKD stage IIIb: Improving  JOSY likely secondary to hemodynamic JOSY in the setting of suspected brief PEA arrest and hypotension Intra-Op and poor oral intake leading to acute tubular injury  -CKD from chronic CNI use with baseline creatinine around 2   Today Cr is down to 2.6 after blood transfusion    Recommendations:  - Lasix 40 mg iv today  - Would obtain CXR in am. If there is still pulmonary edema, consider albumin/lasix 40 mg IV every 12 hours.  - Echo per cardiology    #NICM s/p bicaval heart transplantation: Immunosuppression as per heart transplant team    #Anemia: Likely secondary to recent surgery.  Check iron studies and will give AMANDA as indicated once iron replete    #BMD: Corrected calcium magnesium levels are optimal.  Slightly low phos levels continue to monitor and replete as needed    #Adenocarcinoma for pancreas s/p Whipple surgery  consider reduced immunosuppression, but will defer to heart tx team    Discussed with primary team.        Electronic Signatures:  Rebekah Godfrey)  (Signed 25-May-2023 00:21)   Authored: Service, Subjective Data, Objective Data, Assessment  and Plan, Note Completion      Last Updated: 25-May-2023 00:21 by Rebekah Godfrey)

## 2023-09-30 NOTE — PROGRESS NOTES
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  Patient is status post Whipple in May 2023 who is also status  post postoperative chemotherapy on 9/22/2023.    Overnight Events: Patient had an uneventful night.   Additional Information:    NG tube had about 1500 cc output.  Patient reports his belly is much much softer.  He is passing flatus but no bowel movement.    Objective Data:     Objective Information:      T   P  R  BP   MAP  SpO2   Value  36.6  67  19  175/95   113  99%  Date/Time 9/25 9:19 9/25 9:19 9/25 9:19 9/25 9:19  9/25 9:19 9/25 9:19  Range  (36.1C - 36.7C )  (67 - 88 )  (16 - 19 )  (153 - 178 )/ (79 - 95 )  (113 - 113 )  (96% - 99% )      Pain reported at 9/25 1:30: 10 = Severe    Physical Exam Narrative:  ·  Physical Exam:    NG tube in place.  Alert and oriented no acute distress  No increased work of breathing  Abdomen soft nontender well-healed incisions.    Recent Lab Results:    Results:    CBC: 9/25/2023 04:51              \     Hgb     /                              \     9.9 L    /  WBC  ----------------  Plt               4.9       ----------------    225              /     Hct     \                              /     27.7 L    \            RBC: 3.19 L    MCV: 87           BMP: 9/25/2023 04:51  NA+        Cl-     BUN  /                         140    103    31 H /  --------------------------------  Glucose                ---------------------------  123 H    K+     HCO3-   Creat \                         3.9  28    2.13 H \  Calcium : 9.4     Anion Gap : 13      Radiology Results:    Results:        Impression:    1. Nonspecific relative paucity bowel gas.  2. Nonspecific bibasilar opacities which may reflect a component of  chronic lung disease with or without superimposed  infectious/inflammatory infiltrateand atelectasis.  3. Medical  devices as above.      Xray Abdomen AP View [Sep 24 2023  8:19AM]      Impression:    Findings consistent with mid and distal multifocal at least partial  small bowel obstruction, most likely from adhesions.     Previous Whipple procedure. The previous stent in the pancreatic  remnant is now in a loop of jejunum in the mid right abdomen.     Eventration of the right diaphragm. Stable scarring at the lung  bases, right greater than left, and along the right minor fissure.     Cardiomegaly.     Very mild stable nonspecific retroperitoneal adenopathy, most likely  reactive.     Nonspecific edematous changes in the mesenteric fat of the abdomen  and upper pelvis, right greater than left. Small amount of  nonspecific perihepatic ascites.     MACRO:  None     CT Abdomen and Pelvis with IV Contrast [Sep 23 2023 10:41AM]      Assessment and Plan:        Additional Dx:   Status post heart transplantation: Onset Date: 30-May-2023,  Entered Date: 30-May-2023 14:42    Code Status:  ·  Code Status Full Code     Assessment:    Patient n.p.o. with an NG tube in place.  1500 cc output gastric contents.    CT concerning for obstruction at the aferant as well as possible eferent limb.  We will follow-up with GI regarding possible endoscopic evaluation including possible stenting.    Would be very high risk for any sort of surgical intervention.  Currently no plans to reoperate.    Appreciate transplant cardiology.    Heparin DVT prophylaxis    Nutrition Diagnosis:  ·  Nutrition Diagnosis      Agree with dietitian?s assessment and diagnoses as stated.  A new diagnosis of Moderate malnutrition related to chronic disease or condition related  to pancreatic cancer s/p whipple on chemo (c/b obstructive symptoms x 1 month)  as evidence by reported < 75% estimated energy requirement x at least 1 month, significant 15% weight loss x 7 months, moderate muscle wasting, & moderate fat loss.      Attestation:   Note Completion:  I am a:   Resident/Fellow   Attending Attestation I saw and evaluated the patient.  I personally obtained the key and critical portions of the history and physical exam or was physically present for key and  critical portions performed by the resident/fellow. I reviewed the resident/fellow?s documentation and discussed the patient with the resident/fellow.  I agree with the resident/fellow?s medical decision making as documented in the note.     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 Possible recurrence. GI to scope.          Electronic Signatures:  Hi Huerta)  (Signed 25-Sep-2023 11:54)   Authored: Service, Subjective Data, Objective Data, Assessment  and Plan, Note Completion  Luis Armando Johnson)  (Signed 26-Sep-2023 07:03)   Authored: Assessment and Plan, Note Completion   Co-Signer: Service, Subjective Data, Objective Data, Assessment and Plan, Note Completion      Last Updated: 26-Sep-2023 07:03 by Luis Armando Johnson)

## 2023-09-30 NOTE — PROGRESS NOTES
Service: Surgical Oncology     Subjective Data:   ABRIL PÉREZ is a 67 year old Male who is Hospital Day # 5 and POD #4 for Whipple procedure (pylorus procedure).    Overnight Events: Patient had an uneventful night.   Additional Information:    No acute events overnight  overnight, nurse concerning for wheezing. patient satting well on 2L NC. had issues with shallow breathing; with that said, CXR was stable    Patient also had episode of SBP in 190s, thought to be pain related. Patient received 1x hydralazine and 1x labetalol and responded appropriately.     Patient denies flatus, no BM    Objective Data:     Objective Information:      T   P  R  BP   MAP  SpO2   Value  35.9  87  18  144/83   94  96%  Date/Time 5/23 17:00 5/23 17:00 5/23 17:00 5/23 17:00  5/22 20:22 5/23 17:00  Range  (35.9C - 36.8C )  (87 - 108 )  (18 - 32 )  (134 - 175 )/ (56 - 99 )  (83 - 110 )  (91% - 100% )   As of 23-May-2023 09:00:00, patient is on 2 L/min of oxygen via nasal cannula.      Pain reported at 5/23 12:14: 0 = None    ---- Intake and Output  -----  Mn/Dy/Year Time  Intake   Output  Net  May 23, 2023 2:00 pm  0   375  -375  May 23, 2023 6:00 am  128   475  -347  May 22, 2023 10:00 pm  80   180  -100    The Intake and Output Totals for the last 24 hours are:      Intake   Output  Net      483   1070  -587    Physical Exam Narrative:  ·  Physical Exam:    Neurological: Awake, alert, visibly uncomfortable, oriented x 3   Cardiovascular: RRR  Head/Neck: NCAT  Respiratory/Thorax: even, unlabored, well healed prior sternotomy scar  Genitourinary: Portillo not in place  Gastrointestinal: abdomen soft, appropriately tender to palpation, persistent moderate distension. Incision c/d/i without bleeding. RLQ IRVING drain with serosanguinous output, LLQ IRVING brown/murky.  Skin: warm, dry  Musculoskeletal: BAILEY  Eyes: non-icteric  Extremities: BAILEY x 3  Psychological: lethargic      Medication:    Medications:          Continuous Medications        --------------------------------    1. HYDROmorphone PCA 15 mg/ NaCL 0.9% 30 mL:  1.7  IV PCA  <Continuous>    2. Sodium Chloride 0.9% Infusion:  1000  mL  IntraVenous  <Continuous>         Scheduled Medications       --------------------------------    1. Acetaminophen:  650  mg  Oral  Every 6 Hours    2. Allopurinol:  100  mg  Oral  Every 24 Hours    3. Citalopram (CELEXA):  40  mg  Oral  Daily    4. Cyclobenzaprine:  5  mg  Oral  3 Times a Day    5. Fenofibrate:  160  mg  Oral  Daily    6. Gabapentin:  200  mg  Oral  At Bedtime    7. Heparin SubCutaneous:  5000  unit(s)  SubCutaneous  Every 8 Hours    8. hydrALAZINE (APRESOLINE):  100  mg  Oral  Every 12 Hours    9. Insulin Glargine (Lantus) Injectable:  10  unit(s)  SubCutaneous  Every 24 Hours    10. Insulin Lispro Moderate Corrective Scale:  unit(s)  SubCutaneous  Every 4 Hours    11. Lidocaine 4% TransDermal:  1  patch  TransDermal  Every 24 Hours    12. oxyCODONE Immediate Release:  5  mg  Oral  Every 4 Hours    13. Pantoprazole:  40  mg  Oral  Daily    14. Piperacillin - Tazobactam 2.25 grams/Iso-osmotic 50 mL Premix IVPB:  50  mL  IntraVenous Piggyback  Every 6 Hours    15. predniSONE:  5  mg  Oral  Every 24 Hours    16. Rosuvastatin:  40  mg  Oral  Every Night    17. Sodium Chloride 0.9% Injectable Flush:  10  mL  IntraVenous Flush  Every 12 Hours    18. Tacrolimus:  3  mg  Oral  <User Schedule>    19. Tacrolimus:  4  mg  Oral  <User Schedule>         PRN Medications       --------------------------------    1. Albuterol 2.5 mg - Ipratropium 0.5 mg/ 3 mL Neb Soln:  3  mL  Inhalation  Every 6 Hours    2. Dextrose 50% in Water Injectable:  25  gram(s)  IntraVenous Push  Every 15 Minutes    3. Glucagon Injectable:  1  mg  IntraMuscular  Every 15 Minutes    4. Heparin Flush 10 unit/ mL PF Injectable:  5  mL  IntraVenous Flush  Every 12 Hours    5. Heparin Flush 10 unit/ mL PF Injectable PRN:  5  mL  IntraVenous Flush  According to Flush Policy    6.  Labetalol Injectable:  20  mg  IntraVenous Push  Once    7. Naloxone Injectable:  0.2  mg  IntraVenous Push  Once    8. Ondansetron Injectable:  4  mg  IntraVenous Push  Every 6 Hours    9. Sodium Chloride 0.9% Injectable Flush:  10  mL  IntraVenous Flush  Every 8 Hours and as Needed    10. Sodium Chloride 0.9% Injectable Flush PRN:  10  mL  IntraVenous Flush  According to Flush Policy    11. Sodium Chloride 0.9% Injectable Flush PRN:  20  mL  IntraVenous Flush  According to Flush Policy         Conditional Medication Orders       --------------------------------    1. Perflutren Lipid Microsphere (Activated) 1.3 mL / NaCL 0.9% T.V. 10 mL Injectable:  0.5  mL  IntraVenous Push  Once      Recent Lab Results:    Results:    CBC: 5/23/2023 06:49              \     Hgb     /                              \     6.9 L    /  WBC  ----------------  Plt               10.9       ----------------    196              /     Hct     \                              /     21.5 L    \            RBC: 2.33 L    MCV: 92           CMP: 5/23/2023 06:49  NA+        Cl-     BUN  /                         146 H   112 H    51 H /  --------------------------------  Glucose                ---------------------------  139 H    K+     HCO3-   Creat \                         4.6    26    2.94 H \           \  T Bili  /                    \  0.7  /  AST  x ---- x ALT        52 Hx ---- x 77 H         /  Alk P   \               /  72  \  Calcium : 8.0 L    Anion Gap : 13     Albumin : 2.9 L     T Protein : 5.0 L          Assessment and Plan:   Comorbidities:  ·  Comorbidity Other     Code Status:  ·  Code Status Full Code     Assessment:    68 yo M s/p paulette with Dr. Johnson for pancreatic adenocarcinoma Patient has a hx of heart transplant. Did have an episode of possible 5-10 second PEA arrest during  operation, regular rhythm resumed with epi. In ICU post-op for close monitoring given cardiac history. VSS, afebrile.    PLAN:  Neuro - r/s  PCA, continue oxy, tylenol, gabapentin, home Celexa, delirium precautions  CV - tacro management per transplant team, pred 5, rosuvastatin, hydralazine 100 BID, fenofibrate, cards transplant team following, appreciate recs, echo pending; midline for access for tacrolimus  Resp - OOB, encourage IS, wean O2 as tolerated  GI - advancing to unlimited CLD, PPI  /Renal - mIVF @ 50/hr, strict I/Os, appreciate nephrology recs, 40 IV lasix  Heme - no acute transfusion needs  ID - Zosyn x 5 days (end date 5/14)  Endo - SSI, home allopurinol, add lantus 10u  Prophy - SCDs, Lovenox, Incentive Spirometer  Dispo - RNF    Discussed with attending physician Dr. Johnson.   Izabela Bowden MD HILARY PGY1  Surgical Oncology/[facility] pager [phone]      Attestation:   Note Completion:  I am a:  Resident/Fellow   Attending Attestation I saw and evaluated the patient.  I personally obtained the key and critical portions of the history and physical exam or was physically present for key and  critical portions performed by the resident/fellow. I reviewed the resident/fellow?s documentation and discussed the patient with the resident/fellow.  I agree with the resident/fellow?s medical decision making as documented in the note.     I personally evaluated the patient on 24-May-2023         Electronic Signatures:  Izabela Bowden (Resident))  (Signed 24-May-2023 17:58)   Authored: Service, Subjective Data, Objective Data, Assessment  and Plan, Note Completion  Luis Armando Johnson)  (Signed 26-May-2023 16:48)   Authored: Note Completion   Co-Signer: Service, Subjective Data, Objective Data, Assessment and Plan, Note Completion      Last Updated: 26-May-2023 16:48 by Luis Armando Johnson)

## 2023-09-30 NOTE — H&P
History of Present Illness:   HPI:    ABRIL PÉREZ is a 67 year old Male with history of heart transplant in 2017 and whipple for pancreatic cancer on 5/19/2023 who presents as a trasnfer from  [facility] for SBO. Patient states he was in his normal state of health until 3am when he began experiencing exruciating abdominal pain. Patient denies chest pain or shortness of breath, but states that he has been nauseous and has had a lot of emesis ever  since starting chemo. Denies fever or changes in urination. Has not passed flatus since yesterday.     PMH: CKD, IDDM2, hyperthyroidism  PSH: Whipple, cardiac tranplantation 2017  Meds: iron, citalopram, rosuvastatin, gabapentin, pantoprazole, humalog, allopurinol, calcium, asa 81, fenofibrate, hydralazine, tacrolimus, cholecalciferol, cyclobenzaprine, glargine, amlodipine, prednisone, zenpep    Comorbidities:   Comorbidites:  ·  Comorbid Conditions chronic kidney disease, diabetes   ·  Chronic Kidney Disease diabetic   ·  CKD Stage unable to determine   ·  Diabetes Type Type 2   ·  Insulin Dependent yes   ·  DM Acuity or Status controlled   ·  DM Complications unknown            Allergies:  ·  morphine : Itching    Medications Prior to Admission:   Admission Medication Reconciliation has not been completed for this patient.    Objective:     Objective Information:      T   P  R  BP   MAP  SpO2   Value  36  78  18  109/73   84  97%  Date/Time 9/23 14:54 9/23 16:58 9/23 16:58 9/23 16:58  9/23 16:58 9/23 16:58  Range  (36C - 36C )  (78 - 86 )  (16 - 18 )  (108 - 113 )/ (73 - 81 )  (84 - 90 )  (96% - 98% )      Pain reported at 9/23 16:30: sleeping    Physical Exam by System:    Constitutional: awake, alert, uncomfortable appearing   Eyes: EOMI, no scleral icterus   ENMT: mucous membranes moist   Respiratory/Thorax: nonlabored breathing on room  air, no conversational dyspnea, symmetric chest rise   Cardiovascular: regular rate and rhythm on monitor   Gastrointestinal:  abdomen soft, moderately distended  minimally tender in the epigastrium, ngt in place draining thick light brown contents, midline incision well healed   Musculoskeletal: moving all extremities   Extremities: no peripheral edema   Neurological: motor and sensation grossly intact   Psychological: calm mood with congruent behavior   Skin: no rashes or lesions noted     Recent Lab Results:    Results:    CBC: 9/23/2023 08:30              \     Hgb     /                              \     10.3 L    /  WBC  ----------------  Plt               9.3       ----------------    238              /     Hct     \                              /     30.3 L    \            RBC: 3.50 L    MCV: 87     Neutrophil %: 97.2      CMP: 9/23/2023 08:30  NA+        Cl-     BUN  /                         134 L   98    33 H  /  --------------------------------  Glucose                ---------------------------  160 H    K+     HCO3-   Creat \                         3.7    25    2.38 H \           \  T Bili  /                    \  0.6  /  AST  x ---- x ALT        138 Hx ---- x 69 H         /  Alk P   \               /  108  \  Calcium : 8.6     Anion Gap : 15     Albumin : 3.5     T Protein : 5.7 L          Radiology Results:    Results:        Impression:     NG tube present with tip in mid stomach.        Signed by Haroldo James MD     Xray Abdomen AP View [Sep 23 2023  2:19PM]      Impression:    Findings consistent with mid and distal multifocal at least partial  small bowel obstruction, most likely from adhesions.     Previous Whipple procedure. The previous stent in the pancreatic  remnant is now in a loop of jejunum in the mid right abdomen.     Eventration of the right diaphragm. Stable scarring at the lung  bases, right greater than left, and along the right minor fissure.     Cardiomegaly.     Very mild stable nonspecific retroperitoneal adenopathy, most likely  reactive.     Nonspecific edematous changes in the mesenteric  fat of the abdomen  and upper pelvis, right greater than left. Small amount of  nonspecific perihepatic ascites.     MACRO:  None     CT Abdomen and Pelvis with IV Contrast [Sep 23 2023 10:41AM]      Assessment and Plan:   Assessment:    ABRIL PÉREZ is a 67 year old Male with history of heart transplant in 2017 and whipple for pancreatic cancer on 5/19/2023 who presents as a transfer from  [facility] for SBO. Patient presentation and imaging consistent with SBO.     Plan:  - admit to surg onc  - npo  - ngt  - heart failure consult given cardiac transplantation  - mivf    Patient discussed with Dr. Hoehn Mackenzie Simerlink, MD   General Surgery PGY3  [facility] Surgical Oncology Service  p [phone]      Attestation:   Note Completion:  I am a:  Resident/Fellow   Attending Attestation I saw and evaluated the patient.  I personally obtained the key and critical portions of the history and physical exam or was physically present for key and  critical portions performed by the resident/fellow. I reviewed the resident/fellow?s documentation and discussed the patient with the resident/fellow.  I agree with the resident/fellow?s medical decision making as documented in the note.     I personally evaluated the patient on 23-Sep-2023         Electronic Signatures:  Yunier Sarkar)  (Signed 24-Sep-2023 18:35)   Authored: Note Completion   Co-Signer: History of Present Illness, Comorbidities, Allergies, Medications Prior to Admission, Objective, Assessment and Plan, Note Completion  Simerlink, Mackenzie (MD (Resident))  (Signed 23-Sep-2023 17:51)   Authored: History of Present Illness, Comorbidities,  Allergies, Medications Prior to Admission, Objective, Assessment and Plan, Note Completion      Last Updated: 24-Sep-2023 18:35 by Yunier Sarkar)

## 2023-09-30 NOTE — PROGRESS NOTES
Service: Heart Failure     Subjective Data:   ABRIL PÉREZ is a 67 year old Male who is Hospital Day # 12 and POD #11 for Whipple procedure (pylorus procedure).     patient ambulating well.   pain significantly improved.   regualr BMs.   no TAC level drawn this AM,    Objective Data:     Objective Information:      T   P  R  BP   MAP  SpO2   Value  36.5  85  18  157/84   100  94%  Date/Time 5/30 12:24 5/30 12:24 5/30 12:24 5/30 12:24  5/29 12:02 5/30 12:24  Range  (36.1C - 36.8C )  (71 - 93 )  (15 - 19 )  (124 - 167 )/ (67 - 84 )  (100 - 108 )  (91% - 96% )      Pain reported at 5/30 8:50: 3 = Mild    ---- Intake and Output  -----  Mn/Dy/Year Time  Intake   Output  Net  May 30, 2023 2:00 pm  0   400  -400  May 30, 2023 6:00 am  0   300  -300    The Intake and Output Totals for the last 24 hours are:      Intake   Output  Net      null   335  null    Physical Exam Narrative:  ·  Physical Exam:    Gen: comfortably laying in hospital bed. No acute distress  HEENT: trachea midline. no JVD  CV: S1 S2 heard no murmur appreciated  Pulm: good air movement.  Abd: soft,mild-distended. drains in place/w/ minimal to no putput  Ext: warm and well-perfused no edema.   Psych: appropriate affect  Neuro: CN II-XII grossly intact     Medication:    Medications:          Continuous Medications       --------------------------------  No continuous medications are active       Scheduled Medications       --------------------------------    1. Acetaminophen:  650  mg  Oral  Every 6 Hours    2. Allopurinol:  100  mg  Oral  Every 24 Hours    3. Aspirin Enteric Coated:  81  mg  Oral  Daily    4. Calcitriol:  0.5  microgram(s)  Oral  Daily    5. Calcium 500 mg - Vitamin D 200 Units:  1  tablet(s)  Oral  2 Times a Day    6. Citalopram (CELEXA):  40  mg  Oral  Daily    7. Cyclobenzaprine:  5  mg  Oral  3 Times a Day    8. Fenofibrate:  160  mg  Oral  Daily    9. Gabapentin:  200  mg  Oral  At Bedtime    10. Heparin SubCutaneous:   5000  unit(s)  SubCutaneous  Every 8 Hours    11. hydrALAZINE (APRESOLINE):  100  mg  Oral  Every 8 Hours    12. Insulin Glargine (Lantus) Injectable:  10  unit(s)  SubCutaneous  Every 24 Hours    13. Insulin Lispro Moderate Corrective Scale:  unit(s)  SubCutaneous  Every 4 Hours    14. Isosorbide Dinitrate:  40  mg  Oral  3 Times a Day    15. Lidocaine 4% TransDermal:  1  patch  TransDermal  Every 24 Hours    16. Pantoprazole:  40  mg  Oral  Daily    17. predniSONE:  5  mg  Oral  Every 24 Hours    18. Rosuvastatin:  40  mg  Oral  Every Night    19. Sodium Chloride 0.9% Injectable Flush:  10  mL  IntraVenous Flush  Every 12 Hours    20. Tacrolimus:  3  mg  Oral  <User Schedule>    21. Tacrolimus:  4  mg  Oral  <User Schedule>         PRN Medications       --------------------------------    1. Albuterol 2.5 mg - Ipratropium 0.5 mg/ 3 mL Neb Soln:  3  mL  Inhalation  Every 6 Hours    2. Dextrose 50% in Water Injectable:  25  gram(s)  IntraVenous Push  Every 15 Minutes    3. Glucagon Injectable:  1  mg  IntraMuscular  Every 15 Minutes    4. Heparin Flush 10 unit/ mL PF Injectable:  5  mL  IntraVenous Flush  Every 12 Hours    5. Heparin Flush 10 unit/ mL PF Injectable PRN:  5  mL  IntraVenous Flush  According to Flush Policy    6. Naloxone Injectable:  0.2  mg  IntraVenous Push  Once    7. Ondansetron Injectable:  4  mg  IntraVenous Push  Every 6 Hours    8. Sodium Chloride 0.9% Injectable Flush:  10  mL  IntraVenous Flush  Every 8 Hours and as Needed    9. Sodium Chloride 0.9% Injectable Flush PRN:  10  mL  IntraVenous Flush  According to Flush Policy    10. Sodium Chloride 0.9% Injectable Flush PRN:  20  mL  IntraVenous Flush  According to Flush Policy        Recent Lab Results:    Results:    CBC: 5/30/2023 06:39              \     Hgb     /                              \     8.2 L    /  WBC  ----------------  Plt               7.2       ----------------    277              /     Hct     \                               /     24.7 L    \            RBC: 2.80 L    MCV: 88           CMP: 5/30/2023 06:39  NA+        Cl-     BUN  /                         142    106    29 H /  --------------------------------  Glucose                ---------------------------  163 H    K+     HCO3-   Creat \                         4.2    26    1.99 H \           \  T Bili  /                    \  0.7  /  AST  x ---- x ALT        24 x ---- x 19         /  Alk P   \               /  60  \  Calcium : 8.8     Anion Gap : 14     Albumin : 2.8 L    T Protein : 4.8 L           Radiology Results:    Results:        Conclusion:  CONCLUSIONS:  1. Left ventricular systolic function is low normal with a 50-55% estimated ejection fraction.  2. Poorly visualized anatomical structures due to suboptimal image quality.  3. Abnormal septal motion consistent with post-operative status.  4. Left ventricular cavity size is moderately dilated.  5. Unable to determine right ventricular systolic function.  6. There is mild dilatation of the ascending aorta and aortic root.    QUANTITATIVE DATA SUMMARY:  2D MEASUREMENTS:  Normal Ranges:  Ao Root d:     4.08 cm    (2.0-3.7cm)  LAs:           4.90 cm    (2.7-4.0cm)  IVSd:          1.10 cm    (0.6-1.1cm)  LVPWd:        1.20 cm    (0.6-1.1cm)  LVIDd:         5.60 cm    (3.9-5.9cm)  LVIDs:         5.10 cm  LV Mass Index: 131.3 g/m2  LV % FS        8.9 %    LA VOLUME:  Normal Ranges:  LA Vol A4C:        192.3 ml   (22+/-6mL/m2)  LA Vol A2C:        93.9 ml  LA Vol BP:         137.4 ml  LA Vol Index A4C:  95.4ml/m2  LA Vol Index A2C:  46.5 ml/m2  LA Vol Index BP:   68.2 ml/m2  LA Area A4C:       41.0 cm2  LA Area A2C:       28.0 cm2  LA Major Axis A4C: 7.4 cm  LA Major Axis A2C: 7.1 cm  LA Volume Index:   68.2 ml/m2    RA VOLUME BY A/L METHOD:  Normal Ranges:  RA Vol A4C:        47.7 ml    (8.3-19.5ml)  RA Vol Index A4C:  23.7 ml/m2  RA Area A4C:       17.4 cm2  RA Major Axis A4C: 5.4 cm    AORTA MEASUREMENTS:  Normal  Ranges:  Ao Sinus, d: 4.10 cm (2.1-3.5cm)  Asc Ao, d:   4.00 cm (2.1-3.4cm)    LV SYSTOLIC FUNCTION BY 2D PLANIMETRY (MOD):  Normal Ranges:  EF-A4C View: 53.4 % (>=55%)  EF-A2C View: 51.7 %  EF-Biplane:  50.3 %    LV DIASTOLIC FUNCTION:  Normal Ranges:  MV Peak E:    1.47 m/s    (0.7-1.2 m/s)  MV Peak A:    0.71 m/s    (0.42-0.7 m/s)  E/A Ratio:    2.08        (1.0-2.2)  MV e'         0.08 m/s    (>8.0)  MV lateral e' 0.09 m/s  MV medial e'  0.08 m/s  MV A Dur:     114.00 msec  E/e' Ratio:   17.29       (<8.0)  MV DT:        141 msec    (150-240 msec)    MITRAL VALVE:  Normal Ranges:  MV Vmax:    1.71 m/s  (<=1.3m/s)  MV peak P.7 mmHg (<5mmHg)  MV mean P.0 mmHg  (<2mmHg)  MV VTI:     35.60 cm  (10-13cm)  MV DT:      141 msec  (150-240msec)  MV PHT:     60 msec   (30-60msec)  MVA by PHT: 3.69 cm2  (4-6cm2)    MITRAL INSUFFICIENCY:  Normal Ranges:  PISA Radius:  0.3 cm  MR VTI:       162.00 cm  MR Vmax:      502.00 cm/s  MR Alias Severo: 38.5 cm/s  MR Volume:    7.03 ml  MR Flow Rt:   21.77 ml/s  MR EROA:      0.04 cm2    AORTIC VALVE:  Normal Ranges:  AoV Vmax:      1.42 m/s (<=1.7m/s)  AoV Peak P.1 mmHg (<20mmHg)  LVOT Max Severo:  1.06 m/s (<=1.1m/s)  LVOT VTI:      19.60 cm  LVOT Diameter: 2.20 cm  (1.8-2.4cm)  AoV Area,Vmax: 2.84 cm2 (2.5-4.5cm2)    RIGHT VENTRICLE:  RV 1   4.59 cm  RV 2   3.59 cm  RV 3   7.63 cm  TAPSE: 14.8 mm  RV s'  0.11 m/s    PULMONIC VALVE:  Normal Ranges:  PV Accel Time: 108 msec (>120ms)  PV Max Severo:    1.0 m/s  (0.6-0.9m/s)  PV Max PG:     3.6 mmHg      [id number] Corina Martinez MD  Electronically signed on 2023 at 3:01:34 PM        *** Final ***     Echocardiogram [May 30 2023  3:01PM]      Impression:    1.  Postsurgical changes of a Whipple procedure and drain placement  with multiple fluid collections as described above within the upper  abdomen in the region of the surgical site. Sterility of these fluid  collections is unable to be assessed. No bowel dilatation to  suggest  obstruction at the anastomotic sites.  2. Small right pleural effusion.  3. Patulous distal esophagus mild circumferential wall thickening,  correlate with concern for gastroesophageal reflux.  4. Air is present within the urinary bladder, which does not  demonstrate wall thickening. Correlate with recent instrumentation  and urinalysis with concern for cystitis.  5. Other findings as above.      CT Abdomen and Pelvis without Contrast [May 28 2023 12:15PM]      Assessment and Plan:   Daily Risk Screen:  ·  Does patient have an indwelling urinary catheter? n/a consulting service   ·  Does patient have a central line? 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   #recurrent Obstructive pancreatitis   -Recurrent admissions w/ pancreatitis (1/2023 and early 5/2023)   -Labs last admission suggestive of cholestatic pattern of liver injury, worsening LFTs/elevated lipase.   -RUQ US 3/2023 with body and tail of Pancreas obscured by gas. No gallstones visible   -RUQ US 4/28/23 showing dilation of CBD and intrahepatic bile ducts, no cholecystitis  -MRI pancreas showing high grade stricture of inferior and common bile ducts, and possible pancreatic mass  -ERCP EUS done which showed a 2.8 cm pancreatic mass in the pancreatic head s/p stent was placed to improve biliary drainage.   -Surgical oncology :EUS FNB:  adenocarinoma.   -s/p Whipple surgery/ laparoscopic 5/19  - CT A/P 5/28: several fluid collections, noncharacterizable on Imaging in the upper abdomen, w/ post surgical changes and no obstruction.   - BM+/regular   - off opioids, APAP as needed.   - PT/OT and OOB     # NICM 2/2 to valvular disease s/p bicaval OHT (3/24/17)  # s/p mitral valve repair w/[employer] AnnuloFlex band #36mm w/induction)   # HTN  # post Op hypervolemia   - Post transplant issues/complications: atrial arrhythmias, volume overload, right ventricular failure, acute on chronic kidney disease  requiring RRT, chronotropic incompetence, and psychosis  -Cath with mild CAD, no CAV (3/2019). Echo with mild allograft dysfunction (LVEF 45-50%); similar to prior imaging  -stress test 2022: negative.   -TTE 5/2- showed LVEF low normal 50-55%, no wall motion abnormality NRVEF, mild MR LVIDD 5.3 cm   -Lipids (7/2022): tChol 127, HDL 47, LDL 61, Trigs 97,  -.   - repeat TTE 5/30/2023: LVEF 50-55%, no RWMA, NRVEF LVIDD 5.6cm.     #Immunosuppression  -c/w tacrolimus 4 mg/3 at home (target 5-8) /CKD FK (not drawn).   -Myfortic stopped in lieu of pancreatic CA diagnosis.  -prednisone 5 mg daily   -per previous discussion, patient does not want to transition to sirolimus despite CKD    #Anti infectives:  [CMV -/-; Toxo (-)]   none  post op abx: zosyn/completed.     #other :  -C/w home rosuvastatin   -c/w home fenofibrate   -c/w hydralazine 100 mg TID   -c/w ISDN 40 mg TID   -Continue fish oil 1 gram bid  -C/w Rafat/vitamin D and MVT  -c/w ASA    #Alisha on CKD stage IIIB/post op ?ATN vs cardiorenal   - Cr 3.2 peak   - Crea AM 2.16 >1.99   - BL crea 2.0-2.5.   - s/p multiple IV lasix doses+ albumin for hypervolemia.   - volume status much improved.   Nephrology following.     # Anemia/blood loss anemia likely post op   hgb down to 6.9 s/p 1 unit PRBC (post op), w/ appropriate rise  slow downtrending hgb, no overt bleeding concerns.   s/p 1 unit 5/29  >>hgb 8.2.     #T2DM  - At home 22 units lantus + lispro SSI  - Mild SSI  - reduced dose lantus 10 units +mild SSI    #Depression/anxiety  -c/w citalopram, gabapentin(renal dosed)    #Gout  -allopurinol    #GERD/esophagitis  -continue home PPI    CODE STATUS: FULL CODE   NOK: Benny Goodman [phone]    Plan for discharge in the next 24 hrs    This case was seen and discussed with Dr. Guillermo.     Attestation:   Note Completion:  I am a:  Resident/Fellow   Attending Attestation I saw and evaluated the patient.  I personally obtained the key and critical portions of the history and physical exam or was physically present for key and  critical portions performed by the resident/fellow. I reviewed the resident/fellow?s documentation and discussed the patient with the resident/fellow.  I agree with the resident/fellow?s medical decision making as documented in the note.     I personally evaluated the patient on 30-May-2023         Electronic Signatures:  Tahira Pedro (Fellow))  (Signed 30-May-2023 15:49)   Authored: Service, Subjective Data, Objective Data, Assessment  and Plan, Note Completion  Santhosh Guillermo)  (Signed 30-May-2023 16:21)   Authored: Note Completion   Co-Signer: Service, Subjective Data, Objective Data, Assessment and Plan, Note Completion      Last Updated: 30-May-2023 16:21 by Santhosh Guillermo)

## 2023-09-30 NOTE — PROGRESS NOTES
Service: Surgical Oncology     Subjective Data:   ABRIL PÉREZ is a 67 year old Male who is Hospital Day # 8 and POD #7 for Whipple procedure (pylorus procedure).    Overnight Events: Patient had an uneventful night.   Additional Information:    No acute events overnight  on room air throughout the night  feeling better  passing gas, having bowel movements    Objective Data:     Objective Information:      T   P  R  BP   MAP  SpO2   Value  36.4  83  18  155/74      94%  Date/Time 5/26 12:14 5/26 12:14 5/26 12:14 5/26 12:14    5/26 12:14  Range  (36C - 37C )  (69 - 88 )  (18 - 20 )  (155 - 195 )/ (68 - 92 )    (92% - 97% )   As of 26-May-2023 06:22:00, patient is on 2 L/min of oxygen via room air.  Highest temp of 37 C was recorded at 5/26 6:22      Pain reported at 5/26 9:00: 3 = Mild    ---- Intake and Output  -----  Mn/Dy/Year Time  Intake   Output  Net  May 26, 2023 2:00 pm  0   170  -170  May 26, 2023 6:00 am  0   580  -580  May 25, 2023 10:00 pm  0   10  -10    The Intake and Output Totals for the last 24 hours are:      Intake   Output  Net      270   590  -320    Physical Exam Narrative:  ·  Physical Exam:    Neurological: Awake, alert, visibly uncomfortable, oriented x 3   Cardiovascular: RRR  Head/Neck: NCAT  Respiratory/Thorax: even, shallow breaths, mildly improving, well healed prior sternotomy scar  Genitourinary: Portillo not in place  Gastrointestinal: abdomen soft, appropriately tender to palpation, baseline distension. Incision c/d/i without bleeding. scant drain output bilateral lower quadrant drains.  Skin: warm, dry  Musculoskeletal: BAILEY  Eyes: non-icteric  Extremities: BAILEY x 3  Psychological: lethargic      Medication:    Medications:          Continuous Medications       --------------------------------  No continuous medications are active       Scheduled Medications       --------------------------------    1. Acetaminophen:  650  mg  Oral  Every 6 Hours    2. Allopurinol:  100   mg  Oral  Every 24 Hours    3. Citalopram (CELEXA):  40  mg  Oral  Daily    4. Cyclobenzaprine:  5  mg  Oral  3 Times a Day    5. Fenofibrate:  160  mg  Oral  Daily    6. Gabapentin:  200  mg  Oral  At Bedtime    7. Heparin SubCutaneous:  5000  unit(s)  SubCutaneous  Every 8 Hours    8. hydrALAZINE (APRESOLINE):  100  mg  Oral  Every 12 Hours    9. Insulin Glargine (Lantus) Injectable:  10  unit(s)  SubCutaneous  Every 24 Hours    10. Insulin Lispro Moderate Corrective Scale:  unit(s)  SubCutaneous  Every 4 Hours    11. Isosorbide Dinitrate:  40  mg  Oral  3 Times a Day    12. Lidocaine 4% TransDermal:  1  patch  TransDermal  Every 24 Hours    13. Pantoprazole:  40  mg  Oral  Daily    14. Piperacillin - Tazobactam 2.25 grams/Iso-osmotic 50 mL Premix IVPB:  50  mL  IntraVenous Piggyback  Every 6 Hours    15. predniSONE:  5  mg  Oral  Every 24 Hours    16. Rosuvastatin:  40  mg  Oral  Every Night    17. Sodium Chloride 0.9% Injectable Flush:  10  mL  IntraVenous Flush  Every 12 Hours    18. Tacrolimus:  3  mg  Oral  <User Schedule>    19. Tacrolimus:  4  mg  Oral  <User Schedule>         PRN Medications       --------------------------------    1. Albuterol 2.5 mg - Ipratropium 0.5 mg/ 3 mL Neb Soln:  3  mL  Inhalation  Every 6 Hours    2. Dextrose 50% in Water Injectable:  25  gram(s)  IntraVenous Push  Every 15 Minutes    3. Glucagon Injectable:  1  mg  IntraMuscular  Every 15 Minutes    4. Heparin Flush 10 unit/ mL PF Injectable:  5  mL  IntraVenous Flush  Every 12 Hours    5. Heparin Flush 10 unit/ mL PF Injectable PRN:  5  mL  IntraVenous Flush  According to Flush Policy    6. HYDROmorphone:  2  mg  Oral  Every 4 Hours    7. HYDROmorphone:  4  mg  Oral  Every 4 Hours    8. HYDROmorphone Injectable:  0.4  mg  IntraVenous Push  Every 4 Hours    9. Naloxone Injectable:  0.2  mg  IntraVenous Push  Once    10. Ondansetron Injectable:  4  mg  IntraVenous Push  Every 6 Hours    11. Sodium Chloride 0.9% Injectable Flush:   10  mL  IntraVenous Flush  Every 8 Hours and as Needed    12. Sodium Chloride 0.9% Injectable Flush PRN:  10  mL  IntraVenous Flush  According to Flush Policy    13. Sodium Chloride 0.9% Injectable Flush PRN:  20  mL  IntraVenous Flush  According to Flush Policy         Conditional Medication Orders       --------------------------------    1. Perflutren Lipid Microsphere (Activated) 1.3 mL / NaCL 0.9% T.V. 10 mL Injectable:  0.5  mL  IntraVenous Push  Once      Recent Lab Results:    Results:    CBC: 5/26/2023 10:15              \     Hgb     /                              \     8.2 L    /  WBC  ----------------  Plt               10.4       ----------------    228              /     Hct     \                              /     24.9 L    \            RBC: 2.76 L    MCV: 90           CMP: 5/26/2023 10:15  NA+        Cl-     BUN  /                         148 H   111 H    46 H /  --------------------------------  Glucose                ---------------------------  115 H    K+     HCO3-   Creat \                         4.2    25    2.57 H \           \  T Bili  /                    \  0.9  /  AST  x ---- x ALT        30 x ---- x 39         /  Alk P   \               /  85  \  Calcium : 8.7     Anion Gap : 16     Albumin : 3.2 L    T Protein : 5.6 L           Coagulation: 5/26/2023 05:18  PT  /                    13.4  /  -------<    INR          ----------<      1.2 H  PTT\                              \                       Assessment and Plan:   Comorbidities:  ·  Comorbidity Other     Code Status:  ·  Code Status Full Code     Assessment:    68 yo M s/p paulette with Dr. Johnson for pancreatic adenocarcinoma Patient has a hx of heart transplant. Did have an episode of possible 5-10 second PEA arrest during  operation, regular rhythm resumed with epi. In ICU post-op for close monitoring given cardiac history. VSS, afebrile.    PLAN:  Neuro - PO pain meds - hydromorphone per HF team, tylenol, gabapentin,  home Celexa,  delirium precautions  CV - tacro management per transplant team, pred 5, rosuvastatin, hydralazine 100 BID, increase isosorbide mononitrate per HF team; fenofibrate,  cards transplant team following, appreciate recs, echo pending; midline for access for tacrolimus  Resp - OOB, encourage IS, wean O2 as tolerated  GI - full liquid diet, PPI  /Renal - strict I/Os, appreciate nephrology recs  Heme - no acute transfusion needs  ID - Zosyn x 5 days (end date 5/24)  Endo - SSI, home allopurinol, lantus 10u  Prophy - SCDs, Lovenox, Incentive Spirometer  Dispo - RNF    Discussed with attending physician Dr. Johnson.   Izabela Bowden MD [facility] PGY1  Surgical Oncology/uck pager [phone]      Attestation:   Note Completion:  I am a:  Resident/Fellow   Attending Attestation I saw and evaluated the patient.  I personally obtained the key and critical portions of the history and physical exam or was physically present for key and  critical portions performed by the resident/fellow. I reviewed the resident/fellow?s documentation and discussed the patient with the resident/fellow.  I agree with the resident/fellow?s medical decision making as documented in the note.     I personally evaluated the patient on 26-May-2023         Electronic Signatures:  Izabela Bowden (Resident))  (Signed 26-May-2023 16:02)   Authored: Service, Subjective Data, Objective Data, Assessment  and Plan, Note Completion  Luis Armando Johnson)  (Signed 26-May-2023 17:00)   Authored: Note Completion   Co-Signer: Service, Subjective Data, Objective Data, Assessment and Plan, Note Completion      Last Updated: 26-May-2023 17:00 by Luis Armando Johnson)

## 2023-09-30 NOTE — PROGRESS NOTES
Service: Heart Failure     Subjective Data:   ABRIL PÉREZ is a 67 year old Male who is Hospital Day # 10 and POD #9 for Whipple procedure (pylorus procedure).     Patient still has abdominal pain   ROS is unremarkable.    Objective Data:     Objective Information:      T   P  R  BP   MAP  SpO2   Value  36.4  83  18  169/76      96%  Date/Time 5/28 8:57 5/28 8:57 5/28 8:57 5/28 8:57    5/28 8:57  Range  (36.1C - 37.2C )  (79 - 89 )  (18 - 20 )  (135 - 179 )/ (67 - 95 )    (92% - 96% )  Highest temp of 37.2 C was recorded at 5/28 5:44      Pain reported at 5/28 9:11: 2 = Mild    ---- Intake and Output  -----  Mn/Dy/Year Time  Intake   Output  Net  May 28, 2023 6:00 am  0   70  -70  May 27, 2023 10:00 pm  0   515  -515  May 27, 2023 2:00 pm  0   325  -325    The Intake and Output Totals for the last 24 hours are:      Intake   Output  Net      null   910  null    Physical Exam Narrative:  ·  Physical Exam:    Gen: comfortably laying in hospital bed. No acute distress  HEENT: trachea midline.  CV: S1 S2 heard no murmur  Pulm: good air movement. Crackles b/l   Abd: soft, tender s/p surgery   Ext: warm and well-perfused, b/l edema upper and lower  Neuro: CN II-XII grossly intact     Recent Lab Results:    Results:    CBC: 5/28/2023 05:53              \     Hgb     /                              \     7.4 L    /  WBC  ----------------  Plt               7.1       ----------------    235              /     Hct     \                              /     22.1 L    \            RBC: 2.50 L    MCV: 88           CMP: 5/28/2023 05:53  NA+        Cl-     BUN  /                         145    110 H   38 H  /  --------------------------------  Glucose                ---------------------------  153 H    K+     HCO3-   Creat \                         4.1    23    2.21 H \           \  T Bili  /                    \  0.7  /  AST  x ---- x ALT        25 x ---- x 26         /  Alk P   \               /  68   \  Calcium : 8.3 L    Anion Gap : 16     Albumin : 2.8 L     T Protein : 4.8 L          Coagulation: 5/28/2023 05:53  PT  /                    13.9 H /  -------<    INR          ----------<      1.2 H  PTT\                    24 L \                       Radiology Results:    Results:    Xray Chest 1 View [May 25 2023 11:46AM]      Assessment and Plan:   Daily Risk Screen:  ·  Does patient have an indwelling urinary catheter? n/a consulting service   ·  Does patient have a central line? 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   #recurrent Obstructive pancreatitis   -Recurrent admissions w/ pancreatitis (1/2023 and early 5/2023)   -Labs last admission suggestive of cholestatic pattern of liver injury, worsening LFTs/elevated lipase.   -RUQ US 3/2023 with body and tail of Pancreas obscured by gas. No gallstones visible   -RUQ US 4/28/23 showing dilation of CBD and intrahepatic bile ducts, no cholecystitis  -MRI pancreas showing high grade stricture of inferior and common bile ducts, and possible pancreatic mass  -ERCP EUS done which showed a 2.8 cm pancreatic mass in the pancreatic head s/p stent was placed to improve biliary drainage.   -Surgical oncology :EUS FNB: adenocarinoma.   -s/p Whipple surgery/ laparoscopic 5/19    - advanced diet, tolerating well   - BM++  - surgery following.   - pain control with  dilaudid as needed, off PCA pump  - PT/OT and OOB     # NICM 2/2 to valvular disease s/p bicaval OHT (3/24/17)  # s/p mitral valve repair w/[employer] AnnuloFlex band #36mm w/induction)   # HTN  - Post transplant issues/complications: atrial arrhythmias, volume overload, right ventricular failure, acute on chronic kidney disease  requiring RRT, chronotropic incompetence, and psychosis  -Cath with mild CAD, no CAV (3/2019). Echo with mild allograft dysfunction (LVEF 45-50%); similar to prior imaging  -stress test 2022: negative.   -TTE 5/2- showed LVEF low normal 50-55%, no wall motion abnormality NRVEF, mild MR LVIDD 5.3 cm   -Lipids (7/2022): tChol 127, HDL 47, LDL 61, Trigs 97,   .     #Immunosuppression  -c/w tacrolimus 4 mg/3 at home (target 5-8) /CKD FK 8.0 at goal no changes.   -prednisone 5 mg daily     #Antinfectives:  [CMV -/-; Toxo (-)]   none  post op abx: zosyn. x 5 days     #other :  -repeat ECHO/limited echo for graft function next week   -C/w home rosuvastatin   -c/w home fenofibrate   -c/w hydralazine to 100 mg TID   - c/w ISDN 40 mg TID   -Continue fish oil 1 gram bid  -continue ASA if okay with ACS team  -C/w Rafat/vitamin D and MVT    #Alisha on CKD stage IIIB/post op ?ATN vs cardiorenal   -Cr 3.2 peak   - Kidney function improving   Nephrology following.     # Anemia/blood loss anemia likely post op    #T2DM    #Depression/anxiety  -c/w citalopram, gabapentin(renal dosed)    #Gout  -allopurinol    #GERD/esophagitis  -continue home PPI    CODE STATUS: FULL CODE   NOK: Wife Krystle Goodman [phone]    This case was seen and discussed with Dr. Quintanilla. Formal attestation to follow.     Dylan Styles MD. HF Fellow.     Attestation:   Note Completion:  I am a:  Resident/Fellow   Attending Attestation I saw and evaluated the patient.  I personally obtained the key and critical portions of the history and physical exam or was physically present for key and  critical portions performed  by the resident/fellow. I reviewed the resident/fellow?s documentation and discussed the patient with the resident/fellow.  I agree with the resident/fellow?s medical decision making as documented in their note  with the exception/addition of the following:   I personally evaluated the patient on 28-May-2023   Comments/ Additional Findings    Improved pain control  today.  Sitting up in bed, in no apparent painful distress.  Lungs clear. Warm and euvolaemic.  Tacrolimus level pending today, will review once available.  Target FK 5-8.        Electronic Signatures:  Dylan Styles (Fellow))  (Signed 28-May-2023 10:05)   Authored: Service, Subjective Data, Objective Data, Assessment  and Plan, Note Completion  Maliha Quintanilla)  (Signed 28-May-2023 12:39)   Authored: Note Completion   Co-Signer: Service, Subjective Data, Objective Data, Assessment and Plan, Note Completion      Last Updated: 28-May-2023 12:39 by Maliha Quintanilla)

## 2023-09-30 NOTE — PROGRESS NOTES
Service: Heart Failure     Subjective Data:   ABRIL PÉREZ is a 67 year old Male who is Hospital Day # 11 and POD #10 for Whipple procedure (pylorus procedure).     no new complaints   was comfortably lying on the sofa.   BM this AM  no concerns for bloody stools/black stools  pain much better controlled.   hgb 7.1   CT A/P: several fluid collections in the upper abdomen, post surgical changes. no obstruction.   Crea 2.16.    Objective Data:     Objective Information:      T   P  R  BP   MAP  SpO2   Value  36.6  93  18  143/79   100  93%  Date/Time 5/29 12:02 5/29 12:02 5/29 12:02 5/29 12:02  5/29 12:02 5/29 12:02  Range  (36.3C - 37.2C )  (81 - 95 )  (15 - 19 )  (127 - 169 )/ (71 - 93 )  (100 - 108 )  (91% - 96% )  Highest temp of 37.2 C was recorded at 5/28 5:44      Pain reported at 5/29 12:36: 6 = Moderate    ---- Intake and Output  -----  Mn/Dy/Year Time  Intake   Output  Net  May 29, 2023 6:00 am  0   58  -58  May 28, 2023 10:00 pm  0   350  -350  May 28, 2023 2:00 pm  0   10  -10    The Intake and Output Totals for the last 24 hours are:      Intake   Output  Net      null   418  null    Physical Exam Narrative:  ·  Physical Exam:    Gen: comfortably laying in hospital bed. No acute distress  HEENT: trachea midline.  CV: S1 S2 heard no murmur  Pulm: good air movement. Crackles b/l   Abd: soft, tender s/p surgery   Ext: warm and well-perfused, b/l edema upper and lower  Neuro: CN II-XII grossly intact     Medication:    Medications:          Continuous Medications       --------------------------------  No continuous medications are active       Scheduled Medications       --------------------------------    1. Acetaminophen:  650  mg  Oral  Every 6 Hours    2. Allopurinol:  100  mg  Oral  Every 24 Hours    3. Calcitriol:  0.5  microgram(s)  Oral  Daily    4. Calcium 500 mg - Vitamin D 200 Units:  1  tablet(s)  Oral  2 Times a Day    5. Citalopram (CELEXA):  40  mg  Oral  Daily    6.  Cyclobenzaprine:  5  mg  Oral  3 Times a Day    7. Fenofibrate:  160  mg  Oral  Daily    8. Gabapentin:  200  mg  Oral  At Bedtime    9. Heparin SubCutaneous:  5000  unit(s)  SubCutaneous  Every 8 Hours    10. hydrALAZINE (APRESOLINE):  100  mg  Oral  Every 8 Hours    11. Insulin Glargine (Lantus) Injectable:  10  unit(s)  SubCutaneous  Every 24 Hours    12. Insulin Lispro Moderate Corrective Scale:  unit(s)  SubCutaneous  Every 4 Hours    13. Isosorbide Dinitrate:  40  mg  Oral  3 Times a Day    14. Lidocaine 4% TransDermal:  1  patch  TransDermal  Every 24 Hours    15. Pantoprazole:  40  mg  Oral  Daily    16. Piperacillin - Tazobactam 2.25 grams/Iso-osmotic 50 mL Premix IVPB:  50  mL  IntraVenous Piggyback  Every 6 Hours    17. predniSONE:  5  mg  Oral  Every 24 Hours    18. Rosuvastatin:  40  mg  Oral  Every Night    19. Sodium Chloride 0.9% Injectable Flush:  10  mL  IntraVenous Flush  Every 12 Hours    20. Tacrolimus:  3  mg  Oral  <User Schedule>    21. Tacrolimus:  4  mg  Oral  <User Schedule>         PRN Medications       --------------------------------    1. Albuterol 2.5 mg - Ipratropium 0.5 mg/ 3 mL Neb Soln:  3  mL  Inhalation  Every 6 Hours    2. Dextrose 50% in Water Injectable:  25  gram(s)  IntraVenous Push  Every 15 Minutes    3. Glucagon Injectable:  1  mg  IntraMuscular  Every 15 Minutes    4. Heparin Flush 10 unit/ mL PF Injectable:  5  mL  IntraVenous Flush  Every 12 Hours    5. Heparin Flush 10 unit/ mL PF Injectable PRN:  5  mL  IntraVenous Flush  According to Flush Policy    6. HYDROmorphone:  2  mg  Oral  Every 4 Hours    7. HYDROmorphone:  4  mg  Oral  Every 4 Hours    8. HYDROmorphone Injectable:  0.4  mg  IntraVenous Push  Every 4 Hours    9. Naloxone Injectable:  0.2  mg  IntraVenous Push  Once    10. Ondansetron Injectable:  4  mg  IntraVenous Push  Every 6 Hours    11. Sodium Chloride 0.9% Injectable Flush:  10  mL  IntraVenous Flush  Every 8 Hours and as Needed    12. Sodium  Chloride 0.9% Injectable Flush PRN:  10  mL  IntraVenous Flush  According to Flush Policy    13. Sodium Chloride 0.9% Injectable Flush PRN:  20  mL  IntraVenous Flush  According to Flush Policy         Conditional Medication Orders       --------------------------------    1. Perflutren Lipid Microsphere (Activated) 1.3 mL / NaCL 0.9% T.V. 10 mL Injectable:  0.5  mL  IntraVenous Push  Once      Recent Lab Results:    Results:    CBC: 5/29/2023 06:05              \     Hgb     /                              \     7.1 L    /  WBC  ----------------  Plt               7.3       ----------------    256              /     Hct     \                              /     21.1 L    \            RBC: 2.41 L    MCV: 88           CMP: 5/29/2023 06:05  NA+        Cl-     BUN  /                         142    108 H   33 H  /  --------------------------------  Glucose                ---------------------------  118 H    K+     HCO3-   Creat \                         4.0    25    2.16 H \           \  T Bili  /                    \  0.7  /  AST  x ---- x ALT        24 x ---- x 23         /  Alk P   \               /  63  \  Calcium : 8.6     Anion Gap : 13     Albumin : 2.8 L    T Protein : 4.8 L           Coagulation: 5/29/2023 06:05  PT  /                    14.4 H /  -------<    INR          ----------<      1.2 H  PTT\                    31  \                       Radiology Results:    Results:        Impression:    1.  Postsurgical changes of a Whipple procedure and drain placement  with multiple fluid collections as described above within the upper  abdomen in the region of the surgical site. Sterility of these fluid  collections is unable to be assessed. No bowel dilatation to suggest  obstruction at the anastomotic sites.  2. Small right pleural effusion.  3. Patulous distal esophagus mild circumferential wall thickening,  correlate with concern for gastroesophageal reflux.  4. Air is present within the urinary  bladder, which does not  demonstrate wall thickening. Correlate with recent instrumentation  and urinalysis with concern for cystitis.  5. Other findings as above.      CT Abdomen and Pelvis without Contrast [May 28 2023 12:15PM]      Conclusion:  CONCLUSIONS:  1. Poorly visualized anatomical structures due to suboptimal image quality.  2. Left ventricular systolic function is low normal with a 50% estimated ejection fraction.  3. Abnormal septal motion consistent with post-operative status.    QUANTITATIVE DATA SUMMARY:  2D MEASUREMENTS:  Normal Ranges:  Ao Root d:     3.55 cm   (2.0-3.7cm)  LAs:           6.00 cm   (2.7-4.0cm)  IVSd:          1.10 cm   (0.6-1.1cm)  LVPWd:         0.80 cm   (0.6-1.1cm)  LVIDd:         5.30 cm   (3.9-5.9cm)  LVIDs:         4.30 cm  LV Mass Index: 91.9 g/m2  LV % FS        18.9 %    LA VOLUME:  Normal Ranges:  LA Vol A4C:        148.3 ml  (22+/-6mL/m2)  LA Vol Index A4C:  72.8ml/m2  LA Area A4C:       34.6 cm2  LA Major Axis A4C: 6.9 cm    M-MODE MEASUREMENTS:  Normal Ranges:  Ao Root: 4.20 cm (2.0-3.7cm)  LAs:     5.90 cm (2.7-4.0cm)    AORTA MEASUREMENTS:  Normal Ranges:  Ao Sinus, d: 4.00 cm (2.1-3.5cm)  Asc Ao, d:   4.00 cm (2.1-3.4cm)  Ao Arch:     3.00 cm (2.0-3.6cm)    LV SYSTOLIC FUNCTION BY 2D PLANIMETRY (MOD):  Normal Ranges:  EF-A4C View: 35.9 % (>=55%)  EF-A2C View: 53.2 %  EF-Biplane:  45.0 %    LV DIASTOLIC FUNCTION:  Normal Ranges:  MV Peak E:      1.22 m/s    (0.7-1.2 m/s)  MV Peak A:      0.66 m/s    (0.42-0.7 m/s)  E/A Ratio:      1.85        (1.0-2.2)  MV e'           0.08 m/s    (>8.0)  MV lateral e'   0.08 m/s  MV medial e'    0.09 m/s  MV A Dur:       114.00 msec  E/e' Ratio:     14.35       (<8.0)  a'              0.06 m/s  PulmV Sys Severo:  22.50 cm/s  PulmV Dolan Severo: 26.60 cm/s  PulmV S/D Severo:  0.80    MITRAL VALVE:  Normal Ranges:  MV DT: 148 msec (150-240msec)    MITRAL INSUFFICIENCY:  Normal Ranges:  MR VTI:  101.80 cm  MR Vmax: 465.25 cm/s    AORTIC  VALVE:  Normal Ranges:  AoV Vmax:      1.35 m/s (<=1.7m/s)  AoV Peak P.3 mmHg (<20mmHg)  LVOT Max Severo:  0.92 m/s (<=1.1m/s)  LVOT VTI:      11.50 cm  LVOT Diameter: 2.30 cm  (1.8-2.4cm)  AoV Area,Vmax: 2.85 cm2 (2.5-4.5cm2)    RIGHT VENTRICLE:  RV 1   3.77 cm  RV 2   3.43 cm  RV 3   6.44 cm  TAPSE: 11.3 mm  RV s'  0.23 m/s    TRICUSPID VALVE/RVSP:  Normal Ranges:  IVC Diam: 1.50 cm    PULMONIC VALVE:  Normal Ranges:  PV Accel Time: 86 msec  (>120ms)  PV Max Severo:    1.4 m/s  (0.6-0.9m/s)  PV Max P.3 mmHg    Pulmonary Veins:  PulmV Dolan Severo: 26.60 cm/s  PulmV S/D Severo:  0.80  PulmV Sys Severo:  22.50 cm/s      [id number] Frank Bui MD  Electronically signed on 2023 at 3:40:05 PM        *** Final ***     Echocardiogram [May 20 2023  3:40PM]      Assessment and Plan:   Daily Risk Screen:  ·  Does patient have an indwelling urinary catheter? n/a consulting service   ·  Does patient have a central line? 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   #recurrent Obstructive pancreatitis   -Recurrent admissions w/ pancreatitis (2023 and early 2023)   -Labs last admission suggestive of cholestatic pattern of liver injury, worsening LFTs/elevated lipase.   -RUQ US 3/2023 with body and tail of Pancreas obscured by gas. No gallstones visible   -RUQ US 23 showing dilation of CBD and intrahepatic bile ducts, no  cholecystitis  -MRI pancreas showing high grade stricture of inferior and common bile ducts, and possible pancreatic mass  -ERCP EUS done which showed a 2.8 cm pancreatic mass in the pancreatic head s/p stent was placed to improve biliary drainage.   -Surgical oncology :EUS FNB: adenocarinoma.   -s/p Whipple surgery/ laparoscopic 5/19  - CT A/P 5/28: several fluid collections, noncharacterizable on Imaging in the upper abdomen, w/ post surgical changes and no obstruction.     - advanced diet, tolerating well   - BM++  - surgery following.   - pain control with dilaudid as needed, off PCA pump  - PT/OT and OOB     # NICM 2/2 to valvular disease s/p bicaval OHT (3/24/17)  # s/p mitral valve repair w/[employer] AnnuloFlex band #36mm w/induction)   # HTN  # post Op hypervolemia   - Post transplant issues/complications: atrial arrhythmias, volume overload, right ventricular failure, acute on chronic kidney disease  requiring RRT, chronotropic incompetence, and psychosis  -Cath with mild CAD, no CAV (3/2019). Echo with mild allograft dysfunction (LVEF 45-50%); similar to prior imaging  -stress test 2022: negative.   -TTE 5/2- showed LVEF low normal 50-55%, no wall motion abnormality NRVEF, mild MR LVIDD 5.3 cm   -Lipids (7/2022): tChol 127, HDL 47, LDL 61, Trigs 97,  -.     #Immunosuppression  -c/w tacrolimus 4 mg/3 at home (target 5-8) /CKD FK 5.8>5.5>awaiting level today   -Myfortic stopped in lieu of pancreatic CA diagnosis.  -prednisone 5 mg daily   -per previous discussion, patient does not want to transition to sirolimus despite CKD    #Antinfectives:  [CMV -/-; Toxo (-)]   none  post op abx: zosyn. x 5 days/completed     #other :  -repeat ECHO pending  -C/w home rosuvastatin   -c/w home fenofibrate   -c/w hydralazine 100 mg TID   -c/w ISDN 40 mg TID   -Continue fish oil 1 gram bid  -C/w Rafat/vitamin D and MVT  - ASA held as per surgery, resume if able     #Alisha on CKD stage IIIB/post op ?ATN vs  cardiorenal   - Cr 3.2 peak   - Crea AM 2.16   - BL crea 2.0-2.5.   - s/p multiple IV lasix doses+ albumin for hypervolemia.   Nephrology following.     # Anemia/blood loss anemia likely post op   hgb down to 6.9 s/p 1 unit PRBC (post op).   appropriate rise.   slow downtrending hgb, no overt bleeding concerns.   please transfuse 1 unit of PRBCs today     #T2DM  - At home 22 units lantus + lispro SSI  - Mild SSI  - reduced dose lantus 10 units +mild SSI while CLD.   - resume home lantus and SSI at discharge     #Depression/anxiety  -c/w citalopram, gabapentin(renal dosed)    #Gout  -allopurinol    #GERD/esophagitis  -continue home PPI    CODE STATUS: FULL CODE   NOK: Benny Goodman [phone]    This case was seen and discussed with Dr. Guillermo.     Attestation:   Note Completion:  I am a:  Resident/Fellow   Attending Attestation I saw and evaluated the patient.  I personally obtained the key and critical portions of the history and physical exam or was physically present for key and  critical portions performed by the resident/fellow. I reviewed the resident/fellow?s documentation and discussed the patient with the resident/fellow.  I agree with the resident/fellow?s medical decision making as documented in the note.     I personally evaluated the patient on 29-May-2023   Comments/ Additional Findings    He appears to be recovering quite well from abdominal surgery. He is severely anemic today and I recommend consideration for blood transfusion. Otherwise  he appears stable. Immunosuppression regimen appropriate.    Santhosh Guillermo MD, MPH  Advanced Heart Failure Cardiology          Electronic Signatures:  Tahira Pedro (Fellow))  (Signed 29-May-2023 14:08)   Authored: Service, Subjective Data, Objective Data, Assessment  and Plan, Note Completion  Santhosh Guillermo)  (Signed 29-May-2023 19:51)   Authored: Note Completion   Co-Signer: Service, Subjective Data, Objective Data, Assessment and  Plan, Note Completion      Last Updated: 29-May-2023 19:51 by Santhosh Guillermo)

## 2023-10-02 NOTE — OP NOTE
PROCEDURE DETAILS    Preoperative Diagnosis:  Pancreatic ductal adenocarcinoma  Postoperative Diagnosis:  Pancreatic ductal adenocarcinoma  Surgeon: Elizabeth  Resident/Fellow/Other Assistant: Cristopher Gomez     Procedure:  Whipple procedure (pylorus procedure)  Estimated Blood Loss: 200 ml   Findings: firm gland. frozen section negative for PDAC. friable HOP.   Specimens(s) Collected: yes,  whipple specimen    IV Fluids: 3500 ml LR, 500 ml 5% albumin   Urine Output: 420 ml  Drains and/or Catheters: 19 Fr rianna drains x2   R drain - perihepatic (Mariano's pouch)  L drain - posterior to the GJ and perihepatic (lesser sac)    Date of Dictation: 19-May-2023  Dictated By: Luis Armando Johnson  Dictation Job Number: [id number]                            Attestation:   Note Completion:  Attending Attestation I was present for the entire procedure    I am a: Resident/Fellow         Electronic Signatures:  Yonny Gomez (Fellow))  (Signed 19-May-2023 17:46)   Authored: Post-Operative Note, Chart Review, Note Completion  Luis Armando Johnson)  (Signed 19-May-2023 23:34)   Authored: Post-Operative Note, Chart Review, Note Completion   Co-Signer: Post-Operative Note, Chart Review, Note Completion      Last Updated: 19-May-2023 23:34 by Luis Armando Johnson)

## 2023-10-05 NOTE — PROGRESS NOTES
Pharmacy Medication History Review    Chung Goodman is a 67 y.o. male admitted for No Principal Problem: There is no principal problem currently on the Problem List. Please update the Problem List and refresh.. Pharmacy reviewed the patient's [id number] medications and allergies for accuracy.    The list below reflectives the updated PTA list. Please review each medication in order reconciliation for additional clarification and justification.  Prior to Admission Medications   Prescriptions Last Dose Informant Patient Reported? Taking?   acetaminophen (Tylenol) 325 mg tablet   Yes No   Si tab(s) orally every 6 hours, As needed, Pain - Mild (1-3)   albuterol 90 mcg/actuation inhaler   Yes No   Sig: use 2 (TWO) puffs FOUR TIMES DAILY   allopurinol (Zyloprim) 100 mg tablet 10/4/2023 at am  Yes No   aspirin 81 mg chewable tablet 10/4/2023 at am  Yes No   Sig: CHEW AND SWALLOW 1 TABLET DAILY.   blood sugar diagnostic (OneTouch Verio test strips) strip   Yes No   Sig: TEST 3 TIMES DAILY   buPROPion XL (Wellbutrin XL) 150 mg 24 hr tablet 10/4/2023 at am  Yes No   Sig: Take 1 tablet (150 mg) by mouth once daily.   busPIRone (Buspar) 5 mg tablet 10/4/2023 at am  Yes No   Sig: Take 1 tablet (5 mg) by mouth 3 times a day.   calcium carbonate 600 mg calcium (1,500 mg) tablet 10/4/2023 at am  Yes No   Sig: Take 1 tablet (600 mg) by mouth 2 times a day.   cholecalciferol (Vitamin D-3) 125 MCG (5000 UT) capsule 10/4/2023 at am  Yes No   Sig: Take 1 capsule (125 mcg) by mouth once daily.   citalopram (CeleXA) 40 mg tablet 10/4/2023 at am  Yes No   diphenoxylate-atropine (Lomotil) 2.5-0.025 mg tablet   Yes No   Si tablets 4 times a day as needed for diarrhea.   fenofibrate (Triglide) 160 mg tablet 10/4/2023 at am  Yes No   Sig: Take 1 tablet (160 mg) by mouth once daily.   ferrous sulfate 325 (65 Fe) MG tablet 10/4/2023 at am  Yes No   Sig: Take 1 tablet (325 mg) by mouth once daily.   gabapentin (Neurontin) 100  mg capsule 10/3/2023 at pm  Yes No   Sig: Take 2 capsules (200 mg) by mouth once daily at bedtime.   hydrALAZINE (Apresoline) 50 mg tablet 10/4/2023 at am  Yes No   Sig: Take 1 tablet (50 mg) by mouth 2 times a day.   icosapent ethyL (Vascepa) 1 gram capsule 10/4/2023 at am  Yes No   Sig: Take 1 capsule (1 g) by mouth twice a day.   insulin glargine (Lantus U-100 Insulin) 100 unit/mL injection 10/4/2023 at am  Yes No   Sig: Inject 10 Units under the skin once daily in the morning.   insulin lispro (HumaLOG) 100 unit/mL injection 10/4/2023  Yes No   Sig: Inject under the skin 3 times a day with meals. Using a sliding scale   lidocaine-prilocaine (Emla) 2.5-2.5 % cream   Yes No   Sig: Apply topically to affected area 30 min prior to port   magnesium oxide (Mag-Ox) 400 mg (241.3 mg magnesium) tablet 10/4/2023 at am  Yes No   Sig: Take 2 tablets (800 mg) by mouth 2 times a day.   multivitamin with minerals tablet   Yes No   Si tablet DAILY (route: oral)   pantoprazole (ProtoNix) 40 mg EC tablet 10/4/2023 at am  Yes No   Sig: Take 1 tablet (40 mg) by mouth twice a day.   rosuvastatin (Crestor) 40 mg tablet 10/3/2023  Yes No   Sig: Take 1 tablet (40 mg) by mouth once daily at bedtime.   tacrolimus (Prograf) 1 mg capsule 10/4/2023 at am  Yes No   Sig: TAKE 4 CAPSULES every morning and TAKE 3 CAPSULES every evening      Facility-Administered Medications: None            The list below reflectives the updated allergy list. Please review each documented allergy for additional clarification and justification.  Allergies  Reviewed by Danny Bryant MD MPH on 10/5/2023        Severity Reactions Comments    Amiodarone Not Specified Other Adverse reaction    Morphine Not Specified Other Itching            Below are additional concerns with the patient's PTA list.  Spoke to the patient and his wife, patient had a lot of meds to go thru  Patient is no longer on Warfarin .. No Cellcept; No Creon  Patient does take ASA  daily  Amlodipine and Isosorbide Mono, patient was previously on but they are not sure if they should continue (Have not been taking)      Kiera Barakat CPhT

## 2023-10-05 NOTE — ED PROVIDER NOTES
HPI   Chief Complaint   Patient presents with    Abdominal Pain         67-year male, history of cardiac transplant, pancreatic cancer with Whipple procedure 5/2023, presents with abdominal pain nausea vomiting and fevers.  Decreased p.o. intake.  Admitted to the hospital 9.23 through 9.29 for bowel obstruction, conservatively managed with NG tube no surgical intervention.  At home, last 1 to 2 days again decreased p.o. intake, increased abdominal pain bilateral lower quadrants, nausea vomiting yesterday but no diarrhea but normal bowel movements overall.  No hematemesis melena or hematochezia.  Lightheaded dizzy near syncope or syncopal episode as well due to the pain he believes.    Abdominal pain, nausea, vomiting, fevers today.                          No data recorded                Patient History   Past Medical History:   Diagnosis Date    Myopia, bilateral 06/06/2017    Bilateral myopia    Other disorders of electrolyte and fluid balance, not elsewhere classified 11/03/2014    Electrolyte and fluid disorder    Other visual disturbances 06/06/2017    Blurred vision, bilateral    Personal history of diseases of the blood and blood-forming organs and certain disorders involving the immune mechanism 06/06/2017    History of immunocompromised state    Personal history of diseases of the blood and blood-forming organs and certain disorders involving the immune mechanism 12/17/2014    History of anemia    Personal history of diseases of the skin and subcutaneous tissue 08/01/2014    History of contact dermatitis    Personal history of other diseases of the circulatory system 08/21/2017    History of pulmonary hypertension    Personal history of other mental and behavioral disorders 11/07/2017    History of depression    Personal history of other mental and behavioral disorders 06/21/2018    History of anxiety    Personal history of other specified conditions 07/26/2017    History of bradycardia    Personal history  of other specified conditions 09/04/2014    History of abnormal weight loss    Personal history of urinary (tract) infections 05/12/2017    History of urinary tract infection    Restlessness and agitation     Restlessness and agitation    Rheumatic mitral valve disease, unspecified 12/01/2017    Rheumatic mitral valve disease, unspecified    Right heart failure, unspecified (CMS/HCC) 05/22/2017    RVF (right ventricular failure)    Unspecified exotropia 09/12/2014    Consecutive exotropia    Unspecified intermittent heterotropia 06/06/2017    Exotropia, intermittent     Past Surgical History:   Procedure Laterality Date    AORTIC VALVE REPLACEMENT  09/03/2014    Aortic Valve Replacement    MITRAL VALVE REPLACEMENT  12/17/2014    Mitral Valve Replacement    OTHER SURGICAL HISTORY  09/06/2013    Mitral Valve Repair    OTHER SURGICAL HISTORY  02/12/2016    Cardio-defib Pulse Generator Implantation Date    OTHER SURGICAL HISTORY  02/12/2016    Cardio-Defib Pulse Gen Venous Attach Electrode To Prev Placed Defibrillator    OTHER SURGICAL HISTORY  12/01/2017    Cardiac Transplant Procedures    OTHER SURGICAL HISTORY  12/01/2017    Sternotomy    OTHER SURGICAL HISTORY  12/17/2014    Cardiac Cath Procedure Outcome: Successful     Family History   Problem Relation Name Age of Onset    COPD Mother      Other (systemic lupus erythematosus) Mother      Hypertension Father          benign    Arthritis Other Family History     Ulcerative colitis Other Family History     Gout Other Family History     Psoriasis Other Family History     Other (systemic lupus erythematosus) Other Family History      Social History     Tobacco Use    Smoking status: Not on file    Smokeless tobacco: Not on file   Substance Use Topics    Alcohol use: Not on file    Drug use: Not on file       Review of Systems   Constitutional:  Negative for chills and fever.   HENT:  Negative for rhinorrhea and sore throat.    Eyes:  Negative for pain and discharge.    Respiratory:  Negative for cough and shortness of breath.    Cardiovascular:  Negative for chest pain and palpitations.   Gastrointestinal:  Positive for abdominal distention, abdominal pain, nausea and vomiting.   Genitourinary:  Negative for dysuria and urgency.   Musculoskeletal:  Negative for joint swelling.   Skin:  Negative for rash and wound.   Neurological:  Positive for syncope. Negative for dizziness and headaches.        No focal/lateralizing weakness or numbness on review   Psychiatric/Behavioral:  Negative for agitation and confusion.         Physical Exam   ED Triage Vitals   Temp Pulse Resp BP   -- -- -- --      SpO2 Temp src Heart Rate Source Patient Position   -- -- -- --      BP Location FiO2 (%)     -- --       Physical Exam  Vitals reviewed.   Constitutional:       General: He is not in acute distress.     Appearance: He is well-developed.   HENT:      Head: Normocephalic and atraumatic.      Right Ear: External ear normal.      Left Ear: External ear normal.      Nose: Nose normal.      Mouth/Throat:      Mouth: Mucous membranes are moist.      Pharynx: Oropharynx is clear.   Eyes:      Conjunctiva/sclera: Conjunctivae normal.      Pupils: Pupils are equal, round, and reactive to light.   Neck:      Vascular: No JVD.   Cardiovascular:      Rate and Rhythm: Regular rhythm. Tachycardia present.      Pulses: Normal pulses.   Pulmonary:      Effort: Pulmonary effort is normal. No accessory muscle usage or respiratory distress.      Breath sounds: Rhonchi present.      Comments: Rare scattered rhonchi but no consolidation.  Abdominal:      General: Abdomen is flat. A surgical scar is present. Bowel sounds are increased. There is no distension.      Palpations: Abdomen is soft. There is no mass.      Tenderness: There is generalized abdominal tenderness and tenderness in the periumbilical area. There is no rebound. Negative signs include Christopher's sign and McBurney's sign.      Hernia: No hernia is  present.      Comments: Mild diffuse discomfort.  Not localizable, some voluntary guarding but no obvious rebound rigidity.  Slightly hyperactive bowel sounds noted.   Musculoskeletal:         General: Normal range of motion.      Cervical back: Normal range of motion and neck supple.   Lymphadenopathy:      Cervical: No cervical adenopathy.   Skin:     General: Skin is warm and dry.      Capillary Refill: Capillary refill takes less than 2 seconds.      Comments: No zoster rash seen   Neurological:      General: No focal deficit present.      Mental Status: He is alert. Mental status is at baseline.   Psychiatric:         Mood and Affect: Mood normal.                 ECG if performed, ordered and interpreted by ED physician: Sinus tachycardia, 105 bpm with left axis deviation.  Moderate LVH.  Nonspecific ST-T wave abnormality no acute ischemic findings.        Labs Reviewed   CBC WITH AUTO DIFFERENTIAL - Abnormal       Result Value    WBC 4.3 (*)     nRBC 0.0      RBC 3.84 (*)     Hemoglobin 11.2 (*)     Hematocrit 34.4 (*)     MCV 90      MCH 29.2      MCHC 32.6      RDW 14.8 (*)     Platelets 209      MPV 10.7      Neutrophils % 87.2      Immature Granulocytes %, Automated 0.5      Lymphocytes % 4.2      Monocytes % 7.4      Eosinophils % 0.5      Basophils % 0.2      Neutrophils Absolute 3.76      Immature Granulocytes Absolute, Automated 0.02      Lymphocytes Absolute 0.18 (*)     Monocytes Absolute 0.32      Eosinophils Absolute 0.02      Basophils Absolute 0.01     COMPREHENSIVE METABOLIC PANEL - Abnormal    Glucose 218 (*)     Sodium 133 (*)     Potassium 5.0      Chloride 96 (*)     Bicarbonate 23      Anion Gap 19      Urea Nitrogen 34 (*)     Creatinine 2.36 (*)     eGFR 29 (*)     Calcium 9.9      Albumin 3.7      Alkaline Phosphatase 319 (*)     Total Protein 6.9       (*)     Bilirubin, Total 1.2      ALT 88 (*)    LACTATE DEHYDROGENASE - Abnormal     (*)    LACTATE - Abnormal    Lactate  3.4 (*)     Narrative:     Venipuncture immediately after or during the administration of Metamizole may lead to falsely low results. Testing should be performed immediately  prior to Metamizole dosing.   BLOOD GAS VENOUS FULL PANEL - Abnormal    POCT pH, Venous 7.52 (*)     POCT pCO2, Venous 33 (*)     POCT pO2, Venous 129 (*)     POCT SO2, Venous 100 (*)     POCT Oxy Hemoglobin, Venous 97.7 (*)     POCT Hematocrit Calculated, Venous 33.0 (*)     POCT Sodium, Venous 132 (*)     POCT Potassium, Venous 4.6      POCT Chloride, Venous 99      POCT Ionized Calicum, Venous 1.18      POCT Glucose, Venous 233 (*)     POCT Lactate, Venous 2.5 (*)     POCT Base Excess, Venous 4.2 (*)     POCT HCO3 Calculated, Venous 26.9 (*)     POCT Hemoglobin, Venous 11.0 (*)     POCT Anion Gap, Venous 11.0      Patient Temperature 37.0     BLOOD CULTURE   BLOOD CULTURE   URINE CULTURE   URINALYSIS WITH REFLEX MICROSCOPIC   LACTATE   BLOOD GAS LACTIC ACID, VENOUS          CT chest abdomen pelvis wo IV contrast   Final Result   CHEST:   1.  No acute chest pathology.   2. Mitral valve replacement.   3. Marked enlargement of the left atrium.   4. 4.9 x 4.2 cm ascending aortic aneurysm.        ABDOMEN AND PELVIS:   1. Status post Whipple procedure with gastrojejunostomy.   2. Markedly dilated stomach and proximal small bowel consistent with   a small-bowel obstruction. The point appears to be a mass in the wall   of the proximal loop of small bowel. Consider carcinoid. This could   also represent scarring.   3. Dislodged pancreatic stent in the lumen of the proximal small   bowel loop        MACRO:   None        Signed by: Inez Hernandez 10/5/2023 9:02 AM   Dictation workstation:   [id number]      XR abdomen 1 view    (Results Pending)            ED Course & MDM     ED Medication Administration from 10/05/2023 0648 to 10/05/2023 1051         Date/Time Order Dose Route Action Action by     10/05/2023 0834 EDT acetaminophen (Tylenol) tablet  975 mg 975 mg oral Given Aj, LAZARUS     10/05/2023 0836 EDT sodium chloride 0.9 % bolus 2,121 mL 2,000 mL intravenous New Bag Aj, LAZARUS     10/05/2023 0838 EDT ondansetron (Zofran) injection 4 mg 4 mg intravenous Given Aj, LAZARUS     10/05/2023 0841 EDT HYDROmorphone (Dilaudid) injection 1 mg 1 mg intravenous Given LAZARUS Rocha     10/05/2023 0934 EDT piperacillin-tazobactam-dextrose (Zosyn) IV 4.5 g 4.5 g intravenous New Bag Aj, LAZARUS     10/05/2023 1004 EDT piperacillin-tazobactam-dextrose (Zosyn) IV 4.5 g 0 g intravenous Stopped LAZARUS Rocha                   Diagnoses as of 10/05/23 1051   Small bowel obstruction (CMS/HCC)   Generalized abdominal pain   Malignant neoplasm of pancreas, unspecified location of malignancy (CMS/HCC)       Medical Decision Making  67-year-old, multiple medical problem including cardiac transplant on immunosuppression, presents with fever, abdominal pain, nausea and vomiting.  Baseline labs, septic evaluation, imaging of chest abdomen pelvis.      Sepsis concern.  Antibiotics fluids initiated.  Work-up revealed bowel obstruction with small bowel transition point likely noted.  Hemodynamically stable otherwise beyond the minimal tachycardia.  Reasonable for hospitalization at this time.  NG tube placed.    Case discussed with Dr. Robins at [facility] (formerly [facility]  Given complexity with heart transplant, Whipple procedure bowel obstruction, feels that the patient will be best served at [facility].  Patient will be transferred.  Transfer note completed.          Procedure  Critical Care    Performed by: Danny Bryant MD MPH  Authorized by: Danny Bryant MD MPH    Critical care provider statement:     Critical care time (minutes):  40    Critical care time was exclusive of:  Separately billable procedures and treating other patients    Critical care was necessary to treat or prevent imminent or life-threatening deterioration of the following conditions:  Sepsis (SBO, heart transplant)    Care  discussed with: accepting provider at another facility                     Danny Bryant MD MPH  10/05/23 0732       Danny Bryant MD MPH  10/05/23 0733       Danny Bryant MD MPH  10/05/23 1044       Danny Bryant MD MPH  10/05/23 1046       Danny Bryant MD MPH  10/05/23 1051

## 2023-10-06 NOTE — TELEPHONE ENCOUNTER
Called patient/wife back. Told them transfer center is working on getting him a bed at [facility]. Will follow up with them tomorrow.

## 2023-10-06 NOTE — ED PROVIDER NOTES
Pt has been in the [facility] ED for over 30 hours awating transfer to [facility].  NG tube reinserted few hours ago due to worsening discomfort.  CT concerning for SBO.  This mornings labs are reassuring with normal WBC.  Vitals stable with normal blood pressure and normal HR.  He had a fever yesterday but none today.  Will continue zosyn.       Elda Middleton MD  10/06/23 2243

## 2023-10-06 NOTE — TELEPHONE ENCOUNTER
Returned call received from [facility]. Patient is at [facility] ER with similar symptoms as recent ER visit for SBO. Nausea. Vomiting, not able to eat. Per wife patient hasn't received  immunosuppression; they are to give him sublingual today. They have been waiting in ER since 6am yesterday 10/5. Patient was syncopal at home related to abdominal pain with ongoing SBO.

## 2023-10-06 NOTE — TELEPHONE ENCOUNTER
Called and spoke with Transfer center. Patient does have a pending transfer for him, but no bed at the moment. Will update patients wife

## 2023-10-07 PROBLEM — K56.609 SBO (SMALL BOWEL OBSTRUCTION) (MULTI): Status: ACTIVE | Noted: 2023-01-01

## 2023-10-07 NOTE — H&P
"MEDICINE ADMISSION NOTE    History of Present Illness  Chung Goodman is a 67 y.o. male with PMHx of history of heart transplant (2017 for NICM 2/2 valvular disease) and pancreatic cancer s/p whipple (5/19/2023) currently receiving modified FOLFIRINOX (follows with Dr. Dunaway), and recent admission for SBO presents as a trasnfer from  [facility] for recurrent SBO.     Patient was recently admitted for SBO (9/23 - 9/29). At that time, GI was consulted and pt underwent EGD on 9/26/23 with concern for benign stenosis of afferent limb that was traversed with gastroscope with noted upstream jejunal dilation, afferent limb stenosis  much improved after endoscope removal with likely dilation effect from endoscope and unclear if narrowing was intrinsic or extrinsic in nature, no endoscopic evidence of malignant involvement in stenosis. no additional jejunal stenotic regions noted in  the afferent limb but unable to reach hepaticojejunostomy or pancreaticojejunostomy due to significant scope looping, patent efferent limb anastomosis that was easily traversable, clean based ulcer with pigmented spots noted in gastric body that was likely  related to recent NGT placement.  NGT removed and diet advanced as tolerated, and pt was DC home on 9/29.    Since discharge patient was tolerating regular diet until Thursday p.m. when he had an episode of emesis immediately after drinking a bottle of Ensure.  Later late Thursday night and early Friday morning patient had 5 more episodes of bright green nonbloody emesis which prompted him to present to [facility] ED.  Patient reports temperature of 100.7 and chills at home.  He states that he has had severe belly \"aching, cramping\" which lasts 15 to 20 minutes.  At [facility] ED, CT was concerning for SBO and Bcx grew E coli. Patient was treated with IVF and Zosyn.  His symptoms were controlled with Zofran and as needed Dilaudid IV.    Immediately upon arrival, team was notified by bedside RN that " "patient looked unwell with respiratory rate 40s and accessory muscle use.  Rapid response was called and upon bedside assessment patient appeared to be feeling better with respiratory rate improving to 15-20.  Vitals were otherwise as listed below, and rapid response was canceled.    Vitals on Arrival to [facility]:  /83 (BP Location: Right arm)   Pulse 75   Temp 36.1 °C (96.9 °F) (Temporal)   Resp (!) 40 Comment: MD notified, coming to bedside to assess pt  Ht 1.765 m (5' 9.49\")   Wt 72.4 kg (159 lb 8.4 oz)   SpO2 100%   BMI 23.23 kg/m²      Recent Labs:  Results for orders placed or performed during the hospital encounter of 10/07/23 (from the past 24 hour(s))   CBC and Auto Differential   Result Value Ref Range    WBC 5.7 4.4 - 11.3 x10*3/uL    nRBC 0.0 0.0 - 0.0 /100 WBCs    RBC 2.97 (L) 4.50 - 5.90 x10*6/uL    Hemoglobin 8.9 (L) 13.5 - 17.5 g/dL    Hematocrit 26.5 (L) 41.0 - 52.0 %    MCV 89 80 - 100 fL    MCH 30.0 26.0 - 34.0 pg    MCHC 33.6 32.0 - 36.0 g/dL    RDW 15.0 (H) 11.5 - 14.5 %    Platelets 205 150 - 450 x10*3/uL    MPV 10.3 7.5 - 11.5 fL    Neutrophils % 67.8 40.0 - 80.0 %    Immature Granulocytes %, Automated 0.7 0.0 - 0.9 %    Lymphocytes % 11.3 13.0 - 44.0 %    Monocytes % 14.6 2.0 - 10.0 %    Eosinophils % 5.1 0.0 - 6.0 %    Basophils % 0.5 0.0 - 2.0 %    Neutrophils Absolute 3.85 1.20 - 7.70 x10*3/uL    Immature Granulocytes Absolute, Automated 0.04 0.00 - 0.70 x10*3/uL    Lymphocytes Absolute 0.64 (L) 1.20 - 4.80 x10*3/uL    Monocytes Absolute 0.83 0.10 - 1.00 x10*3/uL    Eosinophils Absolute 0.29 0.00 - 0.70 x10*3/uL    Basophils Absolute 0.03 0.00 - 0.10 x10*3/uL   Magnesium   Result Value Ref Range    Magnesium 2.36 1.60 - 2.40 mg/dL   Hepatic Function Panel   Result Value Ref Range    Albumin 3.2 (L) 3.4 - 5.0 g/dL    Bilirubin, Total 0.5 0.0 - 1.2 mg/dL    Bilirubin, Direct 0.3 0.0 - 0.3 mg/dL    Alkaline Phosphatase 239 (H) 33 - 136 U/L    ALT 79 (H) 10 - 52 U/L    AST 80 (H) 9 " - 39 U/L    Total Protein 5.5 (L) 6.4 - 8.2 g/dL   Coagulation Screen   Result Value Ref Range    Protime 12.7 9.8 - 12.8 seconds    INR 1.1 0.9 - 1.1    aPTT 26 (L) 27 - 38 seconds   Blood Gas Venous Full Panel   Result Value Ref Range    POCT pH, Venous 7.35 7.33 - 7.43 pH    POCT pCO2, Venous 47 41 - 51 mm Hg    POCT pO2, Venous 45 35 - 45 mm Hg    POCT SO2, Venous 66 45 - 75 %    POCT Oxy Hemoglobin, Venous 64.7 45.0 - 75.0 %    POCT Hematocrit Calculated, Venous 35.0 (L) 41.0 - 52.0 %    POCT Sodium, Venous 138 136 - 145 mmol/L    POCT Potassium, Venous 4.0 3.5 - 5.3 mmol/L    POCT Chloride, Venous 106 98 - 107 mmol/L    POCT Ionized Calicum, Venous 1.28 1.10 - 1.33 mmol/L    POCT Glucose, Venous 117 (H) 74 - 99 mg/dL    POCT Lactate, Venous 0.9 0.4 - 2.0 mmol/L    POCT Base Excess, Venous -0.1 -2.0 - 3.0 mmol/L    POCT HCO3 Calculated, Venous 25.9 22.0 - 26.0 mmol/L    POCT Hemoglobin, Venous 11.5 (L) 13.5 - 17.5 g/dL    POCT Anion Gap, Venous 10.0 10.0 - 25.0 mmol/L    Patient Temperature 37.0 degrees Celsius    FiO2 0 %   Phosphorus   Result Value Ref Range    Phosphorus 2.8 2.5 - 4.9 mg/dL   Basic Metabolic Panel   Result Value Ref Range    Glucose 119 (H) 74 - 99 mg/dL    Sodium 142 136 - 145 mmol/L    Potassium 4.1 3.5 - 5.3 mmol/L    Chloride 105 98 - 107 mmol/L    Bicarbonate 25 21 - 32 mmol/L    Anion Gap 16 10 - 20 mmol/L    Urea Nitrogen 51 (H) 6 - 23 mg/dL    Creatinine 2.80 (H) 0.50 - 1.30 mg/dL    eGFR 24 (L) >60 mL/min/1.73m*2    Calcium 9.4 8.6 - 10.6 mg/dL        Imaging:  CT chest abdomen pelvis wo IV contrast  Result Date: 10/5/2023  CHEST: 1.  No acute chest pathology. 2. Mitral valve replacement. 3. Marked enlargement of the left atrium. 4. 4.9 x 4.2 cm ascending aortic aneurysm.     ABDOMEN AND PELVIS: 1. Status post Whipple procedure with gastrojejunostomy. 2. Markedly dilated stomach and proximal small bowel consistent with a small-bowel obstruction. The point appears to be a mass in the  wall of the proximal loop of small bowel. Consider carcinoid. This could also represent scarring. 3. Dislodged pancreatic stent in the lumen of the proximal small bowel loop        XR chest abdomen for OG NG placement  Result Date: 10/6/2023  1.  Lungs with minimal chronic-appearing changes, no acute consolidation. 2.  No pulmonary venous vascular congestion, no cardiomegaly. 3.  Post median sternotomy; central venous Mediport in place, enteric tube.      XR abdomen 1 view  Result Date: 10/7/2023  Enteric tube seen coursing below the level diaphragm with tip [overlying the expected location of the stomach. Right chest wall MediPort with tip overlying the superior cavoatrial junction. Surgical clips overlie the [right upper quadrant.]  Postsurgical changes from median sternotomy. ] [Nonobstructive bowel gas pattern.] Limited evaluation of pneumoperitoneum on semi-erect imaging, however no gross evidence of free air is noted.  [Bibasilar atelectasis.]  [Osseous structures demonstrate no acute bony changes.]     Historical Data:  EGD  Result Date: 9/26/2023  - Z-line regular, 40 cm from the incisors.  - Normal esophagus.  - Non-obstructing non-bleeding gastric ulcer with a flat pigmented spot (Jaydon Class IIc). Suspicious   for stress induced etiology likely related to recent NGT placement.  - Patent pylorus-sparing Whipple, characterized by healthy appearing mucosa was found in the duodenum. Efferent limb anastamosis was patent and healthy appearing.  - Afferent limb anastamosis with benign stenosis (intrisic versus extrinsic) that was initially unable to be traversed with pediatric colonoscopy  - Normal duodenal bulb.  - Jejunal stenosis.  - Normal examined jejunum.  - No specimens collected.    Echocardiogram  Result Date: 9/26/2023  CONCLUSIONS: 1. Left ventricular systolic function is normal with a 55-60% estimated ejection fraction. 2. Abnormal septal motion consistent with post-operative status. 3. There is  mildly reduced right ventricular systolic function. 4. S/p MV repair with 36mm Jayden [employer] Annuloflex band with mean MV gradient of 3mmHg and mild MR. 5. Compared with the prior exam from 5/30/2023 there are no significant changes, though the RV was better seen today.       Past Medical History  He has a past medical history of Myopia, bilateral (06/06/2017), Other disorders of electrolyte and fluid balance, not elsewhere classified (11/03/2014), Other visual disturbances (06/06/2017), Personal history of diseases of the blood and blood-forming organs and certain disorders involving the immune mechanism (06/06/2017), Personal history of diseases of the blood and blood-forming organs and certain disorders involving the immune mechanism (12/17/2014), Personal history of diseases of the skin and subcutaneous tissue (08/01/2014), Personal history of other diseases of the circulatory system (08/21/2017), Personal history of other mental and behavioral disorders (11/07/2017), Personal history of other mental and behavioral disorders (06/21/2018), Personal history of other specified conditions (07/26/2017), Personal history of other specified conditions (09/04/2014), Personal history of urinary (tract) infections (05/12/2017), Restlessness and agitation, Rheumatic mitral valve disease, unspecified (12/01/2017), Right heart failure, unspecified (CMS/HCC) (05/22/2017), Unspecified exotropia (09/12/2014), and Unspecified intermittent heterotropia (06/06/2017).    Surgical History  He has a past surgical history that includes Other surgical history (09/06/2013); Other surgical history (02/12/2016); Other surgical history (02/12/2016); Other surgical history (12/01/2017); Other surgical history (12/01/2017); Other surgical history (12/17/2014); Mitral valve replacement (12/17/2014); and Aortic valve replacement (09/03/2014).     Social History  He reports that he quit smoking about 22 years ago. His smoking use included  cigarettes. He has never used smokeless tobacco. He reports that he does not drink alcohol and does not use drugs.    Family History  Family History   Problem Relation Name Age of Onset    COPD Mother      Other (systemic lupus erythematosus) Mother      Hypertension Father          benign    Arthritis Other Family History     Ulcerative colitis Other Family History     Gout Other Family History     Psoriasis Other Family History     Other (systemic lupus erythematosus) Other Family History         Allergies  Amiodarone and Morphine     Physical Exam  Constitutional: No acute distress, resting comfortably in bed, cooperative  HEENT: Normocephalic, atraumatic, moist mucous membranes, no pharyngeal erythema  Cardiovascular: RRR, normal S1/S2, no murmurs noted, sternal scar noted  Pulmonary: Clear to auscultation b/l, no wheezes/crackles/rhonchi, no increased work of breathing, no supplemental oxygen  GI: Soft, non-tender to light and deep palpation, non-distended, bowel sounds present, NG placed to suction with bright green output  : No rashid catheter  Lower extremities: Warm and well perfused, no lower extremity edema  Neuro: A&O x3, able to move all 4 extremities spontaneously  Psych: Appropriate mood and affect     Medications  Scheduled medications  heparin (porcine), 5,000 Units, subcutaneous, q8h  pantoprazole, 40 mg, intravenous, q12h  piperacillin-tazobactam, 3.375 g, intravenous, q6h  tacrolimus, 1.5 mg, sublingual, q24h  tacrolimus, 2 mg, sublingual, Daily    Continuous medications     PRN medications  PRN medications: albuterol, dextrose 10 % in water (D10W), dextrose, glucagon      Assessment/Plan     Chung Goodman is a 67 y.o. male with PMHx of history of heart transplant (2017 for NICM) and pancreatic cancer s/p whipple (5/19/2023) currently receiving modified FOLFIRINOX (follows with Dr. Dunaway), and recent admission for SBO for recurrent SBO c/b E coli bacteremia.     #SBO  #Ecoli  Bacteremia  #Pancreatic cancer s/p whipple  #Dislodged pancreatic stent   Per CT results, SBO is 2/2 mass in the wall of the proximal loop of small bowel which could represent carcinoid vs scarring. Dislodged pancreatic stent in the lumen of the proximal small bowel loop      No clinical sx of bowel compromise.  - Bcx 10/5 growing E coli, sensitivities pending   [ ] NPO  [ ] NG replaced and on low-intermittent suction   [ ] Cont Zosyn, follow BCx for sensitives   [ ] Home Pantoprazole 40 BID as IV  [ ] GI consult in AM    #Pain 2/2 SBO  - Pt received Dilaudid 1 mg IV spot doses in ED for abdominal pain  - Currently with no pain  [ ] Will treat with dilaudid IV if needed     #Heart Transplant (2017) 2/2 valvular disease  #s/p MV repair  - Tacro level in AM  - Cont Tacro       - Home: 4 mg AM, 3 mg PM PO      [ ] Tacro 2 mg AM, 1.5 mg PM sublingual (dose adjusted for sublingual administration)  [ ] HF consult in AM    #JOSY on CKD  Pre-renal in setting of dehydration given no PO intake and vomiting in setting of SBO  - Baseline Cr ~2  [ ] Repeat RFP  [ ] IVF PRN    #Elevated transaminases  [ ] Consider RUQ US in AM    #HTN  - Holding home oral meds      - Hydralazine 50 PO BID  [ ] Control with IV antihypertensives as needed    #T2DM complicated with Steroid induced hyperglycemia in the past   - Home insulin regimen: Glargine 10u QAM, Lispro SSI  [ ] BG checks q4h  [ ] SSI     #DLD  -Holding home oral meds:      - ASA 81     - Rosuvastatin 40      - Fenofibrate 160 mg daily      - Vascepa 1g BID    #Gout  - Holding home oral meds     - Allopurinol 100 mg     #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  - Holding home oral meds      - Gabapentin 200 mg QHS    #JE  #MDD  - Holding home oral meds     - Bupropion  mg daily      - Buspirone 5 mg TID     - Citalopram 40 mg daily    Other home oral medications currently held:     - Ca carbonate 600 mg BID     - Cholecalciferol 125 mcg daily     - Lomotil      - Ferrous sulfate 324 mg  daily     - MgOx 800 mg BID     - MV    F : PRN  E: PRN  N: NPO  A: Mediport     DVT prophylaxis: Heparin subq    Code Status: FULL CODE (confirmed on admission)   NOK: Extended Emergency Contact Information  Primary Emergency Contact: Krystle Goodman  Home Phone: [phone]  Work Phone: [phone]  Relation: Spouse        Fer Solis MD  Internal Medicine, PGY-2

## 2023-10-07 NOTE — Clinical Note
Pt procedure ended. Pt remaining intubated for OR procedure. ABD soft, non distended. VSS see anesthesia flowsheets

## 2023-10-07 NOTE — CARE PLAN
The patient's goals for the shift include  pain control    The clinical goals for the shift include pain control    Over the shift, the patient made progress toward the following goals.

## 2023-10-07 NOTE — PROGRESS NOTES
Patient endorsed to me by prior care team.  Still awaiting transfer for complex medical issues including bowel obstruction prior malignancy and transplant issues.  Medications continued.  Patient somehow pulled NG out for third or fourth time.  Also pulled IV out and is refusing recurrent attempts at IV access.  Will be given IM analgesics for now.  Still awaiting transfer.           Danny Bryant MD MPH

## 2023-10-07 NOTE — Clinical Note
X tack successfully deployed x4 sites and cinched with suture cinch device. Perforation still notable via endoscopic view

## 2023-10-07 NOTE — CONSULTS
Inpatient consult to Heart Failure  Consult performed by: Alberto Nj MD  Consult ordered by: Kianna Sanz MD  Reason for consult: Heart transplant status        History Of Present Illness:    Chung Goodman is a 67 y.o. male with PMHx of NICM secondary to valvular disease, s/p mitral valve repair with Sown Carbomedics Annuloflex band II with 36 mm with induction, s/p bicaval OHT (3/24/2017) with post-op complicated by atrial arrhythmia, RV failure, chronotropic incompetence, Pancreatic adenocarcinoma, s/p Whipple procedure (5/19/23), currently on chemotx (modified FOLFIRINOX), hx recurrent obstructive pancreatitis, HTN, DM type 2, HLD, CKD Stage 3B (baseline creatinine 2), amiodarone-induced hyperthyroidism, JE, MDD.     Patient was transferred from [facility] for recurrent SBO. Briefly, patient has been having postprandial vomiting and abdominal discomfort for the past 1-2 months. He was previously admitted 2 weeks ago for SBO. EGD (9/26/23) revealed benign stenosis of afferent limb, patent efferent limb anastomosis, clean-based ulcer in the gastric body. He was discharged home on 9/29.     On initial evaluation in the ED at [facility], CT abdomen was suggestive of SBO. Blood culture + E coli. He was started on Zosyn.     Patient denies episodes of chest discomfort, SOB, orthopnea, PND, syncope, dizziness. He has been adherent to his prescribed home meds. He was last seen in the Heart Failure clinic on 6/14/2023. During that time, he was maintained on Tacrolimus 4 mg/5 mg (FK target goal 5-8) and prednisone 5 mg daily. He has been taken off MMF due to pancreatic CA diagnosis.     Last Recorded Vitals:  Vitals:    10/07/23 0048 10/07/23 0418 10/07/23 0555 10/07/23 0929   BP: 123/83  140/83 138/76   BP Location: Right arm  Right arm Left arm   Patient Position:   Sitting Lying   Pulse: 75  87 76   Resp: (!) 40  16 18   Temp: 36.1 °C (96.9 °F)  35.8 °C (96.4 °F) 36 °C (96.8 °F)   TempSrc: Temporal   "Temporal    SpO2: 100%  97%    Weight:  72.4 kg (159 lb 8.4 oz)     Height:  1.765 m (5' 9.49\")         Last Labs:  Results for orders placed or performed during the hospital encounter of 10/07/23 (from the past 24 hour(s))   CBC and Auto Differential   Result Value Ref Range    WBC 5.7 4.4 - 11.3 x10*3/uL    nRBC 0.0 0.0 - 0.0 /100 WBCs    RBC 2.97 (L) 4.50 - 5.90 x10*6/uL    Hemoglobin 8.9 (L) 13.5 - 17.5 g/dL    Hematocrit 26.5 (L) 41.0 - 52.0 %    MCV 89 80 - 100 fL    MCH 30.0 26.0 - 34.0 pg    MCHC 33.6 32.0 - 36.0 g/dL    RDW 15.0 (H) 11.5 - 14.5 %    Platelets 205 150 - 450 x10*3/uL    MPV 10.3 7.5 - 11.5 fL    Neutrophils % 67.8 40.0 - 80.0 %    Immature Granulocytes %, Automated 0.7 0.0 - 0.9 %    Lymphocytes % 11.3 13.0 - 44.0 %    Monocytes % 14.6 2.0 - 10.0 %    Eosinophils % 5.1 0.0 - 6.0 %    Basophils % 0.5 0.0 - 2.0 %    Neutrophils Absolute 3.85 1.20 - 7.70 x10*3/uL    Immature Granulocytes Absolute, Automated 0.04 0.00 - 0.70 x10*3/uL    Lymphocytes Absolute 0.64 (L) 1.20 - 4.80 x10*3/uL    Monocytes Absolute 0.83 0.10 - 1.00 x10*3/uL    Eosinophils Absolute 0.29 0.00 - 0.70 x10*3/uL    Basophils Absolute 0.03 0.00 - 0.10 x10*3/uL   Magnesium   Result Value Ref Range    Magnesium 2.36 1.60 - 2.40 mg/dL   Hepatic Function Panel   Result Value Ref Range    Albumin 3.2 (L) 3.4 - 5.0 g/dL    Bilirubin, Total 0.5 0.0 - 1.2 mg/dL    Bilirubin, Direct 0.3 0.0 - 0.3 mg/dL    Alkaline Phosphatase 239 (H) 33 - 136 U/L    ALT 79 (H) 10 - 52 U/L    AST 80 (H) 9 - 39 U/L    Total Protein 5.5 (L) 6.4 - 8.2 g/dL   Coagulation Screen   Result Value Ref Range    Protime 12.7 9.8 - 12.8 seconds    INR 1.1 0.9 - 1.1    aPTT 26 (L) 27 - 38 seconds   Blood Gas Venous Full Panel   Result Value Ref Range    POCT pH, Venous 7.35 7.33 - 7.43 pH    POCT pCO2, Venous 47 41 - 51 mm Hg    POCT pO2, Venous 45 35 - 45 mm Hg    POCT SO2, Venous 66 45 - 75 %    POCT Oxy Hemoglobin, Venous 64.7 45.0 - 75.0 %    POCT Hematocrit " Calculated, Venous 35.0 (L) 41.0 - 52.0 %    POCT Sodium, Venous 138 136 - 145 mmol/L    POCT Potassium, Venous 4.0 3.5 - 5.3 mmol/L    POCT Chloride, Venous 106 98 - 107 mmol/L    POCT Ionized Calicum, Venous 1.28 1.10 - 1.33 mmol/L    POCT Glucose, Venous 117 (H) 74 - 99 mg/dL    POCT Lactate, Venous 0.9 0.4 - 2.0 mmol/L    POCT Base Excess, Venous -0.1 -2.0 - 3.0 mmol/L    POCT HCO3 Calculated, Venous 25.9 22.0 - 26.0 mmol/L    POCT Hemoglobin, Venous 11.5 (L) 13.5 - 17.5 g/dL    POCT Anion Gap, Venous 10.0 10.0 - 25.0 mmol/L    Patient Temperature 37.0 degrees Celsius    FiO2 0 %   Phosphorus   Result Value Ref Range    Phosphorus 2.8 2.5 - 4.9 mg/dL   Basic Metabolic Panel   Result Value Ref Range    Glucose 119 (H) 74 - 99 mg/dL    Sodium 142 136 - 145 mmol/L    Potassium 4.1 3.5 - 5.3 mmol/L    Chloride 105 98 - 107 mmol/L    Bicarbonate 25 21 - 32 mmol/L    Anion Gap 16 10 - 20 mmol/L    Urea Nitrogen 51 (H) 6 - 23 mg/dL    Creatinine 2.80 (H) 0.50 - 1.30 mg/dL    eGFR 24 (L) >60 mL/min/1.73m*2    Calcium 9.4 8.6 - 10.6 mg/dL   Tacrolimus level   Result Value Ref Range    Tacrolimus  4.6 <=15.0 ng/mL        Last I/O:    Intake/Output Summary (Last 24 hours) at 10/7/2023 1052  Last data filed at 10/7/2023 0929  Gross per 24 hour   Intake 0 ml   Output 51 ml   Net -51 ml        Past Cardiology Tests:  Echo:  Echocardiogram    Result Date: 9/26/2023     Left Ventricle: The left ventricular systolic function is normal, with an estimated ejection fraction of 55-60%. There are no regional wall motion abnormalities. The left ventricular cavity size is normal. The left ventricular septal wall thickness is mildly increased. Abnormal (paradoxical) septal motion consistent with post-operative status. Left ventricular diastolic filling was indeterminate. Left Atrium: The left atrium is consistent with transplant. Right Ventricle: The right ventricle is normal in size. There is mildly reduced right ventricular systolic  function. Right Atrium: The right atrium is normal in size. Aortic Valve: The aortic valve is trileaflet. There is no evidence of aortic valve regurgitation. The peak instantaneous gradient of the aortic valve is 5.0 mmHg. Mitral Valve: The mitral valve is normal in structure. Status post mitral annular ring repair. There is mild mitral valve regurgitation. S/p MV repair with 36mm Jayden Carbomedics Annuloflex band with mean MV gradient of 3mmHg and mild MR. Tricuspid Valve: The tricuspid valve is structurally normal. There is trace tricuspid regurgitation. The right ventricular systolic pressure is unable to be estimated. Pulmonic Valve: The pulmonic valve is not well visualized. There is physiologic pulmonic valve regurgitation. Pericardium: There is a trivial pericardial effusion. Aorta: The aortic root is abnormal. There is mild dilatation of the ascending aorta. There is mild dilatation of the aortic root. Systemic Veins: The inferior vena cava appears to be of normal size. There is IVC inspiratory collapse greater than 50%. In comparison to the previous echocardiogram(s): Compared with the prior exam from 5/30/2023 there are no significant changes, though the RV was better seen today. CONCLUSIONS: 1. Left ventricular systolic function is normal with a 55-60% estimated ejection fraction. 2. Abnormal septal motion consistent with post-operative status. 3. There is mildly reduced right ventricular systolic function. 4. S/p MV repair with 36mm Jayden Carbomedics Annuloflex band with mean MV gradient of 3mmHg and mild MR. 5. Compared with the prior exam from 5/30/2023 there are no significant changes, though the RV was better seen today.     Past Medical History:  He has a past medical history of Myopia, bilateral (06/06/2017), Other disorders of electrolyte and fluid balance, not elsewhere classified (11/03/2014), Other visual disturbances (06/06/2017), Personal history of diseases of the blood and blood-forming  organs and certain disorders involving the immune mechanism (06/06/2017), Personal history of diseases of the blood and blood-forming organs and certain disorders involving the immune mechanism (12/17/2014), Personal history of diseases of the skin and subcutaneous tissue (08/01/2014), Personal history of other diseases of the circulatory system (08/21/2017), Personal history of other mental and behavioral disorders (11/07/2017), Personal history of other mental and behavioral disorders (06/21/2018), Personal history of other specified conditions (07/26/2017), Personal history of other specified conditions (09/04/2014), Personal history of urinary (tract) infections (05/12/2017), Restlessness and agitation, Rheumatic mitral valve disease, unspecified (12/01/2017), Right heart failure, unspecified (CMS/HCC) (05/22/2017), Unspecified exotropia (09/12/2014), and Unspecified intermittent heterotropia (06/06/2017).    Past Surgical History:  He has a past surgical history that includes Other surgical history (09/06/2013); Other surgical history (02/12/2016); Other surgical history (02/12/2016); Other surgical history (12/01/2017); Other surgical history (12/01/2017); Other surgical history (12/17/2014); Mitral valve replacement (12/17/2014); and Aortic valve replacement (09/03/2014).      Social History:  He reports that he quit smoking about 22 years ago. His smoking use included cigarettes. He has never used smokeless tobacco. He reports that he does not drink alcohol and does not use drugs.    Family History:  Family History   Problem Relation Name Age of Onset    COPD Mother      Other (systemic lupus erythematosus) Mother      Hypertension Father          benign    Arthritis Other Family History     Ulcerative colitis Other Family History     Gout Other Family History     Psoriasis Other Family History     Other (systemic lupus erythematosus) Other Family History      Allergies:  Amiodarone and Morphine    Inpatient  Medications:  Scheduled medications   Medication Dose Route Frequency    heparin (porcine)  5,000 Units subcutaneous q8h    pantoprazole  40 mg intravenous q12h    piperacillin-tazobactam  3.375 g intravenous q6h    tacrolimus  1.5 mg sublingual q24h    tacrolimus  2 mg sublingual Daily     PRN medications   Medication    albuterol    dextrose 10 % in water (D10W)    dextrose    glucagon     Continuous Medications   Medication Dose Last Rate     Outpatient Medications:  Current Outpatient Medications   Medication Instructions    acetaminophen (Tylenol) 325 mg tablet 2 tab(s) orally every 6 hours, As needed, Pain - Mild (1-3)    albuterol 90 mcg/actuation inhaler use 2 (TWO) puffs FOUR TIMES DAILY    allopurinol (Zyloprim) 100 mg tablet     aspirin 81 mg chewable tablet CHEW AND SWALLOW 1 TABLET DAILY.    blood sugar diagnostic (OneTouch Verio test strips) strip TEST 3 TIMES DAILY    buPROPion XL (WELLBUTRIN XL) 150 mg, oral, Daily RT    busPIRone (BUSPAR) 5 mg, oral, 3 times daily    calcium carbonate 600 mg calcium (1,500 mg) tablet 1 tablet, oral, 2 times daily    cholecalciferol (Vitamin D-3) 125 MCG (5000 UT) capsule 1 capsule, oral, Daily    citalopram (CeleXA) 40 mg tablet     diphenoxylate-atropine (Lomotil) 2.5-0.025 mg tablet 2 tablets 4 times a day as needed for diarrhea.    fenofibrate (Triglide) 160 mg tablet 1 tablet, oral, Daily    ferrous sulfate 325 (65 Fe) MG tablet 1 tablet, oral, Daily    gabapentin (Neurontin) 100 mg capsule Take 2 capsules (200 mg) by mouth once daily at bedtime.    hydrALAZINE (Apresoline) 50 mg tablet 1 tablet, oral, 2 times daily    icosapent ethyL (Vascepa) 1 gram capsule 1 capsule, oral, 2 times daily    insulin glargine (Lantus U-100 Insulin) 100 unit/mL injection Inject 10 Units under the skin once daily in the morning.    insulin lispro (HumaLOG) 100 unit/mL injection subcutaneous, 3 times daily with meals, Using a sliding scale    lidocaine-prilocaine (Emla) 2.5-2.5 %  cream Apply topically to affected area 30 min prior to port    magnesium oxide (Mag-Ox) 400 mg (241.3 mg magnesium) tablet Take 2 tablets (800 mg) by mouth 2 times a day.    multivitamin with minerals tablet 1 tablet DAILY (route: oral)    pantoprazole (PROTONIX) 40 mg, oral, 2 times daily    rosuvastatin (CRESTOR) 40 mg, oral, Nightly    tacrolimus (Prograf) 1 mg capsule TAKE 4 CAPSULES every morning and TAKE 3 CAPSULES every evening     Physical Exam:  Constitutional:       General: Well developed adult without acute distress      Appearance: Normal appearance.   HENT:      Head: Normocephalic and atraumatic. No JVD, +nasogastric tube attached to suction  Eyes:      Extraocular Movements: Extraocular movements intact.      Conjunctiva/sclera: Conjunctivae normal.   Cardiovascular:      Rate and Rhythm: Normal rate and regular rhythm. No S3.      Heart sounds: Normal heart sounds. No murmurs or gallups.      Extremities: Warm distal extremities, no bipedal edema  Pulmonary:      Effort: Pulmonary effort is normal. No respiratory distress.      Breath sounds: Normal breath sounds bilaterally. No wheezing or rales. No cough.  Abdominal:      General: There is no distension. Active bowel sounds.      Palpations: Abdomen is soft and non-tender.  Skin:     General: Skin is warm and dry.     Comments: Median sternotomy incision c/d/I, well approximated  Neurological:      Mental Status: He is alert and oriented to person, place, and time. No sensory or motor deficits  Psychiatric:         Mood and Affect: Mood normal.         Behavior: Behavior normal.       Assessment/Plan   66 y/o Male with PMHx of NICM secondary to valvular disease, s/p mitral valve repair with Sown Carbomedics Annuloflex band II with 36 mm, s/p bicaval OHT (3/24/2017) with post-op complicated by atrial arrhythmia, RV failure, chronotropic incompetence, Pancreatic adenocarcinoma, s/p Whipple procedure (5/19/23), currently on chemotx (modified  FOLFIRINOX), HTN, DM type 2, HLD, CKD Stage 3B (baseline creatinine 2), amiodarone-induced hyperthyroidism, JE, MDD, transferred from [facility] for recurrent SBO. Blood culture + E coli. [Immunosuppressive regimen at home: Tacrolimus 4 mg/3 mg (Tacrolimus Goal 5-8), prednisone 5 mg daily. ]    No rejection history. Latest DSE: negative (5/2023).     #s/p bicaval OHT (3/24/2017) for NICM secondary to valvular disease  #s/p mitral valve repair with Sorn Carbomedics Annuloflex band II 36 mm  #Pancreatic adenocarcinoma, s/p Whipple procedure (5/19/23), on chemotherapy (FOLFIRINOX)  #Moderate malnutrition related to chronic disease  #E. Coli bacteremia, currently on IV antibiotic    Recommendations:  Hold steroid for now. Patient has ongoing E coli bacteremia  Continue Tacrolimus 2 mg SL at 0630 and Tacrolimus 1.5 mg SL at 1830.  Check Tacrolimus trough level daily at 0600.    HF service will follow up.    Discussed with Heart Failure attending, Dr. Daniel Barone.    Signed:    Alberto Nj MD  Heart Failure fellow

## 2023-10-07 NOTE — SIGNIFICANT EVENT
Updated Assessment and Plan:     Chung Goodman is a 67 y.o. male with PMHx of history of heart transplant (2017 for NICM) and pancreatic cancer s/p whipple (5/19/2023) currently receiving modified FOLFIRINOX (follows with Dr. Dunaway), and recent admission for SBO for recurrent SBO c/b E coli bacteremia. E coli bacteremia most likely coming from biliary tract given complicated anatomy and can have stasis in the area.     Updates:   - GI consulted   - will get repeat Blood cx   - Heart failure consulted   - Surg/onc consulted   - NG tube in place w/ minimal output     #SBO  #Pancreatic cancer s/p whipple   #Dislodged pancreatic stent  #Pancreatic cancer s/p whipple  #Dislodged pancreatic stent   Per CT results, SBO is 2/2 mass in the wall of the proximal loop of small bowel which could represent carcinoid vs scarring. Dislodged pancreatic stent in the lumen of the proximal small bowel loop      No clinical sx of bowel compromise.  :: NG tube in place  -NPO  - home pantoprazole 40 BID as IV  - GI consulted, appreciate recs  - Surg/onc consulted, appreciate recs    #Ecoli Bacteremia  #Transaminitis, cholestatic pattern   :: Bcx 10/5 growing E coli, sensitivities pending   :: had fevers/chills when presented to ED  :: most likely source GI tract  - repeat blood cx   - continue IV zosyn, follow Bcx for senstitivity    #Pain 2/2 SBO  - Pt received Dilaudid 1 mg IV spot doses in ED for abdominal pain  - Currently with no pain  - Will treat with dilaudid IV if needed      #Heart Transplant (2017) 2/2 valvular disease  #s/p MV repair  - Tacro level in AM  - Cont Tacro   - Home: 4 mg AM, 3 mg PM PO  - Tacro 2 mg AM, 1.5 mg PM sublingual-dose confirmed with pharmacy  - Heart failure consulted, appreciate recs     #JOSY on CKD  Pre-renal in setting of dehydration given no PO intake and vomiting in setting of SBO  - Baseline Cr ~2  - IVF PRN     #HTN  - Holding home oral meds    - Hydralazine 50 PO BID  -Control with IV  antihypertensives as needed     #T2DM complicated with Steroid induced hyperglycemia in the past   - Home insulin regimen: Glargine 10u QAM, Lispro SSI  - holding home glargine   - SSI     #DLD  -Holding home oral meds:      - ASA 81     - Rosuvastatin 40      - Fenofibrate 160 mg daily      - Vascepa 1g BID     #Gout  - Holding home oral meds     - Allopurinol 100 mg      #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  - Holding home oral meds      - Gabapentin 200 mg QHS     #JE  #MDD  - Holding home oral meds     - Bupropion  mg daily      - Buspirone 5 mg TID     - Citalopram 40 mg daily     Other home oral medications currently held:     - Ca carbonate 600 mg BID     - Cholecalciferol 125 mcg daily     - Lomotil      - Ferrous sulfate 324 mg daily     - MgOx 800 mg BID     - MV     F : PRN  E: PRN  N: NPO  A: Mediport      DVT prophylaxis: Heparin subq     Code Status: FULL CODE (confirmed on admission)   NOK: Extended Emergency Contact Information  Primary Emergency Contact: Krystle Goodman  Home Phone: [phone]  Work Phone: [phone]  Relation: Spouse     Patient seen and discussed with No Villa  Internal Medicine, PGY-1

## 2023-10-07 NOTE — Clinical Note
X tack with 4 sites secure and cinch deployed to close second portion of perforation site successfully

## 2023-10-07 NOTE — CONSULTS
Gastroenterology Consult Service  Department of Gastroenterology & Hepatology  Digestive [facility]    [facility]  Date of Service  October 7, 2023   Patient: Chung Goodman    Medical Record: [id number]  Reason for Consult: afferent limb syndrome, potential biliary infection  Requesting Service: Oncology    History of Present Illness:  Chung Goodman is a 67 y.o. 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 to [facility] 10/7/23 due to recurrent symptomatic afferent limb syndrome in addition to recent fevers, E. Coli bacteremia, and LFT derangements.    Patient was recently admitted 9/23/23-9/29/23 for concern for symptomatic afferent limb syndrome. Patient was initially managed with NGT gastric decompression. EGD 9/26/23 noted one Jaydon Class IIc gastric body ulcer, pylorus-sparing Whipple in duodenal bulb, widely patent efferent limb anastomosis, afferent limb anastomosis was stenotic, which was benign appearing but was traversed with gastroscope with cap with improved stenosis. Noted upstream jejunal dilation with bilious contents in afferent limb but unable to identify displaced pancreatic duct, hepaticojejunostomy, or pancreaticojejunostomy due to excessive looping. NGT was removed and patient was able to tolerate advanced diet upon discharge 9/29/23.    Patient noted resolved symptoms for a few days but then recurrent abdominal pain, nausea, vomiting, and poor PO intake with new symptom of fevers. Patent presented to [facility] 10/5/23 with fever and tachycardia noted. Labs 10/5/23 notable for WBC 4.3 / hgb 11.2 / Plt 209 / BUN 34 / Cr 2.36 /  / ALT 88 /  / Tbil 1.2 / Lactate 3.4. Labs 9/28/23 noted ALP  68 / ALT 34 / AST 34 / Tbil 0.6. Bcx x2 positive for E. Coli 10/5/23. CT A/P 10/5/23 noted markedly dilated stomach and proximal small bowel consistent with small-bowel obstruction, point appears to be a mass in the wall of the proximal loop of small bowel (consider carcinoid versus scarring), dislodged pancreatic stent in lumen of proximal small bowel loop, no intrahepatic or extrahepatic biliary duct dilation, intact gallbladder. Patient started on IVF and Zosyn. Patient had multiple NGT's in place due to recurrent dislodgement but with only minimal output and no improvement in symptoms. Patient transferred to [facility] 10/7/23 for further management.        Past Medical History:    Past Medical History:   Diagnosis Date    Myopia, bilateral 06/06/2017    Bilateral myopia    Other disorders of electrolyte and fluid balance, not elsewhere classified 11/03/2014    Electrolyte and fluid disorder    Other visual disturbances 06/06/2017    Blurred vision, bilateral    Personal history of diseases of the blood and blood-forming organs and certain disorders involving the immune mechanism 06/06/2017    History of immunocompromised state    Personal history of diseases of the blood and blood-forming organs and certain disorders involving the immune mechanism 12/17/2014    History of anemia    Personal history of diseases of the skin and subcutaneous tissue 08/01/2014    History of contact dermatitis    Personal history of other diseases of the circulatory system 08/21/2017    History of pulmonary hypertension    Personal history of other mental and behavioral disorders 11/07/2017    History of depression    Personal history of other mental and behavioral disorders 06/21/2018    History of anxiety    Personal history of other specified conditions 07/26/2017    History of bradycardia    Personal history of other specified conditions 09/04/2014    History of abnormal weight loss    Personal history of urinary (tract) infections  2017    History of urinary tract infection    Restlessness and agitation     Restlessness and agitation    Rheumatic mitral valve disease, unspecified 2017    Rheumatic mitral valve disease, unspecified    Right heart failure, unspecified (CMS/HCC) 2017    RVF (right ventricular failure)    Unspecified exotropia 2014    Consecutive exotropia    Unspecified intermittent heterotropia 2017    Exotropia, intermittent       Past Surgical History:  Past Surgical History:   Procedure Laterality Date    AORTIC VALVE REPLACEMENT  2014    Aortic Valve Replacement    MITRAL VALVE REPLACEMENT  2014    Mitral Valve Replacement    OTHER SURGICAL HISTORY  2013    Mitral Valve Repair    OTHER SURGICAL HISTORY  2016    Cardio-defib Pulse Generator Implantation Date    OTHER SURGICAL HISTORY  2016    Cardio-Defib Pulse Gen Venous Attach Electrode To Prev Placed Defibrillator    OTHER SURGICAL HISTORY  2017    Cardiac Transplant Procedures    OTHER SURGICAL HISTORY  2017    Sternotomy    OTHER SURGICAL HISTORY  2014    Cardiac Cath Procedure Outcome: Successful       Family History:  Family History   Problem Relation Name Age of Onset    COPD Mother      Other (systemic lupus erythematosus) Mother      Hypertension Father          benign    Arthritis Other Family History     Ulcerative colitis Other Family History     Gout Other Family History     Psoriasis Other Family History     Other (systemic lupus erythematosus) Other Family History        Social History:  Social History     Tobacco Use    Smoking status: Former     Types: Cigarettes     Quit date:      Years since quittin.7    Smokeless tobacco: Never   Substance Use Topics    Alcohol use: Never       Allergies:  Allergies   Allergen Reactions    Amiodarone Other     Adverse reaction    Morphine Other     Itching       Medications:  Prior to Admission Medications:  Medications Prior to  Admission   Medication Sig Dispense Refill Last Dose    acetaminophen (Tylenol) 325 mg tablet 2 tab(s) orally every 6 hours, As needed, Pain - Mild (1-3)       albuterol 90 mcg/actuation inhaler use 2 (TWO) puffs FOUR TIMES DAILY       allopurinol (Zyloprim) 100 mg tablet        aspirin 81 mg chewable tablet CHEW AND SWALLOW 1 TABLET DAILY.       blood sugar diagnostic (OneTouch Verio test strips) strip TEST 3 TIMES DAILY       buPROPion XL (Wellbutrin XL) 150 mg 24 hr tablet Take 1 tablet (150 mg) by mouth once daily.       busPIRone (Buspar) 5 mg tablet Take 1 tablet (5 mg) by mouth 3 times a day.       calcium carbonate 600 mg calcium (1,500 mg) tablet Take 1 tablet (600 mg) by mouth 2 times a day.       cholecalciferol (Vitamin D-3) 125 MCG (5000 UT) capsule Take 1 capsule (125 mcg) by mouth once daily.       citalopram (CeleXA) 40 mg tablet        diphenoxylate-atropine (Lomotil) 2.5-0.025 mg tablet 2 tablets 4 times a day as needed for diarrhea.       fenofibrate (Triglide) 160 mg tablet Take 1 tablet (160 mg) by mouth once daily.       ferrous sulfate 325 (65 Fe) MG tablet Take 1 tablet (325 mg) by mouth once daily.       gabapentin (Neurontin) 100 mg capsule Take 2 capsules (200 mg) by mouth once daily at bedtime.       hydrALAZINE (Apresoline) 50 mg tablet Take 1 tablet (50 mg) by mouth 2 times a day.       icosapent ethyL (Vascepa) 1 gram capsule Take 1 capsule (1 g) by mouth twice a day.       insulin glargine (Lantus U-100 Insulin) 100 unit/mL injection Inject 10 Units under the skin once daily in the morning.       insulin lispro (HumaLOG) 100 unit/mL injection Inject under the skin 3 times a day with meals. Using a sliding scale       lidocaine-prilocaine (Emla) 2.5-2.5 % cream Apply topically to affected area 30 min prior to port       magnesium oxide (Mag-Ox) 400 mg (241.3 mg magnesium) tablet Take 2 tablets (800 mg) by mouth 2 times a day.       multivitamin with minerals tablet 1 tablet DAILY  (route: oral)       pantoprazole (ProtoNix) 40 mg EC tablet Take 1 tablet (40 mg) by mouth twice a day.       rosuvastatin (Crestor) 40 mg tablet Take 1 tablet (40 mg) by mouth once daily at bedtime.       tacrolimus (Prograf) 1 mg capsule TAKE 4 CAPSULES every morning and TAKE 3 CAPSULES every evening            Current Facility-Administered Medications:     albuterol 90 mcg/actuation inhaler 2 puff, 2 puff, inhalation, q4h PRN, Fer Solis MD    dextrose 10 % in water (D10W) infusion, 0.3 g/kg/hr, intravenous, Once PRN, Fer Solis MD    dextrose 50 % injection 25 g, 25 g, intravenous, q15 min PRN, Fer Solis MD    glucagon (Glucagen) injection 1 mg, 1 mg, intramuscular, q15 min PRN, Fer Solis MD    heparin (porcine) injection 5,000 Units, 5,000 Units, subcutaneous, q8h, No Shipley MD, 5,000 Units at 10/07/23 1019    pantoprazole (ProtoNix) injection 40 mg, 40 mg, intravenous, q12h, Fer Solis MD, 40 mg at 10/07/23 0835    piperacillin-tazobactam-dextrose (Zosyn) IV 3.375 g, 3.375 g, intravenous, q6h, Fer Solis MD, Stopped at 10/07/23 1500    tacrolimus (Prograf) capsule 1.5 mg, 1.5 mg, sublingual, q24h, Fer Solis MD    tacrolimus (Prograf) capsule 2 mg, 2 mg, sublingual, Daily, Fer Solis MD, 2 mg at 10/07/23 0623    Pertinent Review of Systems:    Review of Systems   Constitutional:  Positive for appetite change, fatigue and fever.   Gastrointestinal:  Positive for abdominal pain, nausea and vomiting.     Physical Exam:    Vital Signs:    Vitals:    10/07/23 0555 10/07/23 0929 10/07/23 1330 10/07/23 1609   BP: 140/83 138/76 163/89 156/87   BP Location: Right arm Left arm Right arm Right arm   Patient Position: Sitting Lying Lying Lying   Pulse: 87 76 78 77   Resp: 16 18 18 18   Temp: 35.8 °C (96.4 °F) 36 °C (96.8 °F) 36.4 °C (97.5 °F) 36.4 °C (97.5 °F)   TempSrc: Temporal   Temporal   SpO2: 97%   97%   Weight:       Height:           General appearance: well appearing, no acute distress  Skin:  no jaundice  Head: normal  Eyes: anicteric sclera  Lungs: lungs clear to auscultation, no wheezing or rhonchi  Heart: RRR without murmur, gallop, or rubs.  No ectopy  Abdomen: Normal abdominal exam, Abdomen soft, non-tender. Bowel sounds normal. No masses, organomegaly  Extremities: Extremities normal. No lower extremity edema.  Neuro: AOx3.    Diagnostic Testing:    Labs:  Lab Results   Component Value Date    WBC 5.7 10/07/2023    HGB 8.9 (L) 10/07/2023    HCT 26.5 (L) 10/07/2023    MCV 89 10/07/2023     10/07/2023     Lab Results   Component Value Date    GLUCOSE 119 (H) 10/07/2023    CALCIUM 9.4 10/07/2023     10/07/2023    K 4.1 10/07/2023    CO2 25 10/07/2023     10/07/2023    BUN 51 (H) 10/07/2023    CREATININE 2.80 (H) 10/07/2023     Reviewed:  Recent pertinent imaging/procedures    Assessment:     Chung Goodman is a 67 y.o. 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 Coli bacteremia, and LFT derangements.    Gastroenterology is consulted for concern for recurrent afferent limb syndrome and consideration of biliary infection. Patient recently presented with concern for afferent limb syndrome s/p EGD 9/26/23 with noted benign stenosis of afferent limb anastamosis traversed with noted dilation secondary to gastroscope with improved symptoms after procedure. Patient now has new symptoms of fevers, E. Coli bacteremia, elevated LFTs with normal Tbil, and no biliary duct dilation with consideration of biliary infection in setting of no additional obvious infectious etiology. Patient without improvement of symptoms with nasogastric  decompression with minimal output and recurrent displacement. Additional unclear finding of potential mass in proximal small bowel concerning for carcinoid versus scarring.    Plan:        - plan for endoscopic evaluation Monday 10/9/23 pending discussion with advanced endoscopy for management of afferent limb syndrome (consider balloon dilation), potential evaluation of hepaticojejunostomy, and potential evaluation of uncertain proximal small bowel mass (may require pediatric colonoscope with cap, fluoroscopy, anesthesia)  - recommend repeat infectious evaluation, continue antibiotic management  - review imaging with radiology regarding small bowel obstruction and concern for proximal small bowel mass  - appreciate surgical oncology recommendations, gauge distance from duodenojejunostomy to hepaticojejunostomy  - defer supportive care per primary team    Patient to be seen and staffed with Dr. Eloisa Henriquez tomorrow 10/8/23. Patient discussed with Dr. Afshin Gupta today 10/7/23.  Thank you for the consultation. Gastroenterology will continue to the follow the patient.   Please do not hesitate to contact me or page [phone] if there are any further questions between the weekday hours of 7 AM - 5 PM.   If there is an urgent concern during the weekend, after-hours, or holidays; then please page the on-call GI fellow at [phone]. Thank you.    SIGNATURE: Lenny Ulloa MD PATIENT NAME: Chung Goodman   DATE: October 7, 2023 MRN: [id number]       I saw and evaluated the patient. I personally obtained the key and critical portions of the history and physical exam or was physically present for key and critical portions performed by the resident/fellow. I reviewed the resident/fellow's documentation and discussed the patient with the resident/fellow. I agree with the resident/fellow's medical decision making as documented in the note.    Eloisa Henriquez MD

## 2023-10-08 NOTE — H&P (VIEW-ONLY)
Reason For Consult  Concern for SBO in the setting of pancreatic cancer s/p Whipple by Dr. Johnson on 5/19/23    History Of Present Illness  Chung Goodman is a 67 y.o. male with history of heart transplant in 2017 and whipple for pancreatic cancer on 5/19/2023 who presents for abdominal pain concerning for a recurrent SBO.    Patient was recently admitted from 9/23/23-9/29/23 with presentation and imaging consistent with SBO.  NGT placed for decompression. GI consulted and underwent EGD.  EGD 9/26/23 with concern for benign stenosis of afferent limb that was traversed with gastroscope with noted upstream jejunal dilation, afferent limb stenosis  much improved after endoscope removal with likely dilation effect from endoscope and unclear if narrowing was intrinsic or extrinsic in nature, no endoscopic evidence of malignant involvement in stenosis. no additional jejunal stenotic regions noted in  the afferent limb but unable to reach hepaticojejunostomy or pancreaticojejunostomy due to significant scope looping, patent efferent limb anastomosis that was easily traversable, clean based ulcer with pigmented spots noted in gastric body that was likely related to recent NGT placement.  NGT removed, diet advanced as tolerated, and he was discharged home.    Patient reports that since then he has been having multiple episodes of bilious emesis. States that abdominal pain has been constant for the past two months. Has been passing regular flatus and having bowel movements.        Past Medical History  He has a past medical history of Myopia, bilateral (06/06/2017), Other disorders of electrolyte and fluid balance, not elsewhere classified (11/03/2014), Other visual disturbances (06/06/2017), Personal history of diseases of the blood and blood-forming organs and certain disorders involving the immune mechanism (06/06/2017), Personal history of diseases of the blood and blood-forming organs and certain disorders involving  the immune mechanism (12/17/2014), Personal history of diseases of the skin and subcutaneous tissue (08/01/2014), Personal history of other diseases of the circulatory system (08/21/2017), Personal history of other mental and behavioral disorders (11/07/2017), Personal history of other mental and behavioral disorders (06/21/2018), Personal history of other specified conditions (07/26/2017), Personal history of other specified conditions (09/04/2014), Personal history of urinary (tract) infections (05/12/2017), Restlessness and agitation, Rheumatic mitral valve disease, unspecified (12/01/2017), Right heart failure, unspecified (CMS/HCC) (05/22/2017), Unspecified exotropia (09/12/2014), and Unspecified intermittent heterotropia (06/06/2017).    Surgical History  He has a past surgical history that includes Other surgical history (09/06/2013); Other surgical history (02/12/2016); Other surgical history (02/12/2016); Other surgical history (12/01/2017); Other surgical history (12/01/2017); Other surgical history (12/17/2014); Mitral valve replacement (12/17/2014); and Aortic valve replacement (09/03/2014).     Social History  He reports that he quit smoking about 22 years ago. His smoking use included cigarettes. He has never used smokeless tobacco. He reports that he does not drink alcohol and does not use drugs.    Family History  Family History   Problem Relation Name Age of Onset    COPD Mother      Other (systemic lupus erythematosus) Mother      Hypertension Father          benign    Arthritis Other Family History     Ulcerative colitis Other Family History     Gout Other Family History     Psoriasis Other Family History     Other (systemic lupus erythematosus) Other Family History         Allergies  Amiodarone and Morphine    Review of Systems  As stated in HPI     Physical Exam  General: laying bed, in no acute distress  Head/Neck: NC/AT  Cardio: regular rate  Pulm: nonlabored on room air  GI: NGT in place to  "LIWS, midline scar, soft, diffusely tender, nondistended     Last Recorded Vitals  Blood pressure 156/87, pulse 77, temperature 36.4 °C (97.5 °F), temperature source Temporal, resp. rate 18, height 1.765 m (5' 9.49\"), weight 72.4 kg (159 lb 8.4 oz), SpO2 97 %.    Relevant Results      Scheduled medications  heparin (porcine), 5,000 Units, subcutaneous, q8h  pantoprazole, 40 mg, intravenous, q12h  piperacillin-tazobactam, 3.375 g, intravenous, q6h  tacrolimus, 1.5 mg, sublingual, q24h  tacrolimus, 2 mg, sublingual, Daily      Continuous medications     PRN medications  PRN medications: albuterol, dextrose 10 % in water (D10W), dextrose, glucagon, HYDROmorphone  Results for orders placed or performed during the hospital encounter of 10/07/23 (from the past 24 hour(s))   CBC and Auto Differential   Result Value Ref Range    WBC 5.7 4.4 - 11.3 x10*3/uL    nRBC 0.0 0.0 - 0.0 /100 WBCs    RBC 2.97 (L) 4.50 - 5.90 x10*6/uL    Hemoglobin 8.9 (L) 13.5 - 17.5 g/dL    Hematocrit 26.5 (L) 41.0 - 52.0 %    MCV 89 80 - 100 fL    MCH 30.0 26.0 - 34.0 pg    MCHC 33.6 32.0 - 36.0 g/dL    RDW 15.0 (H) 11.5 - 14.5 %    Platelets 205 150 - 450 x10*3/uL    MPV 10.3 7.5 - 11.5 fL    Neutrophils % 67.8 40.0 - 80.0 %    Immature Granulocytes %, Automated 0.7 0.0 - 0.9 %    Lymphocytes % 11.3 13.0 - 44.0 %    Monocytes % 14.6 2.0 - 10.0 %    Eosinophils % 5.1 0.0 - 6.0 %    Basophils % 0.5 0.0 - 2.0 %    Neutrophils Absolute 3.85 1.20 - 7.70 x10*3/uL    Immature Granulocytes Absolute, Automated 0.04 0.00 - 0.70 x10*3/uL    Lymphocytes Absolute 0.64 (L) 1.20 - 4.80 x10*3/uL    Monocytes Absolute 0.83 0.10 - 1.00 x10*3/uL    Eosinophils Absolute 0.29 0.00 - 0.70 x10*3/uL    Basophils Absolute 0.03 0.00 - 0.10 x10*3/uL   Magnesium   Result Value Ref Range    Magnesium 2.36 1.60 - 2.40 mg/dL   Hepatic Function Panel   Result Value Ref Range    Albumin 3.2 (L) 3.4 - 5.0 g/dL    Bilirubin, Total 0.5 0.0 - 1.2 mg/dL    Bilirubin, Direct 0.3 0.0 - " 0.3 mg/dL    Alkaline Phosphatase 239 (H) 33 - 136 U/L    ALT 79 (H) 10 - 52 U/L    AST 80 (H) 9 - 39 U/L    Total Protein 5.5 (L) 6.4 - 8.2 g/dL   Coagulation Screen   Result Value Ref Range    Protime 12.7 9.8 - 12.8 seconds    INR 1.1 0.9 - 1.1    aPTT 26 (L) 27 - 38 seconds   Blood Gas Venous Full Panel   Result Value Ref Range    POCT pH, Venous 7.35 7.33 - 7.43 pH    POCT pCO2, Venous 47 41 - 51 mm Hg    POCT pO2, Venous 45 35 - 45 mm Hg    POCT SO2, Venous 66 45 - 75 %    POCT Oxy Hemoglobin, Venous 64.7 45.0 - 75.0 %    POCT Hematocrit Calculated, Venous 35.0 (L) 41.0 - 52.0 %    POCT Sodium, Venous 138 136 - 145 mmol/L    POCT Potassium, Venous 4.0 3.5 - 5.3 mmol/L    POCT Chloride, Venous 106 98 - 107 mmol/L    POCT Ionized Calicum, Venous 1.28 1.10 - 1.33 mmol/L    POCT Glucose, Venous 117 (H) 74 - 99 mg/dL    POCT Lactate, Venous 0.9 0.4 - 2.0 mmol/L    POCT Base Excess, Venous -0.1 -2.0 - 3.0 mmol/L    POCT HCO3 Calculated, Venous 25.9 22.0 - 26.0 mmol/L    POCT Hemoglobin, Venous 11.5 (L) 13.5 - 17.5 g/dL    POCT Anion Gap, Venous 10.0 10.0 - 25.0 mmol/L    Patient Temperature 37.0 degrees Celsius    FiO2 0 %   Phosphorus   Result Value Ref Range    Phosphorus 2.8 2.5 - 4.9 mg/dL   Basic Metabolic Panel   Result Value Ref Range    Glucose 119 (H) 74 - 99 mg/dL    Sodium 142 136 - 145 mmol/L    Potassium 4.1 3.5 - 5.3 mmol/L    Chloride 105 98 - 107 mmol/L    Bicarbonate 25 21 - 32 mmol/L    Anion Gap 16 10 - 20 mmol/L    Urea Nitrogen 51 (H) 6 - 23 mg/dL    Creatinine 2.80 (H) 0.50 - 1.30 mg/dL    eGFR 24 (L) >60 mL/min/1.73m*2    Calcium 9.4 8.6 - 10.6 mg/dL   Tacrolimus level   Result Value Ref Range    Tacrolimus  4.6 <=15.0 ng/mL   Blood Culture    Specimen: Peripheral Venipuncture; Blood culture   Result Value Ref Range    Blood Culture Loaded on Instrument - Culture in progress    Blood Culture    Specimen: Peripheral Venipuncture; Blood culture   Result Value Ref Range    Blood Culture Loaded on  Instrument - Culture in progress    Blood Culture    Specimen: Mediport; Blood culture   Result Value Ref Range    Blood Culture Loaded on Instrument - Culture in progress    POCT GLUCOSE   Result Value Ref Range    POCT Glucose 115 (H) 74 - 99 mg/dL   POCT GLUCOSE   Result Value Ref Range    POCT Glucose 116 (H) 74 - 99 mg/dL        Assessment/Plan   Chung Goodman is a 67 y.o. male with history of heart transplant in 2017 and whipple for pancreatic cancer on 5/19/2023 who presents for abdominal pain concerning for a recurrent SBO.    Recommendations:   - no acute surgical intervention indicated  - agree with plan for endoscopic evaluation Monday 10/9/23   - continue NGT to LIWS, leave blue port uncapped  - please make sure to run IVF while NPO  - continue pain and nausea control    Patient was discussed with Dr. Prabhakar.    Shlya Pritchett MD  Surgical Oncology   [id number]

## 2023-10-08 NOTE — PROGRESS NOTES
Subjective Data:  No fever overnight. His BP ranges 140-160/70-90s mmHg in the past 24 hours. He was given Hydralazine IVP x1. Net I/O negative 200 ml in 24 hours. He denies chest pain, SOB, orthopnea, PND, leg edema, dizziness. He reports mild abdominal discomfort. His latest Tacrolimus level (10/8): 6.1. He remains NPO status.     Objective Data:  Last Recorded Vitals:  Vitals:    10/08/23 0042 10/08/23 0131 10/08/23 0549 10/08/23 0900   BP: (!) 189/96 147/74 152/84 155/86   BP Location:  Right arm     Patient Position:  Lying     Pulse: 95 103 91 92   Resp: 18  18 18   Temp: 36 °C (96.8 °F)  36.2 °C (97.2 °F) 36.4 °C (97.5 °F)   TempSrc: Temporal  Temporal Temporal   SpO2: 100%  97% 99%   Weight:       Height:           Last Labs:  CBC - 10/8/2023:  5:31 AM  8.7 9.6 261    28.4      CMP - 10/8/2023:  5:31 AM  9.8 5.7 162 --- 1.5   4.4 3.2 108 490      PTT - 10/8/2023:  5:31 AM  1.1   11.9 28     TROPHS   Date/Time Value Ref Range Status   09/26/2023 05:31 AM 18 0 - 53 ng/L Final     Comment:     .  Less than 99th percentile of normal range cutoff-  Female and children under 18 years old <35 ng/L; Male <54 ng/L: Negative  Repeat testing should be performed if clinically indicated.   .  Female and children under 18 years old  ng/L; Male  ng/L:  Consistent with possible cardiac damage and possible increased clinical   risk. Serial measurements may help to assess extent of myocardial damage.   .  >120 ng/L: Consistent with cardiac damage, increased clinical risk and  myocardial infarction. Serial measurements may help assess extent of   myocardial damage.   .   NOTE: Children less than 1 year old may have higher baseline troponin   levels and results should be interpreted in conjunction with the overall   clinical context.  .  NOTE: Troponin I testing is performed using a different   testing methodology at [facility] than at other   [facility] hospitals. Direct result comparisons should only    be made within the same method.     01/17/2023 04:58 PM 13 0 - 20 ng/L Final     Comment:     .  Less than 99th percentile of normal range cutoff-  Female and children under 18 years old <14 ng/L; Male <21 ng/L: Negative  Repeat testing should be performed if clinically indicated.   .  Female and children under 18 years old 14-50 ng/L; Male 21-50 ng/L:  Consistent with possible cardiac damage and possible increased clinical   risk. Serial measurements may help to assess extent of myocardial damage.   .  >50 ng/L: Consistent with cardiac damage, increased clinical risk and  myocardial infarction. Serial measurements may help assess extent of   myocardial damage.   .   NOTE: Children less than 1 year old may have higher baseline troponin   levels and results should be interpreted in conjunction with the overall   clinical context.   .  NOTE: Troponin I testing is performed using a different   testing methodology at [facility] than at other   [facility]. Direct result comparisons should only   be made within the same method.       BNP   Date/Time Value Ref Range Status   05/24/2023 11:41  0 - 99 pg/mL Final     Comment:     .  <100 pg/mL - Heart failure unlikely  100-299 pg/mL - Intermediate probability of acute heart  .               failure exacerbation. Correlate with clinical  .               context and patient history.    >=300 pg/mL - Heart Failure likely. Correlate with clinical  .               context and patient history.   Biotin interference may cause falsely decreased results.   Patients taking a Biotin dose of up to 5 mg/day should   refrain from taking Biotin for 24 hours before sample   collection. Providers may contact their local laboratory   for further information.     10/29/2019 04:07  0 - 99 pg/mL Final     Comment:     .  <100 pg/mL - Heart failure unlikely  100-299 pg/mL - Intermediate probability of acute heart  .               failure exacerbation. Correlate  with clinical  .               context and patient history.    >=300 pg/mL - Heart Failure likely. Correlate with clinical  .               context and patient history.  BNP testing is performed using different testing   methodology at [facility] than at other   [facility] hospitals. Direct result comparisons should   only be made within the same method.       HGBA1C   Date/Time Value Ref Range Status   08/15/2023 10:45 AM 8.3 % Final     Comment:          Diagnosis of Diabetes-Adults   Non-Diabetic: < or = 5.6%   Increased risk for developing diabetes: 5.7-6.4%   Diagnostic of diabetes: > or = 6.5%  .       Monitoring of Diabetes                Age (y)     Therapeutic Goal (%)   Adults:          >18           <7.0   Pediatrics:    13-18           <7.5                   7-12           <8.0                   0- 6            7.5-8.5   American Diabetes Association. Diabetes Care 33(S1), Jan 2010.     05/16/2023 04:29 PM 7.4 % Final     Comment:          Diagnosis of Diabetes-Adults   Non-Diabetic: < or = 5.6%   Increased risk for developing diabetes: 5.7-6.4%   Diagnostic of diabetes: > or = 6.5%  .       Monitoring of Diabetes                Age (y)     Therapeutic Goal (%)   Adults:          >18           <7.0   Pediatrics:    13-18           <7.5                   7-12           <8.0                   0- 6            7.5-8.5   American Diabetes Association. Diabetes Care 33(S1), Jan 2010.     10/20/2020 01:11 PM 9.4 4.3 - 5.6 % Final     Comment:     American Diabetes Association guidelines indicate that patients with HgbA1c in   the range 5.7-6.4% are at increased risk for development of diabetes, and   intervention by lifestyle modification may be beneficial. HgbA1c greater or   equal to 6.5% is considered diagnostic of diabetes.     VLDL   Date/Time Value Ref Range Status   08/15/2023 10:45 AM 15 0 - 40 mg/dL Final   05/16/2023 04:29 PM 20 0 - 40 mg/dL Final   04/27/2023 01:01 PM 38 0 - 40 mg/dL Final       Last I/O:  I/O last 3 completed shifts:  In: 150 (2.1 mL/kg) [IV Piggyback:150]  Out: 401 (5.5 mL/kg) [Urine:251 (0.1 mL/kg/hr); Emesis/NG output:150]  Weight: 72.4 kg     Inpatient Medications:  Scheduled medications   Medication Dose Route Frequency    heparin (porcine)  5,000 Units subcutaneous q8h    pantoprazole  40 mg intravenous q12h    piperacillin-tazobactam  3.375 g intravenous q6h    tacrolimus  1.5 mg sublingual q24h    tacrolimus  2 mg sublingual Daily     PRN medications   Medication    albuterol    dextrose 10 % in water (D10W)    dextrose    glucagon    HYDROmorphone    phenoL     Continuous Medications   Medication Dose Last Rate     Physical Exam:  Constitutional:       General: Well developed adult without acute distress      Appearance: Normal appearance.   HENT:      Head: Normocephalic and atraumatic. No JVD, +nasogastric tube attached to suction  Eyes:      Extraocular Movements: Extraocular movements intact.      Conjunctiva/sclera: Conjunctivae normal.   Cardiovascular:      Rate and Rhythm: Normal rate and regular rhythm. No S3.      Heart sounds: Normal heart sounds. No murmurs or gallups.      Extremities: Warm distal extremities, no bipedal edema  Pulmonary:      Effort: Pulmonary effort is normal. No respiratory distress.      Breath sounds: Normal breath sounds bilaterally. No wheezing or rales. No cough.  Abdominal:      General: There is no distension. Active bowel sounds.      Palpations: Abdomen is soft and non-tender.     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 Blood culture + E coli. [Immunosuppressive regimen at home: Tacrolimus 4 mg/3 mg (Tacrolimus Goal 5-8), prednisone 5 mg daily. ]     No rejection history. Latest DSE: negative (5/2023).      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 Coli bacteremia, currently on IV antibiotic     Recommendations:  -Continue Tacrolimus 2 mg SL at 0630 then 1.5 mg SL at 1830. Latest tacrolimus trough level 6.1 (10/8).   -Tacrolimus Target goal: 5-8  -Check daily Tacrolimus trough level at 0600.  -Hold steroid for now.    HF service will continue to follow.    Discussed with HF attending, Dr. GENESIS Barone.    Signed:    Alberto Nj MD  Heart Failure service

## 2023-10-08 NOTE — SIGNIFICANT EVENT
Plan for endoscopic procedures tomorrow 10/9/23. Please make NPO at midnight and hold AM DVT prophylaxis. Continue antibiotics and follow up pending blood cultures. Discussed plans with patient and family.    Thank you for the consultation. Gastroenterology will continue to the follow the patient.   Please do not hesitate to contact me or page [phone] if there are any further questions between the weekday hours of 7 AM - 5 PM.   If there is an urgent concern during the weekend, after-hours, or holidays; then please page the on-[place] GI [place] [phone]. Thank you.

## 2023-10-08 NOTE — CONSULTS
Reason For Consult  Concern for SBO in the setting of pancreatic cancer s/p Whipple by Dr. Johnson on 5/19/23    History Of Present Illness  Chung Goodman is a 67 y.o. male with history of heart transplant in 2017 and whipple for pancreatic cancer on 5/19/2023 who presents for abdominal pain concerning for a recurrent SBO.    Patient was recently admitted from 9/23/23-9/29/23 with presentation and imaging consistent with SBO.  NGT placed for decompression. GI consulted and underwent EGD.  EGD 9/26/23 with concern for benign stenosis of afferent limb that was traversed with gastroscope with noted upstream jejunal dilation, afferent limb stenosis  much improved after endoscope removal with likely dilation effect from endoscope and unclear if narrowing was intrinsic or extrinsic in nature, no endoscopic evidence of malignant involvement in stenosis. no additional jejunal stenotic regions noted in  the afferent limb but unable to reach hepaticojejunostomy or pancreaticojejunostomy due to significant scope looping, patent efferent limb anastomosis that was easily traversable, clean based ulcer with pigmented spots noted in gastric body that was likely related to recent NGT placement.  NGT removed, diet advanced as tolerated, and he was discharged home.    Patient reports that since then he has been having multiple episodes of bilious emesis. States that abdominal pain has been constant for the past two months. Has been passing regular flatus and having bowel movements.        Past Medical History  He has a past medical history of Myopia, bilateral (06/06/2017), Other disorders of electrolyte and fluid balance, not elsewhere classified (11/03/2014), Other visual disturbances (06/06/2017), Personal history of diseases of the blood and blood-forming organs and certain disorders involving the immune mechanism (06/06/2017), Personal history of diseases of the blood and blood-forming organs and certain disorders involving  the immune mechanism (12/17/2014), Personal history of diseases of the skin and subcutaneous tissue (08/01/2014), Personal history of other diseases of the circulatory system (08/21/2017), Personal history of other mental and behavioral disorders (11/07/2017), Personal history of other mental and behavioral disorders (06/21/2018), Personal history of other specified conditions (07/26/2017), Personal history of other specified conditions (09/04/2014), Personal history of urinary (tract) infections (05/12/2017), Restlessness and agitation, Rheumatic mitral valve disease, unspecified (12/01/2017), Right heart failure, unspecified (CMS/HCC) (05/22/2017), Unspecified exotropia (09/12/2014), and Unspecified intermittent heterotropia (06/06/2017).    Surgical History  He has a past surgical history that includes Other surgical history (09/06/2013); Other surgical history (02/12/2016); Other surgical history (02/12/2016); Other surgical history (12/01/2017); Other surgical history (12/01/2017); Other surgical history (12/17/2014); Mitral valve replacement (12/17/2014); and Aortic valve replacement (09/03/2014).     Social History  He reports that he quit smoking about 22 years ago. His smoking use included cigarettes. He has never used smokeless tobacco. He reports that he does not drink alcohol and does not use drugs.    Family History  Family History   Problem Relation Name Age of Onset    COPD Mother      Other (systemic lupus erythematosus) Mother      Hypertension Father          benign    Arthritis Other Family History     Ulcerative colitis Other Family History     Gout Other Family History     Psoriasis Other Family History     Other (systemic lupus erythematosus) Other Family History         Allergies  Amiodarone and Morphine    Review of Systems  As stated in HPI     Physical Exam  General: laying bed, in no acute distress  Head/Neck: NC/AT  Cardio: regular rate  Pulm: nonlabored on room air  GI: NGT in place to  "LIWS, midline scar, soft, diffusely tender, nondistended     Last Recorded Vitals  Blood pressure 156/87, pulse 77, temperature 36.4 °C (97.5 °F), temperature source Temporal, resp. rate 18, height 1.765 m (5' 9.49\"), weight 72.4 kg (159 lb 8.4 oz), SpO2 97 %.    Relevant Results      Scheduled medications  heparin (porcine), 5,000 Units, subcutaneous, q8h  pantoprazole, 40 mg, intravenous, q12h  piperacillin-tazobactam, 3.375 g, intravenous, q6h  tacrolimus, 1.5 mg, sublingual, q24h  tacrolimus, 2 mg, sublingual, Daily      Continuous medications     PRN medications  PRN medications: albuterol, dextrose 10 % in water (D10W), dextrose, glucagon, HYDROmorphone  Results for orders placed or performed during the hospital encounter of 10/07/23 (from the past 24 hour(s))   CBC and Auto Differential   Result Value Ref Range    WBC 5.7 4.4 - 11.3 x10*3/uL    nRBC 0.0 0.0 - 0.0 /100 WBCs    RBC 2.97 (L) 4.50 - 5.90 x10*6/uL    Hemoglobin 8.9 (L) 13.5 - 17.5 g/dL    Hematocrit 26.5 (L) 41.0 - 52.0 %    MCV 89 80 - 100 fL    MCH 30.0 26.0 - 34.0 pg    MCHC 33.6 32.0 - 36.0 g/dL    RDW 15.0 (H) 11.5 - 14.5 %    Platelets 205 150 - 450 x10*3/uL    MPV 10.3 7.5 - 11.5 fL    Neutrophils % 67.8 40.0 - 80.0 %    Immature Granulocytes %, Automated 0.7 0.0 - 0.9 %    Lymphocytes % 11.3 13.0 - 44.0 %    Monocytes % 14.6 2.0 - 10.0 %    Eosinophils % 5.1 0.0 - 6.0 %    Basophils % 0.5 0.0 - 2.0 %    Neutrophils Absolute 3.85 1.20 - 7.70 x10*3/uL    Immature Granulocytes Absolute, Automated 0.04 0.00 - 0.70 x10*3/uL    Lymphocytes Absolute 0.64 (L) 1.20 - 4.80 x10*3/uL    Monocytes Absolute 0.83 0.10 - 1.00 x10*3/uL    Eosinophils Absolute 0.29 0.00 - 0.70 x10*3/uL    Basophils Absolute 0.03 0.00 - 0.10 x10*3/uL   Magnesium   Result Value Ref Range    Magnesium 2.36 1.60 - 2.40 mg/dL   Hepatic Function Panel   Result Value Ref Range    Albumin 3.2 (L) 3.4 - 5.0 g/dL    Bilirubin, Total 0.5 0.0 - 1.2 mg/dL    Bilirubin, Direct 0.3 0.0 - " 0.3 mg/dL    Alkaline Phosphatase 239 (H) 33 - 136 U/L    ALT 79 (H) 10 - 52 U/L    AST 80 (H) 9 - 39 U/L    Total Protein 5.5 (L) 6.4 - 8.2 g/dL   Coagulation Screen   Result Value Ref Range    Protime 12.7 9.8 - 12.8 seconds    INR 1.1 0.9 - 1.1    aPTT 26 (L) 27 - 38 seconds   Blood Gas Venous Full Panel   Result Value Ref Range    POCT pH, Venous 7.35 7.33 - 7.43 pH    POCT pCO2, Venous 47 41 - 51 mm Hg    POCT pO2, Venous 45 35 - 45 mm Hg    POCT SO2, Venous 66 45 - 75 %    POCT Oxy Hemoglobin, Venous 64.7 45.0 - 75.0 %    POCT Hematocrit Calculated, Venous 35.0 (L) 41.0 - 52.0 %    POCT Sodium, Venous 138 136 - 145 mmol/L    POCT Potassium, Venous 4.0 3.5 - 5.3 mmol/L    POCT Chloride, Venous 106 98 - 107 mmol/L    POCT Ionized Calicum, Venous 1.28 1.10 - 1.33 mmol/L    POCT Glucose, Venous 117 (H) 74 - 99 mg/dL    POCT Lactate, Venous 0.9 0.4 - 2.0 mmol/L    POCT Base Excess, Venous -0.1 -2.0 - 3.0 mmol/L    POCT HCO3 Calculated, Venous 25.9 22.0 - 26.0 mmol/L    POCT Hemoglobin, Venous 11.5 (L) 13.5 - 17.5 g/dL    POCT Anion Gap, Venous 10.0 10.0 - 25.0 mmol/L    Patient Temperature 37.0 degrees Celsius    FiO2 0 %   Phosphorus   Result Value Ref Range    Phosphorus 2.8 2.5 - 4.9 mg/dL   Basic Metabolic Panel   Result Value Ref Range    Glucose 119 (H) 74 - 99 mg/dL    Sodium 142 136 - 145 mmol/L    Potassium 4.1 3.5 - 5.3 mmol/L    Chloride 105 98 - 107 mmol/L    Bicarbonate 25 21 - 32 mmol/L    Anion Gap 16 10 - 20 mmol/L    Urea Nitrogen 51 (H) 6 - 23 mg/dL    Creatinine 2.80 (H) 0.50 - 1.30 mg/dL    eGFR 24 (L) >60 mL/min/1.73m*2    Calcium 9.4 8.6 - 10.6 mg/dL   Tacrolimus level   Result Value Ref Range    Tacrolimus  4.6 <=15.0 ng/mL   Blood Culture    Specimen: Peripheral Venipuncture; Blood culture   Result Value Ref Range    Blood Culture Loaded on Instrument - Culture in progress    Blood Culture    Specimen: Peripheral Venipuncture; Blood culture   Result Value Ref Range    Blood Culture Loaded on  Instrument - Culture in progress    Blood Culture    Specimen: Mediport; Blood culture   Result Value Ref Range    Blood Culture Loaded on Instrument - Culture in progress    POCT GLUCOSE   Result Value Ref Range    POCT Glucose 115 (H) 74 - 99 mg/dL   POCT GLUCOSE   Result Value Ref Range    POCT Glucose 116 (H) 74 - 99 mg/dL        Assessment/Plan   Chung Goodman is a 67 y.o. male with history of heart transplant in 2017 and whipple for pancreatic cancer on 5/19/2023 who presents for abdominal pain concerning for a recurrent SBO.    Recommendations:   - no acute surgical intervention indicated  - agree with plan for endoscopic evaluation Monday 10/9/23   - continue NGT to LIWS, leave blue port uncapped  - please make sure to run IVF while NPO  - continue pain and nausea control    Patient was discussed with Dr. Prabhakar.    Shyla Pritchett MD  Surgical Oncology   [phone]

## 2023-10-08 NOTE — PROGRESS NOTES
"Chung Goodman is a 67 y.o. male w/ PMHx cardiac transplant and pancreatic cancer requiring whipple 5/19/23 on day 1 of admission presenting with SBO (small bowel obstruction) (CMS/HCC).    Subjective     This morning, pt  voiced much disappointment in the care provided thus far since his whipple in May. Pt endorses continued abdominal pain. Input was 150 by IV. Output urine 251, 100 by NG, no bowel movements. Pt remains hemodynamically stable at this time.       Objective     Physical Exam  NEURO: AOx3, pt appears to be in significant discomfort  HEENT: NG in place with blue port hooked to suction, corrected connection and set to LIWS  CARDIO: RRR  PULM: nonlabored breathing  ABD: distended, mildly tender to palpation throughout.    Last Recorded Vitals  Blood pressure 155/86, pulse 92, temperature 36.4 °C (97.5 °F), temperature source Temporal, resp. rate 18, height 1.765 m (5' 9.49\"), weight 72.4 kg (159 lb 8.4 oz), SpO2 99 %.  Intake/Output last 3 Shifts:  I/O last 3 completed shifts:  In: 150 (2.1 mL/kg) [IV Piggyback:150]  Out: 401 (5.5 mL/kg) [Urine:251 (0.1 mL/kg/hr); Emesis/NG output:150]  Weight: 72.4 kg     Relevant Results  Results for orders placed or performed during the hospital encounter of 10/07/23 (from the past 24 hour(s))   Blood Culture    Specimen: Peripheral Venipuncture; Blood culture   Result Value Ref Range    Blood Culture Loaded on Instrument - Culture in progress    Blood Culture    Specimen: Peripheral Venipuncture; Blood culture   Result Value Ref Range    Blood Culture Loaded on Instrument - Culture in progress    Blood Culture    Specimen: Mediport; Blood culture   Result Value Ref Range    Blood Culture Loaded on Instrument - Culture in progress    POCT GLUCOSE   Result Value Ref Range    POCT Glucose 115 (H) 74 - 99 mg/dL   POCT GLUCOSE   Result Value Ref Range    POCT Glucose 116 (H) 74 - 99 mg/dL   POCT GLUCOSE   Result Value Ref Range    POCT Glucose 106 (H) 74 - 99 " mg/dL   POCT GLUCOSE   Result Value Ref Range    POCT Glucose 128 (H) 74 - 99 mg/dL   Tacrolimus level   Result Value Ref Range    Tacrolimus  6.1 <=15.0 ng/mL   Type and screen   Result Value Ref Range    ABO TYPE B     Rh TYPE POS     ANTIBODY SCREEN NEG    Coagulation Screen   Result Value Ref Range    Protime 11.9 9.8 - 12.8 seconds    INR 1.1 0.9 - 1.1    aPTT 28 27 - 38 seconds   CBC   Result Value Ref Range    WBC 8.7 4.4 - 11.3 x10*3/uL    nRBC 0.0 0.0 - 0.0 /100 WBCs    RBC 3.25 (L) 4.50 - 5.90 x10*6/uL    Hemoglobin 9.6 (L) 13.5 - 17.5 g/dL    Hematocrit 28.4 (L) 41.0 - 52.0 %    MCV 87 80 - 100 fL    MCH 29.5 26.0 - 34.0 pg    MCHC 33.8 32.0 - 36.0 g/dL    RDW 14.5 11.5 - 14.5 %    Platelets 261 150 - 450 x10*3/uL    MPV 10.2 7.5 - 11.5 fL   Magnesium   Result Value Ref Range    Magnesium 2.08 1.60 - 2.40 mg/dL   Comprehensive Metabolic Panel   Result Value Ref Range    Glucose 128 (H) 74 - 99 mg/dL    Sodium 144 136 - 145 mmol/L    Potassium 4.7 3.5 - 5.3 mmol/L    Chloride 104 98 - 107 mmol/L    Bicarbonate 21 21 - 32 mmol/L    Anion Gap 24 (H) 10 - 20 mmol/L    Urea Nitrogen 44 (H) 6 - 23 mg/dL    Creatinine 2.40 (H) 0.50 - 1.30 mg/dL    eGFR 29 (L) >60 mL/min/1.73m*2    Calcium 9.8 8.6 - 10.6 mg/dL    Albumin 3.2 (L) 3.4 - 5.0 g/dL    Alkaline Phosphatase 490 (H) 33 - 136 U/L    Total Protein 5.7 (L) 6.4 - 8.2 g/dL     (H) 9 - 39 U/L    Bilirubin, Total 1.5 (H) 0.0 - 1.2 mg/dL     (H) 10 - 52 U/L   Phosphorus   Result Value Ref Range    Phosphorus 4.4 2.5 - 4.9 mg/dL   POCT GLUCOSE   Result Value Ref Range    POCT Glucose 132 (H) 74 - 99 mg/dL           Assessment/Plan   Principal Problem:    SBO (small bowel obstruction) (CMS/HCC)  Active Problems:    Pancreatic cancer (CMS/HCC)    Small bowel obstruction (CMS/HCC)    Pt is a 68 y/o male w/ PMHx cardiac transplant in 2017, 2 months s/p whipple for pancreatic cancer admitted for concerns of recurrent SBO.    Plan:  - no acute surgical  intervention at this time  - CT evaluated, we estimate distance from GJ to HJ to be approx. 45 cm  - continue NGT to LIWS, leave blue port uncapped  - f/up GI recs    - EGD evaluation  - encouraged ambulation  - encouraged incentive spirometry 10x/hr/day  - rest per primary    TIERRA CLARK, MS4

## 2023-10-09 PROBLEM — I27.20 PULMONARY HYPERTENSION (MULTI): Status: ACTIVE | Noted: 2023-01-01

## 2023-10-09 NOTE — SIGNIFICANT EVENT
I was notified by my co-resident covering ACS that mistakenly received notification that this patient had a code white called and the primary team requested surgery's presence. I immediately went to the patient's bedside and was informed of the events that occurred prior to my arrival.    The patient had been tachycardic to the 120s and had a fever of 38.5, as well as having increased abdominal pain. Primary team reported the patient's abdomen was very tender to palpation with guarding. IV tylenol was administered. Primary team ordered CBC, Lactate, B/Cx2, coag, RFP, lipase, hfp, VBG.    During my encounter with the patient, he reported his abdominal pain felt improved and his chills also improved. Abdomen was nondistended, soft without guarding or rebound tenderness, but moderately tender to palpation. We recommended a 2 view KUB, which upon review at bedside did not show any free air. Also recommended fluid resuscitation, zosyn and fluconazole. Possible CT in the AM.    Update:  Patient seen and examined at bedside. Vitals WNL. Abdominal exam is unchanged from previous. Labs show an elevated lactate of 4.2 and T Bili of 3.8 from 1.5. No leukocytosis. At this time, recommended a CTAP without IV contrast.    Irish Rasmussen MD- PGY1  Surgical Oncology  Pager [phone]

## 2023-10-09 NOTE — PROGRESS NOTES
"Chung Goodman is a 67 y.o. male w/ PMHx cardiac transplant and pancreatic cancer requiring whipple 5/19/23 on day 2 of admission presenting with SBO (small bowel obstruction) (CMS/HCC).    Subjective     Overnight pt was febrile and tachycardic to 120's. Code white was called. KUB and CXR were ordered and were normal. This AM, pt endorses continued abdominal pain. Input was 1.1L by IV. Output urine 900, 350 by NG, no bowel movements. Pt remains hemodynamically stable at this time.       Objective     Physical Exam  NEURO: AOx3, pt appears to be in significant discomfort  HEENT: NG in place on LIWS  CARDIO: RRR  PULM: nonlabored breathing  ABD: distended, mildly tender to palpation throughout.    Last Recorded Vitals  Blood pressure 146/75, pulse 82, temperature 36 °C (96.8 °F), resp. rate 18, height 1.765 m (5' 9.49\"), weight 72.4 kg (159 lb 9.8 oz), SpO2 93 %.  Intake/Output last 3 Shifts:  I/O last 3 completed shifts:  In: 1653.3 (22.8 mL/kg) [I.V.:103.3 (1.4 mL/kg); IV Piggyback:1550]  Out: 2025 (28 mL/kg) [Urine:1425 (0.5 mL/kg/hr); Emesis/NG output:600]  Weight: 72.4 kg     Relevant Results  Results for orders placed or performed during the hospital encounter of 10/07/23 (from the past 24 hour(s))   POCT GLUCOSE   Result Value Ref Range    POCT Glucose 184 (H) 74 - 99 mg/dL   POCT GLUCOSE   Result Value Ref Range    POCT Glucose 176 (H) 74 - 99 mg/dL   POCT GLUCOSE   Result Value Ref Range    POCT Glucose 168 (H) 74 - 99 mg/dL   POCT GLUCOSE   Result Value Ref Range    POCT Glucose 186 (H) 74 - 99 mg/dL   Comprehensive Metabolic Panel   Result Value Ref Range    Glucose 174 (H) 74 - 99 mg/dL    Sodium 147 (H) 136 - 145 mmol/L    Potassium 4.9 3.5 - 5.3 mmol/L    Chloride 103 98 - 107 mmol/L    Bicarbonate 19 (L) 21 - 32 mmol/L    Anion Gap 30 (H) 10 - 20 mmol/L    Urea Nitrogen 47 (H) 6 - 23 mg/dL    Creatinine 2.53 (H) 0.50 - 1.30 mg/dL    eGFR 27 (L) >60 mL/min/1.73m*2    Calcium 10.5 8.6 - 10.6 mg/dL    " Albumin 3.6 3.4 - 5.0 g/dL    Alkaline Phosphatase 601 (H) 33 - 136 U/L    Total Protein 6.5 6.4 - 8.2 g/dL     (H) 9 - 39 U/L    Bilirubin, Total 3.8 (H) 0.0 - 1.2 mg/dL     (H) 10 - 52 U/L   Magnesium   Result Value Ref Range    Magnesium 2.03 1.60 - 2.40 mg/dL   Blood Culture    Specimen: Peripheral Venipuncture; Blood culture   Result Value Ref Range    Blood Culture Loaded on Instrument - Culture in progress    Hepatic function panel   Result Value Ref Range    Albumin 3.7 3.4 - 5.0 g/dL    Bilirubin, Total 3.8 (H) 0.0 - 1.2 mg/dL    Bilirubin, Direct 2.6 (H) 0.0 - 0.3 mg/dL    Alkaline Phosphatase 601 (H) 33 - 136 U/L     (H) 10 - 52 U/L     (H) 9 - 39 U/L    Total Protein 6.7 6.4 - 8.2 g/dL   Phosphorus   Result Value Ref Range    Phosphorus 4.5 2.5 - 4.9 mg/dL   Lipase   Result Value Ref Range    Lipase 210 (H) 9 - 82 U/L   Blood Culture    Specimen: Peripheral Venipuncture; Blood culture   Result Value Ref Range    Blood Culture Loaded on Instrument - Culture in progress    CBC   Result Value Ref Range    WBC 5.5 4.4 - 11.3 x10*3/uL    nRBC 0.0 0.0 - 0.0 /100 WBCs    RBC 3.56 (L) 4.50 - 5.90 x10*6/uL    Hemoglobin 10.8 (L) 13.5 - 17.5 g/dL    Hematocrit 32.2 (L) 41.0 - 52.0 %    MCV 90 80 - 100 fL    MCH 30.3 26.0 - 34.0 pg    MCHC 33.5 32.0 - 36.0 g/dL    RDW 14.7 (H) 11.5 - 14.5 %    Platelets 292 150 - 450 x10*3/uL    MPV 9.9 7.5 - 11.5 fL   Magnesium   Result Value Ref Range    Magnesium 2.03 1.60 - 2.40 mg/dL   Lactate   Result Value Ref Range    Lactate 4.2 (HH) 0.4 - 2.0 mmol/L   Coagulation Screen   Result Value Ref Range    Protime 12.1 9.8 - 12.8 seconds    INR 1.1 0.9 - 1.1    aPTT 27 27 - 38 seconds   Tacrolimus level   Result Value Ref Range    Tacrolimus  6.6 <=15.0 ng/mL   Lactate   Result Value Ref Range    Lactate 0.9 0.4 - 2.0 mmol/L   POCT GLUCOSE   Result Value Ref Range    POCT Glucose 177 (H) 74 - 99 mg/dL   POCT GLUCOSE   Result Value Ref Range    POCT  Glucose 198 (H) 74 - 99 mg/dL   POCT GLUCOSE   Result Value Ref Range    POCT Glucose 188 (H) 74 - 99 mg/dL           Assessment/Plan   Principal Problem:    SBO (small bowel obstruction) (CMS/HCC)  Active Problems:    Pancreatic cancer (CMS/HCC)    Small bowel obstruction (CMS/HCC)    Pulmonary hypertension (CMS/HCC)    Pt is a 66 y/o male w/ PMHx cardiac transplant in 2017, 2 months s/p whipple for pancreatic cancer admitted for concerns of recurrent SBO.    Plan:  - continue NPO  - CTAP w/ contrast ordered, will require surgical accompaniment  - continue NGT to LIWS, leave blue port uncapped  - f/up GI recs    - EGD evaluation  - potential surgical intervention for GJ revision  - consult nephrology  - consult heart failure clinic  - encouraged ambulation  - encouraged incentive spirometry 10x/hr/day  - rest per primary    TIERRA CLARK, MS4

## 2023-10-09 NOTE — PROGRESS NOTES
Subjective Data:  Patient developed severe abdominal pain last night associated with fever and rigors. He was also tachycardic at 130s. Rapid response team was called. Serum lactate was found elevated at 4.2 (which has trended down to 0.90). CT abdomen was suggestive of SBO. He denies chest pain, SOB, orthopnea, PND, leg edema.    Objective Data:  Last Recorded Vitals:  Vitals:    10/09/23 0142 10/09/23 0215 10/09/23 0634 10/09/23 0921   BP:  143/84 152/81 134/82   BP Location:   Left arm    Patient Position:   Lying    Pulse:  (!) 117 98 85   Resp:  24 18 18   Temp: 38.7 °C (101.7 °F) 38.6 °C (101.5 °F) 36.5 °C (97.7 °F) 37 °C (98.6 °F)   TempSrc: Temporal  Temporal    SpO2:  95% 98% 93%   Weight:       Height:           Last Labs:  CBC - 10/9/2023:  2:22 AM  5.5 10.8 292    32.2      CMP - 10/9/2023:  2:12 AM  10.5 6.5; 6.7 221; 228 --- 3.8; 3.8   4.5 3.6; 3.7 163; 161 601; 601      PTT - 10/9/2023:  2:22 AM  1.1   12.1 27     TROPHS   Date/Time Value Ref Range Status   09/26/2023 05:31 AM 18 0 - 53 ng/L Final     Comment:     .  Less than 99th percentile of normal range cutoff-  Female and children under 18 years old <35 ng/L; Male <54 ng/L: Negative  Repeat testing should be performed if clinically indicated.   .  Female and children under 18 years old  ng/L; Male  ng/L:  Consistent with possible cardiac damage and possible increased clinical   risk. Serial measurements may help to assess extent of myocardial damage.   .  >120 ng/L: Consistent with cardiac damage, increased clinical risk and  myocardial infarction. Serial measurements may help assess extent of   myocardial damage.   .   NOTE: Children less than 1 year old may have higher baseline troponin   levels and results should be interpreted in conjunction with the overall   clinical context.  .  NOTE: Troponin I testing is performed using a different   testing methodology at [facility] than at other   [facility]. Direct  result comparisons should only   be made within the same method.     01/17/2023 04:58 PM 13 0 - 20 ng/L Final     Comment:     .  Less than 99th percentile of normal range cutoff-  Female and children under 18 years old <14 ng/L; Male <21 ng/L: Negative  Repeat testing should be performed if clinically indicated.   .  Female and children under 18 years old 14-50 ng/L; Male 21-50 ng/L:  Consistent with possible cardiac damage and possible increased clinical   risk. Serial measurements may help to assess extent of myocardial damage.   .  >50 ng/L: Consistent with cardiac damage, increased clinical risk and  myocardial infarction. Serial measurements may help assess extent of   myocardial damage.   .   NOTE: Children less than 1 year old may have higher baseline troponin   levels and results should be interpreted in conjunction with the overall   clinical context.   .  NOTE: Troponin I testing is performed using a different   testing methodology at [facility]] than at other   [facility]. Direct result comparisons should only   be made within the same method.       BNP   Date/Time Value Ref Range Status   05/24/2023 11:41  0 - 99 pg/mL Final     Comment:     .  <100 pg/mL - Heart failure unlikely  100-299 pg/mL - Intermediate probability of acute heart  .               failure exacerbation. Correlate with clinical  .               context and patient history.    >=300 pg/mL - Heart Failure likely. Correlate with clinical  .               context and patient history.   Biotin interference may cause falsely decreased results.   Patients taking a Biotin dose of up to 5 mg/day should   refrain from taking Biotin for 24 hours before sample   collection. Providers may contact their local laboratory   for further information.     10/29/2019 04:07  0 - 99 pg/mL Final     Comment:     .  <100 pg/mL - Heart failure unlikely  100-299 pg/mL - Intermediate probability of acute heart  .                failure exacerbation. Correlate with clinical  .               context and patient history.    >=300 pg/mL - Heart Failure likely. Correlate with clinical  .               context and patient history.  BNP testing is performed using different testing   methodology at [facility] than at other   [facility] hospitals. Direct result comparisons should   only be made within the same method.       HGBA1C   Date/Time Value Ref Range Status   08/15/2023 10:45 AM 8.3 % Final     Comment:          Diagnosis of Diabetes-Adults   Non-Diabetic: < or = 5.6%   Increased risk for developing diabetes: 5.7-6.4%   Diagnostic of diabetes: > or = 6.5%  .       Monitoring of Diabetes                Age (y)     Therapeutic Goal (%)   Adults:          >18           <7.0   Pediatrics:    13-18           <7.5                   7-12           <8.0                   0- 6            7.5-8.5   American Diabetes Association. Diabetes Care 33(S1), Jan 2010.     05/16/2023 04:29 PM 7.4 % Final     Comment:          Diagnosis of Diabetes-Adults   Non-Diabetic: < or = 5.6%   Increased risk for developing diabetes: 5.7-6.4%   Diagnostic of diabetes: > or = 6.5%  .       Monitoring of Diabetes                Age (y)     Therapeutic Goal (%)   Adults:          >18           <7.0   Pediatrics:    13-18           <7.5                   7-12           <8.0                   0- 6            7.5-8.5   American Diabetes Association. Diabetes Care 33(S1), Jan 2010.     10/20/2020 01:11 PM 9.4 4.3 - 5.6 % Final     Comment:     American Diabetes Association guidelines indicate that patients with HgbA1c in   the range 5.7-6.4% are at increased risk for development of diabetes, and   intervention by lifestyle modification may be beneficial. HgbA1c greater or   equal to 6.5% is considered diagnostic of diabetes.     VLDL   Date/Time Value Ref Range Status   08/15/2023 10:45 AM 15 0 - 40 mg/dL Final   05/16/2023 04:29 PM 20 0 - 40 mg/dL Final   04/27/2023  01:01 PM 38 0 - 40 mg/dL Final      Last I/O:  I/O last 3 completed shifts:  In: 1653.3 (22.8 mL/kg) [I.V.:103.3 (1.4 mL/kg); IV Piggyback:1550]  Out: 2025 (28 mL/kg) [Urine:1425 (0.5 mL/kg/hr); Emesis/NG output:600]  Weight: 72.4 kg     Inpatient Medications:  Scheduled medications   Medication Dose Route Frequency    micafungin  100 mg intravenous q24h    pantoprazole  40 mg intravenous q12h    piperacillin-tazobactam  3.375 g intravenous q6h    tacrolimus  1.5 mg sublingual q24h    tacrolimus  2 mg sublingual Daily     PRN medications   Medication    albuterol    dextrose 10 % in water (D10W)    dextrose    glucagon    HYDROmorphone    phenoL     Continuous Medications   Medication Dose Last Rate    lactated Ringer's  100 mL/hr 100 mL/hr (10/09/23 0458)     Physical Exam:  Constitutional:       General: Well developed adult without acute distress   HENT:      Head: Normocephalic and atraumatic. No JVD, +nasogastric tube attached to suction  Eyes:      Extraocular Movements: Extraocular movements intact.      Conjunctiva/sclera: Conjunctivae normal.   Cardiovascular:      Rate and Rhythm: Normal rate and regular rhythm. No S3.      Heart sounds: Normal heart sounds. No murmurs or gallups.      Extremities: Warm distal extremities, no bipedal edema  Pulmonary:      Effort: Pulmonary effort is normal. No respiratory distress.      Breath sounds: Normal breath sounds bilaterally. No wheezing or rales. No cough.  Abdominal:      General: There is no distension. Active bowel sounds.      Palpations: Abdomen tender to palpation (slightly tender)     Assessment/Plan     68 y/o Male with PMHx of NICM secondary to valvular disease, s/p mitral valve repair with [place] [employer] Annuloflex band II with 36 mm, s/p bicaval OHT (3/24/2017) with post-op complicated by atrial arrhythmia, RV failure, chronotropic incompetence, Pancreatic adenocarcinoma, s/p Whipple procedure (5/19/23), currently on chemotx (modified FOLFIRINOX),  HTN, DM type 2, HLD, CKD Stage 3B (baseline creatinine 2), amiodarone-induced hyperthyroidism, JE, MDD, transferred from [facility] for recurrent SBO. Blood culture + E coli. [Immunosuppressive regimen at home: Tacrolimus 4 mg/3 mg (Tacrolimus Goal 5-8), prednisone 5 mg daily. ]     No rejection history. Latest DSE: negative (5/2023).      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 Coli bacteremia, currently on IV antibiotic     Recommendations:  Latest Tacrolimus level (10/9): 6.60   -Continue Tacrolimus 2 mg SL at 0630 then 1.5 mg SL at 1830.   -Tacrolimus Target goal: 5-8  -Check daily Tacrolimus trough level at 0600.    HF service will continue to follow.    Discussed with HF attending, Dr. GENESIS Landa.    Signed:    Alberto Nj MD  Heart Failure service

## 2023-10-09 NOTE — CONSULTS
"Nutrition Assessment:   Reason for Assessment: TPN recommendations    Patient is a 67 y.o. male presenting with PMHx of history of heart transplant (2017 for NICM 2/2 valvular disease) and pancreatic cancer s/p whipple (5/19/2023) currently receiving modified FOLFIRINOX (follows with Dr. Dunaway), and recent admission for SBO presents as a trasnfer from  [facility] for recurrent SBO.        Nutrition History:  Food and Nutrient History: Per wife at bedside patient had ongoing poor intake/tolerance since last discharge.  The most he tolerated was 1/2 sandwich complicated by abd pain.  Tried a Boost shake complicated by emesis.  Food Allergies/Intolerances:  None  Energy intake: Energy Intake: Poor < 50 %  GI Symptoms: Abdominal pain       Anthropometrics:  Height: 176.5 cm (5' 9.49\")  Weight: 72.4 kg (159 lb 9.8 oz)  BMI (Calculated): 23.2410/7/23) 72.4kg  Weight hx:  9/20/23) 80.7kg  6/13/23) 81.1kg  5/3/23) 87.1kg  11/10/22) 98.2kg   IBW/kg (Dietitian Calculated): 73 kg  Percent of IBW: 100 %       Objective/Subjective Weight History:     Weight Change %: 11% < 1month      Significant Weight Loss: Yes  Interpretation of Weight Loss: >5% in 1 month       Nutrition Focused Physical Exam Findings:    Subcutaneous Fat Loss:   Orbital Fat Pads: Mild-Moderate (slight dark circles and slight hollowing)   Buccal Fat Pads: Mild-Moderate (flat cheeks, minimal bounce)   Triceps: Mild-moderate (less than ample fat tissue)       Muscle Wasting:  Temporalis: Mild-Moderate (slight depression)  Pectoralis (Clavicular Region): Mild-Moderate (some protrusion of clavicle)  Deltoid/Trapezius: Mild-Moderate (slight protrusion of acromion process)  Interosseous: Mild-Moderate (slightly depressed area between thumb and forefinger)     Quadriceps: Mild-moderate (mild depression on inner and outer thigh)     Edema:  Edema: none       Physical Findings:           Nails: Positive (pale)  Skin: Negative    Objective Data:  Nutrition Significant " Labs:      Nutrition Specific Mediations:      I/O:     Intake/Output Summary (Last 24 hours) at 10/9/2023 1304  Last data filed at 10/9/2023 0921  Gross per 24 hour   Intake 1503.33 ml   Output 1375 ml   Net 128.33 ml       Dietary Orders (From admission, onward)       Start     Ordered    10/07/23 0310  May Not Participate in Room Service  Once        Question:  .  Answer:  Yes    10/07/23 0310    10/07/23 0224  NPO Diet; Effective now  Diet effective now         10/07/23 0226                     Estimated Needs:   Total Energy Estimated Needs (kCal): 3034-7617 kCalTotal Estimated Energy Need per Day (kCal/kg): 25-30 kCal/kg  Total Protein Estimated Needs (g):  g 1.2-1.5gkg        Nutrition Diagnosis:  Malnutrition Diagnosis  Patient has Malnutrition Diagnosis: Yes  Diagnosis Status: New  Malnutrition Diagnosis: Severe malnutrition related to acute disease or injury  As Evidenced by: <50% estimated intake > 5 days, 10% weight loss over < 1 month and mild/moderate muscle atrophy  Additional Assessment Information: Patient is at risk for refeeding syndrome         Recommendations:    Nutrition Prescription:  1) For TPN via central access recommend Standard TPN formula AA 5%, dextrose 15% @ 40ml/hr x 24hrs.  If blood sugars remain <180 and no major shifts in electrolytes occurs after 24hrs increase TPN to 60ml/hr on day #2.  If blood sugars remain <180 and no major shifts in electrolytes occurs after 24hrs increase TPN to goal of 83ml/hr on day #3.  Include Multivitamin and trace elements in TPN.  For lipids choose SMOF 250ml @ 21ml/hr x 12hrs.    Monitor blood sugars every 6hrs or per provider discretion.  Daily weight  Monitor renal panel + magnesium daily and replete electrolytes PRN.  Weekly LFT's and Triglycerides.      TPN at goal = 1914kcal and 100g protein per day   TPN @ goal = 299g CHO/d.  Provide adequate blood sugar coverage.     2) If unable to provide TPN via central access can provide peripheral  parenteral nutrition (PPN) via peripheral access with standard PPN formula 4.25AA, 5% dextrose @ 40ml/hr day #1 then goal of 83ml/hr after 24hrs to provide 678kcal, 85g protein, 100g CHO/d.  Include MVI and trace elements.   For lipids choose SMOF 250ml @ 21ml/hr x 12hrs.    Monitor blood sugars every 6hrs or per provider discretion.  Daily weight  Monitor renal panel + magnesium daily and replete electrolytes PRN.  Weekly LFT's and Triglycerides.               Monitoring and Evaluation Plan: Weight         Time Spent/Follow-up Reminder:   Follow Up  Time Spent (min): 90 minutes  Last Date of Nutrition Visit: 10/09/23  Nutrition Follow-Up Needed?: Dietitian to reassess per policy

## 2023-10-09 NOTE — ANESTHESIA PREPROCEDURE EVALUATION
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)      GI   (+) Gastroesophageal reflux disease      /Renal   (+) Acute on chronic renal failure (CMS/HCC)   (+) Anemia due to stage 3 chronic kidney disease (CMS/HCC)   (+) Chronic kidney disease, stage 4, severely decreased GFR (CMS/HCC)   (+) SOTO (nonalcoholic steatohepatitis)      Neuro/Psych   (+) Generalized anxiety disorder   (+) Peripheral polyneuropathy      Pulmonary   (+) COPD (chronic obstructive pulmonary disease) (CMS/HCC)   (+) Pulmonary hypertension (CMS/HCC)      GI/Hepatic   (+) SOTO (nonalcoholic steatohepatitis)   (+) Pancreatic cancer (CMS/HCC)      Hematology   (+) Anemia due to stage 3 chronic kidney disease (CMS/HCC)   (+) Iron deficiency anemia     ECHO 09/26/23:  CONCLUSIONS:  1. Left ventricular systolic function is normal with a 55-60% estimated ejection fraction.  2. Abnormal septal motion consistent with post-operative status.  3. There is mildly reduced right ventricular systolic function.  4. S/p MV repair with 36mm [employer] Annuloflex band with mean MV gradient of 3mmHg and mild MR.  5. Compared with the prior exam from 5/30/2023 there are no significant changes, though the RV was better seen today.  Clinical information reviewed:               There were no vitals filed for this visit.    Past Surgical History:   Procedure Laterality Date   • AORTIC VALVE REPLACEMENT  09/03/2014    Aortic Valve Replacement   • MITRAL  VALVE REPLACEMENT  12/17/2014    Mitral Valve Replacement   • OTHER SURGICAL HISTORY  09/06/2013    Mitral Valve Repair   • OTHER SURGICAL HISTORY  02/12/2016    Cardio-defib Pulse Generator Implantation Date   • OTHER SURGICAL HISTORY  02/12/2016    Cardio-Defib Pulse Gen Venous Attach Electrode To Prev Placed Defibrillator   • OTHER SURGICAL HISTORY  12/01/2017    Cardiac Transplant Procedures   • OTHER SURGICAL HISTORY  12/01/2017    Sternotomy   • OTHER SURGICAL HISTORY  12/17/2014    Cardiac Cath Procedure Outcome: Successful     Past Medical History:   Diagnosis Date   • Myopia, bilateral 06/06/2017    Bilateral myopia   • Other disorders of electrolyte and fluid balance, not elsewhere classified 11/03/2014    Electrolyte and fluid disorder   • Other visual disturbances 06/06/2017    Blurred vision, bilateral   • Personal history of diseases of the blood and blood-forming organs and certain disorders involving the immune mechanism 06/06/2017    History of immunocompromised state   • Personal history of diseases of the blood and blood-forming organs and certain disorders involving the immune mechanism 12/17/2014    History of anemia   • Personal history of diseases of the skin and subcutaneous tissue 08/01/2014    History of contact dermatitis   • Personal history of other diseases of the circulatory system 08/21/2017    History of pulmonary hypertension   • Personal history of other mental and behavioral disorders 11/07/2017    History of depression   • Personal history of other mental and behavioral disorders 06/21/2018    History of anxiety   • Personal history of other specified conditions 07/26/2017    History of bradycardia   • Personal history of other specified conditions 09/04/2014    History of abnormal weight loss   • Personal history of urinary (tract) infections 05/12/2017    History of urinary tract infection   • Restlessness and agitation     Restlessness and agitation   • Rheumatic mitral valve  disease, unspecified 12/01/2017    Rheumatic mitral valve disease, unspecified   • Right heart failure, unspecified (CMS/[facility]) 05/22/2017    RVF (right ventricular failure)   • Unspecified exotropia 09/12/2014    Consecutive exotropia   • Unspecified intermittent heterotropia 06/06/2017    Exotropia, intermittent       Prior to Admission medications    Medication Sig Start Date End Date Taking? Authorizing Provider   acetaminophen (Tylenol) 325 mg tablet 2 tab(s) orally every 6 hours, As needed, Pain - Mild (1-3) 3/29/19   Historical Provider, MD   albuterol 90 mcg/actuation inhaler use 2 (TWO) puffs FOUR TIMES DAILY 12/26/22   Historical Provider, MD   allopurinol (Zyloprim) 100 mg tablet  11/7/19   Historical Provider, MD   aspirin 81 mg chewable tablet CHEW AND SWALLOW 1 TABLET DAILY. 5/11/17   Historical Provider, MD   blood sugar diagnostic (OneTouch Verio test strips) strip TEST 3 TIMES DAILY 4/5/21   Historical Provider, MD   buPROPion XL (Wellbutrin XL) 150 mg 24 hr tablet Take 1 tablet (150 mg) by mouth once daily. 7/18/23   Historical Provider, MD   busPIRone (Buspar) 5 mg tablet Take 1 tablet (5 mg) by mouth 3 times a day. 5/14/21   Historical Provider, MD   calcium carbonate 600 mg calcium (1,500 mg) tablet Take 1 tablet (600 mg) by mouth 2 times a day. 5/24/22   Historical Provider, MD   cholecalciferol (Vitamin D-3) 125 MCG (5000 UT) capsule Take 1 capsule (125 mcg) by mouth once daily. 9/29/17   Historical Provider, MD   citalopram (CeleXA) 40 mg tablet     Historical Provider, MD   diphenoxylate-atropine (Lomotil) 2.5-0.025 mg tablet 2 tablets 4 times a day as needed for diarrhea. 8/25/23   Historical Provider, MD   fenofibrate (Triglide) 160 mg tablet Take 1 tablet (160 mg) by mouth once daily. 12/12/17   Historical Provider, MD   ferrous sulfate 325 (65 Fe) MG tablet Take 1 tablet (325 mg) by mouth once daily. 12/12/17   Historical Provider, MD   gabapentin (Neurontin) 100 mg capsule Take 2 capsules  (200 mg) by mouth once daily at bedtime. 4/15/19   Historical Provider, MD   hydrALAZINE (Apresoline) 50 mg tablet Take 1 tablet (50 mg) by mouth 2 times a day. 6/7/21   Historical Provider, MD   icosapent ethyL (Vascepa) 1 gram capsule Take 1 capsule (1 g) by mouth twice a day.    Historical Provider, MD   insulin glargine (Lantus U-100 Insulin) 100 unit/mL injection Inject 10 Units under the skin once daily in the morning.    Historical Provider, MD   insulin lispro (HumaLOG) 100 unit/mL injection Inject under the skin 3 times a day with meals. Using a sliding scale    Historical Provider, MD   lidocaine-prilocaine (Emla) 2.5-2.5 % cream Apply topically to affected area 30 min prior to port 8/23/23   Historical Provider, MD   magnesium oxide (Mag-Ox) 400 mg (241.3 mg magnesium) tablet Take 2 tablets (800 mg) by mouth 2 times a day. 9/28/17   Historical Provider, MD   multivitamin with minerals tablet 1 tablet DAILY (route: oral) 6/6/23   Historical Provider, MD   pantoprazole (ProtoNix) 40 mg EC tablet Take 1 tablet (40 mg) by mouth twice a day. 11/7/19   Historical Provider, MD   rosuvastatin (Crestor) 40 mg tablet Take 1 tablet (40 mg) by mouth once daily at bedtime. 10/11/17   Historical Provider, MD   tacrolimus (Prograf) 1 mg capsule TAKE 4 CAPSULES every morning and TAKE 3 CAPSULES every evening 10/12/21   Historical Provider, MD   0.9 % sodium chloride (sodium chloride 0.9%) solution   10/5/23  Historical Provider, MD   acyclovir (Zovirax) 200 mg capsule   10/5/23  Historical Provider, MD   alendronate (Fosamax) 70 mg tablet 1 tab(s) orally once a week  10/5/23  Historical Provider, MD   amLODIPine (Norvasc) 5 mg tablet   10/5/23  Historical Provider, MD   atorvastatin (Lipitor) 80 mg tablet   10/5/23  Historical Provider, MD   bacitracin ophthalmic ointment   10/5/23  Historical Provider, MD   calcium carbonate-vitamin D3 600 mg-5 mcg (200 unit) tablet Take 1 tablet by mouth twice a day.  10/5/23   "Historical Provider, MD   ciclopirox (Penlac) 8 % solution Apply to affected area daily at bedtime.  10/5/23  Historical Provider, MD   citalopram (CeleXA) 20 mg tablet Take 1 tablet (20 mg) by mouth once daily. 7/18/23 10/5/23  Historical Provider, MD   dapsone 100 mg tablet   10/5/23  Historical Provider, MD   digoxin (Lanoxin) 125 MCG tablet   10/5/23  Historical Provider, MD   divalproex (Depakote ER) 500 mg 24 hr tablet   10/5/23  Historical Provider, MD   enoxaparin (Lovenox) 40 mg/0.4 mL syringe 40 milligram(s) subcutaneously once a day 5/31/23 10/5/23  Historical Provider, MD   ezetimibe (Zetia) 10 mg tablet Take 1 tablet (10 mg) by mouth once daily. 11/7/19 10/5/23  Historical Provider, MD   fenofibrate (Tricor) 48 mg tablet   10/5/23  Historical Provider, MD   furosemide (Lasix) 20 mg tablet   10/5/23  Historical Provider, MD   furosemide (Lasix) 40 mg tablet   10/5/23  Historical Provider, MD   glimepiride (Amaryl) 1 mg tablet   10/5/23  Historical Provider, MD   guaiFENesin (Mucinex) 600 mg 12 hr tablet as necessary 11/18/09 10/5/23  Historical Provider, MD Araceli Rodriguez Insulin 100 unit/mL injection 15 unit(s) subcutaneous 3 times a day  10/5/23  Historical Provider, MD Araceli Rodriguez Insulin 200 unit/mL (3 mL) insulin pen pen Per sliding scale. 3/18/21 10/5/23  Historical Provider, MD   hydrALAZINE (Apresoline) 10 mg tablet   10/5/23  Historical Provider, MD   insulin NPH, Isophane, (HumuLIN N NPH U-100 Insulin) 100 unit/mL injection   10/5/23  Historical Provider, MD   insulin syringe-needle U-100 (Monoject Insulin Syringe) 31G X 5/16\" 0.3 mL syringe Monoject Insulin Syringe 0.3 mL 31 gauge x 5/16\"  10/5/23  Historical Provider, MD   iron ps complex/B12/folic acid (NIFEREX-150 FORTE ORAL) Take  by mouth once daily.  10/5/23  Historical Provider, MD   isosorbide mononitrate 20 mg tablet TAKE 1 TABLET BY MOUTH TWICE DAILY, WITH THE DOSES GIVEN 7 (SEVEN) HOURS APART 7/3/23 10/5/23  Historical " Provider, MD   ketoconazole (NIZOral) 2 % cream 1 Application 4/2/19 10/5/23  Historical Provider, MD   lipase-protease-amylase (Zenpep) 40,000-126,000- 168,000 unit capsule Per instructions 4 TIMES DAILY (route: oral) 5/12/23 10/5/23  Historical Provider, MD   LORazepam (Ativan) 0.5 mg tablet   10/5/23  Historical Provider, MD   methIMAzole (Tapazole) 10 mg tablet   10/5/23  Historical Provider, MD   methIMAzole (Tapazole) 5 mg tablet   10/5/23  Historical Provider, MD   metoprolol succinate XL (Toprol XL) 100 mg 24 hr tablet   10/5/23  Historical Provider, MD   multivitamin tablet Take 1 tablet by mouth once daily.  10/5/23  Historical Provider, MD   mycophenolate (Cellcept) 500 mg tablet Take one tablet in the morning and two in the evening 6/13/19 10/5/23  Historical Provider, MD   mycophenolate (Myfortic) 360 mg EC tablet Take 1 tablet (360 mg) by mouth 2 times a day. 8/27/20 10/5/23  Historical Provider, MD   omega-3 acid ethyl esters (Lovaza) 1 gram capsule Take 1 capsule (1 g) by mouth 2 times a day with meals. 8/9/22 10/5/23  Historical Provider, MD   oxyCODONE (Roxicodone) 5 mg immediate release tablet Per instructions EVERY 8 HOURS NEEDED (route: oral) 5/4/23 10/5/23  Historical Provider, MD   oxyCODONE (Roxicodone) 5 mg immediate release tablet Take 1 tablet (5 mg) by mouth every 6 hours if needed for severe pain (7 - 10) for up to 7 days. 10/5/23 10/5/23  Luis Armando Johnson MD   piperacillin-tazobactam (Zosyn) 4.5 gram injection   10/5/23  Historical Provider, MD   polyethylene glycol (Glycolax, Miralax) 17 gram/dose powder   10/5/23  Historical Provider, MD   potassium chloride CR (Klor-Con M20) 20 mEq ER tablet  4/16/08 10/5/23  Historical Provider, MD   pravastatin (Pravachol) 20 mg tablet   10/5/23  Historical Provider, MD   predniSONE (Deltasone) 5 mg tablet  8/10/23 10/5/23  Historical Provider, MD   QUEtiapine (SEROquel) 25 mg tablet   10/5/23  Historical Provider, MD   simethicone (Mylicon) 80 mg  chewable tablet take one tablet up to 3 times a day as needed for excess flatus (gas) 5/26/22 10/5/23  Historical Provider, MD   sotalol (Betapace) 120 mg tablet   10/5/23  Historical Provider, MD   spironolactone (Aldactone) 25 mg tablet   10/5/23  Historical Provider, MD   sucralfate (Carafate) 100 mg/mL suspension   10/5/23  Historical Provider, MD   terbutaline (Brethine) 5 mg tablet   10/5/23  Historical Provider, MD   torsemide (Demadex) 100 mg tablet   10/5/23  Historical Provider, MD   torsemide (Demadex) 20 mg tablet   10/5/23  Historical Provider, MD   traMADol (Ultram) 50 mg tablet Take 1 tablet (50 mg) by mouth every 12 hours if needed. 8/11/23 10/5/23  Historical Provider, MD   voriconazole (Vfend) 200 mg tablet   10/5/23  Historical Provider, MD   warfarin (Coumadin) 5 mg tablet   10/5/23  Historical Provider, MD   warfarin (Coumadin) 7.5 mg tablet   10/5/23  Historical Provider, MD     Allergies   Allergen Reactions   • Amiodarone Other     Adverse reaction   • Morphine Other     Itching     Social History     Tobacco Use   • Smoking status: Former     Types: Cigarettes     Quit date:      Years since quittin.7   • Smokeless tobacco: Never   Substance Use Topics   • Alcohol use: Never         Chemistry    Lab Results   Component Value Date/Time     (H) 10/09/2023 0212    K 4.9 10/09/2023 0212     10/09/2023 0212    CO2 19 (L) 10/09/2023 0212    BUN 47 (H) 10/09/2023 0212    CREATININE 2.53 (H) 10/09/2023 0212    Lab Results   Component Value Date/Time    CALCIUM 10.5 10/09/2023 0212    ALKPHOS 601 (H) 10/09/2023 0212    ALKPHOS 601 (H) 10/09/2023 0212     (H) 10/09/2023 0212     (H) 10/09/2023 0212     (H) 10/09/2023 0212     (H) 10/09/2023 0212    BILITOT 3.8 (H) 10/09/2023 0212    BILITOT 3.8 (H) 10/09/2023 0212          Lab Results   Component Value Date/Time    WBC 5.5 10/09/2023 0222    HGB 10.8 (L) 10/09/2023 0222    HCT 32.2 (L) 10/09/2023 0222      10/09/2023 0222     Lab Results   Component Value Date/Time    PROTIME 12.1 10/09/2023 0222    INR 1.1 10/09/2023 0222       NPO Detail:  No data recorded     PHYSICAL EXAM    Anesthesia Plan    ASA 3     general

## 2023-10-09 NOTE — PROGRESS NOTES
Subjective   Rapid called on patient last night for tachycardia to 120s. Please see significant event notes for further details.   Interval events:  This morning reports abdominal pain 10/10. Wife at bedside. Both are worried and anxious regarding overnight events. Wants to know if EGD will be performed today.          Objective     Vitals:  Vitals:    10/09/23 0634   BP: 152/81   Pulse: 98   Resp: 18   Temp: 36.5 °C (97.7 °F)   SpO2: 98%       I/O last 3 completed shifts:  In: 1653.3 (22.8 mL/kg) [I.V.:103.3 (1.4 mL/kg); IV Piggyback:1550]  Out: 2025 (28 mL/kg) [Urine:1425 (0.5 mL/kg/hr); Emesis/NG output:600]  Weight: 72.4 kg   No intake/output data recorded.    Physical exam:  Constitutional: Well-developed male in acute distress. Curled on bed, arm is clutching stomach  HEENT: Normocephalic, atraumatic. PERRL. EOMI. No cervical lymphadenopathy, voice soft and strained.   Respiratory: CTA bilaterally. No wheezes, rales, or rhonchi. Normal respiratory effort.  Cardiovascular: RRR. No murmurs, gallops, or rubs. No JVD. Radial pulses 2+.  Abdominal: Distended, guarding present. Extremely tender to light palpation.   Neuro: UE and LE strength 5/5 bilaterally and sensation intact. Normal FTN testing.  MSK: No LE edema bilaterally.  Skin: Warm, dry. No rashes or wounds.  Psych: Appropriate mood and affect.    Medications:  micafungin, 100 mg, intravenous, q24h  pantoprazole, 40 mg, intravenous, q12h  piperacillin-tazobactam, 3.375 g, intravenous, q6h  tacrolimus, 1.5 mg, sublingual, q24h  tacrolimus, 2 mg, sublingual, Daily      lactated Ringer's, 100 mL/hr, Last Rate: 100 mL/hr (10/09/23 0458)      PRN medications: albuterol, dextrose 10 % in water (D10W), dextrose, glucagon, HYDROmorphone, phenoL    Labs:  Results for orders placed or performed during the hospital encounter of 10/07/23 (from the past 24 hour(s))   POCT GLUCOSE   Result Value Ref Range    POCT Glucose 172 (H) 74 - 99 mg/dL   POCT GLUCOSE   Result  Value Ref Range    POCT Glucose 184 (H) 74 - 99 mg/dL   POCT GLUCOSE   Result Value Ref Range    POCT Glucose 176 (H) 74 - 99 mg/dL   POCT GLUCOSE   Result Value Ref Range    POCT Glucose 168 (H) 74 - 99 mg/dL   POCT GLUCOSE   Result Value Ref Range    POCT Glucose 186 (H) 74 - 99 mg/dL   Comprehensive Metabolic Panel   Result Value Ref Range    Glucose 174 (H) 74 - 99 mg/dL    Sodium 147 (H) 136 - 145 mmol/L    Potassium 4.9 3.5 - 5.3 mmol/L    Chloride 103 98 - 107 mmol/L    Bicarbonate 19 (L) 21 - 32 mmol/L    Anion Gap 30 (H) 10 - 20 mmol/L    Urea Nitrogen 47 (H) 6 - 23 mg/dL    Creatinine 2.53 (H) 0.50 - 1.30 mg/dL    eGFR 27 (L) >60 mL/min/1.73m*2    Calcium 10.5 8.6 - 10.6 mg/dL    Albumin 3.6 3.4 - 5.0 g/dL    Alkaline Phosphatase 601 (H) 33 - 136 U/L    Total Protein 6.5 6.4 - 8.2 g/dL     (H) 9 - 39 U/L    Bilirubin, Total 3.8 (H) 0.0 - 1.2 mg/dL     (H) 10 - 52 U/L   Magnesium   Result Value Ref Range    Magnesium 2.03 1.60 - 2.40 mg/dL   Blood Culture    Specimen: Peripheral Venipuncture; Blood culture   Result Value Ref Range    Blood Culture Loaded on Instrument - Culture in progress    Hepatic function panel   Result Value Ref Range    Albumin 3.7 3.4 - 5.0 g/dL    Bilirubin, Total 3.8 (H) 0.0 - 1.2 mg/dL    Bilirubin, Direct 2.6 (H) 0.0 - 0.3 mg/dL    Alkaline Phosphatase 601 (H) 33 - 136 U/L     (H) 10 - 52 U/L     (H) 9 - 39 U/L    Total Protein 6.7 6.4 - 8.2 g/dL   Phosphorus   Result Value Ref Range    Phosphorus 4.5 2.5 - 4.9 mg/dL   Lipase   Result Value Ref Range    Lipase 210 (H) 9 - 82 U/L   CBC   Result Value Ref Range    WBC 5.5 4.4 - 11.3 x10*3/uL    nRBC 0.0 0.0 - 0.0 /100 WBCs    RBC 3.56 (L) 4.50 - 5.90 x10*6/uL    Hemoglobin 10.8 (L) 13.5 - 17.5 g/dL    Hematocrit 32.2 (L) 41.0 - 52.0 %    MCV 90 80 - 100 fL    MCH 30.3 26.0 - 34.0 pg    MCHC 33.5 32.0 - 36.0 g/dL    RDW 14.7 (H) 11.5 - 14.5 %    Platelets 292 150 - 450 x10*3/uL    MPV 9.9 7.5 - 11.5 fL    Magnesium   Result Value Ref Range    Magnesium 2.03 1.60 - 2.40 mg/dL   Lactate   Result Value Ref Range    Lactate 4.2 (HH) 0.4 - 2.0 mmol/L   Coagulation Screen   Result Value Ref Range    Protime 12.1 9.8 - 12.8 seconds    INR 1.1 0.9 - 1.1    aPTT 27 27 - 38 seconds   Lactate   Result Value Ref Range    Lactate 0.9 0.4 - 2.0 mmol/L   POCT GLUCOSE   Result Value Ref Range    POCT Glucose 177 (H) 74 - 99 mg/dL       Imaging:  CT abdomen pelvis wo IV contrast    Result Date: 10/9/2023  STUDY:  [CT ABDOMEN PELVIS WO IV CONTRAST];  [10/9/2023 5:53 am] INDICATION:  [Signs/Symptoms:r/o perforation, high risk]. COMPARISON:  [CT chest abdomen pelvis: 10/05/2023] ACCESSION NUMBER(S):  [[id number]] ORDERING CLINICIAN:  [ALIDA SANTIAGO] TECHNIQUE: CT of the abdomen and pelvis was performed. Contiguous axial images were obtained at 3 mm slice thickness through the abdomen and pelvis. Coronal and sagittal reconstructions at 3 mm slice thickness were performed.  [No intravenous or oral contrast agents were administered.] FINDINGS: Please note that the evaluation of vessels, lymph nodes and organs is limited without intravenous contrast. LOWER CHEST:  [Visualized base demonstrates mild bibasilar atelectasis. Postsurgical changes status post heart transplant better assessed on dedicated CT chest from 10/05/2023. Partially visualized sternotomy with intact sternal lines. No evidence of pericardial effusion. No pneumothorax. Partially visualized esophagus is within normal limits.] ABDOMEN: LIVER:  [The liver is normal in size without evidence of focal liver lesions.] BILE DUCTS:  [Intrahepatic and extrahepatic ductal mildly dilated similar compared to prior imaging.] GALLBLADDER:  [The gallbladder is surgically absent.] PANCREAS:  [Postsurgical changes status post Whipple with re-demonstration of dislodgement of a pancreatic duct stent into the proximal small bowel.] SPLEEN:  [Within normal limits.] ADRENAL GLANDS:  [Bilateral  adrenal glands appear normal.] KIDNEYS AND URETERS:  [The kidneys are normal in size and unremarkable in appearance.]  [No hydroureteronephrosis or nephroureterolithiasis is identified.] PELVIS: BLADDER:  [The urinary bladder appears normal without abnormal wall thickening.] REPRODUCTIVE ORGANS:  [No pelvic masses.] BOWEL:  [Postsurgical changes status post gastrojejunostomy from Whipple procedure. There is interval resolution of previously seen dilated stomach with enteric tube sitting in the gastric antrum. There is re-demonstration of significantly dilated proximal loops of bowel with a transition point as visualized on series 202, image 44 consistent with bowel obstruction which remain similar compared to prior imaging. Previously described calcified mass is not identified on current imaging and may reflect the staple line.] [Colon is overall decompressed and demonstrates no abnormal wall thickening or dilatation.] [Appendix is not visualized.] VESSELS:  [Redemonstration of calcification of the infrarenal aorta and main branching vessels otherwise there is no aneurysmal dilatation of the abdominal aorta. The IVC appears normal.] PERITONEUM/RETROPERITONEUM/LYMPH NODES:  [Minimal fat stranding in the hepatic hilar region likely postsurgical in nature remains similar compared to prior imaging. No ascites or free air, no fluid collection.]  [No enlarged mesenteric lymph nodes.] ABDOMINAL WALL:  [No significant abnormality of the abdominal wall soft tissues.] BONES:  [No suspicious osseous lesions are identified.] IMPRESSION:  [Dilated small loops of bowel with a transition point consistent with a small-bowel obstruction with findings remaining similar compared to prior imaging.] Postsurgical changes status post Whipple procedure with dislodgement of the pancreatic duct stent seen in the small bowel similar compared to prior and mild hilar hepatic region fat stranding similar compared to prior. Additional chronic  findings similar to previous imaging from 10/05/2023 and as detailed above.    XR abdomen 2 views supine and decubitus    Result Date: 10/9/2023  [ [AP radiograph of the chest and abdomen was provided. Cross lateral abdomen radiograph was provided.] [Right IJ access MediPort tip terminates within the superior cavoatrial junction. Median sternotomy with intact cerclage wires. Enteric tube courses below the level of the left hemidiaphragm, the distal tip projects over the expected location of the gastric body. Surgical clips overlying the mid upper chest wall. ] CARDIOMEDIASTINAL SILHOUETTE: [Cardiomediastinal silhouette is normal in size and configuration.] Aortic knob calcifications are seen. LUNGS: [Low lung volumes with resulting bronchovascular crowding. Increased perihilar and interstitial prominence. Interval increase in elevation of the right hemidiaphragm. There is new blunting of the right costophrenic angle. Bibasilar linear opacities, likely represents atelectasis.] ABDOMEN: [Nonobstructive bowel gas pattern. No evidence of pneumoperitoneum on this limited supine radiograph.] BONES: [No acute osseous changes.] ] IMPRESSION: Medical devices as above. Increased pulmonary edema and new small right pleural effusion. Mild bibasilar atelectasis slightly increased from prior. Nonobstructive bowel gas pattern.     XR chest 1 view    Result Date: 10/9/2023  [ [AP radiograph of the chest and abdomen was provided. Cross lateral abdomen radiograph was provided.] [Right IJ access MediPort tip terminates within the superior cavoatrial junction. Median sternotomy with intact cerclage wires. Enteric tube courses below the level of the left hemidiaphragm, the distal tip projects over the expected location of the gastric body. Surgical clips overlying the mid upper chest wall. ] CARDIOMEDIASTINAL SILHOUETTE: [Cardiomediastinal silhouette is normal in size and configuration.] Aortic knob calcifications are seen. LUNGS: [Low  lung volumes with resulting bronchovascular crowding. Increased perihilar and interstitial prominence. Interval increase in elevation of the right hemidiaphragm. There is new blunting of the right costophrenic angle. Bibasilar linear opacities, likely represents atelectasis.] ABDOMEN: [Nonobstructive bowel gas pattern. No evidence of pneumoperitoneum on this limited supine radiograph.] BONES: [No acute osseous changes.] ] IMPRESSION: Medical devices as above. Increased pulmonary edema and new small right pleural effusion. Mild bibasilar atelectasis slightly increased from prior. Nonobstructive bowel gas pattern.     XR abdomen 1 view    Assessment/Plan   Chung Goodman is a 67 y.o. male with PMHx of history of heart transplant (2017 for NICM) and pancreatic cancer s/p whipple (5/19/2023) currently receiving modified FOLFIRINOX (follows with Dr. Dunaway), and recent admission for SBO for recurrent SBO c/b E coli bacteremia. E coli bacteremia most likely coming from biliary tract given complicated anatomy and can have stasis in the area.      Updates 10/9:  - Per discussion with surg/onc attending, probable surgery 10/9   - GI to place G-J tube for decompression today  - micafungin 100 mg IV added overnight  - lactate 4.2 >0.9 after fluids  - >490>601  - ALT 88>108>163  - >221  - Tbili 1.2>1.5>3.8  - Lipase 210  - KUB: no free air found on KUB, although pt could not sit upright  - CT w/o contrast interval resolution of dilated stomach, re-demonstration of dilated proximal loops of bowel with transition point visualized consistent with bowel obstruction similar to prior imaging. Previously described calcified mass not identified on current imaging and may reflect the staple line. No free air noted  - 10/5 Blood cx: pansensitive E coli  - 10/7 Blood cx: NGTD  - 10/9 Blood cx: pending  - consulted IR regarding PTHC placement after G-J tube placement     #SBO  #Pancreatic cancer s/p whipple   #Dislodged  pancreatic stent  #Pancreatic cancer s/p whipple  #Dislodged pancreatic stent   Per CT results, SBO is 2/2 mass in the wall of the proximal loop of small bowel which could represent carcinoid vs scarring. Dislodged pancreatic stent in the lumen of the proximal small bowel loop      :: CT 10/9: (1) interval resolution of dilated stomach, (2) re-demonstration of dilated proximal loops of bowel with transition point visualized consistent with bowel obstruction similar to prior imaging. (3) previously described calcified mass not identified on current imaging and may reflect the staple line. (4) No free air noted  No clinical sx of bowel compromise.  :: NG tube in place  - NPO  - home pantoprazole 40 BID as IV  - GI consulted, appreciate recs  - Surg/onc consulted, appreciate recs  - G-J tube placement today w/ PTHC placement     #Ecoli Bacteremia  #Transaminitis, cholestatic pattern   #Sepsis  :: Bcx 10/5 growing E coli pansensitive  :: had fevers/chills when presented to ED  :: most likely source GI tract  ::Meets sepsis criteria T 101.7, , identified source of infection   ::10/7 Blood cx: NGTD  ::10/9 Blood cx: pending  :: s/p 1x dose of IV micafungin   - continue IV zosyn  - maintenance fluids 100 ml/hr     #Pain 2/2 SBO  - Pt received Dilaudid 1 mg IV spot doses in ED for abdominal pain  - Currently with no pain  - IV dilaudid 1 mg q3 hr prn      #Heart Transplant (2017) 2/2 valvular disease  #s/p MV repair  - Tacro level in AM  - Cont Tacro   - Home: 4 mg AM, 3 mg PM PO  - Tacro 2 mg AM, 1.5 mg PM sublingual-dose confirmed with pharmacy  - Heart failure consulted, appreciate recs     #JOSY on CKD  Pre-renal in setting of dehydration given no PO intake and vomiting in setting of SBO  - Baseline Cr ~2  - Cr 2.8>2.4>2.53  - maintenance fluids 100 ml/hr     #HTN  - Holding home oral meds    - Hydralazine 50 PO BID  - Control with IV antihypertensives as needed     #T2DM complicated with Steroid induced  hyperglycemia in the past   - Home insulin regimen: Glargine 10u QAM, Lispro SSI  - holding home glargine   - SSI     #DLD  -Holding home oral meds:      - ASA 81     - Rosuvastatin 40      - Fenofibrate 160 mg daily      - Vascepa 1g BID     #Gout  - Holding home oral meds     - Allopurinol 100 mg      #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  - Holding home oral meds      - Gabapentin 200 mg QHS     #JE  #MDD  - Holding home oral meds     - Bupropion  mg daily      - Buspirone 5 mg TID     - Citalopram 40 mg daily     Other home oral medications currently held:     - Ca carbonate 600 mg BID     - Cholecalciferol 125 mcg daily     - Lomotil      - Ferrous sulfate 324 mg daily     - MgOx 800 mg BID     - MV     F : PRN  E: PRN  N: NPO  A: Mediport      DVT prophylaxis: Heparin subq     Code Status: FULL CODE (confirmed on admission)   NOK: Extended Emergency Contact Information  Primary Emergency Contact: Krystle Goodman  Home Phone: [phone]  Work Phone: [phone]  Relation: Spouse      Patient seen and discussed with Dr. Tree Shipley MD MPH  PGY-1 Internal Medicine

## 2023-10-09 NOTE — SIGNIFICANT EVENT
"Called to the patient's room for new onset Tachycardia to 120s and Rigors w/o fever. Patient had visible rigors and reports \"feeling cold\". Temp check then was 38.5 and was consistently high upon multiple checks. Patient also reports abdominal pain that reoccurred 1.5hrs after dilaudid was administered.  On exam, patient was exquisitely tender to palpation on the abdomen with noticeable guarding. Rapid was called. Upon hx review, patient did not pass gas or bowel in >24 hrs. IV tylenolol was administered with blankets then cold,wet towels. Rigors diminished after tylenolol administered and abdo pain decreased. ACS consulted. Ordered CBC, Lactate, B/Cx2, coag, RFP, lipase, hfp, VBG. Type and screen active. Per ACS, X-ray KUB and decubitus ordered. Decided to likely do a CT in the morning w/o contrast and add antifungals. Micafungin 100mg IV added Qdaily added (patient already on Zosyn). Other than perforation or infection, other possible eitiology could be tumour fever.   "

## 2023-10-09 NOTE — CARE PLAN
The patient's goals for the shift include      The clinical goals for the shift include pt will have reduced pain and will have normal valued BPs      Problem: Pain  Goal: Takes deep breaths with improved pain control throughout the shift  Outcome: Progressing  Goal: Turns in bed with improved pain control throughout the shift  Outcome: Progressing  Goal: Walks with improved pain control throughout the shift  Outcome: Progressing  Goal: Performs ADL's with improved pain control throughout shift  Outcome: Progressing  Goal: Participates in PT with improved pain control throughout the shift  Outcome: Progressing  Goal: Free from opioid side effects throughout the shift  Outcome: Progressing  Goal: Free from acute confusion related to pain meds throughout the shift  Outcome: Progressing     Problem: Fall/Injury  Goal: Not fall by end of shift  Outcome: Progressing  Goal: Be free from injury by end of the shift  Outcome: Progressing  Goal: Verbalize understanding of personal risk factors for fall in the hospital  Outcome: Progressing  Goal: Verbalize understanding of risk factor reduction measures to prevent injury from fall in the home  Outcome: Progressing  Goal: Use assistive devices by end of the shift  Outcome: Progressing  Goal: Pace activities to prevent fatigue by end of the shift  Outcome: Progressing

## 2023-10-09 NOTE — SIGNIFICANT EVENT
Rapid Response RN Note     10/09/23 0150   Onset Documentation   Rapid Response Initiated By RN;Lynne auto page   Location/Room [facility]  ([facility] 3848)   Pager Time [phone]   Arrival Time 0150   Event End Time 0320   Primary Reason for Call Staff concern     Rapid Response RN Note    Rapid response RN at bedside for RADAR score 6 and staff concern due to the following VS: T 37.4 °Celsius;  ; RR 26; /92; SPO2 100% on room air.     Patient with visible rigors and complaints of feeling chills.  Endorsed worsening abdominal pain.  Dr. Vera Marcos, night float for primary [facility] service and Dr. Liz Reis (NACR) at bedside.  Labs including blood cultures and serum lactate obtained.  Acetaminophen IV administered per bedside RN as ordered.  Second IV access established.  Surgical services consulted.  KUB obtained and reviewed by Surgical staff.  Family at bedside and kept informed of condition and evolving plan of care.    Collaborative plan of care:  CT abdomen  Primary [facility] service to follow-up with lab results as indicated  Recommendations per Surgical services  Staff to page rapid response for any concerns or acute change in condition/VS

## 2023-10-09 NOTE — CARE PLAN
Problem: Pain  Goal: Takes deep breaths with improved pain control throughout the shift  10/9/2023 1241 by Arina Hopkins RN  Outcome: Progressing  10/9/2023 1241 by Arina Hopkins RN  Outcome: Progressing  Goal: Turns in bed with improved pain control throughout the shift  10/9/2023 1241 by Arina Hopkins RN  Outcome: Progressing  10/9/2023 1241 by Arina Hopkins RN  Outcome: Progressing  Goal: Walks with improved pain control throughout the shift  10/9/2023 1241 by Arina Hopkins RN  Outcome: Progressing  10/9/2023 1241 by Arina Hopkins RN  Outcome: Progressing  Goal: Performs ADL's with improved pain control throughout shift  10/9/2023 1241 by Arina Hopkins RN  Outcome: Progressing  10/9/2023 1241 by Arina Hopkins RN  Outcome: Progressing  Goal: Participates in PT with improved pain control throughout the shift  10/9/2023 1241 by Arina Hopkins RN  Outcome: Progressing  10/9/2023 1241 by Arina Hopkins RN  Outcome: Progressing  Goal: Free from opioid side effects throughout the shift  10/9/2023 1241 by Arina Hopkins RN  Outcome: Progressing  10/9/2023 1241 by Arina Hopkins RN  Outcome: Progressing  Goal: Free from acute confusion related to pain meds throughout the shift  10/9/2023 1241 by Arina Hopkins RN  Outcome: Progressing  10/9/2023 1241 by Arina Hopkins RN  Outcome: Progressing     Problem: Fall/Injury  Goal: Not fall by end of shift  10/9/2023 1241 by Arina Hopkins RN  Outcome: Progressing  10/9/2023 1241 by Arina Hopkins RN  Outcome: Progressing  Goal: Be free from injury by end of the shift  10/9/2023 1241 by Arina Hopkins RN  Outcome: Progressing  10/9/2023 1241 by Arina Hopkins RN  Outcome: Progressing  Goal: Verbalize understanding of personal risk factors for fall in the hospital  10/9/2023 1241 by Arina Hopkins RN  Outcome: Progressing  10/9/2023 1241 by Arina Hopkins RN  Outcome: Progressing  Goal: Verbalize understanding of risk factor reduction measures to prevent injury from fall in the home  10/9/2023 1241 by Arina  LUIS Hopkins  Outcome: Progressing  10/9/2023 1241 by Arina Hopkins RN  Outcome: Progressing  Goal: Use assistive devices by end of the shift  10/9/2023 1241 by Arina Hopkins RN  Outcome: Progressing  10/9/2023 1241 by Arina Hopkins RN  Outcome: Progressing  Goal: Pace activities to prevent fatigue by end of the shift  10/9/2023 1241 by Arina Hopkins RN  Outcome: Progressing  10/9/2023 1241 by Arina Hopkins RN  Outcome: Progressing   The patient's goals for the shift include      The clinical goals for the shift include pt will have reduced pain and will have normal valued BPs

## 2023-10-09 NOTE — PROGRESS NOTES
10/09/23 1612   Discharge Planning   Living Arrangements Spouse/significant other   Support Systems Spouse/significant other;Family members   Assistance Needed Pt is needing assistance with ambulation and ADLs   Type of Residence Private residence   Home or Post Acute Services In home services   Type of Home Care Services Home nursing visits;Home health aide;Home OT;Home PT   Patient expects to be discharged to: home with home care   Does the patient need discharge transport arranged? No     Pt with pancreatic cancer, s/p a whipple in 5/19/23, was admitted with a SBO.  The patient lives at home with his wife.  They are interested in having home care after discharge- RN, aide, PT and OT.  They may need DME as well.  PT eval hasn't been completed yet.  Will assess discharge needs again closer to time of discharge. EDOD 10/12.

## 2023-10-10 PROBLEM — I63.9 CVA (CEREBRAL VASCULAR ACCIDENT) (MULTI): Status: ACTIVE | Noted: 2023-01-01

## 2023-10-10 PROBLEM — K63.1 BOWEL PERFORATION (MULTI): Status: ACTIVE | Noted: 2023-01-01

## 2023-10-10 PROBLEM — R19.8 PERFORATED ABDOMINAL VISCUS: Status: ACTIVE | Noted: 2023-01-01

## 2023-10-10 NOTE — H&P (VIEW-ONLY)
"Gastroenterology Consult Service Progress Note  Department of Gastroenterology & Hepatology  Digestive [facility] [place]    [facility]  Date of Service  October 9, 2023   Patient: Chung Goodman    Medical Record: [id number]  Reason for Consult: afferent limb syndrome, potential biliary infection  Requesting Service: Oncology    Interval History:   Concern for recurrent fever, abdominal pain, and hemodynamic instability overnight with repeat abdominal imaging stable and broadened to include antifungal therapy.    Physical Exam:    Vital Signs:    Vitals:    10/09/23 1232 10/09/23 1256 10/09/23 1344 10/09/23 1727   BP:   146/75 123/82   BP Location:       Patient Position:       Pulse:   82 89   Resp:   18 18   Temp:   36 °C (96.8 °F) 37 °C (98.6 °F)   TempSrc:       SpO2:   93% 93%   Weight:  72.4 kg (159 lb 9.8 oz)     Height: 1.765 m (5' 9.49\") 1.765 m (5' 9.49\")       General appearance: well appearing, no acute distress  Skin: no jaundice  Head: normal  Eyes: anicteric sclera  Lungs: lungs clear to auscultation, no wheezing or rhonchi  Heart: RRR without murmur, gallop, or rubs.  No ectopy  Abdomen: Normal abdominal exam, Abdomen soft, non-tender. Bowel sounds normal. No masses, organomegaly  Extremities: Extremities normal. No lower extremity edema.  Neuro: AOx3.      Intake/Output Summary (Last 24 hours) at 10/9/2023 2043  Last data filed at 10/9/2023 1727  Gross per 24 hour   Intake 1503.33 ml   Output 1975 ml   Net -471.67 ml       Diagnostic Testing:     Labs:  Lab Results   Component Value Date    WBC 5.5 10/09/2023    HGB 10.8 (L) 10/09/2023    HCT 32.2 (L) 10/09/2023    MCV 90 10/09/2023     10/09/2023     Lab Results   Component Value Date    GLUCOSE 174 (H) 10/09/2023    CALCIUM 10.5 10/09/2023     (H) 10/09/2023    K 4.9 10/09/2023    CO2 19 (L) 10/09/2023     10/09/2023    BUN 47 (H) 10/09/2023    CREATININE 2.53 (H) 10/09/2023     Assessment:   "   Chung Goodman is a 67 y.o. M with a PMH of cardiac transplant (3/24/17) secondary to NICM due to valvular disease, pancreatic adenocarcinoma s/p Whipple surgery (5/19/23) currently on modified FOLFIRINOX, h/o recurrent pancreatitis, HTN, DMII, HLD, CKDIII, amiodarine-induced hyperthyroidism, JE, MDD transferred from [facility] to [facility] 10/7/23 due to recurrent symptomatic afferent limb syndrome in addition to recent fevers, E. Coli bacteremia, and LFT derangements.     Gastroenterology is consulted for concern for recurrent afferent limb syndrome and consideration of biliary infection. Patient recently presented with concern for afferent limb syndrome s/p EGD 9/26/23 with noted benign stenosis of afferent limb anastamosis traversed with noted dilation secondary to gastroscope with improved symptoms after procedure. Patient now has new symptoms of fevers, E. Coli bacteremia, elevated LFTs with normal Tbil, and no biliary duct dilation with consideration of biliary infection in setting of no additional obvious infectious etiology. Patient without improvement of symptoms with nasogastric decompression with minimal output and recurrent displacement. Additional unclear finding of potential mass in proximal small bowel concerning for carcinoid versus scarring.     Plan:        - plan for endoscopic evaluation Tuesday 10/10/23 for management of afferent limb syndrome (consider balloon dilation) and PEG-J placement for venting/feeding, NPO at midnight, hold AM DVT ppx, order vancomycin 1 gm IV x1 PRN to be administered in endoscopy suite  - consult nutrition for jejunal tube feed recommendations  - monitor infectious evaluation, continue antibiotic management  - appreciate surgical oncology recommendations  - defer supportive care per primary team    Patient to be seen and staffed with Dr. Eloisa Henriquez.  Thank you for the consultation. Gastroenterology will continue to the follow the patient.   Please do  not hesitate to contact me or page [phone] if there are any further questions between the weekday hours of 7 AM - 5 PM.   If there is an urgent concern during the weekend, after-hours, or holidays; then please page the on-call GI fellow at [phone]. Thank you.    SIGNATURE: Lenny Ulloa MD PATIENT NAME: Chung Goodman   DATE: October 9, 2023 MRN: [id number]

## 2023-10-10 NOTE — ANESTHESIA PROCEDURE NOTES
Peripheral IV  Date/Time: 10/10/2023 3:55 PM      Placement  Needle size: 16 G  Laterality: left  Location: external jugular  Local anesthetic: none  Site prep: chlorhexidine  Technique: anatomical landmarks  Attempts: 1

## 2023-10-10 NOTE — CONSULTS
"Inpatient consult to Infectious Diseases  Consult performed by: Mamta Archuleta MD  Consult ordered by: Kianna Sanz MD        Primary MD: Elena Rodriguez MD    Reason For Consult    sepsis, e coli bacteremia, immunosuppressed       History Of Present Illness  Chung Goodman is a 67 y.o. male presenting with history of heart transplant in  and whipple procedure for pancreatic cancer on 2023, h/o recurrent pancreatitis, CKD III, amiodarine-induced hyperthyroidism ,who presents as a trasnfer from  [facility] on 10/06 for SBO, recently admitted for SBO ( - ), s/p EGD on 23   On 10/05 p.m. had an episode of emesis, measured temperature of 100.7 and chills at home.   At [facility] ED, CT was concerning for SBO and Bcx grew E coli. Patient was treated with IVF and Zosyn, transferred to [facility] on 10/06-10/07.  Planned placement of venting PEG and J tube on 10/10, however c/b finding of SB perforation, underwent urgent laparotomy with partial SB resection, PEG also placed. TF to TSICU for monitoring.    Attempted to see patient several times yesterday, however was in the OR throughout the entire day.    He was seen in TSICU this morning, accompanied by his wife. He nods his head \"yes\" when asked if in pain. Limited ROS due to clinical status. RN aware, states patient received dilaudid shortly prior.    Surgical History  He has a past surgical history that includes Other surgical history (2013); Other surgical history (2016); Other surgical history (2016); Other surgical history (2017); Other surgical history (2017); Other surgical history (2014); Mitral valve replacement (2014); and Aortic valve replacement (2014).     Social History     Occupational History    Not on file   Tobacco Use    Smoking status: Former     Types: Cigarettes     Quit date:      Years since quittin.7    Smokeless tobacco: Never   Substance and Sexual Activity    " Alcohol use: Never    Drug use: Never    Sexual activity: Not on file       Family History  Family History   Problem Relation Name Age of Onset    COPD Mother      Other (systemic lupus erythematosus) Mother      Hypertension Father          benign    Arthritis Other Family History     Ulcerative colitis Other Family History     Gout Other Family History     Psoriasis Other Family History     Other (systemic lupus erythematosus) Other Family History      Allergies  Amiodarone and Morphine     Immunization History   Administered Date(s) Administered    Flu vaccine (IIV4), preservative free *Check age/dose* 11/08/2021    Hep A / Hep B 03/22/2017    Influenza, seasonal, injectable 02/09/2017    Influenza, seasonal, injectable, preservative free 10/13/2015    Pfizer Purple Cap SARS-CoV-2 03/31/2021, 04/21/2021    Pneumococcal conjugate vaccine, 13-valent (PREVNAR 13) 05/16/2021    Pneumococcal polysaccharide vaccine, 23-valent, age 2 years and older (PNEUMOVAX 23) 02/09/2017, 06/13/2019    Tdap vaccine, age 7 year and older (BOOSTRIX) 01/01/2005, 10/13/2015     Medications    Current medications:  Scheduled medications  aspirin, 150 mg, rectal, Daily  heparin (porcine), 5,000 Units, subcutaneous, q8h  insulin lispro, 0-15 Units, subcutaneous, q4h  lactated Ringer's, 500 mL, intravenous, Once  lidocaine, 1 patch, transdermal, Daily  magnesium sulfate, 2 g, intravenous, Once  micafungin, 100 mg, intravenous, q24h  pantoprazole, 40 mg, intravenous, BID  piperacillin-tazobactam, 3.375 g, intravenous, q6h  tacrolimus, 1 mg, sublingual, q24h  tacrolimus, 2 mg, sublingual, Daily      Continuous medications  Adult Clinimix TPN, 40 mL/hr  fat emulsion fish oil/plant based, 250 mL  lactated Ringer's, 75 mL/hr      PRN medications  PRN medications: calcium gluconate, calcium gluconate, dextrose 10 % in water (D10W), dextrose, glucagon, HYDROmorphone, ipratropium-albuteroL, magnesium sulfate, magnesium sulfate, oxygen          Objective  Range of Vitals (last 24 hours)  Heart Rate:  []   Temp:  [36.4 °C (97.5 °F)-37 °C (98.6 °F)]   Resp:  [13-44]   BP: (134-158)/(61-94)   Weight:  [74.1 kg (163 lb 5.8 oz)]   SpO2:  [98 %-100 %]   Daily Weight  10/10/23 : 74.1 kg (163 lb 5.8 oz)    Body mass index is 23.79 kg/m².     Physical Exam    GENERAL APPEARANCE:  68y/o , chronically and acutely ill-appearing, sedated but awakens to voice, and appears to be in no acute distress.  HEAD: normocephalic  EYES: PERRL  NOSE: No nasal discharge.  THROAT: Oral mucosa moist.   CARDIAC: Regular rate and rhythm. No murmurs appreciated. No pitting edema.  LUNGS: Clear to auscultation anteriorly. Intubated and mechanically ventilated on minimal respiratory support.  ABDOMEN: Midline abdominal surgical site bandaged, moderate demarcated strikethrough. PEG and drain in place.  MUSCULOSKELETAL: No joint erythema or tenderness appreciated. Symmetric muscular development.  NEUROLOGICAL: Somnolent, nods in response to questions.  SKIN: Skin pale. No lesions or eruptions on exposed areas.  PSYCHIATRIC: Calm  LINES/TUBES/CATHETERS: right mediport, ETT, PEG, RUQ drain, rashid        Relevant Results    Labs  Results from last 72 hours   Lab Units 10/11/23  0007 10/10/23  0621 10/09/23  0222   WBC AUTO x10*3/uL 15.5* 11.3 5.5   HEMOGLOBIN g/dL 9.1* 8.9* 10.8*   HEMATOCRIT % 27.6* 27.2* 32.2*   PLATELETS AUTO x10*3/uL 284 288 292   LYMPHO PCT MAN % 1.6  --   --    MONO PCT MAN % 3.9  --   --    EOSINO PCT MAN % 0.0  --   --      Results from last 72 hours   Lab Units 10/11/23  0007 10/10/23  0621 10/09/23  0212   SODIUM mmol/L 145 148* 147*   POTASSIUM mmol/L 5.0 4.1 4.9   CHLORIDE mmol/L 105 107 103   CO2 mmol/L 23 29 19*   BUN mg/dL 40* 41* 47*   CREATININE mg/dL 2.69* 2.48* 2.53*   GLUCOSE mg/dL 210* 189* 174*   CALCIUM mg/dL 8.6 9.7 10.5   ANION GAP mmol/L 22* 16 30*   EGFR mL/min/1.73m*2 25* 28* 27*   PHOSPHORUS mg/dL 4.3 3.0 4.5     Results from last 72 hours  "  Lab Units 10/11/23  0007 10/10/23  0621 10/09/23  0212   ALK PHOS U/L 365* 494* 601*  601*   BILIRUBIN TOTAL mg/dL 5.2* 4.2* 3.8*  3.8*   BILIRUBIN DIRECT mg/dL  --   --  2.6*   PROTEIN TOTAL g/dL 5.0* 5.2* 6.5  6.7   ALT U/L 115* 140* 163*  161*   AST U/L 150* 188* 221*  228*   ALBUMIN g/dL 3.1* 3.0* 3.6  3.7     Estimated Creatinine Clearance: 27.1 mL/min (A) (by C-G formula based on SCr of 2.69 mg/dL (H)).  CRP   Date Value Ref Range Status   04/29/2023 2.11 (A) mg/dL Final     Comment:     REF VALUE  < 1.00       No results found for: \"HIV1X2\", \"HIVCONF\", \"LCLLRN2KW\"  No results found for: \"HEPCABINIT\", \"HEPCAB\", \"HCVPCRQUANT\"  Microbiology  Susceptibility data from last 14 days.  Collected Specimen Info Organism Ampicillin Cefazolin Ciprofloxacin Gentamicin Levofloxacin Piperacillin/Tazobactam Trimethoprim/Sulfamethoxazole   10/05/23 Blood culture from Peripheral Venipuncture Escherichia coli S S S S S S S   10/05/23 Blood culture from Peripheral Venipuncture Escherichia coli                   10/09/2023 0222 10/11/2023 0902 Blood Culture [[id number]]   Blood culture from Peripheral Venipuncture    Preliminary result Component Value   Blood Culture No growth at 2 days P          10/09/2023 0212 10/11/2023 0702 Blood Culture [[id number]]   Blood culture from Peripheral Venipuncture    Preliminary result Component Value   Blood Culture No growth at 2 days P          10/07/2023 1258 10/11/2023 1501 Blood Culture [[id number]]   Blood culture from Peripheral Venipuncture    Final result Component Value   Blood Culture No growth at 4 days -  FINAL REPORT          10/07/2023 1258 10/11/2023 1501 Blood Culture [[id number]]   Blood culture from Peripheral Venipuncture    Final result Component Value   Blood Culture No growth at 4 days -  FINAL REPORT          10/07/2023 1258 10/11/2023 1501 Blood Culture [[id number]]   Blood culture from [facility]    Final result Component Value   Blood Culture No growth at 4 days -  " FINAL REPORT                 Imaging  CT ABDOMEN PELVIS WO IV CONTRAST;  10/9/2023   Impression:     1. No significant change in appearance of small-bowel obstruction  with a transition point in the central abdomen. No evidence of  pneumatosis.  2. Postsurgical changes status post Whipple procedure with  dislodgement of the pancreatic duct stent seen in the small bowel  similar compared to prior and mild hilar hepatic region fat stranding  similar compared to prior.  3. Additional chronic findings similar to previous imaging from  10/05/2023 and as detailed above.     XR CHEST 1 VIEW;  10/10/2023 10:51 pm, truncated*  FINDINGS:  AP radiograph of the chest  Interval intubation with endotracheal tube projecting proximally 7.9  cm superior to soo, above the level of thoracic inlet. Consider  advancement by 2-3 cm. Interval placement of left subclavian approach  central venous catheter poorly visualized distal aspect and tip  likely overlying mid to lower SVC.  stable positioning of right chest  wall MediPort. Abandoned cardiac pacemaker lead/wire again overlies  upper mediastinum. The previously noted enteric tube has been removed.  Status post prior median sternotomy with intact sternal cerclage  wires.  LUNGS:  Low lung volumes with bronchovascular crowding and no kojo pulmonary  edema. There is persistent bibasilar atelectasis with suggestion of  small pleural effusions. Left basilar lung aeration is slightly  worsened from prior study. There is no pneumothorax.  ABDOMEN:  No remarkable upper abdominal findings.  IMPRESSION:  1. Medical devices as above. Consider slight advancement of  endotracheal tube.  2. Persistent bibasilar atelectasis with suggestion of small pleural  effusions. Left basilar lung aeration is slightly worsened from prior  study.        Assessment/Plan   67 y.o. male presenting with history of heart transplant in 2017 and whipple procedure for pancreatic cancer on 5/19/2023, h/o recurrent  pancreatitis, CKD III, amiodarine-induced hyperthyroidism ,who presents as a trasnfer from  [facility] on 10/06 for SBO, recently admitted for SBO (9/23 - 9/29), s/p EGD on 9/26/23   On 10/05 p.m. had an episode of emesis, measured temperature of 100.7 and chills at home.   At [facility] ED, CT was concerning for SBO and Bcx grew E coli. Patient was treated with IVF and Zosyn, transferred to [facility] on 10/06-10/07.  Blood cultures:   10/05 x2 E. coli.  10/07 x3 NGTD  10/09 x2 NGTD  Was planned for placement of venting PEG and J tube 10/10, however complicated by small bowel perforation, patient was urgently taken to OR for laparotomy w/ partial small bowel resection. PEG placed.  Remains intubated in TSICU.      E.coli bacteremia, likely due to intraabdominal malignancy and SBO w/ bacterial translocation from gut.  Continue zosyn. Duration pending clinical status.  Add micafungin for fungal coverage given bowel perforation of unknown duration - unclear if iatrogenic.      Patient discussed with Dr. Miner.    Mamta Archuleta MD  ID Fellow, PGY IV.  Pager Team A: [phone]. Epic chat preferred.

## 2023-10-10 NOTE — CARE PLAN
Problem: Pain  Goal: Takes deep breaths with improved pain control throughout the shift  Outcome: Progressing  Goal: Turns in bed with improved pain control throughout the shift  Outcome: Progressing  Goal: Walks with improved pain control throughout the shift  Outcome: Progressing  Goal: Performs ADL's with improved pain control throughout shift  Outcome: Progressing  Goal: Participates in PT with improved pain control throughout the shift  Outcome: Progressing  Goal: Free from opioid side effects throughout the shift  Outcome: Progressing  Goal: Free from acute confusion related to pain meds throughout the shift  Outcome: Progressing     Problem: Fall/Injury  Goal: Not fall by end of shift  Outcome: Progressing  Goal: Be free from injury by end of the shift  Outcome: Progressing  Goal: Verbalize understanding of personal risk factors for fall in the hospital  Outcome: Progressing  Goal: Verbalize understanding of risk factor reduction measures to prevent injury from fall in the home  Outcome: Progressing  Goal: Use assistive devices by end of the shift  Outcome: Progressing  Goal: Pace activities to prevent fatigue by end of the shift  Outcome: Progressing

## 2023-10-10 NOTE — PROGRESS NOTES
Subjective   NAEON    This morning feels better. Says that his abdominal pain has improved. Wife states that she feels that her  has also improved.    Denies fevers, chills, chest pain, shortness of breath         Objective     Vitals:  Vitals:    10/10/23 1033   BP: 154/81   Pulse: 74   Resp: 20   Temp: 36.5 °C (97.7 °F)   SpO2: 95%       I/O last 3 completed shifts:  In: 1503.3 (20.8 mL/kg) [I.V.:103.3 (1.4 mL/kg); IV Piggyback:1400]  Out: 3125 (43.2 mL/kg) [Urine:2075 (0.8 mL/kg/hr); Emesis/NG output:1050]  Weight: 72.4 kg   I/O this shift:  In: 3024.6 [I.V.:2800]  Out: 300 [Urine:200; Emesis/NG output:100]    Physical exam:  Constitutional: Well-developed male in NAD  HEENT: Normocephalic, atraumatic.  Respiratory: CTA bilaterally. No wheezes, rales, or rhonchi. Normal respiratory effort.  Cardiovascular: RRR. No murmurs, gallops, or rubs. No JVD. Radial pulses 2+.  Abdominal: Tender to palpation in all 4 quadrants.   Neuro: UE and LE strength 5/5 bilaterally and sensation intact. Normal FTN testing.  MSK: No LE edema bilaterally.  Skin: Warm, dry. No rashes or wounds.  Psych: Appropriate mood and affect.    Medications:  fat emulsion fish oil/plant based, 0.69 g/kg, intravenous, Daily  pantoprazole, 40 mg, intravenous, q12h  piperacillin-tazobactam, 3.375 g, intravenous, q6h  tacrolimus, 1 mg, sublingual, q24h  tacrolimus, 2 mg, sublingual, Daily      Adult Clinimix TPN, 40 mL/hr, Last Rate: 40 mL/hr (10/09/23 2313)  dextrose 5 % in water (D5W), 100 mL/hr  lactated Ringer's, 100 mL/hr, Last Rate: 100 mL/hr (10/10/23 1000)      PRN medications: albuterol, dextrose 10 % in water (D10W), dextrose, glucagon, HYDROmorphone, phenoL    Labs:  Results for orders placed or performed during the hospital encounter of 10/07/23 (from the past 24 hour(s))   POCT GLUCOSE   Result Value Ref Range    POCT Glucose 175 (H) 74 - 99 mg/dL   POCT GLUCOSE   Result Value Ref Range    POCT Glucose 159 (H) 74 - 99 mg/dL   POCT  GLUCOSE   Result Value Ref Range    POCT Glucose 176 (H) 74 - 99 mg/dL   CBC   Result Value Ref Range    WBC 11.3 4.4 - 11.3 x10*3/uL    nRBC 0.2 (H) 0.0 - 0.0 /100 WBCs    RBC 2.87 (L) 4.50 - 5.90 x10*6/uL    Hemoglobin 8.9 (L) 13.5 - 17.5 g/dL    Hematocrit 27.2 (L) 41.0 - 52.0 %    MCV 95 80 - 100 fL    MCH 31.0 26.0 - 34.0 pg    MCHC 32.7 32.0 - 36.0 g/dL    RDW 15.4 (H) 11.5 - 14.5 %    Platelets 288 150 - 450 x10*3/uL    MPV 10.1 7.5 - 11.5 fL   Comprehensive Metabolic Panel   Result Value Ref Range    Glucose 189 (H) 74 - 99 mg/dL    Sodium 148 (H) 136 - 145 mmol/L    Potassium 4.1 3.5 - 5.3 mmol/L    Chloride 107 98 - 107 mmol/L    Bicarbonate 29 21 - 32 mmol/L    Anion Gap 16 10 - 20 mmol/L    Urea Nitrogen 41 (H) 6 - 23 mg/dL    Creatinine 2.48 (H) 0.50 - 1.30 mg/dL    eGFR 28 (L) >60 mL/min/1.73m*2    Calcium 9.7 8.6 - 10.6 mg/dL    Albumin 3.0 (L) 3.4 - 5.0 g/dL    Alkaline Phosphatase 494 (H) 33 - 136 U/L    Total Protein 5.2 (L) 6.4 - 8.2 g/dL     (H) 9 - 39 U/L    Bilirubin, Total 4.2 (H) 0.0 - 1.2 mg/dL     (H) 10 - 52 U/L   Magnesium   Result Value Ref Range    Magnesium 1.88 1.60 - 2.40 mg/dL   Phosphorus   Result Value Ref Range    Phosphorus 3.0 2.5 - 4.9 mg/dL   Tacrolimus level   Result Value Ref Range    Tacrolimus  8.4 <=15.0 ng/mL   POCT GLUCOSE   Result Value Ref Range    POCT Glucose 205 (H) 74 - 99 mg/dL   POCT GLUCOSE   Result Value Ref Range    POCT Glucose 196 (H) 74 - 99 mg/dL   Prepare RBC: 1 Units   Result Value Ref Range    PRODUCT CODE Y3023A30     Unit Number [id number]     Unit ABO B     Unit RH POS     XM INTEP COMP     Dispense Status IS     Blood Expiration Date November 06, 2023 23:59 EST     PRODUCT BLOOD TYPE 7300     UNIT VOLUME 350    Prepare RBC: 1 Units, Irradiated   Result Value Ref Range    PRODUCT CODE D3356X53     Unit Number [id number]     Unit ABO B     Unit RH POS     XM INTEP COMP     Dispense Status IS     Blood Expiration Date November  06, 2023 23:59 EST     PRODUCT BLOOD TYPE 7300     UNIT VOLUME 350        Imaging:    Assessment/Plan   Chung Goodman is a 67 y.o. male with PMHx of history of heart transplant (2017 for NICM) and pancreatic cancer s/p whipple (5/19/2023) currently receiving modified FOLFIRINOX (follows with Dr. Dunaway), and recent admission for SBO for recurrent SBO c/b E coli bacteremia. E coli bacteremia most likely coming from biliary tract given complicated anatomy and can have stasis in the area.      Updates 10/10:  - GI to perform EGD w/ stent and place G tube today  - >490>601>494  - ALT 88>108>163>140  - >221>188  - Tbili 1.2>1.5>3.8>4.2  - 10/7 Blood cx: NGTD  - 10/9 Blood cx: pending  - hypernatremic at 148, will turn off LR at 1800 and switch to D5W maintenance at 100 ml/hr x 12 hours     #SBO  #Pancreatic cancer s/p whipple   #Dislodged pancreatic stent  #Pancreatic cancer s/p whipple  #Dislodged pancreatic stent   Per CT results, SBO is 2/2 mass in the wall of the proximal loop of small bowel which could represent carcinoid vs scarring. Dislodged pancreatic stent in the lumen of the proximal small bowel loop      :: CT 10/9: (1) interval resolution of dilated stomach, (2) re-demonstration of dilated proximal loops of bowel with transition point visualized consistent with bowel obstruction similar to prior imaging. (3) previously described calcified mass not identified on current imaging and may reflect the staple line. (4) No free air noted  No clinical sx of bowel compromise.  :: NG tube in place  - NPO  - home pantoprazole 40 BID as IV  - GI consulted, appreciate recs  - Surg/onc consulted, appreciate recs  - G-J tube placement today w/ PTHC placement  - tacro 2 mg AM, 1 mg PM      #Ecoli Bacteremia  #Transaminitis, cholestatic pattern   #Sepsis  :: Bcx 10/5 growing E coli pansensitive  :: had fevers/chills when presented to ED  :: most likely source GI tract  ::Meets sepsis criteria T 101.7, HR  127, identified source of infection   ::10/7 Blood cx: NGTD  ::10/9 Blood cx: pending  :: s/p 1x dose of IV micafungin   - continue IV zosyn  - maintenance fluids 100 ml/hr- stop at 1800  - D5W 100 ml/hr 9955-9701     #Pain 2/2 SBO  - Pt received Dilaudid 1 mg IV spot doses in ED for abdominal pain  - Currently with no pain  - IV dilaudid 1 mg q3 hr prn      #Heart Transplant (2017) 2/2 valvular disease  #s/p MV repair  - Tacro level in AM  - Cont Tacro   - Home: 4 mg AM, 3 mg PM PO  - Tacro 2 mg AM, 1 mg PM sublingual  - Heart failure consulted, appreciate recs     #JOSY on CKD  Pre-renal in setting of dehydration given no PO intake and vomiting in setting of SBO  - Baseline Cr ~2  - Cr 2.8>2.4>2.53>2.48  - maintenance fluids 100 ml/hr- stop at 1800  - D5W from 8131-4042     #HTN  - Holding home oral meds    - Hydralazine 50 PO BID  - Control with IV antihypertensives as needed     #T2DM complicated with Steroid induced hyperglycemia in the past   - Home insulin regimen: Glargine 10u QAM, Lispro SSI  - holding home glargine   - SSI     #DLD  -Holding home oral meds:      - ASA 81     - Rosuvastatin 40      - Fenofibrate 160 mg daily      - Vascepa 1g BID     #Gout  - Holding home oral meds     - Allopurinol 100 mg      #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  - Holding home oral meds      - Gabapentin 200 mg QHS     #JE  #MDD  - Holding home oral meds     - Bupropion  mg daily      - Buspirone 5 mg TID     - Citalopram 40 mg daily     Other home oral medications currently held:     - Ca carbonate 600 mg BID     - Cholecalciferol 125 mcg daily     - Lomotil      - Ferrous sulfate 324 mg daily     - MgOx 800 mg BID     - MV     F : PRN  E: PRN  N: NPO  A: Mediport      DVT prophylaxis: Heparin subq     Code Status: FULL CODE (confirmed on admission)   NOK: Extended Emergency Contact Information  Primary Emergency Contact: Krystle Goodman  Home Phone: [phone]  Work Phone: [phone]  Relation: Spouse      Patient  discussed with Dr. Tree Shipley MD MPH  PGY-1 Internal Medicine

## 2023-10-10 NOTE — PROGRESS NOTES
Subjective Data:  TPN was started last night. Remains on NPO with NG suction. Oxygen support at 2 lpm nasal cannula was started after O2 sat was 90%. He denies dizziness, chest pain, SOB, orthopnea, PND. No fever. No hypotension. He is scheduled for PEG placement.     Objective Data:  Last Recorded Vitals:  Vitals:    10/10/23 0408 10/10/23 0655 10/10/23 0900 10/10/23 1033   BP: 173/89 164/86 164/84 154/81   BP Location:  Left arm     Patient Position:  Sitting     Pulse: 83 81 74 74   Resp:  18 18 20   Temp:  36.2 °C (97.2 °F) 36.7 °C (98.1 °F) 36.5 °C (97.7 °F)   TempSrc:  Temporal     SpO2:  92% 99% 95%   Weight:       Height:         Last Labs:  CBC - 10/10/2023:  6:21 AM  11.3 8.9 288    27.2      CMP - 10/10/2023:  6:21 AM  9.7 5.2 188 --- 4.2   3.0 3.0 140 494      PTT - 10/9/2023:  2:22 AM  1.1   12.1 27     Last I/O:    Intake/Output Summary (Last 24 hours) at 10/10/2023 1640  Last data filed at 10/10/2023 1000  Gross per 24 hour   Intake 3024.64 ml   Output 2050 ml   Net 974.64 ml        Inpatient Medications:  Scheduled medications   Medication Dose Route Frequency    fat emulsion fish oil/plant based  0.69 g/kg intravenous Daily    pantoprazole  40 mg intravenous q12h    piperacillin-tazobactam  3.375 g intravenous q6h    tacrolimus  1 mg sublingual q24h    tacrolimus  2 mg sublingual Daily     PRN medications   Medication    albuterol    dextrose 10 % in water (D10W)    dextrose    glucagon    HYDROmorphone    phenoL     Continuous Medications   Medication Dose Last Rate    Adult Clinimix TPN  40 mL/hr 40 mL/hr (10/09/23 2313)    dextrose 5 % in water (D5W)  100 mL/hr      lactated Ringer's  100 mL/hr 100 mL/hr (10/10/23 1000)     Physical Exam:  Constitutional:       General: Well developed adult without acute distress   HENT:      Head: Normocephalic and atraumatic. No JVD, +nasogastric tube attached to suction  Eyes:      Extraocular Movements: Extraocular movements intact.      Conjunctiva/sclera:  Conjunctivae normal.   Cardiovascular:      Rate and Rhythm: Normal rate and regular rhythm. No S3.      Heart sounds: Normal heart sounds. No murmurs or gallups.      Extremities: Warm distal extremities, no bipedal edema  Pulmonary:      Effort: Pulmonary effort is normal. No respiratory distress.      Breath sounds: Normal breath sounds bilaterally. No wheezing or rales. No cough.  Abdominal:      General: There is no distension. Active bowel sounds.      Palpations: Abdomen non-tender to palpation      Assessment/Plan   68 y/o Male with PMHx of NICM secondary to valvular disease, s/p mitral valve repair with [place] Carbomedics Annuloflex band II with 36 mm, s/p bicaval OHT (3/24/2017) with post-op complicated by atrial arrhythmia, RV failure, chronotropic incompetence, Pancreatic adenocarcinoma, s/p Whipple procedure (5/19/23), currently on chemotx (modified FOLFIRINOX), HTN, DM type 2, HLD, CKD Stage 3B (baseline creatinine 2), amiodarone-induced hyperthyroidism, JE, MDD, transferred from [facility] for recurrent SBO. Blood culture + E coli. [Immunosuppressive regimen at home: Tacrolimus 4 mg/3 mg (Tacrolimus Goal 5-8), prednisone 5 mg daily. ] No rejection history. Latest DSE: negative (5/2023).      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 Coli bacteremia, currently on IV antibiotic     Recommendations:  -Latest Tacrolimus level (10/10): 8.4. Will reduce evening dose of Tacrolimus. Tacrolimus target goal: 5-8  -Change dosing regimen as follows: Tacrolimus 2 mg SL at 0630 then 1 mg SL at 1830.   -Check daily Tacrolimus trough level at 0600.    HF service will continue to follow.    Discussed with HF attending, Dr. GENESIS Landa.    Signed:    Alberto Nj MD  Heart  Failure service

## 2023-10-10 NOTE — CONSULTS
"Gastroenterology Consult Service Progress Note  Department of Gastroenterology & Hepatology  [facility]    [facility]  Date of Service  October 9, 2023   Patient: Chung Goodman    Medical Record: [id number]  Reason for Consult: afferent limb syndrome, potential biliary infection  Requesting Service: Oncology    Interval History:   Concern for recurrent fever, abdominal pain, and hemodynamic instability overnight with repeat abdominal imaging stable and broadened to include antifungal therapy.    Physical Exam:    Vital Signs:    Vitals:    10/09/23 1232 10/09/23 1256 10/09/23 1344 10/09/23 1727   BP:   146/75 123/82   BP Location:       Patient Position:       Pulse:   82 89   Resp:   18 18   Temp:   36 °C (96.8 °F) 37 °C (98.6 °F)   TempSrc:       SpO2:   93% 93%   Weight:  72.4 kg (159 lb 9.8 oz)     Height: 1.765 m (5' 9.49\") 1.765 m (5' 9.49\")       General appearance: well appearing, no acute distress  Skin: no jaundice  Head: normal  Eyes: anicteric sclera  Lungs: lungs clear to auscultation, no wheezing or rhonchi  Heart: RRR without murmur, gallop, or rubs.  No ectopy  Abdomen: Normal abdominal exam, Abdomen soft, non-tender. Bowel sounds normal. No masses, organomegaly  Extremities: Extremities normal. No lower extremity edema.  Neuro: AOx3.      Intake/Output Summary (Last 24 hours) at 10/9/2023 2043  Last data filed at 10/9/2023 1727  Gross per 24 hour   Intake 1503.33 ml   Output 1975 ml   Net -471.67 ml       Diagnostic Testing:     Labs:  Lab Results   Component Value Date    WBC 5.5 10/09/2023    HGB 10.8 (L) 10/09/2023    HCT 32.2 (L) 10/09/2023    MCV 90 10/09/2023     10/09/2023     Lab Results   Component Value Date    GLUCOSE 174 (H) 10/09/2023    CALCIUM 10.5 10/09/2023     (H) 10/09/2023    K 4.9 10/09/2023    CO2 19 (L) 10/09/2023     10/09/2023    BUN 47 (H) 10/09/2023    CREATININE 2.53 (H) 10/09/2023     Assessment:   "   Chung Goodman is a 67 y.o. M with a PMH of cardiac transplant (3/24/17) secondary to NICM due to valvular disease, pancreatic adenocarcinoma s/p Whipple surgery (5/19/23) currently on modified FOLFIRINOX, h/o recurrent pancreatitis, HTN, DMII, HLD, CKDIII, amiodarine-induced hyperthyroidism, JE, MDD transferred from [facility] 10/7/23 due to recurrent symptomatic afferent limb syndrome in addition to recent fevers, E. Coli bacteremia, and LFT derangements.     Gastroenterology is consulted for concern for recurrent afferent limb syndrome and consideration of biliary infection. Patient recently presented with concern for afferent limb syndrome s/p EGD 9/26/23 with noted benign stenosis of afferent limb anastamosis traversed with noted dilation secondary to gastroscope with improved symptoms after procedure. Patient now has new symptoms of fevers, E. Coli bacteremia, elevated LFTs with normal Tbil, and no biliary duct dilation with consideration of biliary infection in setting of no additional obvious infectious etiology. Patient without improvement of symptoms with nasogastric decompression with minimal output and recurrent displacement. Additional unclear finding of potential mass in proximal small bowel concerning for carcinoid versus scarring.     Plan:        - plan for endoscopic evaluation Tuesday 10/10/23 for management of afferent limb syndrome (consider balloon dilation) and PEG-J placement for venting/feeding, NPO at midnight, hold AM DVT ppx, order vancomycin 1 gm IV x1 PRN to be administered in endoscopy suite  - consult nutrition for jejunal tube feed recommendations  - monitor infectious evaluation, continue antibiotic management  - appreciate surgical oncology recommendations  - defer supportive care per primary team    Patient to be seen and staffed with Dr. Eloisa Henriquez.  Thank you for the consultation. Gastroenterology will continue to the follow the patient.   Please do  not hesitate to contact me or page [phone] if there are any further questions between the weekday hours of 7 AM - 5 PM.   If there is an urgent concern during the weekend, after-hours, or holidays; then please page the on-call GI fellow at [phone]. Thank you.    SIGNATURE: Lenny Ulloa MD PATIENT NAME: Chung Goodman   DATE: October 9, 2023 MRN: [id number]

## 2023-10-10 NOTE — PROGRESS NOTES
"Chung Goodman is a 67 y.o. male w/ PMHx cardiac transplant and pancreatic cancer requiring whipple 5/19/23 on day 3 of admission presenting with SBO (small bowel obstruction) (CMS/HCC).    Subjective     Overnight pt was hypertensive to 160/170 systolic. Nursing was told to contact primary. This AM, pt endorses continued abdominal pain. Output urine 1.3L and x1 unmeasured, 550 by NG, no bowel movements. Pt is hemodynamically stable at this time.       Objective     Physical Exam  NEURO: AOx3  HEENT: NG in place on LIWS  CARDIO: RRR  PULM: nonlabored breathing  ABD: mildly tender to palpation throughout.    Last Recorded Vitals  Blood pressure 164/84, pulse 74, temperature 36.7 °C (98.1 °F), resp. rate 18, height 1.765 m (5' 9.49\"), weight 72.4 kg (159 lb 9.8 oz), SpO2 99 %.  Intake/Output last 3 Shifts:  I/O last 3 completed shifts:  In: 1503.3 (20.8 mL/kg) [I.V.:103.3 (1.4 mL/kg); IV Piggyback:1400]  Out: 3125 (43.2 mL/kg) [Urine:2075 (0.8 mL/kg/hr); Emesis/NG output:1050]  Weight: 72.4 kg     Relevant Results  Results for orders placed or performed during the hospital encounter of 10/07/23 (from the past 24 hour(s))   POCT GLUCOSE   Result Value Ref Range    POCT Glucose 188 (H) 74 - 99 mg/dL   POCT GLUCOSE   Result Value Ref Range    POCT Glucose 175 (H) 74 - 99 mg/dL   POCT GLUCOSE   Result Value Ref Range    POCT Glucose 159 (H) 74 - 99 mg/dL   POCT GLUCOSE   Result Value Ref Range    POCT Glucose 176 (H) 74 - 99 mg/dL   CBC   Result Value Ref Range    WBC 11.3 4.4 - 11.3 x10*3/uL    nRBC 0.2 (H) 0.0 - 0.0 /100 WBCs    RBC 2.87 (L) 4.50 - 5.90 x10*6/uL    Hemoglobin 8.9 (L) 13.5 - 17.5 g/dL    Hematocrit 27.2 (L) 41.0 - 52.0 %    MCV 95 80 - 100 fL    MCH 31.0 26.0 - 34.0 pg    MCHC 32.7 32.0 - 36.0 g/dL    RDW 15.4 (H) 11.5 - 14.5 %    Platelets 288 150 - 450 x10*3/uL    MPV 10.1 7.5 - 11.5 fL   Comprehensive Metabolic Panel   Result Value Ref Range    Glucose 189 (H) 74 - 99 mg/dL    Sodium 148 (H) 136 - " 145 mmol/L    Potassium 4.1 3.5 - 5.3 mmol/L    Chloride 107 98 - 107 mmol/L    Bicarbonate 29 21 - 32 mmol/L    Anion Gap 16 10 - 20 mmol/L    Urea Nitrogen 41 (H) 6 - 23 mg/dL    Creatinine 2.48 (H) 0.50 - 1.30 mg/dL    eGFR 28 (L) >60 mL/min/1.73m*2    Calcium 9.7 8.6 - 10.6 mg/dL    Albumin 3.0 (L) 3.4 - 5.0 g/dL    Alkaline Phosphatase 494 (H) 33 - 136 U/L    Total Protein 5.2 (L) 6.4 - 8.2 g/dL     (H) 9 - 39 U/L    Bilirubin, Total 4.2 (H) 0.0 - 1.2 mg/dL     (H) 10 - 52 U/L   Magnesium   Result Value Ref Range    Magnesium 1.88 1.60 - 2.40 mg/dL   Phosphorus   Result Value Ref Range    Phosphorus 3.0 2.5 - 4.9 mg/dL   POCT GLUCOSE   Result Value Ref Range    POCT Glucose 205 (H) 74 - 99 mg/dL   POCT GLUCOSE   Result Value Ref Range    POCT Glucose 196 (H) 74 - 99 mg/dL           Assessment/Plan   Principal Problem:    SBO (small bowel obstruction) (CMS/HCC)  Active Problems:    Pancreatic cancer (CMS/HCC)    Small bowel obstruction (CMS/HCC)    Pulmonary hypertension (CMS/HCC)    Pt is a 68 y/o male w/ PMHx cardiac transplant in 2017, 2 months s/p whipple for pancreatic cancer admitted for concerns of recurrent SBO.    Plan:  - continue NPO  - continue NGT to LIWS, leave blue port uncapped  - f/up GI recs   - EGD for decompressive GJ and dilation this afternoon  - Consult IR for PTC placement  - potential surgical intervention for GJ revision at later time  - consult nephrology  - consult heart failure clinic  - encouraged ambulation  - encouraged incentive spirometry 10x/hr/day  - rest per primary    TIERRA CLARK, MS4

## 2023-10-10 NOTE — PROGRESS NOTES
Nutrition Note:     Nutrition re-consult received for TPN. Full consult originally completed by this service on 10/09 in am with TPN recs. Please see that note for further information.    Time Spent/Follow-up Reminder:   Follow Up  Time Spent (min): 15 minutes  Last Date of Nutrition Visit: 10/09/23--full assessment completed this date  Nutrition Follow-Up Needed?: Dietitian to reassess per policy

## 2023-10-10 NOTE — ANESTHESIA PROCEDURE NOTES
Arterial Line:    Date/Time: 10/10/2023 4:49 PM    Staffing  Performed: attending   Authorized by: SILVINA Barrios    Performed by: SILVINA Barrios    An arterial line was placed. Procedure performed using ultrasound guidance.in the OR for the following indication(s): continuous blood pressure monitoring and blood sampling needed.    A 20 gauge (size), 12 cm (length), Angiocath (type) catheter was placed into the Left brachial artery, secured by Tegaderm,   Seldinger technique used.  Events:  greater than 3 attempts.      Additional notes:  Multiple unsuccessful attempts blindly and w/ US for radial artery b/l. First attempt in L brachial successful

## 2023-10-10 NOTE — ANESTHESIA PROCEDURE NOTES
Airway  Date/Time: 10/10/2023 12:24 PM  Urgency: elective    Airway not difficult    Staffing  Performed: SILVINA   Authorized by: Diana Childers MD    Performed by: SILVINA Barrios  Patient location during procedure: OR    Indications and Patient Condition  Indications for airway management: anesthesia and airway protection  Spontaneous ventilation: present  Sedation level: deep  Preoxygenated: yes  Patient position: ramp  MILS not maintained throughout  Mask difficulty assessment: 0 - not attempted    Final Airway Details  Final airway type: endotracheal airway      Successful airway: ETT  Cuffed: yes   Successful intubation technique: direct laryngoscopy  Blade: Simin  Blade size: #4  ETT size (mm): 7.5  Cormack-Lehane Classification: grade I - full view of glottis  Placement verified by: chest auscultation and capnometry   Measured from: lips  ETT to lips (cm): 21  Number of attempts at approach: 1    Additional Comments  RSI w/ Cricoid

## 2023-10-10 NOTE — ANESTHESIA PREPROCEDURE EVALUATION
"Patient: Chung Goodman    Procedure Information       Date/Time: 10/10/23 1050    Scheduled providers: Tonia Velazco MD; Diana Childers MD; Jolene Pan RN    Procedure: EGD    Location: 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)      GI  Pancreatic cancer s/p whipple 2/2023  Current bowel obstruction NG in situ   (+) Gastroesophageal reflux disease      /Renal   (+) Acute on chronic renal failure (CMS/HCC)   (+) Anemia due to stage 3 chronic kidney disease (CMS/HCC)   (+) Chronic kidney disease, stage 4, severely decreased GFR (CMS/HCC)   (+) SOTO (nonalcoholic steatohepatitis)      Neuro/Psych   (+) CVA (cerebral vascular accident) (CMS/HCC) (No residual)   (+) Generalized anxiety disorder   (+) Peripheral polyneuropathy      Pulmonary   (+) COPD (chronic obstructive pulmonary disease) (CMS/HCC)   (+) Pulmonary hypertension (CMS/HCC)      GI/Hepatic   (+) SOTO (nonalcoholic steatohepatitis)   (+) Pancreatic cancer (CMS/HCC)      Hematology   (+) Anemia due to stage 3 chronic kidney disease (CMS/HCC)   (+) Iron deficiency anemia      Musculoskeletal (within normal limits)      Eyes, Ears, Nose, and Throat (within normal limits)       Clinical information reviewed:    Allergies  Meds  Problems            Visit Vitals  /81   Pulse 74   Temp 36.5 °C (97.7 °F)   Resp 20   Ht 1.765 m (5' 9.49\")   Wt 72.4 kg (159 lb 9.8 oz)   SpO2 95%   BMI 23.24 kg/m²   Smoking Status Former   BSA 1.88 m²      NPO Detail:  NPO/Void Status  Date of Last Liquid: 10/09/23  Time of Last Liquid: " 2200  Date of Last Solid: 10/09/23  Time of Last Solid: 2200  Last Intake Type: Formula    CONCLUSIONS:  1. Left ventricular systolic function is normal with a 55-60% estimated ejection fraction.  2. Abnormal septal motion consistent with post-operative status.  3. There is mildly reduced right ventricular systolic function.  4. S/p MV repair with 36mm Jay[employer] Annuloflex band with mean MV gradient of 3mmHg and mild MR.  5. Compared with the prior exam from 5/30/2023 there are no significant changes, though the RV was better seen today     Physical Exam    Airway  Mallampati: III  TM distance: >3 FB  Neck ROM: full     Cardiovascular   Rhythm: regular  Rate: normal     Dental    Pulmonary    Abdominal          Anesthesia Plan    ASA 3     general     The patient is not a current smoker.    intravenous induction   Anesthetic plan and risks discussed with patient.  Use of blood products discussed with patient who consented to blood products.    Plan discussed with CAA and attending.

## 2023-10-10 NOTE — ANESTHESIA PROCEDURE NOTES
Central Venous Line:    Date/Time: 10/10/2023 4:00 PM    A central venous line was placed in the OR for the following indication(s): central venous access and CVP monitoring.  Staffing  Performed: attending   Authorized by: SILVINA Barrios    Performed by: Laz Brito MD    Sterility preparation included the following: provider hand hygiene performed prior to central venous catheter insertion, all 5 sterile barriers used (gloves, gown, cap, mask, large sterile drape) during central venous catheter insertion, antiseptic used during central venous catheter insertion and skin prep agent completely dried prior to procedure.  The patient was placed in Trendelenburg position.    Left subclavian vein was prepped.    The site was prepped with Chlorhexidine.  Size: 8.5 Fr   Length: 15  Number of Lumens: double lumen    This catheter was not an oximetric catheter.    During the procedure, the following specific steps were taken: needle advanced into vein and blood aspirated and guidewire advanced into vein.  Seldinger technique used.  Procedure performed using surface landmarks.    Intravenous verification was obtained by venous blood return, transducer and manometry.      Post insertion care included: all ports aspirated, all ports flushed easily, guidewire removed intact, Biopatch applied, line sutured in place and dressing applied.    During the procedure the patient experienced: patient tolerated procedure well with no complications.          Additional notes:  Sterile precautions maintained

## 2023-10-11 NOTE — ANESTHESIA POSTPROCEDURE EVALUATION
Patient: Chung Goodman    Procedure Summary       Date: 10/10/23 Room / Location: [facility] OR 17 / [facility] OR; [facility]    Anesthesia Start: 1206 Anesthesia Stop: 2104    Procedures:       EGD      Exploration Laparotomy, Small Bowel Resection, with Jejuno-jejunostomy, G-Tube Diagnosis:       SBO (small bowel obstruction) (CMS/HCC)      Perforated abdominal viscus      (Perforated abdominal viscus [R19.8])    Scheduled Providers: Tonia Velazco MD; Diana Childers MD; Luis Armando Johnson MD Responsible Provider: SILVINA Barrios    Anesthesia Type: general ASA Status: 3            Anesthesia Type: general    Vitals Value Taken Time   /82 10/10/23 2100   Temp 36..4 10/10/23 2104   Pulse 97 10/10/23 2103   Resp 15 10/10/23 2103   SpO2 100 % 10/10/23 2103   Vitals shown include unvalidated device data.    Anesthesia Post Evaluation    Patient location during evaluation: ICU  Patient participation: complete - patient cannot participate  Level of consciousness: obtunded/minimal responses  Airway patency: patent  Cardiovascular status: hypertensive  Respiratory status: ventilator  Hydration status: acceptable        No notable events documented.

## 2023-10-11 NOTE — PROGRESS NOTES
ERCP 10/10 c/b bowel perf- transferred to OR for small bowel resection, anastomasis, and PEG placement. ICU for ongoing neuro/pain assessments and symptom management, MAP goals, wean vent as tolerated- pulm toilet, trend labs, following cultures from 10/9 continue IV abx, line and drain care. PT/OT when appropriate.  Dispo pending remaining hospital course and recs.   Will continue to follow and assist.

## 2023-10-11 NOTE — SIGNIFICANT EVENT
Pt starting throwing multiple PVC on monitor. Pita PAREDES brought to bedside. Rn ordered to obtain EKG and draw RFP and magnesium. 2g of mag given per Alida. No other orders at this time.

## 2023-10-11 NOTE — H&P
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 Coli bacteremia (started on Zosyn), found to have recurrent SBO, transferred to [facility] on 10/7 for further monitoring and management given recent whipple. During prior admission there was concern for afferent limb syndrome s/p EGD 9/26/23 with noted benign stenosis of afferent limb anastomosis traversed with noted dilation secondary to gastroscope with improved symptoms after procedure. GI service re-consulted this admission due to c/f recurrent afferent limb syndrome and consideration of biliary infection. Patient underwent ERCP in endoscopy suite on 10/10 for management of afferent limb syndrome and PEG-J placement for venting/feeding, course c/b bowel perforation, transferred to OR for small bowel resection, anastomosis, and PEG tube placement. Patient transferred to SICU postoperatively for further management.     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     PSH: Whipple (2023), OHT (2017), MV  repair (2014), AV replacement (2014)     Social Hx: Former tobacco smoker (quit 2001), denies alcohol and illicits      Allergies: Amiodarone and morphine     Home meds: allopurinol, bupropion XL, citalopram, gabapentin, amlodipine, fenofibrate, isosorbide, hydralazine, rosuvastatin, lantus.      Past Medical History  Past Medical History:   Diagnosis Date    Myopia, bilateral 06/06/2017    Bilateral myopia    Other disorders of electrolyte and fluid balance, not elsewhere classified 11/03/2014    Electrolyte and fluid disorder    Other visual disturbances 06/06/2017    Blurred vision, bilateral    Personal history of diseases of the blood and blood-forming organs and certain disorders involving the immune mechanism 06/06/2017    History of immunocompromised state    Personal history of diseases of the blood and blood-forming organs and certain disorders involving the immune mechanism 12/17/2014    History of anemia    Personal history of diseases of the skin and subcutaneous tissue 08/01/2014    History of contact dermatitis    Personal history of other diseases of the circulatory system 08/21/2017    History of pulmonary hypertension    Personal history of other mental and behavioral disorders 11/07/2017    History of depression    Personal history of other mental and behavioral disorders 06/21/2018    History of anxiety    Personal history of other specified conditions 07/26/2017    History of bradycardia    Personal history of other specified conditions 09/04/2014    History of abnormal weight loss    Personal history of urinary (tract) infections 05/12/2017    History of urinary tract infection    Restlessness and agitation     Restlessness and agitation    Rheumatic mitral valve disease, unspecified 12/01/2017    Rheumatic mitral valve disease, unspecified    Right heart failure, unspecified (CMS/HCC) 05/22/2017    RVF (right ventricular failure)    Unspecified exotropia 09/12/2014    Consecutive exotropia     Unspecified intermittent heterotropia 06/06/2017    Exotropia, intermittent       Surgical History  Past Surgical History:   Procedure Laterality Date    AORTIC VALVE REPLACEMENT  09/03/2014    Aortic Valve Replacement    MITRAL VALVE REPLACEMENT  12/17/2014    Mitral Valve Replacement    OTHER SURGICAL HISTORY  09/06/2013    Mitral Valve Repair    OTHER SURGICAL HISTORY  02/12/2016    Cardio-defib Pulse Generator Implantation Date    OTHER SURGICAL HISTORY  02/12/2016    Cardio-Defib Pulse Gen Venous Attach Electrode To Prev Placed Defibrillator    OTHER SURGICAL HISTORY  12/01/2017    Cardiac Transplant Procedures    OTHER SURGICAL HISTORY  12/01/2017    Sternotomy    OTHER SURGICAL HISTORY  12/17/2014    Cardiac Cath Procedure Outcome: Successful        Social History  He reports that he quit smoking about 22 years ago. His smoking use included cigarettes. He has never used smokeless tobacco. He reports that he does not drink alcohol and does not use drugs.    Family History  Family History   Problem Relation Name Age of Onset    COPD Mother      Other (systemic lupus erythematosus) Mother      Hypertension Father          benign    Arthritis Other Family History     Ulcerative colitis Other Family History     Gout Other Family History     Psoriasis Other Family History     Other (systemic lupus erythematosus) Other Family History         Allergies  Amiodarone and Morphine    Review of Systems   Reason unable to perform ROS: Patient intubated and sedated.        Physical Exam  Constitutional:       Comments: Patient intubated and sedated   HENT:      Head: Normocephalic and atraumatic.   Eyes:      Extraocular Movements: Extraocular movements intact.   Cardiovascular:      Rate and Rhythm: Normal rate and regular rhythm.   Pulmonary:      Effort: Pulmonary effort is normal.   Abdominal:      General: Abdomen is flat.      Palpations: Abdomen is soft.   Genitourinary:     Comments: Portillo in place  Musculoskeletal:  "        General: No swelling.      Right lower leg: No edema.      Left lower leg: No edema.   Skin:     General: Skin is warm and dry.   Neurological:      Comments: Patient intubated and sedated   Psychiatric:      Comments: Unable to assess, patient intubated and sedated          Last Recorded Vitals  Blood pressure 154/81, pulse 100, temperature 36.4 °C (97.5 °F), temperature source Temporal, resp. rate 23, height 1.765 m (5' 9.49\"), weight 74.1 kg (163 lb 5.8 oz), SpO2 100 %.    Relevant Results    Scheduled medications  [MAR Hold] fat emulsion fish oil/plant based, 0.69 g/kg, intravenous, Daily  insulin lispro, 0-15 Units, subcutaneous, q4h  lidocaine PF, 0.1 mL, subcutaneous, Once  [MAR Hold] pantoprazole, 40 mg, intravenous, q12h  pantoprazole, 40 mg, intravenous, BID  piperacillin-tazobactam, 3.375 g, intravenous, q6h  [START ON 10/11/2023] polyethylene glycol, 17 g, oral, Daily  tacrolimus, 1 mg, sublingual, q24h  tacrolimus, 2 mg, sublingual, Daily      Continuous medications  [MAR Hold] Adult Clinimix TPN, 40 mL/hr, Last Rate: 40 mL/hr (10/09/23 2313)  dextrose 5 % in water (D5W), 100 mL/hr  lactated Ringer's, 100 mL/hr  lactated Ringer's, 75 mL/hr, Last Rate: 75 mL/hr (10/10/23 2204)  propofol, 5-50 mcg/kg/min      PRN medications  PRN medications: [MAR Hold] albuterol, calcium gluconate, calcium gluconate, [MAR Hold] dextrose 10 % in water (D10W), [MAR Hold] dextrose, [MAR Hold] glucagon, [MAR Hold] HYDROmorphone, magnesium sulfate, magnesium sulfate, oxygen, [MAR Hold] phenoL, potassium chloride    Results for orders placed or performed during the hospital encounter of 10/07/23 (from the past 24 hour(s))   POCT GLUCOSE   Result Value Ref Range    POCT Glucose 176 (H) 74 - 99 mg/dL   CBC   Result Value Ref Range    WBC 11.3 4.4 - 11.3 x10*3/uL    nRBC 0.2 (H) 0.0 - 0.0 /100 WBCs    RBC 2.87 (L) 4.50 - 5.90 x10*6/uL    Hemoglobin 8.9 (L) 13.5 - 17.5 g/dL    Hematocrit 27.2 (L) 41.0 - 52.0 %    MCV 95 80 " - 100 fL    MCH 31.0 26.0 - 34.0 pg    MCHC 32.7 32.0 - 36.0 g/dL    RDW 15.4 (H) 11.5 - 14.5 %    Platelets 288 150 - 450 x10*3/uL    MPV 10.1 7.5 - 11.5 fL   Comprehensive Metabolic Panel   Result Value Ref Range    Glucose 189 (H) 74 - 99 mg/dL    Sodium 148 (H) 136 - 145 mmol/L    Potassium 4.1 3.5 - 5.3 mmol/L    Chloride 107 98 - 107 mmol/L    Bicarbonate 29 21 - 32 mmol/L    Anion Gap 16 10 - 20 mmol/L    Urea Nitrogen 41 (H) 6 - 23 mg/dL    Creatinine 2.48 (H) 0.50 - 1.30 mg/dL    eGFR 28 (L) >60 mL/min/1.73m*2    Calcium 9.7 8.6 - 10.6 mg/dL    Albumin 3.0 (L) 3.4 - 5.0 g/dL    Alkaline Phosphatase 494 (H) 33 - 136 U/L    Total Protein 5.2 (L) 6.4 - 8.2 g/dL     (H) 9 - 39 U/L    Bilirubin, Total 4.2 (H) 0.0 - 1.2 mg/dL     (H) 10 - 52 U/L   Magnesium   Result Value Ref Range    Magnesium 1.88 1.60 - 2.40 mg/dL   Phosphorus   Result Value Ref Range    Phosphorus 3.0 2.5 - 4.9 mg/dL   Tacrolimus level   Result Value Ref Range    Tacrolimus  8.4 <=15.0 ng/mL   POCT GLUCOSE   Result Value Ref Range    POCT Glucose 205 (H) 74 - 99 mg/dL   POCT GLUCOSE   Result Value Ref Range    POCT Glucose 196 (H) 74 - 99 mg/dL   Blood Gas Arterial Full Panel Unsolicited   Result Value Ref Range    POCT pH, Arterial 7.45 (H) 7.38 - 7.42 pH    POCT pCO2, Arterial 37 (L) 38 - 42 mm Hg    POCT pO2, Arterial 142 (H) 85 - 95 mm Hg    POCT SO2, Arterial 99 94 - 100 %    POCT Oxy Hemoglobin, Arterial 97.0 94.0 - 98.0 %    POCT Hematocrit Calculated, Arterial 30.0 (L) 41.0 - 52.0 %    POCT Sodium, Arterial 141 136 - 145 mmol/L    POCT Potassium, Arterial 4.2 3.5 - 5.3 mmol/L    POCT Chloride, Arterial 109 (H) 98 - 107 mmol/L    POCT Ionized Calcium, Arterial 1.21 1.10 - 1.33 mmol/L    POCT Glucose, Arterial 199 (H) 74 - 99 mg/dL    POCT Lactate, Arterial 1.7 0.4 - 2.0 mmol/L    POCT Base Excess, Arterial 1.7 -2.0 - 3.0 mmol/L    POCT HCO3 Calculated, Arterial 25.7 22.0 - 26.0 mmol/L    POCT Hemoglobin, Arterial 9.9 (L)  13.5 - 17.5 g/dL    POCT Anion Gap, Arterial 11 10 - 25 mmo/L    Patient Temperature 37.0 degrees Celsius    FiO2 50 %   BLOOD GAS ARTERIAL FULL PANEL   Result Value Ref Range    POCT pH, Arterial 7.41 7.38 - 7.42 pH    POCT pCO2, Arterial 36 (L) 38 - 42 mm Hg    POCT pO2, Arterial 144 (H) 85 - 95 mm Hg    POCT SO2, Arterial 99 94 - 100 %    POCT Oxy Hemoglobin, Arterial 96.4 94.0 - 98.0 %    POCT Hematocrit Calculated, Arterial 28.0 (L) 41.0 - 52.0 %    POCT Sodium, Arterial 141 136 - 145 mmol/L    POCT Potassium, Arterial 3.8 3.5 - 5.3 mmol/L    POCT Chloride, Arterial 112 (H) 98 - 107 mmol/L    POCT Ionized Calcium, Arterial 1.12 1.10 - 1.33 mmol/L    POCT Glucose, Arterial 152 (H) 74 - 99 mg/dL    POCT Lactate, Arterial 2.2 (H) 0.4 - 2.0 mmol/L    POCT Base Excess, Arterial -1.6 -2.0 - 3.0 mmol/L    POCT HCO3 Calculated, Arterial 22.8 22.0 - 26.0 mmol/L    POCT Hemoglobin, Arterial 9.3 (L) 13.5 - 17.5 g/dL    POCT Anion Gap, Arterial 10 10 - 25 mmo/L    Patient Temperature 37.0 degrees Celsius    FiO2 50 %   POCT GLUCOSE   Result Value Ref Range    POCT Glucose 184 (H) 74 - 99 mg/dL   Prepare RBC: 1 Units, Irradiated   Result Value Ref Range    PRODUCT CODE W3889E97     Unit Number [id number]     Unit ABO B     Unit RH POS     XM INTEP COMP     Dispense Status XM     Blood Expiration Date November 06, 2023 23:59 EST     PRODUCT BLOOD TYPE 7300     UNIT VOLUME 350    Prepare RBC: 1 Units   Result Value Ref Range    PRODUCT CODE L3700Y23     Unit Number [id number]     Unit ABO B     Unit RH POS     XM INTEP COMP     Dispense Status XM     Blood Expiration Date November 06, 2023 23:59 EST     PRODUCT BLOOD TYPE 7300     UNIT VOLUME 350           Assessment/Plan   Principal Problem:    SBO (small bowel obstruction) (CMS/HCC)  Active Problems:    Pancreatic cancer (CMS/HCC)    Small bowel obstruction (CMS/HCC)    Pulmonary hypertension (CMS/HCC)    CVA (cerebral vascular accident) (CMS/HCC)    Bowel  perforation (CMS/HCC)    Perforated abdominal viscus      Assessment:  Chung Goodman is a 67yoM with PMHx significant for 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 Coli bacteremia. C/f recurrent afferent limb syndrome and consideration of biliary infection. Patient underwent ERCP in endoscopy suite on 10/10 for management of afferent limb syndrome and PEG-J placement for venting/feeding, course c/b bowel perforation, transferred to OR for small bowel resection, anastomosis, and PEG tube placement. Patient transferred to SICU postoperatively for further management.     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 Acute post-op pain. Received 1mg of dilaudid intraoperatively.  - ongoing neuro and pain assessments  - PRN hydromorphone for pain control  - PT/OT consult  - Home meds: allopurinol, bupropion XL, citalopram, gabapentin,      CV: History of NICM 2/2 valvular disease s/p OHT (2017), s/p MV repair, HTN, HLD. Baseline echo (9/26) with EF 55-60%, RV mildly reduced RVSF. Patient was hypertensive at end of procedure, 80  - continuous EKG/abp monitoring  - Goal map range 65-90, normotensive  - IS/ppx per HF service: [place], holding prednisone  - Home meds: amlodipine, fenofibrate, isosorbide, hydralazine, rosuvastatin.     PULM: History of COPD, pulmonary HTN. Arrived to SICU intubated and sedated on propofol 50mcg/kg/min. Reversed in OR with 400mg Sugammadex, failed SBT- RR 30, -350, pain not well controlled.   - Wean vent as tolerated, maintain SpO2 >92%.    - Q1h incentive spirometer once extubated  - additional pulm toilet prn.    - F/U post op CXR     GI: Hx of GERD. Pancreatic cancer s/p whipple (5/19/2023). Recurrent SBO with c/f recurrent afferent limb syndrome. Acute transaminitis and hyperbilirubinemia.   - NPO  now, sips with medications once extubated per surgical team  - Continue PPI (home med)  - holding home Lomotil  - holding home pancreatic enzymes while NPO     : Hx of CKD3. Baseline creatinine around 2.  - Maintenance IVF  - Volume resuscitation as needed  - Maintain U/O >0.5ml/kg/hr  - Maintain Portillo for strict I/Os  - Check renal function panel post op and daily  - Replete electrolytes judiciously     HEME: Acute blood loss anemia. OR EBL 25.   - Check CBC and coags post op and daily  - SCDs for DVT prophylaxis  - holding SC heparin for now  - ongoing monitoring for s/s bleeding     ENDO: Hx of IDDM2 (lantus 10u daily at home) and amiodarone-induced hyperthyroidism. Received 2 units of insulin intraoperatively. Glucose on arrival to SICU was 184.  - Q4h BG and SSI Lispro per ICU protocol.     ID: E. Coli bacteremia, +BCx 10/5, started Zosyn. Repeat BCxs 10/7 and 10/9 NGTD. Afebrile. Awaiting post op WBC.  - temp q4h, wbc daily  - continue Zosyn, due at 2040  - ongoing monitoring for s/s infection     Lines:  - L brachial arterial line (10/10, OR)  - L subclavian TINO (10/10, OR)  - 16G L EJ  - PIV- 22G RA  - R port  - Portillo  - RLQ Drain     Dispo: Admit to ICU. Patient seen and discussed with ICU attending         Vanessa Galvan DO

## 2023-10-11 NOTE — PROGRESS NOTES
"Chung Goodman is a 67 y.o. male on day 4 of admission presenting with SBO (small bowel obstruction) (CMS/HCC).    Subjective   Unable to extubate overnight given poor weaning parameters.        Objective     Physical Exam  Eyes:      Extraocular Movements: Extraocular movements intact.      Pupils: Pupils are equal, round, and reactive to light.   Cardiovascular:      Rate and Rhythm: Tachycardia present.      Pulses: Normal pulses.   Pulmonary:      Comments: ETT in situ  Abdominal:      Palpations: Abdomen is soft.      Tenderness: There is abdominal tenderness.   Musculoskeletal:         General: Normal range of motion.   Skin:     General: Skin is warm.   Neurological:      General: No focal deficit present.      Mental Status: He is alert.         Last Recorded Vitals  Blood pressure 148/81, pulse (!) 119, temperature 36.4 °C (97.5 °F), temperature source Temporal, resp. rate 24, height 1.765 m (5' 9.49\"), weight 74.1 kg (163 lb 5.8 oz), SpO2 98 %.  Intake/Output last 3 Shifts:  I/O last 3 completed shifts:  In: 8880.8 (119.8 mL/kg) [I.V.:3766.1 (50.8 mL/kg); Other:40; IV Piggyback:4850]  Out: 2385 (32.2 mL/kg) [Urine:1835 (0.7 mL/kg/hr); Emesis/NG output:450; Drains:100]  Weight: 74.1 kg     Relevant Results           This patient currently has cardiac telemetry ordered; if you would like to modify or discontinue the telemetry order, click here to go to the orders activity to modify/discontinue the order.      This patient is intubated   Reason for patient to remain intubated today? Intubation unnecessary, will extubate today        Malnutrition Diagnosis Status: New  Malnutrition Diagnosis: Severe malnutrition related to acute disease or injury  As Evidenced by: <50% estimated intake > 5 days, 10% weight loss over < 1 month and mild/moderate muscle atrophy  I agree with the dietitian's malnutrition diagnosis.      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 Coli bacteremia. C/f recurrent afferent limb syndrome and consideration of biliary infection. Patient underwent ERCP in endoscopy suite on 10/10 for management of afferent limb syndrome and PEG-J placement for venting/feeding, course c/b bowel perforation, transferred to OR for small bowel resection, anastomosis, and PEG tube placement. Patient transferred to SICU postoperatively for further management.     Neuro: Awake and alert this morning on SAT. Reports acute post operative pain. As needed dilaudid.   CV: Hx OHT 2017, HTN, HLD. Continue rectal aspirin. Sinus tachycardia this morning likely secondary to hypovolemia, will volume resuscitate. Heart failure following for transplant meds.   Pulm: Hx COPD. Acute hypoxemic pulmonary insufficiency, unable to extubate in the OR due to poor weaning parameters. SBT and wean towards extubation today.   GI: Presented with afferent loop obstruction, s/p upper endoscopy with iatrogenic perforation of afferent limb, s/p emergent exploratory laparotomy, afferent perforated bowel resection, side to side jj anastamosis and serosal patch with a open gtube placement NPO, G tube to gravity. PPI. Re-initiate TPN today per surgical team. Will need Gtube study prior to initiation of enteral nutrition.   : CKD, creatinine within baseline range. Monitor urine output and volume resuscitate as needed.  Endo: DM. ISS per protocol. On home lantus, may need to restart with TPN.  Heme: Acute blood loss anemia, stable. Monitor.  SCDs, SQH for DVT prophylaxis.   ID: Continue tacrolimus for immunosuppression, appreciate heart failure assistance with management. Continue zosyn and micafungin for perforated bowel. Transplant ID following.     Maintain rashid catheter for I/Os while resuscitating, maintain subclavian central line for TPN.   Continue ICU care.   Discussed with patient and wife at bedside.        I spent 40 minutes in the professional and overall care of this patient.      Antonella Mcdermott MD

## 2023-10-11 NOTE — PROGRESS NOTES
Occupational Therapy                 Therapy Communication Note    Patient Name: Chung Goodman  MRN: [id number]  Today's Date: 10/11/2023     Discipline: Occupational Therapy    Missed Visit Reason: Missed Visit Reason:  (Pt discussed during ID rounds. Hold per NP.)    Missed Time: Attempt    Comment:

## 2023-10-11 NOTE — OP NOTE
Exploration Laparotomy, Small Bowel Resection, with Jejuno-jejunostomy, G-Tube Operative Note     Date: 10/10/2023  OR Location: [facility] OR    Name: Chung Goodman : 1955, Age: 67 y.o., MRN: [id number], Sex: male    Diagnosis  Pre-op Diagnosis     * Perforated abdominal viscus [R19.8] Post-op Diagnosis     * Perforated abdominal viscus [R19.8]     Procedures  Exploration Laparotomy, Small Bowel Resection, with Jejuno-jejunostomy, G-Tube  99280 - CO EXPLORATORY LAPAROTOMY CELIOTOMY W/WO BIOPSY SPX  Repair transverse colon: 33493  Serosal patch to jejunal stump 77784    Surgeons      * Luis Armando Johnson - Primary    Resident/Fellow/Other Assistant:  No surgical staff documented.    Procedure Summary  Anesthesia: General  ASA: III  Anesthesia Staff: Anesthesiologist: Ashwin Ulloa DO; Alina Velasco MD; Laz Brito MD; Diana Childers MD; Syed Marinelli MD  CRNA: MAX Pruitt-CRNA  C-AA: SILVINA Barber; SILVINA Barrios  Anesthesia Resident: Otoniel Liu DO  Estimated Blood Loss: 100 mL  Intra-op Medications:   Medication Name Total Dose   HYDROmorphone (Dilaudid) injection 1 mg 0.2 mg              Anesthesia Record               Intraprocedure I/O Totals          Intake    LR 2800.00 mL    Propofol Drip 13.03 mL    The total shown is the total volume documented since Anesthesia Start was filed.    albumin human bottle 5% 1000.00 mL    Total Intake 3813.03 mL       Output    Urine 400 mL    Total Output 400 mL       Net    Net Volume 3413.03 mL          Specimen:   ID Type Source Tests Collected by Time   1 : Peritoneal Nodule Tissue SOFT TISSUE MASS BIOPSY SURGICAL PATHOLOGY EXAM Luis Armando Johnson MD 10/10/2023 1716   2 : Perforated Jejunum Afferent Tissue JEJUNUM BIOPSY SURGICAL PATHOLOGY EXAM Luis Armando Johnson MD 10/10/2023 1738        Staff:   Circulator: Sheldon Fink RN; Harris Garcia RN; Anjali Urias RN  Relief Circulator: Campos Ballesteros RN  Scrub Person: Campos Ballesteros RN;  Thania Worthy; Yariel Cannon, LUIS; Adelfo Arriaga         Drains and/or Catheters:   Closed/Suction Drain RUQ Bulb (Active)   Site Description Healing 10/11/23 0400   Dressing Status Clean;Dry 10/11/23 0400   Drainage Appearance Bloody;Cloudy;Thick 10/11/23 0400   Status To bulb suction 10/11/23 0400   Output (mL) 50 mL 10/11/23 0000   Intake (ml) 40 ml 10/11/23 0400       Gastrostomy/Enterostomy Gastrostomy 2 20 Fr. LUQ (Active)   Surrounding Skin Intact;Dry 10/11/23 0400   Drain Status Open to gravity drainage 10/11/23 0400   Drainage Appearance Green 10/11/23 0400   Site Description Healing 10/11/23 0400   Dressing Status Clean;Dry 10/11/23 0400   Dressing Type Dry dressing 10/11/23 0400   Output (mL) 0 mL 10/11/23 0400       Urethral Catheter Other (Comment) 16 Fr. (Active)   Site Assessment Clean;Skin intact 10/11/23 0400   Collection Container Standard drainage bag 10/11/23 0400   Securement Method Securing device (Describe) 10/11/23 0400   Reason for Continuing Urinary Catheterization accurate hourly measurement of urine volume in a critically ill patient that cannot be assessed by other volumes and urine collection strategies 10/11/23 0000   Output (mL) 20 mL 10/11/23 0600       [REMOVED] NG/OG/Feeding Tube Nasogastric 16 Fr Right nostril (Removed)       [REMOVED] NG/OG/Feeding Tube Nasogastric 16 Fr Right nostril (Removed)       [REMOVED] NG/OG/Feeding Tube Nasogastric 16 Fr Right nostril (Removed)       [REMOVED] NG/OG/Feeding Tube Nasogastric 18 Fr Right nostril (Removed)   Tube Status Unclamped 10/10/23 0300   Placement Verification Measurements 10/08/23 2014   Gastric Aspirate Grassy green (gastric) 10/08/23 2014   Distal Tube Measurement 65 cm 10/10/23 0300   Site Assessment Clean;Dry;Intact 10/10/23 0401   Drainage Appearance Green 10/10/23 0300   NG/OG Interventions Air injected into blue air vent port 10/10/23 0300   Response To Intervention No resistance met 10/10/23 0300   Tube Securement  Taped to [facility] 10/10/23 0300   Output (mL) 100 mL 10/10/23 0900       Tourniquet Times:         Implants:     Findings: The patient had minimal succus in the abdomen.  There is only a small amount of spillage in the lower abdomen.  There is a large defect about 2 inches in the proximal aspect of the efferent jejunal limb near the stricture in the anastomosis.  We also noted tethering of the anastomosis of the transverse mesocolon and nodularity consistent with carcinomatosis.  A nodule was sent to pathology and the frozen section returned adenocarcinoma consistent with metastases and recurrence.    Indications: Chung Goodman is an 67 y.o. male who is having surgery for Perforated abdominal viscus [R19.8]. Perforated jejujunum, afferent limb syndrome.    The patient was seen in the preoperative area. The risks, benefits, complications, treatment options, non-operative alternatives, expected recovery and outcomes were discussed with the patient. The possibilities of reaction to medication, pulmonary aspiration, injury to surrounding structures, bleeding, recurrent infection, the need for additional procedures, failure to diagnose a condition, and creating a complication requiring transfusion or operation were discussed with the patient. The patient concurred with the proposed plan, giving informed consent.  The site of surgery was properly noted/marked if necessary per policy. The patient has been actively warmed in preoperative area. Preoperative antibiotics were administered. Venous thrombosis prophylaxis have been ordered including bilateral sequential compression devices    Procedure Details:   The patient was intubated and taken from endoscopy suite after suspected perforation of the abdomen was encountered during the endoscopy.  We were able to get the patient to the operating room within an hour of the injury.  We made a vertical midline incision from the xiphoid process to the umbilicus with a  skin knife.  We cautiously under the abdomen with electrocautery.  The patient was 4 months from his previous Whipple operation and therefore we meticulously dissected the visceral off of the abdominal wall make sure not to injure any structures.  The transverse colon was tethered to the umbilicus.  Therefore, there is a small amount of trauma to a 1 inch segment of transverse colon on the anterior wall.  There is no full-thickness injury.  Was a little bit of deserosalization.  We therefore oversewed it with 3-0 silk pop-off suture.  We then assessed her options.  We found a nodule in the transverse mesocolon and sent it for analysis.  It was positive for carcinomatosis.  Therefore, we wanted to perform the most effective surgery while performing the least amount of morbidity and the least extensive dissection.  We therefore resected the 3 inch segment of jejunum consistent with the area of injury.  This was done with 2 loads of the ROMELIA stapler.  We then oversewed the jejunal stump on the efferent jejunal limb.  We then proceeded to perform a Sullivan enteroenterostomy from the ED for a jejunal limb to the A4 jejunal limb to reconstitute the GI tract from the pancreatobiliary limb and decompressed this pancreatobiliary limb.  Previously, the gastroenterologist felt on endoscopy that the E friend limb of the anastomosis, the previous duodenojejunostomy was patent.  Nevertheless, I was concerned about the amount of cancer in this area and progression of obstruction.  If there is a blind end to this 2 inch 8 Armenian jejunal stump beyond the stricture, there could be a blowout of the jejunal stump.  However, I did not think resecting the anastomosis was advisable due to the amount of cancer in this area.  We therefore performed a serosal patch with a flexible loop of jejunum to our oversewn staple line.  This was done with 3-0 silk pop-off suture in an interrupted fashion.  Next, we turned our attention to the stomach.   There is a clear exposed area on the greater curvature of the anterior wall of the stomach.  We then proceeded to perform a Halie gastrostomy tube with a 20 Kenyan G-tube.  We did this in the standard fashion with a 3-0 silk pursestring suture around the G-tube site.  We performed a gastrotomy and then inserted our G-tube.  We used 2-0 silk suture to pexy the stomach to the abdominal wall on 4 sides.  The G-tube was then secured to the abdominal wall on the outside with 2-0 nylon suture.  At this point we looked around for hemostasis and there is no evidence of any bleeding.  We irrigated the abdomen with a liter of warm saline solution.  We placed 1 drain in the pelvis anticipating some ascites and secured that with 2-0 nylon suture.  We closed the abdominal fascia with a running looped #1 PDS suture.  Closed the skin with staples.  I discussed the operative findings with the patient's family in the waiting area.  The patient tolerated this operation quite well.    He was taken intubated to the ICU and I discussed the operative findings with the patient's wife in the waiting area.    Complications:  None; patient tolerated the procedure well.    Disposition: ICU intubated and stable  Condition: stable         Additional Details: None  Attending Attestation: I was present and scrubbed for the entire procedure.    Luis Armando Johnson  Phone Number: [phone]

## 2023-10-11 NOTE — PROGRESS NOTES
"Chung Goodman is a 67 y.o. male on day 4 of admission presenting with SBO (small bowel obstruction) (CMS/HCC).    Subjective   Patient remains intubated following OR yesterday evening. Patient following simple commands with propofol held. Received 1L LR overnight and 500ml Albumin 5% this am for low UOP and tachycardia.     Objective     Physical Exam  Constitutional:       Comments: Intubated, alert to verbal stimuli, no acute distress.   HENT:      Head: Normocephalic and atraumatic.      Mouth/Throat:      Comments: ETT in place  Eyes:      Pupils: Pupils are equal, round, and reactive to light.      Comments: Clear sclera   Cardiovascular:      Rate and Rhythm: Regular rhythm. Tachycardia present.      Pulses: Normal pulses.      Heart sounds: Normal heart sounds.   Pulmonary:      Effort: No respiratory distress.      Breath sounds: Normal breath sounds.      Comments: Mechanically ventilated via ETT. Tachypnea during SBT. Thorax symmetric. Current vent settings: SIMV 30%, 450, 12, PS10/+5.  Abdominal:      General: Abdomen is flat. There is no distension.      Palpations: Abdomen is soft.      Comments: Midline surgical dressing with moderate strikethrough. RLQ IRVING drain with cloudy serosanguinous output. G tube to gravity drainage.   Genitourinary:     Comments: Portillo in place with clear yellow/feroz urine output  Musculoskeletal:      Cervical back: Neck supple.   Skin:     General: Skin is warm and dry.   Neurological:      Comments: Intubated. Following simple commands, nodding appropriately to questions.   Psychiatric:      Comments: NELSON         Last Recorded Vitals  Blood pressure 148/81, pulse (!) 119, temperature 36.4 °C (97.5 °F), temperature source Temporal, resp. rate 24, height 1.765 m (5' 9.49\"), weight 74.1 kg (163 lb 5.8 oz), SpO2 98 %.  Intake/Output last 3 Shifts:  I/O last 3 completed shifts:  In: 8880.8 (119.8 mL/kg) [I.V.:3766.1 (50.8 mL/kg); Other:40; IV Piggyback:4850]  Out: 2385 " (32.2 mL/kg) [Urine:1835 (0.7 mL/kg/hr); Emesis/NG output:450; Drains:100]  Weight: 74.1 kg     Relevant Results  Scheduled medications  aspirin, 150 mg, rectal, Daily  heparin (porcine), 5,000 Units, subcutaneous, q8h  insulin lispro, 0-15 Units, subcutaneous, q4h  micafungin, 100 mg, intravenous, q24h  pantoprazole, 40 mg, intravenous, BID  piperacillin-tazobactam, 3.375 g, intravenous, q6h  tacrolimus, 1 mg, sublingual, q24h  tacrolimus, 2 mg, sublingual, Daily      Continuous medications  Adult Clinimix TPN, 40 mL/hr  fat emulsion fish oil/plant based, 250 mL  lactated Ringer's, 75 mL/hr, Last Rate: 75 mL/hr (10/11/23 1000)      PRN medications  PRN medications: calcium gluconate, calcium gluconate, dextrose 10 % in water (D10W), dextrose, glucagon, HYDROmorphone, ipratropium-albuteroL, magnesium sulfate, magnesium sulfate, oxygen    Results for orders placed or performed during the hospital encounter of 10/07/23 (from the past 24 hour(s))   Blood Gas Arterial Full Panel Unsolicited   Result Value Ref Range    POCT pH, Arterial 7.45 (H) 7.38 - 7.42 pH    POCT pCO2, Arterial 37 (L) 38 - 42 mm Hg    POCT pO2, Arterial 142 (H) 85 - 95 mm Hg    POCT SO2, Arterial 99 94 - 100 %    POCT Oxy Hemoglobin, Arterial 97.0 94.0 - 98.0 %    POCT Hematocrit Calculated, Arterial 30.0 (L) 41.0 - 52.0 %    POCT Sodium, Arterial 141 136 - 145 mmol/L    POCT Potassium, Arterial 4.2 3.5 - 5.3 mmol/L    POCT Chloride, Arterial 109 (H) 98 - 107 mmol/L    POCT Ionized Calcium, Arterial 1.21 1.10 - 1.33 mmol/L    POCT Glucose, Arterial 199 (H) 74 - 99 mg/dL    POCT Lactate, Arterial 1.7 0.4 - 2.0 mmol/L    POCT Base Excess, Arterial 1.7 -2.0 - 3.0 mmol/L    POCT HCO3 Calculated, Arterial 25.7 22.0 - 26.0 mmol/L    POCT Hemoglobin, Arterial 9.9 (L) 13.5 - 17.5 g/dL    POCT Anion Gap, Arterial 11 10 - 25 mmo/L    Patient Temperature 37.0 degrees Celsius    FiO2 50 %   BLOOD GAS ARTERIAL FULL PANEL   Result Value Ref Range    POCT pH,  Arterial 7.41 7.38 - 7.42 pH    POCT pCO2, Arterial 36 (L) 38 - 42 mm Hg    POCT pO2, Arterial 144 (H) 85 - 95 mm Hg    POCT SO2, Arterial 99 94 - 100 %    POCT Oxy Hemoglobin, Arterial 96.4 94.0 - 98.0 %    POCT Hematocrit Calculated, Arterial 28.0 (L) 41.0 - 52.0 %    POCT Sodium, Arterial 141 136 - 145 mmol/L    POCT Potassium, Arterial 3.8 3.5 - 5.3 mmol/L    POCT Chloride, Arterial 112 (H) 98 - 107 mmol/L    POCT Ionized Calcium, Arterial 1.12 1.10 - 1.33 mmol/L    POCT Glucose, Arterial 152 (H) 74 - 99 mg/dL    POCT Lactate, Arterial 2.2 (H) 0.4 - 2.0 mmol/L    POCT Base Excess, Arterial -1.6 -2.0 - 3.0 mmol/L    POCT HCO3 Calculated, Arterial 22.8 22.0 - 26.0 mmol/L    POCT Hemoglobin, Arterial 9.3 (L) 13.5 - 17.5 g/dL    POCT Anion Gap, Arterial 10 10 - 25 mmo/L    Patient Temperature 37.0 degrees Celsius    FiO2 50 %   POCT GLUCOSE   Result Value Ref Range    POCT Glucose 184 (H) 74 - 99 mg/dL   Prepare RBC: 1 Units, Irradiated   Result Value Ref Range    PRODUCT CODE R6993R73     Unit Number [id number]     Unit ABO B     Unit RH POS     XM INTEP COMP     Dispense Status XM     Blood Expiration Date November 06, 2023 23:59 EST     PRODUCT BLOOD TYPE 7300     UNIT VOLUME 350    Prepare RBC: 1 Units   Result Value Ref Range    PRODUCT CODE G0639M12     Unit Number [id number]     Unit ABO B     Unit RH POS     XM INTEP COMP     Dispense Status XM     Blood Expiration Date November 06, 2023 23:59 EST     PRODUCT BLOOD TYPE 7300     UNIT VOLUME 350    POCT GLUCOSE   Result Value Ref Range    POCT Glucose 207 (H) 74 - 99 mg/dL   CBC and Auto Differential   Result Value Ref Range    WBC 15.5 (H) 4.4 - 11.3 x10*3/uL    nRBC 0.0 0.0 - 0.0 /100 WBCs    RBC 2.97 (L) 4.50 - 5.90 x10*6/uL    Hemoglobin 9.1 (L) 13.5 - 17.5 g/dL    Hematocrit 27.6 (L) 41.0 - 52.0 %    MCV 93 80 - 100 fL    MCH 30.6 26.0 - 34.0 pg    MCHC 33.0 32.0 - 36.0 g/dL    RDW 15.8 (H) 11.5 - 14.5 %    Platelets 284 150 - 450 x10*3/uL     MPV 10.4 7.5 - 11.5 fL    Immature Granulocytes %, Automated 0.2 0.0 - 0.9 %    Immature Granulocytes Absolute, Automated 0.03 0.00 - 0.70 x10*3/uL   Comprehensive Metabolic Panel   Result Value Ref Range    Glucose 210 (H) 74 - 99 mg/dL    Sodium 145 136 - 145 mmol/L    Potassium 5.0 3.5 - 5.3 mmol/L    Chloride 105 98 - 107 mmol/L    Bicarbonate 23 21 - 32 mmol/L    Anion Gap 22 (H) 10 - 20 mmol/L    Urea Nitrogen 40 (H) 6 - 23 mg/dL    Creatinine 2.69 (H) 0.50 - 1.30 mg/dL    eGFR 25 (L) >60 mL/min/1.73m*2    Calcium 8.6 8.6 - 10.6 mg/dL    Albumin 3.1 (L) 3.4 - 5.0 g/dL    Alkaline Phosphatase 365 (H) 33 - 136 U/L    Total Protein 5.0 (L) 6.4 - 8.2 g/dL     (H) 9 - 39 U/L    Bilirubin, Total 5.2 (H) 0.0 - 1.2 mg/dL     (H) 10 - 52 U/L   Magnesium   Result Value Ref Range    Magnesium 1.36 (L) 1.60 - 2.40 mg/dL   Phosphorus   Result Value Ref Range    Phosphorus 4.3 2.5 - 4.9 mg/dL   Calcium, Ionized   Result Value Ref Range    POCT Calcium, Ionized 1.11 1.1 - 1.33 mmol/L   Coagulation Screen   Result Value Ref Range    Protime 14.0 (H) 9.8 - 12.8 seconds    INR 1.2 (H) 0.9 - 1.1    aPTT 25 (L) 27 - 38 seconds   Manual Differential   Result Value Ref Range    Neutrophils %, Manual 68.7 40.0 - 80.0 %    Bands %, Manual 25.0 0.0 - 5.0 %    Lymphocytes %, Manual 1.6 13.0 - 44.0 %    Monocytes %, Manual 3.9 2.0 - 10.0 %    Eosinophils %, Manual 0.0 0.0 - 6.0 %    Basophils %, Manual 0.0 0.0 - 2.0 %    Myelocytes %, Manual 0.8 0.0 - 0.0 %    Seg Neutrophils Absolute, Manual 10.65 (H) 1.20 - 7.00 x10*3/uL    Bands Absolute, Manual 3.88 (H) 0.00 - 0.70 x10*3/uL    Lymphocytes Absolute, Manual 0.25 (L) 1.20 - 4.80 x10*3/uL    Monocytes Absolute, Manual 0.60 0.10 - 1.00 x10*3/uL    Eosinophils Absolute, Manual 0.00 0.00 - 0.70 x10*3/uL    Basophils Absolute, Manual 0.00 0.00 - 0.10 x10*3/uL    Myelocytes Absolute, Manual 0.12 0.00 - 0.00 x10*3/uL    Total Cells Counted 128     Neutrophils Absolute, Manual  14.53 (H) 1.20 - 7.70 x10*3/uL    RBC Morphology See Below     Target Cells Few    Blood Gas Arterial Full Panel Unsolicited   Result Value Ref Range    POCT pH, Arterial 7.46 (H) 7.38 - 7.42 pH    POCT pCO2, Arterial 35 (L) 38 - 42 mm Hg    POCT pO2, Arterial 187 (H) 85 - 95 mm Hg    POCT SO2, Arterial 100 94 - 100 %    POCT Oxy Hemoglobin, Arterial 97.1 94.0 - 98.0 %    POCT Hematocrit Calculated, Arterial 28.0 (L) 41.0 - 52.0 %    POCT Sodium, Arterial 139 136 - 145 mmol/L    POCT Potassium, Arterial 4.9 3.5 - 5.3 mmol/L    POCT Chloride, Arterial 107 98 - 107 mmol/L    POCT Ionized Calcium, Arterial 1.15 1.10 - 1.33 mmol/L    POCT Glucose, Arterial 222 (H) 74 - 99 mg/dL    POCT Lactate, Arterial 1.6 0.4 - 2.0 mmol/L    POCT Base Excess, Arterial 1.2 -2.0 - 3.0 mmol/L    POCT HCO3 Calculated, Arterial 24.9 22.0 - 26.0 mmol/L    POCT Hemoglobin, Arterial 9.3 (L) 13.5 - 17.5 g/dL    POCT Anion Gap, Arterial 12 10 - 25 mmo/L    Patient Temperature 37.0 degrees Celsius    FiO2 50 %   POCT GLUCOSE   Result Value Ref Range    POCT Glucose 202 (H) 74 - 99 mg/dL   Tacrolimus level   Result Value Ref Range    Tacrolimus  5.3 <=15.0 ng/mL   POCT GLUCOSE   Result Value Ref Range    POCT Glucose 146 (H) 74 - 99 mg/dL   POCT GLUCOSE   Result Value Ref Range    POCT Glucose 141 (H) 74 - 99 mg/dL       This patient is intubated   Reason for patient to remain intubated today? they are planned for extubation trial later today/tonight        Malnutrition Diagnosis Status: New  Malnutrition Diagnosis: Severe malnutrition related to acute disease or injury  As Evidenced by: <50% estimated intake > 5 days, 10% weight loss over < 1 month and mild/moderate muscle atrophy  I agree with the dietitian's malnutrition diagnosis.      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 Coli bacteremia. C/f recurrent afferent limb syndrome and consideration of biliary infection. Patient underwent EGD on 10/10 for management of afferent limb syndrome and PEG-J placement for venting/feeding, however course c/b afferent limb perforation, emergently transferred to OR s/p ex-lap, SBR, J-J anastomosis, and G-tube placement. Patient transferred to SICU postoperatively for further management.     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 Acute post-op pain. Arrived to SICU intubated and sedated. NMB reversed in OR. Holding propofol this am for SAT, following simple commands and nodding appropriately to questions.  - ongoing neuro and pain assessments  - continue holding propofol for SBT  - PRN hydromorphone for pain control  - PT/OT consult  - Home meds: allopurinol, bupropion XL, citalopram, gabapentin  - Restraints indicated while patient remains intubated, restrain with soft wrist restraints until medical devices are discontinued.     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 Baseline echo (9/26) with EF 55-60%, RV mildly reduced RVSF. Sinus tachycardia this am, treated with 500ml Albumin 5% bolus.  - continuous EKG/abp monitoring  - Goal map range 65-90  - additional volume as indicated  - start rectal ASA  - IS/ppx per HF service: [place], holding prednisone  - Home meds: ASA, amlodipine, fenofibrate, isosorbide, hydralazine, rosuvastatin.     PULM: History of COPD, pulmonary HTN. Arrived to SICU intubated. Failed SBT trial x2 today 2/2 tachypnea. Placed on SIMV.   - Wean vent as tolerated, maintain SpO2 >92%.   - Q1h incentive spirometer once extubated  - additional pulm toilet prn.   - CXR daily  - ABG prn   - PRN duonebs     GI: Hx of GERD. Pancreatic cancer s/p whipple (5/19/2023). Recurrent SBO with c/f recurrent afferent limb syndrome. Acute transaminitis, improving.  Worsening hyperbilirubinemia. Afferent limb perforation noted during EGD on 10/10, emergently transferred to OR s/p ex-lap, SBR, J-J anastomosis, and G-tube placement.  - NPO, okay for ice chips once extubated  - resume TPN at 40ml/hr (lower dose given hyperglycemia yesterday, consider advancing tomorrow)  - resume lipids  - continue G-tube to gravity -> clamp once extubated  - monitor IRVING drain output  - Continue PPI (home med)  - holding home Lomotil  - holding home pancreatic enzymes while NPO     : Hx of CKD3. Baseline creatinine ~2-3. Oliguria. Net positive 7.6L for past 24hrs.  - Check renal function panel daily and PRN  - Maintenance IVF, discontinue once TPN resumes this evening  - Volume resuscitation as needed  - Maintain U/O >0.5ml/kg/hr  - Maintain Portillo for strict I/Os  - Replete electrolytes judiciously     HEME: Acute blood loss anemia. OR EBL 25ml. Mild coagulopathy.  - Check CBC and coags daily and PRN  - SCDs for DVT prophylaxis  - resume SQ heparin  - rectal ASA  - ongoing monitoring for s/s bleeding  - Maintain active T&S (10/8) -> update     ENDO: Hx of IDDM2 (lantus 10u daily at home) and amiodarone-induced hyperthyroidism.   - Q4h BG and SSI Lispro per ICU protocol.  - holding home lantus -> will consider resuming with addition of TPN     ID: Sepsis 2/2 E. Coli bacteremia, +BCx 10/5, started Zosyn. Repeat BCxs 10/7 and 10/9 NGTD. Afebrile. Leukocytosis. Addition of vancomycin 10/10.  - temp q4h, wbc daily  - Transplant ID following, appreciate recs  - continue Zosyn  - discontinue vancomycin  - add micafungin given bowel perf w/ hx of heart transplant  - ongoing monitoring for s/s infection     Lines:  - L brachial arterial line (10/10, OR)  - L subclavian TINO (10/10, OR)  - 16G L EJ -> remove today after obtain 2nd PIV  - PIV- 22G RA  - R chest mediport -> de-access today     Dispo: Continue ICU care. Patient seen and discussed with ICU attending Dr. Mcdermott.    I spent 60 minutes in the  professional and overall care of this patient.      Ayo Cummings, APRN-CNP

## 2023-10-11 NOTE — PROGRESS NOTES
Subjective Data:  Patient underwent EGD with dilation of afferent limb anastomotic stenosis (10/10/23). S/p ex lap, small bowel resection, jejuno-jejunostomy, G-tube  (10/10/23) for perforated viscus. His MAP overnight was 90s. Net I/O: positive 7.6 liters in 24 hours. He was attempted to be weaned off ventilator but developed tachypnea. No fever. He remains intubated. Tacrolimus level (10/11): 5.30.     Objective Data:  Last Recorded Vitals:  Vitals:    10/11/23 1300 10/11/23 1400 10/11/23 1500 10/11/23 1600   BP: 148/81 154/77 (!) 155/94 141/78   Pulse: (!) 119 (!) 118 (!) 119 (!) 122   Resp: 24 21 (!) 29 (!) 28   Temp: 37 °C (98.6 °F) 37 °C (98.6 °F)  36.7 °C (98.1 °F)   TempSrc: Esophageal Esophageal  Temporal   SpO2: 98% 99% 99% 99%   Weight:       Height:         Last Labs:  CBC - 10/11/2023: 12:07 AM  15.5 9.1 284    27.6      CMP - 10/11/2023: 12:07 AM  8.6 5.0 150 --- 5.2   4.3 3.1 115 365      PTT - 10/11/2023: 12:07 AM  1.2   14.0 25     Last I/O:    Intake/Output Summary (Last 24 hours) at 10/11/2023 1707  Last data filed at 10/11/2023 1600  Gross per 24 hour   Intake 7831.12 ml   Output 1135 ml   Net 6696.12 ml        Inpatient Medications:  Scheduled medications   Medication Dose Route Frequency    acetaminophen  1,000 mg intravenous Once    aspirin  150 mg rectal Daily    heparin (porcine)  5,000 Units subcutaneous q8h    insulin lispro  0-15 Units subcutaneous q4h    lidocaine  1 patch transdermal Daily    micafungin  100 mg intravenous q24h    pantoprazole  40 mg intravenous BID    piperacillin-tazobactam  3.375 g intravenous q6h    tacrolimus  1 mg sublingual q24h    tacrolimus  2 mg sublingual Daily     PRN medications   Medication    calcium gluconate    calcium gluconate    dextrose 10 % in water (D10W)    dextrose    glucagon    HYDROmorphone    ipratropium-albuteroL    magnesium sulfate    magnesium sulfate    oxygen     Continuous Medications   Medication Dose Last Rate    Adult Clinimix TPN   40 mL/hr      fat emulsion fish oil/plant based  250 mL      lactated Ringer's  75 mL/hr 75 mL/hr (10/11/23 1000)     Physical Exam:  Constitutional:       General: Sedated, intubated  HENT:      Head: Normocephalic and atraumatic. No JVD  Eyes:      Conjunctiva/sclera: Conjunctivae normal. Anicteric sclerae  Cardiovascular:      Rate and Rhythm: Tachycardic and regular rhythm. No S3.      Heart sounds: Normal heart sounds. No murmurs or gallups.   Pulmonary:      Breath sounds: Normal breath sounds bilaterally. No wheezing or rales. No cough.  Abdominal:      General: There is no distension. Active bowel sounds. + IRVING drain on right RLQ with serosanguinous fluid, intact midline surgical dressing  Extremities: No bipedal edema, full pulses bilateral upper and lower extremities, warm extremities     Assessment/Plan   68 y/o Male with PMHx of NICM secondary to valvular disease, s/p mitral valve repair with [place] [employer] Annuloflex band II with 36 mm, s/p bicaval OHT (3/24/2017) with post-op complicated by atrial arrhythmia, RV failure, chronotropic incompetence, Pancreatic adenocarcinoma, s/p Whipple procedure (5/19/23), currently on chemotx (modified FOLFIRINOX), HTN, DM type 2, HLD, CKD Stage 3B (baseline creatinine 2), amiodarone-induced hyperthyroidism, JE, MDD, transferred from [facility] for recurrent SBO. Blood culture + E coli. [Immunosuppressive regimen at home: Tacrolimus 4 mg/3 mg (Tacrolimus Goal 5-8), prednisone 5 mg daily. ] No rejection history. Latest DSE: negative (5/2023).      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 Coli bacteremia, currently on IV antibiotic  # s/p EGD with dilation of afferent limb anastomotic stenosis (10/10/23)  #s/p Ex-lap, small bowel  resection, jejuno-jejunostomy, G-tube (10/10/23)  # Perforated afferent limb     Recommendations:  -Hold prednisone for now. Will start home dose of Prednisone once extubated. If delayed, may start methylprednisolone IV 4mg daily  -Continue Tacrolimus 2 mg SL at 0630 and 1 mg SL at 1830. Tacrolimus target goal: 5-8  -Check daily Tacrolimus trough level at 0600.    HF service will continue to follow.    Discussed with HF attending, Dr. GENESIS Landa.    Signed:    Alberto Nj MD  Heart Failure service      I saw and evaluated the patient. I personally obtained the key and critical portions of the history and physical exam or was physically present for key and critical portions performed by the resident/fellow. I reviewed the resident/fellow's documentation and discussed the patient with the resident/fellow. I agree with the resident/fellow's medical decision making as documented in the note.    Lazarus Landa MD

## 2023-10-11 NOTE — PROGRESS NOTES
"Vancomycin Dosing by Pharmacy- INITIAL    Chung Goodman is a 67 y.o. year old male who Pharmacy has been consulted for vancomycin dosing for bacteremia. Based on the patient's indication and renal status this patient will be dosed based on a goal trough/random level of 15-20.     Renal function is currently declining and will be dosed by levels.    Visit Vitals  /83   Pulse 103   Temp 36.5 °C (97.7 °F) (Temporal)   Resp 16        Lab Results   Component Value Date    CREATININE 2.69 (H) 10/11/2023    CREATININE 2.48 (H) 10/10/2023    CREATININE 2.53 (H) 10/09/2023    CREATININE 2.40 (H) 10/08/2023        Patient weight is No results found for: \"PTWEIGHT\"    No results found for: \"CULTURE\"     I/O last 3 completed shifts:  In: 3024.6 (41.8 mL/kg) [I.V.:2800 (38.7 mL/kg)]  Out: 2490 (34.4 mL/kg) [Urine:1840 (0.7 mL/kg/hr); Emesis/NG output:650]  Weight: 72.4 kg   [unfilled]    Lab Results   Component Value Date    PATIENTTEMP 37.0 10/11/2023    PATIENTTEMP 37.0 10/10/2023    PATIENTTEMP 37.0 10/10/2023          Assessment/Plan     Patient received 2g yesterday  Follow-up level will be ordered on 10/12 at first AM draw unless clinically indicated sooner.  Will continue to monitor renal function daily while on vancomycin and order serum creatinine at least every 48 hours if not already ordered.  Follow for continued vancomycin needs, clinical response, and signs/symptoms of toxicity.       Clarence Partida, PharmD       "

## 2023-10-11 NOTE — BRIEF OP NOTE
Date: 10/10/2023  OR Location: [facility] OR    Name: Chung Goodman : 1955, Age: 67 y.o., MRN: [id number], Sex: male    Diagnosis  Pre-op Diagnosis     * Perforated abdominal viscus [R19.8] Post-op Diagnosis     * Perforated abdominal viscus [R19.8]     Procedures  Exploration Laparotomy, Small Bowel Resection, with Jejuno-jejunostomy, G-Tube  08513 - IN EXPLORATORY LAPAROTOMY CELIOTOMY W/WO BIOPSY SPX      Surgeons      * Luis Armando Johnson - Primary    Resident/Fellow/Other Assistant:  No surgical staff documented.    Procedure Summary  Anesthesia: General  ASA: III  Anesthesia Staff: Anesthesiologist: Ashwin Ulloa DO; Alina Velasco MD; Laz Brito MD; Diana Childers MD; Syed Marinelli MD  CRNA: MAX Pruitt-CRNA  C-AA: SILVINA Barber; SILVINA Barrios  Anesthesia Resident: Otoniel Liu DO  Estimated Blood Loss: 25mL  Intra-op Medications:   Medication Name Total Dose   HYDROmorphone (Dilaudid) injection 1 mg 0.2 mg              Anesthesia Record               Intraprocedure I/O Totals          Intake    Propofol Drip 0.00 mL    The total shown is the total volume documented since Anesthesia Start was filed.    Total Intake 0 mL       Output    Urine 360 mL    Total Output 360 mL       Net    Net Volume -360 mL          Specimen:   ID Type Source Tests Collected by Time   1 : Peritoneal Nodule Tissue SOFT TISSUE MASS BIOPSY SURGICAL PATHOLOGY EXAM Luis Armando Johnson MD 10/10/2023 1716   2 : Perforated Jejunum Afferent Tissue JEJUNUM BIOPSY SURGICAL PATHOLOGY EXAM Luis Armando Johnson MD 10/10/2023 1738        Staff:   Circulator: Sheldon Fink RN; Harris Garcia RN; Anjali Urias RN  Relief Circulator: Campos Ballesteros RN  Scrub Person: Campos Ballesteros RN; Thania Worthy; Yariel Cannon RN; Adelfo Arriaga          Findings: large perforation of afferent loop of bowel, carcinomatosis    Complications:  None; patient tolerated the procedure well.     Disposition: ICU - intubated  and hemodynamically stable.  Condition: stable  Specimens Collected:   ID Type Source Tests Collected by Time   1 : Peritoneal Nodule Tissue SOFT TISSUE MASS BIOPSY SURGICAL PATHOLOGY EXAM Luis Armando Johnson MD 10/10/2023 1714   2 : Perforated Jejunum Afferent Tissue JEJUNUM BIOPSY SURGICAL PATHOLOGY EXAM Luis Armando Johnson MD 10/10/2023 1738     Attending Attestation:     Luis Armando Johnson  Phone Number: [phone]

## 2023-10-11 NOTE — CONSULTS
Gastroenterology Consult Service Progress Note  Department of Gastroenterology & Hepatology  Digestive [facility] [place]    [facility]  Date of Service  October 10, 2023   Patient: Chung Goodman    Medical Record: [id number]  Reason for Consult: afferent limb syndrome, potential biliary infection  Requesting Service: Oncology    Interval History:   No acute issues overnight. Patient planned for endoscopic treatment for today.    Physical Exam:    Vital Signs:    Vitals:    10/10/23 0408 10/10/23 0655 10/10/23 0900 10/10/23 1033   BP: 173/89 164/86 164/84 154/81   BP Location:  Left arm     Patient Position:  Sitting     Pulse: 83 81 74 74   Resp:  18 18 20   Temp:  36.2 °C (97.2 °F) 36.7 °C (98.1 °F) 36.5 °C (97.7 °F)   TempSrc:  Temporal     SpO2:  92% 99% 95%   Weight:       Height:         General appearance: well appearing, no acute distress  Skin: no jaundice  Head: normal  Eyes: anicteric sclera  Lungs: lungs clear to auscultation, no wheezing or rhonchi  Heart: RRR without murmur, gallop, or rubs.  No ectopy  Abdomen: Normal abdominal exam, Abdomen soft, non-tender. Bowel sounds normal. No masses, organomegaly  Extremities: Extremities normal. No lower extremity edema.  Neuro: AOx3.      Intake/Output Summary (Last 24 hours) at 10/10/2023 2043  Last data filed at 10/10/2023 1934  Gross per 24 hour   Intake 3024.64 ml   Output 1810 ml   Net 1214.64 ml         Diagnostic Testing:     Labs:  Lab Results   Component Value Date    WBC 11.3 10/10/2023    HGB 8.9 (L) 10/10/2023    HCT 27.2 (L) 10/10/2023    MCV 95 10/10/2023     10/10/2023     Lab Results   Component Value Date    GLUCOSE 189 (H) 10/10/2023    CALCIUM 9.7 10/10/2023     (H) 10/10/2023    K 4.1 10/10/2023    CO2 29 10/10/2023     10/10/2023    BUN 41 (H) 10/10/2023    CREATININE 2.48 (H) 10/10/2023     Assessment:     Chung Goodman is a 67 y.o. M with a PMH of cardiac transplant (3/24/17)  Monitor And updated RX for glasses. secondary to NICM due to valvular disease, pancreatic adenocarcinoma s/p Whipple surgery (5/19/23) currently on modified FOLFIRINOX, h/o recurrent pancreatitis, HTN, DMII, HLD, CKDIII, amiodarine-induced hyperthyroidism, JE, MDD transferred from [facility] 10/7/23 due to recurrent symptomatic afferent limb syndrome in addition to recent fevers, E. Coli bacteremia, and LFT derangements.     Gastroenterology is consulted for concern for recurrent afferent limb syndrome and consideration of biliary infection. Patient recently presented with concern for afferent limb syndrome s/p EGD 9/26/23 with noted benign stenosis of afferent limb anastamosis traversed with noted dilation secondary to gastroscope with improved symptoms after procedure. Patient now has new symptoms of fevers, E. Coli bacteremia, elevated LFTs with normal Tbil, and no biliary duct dilation with consideration of biliary infection in setting of no additional obvious infectious etiology. Patient without improvement of symptoms with nasogastric decompression with minimal output and recurrent displacement. Additional unclear finding of potential mass in proximal small bowel concerning for carcinoid versus scarring.     Plan:        - endoscopic evaluation Tuesday 10/10/23 for management of afferent limb syndrome (consider balloon dilation) and PEG-J placement for venting/feeding, NPO, hold AM DVT ppx, vancomycin 1 gm IV x1 PRN to be administered in endoscopy suite  - consult nutrition for jejunal tube feed recommendations  - monitor infectious evaluation, continue antibiotic management  - appreciate surgical oncology recommendations  - defer supportive care per primary team    Addendum:  - EGD 10/10/23 with dilation of afferent limb anastomotic stenosis but noted perforation of afferent limb not amenable to endoscopic closure, requiring urgent surgical management     Patient seen and staffed with Dr. Eloisa Henriquez.  Thank you for the  consultation. Gastroenterology will continue to the follow the patient.   Please do not hesitate to contact me or page [phone] if there are any further questions between the weekday hours of 7 AM - 5 PM.   If there is an urgent concern during the weekend, after-hours, or holidays; then please page the on-call GI fellow at [phone]. Thank you.    SIGNATURE: Lenny Ulloa MD PATIENT NAME: Chung Goodman   DATE: October 10, 2023 MRN: [id number]

## 2023-10-11 NOTE — PROGRESS NOTES
"Chung Goodman is a 67 y.o. male w/ PMHx cardiac transplant and pancreatic cancer requiring whipple 5/19/23 on day 4 of admission presenting with SBO (small bowel obstruction) (CMS/HCC).    Subjective     Patient was seen and examined. Intubated and sedated and stable at this time. Had exploratory laparotomy for iatrogenic bowel perforation during endoscopy, had bowel resection and bowel anastomosis and gtube placement.        Objective     Physical Exam  NEURO: Intubated and sedated, minimal vent settings.  HEENT: NCAT, ETT in place, No OGT  CARDIO: mildly tachycardic at times  PULM: Comfortable on vent at this time, no distress  ABD: Soft, mildly distended, Gtube in place with minimal output of gastric contents, Drain with ss output. Overall incision is CDI with scant strikethrough of blood.     Last Recorded Vitals  Blood pressure 156/81, pulse (!) 114, temperature 36.4 °C (97.5 °F), temperature source Temporal, resp. rate 23, height 1.765 m (5' 9.49\"), weight 74.1 kg (163 lb 5.8 oz), SpO2 99 %.  Intake/Output last 3 Shifts:  I/O last 3 completed shifts:  In: 8880.8 (119.8 mL/kg) [I.V.:3766.1 (50.8 mL/kg); Other:40; IV Piggyback:4850]  Out: 2385 (32.2 mL/kg) [Urine:1835 (0.7 mL/kg/hr); Emesis/NG output:450; Drains:100]  Weight: 74.1 kg     Relevant Results  Results for orders placed or performed during the hospital encounter of 10/07/23 (from the past 24 hour(s))   Blood Gas Arterial Full Panel Unsolicited   Result Value Ref Range    POCT pH, Arterial 7.45 (H) 7.38 - 7.42 pH    POCT pCO2, Arterial 37 (L) 38 - 42 mm Hg    POCT pO2, Arterial 142 (H) 85 - 95 mm Hg    POCT SO2, Arterial 99 94 - 100 %    POCT Oxy Hemoglobin, Arterial 97.0 94.0 - 98.0 %    POCT Hematocrit Calculated, Arterial 30.0 (L) 41.0 - 52.0 %    POCT Sodium, Arterial 141 136 - 145 mmol/L    POCT Potassium, Arterial 4.2 3.5 - 5.3 mmol/L    POCT Chloride, Arterial 109 (H) 98 - 107 mmol/L    POCT Ionized Calcium, Arterial 1.21 1.10 - 1.33 mmol/L "    POCT Glucose, Arterial 199 (H) 74 - 99 mg/dL    POCT Lactate, Arterial 1.7 0.4 - 2.0 mmol/L    POCT Base Excess, Arterial 1.7 -2.0 - 3.0 mmol/L    POCT HCO3 Calculated, Arterial 25.7 22.0 - 26.0 mmol/L    POCT Hemoglobin, Arterial 9.9 (L) 13.5 - 17.5 g/dL    POCT Anion Gap, Arterial 11 10 - 25 mmo/L    Patient Temperature 37.0 degrees Celsius    FiO2 50 %   BLOOD GAS ARTERIAL FULL PANEL   Result Value Ref Range    POCT pH, Arterial 7.41 7.38 - 7.42 pH    POCT pCO2, Arterial 36 (L) 38 - 42 mm Hg    POCT pO2, Arterial 144 (H) 85 - 95 mm Hg    POCT SO2, Arterial 99 94 - 100 %    POCT Oxy Hemoglobin, Arterial 96.4 94.0 - 98.0 %    POCT Hematocrit Calculated, Arterial 28.0 (L) 41.0 - 52.0 %    POCT Sodium, Arterial 141 136 - 145 mmol/L    POCT Potassium, Arterial 3.8 3.5 - 5.3 mmol/L    POCT Chloride, Arterial 112 (H) 98 - 107 mmol/L    POCT Ionized Calcium, Arterial 1.12 1.10 - 1.33 mmol/L    POCT Glucose, Arterial 152 (H) 74 - 99 mg/dL    POCT Lactate, Arterial 2.2 (H) 0.4 - 2.0 mmol/L    POCT Base Excess, Arterial -1.6 -2.0 - 3.0 mmol/L    POCT HCO3 Calculated, Arterial 22.8 22.0 - 26.0 mmol/L    POCT Hemoglobin, Arterial 9.3 (L) 13.5 - 17.5 g/dL    POCT Anion Gap, Arterial 10 10 - 25 mmo/L    Patient Temperature 37.0 degrees Celsius    FiO2 50 %   POCT GLUCOSE   Result Value Ref Range    POCT Glucose 184 (H) 74 - 99 mg/dL   Prepare RBC: 1 Units, Irradiated   Result Value Ref Range    PRODUCT CODE S8265Q65     Unit Number [id number]     Unit ABO B     Unit RH POS     XM INTEP COMP     Dispense Status XM     Blood Expiration Date November 06, 2023 23:59 EST     PRODUCT BLOOD TYPE 7300     UNIT VOLUME 350    Prepare RBC: 1 Units   Result Value Ref Range    PRODUCT CODE R3675C77     Unit Number [id number]     Unit ABO B     Unit RH POS     XM INTEP COMP     Dispense Status XM     Blood Expiration Date November 06, 2023 23:59 EST     PRODUCT BLOOD TYPE 7300     UNIT VOLUME 350    POCT GLUCOSE   Result Value  Ref Range    POCT Glucose 207 (H) 74 - 99 mg/dL   CBC and Auto Differential   Result Value Ref Range    WBC 15.5 (H) 4.4 - 11.3 x10*3/uL    nRBC 0.0 0.0 - 0.0 /100 WBCs    RBC 2.97 (L) 4.50 - 5.90 x10*6/uL    Hemoglobin 9.1 (L) 13.5 - 17.5 g/dL    Hematocrit 27.6 (L) 41.0 - 52.0 %    MCV 93 80 - 100 fL    MCH 30.6 26.0 - 34.0 pg    MCHC 33.0 32.0 - 36.0 g/dL    RDW 15.8 (H) 11.5 - 14.5 %    Platelets 284 150 - 450 x10*3/uL    MPV 10.4 7.5 - 11.5 fL    Immature Granulocytes %, Automated 0.2 0.0 - 0.9 %    Immature Granulocytes Absolute, Automated 0.03 0.00 - 0.70 x10*3/uL   Comprehensive Metabolic Panel   Result Value Ref Range    Glucose 210 (H) 74 - 99 mg/dL    Sodium 145 136 - 145 mmol/L    Potassium 5.0 3.5 - 5.3 mmol/L    Chloride 105 98 - 107 mmol/L    Bicarbonate 23 21 - 32 mmol/L    Anion Gap 22 (H) 10 - 20 mmol/L    Urea Nitrogen 40 (H) 6 - 23 mg/dL    Creatinine 2.69 (H) 0.50 - 1.30 mg/dL    eGFR 25 (L) >60 mL/min/1.73m*2    Calcium 8.6 8.6 - 10.6 mg/dL    Albumin 3.1 (L) 3.4 - 5.0 g/dL    Alkaline Phosphatase 365 (H) 33 - 136 U/L    Total Protein 5.0 (L) 6.4 - 8.2 g/dL     (H) 9 - 39 U/L    Bilirubin, Total 5.2 (H) 0.0 - 1.2 mg/dL     (H) 10 - 52 U/L   Magnesium   Result Value Ref Range    Magnesium 1.36 (L) 1.60 - 2.40 mg/dL   Phosphorus   Result Value Ref Range    Phosphorus 4.3 2.5 - 4.9 mg/dL   Calcium, Ionized   Result Value Ref Range    POCT Calcium, Ionized 1.11 1.1 - 1.33 mmol/L   Coagulation Screen   Result Value Ref Range    Protime 14.0 (H) 9.8 - 12.8 seconds    INR 1.2 (H) 0.9 - 1.1    aPTT 25 (L) 27 - 38 seconds   Manual Differential   Result Value Ref Range    Neutrophils %, Manual 68.7 40.0 - 80.0 %    Bands %, Manual 25.0 0.0 - 5.0 %    Lymphocytes %, Manual 1.6 13.0 - 44.0 %    Monocytes %, Manual 3.9 2.0 - 10.0 %    Eosinophils %, Manual 0.0 0.0 - 6.0 %    Basophils %, Manual 0.0 0.0 - 2.0 %    Myelocytes %, Manual 0.8 0.0 - 0.0 %    Seg Neutrophils Absolute, Manual 10.65  (H) 1.20 - 7.00 x10*3/uL    Bands Absolute, Manual 3.88 (H) 0.00 - 0.70 x10*3/uL    Lymphocytes Absolute, Manual 0.25 (L) 1.20 - 4.80 x10*3/uL    Monocytes Absolute, Manual 0.60 0.10 - 1.00 x10*3/uL    Eosinophils Absolute, Manual 0.00 0.00 - 0.70 x10*3/uL    Basophils Absolute, Manual 0.00 0.00 - 0.10 x10*3/uL    Myelocytes Absolute, Manual 0.12 0.00 - 0.00 x10*3/uL    Total Cells Counted 128     Neutrophils Absolute, Manual 14.53 (H) 1.20 - 7.70 x10*3/uL    RBC Morphology See Below     Target Cells Few    Blood Gas Arterial Full Panel Unsolicited   Result Value Ref Range    POCT pH, Arterial 7.46 (H) 7.38 - 7.42 pH    POCT pCO2, Arterial 35 (L) 38 - 42 mm Hg    POCT pO2, Arterial 187 (H) 85 - 95 mm Hg    POCT SO2, Arterial 100 94 - 100 %    POCT Oxy Hemoglobin, Arterial 97.1 94.0 - 98.0 %    POCT Hematocrit Calculated, Arterial 28.0 (L) 41.0 - 52.0 %    POCT Sodium, Arterial 139 136 - 145 mmol/L    POCT Potassium, Arterial 4.9 3.5 - 5.3 mmol/L    POCT Chloride, Arterial 107 98 - 107 mmol/L    POCT Ionized Calcium, Arterial 1.15 1.10 - 1.33 mmol/L    POCT Glucose, Arterial 222 (H) 74 - 99 mg/dL    POCT Lactate, Arterial 1.6 0.4 - 2.0 mmol/L    POCT Base Excess, Arterial 1.2 -2.0 - 3.0 mmol/L    POCT HCO3 Calculated, Arterial 24.9 22.0 - 26.0 mmol/L    POCT Hemoglobin, Arterial 9.3 (L) 13.5 - 17.5 g/dL    POCT Anion Gap, Arterial 12 10 - 25 mmo/L    Patient Temperature 37.0 degrees Celsius    FiO2 50 %   POCT GLUCOSE   Result Value Ref Range    POCT Glucose 202 (H) 74 - 99 mg/dL   Tacrolimus level   Result Value Ref Range    Tacrolimus  5.3 <=15.0 ng/mL   POCT GLUCOSE   Result Value Ref Range    POCT Glucose 146 (H) 74 - 99 mg/dL   POCT GLUCOSE   Result Value Ref Range    POCT Glucose 141 (H) 74 - 99 mg/dL           Assessment/Plan   Principal Problem:    SBO (small bowel obstruction) (CMS/HCC)  Active Problems:    Pancreatic cancer (CMS/HCC)    Small bowel obstruction (CMS/HCC)    Pulmonary hypertension (CMS/HCC)     CVA (cerebral vascular accident) (CMS/HCC)    Perforated abdominal viscus    Bowel perforation (CMS/HCC)    Pt is a 68 y/o male w/ PMHx cardiac transplant in 2017, s/p whipple for 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   Plan:  - SBT, when extubated ok for meds with minimal sips  - continue gtube to gravity until extubated  - continue abx per ID  - Will plan for Gtube study tentatively for Friday  - appreciate SICU care of this critically ill patient    Iglesia Barahona DO

## 2023-10-11 NOTE — SIGNIFICANT EVENT
Postop Check    Chung Goodman  is a 67 y.o. male POD#0 s/p ex lap, SBR, jejunostomy, and g-tube placement, who is recovering well postoperatively. Patient currently sedated and intubated, but arousable to stimuli. No concerns per nursing staff.    Vitals:    10/11/23 0100   BP: 157/83   Pulse: 103   Resp: 16   Temp:    SpO2: 100%     General: no acute distress, sedated and intubated  Neuro: sedated but will arouse to stimuli   CV: regular rate  Resp: ET tube in place. Equal chest rise. Vent settings per ICU team  Abd: soft, nondistended, appropriately tender. Midline incision covered with bandage with minimal strikethrough, intact.   Extremities: MAEx4    A/P:  67 year old male POD0 s/p ex lap, SBR, and jejunostomy and g-tube placement. Patient had an attempted GJ anastomosis stricture dilation today, resulting in perforation requiring operative intervention. Patient is remains in critical condition, but currently stable in the ICU.    Plan:  -  continue NPO  - continue NGT to LIWS  - wean to extubate as tolerated  - rest of care per primary team and ICU team    Irish Rasmussen [id number]  Surgical Oncology  Pager [phone]

## 2023-10-11 NOTE — CARE PLAN
The patient's goals for the shift include      The clinical goals for the shift include patient will report pain <9/10 by end of shift    Problem: Safety - Medical Restraint  Goal: Remains free of injury from restraints (Restraint for Interference with Medical Device)  Outcome: Progressing  Flowsheets (Taken 10/11/2023 0529)  Remains free of injury from restraints (restraint for interference with medical device):   Determine that other, less restrictive measures have been tried or would not be effective before applying the restraint   Evaluate the patient's condition at the time of restraint application   Inform patient/family regarding the reason for restraint   Every 2 hours: Monitor safety, psychosocial status, comfort, nutrition and hydration  Goal: Free from restraint(s) (Restraint for Interference with Medical Device)  Outcome: Progressing  Flowsheets (Taken 10/11/2023 0529)  Free from restraint(s) (restraint for interference with medical device):   ONCE/SHIFT or MINIMUM Every 12 hours: Assess and document the continuing need for restraints   Every 24 hours: Continued use of restraint requires Licensed Independent Practitioner to perform face to face examination and written order   Identify and implement measures to help patient regain control  Goal: Free from restraint(s) (Restraint for Interference with Medical Device)  Outcome: Progressing  Flowsheets (Taken 10/11/2023 0529)  Free from restraint(s) (restraint for interference with medical device):   ONCE/SHIFT or MINIMUM Every 12 hours: Assess and document the continuing need for restraints   Every 24 hours: Continued use of restraint requires Licensed Independent Practitioner to perform face to face examination and written order   Identify and implement measures to help patient regain control     Problem: Safety - Medical Restraint  Goal: Remains free of injury from restraints (Restraint for Interference with Medical Device)  Outcome:  Progressing  Flowsheets (Taken 10/11/2023 0529)  Remains free of injury from restraints (restraint for interference with medical device):   Determine that other, less restrictive measures have been tried or would not be effective before applying the restraint   Evaluate the patient's condition at the time of restraint application   Inform patient/family regarding the reason for restraint   Every 2 hours: Monitor safety, psychosocial status, comfort, nutrition and hydration  Goal: Free from restraint(s) (Restraint for Interference with Medical Device)  Outcome: Progressing  Flowsheets (Taken 10/11/2023 0529)  Free from restraint(s) (restraint for interference with medical device):   ONCE/SHIFT or MINIMUM Every 12 hours: Assess and document the continuing need for restraints   Every 24 hours: Continued use of restraint requires Licensed Independent Practitioner to perform face to face examination and written order   Identify and implement measures to help patient regain control  Goal: Free from restraint(s) (Restraint for Interference with Medical Device)  Outcome: Progressing  Flowsheets (Taken 10/11/2023 0529)  Free from restraint(s) (restraint for interference with medical device):   ONCE/SHIFT or MINIMUM Every 12 hours: Assess and document the continuing need for restraints   Every 24 hours: Continued use of restraint requires Licensed Independent Practitioner to perform face to face examination and written order   Identify and implement measures to help patient regain control     Over the shift, the patient did not make progress toward the following goals. Barriers to progression include pt still sedated and intubated. Recommendations to address these barriers include weaning off sedation and CPAP.

## 2023-10-11 NOTE — PROGRESS NOTES
Physical Therapy                 Therapy Communication Note    Patient Name: Chung Goodman  MRN: [id number]  Today's Date: 10/11/2023     Discipline: Physical Therapy    Missed Visit Reason: Missed Visit Reason:  (0844: pt intubated/sedated, hold PT per team. Will continue to follow.)    Missed Time: Attempt

## 2023-10-12 NOTE — SIGNIFICANT EVENT
10/12/23 1316   Readings   Resp (!) 35   Daily Screen   Total RSBI 139   Weaning Parameters   Weaning Vital Capacity 542 mL   Negative Inspiratory Force (NIF) -19   Spontaneous Minute Volume (MV) 12   Weaning Tidal Volume 352 mL     Weaning Parameters Obtained. Pt following commands. Reported to team.

## 2023-10-12 NOTE — PROGRESS NOTES
"Chung Goodman is a 67 y.o. male on day 5 of admission presenting with SBO (small bowel obstruction) (CMS/HCC).    Subjective   Pt remained on CPAP 10/5 overnight, continues to have tachypnea to low 30s. Started on TPN at 40ml/hr.      Objective     Physical Exam  Vitals and nursing note reviewed.   Constitutional:       General: He is not in acute distress.     Appearance: He is ill-appearing.   HENT:      Head: Normocephalic and atraumatic.      Mouth/Throat:      Mouth: Mucous membranes are moist.   Eyes:      Conjunctiva/sclera: Conjunctivae normal.      Pupils: Pupils are equal, round, and reactive to light.   Cardiovascular:      Rate and Rhythm: Regular rhythm. Tachycardia present.      Pulses: Normal pulses.      Heart sounds: Normal heart sounds.   Pulmonary:      Comments: Intubated, tachypneic  Abdominal:      General: Abdomen is flat. There is no distension.      Palpations: Abdomen is soft.      Tenderness: There is abdominal tenderness (appropriately).   Musculoskeletal:      Cervical back: Neck supple.      Right lower leg: No edema.      Left lower leg: No edema.   Skin:     General: Skin is warm and dry.   Neurological:      Comments: Following commands UE/LE. MAEx4         Last Recorded Vitals  Blood pressure 180/68, pulse (Abnormal) 122, temperature 37.4 °C (99.3 °F), temperature source Temporal, resp. rate (Abnormal) 35, height 1.765 m (5' 9.49\"), weight 76.7 kg (169 lb 1.5 oz), SpO2 100 %.  Intake/Output last 3 Shifts:  I/O last 3 completed shifts:  In: 40965.3 (130.9 mL/kg) [I.V.:2639.9 (34.4 mL/kg); Other:40; IV Piggyback:6750]  Out: 1695 (22.1 mL/kg) [Urine:1200 (0.4 mL/kg/hr); Emesis/NG output:170; Drains:325]  Weight: 76.7 kg     Relevant Results           This patient currently has cardiac telemetry ordered; if you would like to modify or discontinue the telemetry order, click here to go to the orders activity to modify/discontinue the order.  This patient has a central line   Reason " for the central line remaining today? Parenteral nutrition    This patient has a urinary catheter   Reason for the urinary catheter remaining today? critically ill patient who need accurate urinary output measurements          Malnutrition Diagnosis Status: New  Malnutrition Diagnosis: Severe malnutrition related to acute disease or injury  As Evidenced by: <50% estimated intake > 5 days, 10% weight loss over < 1 month and mild/moderate muscle atrophy  I agree with the dietitian's malnutrition diagnosis.      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 Coli bacteremia. C/f recurrent afferent limb syndrome and consideration of biliary infection. Patient underwent EGD on 10/10 for management of afferent limb syndrome and PEG-J placement for venting/feeding, however course c/b afferent limb perforation, emergently transferred to OR s/p ex-lap, SBR, J-J anastomosis, and G-tube placement. Patient transferred to SICU postoperatively for further management.     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 Acute post-op pain. Arrived to SICU intubated and sedated. NMB reversed in OR. This am, not following commands, had just received prn dilaudid. On rounds, following simple commands.   - ongoing neuro and pain assessments  - Decrease PRN hydromorphone to 0.2mg q3h  - Start precedex, target RASS 0 to -1  - PT/OT consult  - Home meds: allopurinol, bupropion XL, citalopram,  gabapentin  - Restraints indicated while patient remains intubated, restrain with soft wrist restraints until medical devices are discontinued.     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 Baseline echo (9/26) with EF 55-60%, RV mildly reduced RVSF. Sinus tachycardia and hypertensive overnight  - continuous EKG/abp monitoring  - Goal map range 65-90  - Additional volume as indicated  - Continue rectal ASA  - IS/ppx per HF service: [place], holding prednisone - will discuss with Surg Onc starting methylprednisolone per HF recs  - Home meds: ASA, amlodipine, fenofibrate, isosorbide, hydralazine, rosuvastatin.     PULM: History of COPD, pulmonary HTN. Arrived to SICU intubated. Failed SBT trial 10/11 2/2 tachypnea. Remained on CPAP overnight 10/5, weaned to 5/5 this am. Plan for SBT today. CXR this morning with bilateral pleural effusions, L>R.    - Wean vent as tolerated, maintain SpO2 >92%.   - Q1h incentive spirometer once extubated  - additional pulm toilet prn.   - CXR daily  - ABG prn   - PRN duonekevin     GI: Hx of GERD. Pancreatic cancer s/p whipple (5/19/2023). Recurrent SBO with c/f recurrent afferent limb syndrome. Acute transaminitis, improving. Worsening hyperbilirubinemia. Afferent limb perforation noted during EGD on 10/10, emergently transferred to OR s/p ex-lap, SBR, J-J anastomosis, and G-tube placement.  - NPO, okay for ice chips once extubated  - Continue TPN at 40ml/hr, consider advancing tomorrow if BG well controlled  - Continue lipids  - continue G-tube to gravity -> clamp once extubated  - monitor IRVING drain output  - Continue PPI (home med)  - holding home Lomotil  - holding home pancreatic enzymes while NPO     : Hx of CKD3. Baseline creatinine ~2-3. Oliguria. Net positive 7.6L for past 24hrs.  - Check renal function panel daily and PRN  - Volume resuscitation as needed  - Maintain U/O >0.5ml/kg/hr  - Maintain Portillo for strict I/Os  - Replete electrolytes  per protocol  - Diuresis with lasix 40mg IVP     HEME: Acute blood loss anemia. OR EBL 25ml. Mild coagulopathy.  - Check CBC and coags daily and PRN  - SCDs for DVT prophylaxis  - Continue SQ heparin  - rectal ASA  - ongoing monitoring for s/s bleeding  - Maintain active T&S (10/8) -> update     ENDO: Hx of IDDM2 (lantus 10u daily at home) and amiodarone-induced hyperthyroidism.   - Q4h BG and SSI Lispro per ICU protocol.  - Restart home Lantus 10u qHS     ID: Sepsis 2/2 E. Coli bacteremia, +BCx 10/5, started Zosyn. Repeat BCxs 10/7 and 10/9 NGTD. Afebrile. Leukocytosis.   - temp q4h, wbc daily  - Transplant ID following, appreciate recs  - continue Zosyn  - Vanco 10/10-10/12  - Continue micafungin (10/11-) given bowel perf w/ hx of heart transplant  - ongoing monitoring for s/s infection     Lines:  - L brachial arterial line (10/10, OR)  - L subclavian TINO (10/10, OR)  - PIVx2  - R chest mediport -> de-accessed 10/11     Dispo: Continue ICU care. Patient seen and discussed with ICU attending Dr. Mcdermott.           Angeles Hernandez MD

## 2023-10-12 NOTE — PROGRESS NOTES
Subjective Data:  Patient did not pass the weaning trial this morning. He remains NPO, receiving TPN. -126. Net I/O: + 3 liters in the past 24 hours. He has been started on IV methylprednisolone in addition to continuing Tacrolimus regimen. His latest tacrolimus level (10/12) is 5.70.    Objective Data:  Last Recorded Vitals:  Vitals:    10/12/23 1316 10/12/23 1400 10/12/23 1500 10/12/23 1523   BP:  160/77 140/68    Pulse: (!) 121 (!) 119 (!) 115 109   Resp: (!) 35 (!) 34 (!) 30 (!) 28   Temp:       TempSrc:       SpO2:  100% 100%    Weight:       Height:         Last Labs:  CBC - 10/12/2023:  5:58 AM  18.2 8.2 232    23.4      CMP - 10/12/2023:  5:58 AM  8.4 5.0 72 --- 3.9   2.7 2.8 68 238      PTT - 10/12/2023:  5:58 AM  1.3   15.2 32     Last I/O:    Intake/Output Summary (Last 24 hours) at 10/12/2023 1525  Last data filed at 10/12/2023 1500  Gross per 24 hour   Intake 2751.43 ml   Output 1620 ml   Net 1131.43 ml        Inpatient Medications:  Scheduled medications   Medication Dose Route Frequency    aspirin  150 mg rectal Daily    heparin (porcine)  5,000 Units subcutaneous q8h    insulin glargine  10 Units subcutaneous Nightly    insulin lispro  0-15 Units subcutaneous q4h    lidocaine  1 patch transdermal Daily    methylPREDNISolone sodium succinate (PF)  4 mg intravenous q24h    micafungin  100 mg intravenous q24h    pantoprazole  40 mg intravenous BID    piperacillin-tazobactam  3.375 g intravenous q6h    tacrolimus  1 mg sublingual q24h    tacrolimus  2 mg sublingual Daily     PRN medications   Medication    calcium gluconate    calcium gluconate    dextrose 10 % in water (D10W)    dextrose    glucagon    HYDROmorphone    ipratropium-albuteroL    magnesium sulfate    magnesium sulfate    oxygen     Continuous Medications   Medication Dose Last Rate    Adult Clinimix TPN  40 mL/hr 40 mL/hr (10/12/23 1500)    dexmedeTOMIDine  0.1-1.5 mcg/kg/hr 0.4 mcg/kg/hr (10/12/23 1500)    fat emulsion fish  oil/plant based  250 mL Stopped (10/12/23 5415)     Physical Exam:  Constitutional:       General: Well developed adult without acute distress, intubated on CPAP mode  HENT:      Head: Normocephalic and atraumatic. No JVD, +nasogastric tube attached to suction  Eyes:      Extraocular Movements: Extraocular movements intact.      Conjunctiva/sclera: Conjunctivae normal.   Cardiovascular:      Rate and Rhythm: Tachycardic, regular rhythm. No S3.      Heart sounds: Normal heart sounds. No murmurs or gallups.      Extremities: Warm distal extremities, no bipedal edema  Pulmonary:      Effort: Pulmonary effort is normal. No respiratory distress.      Breath sounds: Normal breath sounds bilaterally. No wheezing or rales. No cough.  Abdominal:      General: Active bowel sounds. + IRVING drain on right RLQ with serosanguinous fluid, intact midline surgical dressing      Assessment/Plan   66 y/o Male with PMHx of NICM secondary to valvular disease, s/p mitral valve repair with [place] [employer] Annuloflex band II with 36 mm, s/p bicaval OHT (3/24/2017) with post-op complicated by atrial arrhythmia, RV failure, chronotropic incompetence, Pancreatic adenocarcinoma, s/p Whipple procedure (5/19/23), currently on chemotx (modified FOLFIRINOX), HTN, DM type 2, HLD, CKD Stage 3B (baseline creatinine 2), amiodarone-induced hyperthyroidism, JE, MDD, transferred from [facility] for recurrent SBO. Blood culture + E coli. [Immunosuppressive regimen at home: Tacrolimus 4 mg/3 mg (Tacrolimus Goal 5-8), prednisone 5 mg daily. ] No rejection history. Latest DSE: negative (5/2023).      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 Coli bacteremia, currently on IV antibiotic  # s/p EGD with dilation of afferent  limb anastomotic stenosis (10/10/23)  #s/p Ex-lap, small bowel resection, jejuno-jejunostomy, G-tube (10/10/23)  # Perforated afferent limb     Recommendations:  -Continue Tacrolimus 2 mg SL at 0630, 1 mg SL at 1830. Tacrolimus target goal: 5-8  -Check daily Tacrolimus trough level at 0600.  -Continue methylprednisolone 4 mg IV daily.  -Keep serum K>4, Mg>2.    HF service will continue to follow.    Discussed with HF attending, Dr. GENESIS Landa.    Signed:    Alberto Nj MD  Heart Failure service

## 2023-10-12 NOTE — PROGRESS NOTES
"Chung Goodman is a 67 y.o. male w/ PMHx cardiac transplant and pancreatic cancer requiring whipple 5/19/23 on day 5 of admission presenting with SBO (small bowel obstruction) (CMS/HCC).    Subjective     Patient was seen and examined. Intubated and sedated and stable at this time. Had exploratory laparotomy for iatrogenic bowel perforation during endoscopy, had bowel resection and bowel anastomosis and gtube placement 10/10. This AM pt is slightly more alert and responding to painful stimuli. Per his partner, he was responding to commands later in the day yesterday.       Objective     Physical Exam  NEURO: Intubated and sedated, minimal vent settings, pt breathing over the vent intermittently. Responds to name and painful stimuli, not responding to commands at this time.  HEENT: NCAT, ETT in place, No OGT  CARDIO: mildly tachycardic at times  PULM: Comfortable on vent at this time, no distress  ABD: Soft, mildly distended, Gtube in place with minimal output of gastric contents, Drain with ss output. Overall incision is CDI with scant strikethrough of blood.     Last Recorded Vitals  Blood pressure 180/68, pulse (!) 122, temperature 37.4 °C (99.3 °F), temperature source Temporal, resp. rate (!) 35, height 1.765 m (5' 9.49\"), weight 76.7 kg (169 lb 1.5 oz), SpO2 100 %.  Intake/Output last 3 Shifts:  I/O last 3 completed shifts:  In: 18905.3 (130.9 mL/kg) [I.V.:2639.9 (34.4 mL/kg); Other:40; IV Piggyback:6750]  Out: 1695 (22.1 mL/kg) [Urine:1200 (0.4 mL/kg/hr); Emesis/NG output:170; Drains:325]  Weight: 76.7 kg     Relevant Results  Results for orders placed or performed during the hospital encounter of 10/07/23 (from the past 24 hour(s))   POCT GLUCOSE   Result Value Ref Range    POCT Glucose 146 (H) 74 - 99 mg/dL   POCT GLUCOSE   Result Value Ref Range    POCT Glucose 141 (H) 74 - 99 mg/dL   POCT GLUCOSE   Result Value Ref Range    POCT Glucose 159 (H) 74 - 99 mg/dL   Magnesium   Result Value Ref Range    " Magnesium 2.36 1.60 - 2.40 mg/dL   Renal Function Panel   Result Value Ref Range    Glucose 122 (H) 74 - 99 mg/dL    Sodium 145 136 - 145 mmol/L    Potassium 3.8 3.5 - 5.3 mmol/L    Chloride 108 (H) 98 - 107 mmol/L    Bicarbonate 25 21 - 32 mmol/L    Anion Gap 16 10 - 20 mmol/L    Urea Nitrogen 44 (H) 6 - 23 mg/dL    Creatinine 3.22 (H) 0.50 - 1.30 mg/dL    eGFR 20 (L) >60 mL/min/1.73m*2    Calcium 8.2 (L) 8.6 - 10.6 mg/dL    Phosphorus 2.9 2.5 - 4.9 mg/dL    Albumin 2.9 (L) 3.4 - 5.0 g/dL   POCT GLUCOSE   Result Value Ref Range    POCT Glucose 118 (H) 74 - 99 mg/dL   Prepare RBC: 1 Units, Irradiated   Result Value Ref Range    PRODUCT CODE Z4460S39     Unit Number [id number]     Unit ABO B     Unit RH POS     XM INTEP COMP     Dispense Status RE     Blood Expiration Date November 06, 2023 23:59 EST     PRODUCT BLOOD TYPE 7300     UNIT VOLUME 350    Prepare RBC: 1 Units   Result Value Ref Range    PRODUCT CODE R0905Y02     Unit Number [id number]     Unit ABO B     Unit RH POS     XM INTEP COMP     Dispense Status RE     Blood Expiration Date November 06, 2023 23:59 EST     PRODUCT BLOOD TYPE 7300     UNIT VOLUME 350    POCT GLUCOSE   Result Value Ref Range    POCT Glucose 204 (H) 74 - 99 mg/dL   POCT GLUCOSE   Result Value Ref Range    POCT Glucose 216 (H) 74 - 99 mg/dL   Blood Gas Arterial Full Panel   Result Value Ref Range    POCT pH, Arterial 7.48 (H) 7.38 - 7.42 pH    POCT pCO2, Arterial 34 (L) 38 - 42 mm Hg    POCT pO2, Arterial 110 (H) 85 - 95 mm Hg    POCT SO2, Arterial 100 94 - 100 %    POCT Oxy Hemoglobin, Arterial 96.5 94.0 - 98.0 %    POCT Hematocrit Calculated, Arterial 24.0 (L) 41.0 - 52.0 %    POCT Sodium, Arterial 141 136 - 145 mmol/L    POCT Potassium, Arterial 3.8 3.5 - 5.3 mmol/L    POCT Chloride, Arterial 110 (H) 98 - 107 mmol/L    POCT Ionized Calcium, Arterial 1.13 1.10 - 1.33 mmol/L    POCT Glucose, Arterial 230 (H) 74 - 99 mg/dL    POCT Lactate, Arterial 1.5 0.4 - 2.0 mmol/L    POCT  Base Excess, Arterial 1.8 -2.0 - 3.0 mmol/L    POCT HCO3 Calculated, Arterial 25.3 22.0 - 26.0 mmol/L    POCT Hemoglobin, Arterial 8.1 (L) 13.5 - 17.5 g/dL    POCT Anion Gap, Arterial 10 10 - 25 mmo/L    Patient Temperature 37.0 degrees Celsius    FiO2 30 %   Blood Gas Lactic Acid, Venous   Result Value Ref Range    POCT Lactate, Venous 1.6 0.4 - 2.0 mmol/L   Calcium, Ionized   Result Value Ref Range    POCT Calcium, Ionized 1.14 1.1 - 1.33 mmol/L   Coagulation Screen   Result Value Ref Range    Protime 15.2 (H) 9.8 - 12.8 seconds    INR 1.3 (H) 0.9 - 1.1    aPTT 32 27 - 38 seconds           Assessment/Plan   Principal Problem:    SBO (small bowel obstruction) (CMS/HCC)  Active Problems:    Pancreatic cancer (CMS/HCC)    Small bowel obstruction (CMS/HCC)    Pulmonary hypertension (CMS/HCC)    CVA (cerebral vascular accident) (CMS/HCC)    Bowel perforation (CMS/HCC)    Perforated abdominal viscus    Pt is a 68 y/o male w/ PMHx cardiac transplant in 2017, s/p whipple for 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     Plan:  - SBT, follow-up extubation planning   - early tracheotomy if not extubated 10/12  - Administer 40 Lasix  - continue gtube to gravity until extubated  - continue abx per ID  - Will plan for Gtube study tentatively for Friday  - appreciate SICU care of this critically ill patient    TIERRA CLARK, MS4

## 2023-10-12 NOTE — CARE PLAN
The patient's goals for the shift include      The clinical goals for the shift include patient will report pain <9/10 by end of shift    Over the shift, the patient did not make progress toward the following goals. Barriers to progression include . Recommendations to address these barriers include .

## 2023-10-12 NOTE — PROGRESS NOTES
Patient underwent surgical management of afferent limb perforation with concern for recurrent malignancy with initial pathology concerning for recurrent pancreatic adenocarcinoma. Patient transferred to TSICU after surgery still on mechanical ventilation. Wife at bedside with recent hospital course reviewed. Patient with abdominal pain after recent surgery with PEG tube in place. Nutrition via TPN. Patient continued on broad spectrum antimicrobial therapy.     Patient discussed with Dr. Eloisa Henriquez.  Thank you for the consultation. Gastroenterology will continue to the follow the patient.   Please do not hesitate to contact me or page [phone] if there are any further questions between the weekday hours of 7 AM - 5 PM.   If there is an urgent concern during the weekend, after-hours, or holidays; then please page the on-call GI fellow at [phone]. Thank you.

## 2023-10-12 NOTE — PROGRESS NOTES
Physical Therapy                 Therapy Communication Note    Patient Name: Chung Goodman  MRN: [id number]  Today's Date: 10/12/2023     Discipline: Physical Therapy    Missed Visit Reason: Missed Visit Reason:  (0843: Spoke to RN who requested hold on PT as team is weaning vent to extubate and pt appears to be in a high amount of pain with movement in bed. Will continue to follow.)    Missed Time: Attempt

## 2023-10-12 NOTE — SIGNIFICANT EVENT
Patient continued on empiric antimicrobials, weaning on ventilator, improving mentation with decreased sedation, G-tube to drainage, and expected abdominal pain.     Gastroenterology will continue to the follow the patient peripherally.   Please do not hesitate to contact me or page [phone] if there are any further questions between the weekday hours of 7 AM - 5 PM.   If there is an urgent concern during the weekend, after-hours, or holidays; then please page the on-call GI fellow at [phone]. Thank you.

## 2023-10-12 NOTE — CARE PLAN
Problem: Pain  Goal: Takes deep breaths with improved pain control throughout the shift  Outcome: Progressing     Problem: Fall/Injury  Goal: Not fall by end of shift  Outcome: Progressing     Problem: Safety - Medical Restraint  Goal: Remains free of injury from restraints (Restraint for Interference with Medical Device)  Outcome: Progressing  Goal: Free from restraint(s) (Restraint for Interference with Medical Device)  Outcome: Progressing  Goal: Free from restraint(s) (Restraint for Interference with Medical Device)  Outcome: Progressing       The patient's goals for the shift include  remain safe throughout shift    The clinical goals for the shift include patient will report pain <9/10 by end of shift    Over the shift, the patient did make progress toward the following goals.

## 2023-10-13 NOTE — PROGRESS NOTES
"Chung Goodman is a 67 y.o. male on day 6 of admission presenting with SBO (small bowel obstruction) (CMS/HCC).    Subjective   Hgb downtrend to 6.9 overnight, transfused 1u PRBC, incremented to 8.0. Patient remains intubated, CPAP 30% 8/+5. Precedex at 0.2.     Objective     Physical Exam  Vitals and nursing note reviewed.   Constitutional:       General: He is not in acute distress.     Comments: Intubated   HENT:      Head: Normocephalic and atraumatic.      Mouth/Throat:      Comments: ETT in place  Eyes:      Conjunctiva/sclera: Conjunctivae normal.      Pupils: Pupils are equal, round, and reactive to light.   Cardiovascular:      Rate and Rhythm: Normal rate and regular rhythm.      Pulses: Normal pulses.      Heart sounds: Normal heart sounds.   Pulmonary:      Effort: No respiratory distress.      Breath sounds: Normal breath sounds.      Comments: Mechanically ventilated via ETT. Thorax symmetric. Mild tachypnea.  Abdominal:      General: Abdomen is flat. There is no distension.      Palpations: Abdomen is soft.      Tenderness: There is abdominal tenderness (appropriately).      Comments: Midline incision, staples in place with some removed by surgery, open area packed with gauze.   Genitourinary:     Comments: Portillo in place with clear dark yellow urine output  Musculoskeletal:      Cervical back: Neck supple.      Right lower leg: No edema.      Left lower leg: No edema.   Skin:     General: Skin is warm and dry.   Neurological:      Comments: Intubated, precedex at 0.2. Following commands with extremities x4.       Last Recorded Vitals  Blood pressure 115/76, pulse 76, temperature 36.5 °C (97.7 °F), resp. rate (!) 27, height 1.765 m (5' 9.49\"), weight 76.7 kg (169 lb 1.5 oz), SpO2 100 %.  Intake/Output last 3 Shifts:  I/O last 3 completed shifts:  In: 3131.4 (40.8 mL/kg) [I.V.:179.8 (2.3 mL/kg); Blood:350; IV Piggyback:1050]  Out: 2550 (33.2 mL/kg) [Urine:2415 (0.9 mL/kg/hr); Emesis/NG output:40; " Drains:95]  Weight: 76.7 kg     Relevant Results  Scheduled medications  aspirin, 150 mg, rectal, Daily  [START ON 10/14/2023] azithromycin, 250 mg, intravenous, q24h  azithromycin, 500 mg, intravenous, Once  heparin (porcine), 5,000 Units, subcutaneous, q8h  insulin glargine, 20 Units, subcutaneous, Nightly  insulin lispro, 0-15 Units, subcutaneous, q4h  ipratropium-albuteroL, 3 mL, nebulization, q6h  lidocaine, 1 patch, transdermal, Daily  [START ON 10/16/2023] methylPREDNISolone sodium succinate (PF), 4 mg, intravenous, q24h  methylPREDNISolone sodium succinate (PF), 40 mg, intravenous, q24h  micafungin, 100 mg, intravenous, q24h  pantoprazole, 40 mg, intravenous, BID  piperacillin-tazobactam, 3.375 g, intravenous, q6h  tacrolimus, 1 mg, sublingual, q24h  tacrolimus, 2 mg, sublingual, Daily    Continuous medications  Adult Clinimix TPN, 40 mL/hr, Last Rate: 40 mL/hr (10/12/23 2100)  dexmedeTOMIDine, 0.1-1.5 mcg/kg/hr, Last Rate: 0.2 mcg/kg/hr (10/13/23 0700)  fat emulsion fish oil/plant based, 250 mL, Last Rate: Stopped (10/12/23 0755)    PRN medications  PRN medications: calcium gluconate, calcium gluconate, dextrose 10 % in water (D10W), dextrose, glucagon, HYDROmorphone, magnesium sulfate, magnesium sulfate, oxygen    Results for orders placed or performed during the hospital encounter of 10/07/23 (from the past 24 hour(s))   POCT GLUCOSE   Result Value Ref Range    POCT Glucose 222 (H) 74 - 99 mg/dL   Calcium, Ionized   Result Value Ref Range    POCT Calcium, Ionized 1.17 1.1 - 1.33 mmol/L   CBC   Result Value Ref Range    WBC 17.4 (H) 4.4 - 11.3 x10*3/uL    nRBC 0.0 0.0 - 0.0 /100 WBCs    RBC 2.50 (L) 4.50 - 5.90 x10*6/uL    Hemoglobin 7.3 (L) 13.5 - 17.5 g/dL    Hematocrit 22.5 (L) 41.0 - 52.0 %    MCV 90 80 - 100 fL    MCH 29.2 26.0 - 34.0 pg    MCHC 32.4 32.0 - 36.0 g/dL    RDW 16.3 (H) 11.5 - 14.5 %    Platelets 198 150 - 450 x10*3/uL    MPV 10.9 7.5 - 11.5 fL   POCT GLUCOSE   Result Value Ref Range     POCT Glucose 177 (H) 74 - 99 mg/dL   POCT GLUCOSE   Result Value Ref Range    POCT Glucose 219 (H) 74 - 99 mg/dL   Magnesium   Result Value Ref Range    Magnesium 2.58 (H) 1.60 - 2.40 mg/dL   Renal Function Panel   Result Value Ref Range    Glucose 240 (H) 74 - 99 mg/dL    Sodium 144 136 - 145 mmol/L    Potassium 3.8 3.5 - 5.3 mmol/L    Chloride 107 98 - 107 mmol/L    Bicarbonate 27 21 - 32 mmol/L    Anion Gap 14 10 - 20 mmol/L    Urea Nitrogen 52 (H) 6 - 23 mg/dL    Creatinine 3.53 (H) 0.50 - 1.30 mg/dL    eGFR 18 (L) >60 mL/min/1.73m*2    Calcium 8.5 (L) 8.6 - 10.6 mg/dL    Phosphorus 2.7 2.5 - 4.9 mg/dL    Albumin 2.7 (L) 3.4 - 5.0 g/dL   CBC   Result Value Ref Range    WBC 17.3 (H) 4.4 - 11.3 x10*3/uL    nRBC 0.0 0.0 - 0.0 /100 WBCs    RBC 2.32 (L) 4.50 - 5.90 x10*6/uL    Hemoglobin 6.9 (L) 13.5 - 17.5 g/dL    Hematocrit 20.9 (L) 41.0 - 52.0 %    MCV 90 80 - 100 fL    MCH 29.7 26.0 - 34.0 pg    MCHC 33.0 32.0 - 36.0 g/dL    RDW 16.4 (H) 11.5 - 14.5 %    Platelets 189 150 - 450 x10*3/uL    MPV 11.2 7.5 - 11.5 fL   Coagulation Screen   Result Value Ref Range    Protime 13.1 (H) 9.8 - 12.8 seconds    INR 1.2 (H) 0.9 - 1.1    aPTT 30 27 - 38 seconds   Hepatic function panel   Result Value Ref Range    Albumin 2.6 (L) 3.4 - 5.0 g/dL    Bilirubin, Total 2.9 (H) 0.0 - 1.2 mg/dL    Bilirubin, Direct 1.8 (H) 0.0 - 0.3 mg/dL    Alkaline Phosphatase 202 (H) 33 - 136 U/L    ALT 44 10 - 52 U/L    AST 38 9 - 39 U/L    Total Protein 4.6 (L) 6.4 - 8.2 g/dL   Calcium, Ionized   Result Value Ref Range    POCT Calcium, Ionized 1.12 1.1 - 1.33 mmol/L   POCT GLUCOSE   Result Value Ref Range    POCT Glucose 222 (H) 74 - 99 mg/dL   Prepare RBC: 1 Units   Result Value Ref Range    PRODUCT CODE C8400M67     Unit Number [id number]     Unit ABO B     Unit RH POS     XM INTEP COMP     Dispense Status IS     Blood Expiration Date November 06, 2023 23:59 EST     PRODUCT BLOOD TYPE 7300     UNIT VOLUME 350    Prepare RBC: 1 Units,  Irradiated   Result Value Ref Range    PRODUCT CODE I0162B74     Unit Number [id number]     Unit ABO B     Unit RH POS     XM INTEP COMP     Dispense Status TR     Blood Expiration Date November 06, 2023 23:59 EST     PRODUCT BLOOD TYPE 7300     UNIT VOLUME 350    CBC   Result Value Ref Range    WBC 17.9 (H) 4.4 - 11.3 x10*3/uL    nRBC 0.0 0.0 - 0.0 /100 WBCs    RBC 2.78 (L) 4.50 - 5.90 x10*6/uL    Hemoglobin 8.0 (L) 13.5 - 17.5 g/dL    Hematocrit 24.4 (L) 41.0 - 52.0 %    MCV 88 80 - 100 fL    MCH 28.8 26.0 - 34.0 pg    MCHC 32.8 32.0 - 36.0 g/dL    RDW 16.9 (H) 11.5 - 14.5 %    Platelets 182 150 - 450 x10*3/uL    MPV 10.9 7.5 - 11.5 fL   Tacrolimus level   Result Value Ref Range    Tacrolimus  6.2 <=15.0 ng/mL   POCT GLUCOSE   Result Value Ref Range    POCT Glucose 258 (H) 74 - 99 mg/dL   Hepatic function panel   Result Value Ref Range    Albumin 2.4 (L) 3.4 - 5.0 g/dL    Bilirubin, Total 2.4 (H) 0.0 - 1.2 mg/dL    Bilirubin, Direct 1.4 (H) 0.0 - 0.3 mg/dL    Alkaline Phosphatase 170 (H) 33 - 136 U/L    ALT 38 10 - 52 U/L    AST 28 9 - 39 U/L    Total Protein 4.7 (L) 6.4 - 8.2 g/dL   Blood Gas Arterial Full Panel   Result Value Ref Range    POCT pH, Arterial 7.44 (H) 7.38 - 7.42 pH    POCT pCO2, Arterial 39 38 - 42 mm Hg    POCT pO2, Arterial 110 (H) 85 - 95 mm Hg    POCT SO2, Arterial 100 94 - 100 %    POCT Oxy Hemoglobin, Arterial 97.0 94.0 - 98.0 %    POCT Hematocrit Calculated, Arterial 25.0 (L) 41.0 - 52.0 %    POCT Sodium, Arterial 141 136 - 145 mmol/L    POCT Potassium, Arterial 3.6 3.5 - 5.3 mmol/L    POCT Chloride, Arterial 110 (H) 98 - 107 mmol/L    POCT Ionized Calcium, Arterial 1.23 1.10 - 1.33 mmol/L    POCT Glucose, Arterial 293 (H) 74 - 99 mg/dL    POCT Lactate, Arterial 1.1 0.4 - 2.0 mmol/L    POCT Base Excess, Arterial 2.2 -2.0 - 3.0 mmol/L    POCT HCO3 Calculated, Arterial 26.5 (H) 22.0 - 26.0 mmol/L    POCT Hemoglobin, Arterial 8.2 (L) 13.5 - 17.5 g/dL    POCT Anion Gap, Arterial 8 (L) 10 -  25 mmo/L    Patient Temperature 37.0 degrees Celsius    FiO2 30 %   Blood Gas Arterial Full Panel   Result Value Ref Range    POCT pH, Arterial 7.42 7.38 - 7.42 pH    POCT pCO2, Arterial 40 38 - 42 mm Hg    POCT pO2, Arterial 109 (H) 85 - 95 mm Hg    POCT SO2, Arterial 99 94 - 100 %    POCT Oxy Hemoglobin, Arterial 96.7 94.0 - 98.0 %    POCT Hematocrit Calculated, Arterial 24.0 (L) 41.0 - 52.0 %    POCT Sodium, Arterial 141 136 - 145 mmol/L    POCT Potassium, Arterial 3.5 3.5 - 5.3 mmol/L    POCT Chloride, Arterial 111 (H) 98 - 107 mmol/L    POCT Ionized Calcium, Arterial 1.23 1.10 - 1.33 mmol/L    POCT Glucose, Arterial 258 (H) 74 - 99 mg/dL    POCT Lactate, Arterial 1.2 0.4 - 2.0 mmol/L    POCT Base Excess, Arterial 1.3 -2.0 - 3.0 mmol/L    POCT HCO3 Calculated, Arterial 25.9 22.0 - 26.0 mmol/L    POCT Hemoglobin, Arterial 8.0 (L) 13.5 - 17.5 g/dL    POCT Anion Gap, Arterial 8 (L) 10 - 25 mmo/L    Patient Temperature 37.0 degrees Celsius    FiO2 30 %     This patient has a central line   Reason for the central line remaining today? Parenteral nutrition    This patient has a urinary catheter   Reason for the urinary catheter remaining today? critically ill patient who need accurate urinary output measurements          Malnutrition Diagnosis Status: New  Malnutrition Diagnosis: Severe malnutrition related to acute disease or injury  As Evidenced by: <50% estimated intake > 5 days, 10% weight loss over < 1 month and mild/moderate muscle atrophy  I agree with the dietitian's malnutrition diagnosis.    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 Coli bacteremia. C/f recurrent afferent limb syndrome and consideration of biliary infection. Patient underwent EGD on 10/10 for  management of afferent limb syndrome and PEG-J placement for venting/feeding, however course c/b afferent limb perforation, emergently transferred to OR s/p ex-lap, SBR, J-J anastomosis, and G-tube placement. Patient transferred to SICU postoperatively for further management.     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 Acute post-op pain. Arrived to SICU intubated and sedated. NMB reversed in OR. Oversedated with Dilaudid 0.4mg, switched back to 0.2mg. Currently on Precedex at 0.2. Following commands.  - ongoing neuro and pain assessments  - PRN hydromorphone for pain control  - Lidoderm patch  - Continue precedex infusion, target RASS 0 to -1  - PT/OT consult  - Home meds: allopurinol, bupropion XL, citalopram, gabapentin  - Restraints indicated while patient remains intubated, restrain with soft wrist restraints until medical devices are discontinued.     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 Baseline echo (9/26) with EF 55-60%, RV mildly reduced RVSF. Sinus tachycardia resolved, now NSR with frequent PVCs.  - continuous EKG/abp monitoring  - Goal map range 65-90  - Additional volume as indicated  - Continue rectal ASA  - IS/ppx per HF service: [place], started Methylprednisolone 4mg IV on 10/12 (home prednisone)  - Home meds: ASA, amlodipine, fenofibrate, isosorbide, hydralazine, rosuvastatin.     PULM: History of COPD, pulmonary HTN. Arrived to SICU intubated. Failed multiple SBT 2/2 tachypnea. C/f possible COPD exacerbation. CXR with B/L pleural effusions, L>R. Now tolerating CPAP 30% 8/+5 with RR in the 20s.  - Continue SBT as tolerated (Today: NIF -13, Vt ~350)  - Wean vent as tolerated, maintain SpO2 >92%.  - Methylpred 40mg IV x3 days for COPD exacerbation (then resume maintenance)  - Azithromycin 500mg IV x1, then 250mg IV x4 days  - Scheduled duonebs  - Diurese with Lasix 20mg IV x1  - Q1h incentive spirometer once extubated  - additional pulm  toilet prn.   - CXR daily  - ABG prn      GI: Hx of GERD. Pancreatic cancer s/p whipple (5/19/2023). Recurrent SBO with c/f recurrent afferent limb syndrome. Acute transaminitis and hyperbilirubinemia improving. Afferent limb perforation noted during EGD on 10/10, emergently transferred to OR s/p ex-lap, SBR, J-J anastomosis, and G-tube placement. Multiple chidi removed by Dr. Johnson this am, open area packed. Per Dr. Johnson, there was an area of transverse colon with herniation to the umbilicus that was mobilized and oversewn in the OR with high risk for perforation.  - NPO, okay for ice chips once extubated  - Continue TPN at 40ml/hr, consider advancing tomorrow if BG well controlled  - Continue lipids  - continue G-tube to gravity -> clamp once extubated  - monitor IRVING drain output  - Continue PPI (home med)  - holding home Lomotil  - holding home pancreatic enzymes while NPO     : Hx of CKD3. Baseline creatinine ~2-3. Oliguria. Net negative 209ml for past 24hrs following lasix 40mg IV yesterday.  - Check renal function panel daily and PRN  - Volume resuscitation as needed  - Maintain U/O >0.5ml/kg/hr  - Maintain Portillo for strict I/Os  - Replete electrolytes per protocol  - Diuresis as above in pulm     HEME: Acute blood loss anemia. OR EBL 25ml. Mild coagulopathy.  - Check CBC and coags daily and PRN  - SCDs for DVT prophylaxis  - Continue SQ heparin  - rectal ASA  - ongoing monitoring for s/s bleeding  - Maintain active T&S (10/12)     ENDO: Hx of IDDM2 (lantus 10u daily at home) and amiodarone-induced hyperthyroidism.   - Q4h BG and SSI Lispro per ICU protocol.  - Increase lantus to 20u daily  - Monitor BG with increased steroid dosing for COPD exacerbation     ID: Sepsis 2/2 E. Coli bacteremia, +BCx 10/5, started Zosyn. Repeat BCxs 10/7 and 10/9 NGTD. Afebrile. Leukocytosis.   - temp q4h, wbc daily  - Transplant ID following, appreciate recs  - continue Zosyn (10/5-)  - Vanco 10/10-10/11  - Continue  micafungin (10/11-) given bowel perf w/ hx of heart transplant  - ongoing monitoring for s/s infection     Lines:  - L brachial arterial line (10/10, OR)  - L subclavian TINO (10/10, OR)  - PIVx2  - R chest mediport -> de-accessed 10/11     Dispo: Continue ICU care. Patient seen and discussed with ICU attending Dr. Mcdermott.     I spent 55 minutes in the professional and overall care of this patient.       Ayo Cummings, APRN-CNP

## 2023-10-13 NOTE — CARE PLAN
The patient's goals for the shift include    Problem: Safety - Medical Restraint  Goal: Remains free of injury from restraints (Restraint for Interference with Medical Device)  Outcome: Progressing     Problem: Safety - Medical Restraint  Goal: Free from restraint(s) (Restraint for Interference with Medical Device)  Outcome: Progressing     Problem: Safety - Medical Restraint  Goal: Free from restraint(s) (Restraint for Interference with Medical Device)  Outcome: Progressing       The clinical goals for the shift include patient will be free from harm.

## 2023-10-13 NOTE — PROGRESS NOTES
ICU for continued close obs and management, continue to wean vent to extubation, HF & GI following.     PAYOR: [employer]    DISPO: pending remaining hospital course and recs.     Will continue to follow and assist as appropriate.

## 2023-10-13 NOTE — PROGRESS NOTES
Chung Goodman is a 67 y.o. male on day 6 of admission presenting with SBO (small bowel obstruction) (CMS/HCC).    Subjective   Interval History: Patient is sedated and intubated.  On minimal vent settings.    Review of Systems    Objective   Range of Vitals (last 24 hours)  Heart Rate:  []   Temp:  [36 °C (96.8 °F)-36.8 °C (98.2 °F)]   Resp:  [17-35]   BP: ()/(58-89)   SpO2:  [98 %-100 %]   Daily Weight  10/12/23 : 76.7 kg (169 lb 1.5 oz)    Body mass index is 24.62 kg/m².    Physical Exam  GENERAL APPEARANCE: Sedated and intubated  HEENT: Atraumatic and normocephalic  NECK: Supple  CARDIAC: Regular   LUNGS: Clear  ABDOMEN: G-tube and IRVING drain noted.  Laparotomy surgical staples noted with no surrounding erythema.  SKIN: Right side chest Mediport and left subclavian triple-lumen catheter is noted        Antibiotics  surgical lubricant gel  - Omnicell Override Pull  albuterol 90 mcg/actuation inhaler 2 puff  tacrolimus (Prograf) capsule 2 mg  tacrolimus (Prograf) capsule 1.5 mg  pantoprazole (ProtoNix) injection 40 mg  heparin (porcine) injection 5,000 Units  dextrose 50 % injection 25 g  glucagon (Glucagen) injection 1 mg  dextrose 10 % in water (D10W) infusion  piperacillin-tazobactam-dextrose (Zosyn) IV 3.375 g  HYDROmorphone (Dilaudid) injection 0.4 mg  benzonatate (Tessalon) capsule 100 mg  benzocaine-menthol (Cepastat Sore Throat) 15-3.6 mg lozenge 1 lozenge  phenoL (Chloraseptic) 1.4 % mouth/throat spray 1 spray  acetaminophen (Tylenol) tablet 975 mg  hydrALAZINE (Apresoline) injection 10 mg  scopolamine (Transderm-Scop) patch 1 patch  acetaminophen (Ofirmev) injection 1,000 mg  acetaminophen (Ofirmev) injection 1,000 mg  lactated Ringer's bolus 1,000 mL  micafungin (Mycamine) in sodium chloride 0.9 % 100 mL  mg  lactated Ringer's infusion  HYDROmorphone (Dilaudid) injection 1 mg  Adult Clinimix TPN  fat emulsion fish oil/plant based (SMOFlipid) 20 % IV infusion 50 g  vancomycin in  dextrose 5 % (Vancocin) IVPB 1 g  lactated Ringer's infusion  - Omnicell Override Pull  lidocaine (cardiac) (Xylocaine) 100 mg/5 mL (2 %) injection  - Omnicell Override Pull  propofol (Diprivan) 10 mg/mL injection  - Omnicell Override Pull  midazolam (Versed) 1 mg/mL injection  - Omnicell Override Pull  fentaNYL PF (Sublimaze) 50 mcg/mL injection  - Omnicell Override Pull  ondansetron (Zofran) 4 mg/2 mL injection  - Omnicell Override Pull  succinylcholine (Anectine) 20 mg/mL injection  - Omnicell Override Pull  lidocaine PF (Xylocaine) 10 mg/mL (1 %) injection 1 mg  lactated Ringer's infusion  dexAMETHasone (Decadron) 4 mg/mL injection  - Omnicell Override Pull  albumin human 5 % infusion  - Omnicell Override Pull  rocuronium (ZeMuron) 10 mg/mL injection  - Omnicell Override Pull  dextrose 5 % in water (D5W) infusion  tacrolimus (Prograf) capsule 1 mg  albumin human 5 % infusion  - Omnicell Override Pull  propofol (Diprivan) 10 mg/mL injection  - Omnicell Override Pull  rocuronium (ZeMuron) 10 mg/mL injection  - Omnicell Override Pull  sodium chloride 0.9 % irrigation solution  vancomycin in dextrose 5 % (Vancocin) IVPB 1 g  lactated Ringer's infusion  pantoprazole (ProtoNix) injection 40 mg  insulin lispro (HumaLOG) injection 0-15 Units  potassium chloride 20 mEq in 100 mL IV premix  magnesium sulfate IV 2 g  magnesium sulfate IV 4 g  calcium gluconate in NS IV 1 g  calcium gluconate in NS IV 2 g  propofol (Diprivan) 10 mg/mL injection  - Omnicell Override Pull  propofol (Diprivan) infusion  oxygen (O2) therapy  propofol (Diprivan) infusion  polyethylene glycol (Glycolax, Miralax) packet 17 g  pantoprazole (ProtoNix) injection 40 mg  HYDROmorphone (Dilaudid) injection 0.2 mg  HYDROmorphone (Dilaudid) injection 0.4 mg  vancomycin in dextrose 5 % (Vancocin) IVPB 1,000 mg  lactated Ringer's bolus 500 mL  lactated Ringer's bolus 500 mL  HYDROmorphone (Dilaudid) injection 0.2 mg  ipratropium-albuteroL (Duo-Neb) 0.5-2.5  mg/3 mL nebulizer solution 3 mL  albumin human 5 % infusion  - Omnicell Override Pull  albumin human 5 % infusion 25 g  fat emulsion fish oil/plant based (SMOFlipid) 20 % IV infusion 50 g  heparin (porcine) injection 5,000 Units  heparin lock flush (porcine) injection 500 Units  micafungin (Mycamine) injection 100 mg  Adult Clinimix TPN  aspirin suppository 150 mg  micafungin (Mycamine) in sodium chloride 0.9 % 100 mL  mg  HYDROmorphone (Dilaudid) injection 0.2 mg  lidocaine 4 % patch 1 patch  HYDROmorphone (Dilaudid) injection 0.4 mg  acetaminophen (Ofirmev) injection 1,000 mg  lactated Ringer's bolus 500 mL  lactated Ringer's infusion  magnesium sulfate IV 2 g  furosemide (Lasix) injection 40 mg  dexmedeTOMIDine in NS (Precedex) 400 mcg in 100 mL (4 mcg/mL) infusion  HYDROmorphone (Dilaudid) injection 0.2 mg  insulin glargine (Lantus) injection 10 Units  methylPREDNISolone sod succinate (PF) (SOLU-Medrol) 40 mg/mL injection 4 mg  albumin human 5 % infusion 12.5 g  insulin glargine (Lantus) injection 15 Units  insulin glargine (Lantus) injection 15 Units  furosemide (Lasix) injection 20 mg  azithromycin (Zithromax) in sodium chloride 0.9 % 250 mL  mg  azithromycin (Zithromax) 250 mg in dextrose 5 % in water (D5W) 250 mL IV  ipratropium-albuteroL (Duo-Neb) 0.5-2.5 mg/3 mL nebulizer solution 3 mL  insulin glargine (Lantus) injection 20 Units  ipratropium-albuteroL (Duo-Neb) 0.5-2.5 mg/3 mL nebulizer solution 3 mL  methylPREDNISolone sod succinate (PF) (SOLU-Medrol) 40 mg/mL injection 40 mg  methylPREDNISolone sod succinate (PF) (SOLU-Medrol) 40 mg/mL injection 4 mg      Relevant Results  Labs  Results from last 72 hours   Lab Units 10/13/23  0536 10/13/23  0009 10/12/23  1720 10/12/23  0558 10/11/23  0007   WBC AUTO x10*3/uL 17.9* 17.3* 17.4*   < > 15.5*   HEMOGLOBIN g/dL 8.0* 6.9* 7.3*   < > 9.1*   HEMATOCRIT % 24.4* 20.9* 22.5*   < > 27.6*   PLATELETS AUTO x10*3/uL 182 189 198   < > 284   LYMPHO PCT MAN  %  --   --   --   --  1.6   MONO PCT MAN %  --   --   --   --  3.9   EOSINO PCT MAN %  --   --   --   --  0.0    < > = values in this interval not displayed.     Results from last 72 hours   Lab Units 10/13/23  0009 10/12/23  0558 10/11/23  1853   SODIUM mmol/L 144 141 145   POTASSIUM mmol/L 3.8 4.3 3.8   CHLORIDE mmol/L 107 104 108*   CO2 mmol/L 27 25 25   BUN mg/dL 52* 48* 44*   CREATININE mg/dL 3.53* 3.21* 3.22*   GLUCOSE mg/dL 240* 225* 122*   CALCIUM mg/dL 8.5* 8.4* 8.2*   ANION GAP mmol/L 14 16 16   EGFR mL/min/1.73m*2 18* 20* 20*   PHOSPHORUS mg/dL 2.7 2.7 2.9     Results from last 72 hours   Lab Units 10/13/23  1005 10/13/23  0009 10/12/23  0558   ALK PHOS U/L 170* 202* 238*   BILIRUBIN TOTAL mg/dL 2.4* 2.9* 3.9*   BILIRUBIN DIRECT mg/dL 1.4* 1.8* 1.8*   PROTEIN TOTAL g/dL 4.7* 4.6* 5.0*   ALT U/L 38 44 68*   AST U/L 28 38 72*   ALBUMIN g/dL 2.4* 2.6*  2.7* 2.8*     Estimated Creatinine Clearance: 20.6 mL/min (A) (by C-G formula based on SCr of 3.53 mg/dL (H)).  CRP   Date Value Ref Range Status   04/29/2023 2.11 (A) mg/dL Final     Comment:     REF VALUE  < 1.00       Microbiology  Susceptibility data from last 14 days.  Collected Specimen Info Organism Ampicillin Cefazolin Ciprofloxacin Gentamicin Levofloxacin Piperacillin/Tazobactam Trimethoprim/Sulfamethoxazole   10/05/23 Blood culture from Peripheral Venipuncture Escherichia coli S S S S S S S   10/05/23 Blood culture from Peripheral Venipuncture Escherichia coli              Imaging           This patient currently has cardiac telemetry ordered; if you would like to modify or discontinue the telemetry order, click here to go to the orders activity to modify/discontinue the order.  Assessment/Plan   67 y.o. male presenting with history of heart transplant in 2017 and whipple procedure for pancreatic cancer on 5/19/2023, h/o recurrent pancreatitis, CKD III, amiodarine-induced hyperthyroidism ,who presents as a trasnfer from  [facility] on 10/06 for SBO,  recently admitted for SBO (9/23 - 9/29), s/p EGD on 9/26/23   On 10/05 p.m. had an episode of emesis, measured temperature of 100.7 and chills at home.   At [facility] ED, CT was concerning for SBO and Bcx grew E coli. Patient was treated with IVF and Zosyn, transferred to [facility] on 10/06-10/07.  Blood cultures:   10/05 x2 E. coli.  10/07 x3 NGTD  10/09 x2 NGTD    Problems  E. 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  Will consider extension of antibiotic therapy if patient has any setbacks.  Will follow peripherally           Mikey Miner MD

## 2023-10-13 NOTE — PROGRESS NOTES
"Chung Goodman is a 67 y.o. male w/ PMHx cardiac transplant and pancreatic cancer requiring whipple 5/19/23 on day 6 of admission presenting with SBO (small bowel obstruction) (CMS/HCC).    Subjective     Patient was seen and examined. Intubated and sedated and stable at this time. Had exploratory laparotomy for iatrogenic bowel perforation during endoscopy, had bowel resection and bowel anastomosis and gtube placement 10/10. He failed SBT 10/12 and remains intubated. Overnight his Hgb dropped to 6.9, resulting in transfusion of 1u pRBC. This AM pt is slightly more alert and responding to commands. He has output 45cc from his drain.        Objective     Physical Exam  NEURO: Intubated and sedated, minimal vent settings, pt breathing over the vent intermittently. Responds to name and commands.  HEENT: NCAT, ETT in place, No OGT  CARDIO: mildly tachycardic at times  PULM: Comfortable on vent at this time, no distress  ABD: Soft, mildly distended, Gtube in place with minimal output of gastric contents, Drain with ss output.  Incision draining serosanguinous output from inferior pole. Putrid odor noted upon removal of abdominal pad.    Last Recorded Vitals  Blood pressure 131/77, pulse 74, temperature 36.5 °C (97.7 °F), resp. rate (!) 28, height 1.765 m (5' 9.49\"), weight 76.7 kg (169 lb 1.5 oz), SpO2 100 %.  Intake/Output last 3 Shifts:  I/O last 3 completed shifts:  In: 3131.4 (40.8 mL/kg) [I.V.:179.8 (2.3 mL/kg); Blood:350; IV Piggyback:1050]  Out: 2550 (33.2 mL/kg) [Urine:2415 (0.9 mL/kg/hr); Emesis/NG output:40; Drains:95]  Weight: 76.7 kg     Relevant Results  Results for orders placed or performed during the hospital encounter of 10/07/23 (from the past 24 hour(s))   POCT GLUCOSE   Result Value Ref Range    POCT Glucose 209 (H) 74 - 99 mg/dL   POCT GLUCOSE   Result Value Ref Range    POCT Glucose 222 (H) 74 - 99 mg/dL   Calcium, Ionized   Result Value Ref Range    POCT Calcium, Ionized 1.17 1.1 - 1.33 mmol/L "   CBC   Result Value Ref Range    WBC 17.4 (H) 4.4 - 11.3 x10*3/uL    nRBC 0.0 0.0 - 0.0 /100 WBCs    RBC 2.50 (L) 4.50 - 5.90 x10*6/uL    Hemoglobin 7.3 (L) 13.5 - 17.5 g/dL    Hematocrit 22.5 (L) 41.0 - 52.0 %    MCV 90 80 - 100 fL    MCH 29.2 26.0 - 34.0 pg    MCHC 32.4 32.0 - 36.0 g/dL    RDW 16.3 (H) 11.5 - 14.5 %    Platelets 198 150 - 450 x10*3/uL    MPV 10.9 7.5 - 11.5 fL   POCT GLUCOSE   Result Value Ref Range    POCT Glucose 177 (H) 74 - 99 mg/dL   POCT GLUCOSE   Result Value Ref Range    POCT Glucose 219 (H) 74 - 99 mg/dL   Magnesium   Result Value Ref Range    Magnesium 2.58 (H) 1.60 - 2.40 mg/dL   Renal Function Panel   Result Value Ref Range    Glucose 240 (H) 74 - 99 mg/dL    Sodium 144 136 - 145 mmol/L    Potassium 3.8 3.5 - 5.3 mmol/L    Chloride 107 98 - 107 mmol/L    Bicarbonate 27 21 - 32 mmol/L    Anion Gap 14 10 - 20 mmol/L    Urea Nitrogen 52 (H) 6 - 23 mg/dL    Creatinine 3.53 (H) 0.50 - 1.30 mg/dL    eGFR 18 (L) >60 mL/min/1.73m*2    Calcium 8.5 (L) 8.6 - 10.6 mg/dL    Phosphorus 2.7 2.5 - 4.9 mg/dL    Albumin 2.7 (L) 3.4 - 5.0 g/dL   CBC   Result Value Ref Range    WBC 17.3 (H) 4.4 - 11.3 x10*3/uL    nRBC 0.0 0.0 - 0.0 /100 WBCs    RBC 2.32 (L) 4.50 - 5.90 x10*6/uL    Hemoglobin 6.9 (L) 13.5 - 17.5 g/dL    Hematocrit 20.9 (L) 41.0 - 52.0 %    MCV 90 80 - 100 fL    MCH 29.7 26.0 - 34.0 pg    MCHC 33.0 32.0 - 36.0 g/dL    RDW 16.4 (H) 11.5 - 14.5 %    Platelets 189 150 - 450 x10*3/uL    MPV 11.2 7.5 - 11.5 fL   Coagulation Screen   Result Value Ref Range    Protime 13.1 (H) 9.8 - 12.8 seconds    INR 1.2 (H) 0.9 - 1.1    aPTT 30 27 - 38 seconds   Hepatic function panel   Result Value Ref Range    Albumin 2.6 (L) 3.4 - 5.0 g/dL    Bilirubin, Total 2.9 (H) 0.0 - 1.2 mg/dL    Bilirubin, Direct 1.8 (H) 0.0 - 0.3 mg/dL    Alkaline Phosphatase 202 (H) 33 - 136 U/L    ALT 44 10 - 52 U/L    AST 38 9 - 39 U/L    Total Protein 4.6 (L) 6.4 - 8.2 g/dL   Calcium, Ionized   Result Value Ref Range    POCT  Calcium, Ionized 1.12 1.1 - 1.33 mmol/L   POCT GLUCOSE   Result Value Ref Range    POCT Glucose 222 (H) 74 - 99 mg/dL   Prepare RBC: 1 Units   Result Value Ref Range    PRODUCT CODE M0376K05     Unit Number [id number]     Unit ABO B     Unit RH POS     XM INTEP COMP     Dispense Status IS     Blood Expiration Date November 06, 2023 23:59 EST     PRODUCT BLOOD TYPE 7300     UNIT VOLUME 350    Prepare RBC: 1 Units, Irradiated   Result Value Ref Range    PRODUCT CODE B3585F77     Unit Number [id number]     Unit ABO B     Unit RH POS     XM INTEP COMP     Dispense Status TR     Blood Expiration Date November 06, 2023 23:59 EST     PRODUCT BLOOD TYPE 7300     UNIT VOLUME 350    CBC   Result Value Ref Range    WBC 17.9 (H) 4.4 - 11.3 x10*3/uL    nRBC 0.0 0.0 - 0.0 /100 WBCs    RBC 2.78 (L) 4.50 - 5.90 x10*6/uL    Hemoglobin 8.0 (L) 13.5 - 17.5 g/dL    Hematocrit 24.4 (L) 41.0 - 52.0 %    MCV 88 80 - 100 fL    MCH 28.8 26.0 - 34.0 pg    MCHC 32.8 32.0 - 36.0 g/dL    RDW 16.9 (H) 11.5 - 14.5 %    Platelets 182 150 - 450 x10*3/uL    MPV 10.9 7.5 - 11.5 fL   POCT GLUCOSE   Result Value Ref Range    POCT Glucose 258 (H) 74 - 99 mg/dL   Blood Gas Arterial Full Panel   Result Value Ref Range    POCT pH, Arterial 7.44 (H) 7.38 - 7.42 pH    POCT pCO2, Arterial 39 38 - 42 mm Hg    POCT pO2, Arterial 110 (H) 85 - 95 mm Hg    POCT SO2, Arterial 100 94 - 100 %    POCT Oxy Hemoglobin, Arterial 97.0 94.0 - 98.0 %    POCT Hematocrit Calculated, Arterial 25.0 (L) 41.0 - 52.0 %    POCT Sodium, Arterial 141 136 - 145 mmol/L    POCT Potassium, Arterial 3.6 3.5 - 5.3 mmol/L    POCT Chloride, Arterial 110 (H) 98 - 107 mmol/L    POCT Ionized Calcium, Arterial 1.23 1.10 - 1.33 mmol/L    POCT Glucose, Arterial 293 (H) 74 - 99 mg/dL    POCT Lactate, Arterial 1.1 0.4 - 2.0 mmol/L    POCT Base Excess, Arterial 2.2 -2.0 - 3.0 mmol/L    POCT HCO3 Calculated, Arterial 26.5 (H) 22.0 - 26.0 mmol/L    POCT Hemoglobin, Arterial 8.2 (L) 13.5 -  17.5 g/dL    POCT Anion Gap, Arterial 8 (L) 10 - 25 mmo/L    Patient Temperature 37.0 degrees Celsius    FiO2 30 %           Assessment/Plan   Principal Problem:    SBO (small bowel obstruction) (CMS/HCC)  Active Problems:    Pancreatic cancer (CMS/HCC)    Small bowel obstruction (CMS/HCC)    Pulmonary hypertension (CMS/HCC)    CVA (cerebral vascular accident) (CMS/HCC)    Bowel perforation (CMS/HCC)    Perforated abdominal viscus    Pt is a 68 y/o male w/ PMHx cardiac transplant in 2017, s/p whipple for 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     Plan:    Neuro  - continue sedation per SICU  - continue pain management per SICU    CV  -  administer 20mg Lasix  - ASA suppository 150mg     Pulm:   - SBT, follow-up extubation planning   - early tracheotomy if not extubated by 10/16    GI  - remove ~5 staples from below the belly button and evaluate fluid expressed, subsequently pack with gauze  - continue TPN  - continue gtube to gravity until extubated  - Will plan for Gtube study tentatively for Friday    ID  - continue abx per ID    Prophy  - DVT ppx: Heparin    Dispo:  - appreciate SICU care of this critically ill patient    TIERRA CLARK, MS4

## 2023-10-13 NOTE — PROGRESS NOTES
"Chung Goodman is a 67 y.o. male on day 6 of admission presenting with SBO (small bowel obstruction) (CMS/HCC).    Subjective   More comfortable on precedex. Following commands.        Objective     Physical Exam  Abdominal:      Palpations: Abdomen is soft.      Tenderness: There is abdominal tenderness.   Neurological:      General: No focal deficit present.         Last Recorded Vitals  Blood pressure 137/81, pulse 83, temperature 36.7 °C (98.1 °F), temperature source Temporal, resp. rate (!) 27, height 1.765 m (5' 9.49\"), weight 76.7 kg (169 lb 1.5 oz), SpO2 100 %.  Intake/Output last 3 Shifts:  I/O last 3 completed shifts:  In: 3131.4 (40.8 mL/kg) [I.V.:179.8 (2.3 mL/kg); Blood:350; IV Piggyback:1050]  Out: 2550 (33.2 mL/kg) [Urine:2415 (0.9 mL/kg/hr); Emesis/NG output:40; Drains:95]  Weight: 76.7 kg     Relevant Results           This patient currently has cardiac telemetry ordered; if you would like to modify or discontinue the telemetry order, click here to go to the orders activity to modify/discontinue the order.      This patient is intubated   Reason for patient to remain intubated today? they have inadequate gas-exchange without positive pressure        Malnutrition Diagnosis Status: New  Malnutrition Diagnosis: Severe malnutrition related to acute disease or injury  As Evidenced by: <50% estimated intake > 5 days, 10% weight loss over < 1 month and mild/moderate muscle atrophy  I agree with the dietitian's malnutrition diagnosis.      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 Coli bacteremia. C/f recurrent afferent limb syndrome and consideration of biliary infection. Patient underwent ERCP in endoscopy suite on 10/10 for management of afferent limb syndrome and PEG-J placement for venting/feeding, course c/b bowel perforation, transferred to OR for small bowel resection, anastomosis, and PEG tube placement. Patient transferred to SICU postoperatively for further management.      Neuro: Awake and alert this morning on SAT. Reports acute post operative pain. As needed dilaudid. Comfortable on precedex to facilitate ETT.   CV: Hx OHT 2017, HTN, HLD. Continue rectal aspirin. Sinus tachycardia improved today. Heart failure following for immunosuppression meds.   Pulm: Hx COPD. Acute hypoxemic pulmonary insufficiency, unable to extubate in the OR due to poor weaning parameters. SBT and repeated poor weaning parameters. Add systemic steroids and azithromycin x 5 days for COPD exacerbation. Diuresis for pulmonary edema.   GI: Presented with afferent loop obstruction, s/p upper endoscopy with iatrogenic perforation of afferent limb, s/p emergent exploratory laparotomy, afferent perforated bowel resection, side to side jj anastamosis and serosal patch with a open gtube placement NPO, G tube to gravity. PPI. Continue TPN today per surgical team. Will need Gtube study prior to initiation of enteral nutrition.   : CKD, creatinine within baseline range. Monitor urine output, goal negative 1L with diuresis if needed.  Endo: DM. ISS per protocol. On home lantus, will increase dose today as glucose poorly controlled with initiation of TPN.  Heme: Acute blood loss anemia, stable. Monitor. SCDs, SQH for DVT prophylaxis.   ID: Continue tacrolimus for immunosuppression, appreciate heart failure assistance with management. Continue zosyn and micafungin for perforated bowel. Transplant ID  following.      Maintain rashid catheter for I/Os while resuscitating, maintain subclavian central line for TPN.   Continue ICU care.   Discussed with patient and wife at bedside.        I spent 44 minutes in the professional and overall care of this patient.      Antonella Mcdermott MD

## 2023-10-13 NOTE — PROGRESS NOTES
Subjective Data:  Patient was transfused 1 unit pRBC last night. His developed hypotension last night and improved after temporarily holding Precedex gtt. He was put on CPAP trial earlier today. He remains intubated. His total urine output 1.9 liter in the past 24 hours. Telemetry monitor showed sinus rhythm with frequent PVCs. Latest tacrolimus level (10/13/23): 6.20.     Objective Data:  Last Recorded Vitals:  Vitals:    10/13/23 1127 10/13/23 1200 10/13/23 1300 10/13/23 1400   BP:  128/69 139/77 137/81   BP Location:       Patient Position:       Pulse: 76 73 75 72   Resp: (!) 27 (!) 30 26 23   Temp:  36.7 °C (98.1 °F)     TempSrc:  Temporal     SpO2: 100% 100% 100% 99%   Weight:       Height:         Last Labs:  CBC - 10/13/2023:  5:36 AM  17.9 8.0 182    24.4      CMP - 10/13/2023: 12:09 AM  8.5 4.7 28 --- 2.4   2.7 2.4 38 170      PTT - 10/13/2023: 12:09 AM  1.2   13.1 30     Last I/O:    Intake/Output Summary (Last 24 hours) at 10/13/2023 1458  Last data filed at 10/13/2023 1100  Gross per 24 hour   Intake 1634.99 ml   Output 1610 ml   Net 24.99 ml        Inpatient Medications:  Scheduled medications   Medication Dose Route Frequency    aspirin  150 mg rectal Daily    [START ON 10/14/2023] azithromycin  250 mg intravenous q24h    azithromycin  500 mg intravenous Once    heparin (porcine)  5,000 Units subcutaneous q8h    insulin glargine  20 Units subcutaneous Nightly    insulin lispro  0-15 Units subcutaneous q4h    ipratropium-albuteroL  3 mL nebulization q6h    lidocaine  1 patch transdermal Daily    [START ON 10/16/2023] methylPREDNISolone sodium succinate (PF)  4 mg intravenous q24h    methylPREDNISolone sodium succinate (PF)  40 mg intravenous q24h    micafungin  100 mg intravenous q24h    pantoprazole  40 mg intravenous BID    piperacillin-tazobactam  3.375 g intravenous q6h    tacrolimus  1 mg sublingual q24h    tacrolimus  2 mg sublingual Daily     PRN medications   Medication    calcium gluconate     calcium gluconate    dextrose 10 % in water (D10W)    dextrose    glucagon    HYDROmorphone    magnesium sulfate    magnesium sulfate    oxygen     Continuous Medications   Medication Dose Last Rate    Adult Clinimix TPN  40 mL/hr 40 mL/hr (10/12/23 2100)    dexmedeTOMIDine  0.1-1.5 mcg/kg/hr Stopped (10/13/23 1410)    fat emulsion fish oil/plant based  250 mL Stopped (10/12/23 0755)     Physical Exam:  Constitutional:       General: Well developed adult without acute distress, intubated on CPAP mode  HENT:      Head: Normocephalic and atraumatic. No JVD, +nasogastric tube attached to suction  Eyes:      Extraocular Movements: Extraocular movements intact.      Conjunctiva/sclera: Conjunctivae normal.   Cardiovascular:      Rate and Rhythm: Tachycardic, regular rhythm. No S3.      Heart sounds: Normal heart sounds. No murmurs or gallups.      Extremities: Warm distal extremities, no bipedal edema  Pulmonary:      Effort: Pulmonary effort is normal. No respiratory distress.      Breath sounds: Normal breath sounds bilaterally. No wheezing or rales. No cough.  Abdominal:      General: Active bowel sounds. + IRVING drain on right RLQ with serosanguinous fluid, intact midline surgical dressing, PEG tube intact     Assessment/Plan   66 y/o Male with PMHx of NICM secondary to valvular disease, s/p mitral valve repair with [place] [employer] Annuloflex band II with 36 mm, s/p bicaval OHT (3/24/2017) with post-op complicated by atrial arrhythmia, RV failure, chronotropic incompetence, Pancreatic adenocarcinoma, s/p Whipple procedure (5/19/23), currently on chemotx (modified FOLFIRINOX), HTN, DM type 2, HLD, CKD Stage 3B (baseline creatinine 2), amiodarone-induced hyperthyroidism, JE, MDD, transferred from [facility] for recurrent SBO. Blood culture + E coli. [Immunosuppressive regimen at home: Tacrolimus 4 mg/3 mg (Tacrolimus Goal 5-8), prednisone 5 mg daily. ] No rejection history. Latest DSE: negative (5/2023).      #s/p bicaval  OHT (3/24/2017) for NICM secondary to valvular disease  #s/p mitral valve repair with [place] [employer] Annuloflex band 36 mm  #Pancreatic adenocarcinoma, s/p Whipple procedure (5/19/23), on chemotherapy (FOLFIRINOX)  #Moderate malnutrition related to chronic disease  #E. Coli bacteremia, currently on IV antibiotic  # s/p EGD with dilation of afferent limb anastomotic stenosis (10/10/23)  #s/p Ex-lap, small bowel resection, jejuno-jejunostomy, G-tube (10/10/23)  # Perforated afferent limb     Recommendations:  -Continue current dose of immunosuppressive regimen for cardiac transplant.  -Tacrolimus 2 mg SL at 0630, 1 mg SL at 1830. Tacrolimus target goal: 5-8  -Check daily Tacrolimus trough level at 0600.  -Continue methylprednisolone 4 mg IV daily.  -Replete electrolytes. Maintain K>4, Mg>2.    HF service will continue to follow.    Discussed with HF attending, Dr. GENESIS Landa.    Signed:    Albreto Nj MD  Heart Failure service

## 2023-10-13 NOTE — PROGRESS NOTES
Occupational Therapy                 Therapy Communication Note    Patient Name: Chung Goodman  MRN: [id number]  Today's Date: 10/13/2023     Discipline: Occupational Therapy    Missed Visit Reason: Missed Visit Reason:  (Pt recently began CPAP trial. PA requested therapy hold during CPAP trial.)    Missed Time: Attempt    Comment:

## 2023-10-14 NOTE — PROGRESS NOTES
"Chung Goodman is a 67 y.o. male w/ PMHx cardiac transplant and pancreatic cancer requiring whipple 5/19/23 on day 7 of admission presenting with SBO (small bowel obstruction) (CMS/HCC).    Subjective     Patient was seen and examined. Had exploratory laparotomy for iatrogenic bowel perforation during endoscopy, had bowel resection and bowel anastomosis and gtube placement 10/10. He failed SBT 10/12 and 10/13, remains intubated at this time. This AM pt is slightly more alert and tracking providers. Output 2.665L urine, 245cc from gastrostomy over the past 24h. Pt remains intubated and hemodynamically stable at this time.    Drain: 45cc       Objective     Physical Exam  NEURO: Intubated and sedated, minimal vent settings, pt breathing over the vent intermittently. Responds to name and commands.  HEENT: NCAT, ETT in place, No OGT  CARDIO: RRR  PULM: Comfortable on vent at this time, no distress  ABD: Soft, mildly distended, Gtube in place with minimal output of gastric contents, Drain with ss output.  Incision packing removed, serosanguinous output noted. No purulence or active bleeding, no warmth or erythema. Incision probed and cleaned with swabs, repacked and covered with abdominal pad.     Last Recorded Vitals  Blood pressure 140/80, pulse 75, temperature 36.7 °C (98.1 °F), resp. rate (!) 27, height 1.765 m (5' 9.49\"), weight 76.7 kg (169 lb 1.5 oz), SpO2 98 %.  Intake/Output last 3 Shifts:  I/O last 3 completed shifts:  In: 2630.9 (34.3 mL/kg) [I.V.:57.8 (0.8 mL/kg); Blood:350; Other:15; IV Piggyback:600]  Out: 3690 (48.1 mL/kg) [Urine:3380 (1.2 mL/kg/hr); Emesis/NG output:245; Drains:65]  Weight: 76.7 kg     Relevant Results  Results for orders placed or performed during the hospital encounter of 10/07/23 (from the past 24 hour(s))   Blood Gas Arterial Full Panel   Result Value Ref Range    POCT pH, Arterial 7.42 7.38 - 7.42 pH    POCT pCO2, Arterial 40 38 - 42 mm Hg    POCT pO2, Arterial 109 (H) 85 - 95 mm " Hg    POCT SO2, Arterial 99 94 - 100 %    POCT Oxy Hemoglobin, Arterial 96.7 94.0 - 98.0 %    POCT Hematocrit Calculated, Arterial 24.0 (L) 41.0 - 52.0 %    POCT Sodium, Arterial 141 136 - 145 mmol/L    POCT Potassium, Arterial 3.5 3.5 - 5.3 mmol/L    POCT Chloride, Arterial 111 (H) 98 - 107 mmol/L    POCT Ionized Calcium, Arterial 1.23 1.10 - 1.33 mmol/L    POCT Glucose, Arterial 258 (H) 74 - 99 mg/dL    POCT Lactate, Arterial 1.2 0.4 - 2.0 mmol/L    POCT Base Excess, Arterial 1.3 -2.0 - 3.0 mmol/L    POCT HCO3 Calculated, Arterial 25.9 22.0 - 26.0 mmol/L    POCT Hemoglobin, Arterial 8.0 (L) 13.5 - 17.5 g/dL    POCT Anion Gap, Arterial 8 (L) 10 - 25 mmo/L    Patient Temperature 37.0 degrees Celsius    FiO2 30 %   POCT GLUCOSE   Result Value Ref Range    POCT Glucose 229 (H) 74 - 99 mg/dL   POCT GLUCOSE   Result Value Ref Range    POCT Glucose 244 (H) 74 - 99 mg/dL   CBC   Result Value Ref Range    WBC 16.3 (H) 4.4 - 11.3 x10*3/uL    nRBC 0.0 0.0 - 0.0 /100 WBCs    RBC 2.92 (L) 4.50 - 5.90 x10*6/uL    Hemoglobin 8.5 (L) 13.5 - 17.5 g/dL    Hematocrit 25.2 (L) 41.0 - 52.0 %    MCV 86 80 - 100 fL    MCH 29.1 26.0 - 34.0 pg    MCHC 33.7 32.0 - 36.0 g/dL    RDW 17.7 (H) 11.5 - 14.5 %    Platelets 193 150 - 450 x10*3/uL    MPV 11.4 7.5 - 11.5 fL   Renal function panel   Result Value Ref Range    Glucose 304 (H) 74 - 99 mg/dL    Sodium 143 136 - 145 mmol/L    Potassium 4.0 3.5 - 5.3 mmol/L    Chloride 106 98 - 107 mmol/L    Bicarbonate 24 21 - 32 mmol/L    Anion Gap 17 10 - 20 mmol/L    Urea Nitrogen 61 (H) 6 - 23 mg/dL    Creatinine 3.39 (H) 0.50 - 1.30 mg/dL    eGFR 19 (L) >60 mL/min/1.73m*2    Calcium 8.7 8.6 - 10.6 mg/dL    Phosphorus 3.1 2.5 - 4.9 mg/dL    Albumin 2.7 (L) 3.4 - 5.0 g/dL   POCT GLUCOSE   Result Value Ref Range    POCT Glucose 317 (H) 74 - 99 mg/dL   Tacrolimus level   Result Value Ref Range    Tacrolimus  4.9 <=15.0 ng/mL   Calcium, Ionized   Result Value Ref Range    POCT Calcium, Ionized 1.26 1.1 -  1.33 mmol/L   CBC   Result Value Ref Range    WBC 15.4 (H) 4.4 - 11.3 x10*3/uL    nRBC 0.0 0.0 - 0.0 /100 WBCs    RBC 2.84 (L) 4.50 - 5.90 x10*6/uL    Hemoglobin 8.2 (L) 13.5 - 17.5 g/dL    Hematocrit 24.8 (L) 41.0 - 52.0 %    MCV 87 80 - 100 fL    MCH 28.9 26.0 - 34.0 pg    MCHC 33.1 32.0 - 36.0 g/dL    RDW 18.1 (H) 11.5 - 14.5 %    Platelets 200 150 - 450 x10*3/uL    MPV 11.4 7.5 - 11.5 fL   Coagulation Screen   Result Value Ref Range    Protime 11.5 9.8 - 12.8 seconds    INR 1.0 0.9 - 1.1    aPTT 28 27 - 38 seconds   Magnesium   Result Value Ref Range    Magnesium 2.43 (H) 1.60 - 2.40 mg/dL   Renal Function Panel   Result Value Ref Range    Glucose 385 (H) 74 - 99 mg/dL    Sodium 144 136 - 145 mmol/L    Potassium 4.1 3.5 - 5.3 mmol/L    Chloride 104 98 - 107 mmol/L    Bicarbonate 26 21 - 32 mmol/L    Anion Gap 18 10 - 20 mmol/L    Urea Nitrogen 62 (H) 6 - 23 mg/dL    Creatinine 3.39 (H) 0.50 - 1.30 mg/dL    eGFR 19 (L) >60 mL/min/1.73m*2    Calcium 9.2 8.6 - 10.6 mg/dL    Phosphorus 4.0 2.5 - 4.9 mg/dL    Albumin 2.6 (L) 3.4 - 5.0 g/dL   POCT GLUCOSE   Result Value Ref Range    POCT Glucose 378 (H) 74 - 99 mg/dL   POCT GLUCOSE   Result Value Ref Range    POCT Glucose 300 (H) 74 - 99 mg/dL   POCT GLUCOSE   Result Value Ref Range    POCT Glucose 341 (H) 74 - 99 mg/dL   POCT GLUCOSE   Result Value Ref Range    POCT Glucose 368 (H) 74 - 99 mg/dL           Assessment/Plan   Principal Problem:    SBO (small bowel obstruction) (CMS/HCC)  Active Problems:    Pancreatic cancer (CMS/HCC)    Small bowel obstruction (CMS/HCC)    Pulmonary hypertension (CMS/HCC)    CVA (cerebral vascular accident) (CMS/HCC)    Bowel perforation (CMS/HCC)    Perforated abdominal viscus    Pt is a 66 y/o male w/ PMHx cardiac transplant in 2017, s/p whipple for panc adenocarcinoma in May, presented with afferent loop obstruction, s/p upper endoscopy with iatrogenic perforation of afferent limb, s/p emergent exploratory laparotomy, afferent  perforated bowel resection, side to side jj anastamosis and serosal patch with a open gtube placement.     Plan:    Neuro  - continue sedation per SICU  - continue pain management per SICU    CV  -  administer 40mg Lasix  - ASA suppository 150mg     Pulm:   - Failed SBT 10/12, 10/13  - Repeat SBT 10/14, follow-up extubation    GI  - continue monitoring midline incision for signs of infection/dehiscence  - continue TPN  - continue gtube to gravity until extubated    ID  - continue abx per ID    Prophy  - DVT ppx: Heparin    Dispo:  - appreciate SICU care of this critically ill patient    Discussed with Dr. Norris, Discussed with Dr. Agatha CLARK, MS4

## 2023-10-14 NOTE — NURSING NOTE
Pt's SBP remain greater that 160 via the arterial line and blood pressure cuff. Pt denies pain, pt's CPOT score is 0, Bridgette Randolph, SICU notified of patient's vital sign trend RN stated that pt does not have PRN BP medications ordered. Per order give 40 lasix and reevaluate pt's BP in 1 hour. RN notified Bridgette Randolph that pt's blood glucose remains greater than 300, despite interventions. RN awaiting insulin gtt.

## 2023-10-14 NOTE — PROGRESS NOTES
Subjective Data:  Patient remains intubated on ventilatory support. He is able to follow commands, unable to verbalize with endotracheal tube in place.     Overnight Events:    Remains intubated and hemodynamically stable.      Objective Data:  Last Recorded Vitals:  Vitals:    10/14/23 0700 10/14/23 0800 10/14/23 0900 10/14/23 0901   BP: 151/82 154/83 150/80 150/80   Pulse: 66 64 64 62   Resp: 15 15 16 15   Temp:       TempSrc:       SpO2: 100% 100% 100% 100%   Weight:       Height:           Last Labs:  CBC - 10/14/2023:  1:14 AM  15.4 8.2 200    24.8      CMP - 10/14/2023:  1:14 AM  9.2 4.7 28 --- 2.4   4.0 2.6 38 170      PTT - 10/14/2023:  1:14 AM  1.0   11.5 28             TROPHS   Date/Time Value Ref Range Status   09/26/2023 05:31 AM 18 0 - 53 ng/L Final     Comment:     .  Less than 99th percentile of normal range cutoff-  Female and children under 18 years old <35 ng/L; Male <54 ng/L: Negative  Repeat testing should be performed if clinically indicated.   .  Female and children under 18 years old  ng/L; Male  ng/L:  Consistent with possible cardiac damage and possible increased clinical   risk. Serial measurements may help to assess extent of myocardial damage.   .  >120 ng/L: Consistent with cardiac damage, increased clinical risk and  myocardial infarction. Serial measurements may help assess extent of   myocardial damage.   .   NOTE: Children less than 1 year old may have higher baseline troponin   levels and results should be interpreted in conjunction with the overall   clinical context.  .  NOTE: Troponin I testing is performed using a different   testing methodology at [facility] than at other   [facility] hospitals. Direct result comparisons should only   be made within the same method.     01/17/2023 04:58 PM 13 0 - 20 ng/L Final     Comment:     .  Less than 99th percentile of normal range cutoff-  Female and children under 18 years old <14 ng/L; Male <21 ng/L:  Negative  Repeat testing should be performed if clinically indicated.   .  Female and children under 18 years old 14-50 ng/L; Male 21-50 ng/L:  Consistent with possible cardiac damage and possible increased clinical   risk. Serial measurements may help to assess extent of myocardial damage.   .  >50 ng/L: Consistent with cardiac damage, increased clinical risk and  myocardial infarction. Serial measurements may help assess extent of   myocardial damage.   .   NOTE: Children less than 1 year old may have higher baseline troponin   levels and results should be interpreted in conjunction with the overall   clinical context.   .  NOTE: Troponin I testing is performed using a different   testing methodology at [facility] than at other   [facility]. Direct result comparisons should only   be made within the same method.       BNP   Date/Time Value Ref Range Status   05/24/2023 11:41  0 - 99 pg/mL Final     Comment:     .  <100 pg/mL - Heart failure unlikely  100-299 pg/mL - Intermediate probability of acute heart  .               failure exacerbation. Correlate with clinical  .               context and patient history.    >=300 pg/mL - Heart Failure likely. Correlate with clinical  .               context and patient history.   Biotin interference may cause falsely decreased results.   Patients taking a Biotin dose of up to 5 mg/day should   refrain from taking Biotin for 24 hours before sample   collection. Providers may contact their local laboratory   for further information.     10/29/2019 04:07  0 - 99 pg/mL Final     Comment:     .  <100 pg/mL - Heart failure unlikely  100-299 pg/mL - Intermediate probability of acute heart  .               failure exacerbation. Correlate with clinical  .               context and patient history.    >=300 pg/mL - Heart Failure likely. Correlate with clinical  .               context and patient history.  BNP testing is performed using different  testing   methodology at [facility] than at other   [facility]. Direct result comparisons should   only be made within the same method.       HGBA1C   Date/Time Value Ref Range Status   08/15/2023 10:45 AM 8.3 % Final     Comment:          Diagnosis of Diabetes-Adults   Non-Diabetic: < or = 5.6%   Increased risk for developing diabetes: 5.7-6.4%   Diagnostic of diabetes: > or = 6.5%  .       Monitoring of Diabetes                Age (y)     Therapeutic Goal (%)   Adults:          >18           <7.0   Pediatrics:    13-18           <7.5                   7-12           <8.0                   0- 6            7.5-8.5   American Diabetes Association. Diabetes Care 33(S1), Jan 2010.     05/16/2023 04:29 PM 7.4 % Final     Comment:          Diagnosis of Diabetes-Adults   Non-Diabetic: < or = 5.6%   Increased risk for developing diabetes: 5.7-6.4%   Diagnostic of diabetes: > or = 6.5%  .       Monitoring of Diabetes                Age (y)     Therapeutic Goal (%)   Adults:          >18           <7.0   Pediatrics:    13-18           <7.5                   7-12           <8.0                   0- 6            7.5-8.5   American Diabetes Association. Diabetes Care 33(S1), Jan 2010.     10/20/2020 01:11 PM 9.4 4.3 - 5.6 % Final     Comment:     American Diabetes Association guidelines indicate that patients with HgbA1c in   the range 5.7-6.4% are at increased risk for development of diabetes, and   intervention by lifestyle modification may be beneficial. HgbA1c greater or   equal to 6.5% is considered diagnostic of diabetes.   08/09/2016 03:42 PM 8.1 4.2 - 6.5 % Final     VLDL   Date/Time Value Ref Range Status   08/15/2023 10:45 AM 15 0 - 40 mg/dL Final   05/16/2023 04:29 PM 20 0 - 40 mg/dL Final   04/27/2023 01:01 PM 38 0 - 40 mg/dL Final      Last I/O:  I/O last 3 completed shifts:  In: 2630.9 (34.3 mL/kg) [I.V.:57.8 (0.8 mL/kg); Blood:350; Other:15; IV Piggyback:600]  Out: 3690 (48.1 mL/kg)  "[Urine:3380 (1.2 mL/kg/hr); Emesis/NG output:245; Drains:65]  Weight: 76.7 kg     Past Cardiology Tests (Last 3 Years):  EKG:  No results found for this or any previous visit from the past 1095 days.    Echo:  No results found for this or any previous visit from the past 1095 days.    Ejection Fractions:  No results found for: \"EF\"  Cath:  No results found for this or any previous visit from the past 1095 days.    Stress Test:  No results found for this or any previous visit from the past 1095 days.    Cardiac Imaging:  XR chest abdomen for OG NG placement 10/6/2023      XR chest abdomen for OG NG placement 10/6/2023      XR chest abdomen for OG NG placement 10/5/2023      Inpatient Medications:  Scheduled medications   Medication Dose Route Frequency    aspirin  150 mg rectal Daily    azithromycin  250 mg intravenous q24h    heparin (porcine)  5,000 Units subcutaneous q8h    insulin glargine  20 Units subcutaneous Nightly    [Held by provider] insulin lispro  0-15 Units subcutaneous q4h    insulin regular  0-10 Units intravenous Once    ipratropium-albuteroL  3 mL nebulization q6h    lidocaine  1 patch transdermal Daily    [START ON 10/16/2023] methylPREDNISolone sodium succinate (PF)  4 mg intravenous q24h    methylPREDNISolone sodium succinate (PF)  40 mg intravenous q24h    micafungin  100 mg intravenous q24h    pantoprazole  40 mg intravenous BID    piperacillin-tazobactam  3.375 g intravenous q6h    tacrolimus  1 mg sublingual q24h    tacrolimus  2 mg sublingual Daily     PRN medications   Medication    calcium gluconate    calcium gluconate    dextrose 10 % in water (D10W)    dextrose 10 % in water (D10W)    dextrose    dextrose    glucagon    glucagon    HYDROmorphone    insulin regular    magnesium sulfate    magnesium sulfate    oxygen     Continuous Medications   Medication Dose Last Rate    Adult Clinimix TPN  40 mL/hr 40 mL/hr (10/13/23 2000)    dexmedeTOMIDine  0.1-1.5 mcg/kg/hr 0.2 mcg/kg/hr (10/14/23 " 0800)    fat emulsion fish oil/plant based  250 mL Stopped (10/14/23 0800)    insulin regular infusion  0-20 Units/hr 1 Units/hr (10/14/23 0623)       Physical Exam:  Physical Exam  Constitutional:       General: He is not in acute distress.     Appearance: He is ill-appearing.   HENT:      Head: Normocephalic and atraumatic.   Eyes:      Extraocular Movements: Extraocular movements intact.      Conjunctiva/sclera: Conjunctivae normal.   Cardiovascular:      Rate and Rhythm: Normal rate and regular rhythm.      Heart sounds: Normal heart sounds.   Pulmonary:      Comments: Intubated on ventilatory support  Abdominal:      General: Bowel sounds are normal.   Skin:     General: Skin is warm and dry.   Neurological:      Mental Status: He is alert.           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 Blood culture positive for E coli. [Immunosuppressive regimen at home: Tacrolimus 4 mg/3 mg (Tacrolimus Goal 5-8), prednisone 5 mg daily. ] No rejection history. Latest DSE: negative (5/2023).      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 Coli bacteremia, currently on IV antibiotic  # s/p EGD with dilation of afferent limb anastomotic stenosis (10/10/23)  #s/p Ex-lap, small  bowel resection, jejuno-jejunostomy, G-tube (10/10/23)  # Perforated afferent limb     Recommendations:  - Continue current dose of immunosuppressive regimen for cardiac transplant.  - Tacrolimus 2 mg SL at 0630, 1 mg SL at 1830. Tacrolimus target goal: 5-8  - Check daily Tacrolimus trough level at 0600.  - Per Trauma/SICU team patient has been started on short course of methylprednisolone 40mg IV daily for possible COPD with goal of ventilator liberation.   - Replete electrolytes. Maintain K>4, Mg>2.     HF service will continue to follow.     Patient was examined and discussed with Heart Failure service attending physician, Dr. Lazarus Landa.    Signed,  Cheryl Velasco MD  Heart Failure Fellow PGY4

## 2023-10-14 NOTE — PROGRESS NOTES
"Chung Goodman is a 67 y.o. male on day 7 of admission presenting with SBO (small bowel obstruction) (CMS/HCC).    Subjective   Received Lasix 40 mg IV x2 overnight with good response  Currently on CPAP      Objective     Physical Exam  Vitals and nursing note reviewed.   Constitutional:       General: He is not in acute distress.     Comments: Intubated   HENT:      Head: Normocephalic and atraumatic.      Mouth/Throat:      Comments: ETT in place  Eyes:      Conjunctiva/sclera: Conjunctivae normal.      Pupils: Pupils are equal, round, and reactive to light.   Cardiovascular:      Rate and Rhythm: Normal rate and regular rhythm.      Pulses: Normal pulses.      Heart sounds: Normal heart sounds.   Pulmonary:      Effort: No respiratory distress.      Breath sounds: Normal breath sounds.      Comments: Mechanically ventilated via ETT. Thorax symmetric. Mild tachypnea.  Abdominal:      General: Abdomen is flat. There is no distension.      Palpations: Abdomen is soft.      Tenderness: Tenderness: appropriately.      Comments: Midline incision, staples in place with some removed by surgery, open area packed with gauze.   Genitourinary:     Comments: Portillo in place with clear dark yellow urine output  Musculoskeletal:      Cervical back: Neck supple.      Right lower leg: No edema.      Left lower leg: No edema.   Skin:     General: Skin is warm and dry.   Neurological:      Comments: Intubated, precedex at 0.2. Following commands with extremities x4.       Last Recorded Vitals  Blood pressure 139/77, pulse 73, temperature 36.4 °C (97.5 °F), resp. rate 23, height 1.765 m (5' 9.49\"), weight 76.7 kg (169 lb 1.5 oz), SpO2 99 %.  Intake/Output last 3 Shifts:  I/O last 3 completed shifts:  In: 2630.9 (34.3 mL/kg) [I.V.:57.8 (0.8 mL/kg); Blood:350; Other:15; IV Piggyback:600]  Out: 3690 (48.1 mL/kg) [Urine:3380 (1.2 mL/kg/hr); Emesis/NG output:245; Drains:65]  Weight: 76.7 kg     Relevant Results  Scheduled " medications  aspirin, 150 mg, rectal, Daily  azithromycin, 250 mg, intravenous, q24h  heparin (porcine), 5,000 Units, subcutaneous, q8h  insulin glargine, 20 Units, subcutaneous, Nightly  [Held by provider] insulin lispro, 0-15 Units, subcutaneous, q4h  insulin regular, 0-10 Units, intravenous, Once  ipratropium-albuteroL, 3 mL, nebulization, q6h  lidocaine, 1 mL, infiltration, Once  lidocaine, 1 patch, transdermal, Daily  [START ON 10/18/2023] methylPREDNISolone sodium succinate (PF), 4 mg, intravenous, q24h  methylPREDNISolone sodium succinate (PF), 40 mg, intravenous, q24h  micafungin, 100 mg, intravenous, q24h  pantoprazole, 40 mg, intravenous, BID  piperacillin-tazobactam, 3.375 g, intravenous, q6h  tacrolimus, 1 mg, sublingual, q24h  tacrolimus, 2 mg, sublingual, Daily    Continuous medications  Adult Clinimix TPN, 40 mL/hr, Last Rate: 40 mL/hr (10/13/23 2000)  dexmedeTOMIDine, 0.1-1.5 mcg/kg/hr, Last Rate: Stopped (10/14/23 1100)  fat emulsion fish oil/plant based, 250 mL, Last Rate: Stopped (10/14/23 0800)  insulin regular infusion, 0-20 Units/hr, Last Rate: 3.75 Units/hr (10/14/23 1200)    PRN medications  PRN medications: calcium gluconate, calcium gluconate, dextrose 10 % in water (D10W), dextrose 10 % in water (D10W), dextrose, dextrose, glucagon, glucagon, HYDROmorphone, insulin regular, magnesium sulfate, magnesium sulfate, oxygen    Results for orders placed or performed during the hospital encounter of 10/07/23 (from the past 24 hour(s))   POCT GLUCOSE   Result Value Ref Range    POCT Glucose 244 (H) 74 - 99 mg/dL   CBC   Result Value Ref Range    WBC 16.3 (H) 4.4 - 11.3 x10*3/uL    nRBC 0.0 0.0 - 0.0 /100 WBCs    RBC 2.92 (L) 4.50 - 5.90 x10*6/uL    Hemoglobin 8.5 (L) 13.5 - 17.5 g/dL    Hematocrit 25.2 (L) 41.0 - 52.0 %    MCV 86 80 - 100 fL    MCH 29.1 26.0 - 34.0 pg    MCHC 33.7 32.0 - 36.0 g/dL    RDW 17.7 (H) 11.5 - 14.5 %    Platelets 193 150 - 450 x10*3/uL    MPV 11.4 7.5 - 11.5 fL   Renal  function panel   Result Value Ref Range    Glucose 304 (H) 74 - 99 mg/dL    Sodium 143 136 - 145 mmol/L    Potassium 4.0 3.5 - 5.3 mmol/L    Chloride 106 98 - 107 mmol/L    Bicarbonate 24 21 - 32 mmol/L    Anion Gap 17 10 - 20 mmol/L    Urea Nitrogen 61 (H) 6 - 23 mg/dL    Creatinine 3.39 (H) 0.50 - 1.30 mg/dL    eGFR 19 (L) >60 mL/min/1.73m*2    Calcium 8.7 8.6 - 10.6 mg/dL    Phosphorus 3.1 2.5 - 4.9 mg/dL    Albumin 2.7 (L) 3.4 - 5.0 g/dL   POCT GLUCOSE   Result Value Ref Range    POCT Glucose 317 (H) 74 - 99 mg/dL   Tacrolimus level   Result Value Ref Range    Tacrolimus  4.9 <=15.0 ng/mL   Calcium, Ionized   Result Value Ref Range    POCT Calcium, Ionized 1.26 1.1 - 1.33 mmol/L   CBC   Result Value Ref Range    WBC 15.4 (H) 4.4 - 11.3 x10*3/uL    nRBC 0.0 0.0 - 0.0 /100 WBCs    RBC 2.84 (L) 4.50 - 5.90 x10*6/uL    Hemoglobin 8.2 (L) 13.5 - 17.5 g/dL    Hematocrit 24.8 (L) 41.0 - 52.0 %    MCV 87 80 - 100 fL    MCH 28.9 26.0 - 34.0 pg    MCHC 33.1 32.0 - 36.0 g/dL    RDW 18.1 (H) 11.5 - 14.5 %    Platelets 200 150 - 450 x10*3/uL    MPV 11.4 7.5 - 11.5 fL   Coagulation Screen   Result Value Ref Range    Protime 11.5 9.8 - 12.8 seconds    INR 1.0 0.9 - 1.1    aPTT 28 27 - 38 seconds   Magnesium   Result Value Ref Range    Magnesium 2.43 (H) 1.60 - 2.40 mg/dL   Renal Function Panel   Result Value Ref Range    Glucose 385 (H) 74 - 99 mg/dL    Sodium 144 136 - 145 mmol/L    Potassium 4.1 3.5 - 5.3 mmol/L    Chloride 104 98 - 107 mmol/L    Bicarbonate 26 21 - 32 mmol/L    Anion Gap 18 10 - 20 mmol/L    Urea Nitrogen 62 (H) 6 - 23 mg/dL    Creatinine 3.39 (H) 0.50 - 1.30 mg/dL    eGFR 19 (L) >60 mL/min/1.73m*2    Calcium 9.2 8.6 - 10.6 mg/dL    Phosphorus 4.0 2.5 - 4.9 mg/dL    Albumin 2.6 (L) 3.4 - 5.0 g/dL   POCT GLUCOSE   Result Value Ref Range    POCT Glucose 378 (H) 74 - 99 mg/dL   POCT GLUCOSE   Result Value Ref Range    POCT Glucose 300 (H) 74 - 99 mg/dL   POCT GLUCOSE   Result Value Ref Range    POCT Glucose  341 (H) 74 - 99 mg/dL   POCT GLUCOSE   Result Value Ref Range    POCT Glucose 368 (H) 74 - 99 mg/dL   POCT GLUCOSE   Result Value Ref Range    POCT Glucose 326 (H) 74 - 99 mg/dL   POCT GLUCOSE   Result Value Ref Range    POCT Glucose 269 (H) 74 - 99 mg/dL     Assessment/Plan     Chung Goodman is a 67yoM with PMHx significant for 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 Coli bacteremia. C/f recurrent afferent limb syndrome and consideration of biliary infection. Patient underwent EGD on 10/10 for management of afferent limb syndrome and PEG-J placement for venting/feeding, however course c/b afferent limb perforation, emergently transferred to OR s/p ex-lap, SBR, J-J anastomosis, and G-tube placement. Patient transferred to SICU postoperatively for further management.     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 Acute post-op pain. Arrived to SICU intubated and sedated. NMB reversed in OR. Oversedated with Dilaudid 0.4mg, switched back to 0.2mg. Currently on Precedex at 0.2. Following commands.  - ongoing neuro and pain assessments  - PRN hydromorphone for pain control  - Lidoderm patch  - Continue precedex infusion, target RASS 0 to -1  - PT/OT consult  - Home meds: allopurinol, bupropion XL, citalopram, gabapentin  - Restraints indicated while patient remains intubated, restrain with soft wrist restraints until medical devices are discontinued.     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 Baseline echo (9/26) with EF 55-60%, RV mildly reduced RVSF. Sinus tachycardia resolved, now NSR with frequent PVCs.  - continuous EKG/abp monitoring  - Goal map range 65-90  - Additional volume as indicated  - Continue rectal ASA  - IS/ppx per HF service: [place],  Methylprednisolone 4mg  while on  higher dose for COPD exacerbation(home prednisone 5 mg)  - Home meds: ASA, amlodipine, fenofibrate, isosorbide, hydralazine, rosuvastatin.     PULM: History of COPD, pulmonary HTN. Arrived to SICU intubated. Failed multiple SBT 2/2 tachypnea. C/f possible COPD exacerbation. CXR with B/L pleural effusions, L>R. Now tolerating CPAP 30% 5/+5 with RR in the 20s.  - Continue SBT as tolerated  - Wean vent as tolerated, maintain SpO2 >92%.  - Methylpred 40mg IV x5 days for COPD exacerbation ( day 2 today)   - Azithromycin 500mg IV x1, then 250mg IV x4 days ( day 2)   - Scheduled duonebs  - Q1h incentive spirometer once extubated  - additional pulm toilet prn.   - CXR daily  - ABG prn      GI: Hx of GERD. Pancreatic cancer s/p whipple (5/19/2023). Recurrent SBO with c/f recurrent afferent limb syndrome. Acute transaminitis and hyperbilirubinemia improving. Afferent limb perforation noted during EGD on 10/10, emergently transferred to OR s/p ex-lap, SBR, J-J anastomosis, and G-tube placement. Multiple chidi removed by Dr. Johnson this am, open area packed. Per Dr. Johnson, there was an area of transverse colon with herniation to the umbilicus that was mobilized and oversewn in the OR with high risk for perforation.  - NPO, okay for ice chips once extubated  - Continue TPN at 40ml/hr, continue same rate given elevated BG   - Continue lipids  - continue G-tube to gravity -> clamp once extubated  - monitor IRVING drain output  - Continue PPI (home med)  - holding home Lomotil  - holding home pancreatic enzymes while NPO     : Hx of CKD3. Baseline creatinine ~2-3. Oliguria. Net negative 209ml for past 24hrs following lasix 40mg IV yesterday.  - Check renal function panel daily and PRN  - Volume resuscitation as needed  - Maintain U/O >0.5ml/kg/hr  - Maintain Portillo for strict I/Os  - Replete electrolytes per protocol  -      HEME: Acute blood loss anemia. OR EBL 25ml. Mild coagulopathy.  - Check CBC and coags daily and PRN  - SCDs  for DVT prophylaxis  - Continue SQ heparin  - rectal ASA  - ongoing monitoring for s/s bleeding  - Maintain active T&S (10/12)     ENDO: Hx of IDDM2 (lantus 10u daily at home) and amiodarone-induced hyperthyroidism. Hyperglycemia with BG in the 300's. Started on insulin infusion  - Continue insulin infusion  - Continue  lantus to 20u daily       ID: Sepsis 2/2 E. Coli bacteremia, +BCx 10/5, started Zosyn. Repeat BCxs 10/7 and 10/9 NGTD. Afebrile. Leukocytosis.   - temp q4h, wbc daily  - Transplant ID following, appreciate recs  - continue Zosyn (10/5-)  - Vanco 10/10-10/11  - Continue micafungin (10/11-) given bowel perf w/ hx of heart transplant  - ongoing monitoring for s/s infection     Lines:  - L brachial arterial line (10/10, OR)  - L subclavian TINO (10/10, OR)  - PIVx2  - R chest mediport -> de-accessed 10/11     Dispo: Continue ICU care. Patient seen and discussed with ICU attending Dr. Mcdermott.     I spent 55 minutes in the professional and overall care of this patient.       Pita Terry PA-C

## 2023-10-14 NOTE — PROGRESS NOTES
"Chung Goodman is a 67 y.o. male on day 7 of admission presenting with SBO (small bowel obstruction) (CMS/HCC).    Subjective   Rested well overnight on SIMV. Some apnea this morning.        Objective     Physical Exam  Cardiovascular:      Rate and Rhythm: Normal rate.   Abdominal:      Palpations: Abdomen is soft.   Neurological:      General: No focal deficit present.         Last Recorded Vitals  Blood pressure 139/77, pulse 73, temperature 36.4 °C (97.5 °F), resp. rate 23, height 1.765 m (5' 9.49\"), weight 76.7 kg (169 lb 1.5 oz), SpO2 99 %.  Intake/Output last 3 Shifts:  I/O last 3 completed shifts:  In: 2630.9 (34.3 mL/kg) [I.V.:57.8 (0.8 mL/kg); Blood:350; Other:15; IV Piggyback:600]  Out: 3690 (48.1 mL/kg) [Urine:3380 (1.2 mL/kg/hr); Emesis/NG output:245; Drains:65]  Weight: 76.7 kg     Relevant Results           This patient currently has cardiac telemetry ordered; if you would like to modify or discontinue the telemetry order, click here to go to the orders activity to modify/discontinue the order.            Malnutrition Diagnosis Status: New  Malnutrition Diagnosis: Severe malnutrition related to acute disease or injury  As Evidenced by: <50% estimated intake > 5 days, 10% weight loss over < 1 month and mild/moderate muscle atrophy  I agree with the dietitian's malnutrition diagnosis.      Assessment/Plan   Principal Problem:    SBO (small bowel obstruction) (CMS/HCC)  Active Problems:    Pancreatic cancer (CMS/HCC)    Small bowel obstruction (CMS/HCC)    Pulmonary hypertension (CMS/HCC)    CVA (cerebral vascular accident) (CMS/HCC)    Bowel perforation (CMS/HCC)    Perforated abdominal viscus    Due to the high probability of life threatening clinical decompensation, the patient required critical care time evaluating and managing this patient.  Critical care time included obtaining a history, examining the patient, ordering and reviewing studies, discussing, developing, and implementing a " management plan, evaluating the patient's response to treatment, and discussion with other care team providers. I saw and evaluated the patient myself. I reviewed the provider's documentation and discussed the patient with the provider. Critical care time was performed excluive of billable procedures.    Critical Care Time: 38 minutes     67yoM with PMHx significant for 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 Coli bacteremia. C/f recurrent afferent limb syndrome and consideration of biliary infection. Patient underwent ERCP in endoscopy suite on 10/10 for management of afferent limb syndrome and PEG-J placement for venting/feeding, course c/b bowel perforation, transferred to OR for small bowel resection, anastomosis, and PEG tube placement. Patient transferred to SICU postoperatively for further management.      Neuro: Following commands this morning with some intermittent apnea. Weaning off precedex.   CV: Hx OHT 2017, HTN, HLD. Continue rectal aspirin. Sinus tachycardia improved today. Heart failure following for immunosuppression meds.   Pulm: Hx COPD. Acute hypoxemic pulmonary insufficiency, unable to extubate in the OR due to poor weaning parameters. SBT and repeated poor weaning parameters. Add systemic steroids and azithromycin x 5 days for COPD exacerbation. Diuresis for pulmonary edema. SBT again today.   GI: Presented with afferent loop obstruction, s/p upper endoscopy with iatrogenic perforation of afferent limb, s/p emergent exploratory laparotomy, afferent perforated bowel resection, side to side jj anastamosis and serosal patch with a open gtube placement NPO, G tube to gravity. PPI. Continue TPN today per surgical team. Will need Gtube study prior to initiation of enteral nutrition.   : CKD, creatinine within baseline range.  Monitor urine output, goal negative 1L with diuresis if needed.  Endo: DM. ISS per protocol. On home lantus, continue insulin infusion today as glucose poorly controlled with initiation of TPN and steroids.   Heme: Acute blood loss anemia, stable. Monitor. SCDs, SQH for DVT prophylaxis.   ID: Continue tacrolimus for immunosuppression, appreciate heart failure assistance with management. Continue zosyn and micafungin for perforated bowel. Transplant ID following.      Maintain rashid catheter for I/Os while resuscitating, maintain subclavian central line for TPN.   Continue ICU care.       I spent 38 minutes in the professional and overall care of this patient.      Antonella Mcdermott MD

## 2023-10-15 NOTE — PROGRESS NOTES
"Chung Goodman is a 67 y.o. male on day 8 of admission presenting with SBO (small bowel obstruction) (CMS/HCC).    Subjective   More awake this more morning. Tolerated SBT with adequate weaning parameters.   Extubated during rounds this morning. Saturating well on 3 LNC     Objective     Physical Exam  Constitutional:       General: He is not in acute distress.     Appearance: Normal appearance.   HENT:      Head: Normocephalic and atraumatic.   Eyes:      Pupils: Pupils are equal, round, and reactive to light.   Cardiovascular:      Rate and Rhythm: Normal rate and regular rhythm.      Pulses: Normal pulses.      Heart sounds: Normal heart sounds.   Pulmonary:      Effort: No respiratory distress.      Breath sounds: Normal breath sounds.      Comments: On 3 lnc   Abdominal:      General: Abdomen is flat. There is no distension.      Palpations: Abdomen is soft.      Tenderness: Tenderness: appropriately.      Comments: Midline incision, staples in place with some removed by surgery, open area packed with gauze.   Genitourinary:     Comments: Portillo in place with clear dark yellow urine output  Musculoskeletal:      Cervical back: Neck supple.      Right lower leg: No edema.      Left lower leg: No edema.   Skin:     General: Skin is warm and dry.   Neurological:      Mental Status: He is alert.       Last Recorded Vitals  Blood pressure (!) 163/103, pulse 83, temperature 36.2 °C (97.2 °F), temperature source Temporal, resp. rate 22, height 1.765 m (5' 9.49\"), weight 76.7 kg (169 lb 1.5 oz), SpO2 97 %.  Intake/Output last 3 Shifts:  I/O last 3 completed shifts:  In: 1944.2 (25.3 mL/kg) [I.V.:159.6 (2.1 mL/kg); Other:15; IV Piggyback:200]  Out: 4845 (63.2 mL/kg) [Urine:4525 (1.6 mL/kg/hr); Emesis/NG output:275; Drains:45]  Weight: 76.7 kg     Relevant Results  Scheduled medications  aspirin, 150 mg, rectal, Daily  azithromycin, 250 mg, intravenous, q24h  heparin (porcine), 5,000 Units, subcutaneous, q8h  insulin " glargine, 20 Units, subcutaneous, Nightly  [Held by provider] insulin lispro, 0-15 Units, subcutaneous, q4h  insulin regular, 0-10 Units, intravenous, Once  ipratropium-albuteroL, 3 mL, nebulization, q6h  lidocaine, 1 patch, transdermal, Daily  [START ON 10/18/2023] methylPREDNISolone sodium succinate (PF), 4 mg, intravenous, q24h  methylPREDNISolone sodium succinate (PF), 40 mg, intravenous, q24h  micafungin, 100 mg, intravenous, q24h  pantoprazole, 40 mg, intravenous, BID  piperacillin-tazobactam, 3.375 g, intravenous, q6h  tacrolimus, 1 mg, sublingual, q24h  tacrolimus, 2 mg, sublingual, Daily    Continuous medications  Adult Clinimix TPN, 40 mL/hr, Last Rate: 40 mL/hr (10/14/23 2114)  fat emulsion fish oil/plant based, 250 mL, Last Rate: Stopped (10/14/23 0800)  insulin regular infusion, 0-20 Units/hr, Last Rate: 0.98 Units/hr (10/15/23 1110)    PRN medications  PRN medications: calcium gluconate, calcium gluconate, dextrose 10 % in water (D10W), dextrose 10 % in water (D10W), dextrose, dextrose, glucagon, glucagon, HYDROmorphone, insulin regular, labetaloL, magnesium sulfate, magnesium sulfate, oxygen    Results for orders placed or performed during the hospital encounter of 10/07/23 (from the past 24 hour(s))   POCT GLUCOSE   Result Value Ref Range    POCT Glucose 269 (H) 74 - 99 mg/dL   POCT GLUCOSE   Result Value Ref Range    POCT Glucose 253 (H) 74 - 99 mg/dL   Calcium, Ionized   Result Value Ref Range    POCT Calcium, Ionized 1.34 (H) 1.1 - 1.33 mmol/L   CBC   Result Value Ref Range    WBC 14.7 (H) 4.4 - 11.3 x10*3/uL    nRBC 0.1 (H) 0.0 - 0.0 /100 WBCs    RBC 2.81 (L) 4.50 - 5.90 x10*6/uL    Hemoglobin 8.1 (L) 13.5 - 17.5 g/dL    Hematocrit 24.4 (L) 41.0 - 52.0 %    MCV 87 80 - 100 fL    MCH 28.8 26.0 - 34.0 pg    MCHC 33.2 32.0 - 36.0 g/dL    RDW 18.0 (H) 11.5 - 14.5 %    Platelets 208 150 - 450 x10*3/uL    MPV 11.1 7.5 - 11.5 fL   Hepatic function panel   Result Value Ref Range    Albumin 2.7 (L) 3.4 -  5.0 g/dL    Bilirubin, Total 1.5 (H) 0.0 - 1.2 mg/dL    Bilirubin, Direct 0.8 (H) 0.0 - 0.3 mg/dL    Alkaline Phosphatase 169 (H) 33 - 136 U/L    ALT 30 10 - 52 U/L    AST 19 9 - 39 U/L    Total Protein 5.3 (L) 6.4 - 8.2 g/dL   Magnesium   Result Value Ref Range    Magnesium 2.34 1.60 - 2.40 mg/dL   Basic Metabolic Panel   Result Value Ref Range    Glucose 255 (H) 74 - 99 mg/dL    Sodium 144 136 - 145 mmol/L    Potassium 3.4 (L) 3.5 - 5.3 mmol/L    Chloride 105 98 - 107 mmol/L    Bicarbonate 27 21 - 32 mmol/L    Anion Gap 15 10 - 20 mmol/L    Urea Nitrogen 76 (H) 6 - 23 mg/dL    Creatinine 3.53 (H) 0.50 - 1.30 mg/dL    eGFR 18 (L) >60 mL/min/1.73m*2    Calcium 9.5 8.6 - 10.6 mg/dL   Phosphorus   Result Value Ref Range    Phosphorus 3.4 2.5 - 4.9 mg/dL   POCT GLUCOSE   Result Value Ref Range    POCT Glucose 243 (H) 74 - 99 mg/dL   POCT GLUCOSE   Result Value Ref Range    POCT Glucose 229 (H) 74 - 99 mg/dL   POCT GLUCOSE   Result Value Ref Range    POCT Glucose 231 (H) 74 - 99 mg/dL   POCT GLUCOSE   Result Value Ref Range    POCT Glucose 207 (H) 74 - 99 mg/dL   POCT GLUCOSE   Result Value Ref Range    POCT Glucose 178 (H) 74 - 99 mg/dL   POCT GLUCOSE   Result Value Ref Range    POCT Glucose 149 (H) 74 - 99 mg/dL   POCT GLUCOSE   Result Value Ref Range    POCT Glucose 127 (H) 74 - 99 mg/dL   POCT GLUCOSE   Result Value Ref Range    POCT Glucose 109 (H) 74 - 99 mg/dL   POCT GLUCOSE   Result Value Ref Range    POCT Glucose 98 74 - 99 mg/dL   POCT GLUCOSE   Result Value Ref Range    POCT Glucose 103 (H) 74 - 99 mg/dL   POCT GLUCOSE   Result Value Ref Range    POCT Glucose 108 (H) 74 - 99 mg/dL   POCT GLUCOSE   Result Value Ref Range    POCT Glucose 103 (H) 74 - 99 mg/dL   POCT GLUCOSE   Result Value Ref Range    POCT Glucose 97 74 - 99 mg/dL   POCT GLUCOSE   Result Value Ref Range    POCT Glucose 132 (H) 74 - 99 mg/dL   Calcium, Ionized   Result Value Ref Range    POCT Calcium, Ionized 1.35 (H) 1.1 - 1.33 mmol/L   CBC    Result Value Ref Range    WBC 15.8 (H) 4.4 - 11.3 x10*3/uL    nRBC 0.1 (H) 0.0 - 0.0 /100 WBCs    RBC 2.98 (L) 4.50 - 5.90 x10*6/uL    Hemoglobin 8.5 (L) 13.5 - 17.5 g/dL    Hematocrit 25.5 (L) 41.0 - 52.0 %    MCV 86 80 - 100 fL    MCH 28.5 26.0 - 34.0 pg    MCHC 33.3 32.0 - 36.0 g/dL    RDW 17.4 (H) 11.5 - 14.5 %    Platelets 266 150 - 450 x10*3/uL    MPV 11.0 7.5 - 11.5 fL   Hepatic function panel   Result Value Ref Range    Albumin 2.7 (L) 3.4 - 5.0 g/dL    Bilirubin, Total 1.6 (H) 0.0 - 1.2 mg/dL    Bilirubin, Direct 0.8 (H) 0.0 - 0.3 mg/dL    Alkaline Phosphatase 172 (H) 33 - 136 U/L    ALT 28 10 - 52 U/L    AST 22 9 - 39 U/L    Total Protein 5.6 (L) 6.4 - 8.2 g/dL   Magnesium   Result Value Ref Range    Magnesium 1.98 1.60 - 2.40 mg/dL   Basic Metabolic Panel   Result Value Ref Range    Glucose 140 (H) 74 - 99 mg/dL    Sodium 148 (H) 136 - 145 mmol/L    Potassium 3.4 (L) 3.5 - 5.3 mmol/L    Chloride 108 (H) 98 - 107 mmol/L    Bicarbonate 26 21 - 32 mmol/L    Anion Gap 17 10 - 20 mmol/L    Urea Nitrogen 70 (H) 6 - 23 mg/dL    Creatinine 3.19 (H) 0.50 - 1.30 mg/dL    eGFR 21 (L) >60 mL/min/1.73m*2    Calcium 9.8 8.6 - 10.6 mg/dL   Phosphorus   Result Value Ref Range    Phosphorus 3.8 2.5 - 4.9 mg/dL   Blood Gas Arterial Full Panel Unsolicited   Result Value Ref Range    POCT pH, Arterial 7.50 (H) 7.38 - 7.42 pH    POCT pCO2, Arterial 35 (L) 38 - 42 mm Hg    POCT pO2, Arterial 151 (H) 85 - 95 mm Hg    POCT SO2, Arterial 100 94 - 100 %    POCT Oxy Hemoglobin, Arterial 97.7 94.0 - 98.0 %    POCT Hematocrit Calculated, Arterial 28.0 (L) 41.0 - 52.0 %    POCT Sodium, Arterial 144 136 - 145 mmol/L    POCT Potassium, Arterial 3.2 (L) 3.5 - 5.3 mmol/L    POCT Chloride, Arterial 110 (H) 98 - 107 mmol/L    POCT Ionized Calcium, Arterial 1.36 (H) 1.10 - 1.33 mmol/L    POCT Glucose, Arterial 141 (H) 74 - 99 mg/dL    POCT Lactate, Arterial 0.9 0.4 - 2.0 mmol/L    POCT Base Excess, Arterial 4.0 (H) -2.0 - 3.0 mmol/L     POCT HCO3 Calculated, Arterial 27.3 (H) 22.0 - 26.0 mmol/L    POCT Hemoglobin, Arterial 9.2 (L) 13.5 - 17.5 g/dL    POCT Anion Gap, Arterial 10 10 - 25 mmo/L    Patient Temperature 37.0 degrees Celsius   Blood Gas Arterial Full Panel   Result Value Ref Range    POCT pH, Arterial 7.44 (H) 7.38 - 7.42 pH    POCT pCO2, Arterial 43 (H) 38 - 42 mm Hg    POCT pO2, Arterial 127 (H) 85 - 95 mm Hg    POCT SO2, Arterial 99 94 - 100 %    POCT Oxy Hemoglobin, Arterial 97.3 94.0 - 98.0 %    POCT Hematocrit Calculated, Arterial 41.0 41.0 - 52.0 %    POCT Sodium, Arterial 143 136 - 145 mmol/L    POCT Potassium, Arterial 3.4 (L) 3.5 - 5.3 mmol/L    POCT Chloride, Arterial 109 (H) 98 - 107 mmol/L    POCT Ionized Calcium, Arterial 1.35 (H) 1.10 - 1.33 mmol/L    POCT Glucose, Arterial 121 (H) 74 - 99 mg/dL    POCT Lactate, Arterial 0.9 0.4 - 2.0 mmol/L    POCT Base Excess, Arterial 4.4 (H) -2.0 - 3.0 mmol/L    POCT HCO3 Calculated, Arterial 29.2 (H) 22.0 - 26.0 mmol/L    POCT Hemoglobin, Arterial 13.8 13.5 - 17.5 g/dL    POCT Anion Gap, Arterial 8 (L) 10 - 25 mmo/L    Patient Temperature 37.0 degrees Celsius    FiO2 30 %   Blood Gas Arterial Full Panel   Result Value Ref Range    POCT pH, Arterial 7.45 (H) 7.38 - 7.42 pH    POCT pCO2, Arterial 41 38 - 42 mm Hg    POCT pO2, Arterial 145 (H) 85 - 95 mm Hg    POCT SO2, Arterial 99 94 - 100 %    POCT Oxy Hemoglobin, Arterial 97.5 94.0 - 98.0 %    POCT Hematocrit Calculated, Arterial 29.0 (L) 41.0 - 52.0 %    POCT Sodium, Arterial 143 136 - 145 mmol/L    POCT Potassium, Arterial 3.6 3.5 - 5.3 mmol/L    POCT Chloride, Arterial 111 (H) 98 - 107 mmol/L    POCT Ionized Calcium, Arterial 1.38 (H) 1.10 - 1.33 mmol/L    POCT Glucose, Arterial 107 (H) 74 - 99 mg/dL    POCT Lactate, Arterial 0.8 0.4 - 2.0 mmol/L    POCT Base Excess, Arterial 4.1 (H) -2.0 - 3.0 mmol/L    POCT HCO3 Calculated, Arterial 28.5 (H) 22.0 - 26.0 mmol/L    POCT Hemoglobin, Arterial 9.6 (L) 13.5 - 17.5 g/dL    POCT Anion  Gap, Arterial 7 (L) 10 - 25 mmo/L    Patient Temperature 37.0 degrees Celsius    FiO2 32 %     Assessment/Plan     Chung Goodman is a 67yoM with PMHx significant for 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 Coli bacteremia. C/f recurrent afferent limb syndrome and consideration of biliary infection. Patient underwent EGD on 10/10 for management of afferent limb syndrome and PEG-J placement for venting/feeding, however course c/b afferent limb perforation, emergently transferred to OR s/p ex-lap, SBR, J-J anastomosis, and G-tube placement. Patient transferred to SICU postoperatively for further management.     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 Acute post-op pain. Arrived to SICU intubated and sedated. NMB reversed in OR.  Following commands. 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 Baseline echo (9/26) with EF 55-60%, RV mildly reduced RVSF. Sinus tachycardia resolved, now NSR with frequent PVCs. Hypertensive with SBP in the 160's-170's overnight  - continuous EKG/abp monitoring  -Labetalol 10 mg q 4 PRN for SBP> 160   - Goal map range 65-90  - Additional volume as indicated  - Continue rectal ASA  - IS/ppx per HF service: [place],  Methylprednisolone 4mg on hold  while on higher dose for COPD exacerbation(home prednisone 5 mg)  - Home meds: ASA, amlodipine, fenofibrate, isosorbide, hydralazine, rosuvastatin.     PULM: History of COPD, pulmonary HTN. Arrived to SICU intubated. Failed multiple SBT 2/2 tachypnea. C/f  possible COPD exacerbation therefore started on MP and Azithromycin for possible COPD exacerbation. Also have been diuresing.  Improved respiratory status and eventually extubated this morning. Currently on 3 LNC   - Methylpred 40 mg IV x5 days for COPD exacerbation ( day 3 today)   - Azithromycin 500mg IV x1, then 250mg IV x4 days ( day 3)   - Scheduled duonebs  - Q1h incentive spirometer while awake  - additional pulm toilet prn.   - CXR daily  - ABG prn      GI: Hx of GERD. Pancreatic cancer s/p whipple (5/19/2023). Recurrent SBO with c/f recurrent afferent limb syndrome. Acute transaminitis and hyperbilirubinemia improving. Afferent limb perforation noted during EGD on 10/10, emergently transferred to OR s/p ex-lap, SBR, J-J anastomosis, and G-tube placement. Multiple chidi removed by Dr. Johnson this am, open area packed. Per Dr. Johnson, there was an area of transverse colon with herniation to the umbilicus that was mobilized and oversewn in the OR with high risk for perforation.  - NPO, okay for ice chips once extubated  - Continue TPN at 40ml/hr, continue same rate for now as he remains on insulin drip.   - Continue lipids  -  G-tube to gravity -> will discuss with surgery clamping now that he is extubated  - monitor IRVING drain output  - Continue PPI (home med)  - holding home Lomotil  - holding home pancreatic enzymes while NPO   -Keep NPO today. Reassess tomorrow    : Hx of CKD3. Baseline creatinine ~2-3. JOSY this admission improving. Cr back to baseline   - Check renal function panel daily and PRN  - Volume resuscitation as needed  - Maintain U/O >0.5ml/kg/hr  - Maintain Portillo for strict I/Os  - Replete electrolytes per protocol  -      HEME: Acute blood loss anemia. OR EBL 25ml. Mild coagulopathy.  - Check CBC and coags daily and PRN  - SCDs for DVT prophylaxis  - Continue SQ heparin  - rectal ASA  - ongoing monitoring for s/s bleeding  - Maintain active T&S (10/12)     ENDO: Hx of IDDM2 (lantus 10u  daily at home) and amiodarone-induced hyperthyroidism. Hyperglycemia with BG in the 300's 10/14 . Started on insulin infusion  - Continue insulin infusion  - Continue  lantus to 20u daily       ID: Sepsis 2/2 E. Coli bacteremia, +BCx 10/5, started Zosyn. Repeat BCxs 10/7 and 10/9 NGTD. Afebrile. Leukocytosis.   - temp q4h, wbc daily  - Transplant ID following, appreciate recs  - continue Zosyn (10/5-), thru 10/18  - Vanco 10/10-10/11  - Continue micafungin (10/11-)  thru 10/18, given bowel perf w/ hx of heart transplant  - ongoing monitoring for s/s infection     Lines:  - L brachial arterial line (10/10, OR)  - L subclavian TINO (10/10, OR)  - PIVx2  - R chest mediport        Dispo: Continue ICU care. Patient seen and discussed with ICU attending Dr. Mcdermott.     I spent 55 minutes in the professional and overall care of this patient.     Pita Terry PA-C   Team phone [phone]

## 2023-10-15 NOTE — CARE PLAN
Problem: Safety - Medical Restraint  Goal: Remains free of injury from restraints (Restraint for Interference with Medical Device)  10/15/2023 0001 by German James RN  Flowsheets (Taken 10/15/2023 0001)  Remains free of injury from restraints (restraint for interference with medical device):   Every 2 hours: Monitor safety, psychosocial status, comfort, nutrition and hydration   Determine that other, less restrictive measures have been tried or would not be effective before applying the restraint   Evaluate the patient's condition at the time of restraint application   Inform patient/family regarding the reason for restraint  10/14/2023 2359 by German James RN  Flowsheets (Taken 10/14/2023 2359)  Remains free of injury from restraints (restraint for interference with medical device): Every 2 hours: Monitor safety, psychosocial status, comfort, nutrition and hydration

## 2023-10-15 NOTE — SIGNIFICANT EVENT
Pt extubated per MD order, placed on 3 lpm NC, pt tolerated well, SpO2 100%, no stridor, positive voice, strong cough.   10/15/23 1020   Vital Signs   Heart Rate 83   Resp 22   Respiratory Assessment   Assessment Type Assess only   Respiratory Pattern   (regular)   Chest Assessment Symmetrical   Cough Non-productive   Bilateral Breath Sounds Diminished

## 2023-10-15 NOTE — PROGRESS NOTES
Subjective Data:  Patient has been extubated this morning to nasal cannula. He is able to follow commands, noted to have hoarse voice, but endorses he feels better with the removal of the endotracheal tube.       Objective Data:  Last Recorded Vitals:  Vitals:    10/15/23 1020 10/15/23 1100 10/15/23 1200 10/15/23 1408   BP: (!) 163/103 (!) 162/98 (!) 157/96 166/81   Patient Position:       Pulse: 83 83 82    Resp: 22 21 (!) 28    Temp:   36 °C (96.8 °F)    TempSrc:   Temporal    SpO2: 97% 100% 100%    Weight:       Height:           Last Labs:  CBC - 10/15/2023:  4:01 AM  15.8 8.5 266    25.5      CMP - 10/15/2023:  4:01 AM  9.8 5.6 22 --- 1.6   3.8 2.7 28 172      PTT - 10/14/2023:  1:14 AM  1.0   11.5 28             TROPHS   Date/Time Value Ref Range Status   09/26/2023 05:31 AM 18 0 - 53 ng/L Final     Comment:     .  Less than 99th percentile of normal range cutoff-  Female and children under 18 years old <35 ng/L; Male <54 ng/L: Negative  Repeat testing should be performed if clinically indicated.   .  Female and children under 18 years old  ng/L; Male  ng/L:  Consistent with possible cardiac damage and possible increased clinical   risk. Serial measurements may help to assess extent of myocardial damage.   .  >120 ng/L: Consistent with cardiac damage, increased clinical risk and  myocardial infarction. Serial measurements may help assess extent of   myocardial damage.   .   NOTE: Children less than 1 year old may have higher baseline troponin   levels and results should be interpreted in conjunction with the overall   clinical context.  .  NOTE: Troponin I testing is performed using a different   testing methodology at [facility] than at other   [facility] hospitals. Direct result comparisons should only   be made within the same method.     01/17/2023 04:58 PM 13 0 - 20 ng/L Final     Comment:     .  Less than 99th percentile of normal range cutoff-  Female and children under 18 years  old <14 ng/L; Male <21 ng/L: Negative  Repeat testing should be performed if clinically indicated.   .  Female and children under 18 years old 14-50 ng/L; Male 21-50 ng/L:  Consistent with possible cardiac damage and possible increased clinical   risk. Serial measurements may help to assess extent of myocardial damage.   .  >50 ng/L: Consistent with cardiac damage, increased clinical risk and  myocardial infarction. Serial measurements may help assess extent of   myocardial damage.   .   NOTE: Children less than 1 year old may have higher baseline troponin   levels and results should be interpreted in conjunction with the overall   clinical context.   .  NOTE: Troponin I testing is performed using a different   testing methodology at [facility] than at other   [facility]. Direct result comparisons should only   be made within the same method.       BNP   Date/Time Value Ref Range Status   05/24/2023 11:41  0 - 99 pg/mL Final     Comment:     .  <100 pg/mL - Heart failure unlikely  100-299 pg/mL - Intermediate probability of acute heart  .               failure exacerbation. Correlate with clinical  .               context and patient history.    >=300 pg/mL - Heart Failure likely. Correlate with clinical  .               context and patient history.   Biotin interference may cause falsely decreased results.   Patients taking a Biotin dose of up to 5 mg/day should   refrain from taking Biotin for 24 hours before sample   collection. Providers may contact their local laboratory   for further information.     10/29/2019 04:07  0 - 99 pg/mL Final     Comment:     .  <100 pg/mL - Heart failure unlikely  100-299 pg/mL - Intermediate probability of acute heart  .               failure exacerbation. Correlate with clinical  .               context and patient history.    >=300 pg/mL - Heart Failure likely. Correlate with clinical  .               context and patient history.  BNP testing is  performed using different testing   methodology at [facility] than at other   [facility]. Direct result comparisons should   only be made within the same method.       HGBA1C   Date/Time Value Ref Range Status   08/15/2023 10:45 AM 8.3 % Final     Comment:          Diagnosis of Diabetes-Adults   Non-Diabetic: < or = 5.6%   Increased risk for developing diabetes: 5.7-6.4%   Diagnostic of diabetes: > or = 6.5%  .       Monitoring of Diabetes                Age (y)     Therapeutic Goal (%)   Adults:          >18           <7.0   Pediatrics:    13-18           <7.5                   7-12           <8.0                   0- 6            7.5-8.5   American Diabetes Association. Diabetes Care 33(S1), Jan 2010.     05/16/2023 04:29 PM 7.4 % Final     Comment:          Diagnosis of Diabetes-Adults   Non-Diabetic: < or = 5.6%   Increased risk for developing diabetes: 5.7-6.4%   Diagnostic of diabetes: > or = 6.5%  .       Monitoring of Diabetes                Age (y)     Therapeutic Goal (%)   Adults:          >18           <7.0   Pediatrics:    13-18           <7.5                   7-12           <8.0                   0- 6            7.5-8.5   American Diabetes Association. Diabetes Care 33(S1), Jan 2010.     10/20/2020 01:11 PM 9.4 4.3 - 5.6 % Final     Comment:     American Diabetes Association guidelines indicate that patients with HgbA1c in   the range 5.7-6.4% are at increased risk for development of diabetes, and   intervention by lifestyle modification may be beneficial. HgbA1c greater or   equal to 6.5% is considered diagnostic of diabetes.   08/09/2016 03:42 PM 8.1 4.2 - 6.5 % Final     VLDL   Date/Time Value Ref Range Status   08/15/2023 10:45 AM 15 0 - 40 mg/dL Final   05/16/2023 04:29 PM 20 0 - 40 mg/dL Final   04/27/2023 01:01 PM 38 0 - 40 mg/dL Final      Last I/O:  I/O last 3 completed shifts:  In: 1944.2 (25.3 mL/kg) [I.V.:159.6 (2.1 mL/kg); Other:15; IV Piggyback:200]  Out: 4845 (63.2  "mL/kg) [Urine:4525 (1.6 mL/kg/hr); Emesis/NG output:275; Drains:45]  Weight: 76.7 kg     Past Cardiology Tests (Last 3 Years):  EKG:  No results found for this or any previous visit from the past 1095 days.    Echo:  No results found for this or any previous visit from the past 1095 days.    Ejection Fractions:  No results found for: \"EF\"  Cath:  No results found for this or any previous visit from the past 1095 days.    Stress Test:  No results found for this or any previous visit from the past 1095 days.    Cardiac Imaging:  XR chest abdomen for OG NG placement 10/6/2023      XR chest abdomen for OG NG placement 10/6/2023      XR chest abdomen for OG NG placement 10/5/2023      Inpatient Medications:  Scheduled medications   Medication Dose Route Frequency    aspirin  150 mg rectal Daily    azithromycin  250 mg intravenous q24h    heparin (porcine)  5,000 Units subcutaneous q8h    insulin glargine  20 Units subcutaneous Nightly    [Held by provider] insulin lispro  0-15 Units subcutaneous q4h    insulin regular  0-10 Units intravenous Once    ipratropium-albuteroL  3 mL nebulization q6h    lidocaine  1 patch transdermal Daily    [START ON 10/18/2023] methylPREDNISolone sodium succinate (PF)  4 mg intravenous q24h    methylPREDNISolone sodium succinate (PF)  40 mg intravenous q24h    micafungin  100 mg intravenous q24h    pantoprazole  40 mg intravenous BID    piperacillin-tazobactam  3.375 g intravenous q6h    tacrolimus  1 mg sublingual q24h    tacrolimus  2 mg sublingual Daily     PRN medications   Medication    calcium gluconate    calcium gluconate    dextrose 10 % in water (D10W)    dextrose 10 % in water (D10W)    dextrose    dextrose    glucagon    glucagon    HYDROmorphone    insulin regular    labetaloL    magnesium sulfate    magnesium sulfate    oxygen     Continuous Medications   Medication Dose Last Rate    Adult Clinimix TPN  40 mL/hr 40 mL/hr (10/14/23 2114)    fat emulsion fish oil/plant based  250 " mL Stopped (10/14/23 0800)    insulin regular infusion  0-20 Units/hr 0.55 Units/hr (10/15/23 1323)       Physical Exam:  Physical Exam  Constitutional:       General: He is not in acute distress.     Appearance: He is ill-appearing.   HENT:      Head: Normocephalic and atraumatic.   Eyes:      Extraocular Movements: Extraocular movements intact.      Conjunctiva/sclera: Conjunctivae normal.   Cardiovascular:      Rate and Rhythm: Normal rate and regular rhythm.      Heart sounds: Normal heart sounds.   Pulmonary:      Comments: Extubated to nasal cannula.  Abdominal:      General: Bowel sounds are normal.   Skin:     General: Skin is warm and dry.   Neurological:      Mental Status: He is alert.        CMP:  Recent Labs     10/10/23  0621 10/11/23  0007 10/11/23  1853 10/12/23  0558 10/13/23  0009 10/13/23  1609 10/14/23  0114 10/14/23  1355 10/15/23  0401 10/15/23  1517   * 145 145 141 144 143 144 144 148* 146*   K 4.1 5.0 3.8 4.3 3.8 4.0 4.1 3.4* 3.4* 4.0    105 108* 104 107 106 104 105 108* 108*   CO2 29 23 25 25 27 24 26 27 26 27   ANIONGAP 16 22* 16 16 14 17 18 15 17 15   BUN 41* 40* 44* 48* 52* 61* 62* 76* 70* 64*   CREATININE 2.48* 2.69* 3.22* 3.21* 3.53* 3.39* 3.39* 3.53* 3.19* 2.76*   EGFR 28* 25* 20* 20* 18* 19* 19* 18* 21* 24*   MG 1.88 1.36* 2.36 2.93* 2.58*  --  2.43* 2.34 1.98  --      Recent Labs     04/28/23  1639 04/29/23  0945 05/02/23  0036 05/02/23  0603 05/16/23  1629 05/19/23  1810 09/23/23  0830 09/23/23  1806 10/09/23  0212 10/10/23  0621 10/12/23  0558 10/13/23  0009 10/13/23  1005 10/13/23  1609 10/14/23  0114 10/14/23  1355 10/15/23  0401 10/15/23  1517   ALBUMIN 4.1   < > 3.7   < > 4.1   < > 3.5   < > 3.6  3.7   < > 2.8* 2.6*  2.7* 2.4* 2.7* 2.6* 2.7* 2.7* 2.8*   ALKPHOS 514*   < > 491*   < > 152*   < > 108   < > 601*  601*   < > 238* 202* 170*  --   --  169* 172*  --    *   < > 327*   < > 45   < > 69*   < > 163*  161*   < > 68* 44 38  --   --  30 28  --    AST  591*   < > 250*   < > 43*   < > 138*   < > 221*  228*   < > 72* 38 28  --   --  19 22  --    BILITOT 5.1*   < > 4.7*   < > 1.4*   < > 0.6   < > 3.8*  3.8*   < > 3.9* 2.9* 2.4*  --   --  1.5* 1.6*  --    LIPASE 2,472*  --  8,160*  --  2,036*  --  76  --  210*  --   --   --   --   --   --   --   --   --     < > = values in this interval not displayed.     CBC:  Recent Labs     10/12/23  0558 10/12/23  1720 10/13/23  0009 10/13/23  0536 10/13/23  1609 10/14/23  0114 10/14/23  1355 10/15/23  0401   WBC 18.2* 17.4* 17.3* 17.9* 16.3* 15.4* 14.7* 15.8*   HGB 8.2* 7.3* 6.9* 8.0* 8.5* 8.2* 8.1* 8.5*   HCT 23.4* 22.5* 20.9* 24.4* 25.2* 24.8* 24.4* 25.5*    198 189 182 193 200 208 266   MCV 91 90 90 88 86 87 87 86     COAG:   Recent Labs     05/20/23  0148 05/20/23  1515 05/29/23  0605 10/07/23  0227 10/08/23  0531 10/09/23  0222 10/11/23  0007 10/12/23  0558 10/13/23  0009 10/14/23  0114   INR 1.1   < > 1.2* 1.1 1.1 1.1 1.2* 1.3* 1.2* 1.0   FIBRINOGEN 362  --   --   --   --   --   --   --   --   --     < > = values in this interval not displayed.     ABO:   Recent Labs     10/12/23  0558   ABO B     HEME/ENDO:  Recent Labs     12/01/17  1158 12/04/17  0333 03/30/18  1203 10/18/18  1437 03/21/19  1201 07/19/19  1202 11/01/19  0601 02/25/20  1308 04/02/20  0915 07/28/22  0859 04/27/23  1301 05/16/23  1629 08/15/23  1045   FERRITIN 1,297*  --   --   --   --   --  845*  --   --   --   --   --   --    IRONSAT 7*  --  14*  --  23*  --  11*  --   --   --   --   --   --    TSH  --    < >  --    < > 1.08   < >  --  0.91  --  1.06  --  0.69 1.21   HGBA1C  --    < >  --    < > 9.2   < >  --  8.6   < > 8.4* 8.0* 7.4* 8.3*    < > = values in this interval not displayed.      CARDIAC:   Recent Labs     12/04/17  0333 10/29/19  0407 01/17/23  1658 05/24/23  1141 09/26/23  0531 10/05/23  0756   LDH  --   --   --   --  147 305*   TROPHS  --   --  13  --  18  --    * 410*  --  373*  --   --      Recent Labs     04/27/23  1301  05/16/23  1629 08/15/23  1045 10/09/23  0212   CHOL 200* 172 121  --    LDLF 117* 95 61  --    HDL 45.2 57.3 45.8  --    TRIG 189* 99 73 345*     MICRO:   Recent Labs     04/29/23  0945   CRP 2.11*     Susceptibility data from last 90 days.  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 Blood culture positive for E coli. [Immunosuppressive regimen at home: Tacrolimus 4 mg/3 mg (Tacrolimus Goal 5-8), prednisone 5 mg daily. ] No rejection history. Latest DSE: negative (5/2023).      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 Coli bacteremia, currently on IV antibiotic  # s/p EGD with dilation of afferent limb anastomotic stenosis (10/10/23)  #s/p Ex-lap, small bowel resection, jejuno-jejunostomy, G-tube (10/10/23)  # Perforated afferent limb     Recommendations:  - Continue current dose of immunosuppressive regimen for cardiac transplant.  - Tacrolimus 2 mg SL at  0630, 1 mg SL at 1830. Tacrolimus target goal: 5-8.   - 10/15 tacrolimus level 4.9 --> no change to tacrolimus dosing today  - Check daily Tacrolimus trough level at 0600.  - Per Trauma/SICU team patient has been started on short course of methylprednisolone 40mg IV daily for possible COPD with goal of ventilator liberation, which patient has achieved extubation today.  - Can transition to oral tacrolimus 4mg qAM and 3mg qPM and oral prednisone 5mg daily once patient is able to tolerate oral medications  - Replete electrolytes. Maintain K>4, Mg>2.     HF service will continue to follow.     Patient was examined and discussed with Heart Failure service attending physician, Dr. Lazarus Landa.    Signed,  Cheryl Velasco MD  Heart Failure Fellow PGY4      I saw and evaluated the patient. I personally obtained the key and critical portions of the history and physical exam or was physically present for key and critical portions performed by the resident/fellow. I reviewed the resident/fellow's documentation and discussed the patient with the resident/fellow. I agree with the resident/fellow's medical decision making as documented in the note.    __________________________________________  Lazarus Landa MD

## 2023-10-15 NOTE — PROGRESS NOTES
"Chung Goodman is a 67 y.o. male w/ PMHx cardiac transplant and pancreatic cancer requiring whipple 5/19/23 on day 8 of admission presenting with SBO (small bowel obstruction) (CMS/HCC).    Subjective     Patient was seen and examined. Had exploratory laparotomy for iatrogenic bowel perforation during endoscopy, had bowel resection and bowel anastomosis and gtube placement 10/10. He failed SBT 10/12 and 10/13, was extubated this AM. Pt more alert and interactive with providers today. Output 2.765L urine, 175cc from gastrostomy over the past 24h. Pt remains  hemodynamically stable at this time.    Drain: 45cc    Labs significant for downtrending Cr and WBC. ABG significant for alkalosis 7.5, pCO2 35, pO2 151 and a base excess of 4.0.       Objective     Physical Exam  NEURO: Pt extubated. Responds to name and commands.  HEENT: NCAT, No OGT  CARDIO: RRR  PULM: No signs of respiratory distress, on supplemental O2.  ABD: Soft, mildly distended, Gtube in place with minimal output of gastric contents, Drain with ss output.  Incision packing removed. No purulence or active bleeding, no warmth or erythema. Incision probed and cleaned with swabs, repacked and covered with abdominal pad.     Last Recorded Vitals  Blood pressure (!) 163/103, pulse 83, temperature 36.2 °C (97.2 °F), temperature source Temporal, resp. rate 22, height 1.765 m (5' 9.49\"), weight 76.7 kg (169 lb 1.5 oz), SpO2 97 %.  Intake/Output last 3 Shifts:  I/O last 3 completed shifts:  In: 1944.2 (25.3 mL/kg) [I.V.:159.6 (2.1 mL/kg); Other:15; IV Piggyback:200]  Out: 4845 (63.2 mL/kg) [Urine:4525 (1.6 mL/kg/hr); Emesis/NG output:275; Drains:45]  Weight: 76.7 kg     Relevant Results  Results for orders placed or performed during the hospital encounter of 10/07/23 (from the past 24 hour(s))   POCT GLUCOSE   Result Value Ref Range    POCT Glucose 326 (H) 74 - 99 mg/dL   POCT GLUCOSE   Result Value Ref Range    POCT Glucose 269 (H) 74 - 99 mg/dL   POCT " GLUCOSE   Result Value Ref Range    POCT Glucose 253 (H) 74 - 99 mg/dL   Calcium, Ionized   Result Value Ref Range    POCT Calcium, Ionized 1.34 (H) 1.1 - 1.33 mmol/L   CBC   Result Value Ref Range    WBC 14.7 (H) 4.4 - 11.3 x10*3/uL    nRBC 0.1 (H) 0.0 - 0.0 /100 WBCs    RBC 2.81 (L) 4.50 - 5.90 x10*6/uL    Hemoglobin 8.1 (L) 13.5 - 17.5 g/dL    Hematocrit 24.4 (L) 41.0 - 52.0 %    MCV 87 80 - 100 fL    MCH 28.8 26.0 - 34.0 pg    MCHC 33.2 32.0 - 36.0 g/dL    RDW 18.0 (H) 11.5 - 14.5 %    Platelets 208 150 - 450 x10*3/uL    MPV 11.1 7.5 - 11.5 fL   Hepatic function panel   Result Value Ref Range    Albumin 2.7 (L) 3.4 - 5.0 g/dL    Bilirubin, Total 1.5 (H) 0.0 - 1.2 mg/dL    Bilirubin, Direct 0.8 (H) 0.0 - 0.3 mg/dL    Alkaline Phosphatase 169 (H) 33 - 136 U/L    ALT 30 10 - 52 U/L    AST 19 9 - 39 U/L    Total Protein 5.3 (L) 6.4 - 8.2 g/dL   Magnesium   Result Value Ref Range    Magnesium 2.34 1.60 - 2.40 mg/dL   Basic Metabolic Panel   Result Value Ref Range    Glucose 255 (H) 74 - 99 mg/dL    Sodium 144 136 - 145 mmol/L    Potassium 3.4 (L) 3.5 - 5.3 mmol/L    Chloride 105 98 - 107 mmol/L    Bicarbonate 27 21 - 32 mmol/L    Anion Gap 15 10 - 20 mmol/L    Urea Nitrogen 76 (H) 6 - 23 mg/dL    Creatinine 3.53 (H) 0.50 - 1.30 mg/dL    eGFR 18 (L) >60 mL/min/1.73m*2    Calcium 9.5 8.6 - 10.6 mg/dL   Phosphorus   Result Value Ref Range    Phosphorus 3.4 2.5 - 4.9 mg/dL   POCT GLUCOSE   Result Value Ref Range    POCT Glucose 243 (H) 74 - 99 mg/dL   POCT GLUCOSE   Result Value Ref Range    POCT Glucose 229 (H) 74 - 99 mg/dL   POCT GLUCOSE   Result Value Ref Range    POCT Glucose 231 (H) 74 - 99 mg/dL   POCT GLUCOSE   Result Value Ref Range    POCT Glucose 207 (H) 74 - 99 mg/dL   POCT GLUCOSE   Result Value Ref Range    POCT Glucose 178 (H) 74 - 99 mg/dL   POCT GLUCOSE   Result Value Ref Range    POCT Glucose 149 (H) 74 - 99 mg/dL   POCT GLUCOSE   Result Value Ref Range    POCT Glucose 127 (H) 74 - 99 mg/dL   POCT  GLUCOSE   Result Value Ref Range    POCT Glucose 109 (H) 74 - 99 mg/dL   POCT GLUCOSE   Result Value Ref Range    POCT Glucose 98 74 - 99 mg/dL   POCT GLUCOSE   Result Value Ref Range    POCT Glucose 103 (H) 74 - 99 mg/dL   POCT GLUCOSE   Result Value Ref Range    POCT Glucose 108 (H) 74 - 99 mg/dL   POCT GLUCOSE   Result Value Ref Range    POCT Glucose 103 (H) 74 - 99 mg/dL   POCT GLUCOSE   Result Value Ref Range    POCT Glucose 97 74 - 99 mg/dL   POCT GLUCOSE   Result Value Ref Range    POCT Glucose 132 (H) 74 - 99 mg/dL   Calcium, Ionized   Result Value Ref Range    POCT Calcium, Ionized 1.35 (H) 1.1 - 1.33 mmol/L   CBC   Result Value Ref Range    WBC 15.8 (H) 4.4 - 11.3 x10*3/uL    nRBC 0.1 (H) 0.0 - 0.0 /100 WBCs    RBC 2.98 (L) 4.50 - 5.90 x10*6/uL    Hemoglobin 8.5 (L) 13.5 - 17.5 g/dL    Hematocrit 25.5 (L) 41.0 - 52.0 %    MCV 86 80 - 100 fL    MCH 28.5 26.0 - 34.0 pg    MCHC 33.3 32.0 - 36.0 g/dL    RDW 17.4 (H) 11.5 - 14.5 %    Platelets 266 150 - 450 x10*3/uL    MPV 11.0 7.5 - 11.5 fL   Hepatic function panel   Result Value Ref Range    Albumin 2.7 (L) 3.4 - 5.0 g/dL    Bilirubin, Total 1.6 (H) 0.0 - 1.2 mg/dL    Bilirubin, Direct 0.8 (H) 0.0 - 0.3 mg/dL    Alkaline Phosphatase 172 (H) 33 - 136 U/L    ALT 28 10 - 52 U/L    AST 22 9 - 39 U/L    Total Protein 5.6 (L) 6.4 - 8.2 g/dL   Magnesium   Result Value Ref Range    Magnesium 1.98 1.60 - 2.40 mg/dL   Basic Metabolic Panel   Result Value Ref Range    Glucose 140 (H) 74 - 99 mg/dL    Sodium 148 (H) 136 - 145 mmol/L    Potassium 3.4 (L) 3.5 - 5.3 mmol/L    Chloride 108 (H) 98 - 107 mmol/L    Bicarbonate 26 21 - 32 mmol/L    Anion Gap 17 10 - 20 mmol/L    Urea Nitrogen 70 (H) 6 - 23 mg/dL    Creatinine 3.19 (H) 0.50 - 1.30 mg/dL    eGFR 21 (L) >60 mL/min/1.73m*2    Calcium 9.8 8.6 - 10.6 mg/dL   Phosphorus   Result Value Ref Range    Phosphorus 3.8 2.5 - 4.9 mg/dL   Blood Gas Arterial Full Panel Unsolicited   Result Value Ref Range    POCT pH, Arterial  7.50 (H) 7.38 - 7.42 pH    POCT pCO2, Arterial 35 (L) 38 - 42 mm Hg    POCT pO2, Arterial 151 (H) 85 - 95 mm Hg    POCT SO2, Arterial 100 94 - 100 %    POCT Oxy Hemoglobin, Arterial 97.7 94.0 - 98.0 %    POCT Hematocrit Calculated, Arterial 28.0 (L) 41.0 - 52.0 %    POCT Sodium, Arterial 144 136 - 145 mmol/L    POCT Potassium, Arterial 3.2 (L) 3.5 - 5.3 mmol/L    POCT Chloride, Arterial 110 (H) 98 - 107 mmol/L    POCT Ionized Calcium, Arterial 1.36 (H) 1.10 - 1.33 mmol/L    POCT Glucose, Arterial 141 (H) 74 - 99 mg/dL    POCT Lactate, Arterial 0.9 0.4 - 2.0 mmol/L    POCT Base Excess, Arterial 4.0 (H) -2.0 - 3.0 mmol/L    POCT HCO3 Calculated, Arterial 27.3 (H) 22.0 - 26.0 mmol/L    POCT Hemoglobin, Arterial 9.2 (L) 13.5 - 17.5 g/dL    POCT Anion Gap, Arterial 10 10 - 25 mmo/L    Patient Temperature 37.0 degrees Celsius   Blood Gas Arterial Full Panel   Result Value Ref Range    POCT pH, Arterial 7.44 (H) 7.38 - 7.42 pH    POCT pCO2, Arterial 43 (H) 38 - 42 mm Hg    POCT pO2, Arterial 127 (H) 85 - 95 mm Hg    POCT SO2, Arterial 99 94 - 100 %    POCT Oxy Hemoglobin, Arterial 97.3 94.0 - 98.0 %    POCT Hematocrit Calculated, Arterial 41.0 41.0 - 52.0 %    POCT Sodium, Arterial 143 136 - 145 mmol/L    POCT Potassium, Arterial 3.4 (L) 3.5 - 5.3 mmol/L    POCT Chloride, Arterial 109 (H) 98 - 107 mmol/L    POCT Ionized Calcium, Arterial 1.35 (H) 1.10 - 1.33 mmol/L    POCT Glucose, Arterial 121 (H) 74 - 99 mg/dL    POCT Lactate, Arterial 0.9 0.4 - 2.0 mmol/L    POCT Base Excess, Arterial 4.4 (H) -2.0 - 3.0 mmol/L    POCT HCO3 Calculated, Arterial 29.2 (H) 22.0 - 26.0 mmol/L    POCT Hemoglobin, Arterial 13.8 13.5 - 17.5 g/dL    POCT Anion Gap, Arterial 8 (L) 10 - 25 mmo/L    Patient Temperature 37.0 degrees Celsius    FiO2 30 %           Assessment/Plan   Principal Problem:    SBO (small bowel obstruction) (CMS/HCC)  Active Problems:    Pancreatic cancer (CMS/HCC)    Small bowel obstruction (CMS/HCC)    Pulmonary  hypertension (CMS/HCC)    CVA (cerebral vascular accident) (CMS/HCC)    Bowel perforation (CMS/HCC)    Perforated abdominal viscus    Pt is a 68 y/o male w/ PMHx cardiac transplant in 2017, s/p whipple for 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     Plan:    Neuro  - continue sedation per SICU  - continue pain management per SICU    CV  -  administer 40mg Lasix  - ASA suppository 150mg     Pulm:   - Failed SBT 10/12, 10/13  - Pt extubated 10/15    GI  - continue monitoring midline incision for signs of infection/dehiscence  - institute BID dressing changes  - Reassess diet 10/16, consider bedside swallow to assess ability to progress  - continue TPN  - continue gtube to gravity until extubated    ID  - continue abx per ID    Prophy  - DVT ppx: Heparin    Dispo:  - appreciate SICU care of this critically ill patient    Discussed with Dr. Norris, Discussed with Dr. Agatha CLARK, MS4

## 2023-10-16 NOTE — PROGRESS NOTES
"Subjective Data:  Patient was extubated yesterday. He remains in no acute respiratory distress, saturating % on  oxygen supplement nasal cannula. He remains NPO, receiving TPN. -170s/80-90s mmHg. Telemetry showed sinus rhythm at 80s with 3 beats NSVT, PVC. He denies chest discomfort, dyspnea, abdominal pain, leg swelling or dizziness.     Objective Data:  Last Recorded Vitals:  Vitals:    10/16/23 1300 10/16/23 1329 10/16/23 1400 10/16/23 1437   BP: 151/86 156/86 (!) 156/95    BP Location:       Patient Position:       Pulse: 83 83 84    Resp: 17 23 22    Temp:       TempSrc:       SpO2: 100% 99% 100%    Weight:    70.5 kg (155 lb 6.8 oz)   Height:    1.765 m (5' 9.49\")     Last Labs:  CBC - 10/16/2023:  3:30 AM  12.9 8.1 276    24.9      CMP - 10/16/2023:  3:28 AM  9.9 5.4 21 --- 1.5   3.4 2.6 23 145      PTT - 10/16/2023:  3:28 AM  1.1   12.6 27     TROPHS   Date/Time Value Ref Range Status   09/26/2023 05:31 AM 18 0 - 53 ng/L Final     Comment:     .  Less than 99th percentile of normal range cutoff-  Female and children under 18 years old <35 ng/L; Male <54 ng/L: Negative  Repeat testing should be performed if clinically indicated.   .  Female and children under 18 years old  ng/L; Male  ng/L:  Consistent with possible cardiac damage and possible increased clinical   risk. Serial measurements may help to assess extent of myocardial damage.   .  >120 ng/L: Consistent with cardiac damage, increased clinical risk and  myocardial infarction. Serial measurements may help assess extent of   myocardial damage.   .   NOTE: Children less than 1 year old may have higher baseline troponin   levels and results should be interpreted in conjunction with the overall   clinical context.  .  NOTE: Troponin I testing is performed using a different   testing methodology at [facility] than at other   [facility] hospitals. Direct result comparisons should only   be made within the same method.   "   01/17/2023 04:58 PM 13 0 - 20 ng/L Final     Comment:     .  Less than 99th percentile of normal range cutoff-  Female and children under 18 years old <14 ng/L; Male <21 ng/L: Negative  Repeat testing should be performed if clinically indicated.   .  Female and children under 18 years old 14-50 ng/L; Male 21-50 ng/L:  Consistent with possible cardiac damage and possible increased clinical   risk. Serial measurements may help to assess extent of myocardial damage.   .  >50 ng/L: Consistent with cardiac damage, increased clinical risk and  myocardial infarction. Serial measurements may help assess extent of   myocardial damage.   .   NOTE: Children less than 1 year old may have higher baseline troponin   levels and results should be interpreted in conjunction with the overall   clinical context.   .  NOTE: Troponin I testing is performed using a different   testing methodology at [facility] than at other   [facility]. Direct result comparisons should only   be made within the same method.       BNP   Date/Time Value Ref Range Status   05/24/2023 11:41  0 - 99 pg/mL Final     Comment:     .  <100 pg/mL - Heart failure unlikely  100-299 pg/mL - Intermediate probability of acute heart  .               failure exacerbation. Correlate with clinical  .               context and patient history.    >=300 pg/mL - Heart Failure likely. Correlate with clinical  .               context and patient history.   Biotin interference may cause falsely decreased results.   Patients taking a Biotin dose of up to 5 mg/day should   refrain from taking Biotin for 24 hours before sample   collection. Providers may contact their local laboratory   for further information.     10/29/2019 04:07  0 - 99 pg/mL Final     Comment:     .  <100 pg/mL - Heart failure unlikely  100-299 pg/mL - Intermediate probability of acute heart  .               failure exacerbation. Correlate with clinical  .                context and patient history.    >=300 pg/mL - Heart Failure likely. Correlate with clinical  .               context and patient history.  BNP testing is performed using different testing   methodology at [facility] than at other   [facility] hospitals. Direct result comparisons should   only be made within the same method.       HGBA1C   Date/Time Value Ref Range Status   08/15/2023 10:45 AM 8.3 % Final     Comment:          Diagnosis of Diabetes-Adults   Non-Diabetic: < or = 5.6%   Increased risk for developing diabetes: 5.7-6.4%   Diagnostic of diabetes: > or = 6.5%  .       Monitoring of Diabetes                Age (y)     Therapeutic Goal (%)   Adults:          >18           <7.0   Pediatrics:    13-18           <7.5                   7-12           <8.0                   0- 6            7.5-8.5   American Diabetes Association. Diabetes Care 33(S1), Jan 2010.     05/16/2023 04:29 PM 7.4 % Final     Comment:          Diagnosis of Diabetes-Adults   Non-Diabetic: < or = 5.6%   Increased risk for developing diabetes: 5.7-6.4%   Diagnostic of diabetes: > or = 6.5%  .       Monitoring of Diabetes                Age (y)     Therapeutic Goal (%)   Adults:          >18           <7.0   Pediatrics:    13-18           <7.5                   7-12           <8.0                   0- 6            7.5-8.5   American Diabetes Association. Diabetes Care 33(S1), Jan 2010.     10/20/2020 01:11 PM 9.4 4.3 - 5.6 % Final     Comment:     American Diabetes Association guidelines indicate that patients with HgbA1c in   the range 5.7-6.4% are at increased risk for development of diabetes, and   intervention by lifestyle modification may be beneficial. HgbA1c greater or   equal to 6.5% is considered diagnostic of diabetes.   08/09/2016 03:42 PM 8.1 4.2 - 6.5 % Final     VLDL   Date/Time Value Ref Range Status   08/15/2023 10:45 AM 15 0 - 40 mg/dL Final   05/16/2023 04:29 PM 20 0 - 40 mg/dL Final   04/27/2023 01:01 PM 38 0 - 40  mg/dL Final      Last I/O:  I/O last 3 completed shifts:  In: 958.9 (13.6 mL/kg) [I.V.:58.1 (0.8 mL/kg); IV Piggyback:400]  Out: 3480 (49.4 mL/kg) [Urine:3100 (1.2 mL/kg/hr); Emesis/NG output:325; Drains:55]  Weight: 70.5 kg     Inpatient Medications:  Scheduled medications   Medication Dose Route Frequency    aspirin  150 mg rectal Daily    azithromycin  250 mg intravenous q24h    fat emulsion fish oil/plant based  250 mL intravenous Daily    heparin (porcine)  5,000 Units subcutaneous q8h    heparin flush  50 Units intra-catheter q12h    hydrALAZINE  10 mg intravenous q6h    insulin glargine  20 Units subcutaneous Nightly    insulin lispro  0-15 Units subcutaneous q4h    insulin regular  0-10 Units intravenous Once    ipratropium-albuteroL  3 mL nebulization q6h    lidocaine  1 mL infiltration Once    lidocaine  1 patch transdermal Daily    [START ON 10/18/2023] methylPREDNISolone sodium succinate (PF)  4 mg intravenous q24h    methylPREDNISolone sodium succinate (PF)  40 mg intravenous q24h    micafungin  100 mg intravenous q24h    pantoprazole  40 mg intravenous BID    piperacillin-tazobactam  3.375 g intravenous q6h    tacrolimus  1 mg sublingual q24h    tacrolimus  2 mg sublingual Daily     PRN medications   Medication    alteplase    alteplase    dextrose 10 % in water (D10W)    dextrose    glucagon    heparin flush    heparin lock flush (porcine)    HYDROmorphone    oxygen     Continuous Medications   Medication Dose Last Rate    Adult Clinimix TPN  83 mL/hr      Adult Clinimix TPN  40 mL/hr 40 mL/hr (10/16/23 1145)     Physical Exam:  Physical Exam  Constitutional:       General: He is not in acute distress.     Appearance: Normal appearance.   HENT:      Head: Normocephalic and atraumatic.   Eyes:      Extraocular Movements: Extraocular movements intact.      Conjunctiva/sclera: Conjunctivae normal.   Cardiovascular:      Rate and Rhythm: Normal rate and regular rhythm.      Heart sounds: Normal heart  sounds.   Pulmonary:      Effort: No respiratory distress.      Breath sounds: Normal breath sounds. No wheezing or rales.      Comments: Extubated to nasal cannula.  Abdominal:      General: Bowel sounds are normal.   Skin:     General: Skin is warm and dry.        CMP:  Recent Labs     10/11/23  1853 10/12/23  0558 10/13/23  0009 10/13/23  1609 10/14/23  0114 10/14/23  1355 10/15/23  0401 10/15/23  1517 10/16/23  0328 10/16/23  0330    141 144 143 144 144 148* 146* 149*  --    K 3.8 4.3 3.8 4.0 4.1 3.4* 3.4* 4.0 3.6  --    * 104 107 106 104 105 108* 108* 110*  --    CO2 25 25 27 24 26 27 26 27 27  --    ANIONGAP 16 16 14 17 18 15 17 15 16  --    BUN 44* 48* 52* 61* 62* 76* 70* 64* 65*  --    CREATININE 3.22* 3.21* 3.53* 3.39* 3.39* 3.53* 3.19* 2.76* 2.68*  --    EGFR 20* 20* 18* 19* 19* 18* 21* 24* 25*  --    MG 2.36 2.93* 2.58*  --  2.43* 2.34 1.98  --  1.76 1.75       Recent Labs     04/28/23  1639 04/29/23  0945 05/02/23  0036 05/02/23  0603 05/16/23  1629 05/19/23  1810 09/23/23  0830 09/23/23  1806 10/09/23  0212 10/10/23  0621 10/13/23  0009 10/13/23  1005 10/13/23  1609 10/14/23  0114 10/14/23  1355 10/15/23  0401 10/15/23  1517 10/16/23  0328   ALBUMIN 4.1   < > 3.7   < > 4.1   < > 3.5   < > 3.6  3.7   < > 2.6*  2.7* 2.4*   < > 2.6* 2.7* 2.7* 2.8* 2.6*   ALKPHOS 514*   < > 491*   < > 152*   < > 108   < > 601*  601*   < > 202* 170*  --   --  169* 172*  --  145*   *   < > 327*   < > 45   < > 69*   < > 163*  161*   < > 44 38  --   --  30 28  --  23   *   < > 250*   < > 43*   < > 138*   < > 221*  228*   < > 38 28  --   --  19 22  --  21   BILITOT 5.1*   < > 4.7*   < > 1.4*   < > 0.6   < > 3.8*  3.8*   < > 2.9* 2.4*  --   --  1.5* 1.6*  --  1.5*   LIPASE 2,472*  --  8,160*  --  2,036*  --  76  --  210*  --   --   --   --   --   --   --   --   --     < > = values in this interval not displayed.       CBC:  Recent Labs     10/12/23  1720 10/13/23  0009 10/13/23  0536 10/13/23  1609  10/14/23  0114 10/14/23  1355 10/15/23  0401 10/16/23  0330   WBC 17.4* 17.3* 17.9* 16.3* 15.4* 14.7* 15.8* 12.9*   HGB 7.3* 6.9* 8.0* 8.5* 8.2* 8.1* 8.5* 8.1*   HCT 22.5* 20.9* 24.4* 25.2* 24.8* 24.4* 25.5* 24.9*    189 182 193 200 208 266 276   MCV 90 90 88 86 87 87 86 90       COAG:   Recent Labs     05/20/23  0148 05/20/23  1515 10/07/23  0227 10/08/23  0531 10/09/23  0222 10/11/23  0007 10/12/23  0558 10/13/23  0009 10/14/23  0114 10/16/23  0328   INR 1.1   < > 1.1 1.1 1.1 1.2* 1.3* 1.2* 1.0 1.1   FIBRINOGEN 362  --   --   --   --   --   --   --   --   --     < > = values in this interval not displayed.       ABO:   Recent Labs     10/16/23  1308   ABO B       HEME/ENDO:  Recent Labs     12/01/17  1158 12/04/17  0333 03/30/18  1203 10/18/18  1437 03/21/19  1201 07/19/19  1202 11/01/19  0601 02/25/20  1308 04/02/20  0915 07/28/22  0859 04/27/23  1301 05/16/23  1629 08/15/23  1045   FERRITIN 1,297*  --   --   --   --   --  845*  --   --   --   --   --   --    IRONSAT 7*  --  14*  --  23*  --  11*  --   --   --   --   --   --    TSH  --    < >  --    < > 1.08   < >  --  0.91  --  1.06  --  0.69 1.21   HGBA1C  --    < >  --    < > 9.2   < >  --  8.6   < > 8.4* 8.0* 7.4* 8.3*    < > = values in this interval not displayed.        CARDIAC:   Recent Labs     12/04/17  0333 10/29/19  0407 01/17/23  1658 05/24/23  1141 09/26/23  0531 10/05/23  0756   LDH  --   --   --   --  147 305*   TROPHS  --   --  13  --  18  --    * 410*  --  373*  --   --        Recent Labs     04/27/23  1301 05/16/23  1629 08/15/23  1045 10/09/23  0212   CHOL 200* 172 121  --    LDLF 117* 95 61  --    HDL 45.2 57.3 45.8  --    TRIG 189* 99 73 345*       MICRO:   Recent Labs     04/29/23  0945   CRP 2.11*       Susceptibility data from last 90 days.  Collected Specimen Info Organism Ampicillin Cefazolin Ciprofloxacin Gentamicin Levofloxacin Piperacillin/Tazobactam Trimethoprim/Sulfamethoxazole   10/05/23 Blood culture from Peripheral  Venipuncture Escherichia coli S S S S S S S   10/05/23 Blood culture from Peripheral Venipuncture Escherichia coli              Assessment/Plan   Chung Goodman is a 67-year-old man with history of NICM secondary to valvular disease, s/p mitral valve repair with [place] [employer] Annuloflex band II with 36 mm, s/p bicaval OHT (3/24/2017) with post-op complicated by atrial arrhythmia, RV failure, chronotropic incompetence, pancreatic adenocarcinoma, s/p Whipple procedure (5/19/23), currently on chemotherapy (modified FOLFIRINOX), HTN, DM type 2, HLD, CKD Stage 3B (baseline creatinine 2), amiodarone-induced hyperthyroidism, JE, MDD, transferred from [facility] for recurrent SBO. Blood culture positive for E coli. [Immunosuppressive regimen at home: Tacrolimus 4 mg/3 mg (Tacrolimus Goal 5-8), prednisone 5 mg daily. ] No rejection history. Latest DSE: negative (5/2023).      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 Coli bacteremia, currently on IV antibiotic  # s/p EGD with dilation of afferent limb anastomotic stenosis (10/10/23)  #s/p Ex-lap, small bowel resection, jejuno-jejunostomy, G-tube (10/10/23)  # Perforated afferent limb     Recommendations:  - Continue Tacrolimus 2 mg SL at 0630, 1 mg SL at 1830. Tacrolimus target goal: 5-8.   - Latest Tacrolimus level (10/16/23): 6.10  - Check daily Tacrolimus trough level at 0600.  - Plan is to transition to oral tacrolimus 4mg at 0630 and 3mg at 1830 and oral prednisone 5mg daily once patient is able to tolerate oral medications  - Replete electrolytes. Maintain K>4, Mg>2.     HF service will continue to follow.     Discussed with HF attending, Dr. RITESH Rodriguez.     Signed:    Alberto Nj MD  Heart Failure service

## 2023-10-16 NOTE — PROGRESS NOTES
Occupational Therapy    Evaluation/Treatment    Patient Name: Chung Goodman  MRN: [id number]  : 1955  Today's Date: 10/16/23  Time Calculation  Start Time: 1329  Stop Time: 1356  Time Calculation (min): 27 min       Assessment:  OT Assessment: Pt is a 67 year old male who demonstrates decreased cognition, information processing, strength, activty tolerance, balance and coordination, which impedes occupational performance.  Prognosis: Good  End of Session Communication: Bedside nurse  End of Session Patient Position: Bed, 3 rail up, Alarm on  OT Assessment Results: Decreased ADL status, Decreased upper extremity strength, Decreased safe judgment during ADL, Decreased cognition, Decreased endurance, Decreased fine motor control, Decreased functional mobility, Decreased gross motor control, Decreased IADLs  Prognosis: Good  Plan:  Treatment Interventions: ADL retraining, Functional transfer training, UE strengthening/ROM, Endurance training, Cognitive reorientation, Patient/family training, Neuromuscular reeducation, Compensatory technique education  OT Frequency: 3 times per week  OT Discharge Recommendations: Moderate intensity level of continued care  OT - OK to Discharge: Yes  Treatment Interventions: ADL retraining, Functional transfer training, UE strengthening/ROM, Endurance training, Cognitive reorientation, Patient/family training, Neuromuscular reeducation, Compensatory technique education    Subjective   Current Problem:  1. SBO (small bowel obstruction) (CMS/HCC)  EGD w PEG Tube Placement, w Dilation TTS    EGD w PEG Tube Placement, w Dilation TTS    CANCELED: ERCP    CANCELED: Case Request Operating Room: Exploration Laparotomy    CANCELED: Case Request Operating Room: Exploration Laparotomy      2. Cholangitis  CANCELED: ERCP      3. Bowel perforation (CMS/HCC)  CANCELED: Case Request Operating Room: Exploration Laparotomy    CANCELED: Case Request Operating Room: Exploration Laparotomy     "  4. Perforated abdominal viscus  Case Request Operating Room: Exploration Laparotomy, revision of gastrojejunostomy    Case Request Operating Room: Exploration Laparotomy, revision of gastrojejunostomy    Surgical Pathology Exam    Surgical Pathology Exam    Surgical Pathology Exam    Surgical Pathology Exam        General:   OT Received On: 10/16/23  General  Reason for Referral: s/p Ex-lap, small bowel resection, jejuno-jejunostomy, G-tube (10/10/23)  Past Medical History Relevant to Rehab: PMH: NICM secondary to valvular disease, s/p mitral valve repair, s/p bicaval OHT (3/24/2017) with post-op complicated by atrial arrhythmia, RV failure, chronotropic incompetence, Pancreatic adenocarcinoma, s/p Whipple procedure (5/19/23), currently on chemotx (modified FOLFIRINOX), HTN, DM type 2, HLD, CKD Stage 3B, amiodarone-induced hyperthyroidism, JE, MDD  Family/Caregiver Present: No  Prior to Session Communication: Bedside nurse, Physician  Patient Position Received: Bed, 3 rail up  General Comment: Pt agreeable to participate but with slow processing speed and confusion. On 4L NC, TPN, rashid, and PEG.  Precautions:  Medical Precautions: Abdominal precautions, Fall precautions  Precautions Comment: SBP<160, MAP 65-90  Vital Signs:  Heart Rate: 83 (Post: 84)  Resp: 23 (Post: 22)  SpO2: 99 % (Post: 100)  BP: 156/86 (Post: 163/94)  MAP (mmHg): 108 (Post: 115)  Pain:  Pain Assessment  Pain Assessment: 0-10  Pain Score: 0 - No pain    Objective   Cognition:  Overall Cognitive Status: Impaired  Orientation Level:  (Oriented to hospital with increased time, reported city as \"[place]\", and time as February 24, 2023 with increased time.)  Following Commands:  (Followed >75% commands however significantly delayed processing speed and required repetition of most questions)  Attention: Exceptions to WFL  Alternating Attention: Impaired  Selective Attention: Impaired  Insight: Moderate  Impulsive: Moderately  Processing Speed: " "Delayed     Confusion Assessment Method (CAM)  Acute Onset and Fluctuating Course (1A): Yes  Acute Onset and Fluctuating Course (1B): Yes  Inattention (2): Yes  Disorganized Thinking (3): Yes  Rate Patient's Level of Consciousness (4): Alert (Normal), No  Delirium Present: Yes     Home Living:  Type of Home: House  Lives With: Spouse  Home Adaptive Equipment: Walker rolling or standard  Home Layout: One level  Home Access: Stairs to enter with rails  Entrance Stairs-Number of Steps: 10  Bathroom Shower/Tub: Tub/shower unit  Home Living Comments: Questionable historian  Prior Function:  Level of [place]: Independent with ADLs and functional transfers  ADL Assistance: Independent  Homemaking Assistance: Independent  Ambulatory Assistance: Independent  Leisure: Likes to play the bass guitar and [employer]un[employer] music  Prior Function Comments: Reports \"a couple\" recent falls. Questionable historian.     ADL:  Eating Assistance: Minimal  Eating Deficit:  (Anticipated)  Grooming Assistance: Minimal  Grooming Deficit:  (Oral care seated upright in bed with min A for thoroughness of task)  Bathing Assistance: Maximal  UE Dressing Assistance: Moderate  LE Dressing Assistance: Total  Toileting Assistance with Device: Total    Activity Tolerance:  Early Mobility/Exercise Safety Screen: Proceed with mobilization - No exclusion criteria met  Activity Tolerance Comments: Declined to sit EOB     Bed Mobility/Transfers: Bed Mobility  Bed Mobility: Yes  Bed Mobility 1  Bed Mobility 1:  (pt declined to sit EOB however able to long sit with SBA and BUE support on bedrail)       Cognitive Skill Development:  Cognitive Skill Development Activity 1: Increased time this date addressing cognitive deficits. Reoriented pt to time with visual cues to date written on board and clock on wall. Performed ADL tasks including washing face and brushing teeth with cueing for initiation, thoroughness, and accuracy of task.  Cognitive Skill Development " Activity 2: Pt required increased time for all commands and tasks 2/2 slow processing speed. Repetition of commands and ~15 seconds of processing required.      Sensation:  Light Touch: No apparent deficits  Strength:  Strength Comments: BUE strength >/=3/5     Coordination:  Finger to Nose: Impaired       Outcome Measures: [facility] Daily Activity  Putting on and taking off regular lower body clothing: Total  Bathing (including washing, rinsing, drying): A lot  Putting on and taking off regular upper body clothing: A lot  Toileting, which includes using toilet, bedpan or urinal: Total  Taking care of personal grooming such as brushing teeth: A little  Eating Meals: A little  Daily Activity - Total Score: 12      Education Documentation  Precautions, taught by Monica Peña, OT at 10/16/2023  3:16 PM.  Learner: Patient  Readiness: Acceptance  Method: Explanation, Demonstration  Response: No Evidence of Learning, Needs Reinforcement    ADL Training, taught by Monica Peña, OT at 10/16/2023  3:16 PM.  Learner: Patient  Readiness: Acceptance  Method: Explanation, Demonstration  Response: No Evidence of Learning, Needs Reinforcement    Education Comments  No comments found.        OP EDUCATION:       Goals:  Encounter Problems       Encounter Problems (Active)       ADLs       Patient with complete lower body dressing with minimal assist  level of assistance donning and doffing all LE clothes  with PRN adaptive equipment while supported sitting (Progressing)       Start:  10/16/23    Expected End:  11/06/23            Patient will complete toileting including hygiene clothing management/hygiene with minimal assist  level of assistance. (Progressing)       Start:  10/16/23    Expected End:  11/06/23               COGNITION/SAFETY       Patient will follow >90% Complex Two Step  commands with <15 seconds processing time to allow improved ADL performance. (Progressing)       Start:  10/16/23    Expected  End:  11/06/23               TRANSFERS       Patient will complete sit to stand transfer with moderate assist level of assistance and least restrictive device in order to improve safety and prepare for out of bed mobility. (Progressing)       Start:  10/16/23    Expected End:  11/06/23

## 2023-10-16 NOTE — PROGRESS NOTES
"Chung Goodman is a 67 y.o. male on day 9 of admission presenting with SBO (small bowel obstruction) (CMS/HCC).    Subjective   Insulin infusion stopped overnight  PEG tube clamped after extubation yesterday  Hypertensive with SBP in the 160's,responds better to hydralazine than labetalol   Objective     Physical Exam  Constitutional:       General: He is not in acute distress.     Appearance: Normal appearance.   HENT:      Head: Normocephalic and atraumatic.   Eyes:      Pupils: Pupils are equal, round, and reactive to light.   Cardiovascular:      Rate and Rhythm: Normal rate and regular rhythm.      Pulses: Normal pulses.      Heart sounds: Normal heart sounds.   Pulmonary:      Effort: No respiratory distress.      Breath sounds: Normal breath sounds.      Comments: On 3 lnc   Abdominal:      General: Abdomen is flat. There is no distension.      Palpations: Abdomen is soft.      Tenderness: Tenderness: appropriately.      Comments: Midline incision, staples in place with some removed by surgery, open area packed with gauze.   Genitourinary:     Comments: Portillo in place with clear dark yellow urine output  Musculoskeletal:      Cervical back: Neck supple.      Right lower leg: No edema.      Left lower leg: No edema.   Skin:     General: Skin is warm and dry.   Neurological:      Mental Status: He is alert.       Last Recorded Vitals  Blood pressure (!) 156/95, pulse 84, temperature 36.2 °C (97.2 °F), temperature source Temporal, resp. rate 22, height 1.765 m (5' 9.49\"), weight 70.5 kg (155 lb 6.8 oz), SpO2 100 %.  Intake/Output last 3 Shifts:  I/O last 3 completed shifts:  In: 958.9 (13.6 mL/kg) [I.V.:58.1 (0.8 mL/kg); IV Piggyback:400]  Out: 3480 (49.4 mL/kg) [Urine:3100 (1.2 mL/kg/hr); Emesis/NG output:325; Drains:55]  Weight: 70.5 kg     Relevant Results  Scheduled medications  aspirin, 150 mg, rectal, Daily  azithromycin, 250 mg, intravenous, q24h  fat emulsion fish oil/plant based, 250 mL, " intravenous, Daily  heparin (porcine), 5,000 Units, subcutaneous, q8h  heparin flush, 50 Units, intra-catheter, q12h  hydrALAZINE, 10 mg, intravenous, q6h  insulin glargine, 20 Units, subcutaneous, Nightly  insulin lispro, 0-15 Units, subcutaneous, q4h  insulin regular, 0-10 Units, intravenous, Once  ipratropium-albuteroL, 3 mL, nebulization, q6h  labetaloL, 10 mg, intravenous, Once  lidocaine, 1 mL, infiltration, Once  lidocaine, 1 patch, transdermal, Daily  [START ON 10/18/2023] methylPREDNISolone sodium succinate (PF), 4 mg, intravenous, q24h  methylPREDNISolone sodium succinate (PF), 40 mg, intravenous, q24h  micafungin, 100 mg, intravenous, q24h  pantoprazole, 40 mg, intravenous, BID  piperacillin-tazobactam, 3.375 g, intravenous, q6h  tacrolimus, 1 mg, sublingual, q24h  tacrolimus, 2 mg, sublingual, Daily    Continuous medications  Adult Clinimix TPN, 83 mL/hr  Adult Clinimix TPN, 40 mL/hr, Last Rate: 40 mL/hr (10/16/23 1145)    PRN medications  PRN medications: alteplase, alteplase, calcium gluconate, calcium gluconate, dextrose 10 % in water (D10W), dextrose, glucagon, heparin flush, heparin lock flush (porcine), HYDROmorphone, labetaloL, magnesium sulfate, magnesium sulfate, oxygen    Results for orders placed or performed during the hospital encounter of 10/07/23 (from the past 24 hour(s))   POCT GLUCOSE   Result Value Ref Range    POCT Glucose 196 (H) 74 - 99 mg/dL   POCT GLUCOSE   Result Value Ref Range    POCT Glucose 209 (H) 74 - 99 mg/dL   POCT GLUCOSE   Result Value Ref Range    POCT Glucose 248 (H) 74 - 99 mg/dL   POCT GLUCOSE   Result Value Ref Range    POCT Glucose 284 (H) 74 - 99 mg/dL   Blood Gas Arterial Full Panel Unsolicited   Result Value Ref Range    POCT pH, Arterial 7.45 (H) 7.38 - 7.42 pH    POCT pCO2, Arterial 39 38 - 42 mm Hg    POCT pO2, Arterial 165 (H) 85 - 95 mm Hg    POCT SO2, Arterial 100 94 - 100 %    POCT Oxy Hemoglobin, Arterial 97.4 94.0 - 98.0 %    POCT Hematocrit  Calculated, Arterial 26.0 (L) 41.0 - 52.0 %    POCT Sodium, Arterial 142 136 - 145 mmol/L    POCT Potassium, Arterial 4.3 3.5 - 5.3 mmol/L    POCT Chloride, Arterial 109 (H) 98 - 107 mmol/L    POCT Ionized Calcium, Arterial 1.37 (H) 1.10 - 1.33 mmol/L    POCT Glucose, Arterial 336 (H) 74 - 99 mg/dL    POCT Lactate, Arterial 2.1 (H) 0.4 - 2.0 mmol/L    POCT Base Excess, Arterial 2.9 -2.0 - 3.0 mmol/L    POCT HCO3 Calculated, Arterial 27.1 (H) 22.0 - 26.0 mmol/L    POCT Hemoglobin, Arterial 8.6 (L) 13.5 - 17.5 g/dL    POCT Anion Gap, Arterial 10 10 - 25 mmo/L    Patient Temperature 37.0 degrees Celsius   POCT GLUCOSE   Result Value Ref Range    POCT Glucose 281 (H) 74 - 99 mg/dL   Calcium, Ionized   Result Value Ref Range    POCT Calcium, Ionized 1.36 (H) 1.1 - 1.33 mmol/L   Tacrolimus level   Result Value Ref Range    Tacrolimus  6.1 <=15.0 ng/mL   Magnesium   Result Value Ref Range    Magnesium 1.76 1.60 - 2.40 mg/dL   Coagulation Screen   Result Value Ref Range    Protime 12.6 9.8 - 12.8 seconds    INR 1.1 0.9 - 1.1    aPTT 27 27 - 38 seconds   Hepatic function panel   Result Value Ref Range    Albumin 2.6 (L) 3.4 - 5.0 g/dL    Bilirubin, Total 1.5 (H) 0.0 - 1.2 mg/dL    Bilirubin, Direct 0.7 (H) 0.0 - 0.3 mg/dL    Alkaline Phosphatase 145 (H) 33 - 136 U/L    ALT 23 10 - 52 U/L    AST 21 9 - 39 U/L    Total Protein 5.4 (L) 6.4 - 8.2 g/dL   Basic Metabolic Panel   Result Value Ref Range    Glucose 136 (H) 74 - 99 mg/dL    Sodium 149 (H) 136 - 145 mmol/L    Potassium 3.6 3.5 - 5.3 mmol/L    Chloride 110 (H) 98 - 107 mmol/L    Bicarbonate 27 21 - 32 mmol/L    Anion Gap 16 10 - 20 mmol/L    Urea Nitrogen 65 (H) 6 - 23 mg/dL    Creatinine 2.68 (H) 0.50 - 1.30 mg/dL    eGFR 25 (L) >60 mL/min/1.73m*2    Calcium 9.9 8.6 - 10.6 mg/dL   Phosphorus   Result Value Ref Range    Phosphorus 3.4 2.5 - 4.9 mg/dL   CBC   Result Value Ref Range    WBC 12.9 (H) 4.4 - 11.3 x10*3/uL    nRBC 0.0 0.0 - 0.0 /100 WBCs    RBC 2.77 (L) 4.50 -  5.90 x10*6/uL    Hemoglobin 8.1 (L) 13.5 - 17.5 g/dL    Hematocrit 24.9 (L) 41.0 - 52.0 %    MCV 90 80 - 100 fL    MCH 29.2 26.0 - 34.0 pg    MCHC 32.5 32.0 - 36.0 g/dL    RDW 16.9 (H) 11.5 - 14.5 %    Platelets 276 150 - 450 x10*3/uL    MPV 10.5 7.5 - 11.5 fL   Magnesium   Result Value Ref Range    Magnesium 1.75 1.60 - 2.40 mg/dL   Blood Gas Arterial Full Panel   Result Value Ref Range    POCT pH, Arterial 7.47 (H) 7.38 - 7.42 pH    POCT pCO2, Arterial 39 38 - 42 mm Hg    POCT pO2, Arterial 155 (H) 85 - 95 mm Hg    POCT SO2, Arterial 100 94 - 100 %    POCT Oxy Hemoglobin, Arterial 97.3 94.0 - 98.0 %    POCT Hematocrit Calculated, Arterial 35.0 (L) 41.0 - 52.0 %    POCT Sodium, Arterial 145 136 - 145 mmol/L    POCT Potassium, Arterial 3.2 (L) 3.5 - 5.3 mmol/L    POCT Chloride, Arterial 112 (H) 98 - 107 mmol/L    POCT Ionized Calcium, Arterial 1.31 1.10 - 1.33 mmol/L    POCT Glucose, Arterial 136 (H) 74 - 99 mg/dL    POCT Lactate, Arterial 1.5 0.4 - 2.0 mmol/L    POCT Base Excess, Arterial 4.4 (H) -2.0 - 3.0 mmol/L    POCT HCO3 Calculated, Arterial 28.4 (H) 22.0 - 26.0 mmol/L    POCT Hemoglobin, Arterial 11.6 (L) 13.5 - 17.5 g/dL    POCT Anion Gap, Arterial 8 (L) 10 - 25 mmo/L    Patient Temperature 37.0 degrees Celsius    FiO2 30 %   POCT GLUCOSE   Result Value Ref Range    POCT Glucose 160 (H) 74 - 99 mg/dL   POCT GLUCOSE   Result Value Ref Range    POCT Glucose 193 (H) 74 - 99 mg/dL   Type and Screen   Result Value Ref Range    ABO TYPE B     Rh TYPE POS     ANTIBODY SCREEN NEG      Assessment/Plan     Chung Goodman is a 67yoM with PMHx significant for 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 Coli bacteremia. C/f recurrent afferent limb syndrome and consideration of biliary infection. Patient underwent EGD on 10/10 for management of afferent  limb syndrome and PEG-J placement for venting/feeding, however course c/b afferent limb perforation, emergently transferred to OR s/p ex-lap, SBR, J-J anastomosis, and G-tube placement. Patient transferred to SICU postoperatively for further management.     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 Acute post-op pain. Arrived to SICU intubated and sedated. NMB reversed in OR.  Following commands. Extubated this morning , Alert and oriented  - ongoing neuro and pain assessments  - PRN 0.2 hydromorphone for pain control ( becomes sedated with 0.4)  - Lidoderm patch  - PT/OT consult  - Home meds: allopurinol, bupropion XL, citalopram, gabapentin. Continur holding today. 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 Baseline echo (9/26) with EF 55-60%, RV mildly reduced RVSF. Sinus tachycardia resolved, now NSR with frequent PVCs. Hypertensive with SBP in the 160's-170's overnight despite labetalol. Responded better to Hydralazine   - continuous EKG/abp monitoring  -start Hydralazine 10 mg IV q 6  -Labetalol 10 mg q 4 PRN for SBP> 160   - Goal map range 65-90  - Additional volume as indicated  - Continue rectal ASA  - IS/ppx per HF service: [place],  Methylprednisolone 4mg on hold  while on higher dose for COPD exacerbation(home prednisone 5 mg)  - Home meds: ASA, amlodipine, fenofibrate, isosorbide, hydralazine, rosuvastatin.     PULM: History of COPD, pulmonary HTN. Arrived to SICU intubated. Failed multiple SBT 2/2 tachypnea. C/f possible COPD exacerbation therefore started on MP and Azithromycin for possible COPD exacerbation. Also have been diuresing.  Improved respiratory status and eventually extubated on 10/15.  Currently on 3 LNC   - Methylpred 40 mg IV x5 days for COPD exacerbation ( day 4 today)   - Azithromycin 500mg IV x1, then 250mg IV x4 days ( day 4)   - Scheduled duonebs  - Q1h incentive spirometer  while awake  - additional pulm toilet prn.   - CXR daily  - ABG prn      GI: Hx of GERD. Pancreatic cancer s/p whipple (5/19/2023). Recurrent SBO with c/f recurrent afferent limb syndrome. Acute transaminitis and hyperbilirubinemia improving. Afferent limb perforation noted during EGD on 10/10, emergently transferred to OR s/p ex-lap, SBR, J-J anastomosis, and G-tube placement. Multiple chidi removed by Dr. Johnson this am, open area packed. Per Dr. Johnson, there was an area of transverse colon with herniation to the umbilicus that was mobilized and oversewn in the OR with high risk for perforation.  - Continue TPN/lipids, increase to goal of 83 ml/hr tonight  -Upper GI study through the G tube  -  G-tube to gravity per surgery   -Keep NPO except sips for comfort  - monitor IRVING drain output  - Continue PPI (home med)  - holding home pancreatic enzymes while NPO      : Hx of CKD3. Baseline creatinine ~2-3. JOSY this admission improving. Cr back to baseline . Hypernatremia with Na of 149  - Check renal function panel daily and PRN  - Volume resuscitation as needed  - Maintain U/O >0.5ml/kg/hr  - Maintain Portillo for strict I/Os  - Replete electrolytes per protocol  -      HEME: Acute blood loss anemia. H/H has been stable  - Check CBC and coags daily and PRN  - SCDs for DVT prophylaxis  - Continue SQ heparin  - rectal ASA  - ongoing monitoring for s/s bleeding  - Maintain active T&S (10/12)--> resend today     ENDO: Hx of IDDM2 (lantus 10u daily at home) and amiodarone-induced hyperthyroidism. Hyperglycemia with BG in the 300's 10/14 . Started on insulin infusion, off overnight   - Continue  lantus to 20u daily  -Continue Lispro sliding scale # 3       ID: Sepsis 2/2 E. Coli bacteremia, +BCx 10/5, started Zosyn. Repeat BCxs 10/7 and 10/9 NGTD. Afebrile. Leukocytosis.   - temp q4h, wbc daily  - Transplant ID following, appreciate recs  - continue Zosyn (10/5-), thru 10/18  - Vanco 10/10-10/11  - Continue micafungin  (10/11-)  thru 10/18, given bowel perf w/ hx of heart transplant  - ongoing monitoring for s/s infection     Lines:  - L brachial arterial line (10/10, OR)--> remove today  - L subclavian TINO (10/10, OR)  - PIVx2  - R chest mediport   -Place a piccline for TPN and discontinue Subclavian TINO afterwarsds        I have discussed the case with the resident/advanced practice provider. I have personally performed a history, physical exam, and my own medical decision making. I have reviewed the note and agree with the findings and plan with the following exceptions as identified below. I have personally provided 43 minutes of critical care time exclusive of time spent on separately billable procedures. Time includes review of laboratory data, radiology results, discussion with consultants, family and monitoring for potential decompensation. Interventions were performed as documented above.      Family/Surrogate updated with plan of care.  Code status addressed/up to date.

## 2023-10-16 NOTE — PROGRESS NOTES
Chung Goodman is a 67 y.o. male on day 9 of admission presenting with SBO (small bowel obstruction) (CMS/HCC).    Subjective   Interval History: Events over the weekend noted.  Patient is extubated.    Review of Systems    Objective   Range of Vitals (last 24 hours)  Heart Rate:  [75-86]   Temp:  [36 °C (96.8 °F)-36.3 °C (97.3 °F)]   Resp:  [20-28]   BP: (121-174)/()   Weight:  [70.5 kg (155 lb 6.8 oz)]   SpO2:  [94 %-100 %]   Daily Weight  10/16/23 : 70.5 kg (155 lb 6.8 oz)    Body mass index is 22.63 kg/m².    GENERAL APPEARANCE: Awake and alert and not in any distress  NECK: Supple  CARDIAC: Regular   LUNGS: Clear  ABDOMEN: Soft and nontender  MUSCULOSKELETAL: No swelling of major joints  EXTREMITIES: Left arm is swollen      Antibiotics  surgical lubricant gel  - Omnicell Override Pull  albuterol 90 mcg/actuation inhaler 2 puff  tacrolimus (Prograf) capsule 2 mg  tacrolimus (Prograf) capsule 1.5 mg  pantoprazole (ProtoNix) injection 40 mg  heparin (porcine) injection 5,000 Units  dextrose 50 % injection 25 g  glucagon (Glucagen) injection 1 mg  dextrose 10 % in water (D10W) infusion  piperacillin-tazobactam-dextrose (Zosyn) IV 3.375 g  HYDROmorphone (Dilaudid) injection 0.4 mg  benzonatate (Tessalon) capsule 100 mg  benzocaine-menthol (Cepastat Sore Throat) 15-3.6 mg lozenge 1 lozenge  phenoL (Chloraseptic) 1.4 % mouth/throat spray 1 spray  acetaminophen (Tylenol) tablet 975 mg  hydrALAZINE (Apresoline) injection 10 mg  scopolamine (Transderm-Scop) patch 1 patch  acetaminophen (Ofirmev) injection 1,000 mg  acetaminophen (Ofirmev) injection 1,000 mg  lactated Ringer's bolus 1,000 mL  micafungin (Mycamine) in sodium chloride 0.9 % 100 mL  mg  lactated Ringer's infusion  HYDROmorphone (Dilaudid) injection 1 mg  Adult Clinimix TPN  fat emulsion fish oil/plant based (SMOFlipid) 20 % IV infusion 50 g  vancomycin in dextrose 5 % (Vancocin) IVPB 1 g  lactated Ringer's infusion  - Omnicell Override  Pull  lidocaine (cardiac) (Xylocaine) 100 mg/5 mL (2 %) injection  - Omnicell Override Pull  propofol (Diprivan) 10 mg/mL injection  - Omnicell Override Pull  midazolam (Versed) 1 mg/mL injection  - Omnicell Override Pull  fentaNYL PF (Sublimaze) 50 mcg/mL injection  - Omnicell Override Pull  ondansetron (Zofran) 4 mg/2 mL injection  - Omnicell Override Pull  succinylcholine (Anectine) 20 mg/mL injection  - Omnicell Override Pull  lidocaine PF (Xylocaine) 10 mg/mL (1 %) injection 1 mg  lactated Ringer's infusion  dexAMETHasone (Decadron) 4 mg/mL injection  - Omnicell Override Pull  albumin human 5 % infusion  - Omnicell Override Pull  rocuronium (ZeMuron) 10 mg/mL injection  - Omnicell Override Pull  dextrose 5 % in water (D5W) infusion  tacrolimus (Prograf) capsule 1 mg  albumin human 5 % infusion  - Omnicell Override Pull  propofol (Diprivan) 10 mg/mL injection  - Omnicell Override Pull  rocuronium (ZeMuron) 10 mg/mL injection  - Omnicell Override Pull  sodium chloride 0.9 % irrigation solution  vancomycin in dextrose 5 % (Vancocin) IVPB 1 g  lactated Ringer's infusion  pantoprazole (ProtoNix) injection 40 mg  insulin lispro (HumaLOG) injection 0-15 Units  potassium chloride 20 mEq in 100 mL IV premix  magnesium sulfate IV 2 g  magnesium sulfate IV 4 g  calcium gluconate in NS IV 1 g  calcium gluconate in NS IV 2 g  propofol (Diprivan) 10 mg/mL injection  - Omnicell Override Pull  propofol (Diprivan) infusion  oxygen (O2) therapy  propofol (Diprivan) infusion  polyethylene glycol (Glycolax, Miralax) packet 17 g  pantoprazole (ProtoNix) injection 40 mg  HYDROmorphone (Dilaudid) injection 0.2 mg  HYDROmorphone (Dilaudid) injection 0.4 mg  vancomycin in dextrose 5 % (Vancocin) IVPB 1,000 mg  lactated Ringer's bolus 500 mL  lactated Ringer's bolus 500 mL  HYDROmorphone (Dilaudid) injection 0.2 mg  ipratropium-albuteroL (Duo-Neb) 0.5-2.5 mg/3 mL nebulizer solution 3 mL  albumin human 5 % infusion  - Omnicell Override  Pull  albumin human 5 % infusion 25 g  fat emulsion fish oil/plant based (SMOFlipid) 20 % IV infusion 50 g  heparin (porcine) injection 5,000 Units  heparin lock flush (porcine) injection 500 Units  micafungin (Mycamine) injection 100 mg  Adult Clinimix TPN  aspirin suppository 150 mg  micafungin (Mycamine) in sodium chloride 0.9 % 100 mL  mg  HYDROmorphone (Dilaudid) injection 0.2 mg  lidocaine 4 % patch 1 patch  HYDROmorphone (Dilaudid) injection 0.4 mg  acetaminophen (Ofirmev) injection 1,000 mg  lactated Ringer's bolus 500 mL  lactated Ringer's infusion  magnesium sulfate IV 2 g  furosemide (Lasix) injection 40 mg  dexmedeTOMIDine in NS (Precedex) 400 mcg in 100 mL (4 mcg/mL) infusion  HYDROmorphone (Dilaudid) injection 0.2 mg  insulin glargine (Lantus) injection 10 Units  methylPREDNISolone sod succinate (PF) (SOLU-Medrol) 40 mg/mL injection 4 mg  albumin human 5 % infusion 12.5 g  insulin glargine (Lantus) injection 15 Units  insulin glargine (Lantus) injection 15 Units  furosemide (Lasix) injection 20 mg  azithromycin (Zithromax) in sodium chloride 0.9 % 250 mL  mg  azithromycin (Zithromax) 250 mg in dextrose 5 % in water (D5W) 250 mL IV  ipratropium-albuteroL (Duo-Neb) 0.5-2.5 mg/3 mL nebulizer solution 3 mL  insulin glargine (Lantus) injection 20 Units  ipratropium-albuteroL (Duo-Neb) 0.5-2.5 mg/3 mL nebulizer solution 3 mL  methylPREDNISolone sod succinate (PF) (SOLU-Medrol) 40 mg/mL injection 40 mg  methylPREDNISolone sod succinate (PF) (SOLU-Medrol) 40 mg/mL injection 4 mg  surgical lubricant gel  - Omnicell Override Pull  fat emulsion fish oil/plant based (SMOFlipid) 20 % IV infusion 50 g  furosemide (Lasix) injection 40 mg  furosemide (Lasix) injection 40 mg  furosemide (Lasix) injection 40 mg  dextrose 50 % injection 25 g  glucagon (Glucagen) injection 1 mg  dextrose 10 % in water (D10W) infusion  insulin regular (HumuLIN, NovoLIN) bolus from bag 0-10 Units  insulin regular 100 unit/100 mL  (1 unit/mL) in 0.9 % NaCl infusion  insulin regular (HumuLIN, NovoLIN) bolus from bag 2-6 Units  furosemide (Lasix) 10 mg/mL injection  - Omnicell Override Pull  methylPREDNISolone sod succinate (PF) (SOLU-Medrol) 40 mg/mL injection 4 mg  lidocaine (Xylocaine) 10 mg/mL (1 %) injection 10 mg  potassium chloride 40 mEq in 100 mL IV premix  hydrALAZINE (Apresoline) injection 10 mg  labetaloL (Normodyne,Trandate) injection 10 mg  potassium chloride 20 mEq in 100 mL IV premix  labetaloL (Normodyne,Trandate) injection 10 mg  oxygen (O2) therapy  surgical lubricant gel  - Omnicell Override Pull  fat emulsion fish oil/plant based (SMOFlipid) 20 % IV infusion 50 g  Adult Clinimix TPN  heparin flush 10 unit/mL syringe 50 Units  heparin flush 10 unit/mL syringe 50 Units  heparin lock flush (porcine) injection 500 Units  alteplase (Cathflo Activase) injection 2 mg  labetaloL (Normodyne,Trandate) injection 10 mg  labetaloL (Normodyne,Trandate) injection 20 mg    hydrALAZINE (Apresoline) injection 10 mg  potassium chloride 40 mEq in 100 mL IV premix  hydrALAZINE (Apresoline) injection 10 mg  lidocaine (Xylocaine) 10 mg/mL (1 %) injection 10 mg  alteplase (Cathflo Activase) injection 2 mg  Adult Clinimix TPN      Relevant Results  Labs  Results from last 72 hours   Lab Units 10/16/23  0330 10/15/23  0401 10/14/23  1355   WBC AUTO x10*3/uL 12.9* 15.8* 14.7*   HEMOGLOBIN g/dL 8.1* 8.5* 8.1*   HEMATOCRIT % 24.9* 25.5* 24.4*   PLATELETS AUTO x10*3/uL 276 266 208     Results from last 72 hours   Lab Units 10/16/23  0328 10/15/23  1517 10/15/23  0401   SODIUM mmol/L 149* 146* 148*   POTASSIUM mmol/L 3.6 4.0 3.4*   CHLORIDE mmol/L 110* 108* 108*   CO2 mmol/L 27 27 26   BUN mg/dL 65* 64* 70*   CREATININE mg/dL 2.68* 2.76* 3.19*   GLUCOSE mg/dL 136* 175* 140*   CALCIUM mg/dL 9.9 10.0 9.8   ANION GAP mmol/L 16 15 17   EGFR mL/min/1.73m*2 25* 24* 21*   PHOSPHORUS mg/dL 3.4 3.7 3.8     Results from last 72 hours   Lab Units 10/16/23  0329  10/15/23  1517 10/15/23  0401 10/14/23  1355   ALK PHOS U/L 145*  --  172* 169*   BILIRUBIN TOTAL mg/dL 1.5*  --  1.6* 1.5*   BILIRUBIN DIRECT mg/dL 0.7*  --  0.8* 0.8*   PROTEIN TOTAL g/dL 5.4*  --  5.6* 5.3*   ALT U/L 23  --  28 30   AST U/L 21  --  22 19   ALBUMIN g/dL 2.6* 2.8* 2.7* 2.7*     Estimated Creatinine Clearance: 26.7 mL/min (A) (by C-G formula based on SCr of 2.68 mg/dL (H)).  CRP   Date Value Ref Range Status   04/29/2023 2.11 (A) mg/dL Final     Comment:     REF VALUE  < 1.00       Microbiology  Susceptibility data from last 14 days.  Collected Specimen Info Organism Ampicillin Cefazolin Ciprofloxacin Gentamicin Levofloxacin Piperacillin/Tazobactam Trimethoprim/Sulfamethoxazole   10/05/23 Blood culture from Peripheral Venipuncture Escherichia coli S S S S S S S   10/05/23 Blood culture from Peripheral Venipuncture Escherichia coli              Imaging           This patient currently has cardiac telemetry ordered; if you would like to modify or discontinue the telemetry order, click here to go to the orders activity to modify/discontinue the order.  Assessment/Plan   67 y.o. male presenting with history of heart transplant in 2017 and whipple procedure for pancreatic cancer on 5/19/2023, h/o recurrent pancreatitis, CKD III, amiodarine-induced hyperthyroidism ,who presents as a trasnfer from  [facility] on 10/06 for SBO, recently admitted for SBO (9/23 - 9/29), s/p EGD on 9/26/23   On 10/05 p.m. had an episode of emesis, measured temperature of 100.7 and chills at home.   At [facility] ED, CT was concerning for SBO and Bcx grew E coli. Patient was treated with IVF and Zosyn, transferred to [facility] on 10/06-10/07.  Blood cultures:   10/05 x2 E. coli.  10/07 x3 NGTD  10/09 x2 NGTD     Problems  E. coli bacteremia  Small bowel obstruction  Bowel perforation s/p emergent laparotomy  Possible peritoneal carcinomatosis  JOSY  Respiratory failure     10/13/23  Continue Zosyn IV (renally dosed) and micafungin 100 mg IV  every 24 hours for 7 days from date of surgery (until Oct 18) provided patient continues to have clinical improvement.  Will consider extension of antibiotic therapy if patient has any setbacks.    Plan  Continue Zosyn IV (renally dosed) and micafungin 100 mg IV every 24 hours for 7 days from date of surgery (until Oct 18).  Will follow peripherally         Mikey Miner MD

## 2023-10-16 NOTE — CARE PLAN
Problem: COGNITION/SAFETY  Goal: Patient will follow >90% Complex Two Step  commands with <15 seconds processing time to allow improved ADL performance.  Outcome: Progressing     Problem: ADLs  Goal: Patient with complete lower body dressing with minimal assist  level of assistance donning and doffing all LE clothes  with PRN adaptive equipment while supported sitting  Outcome: Progressing  Goal: Patient will complete toileting including hygiene clothing management/hygiene with minimal assist  level of assistance.  Outcome: Progressing     Problem: TRANSFERS  Goal: Patient will complete sit to stand transfer with moderate assist level of assistance and least restrictive device in order to improve safety and prepare for out of bed mobility.  Outcome: Progressing

## 2023-10-16 NOTE — CARE PLAN
Problem: Balance  Goal: STG - Maintains dynamic standing balance with upper extremity support on LRAD with SBA  Outcome: Progressing     Problem: Mobility  Goal: STG - Patient will ambulate 50 ft with LRAD and SBA  Outcome: Progressing     Problem: Transfers  Goal: STG - Patient will perform bed mobility with SBA  Outcome: Progressing  Goal: STG - Patient will transfer sit to and from stand with LRAD and SBA   Outcome: Progressing

## 2023-10-16 NOTE — PROGRESS NOTES
Physical Therapy    Physical Therapy Evaluation    Patient Name: Chung Goodman  MRN: [id number]  Today's Date: 10/16/2023   Time Calculation  Start Time: 1207  Stop Time: 1227  Time Calculation (min): 20 min    Assessment/Plan   PT Assessment  PT Assessment Results: Decreased strength, Decreased range of motion, Decreased endurance, Impaired balance, Decreased mobility, Impaired judgement, Decreased safety awareness  Rehab Prognosis: Excellent  Evaluation/Treatment Tolerance: Patient tolerated treatment well  Medical Staff Made Aware: Yes  End of Session Communication: Bedside nurse  End of Session Patient Position: Bed, 3 rail up  IP OR SWING BED PT PLAN  Inpatient or Swing Bed: Inpatient  PT Plan  Treatment/Interventions: Bed mobility, Transfer training, Gait training, Stair training, Neuromuscular re-education, Strengthening, Endurance training, Therapeutic exercise, Home exercise program, Therapeutic activity, Positioning, Postural re-education  PT Plan: Skilled PT  PT Frequency: 5 times per week  PT Discharge Recommendations: Moderate intensity level of continued care  PT Recommended Transfer Status: Assist x2  PT - OK to Discharge: Yes      Subjective   General Visit Information:  General  Reason for Referral: s/p Ex-lap, small bowel resection, jejuno-jejunostomy, G-tube (10/10/23)  Past Medical History Relevant to Rehab: PMH: NICM secondary to valvular disease, s/p mitral valve repair, s/p bicaval OHT (3/24/2017) with post-op complicated by atrial arrhythmia, RV failure, chronotropic incompetence, Pancreatic adenocarcinoma, s/p Whipple procedure (5/19/23), currently on chemotx (modified FOLFIRINOX), HTN, DM type 2, HLD, CKD Stage 3B, amiodarone-induced hyperthyroidism, JE, MDD  Missed Visit: Yes  Missed Visit Reason:  (0843: Spoke to RN who requested hold on PT as team is weaning vent to extubate and pt appears to be in a high amount of pain with movement in bed. Will continue to follow.)  Prior to  "Session Communication: Bedside nurse  Patient Position Received: Bed, 3 rail up  General Comment: Pt alert and agreeable to therapy though confused. On 4L O2 NC, TPN, tele, IV, rashid, and PEG tube to gravity.  Home Living:  Home Living  Type of Home:  (Pt is a questionable historian but states he lives in a single story house with wife with 10 CHESTER with railing. Pt reports there is a tub shower with no grab bars or seat but he does have a walker; will attempt to confirm accuracy)  Prior Level of Function:  Prior Function Per Pt/Caregiver Report  Level of [place]:  (Pt is a questionable historian however states he was previously IND, h/o falls but unable to state how often, (-) driving, (+) cooking)  Precautions:  Precautions  Medical Precautions: Abdominal precautions, Fall precautions  Vital Signs:  Vital Signs  Heart Rate: 85  Resp: 22  SpO2: (!) 1 %  BP: 153/89    Objective   Pain:  Pain Assessment  Pain Assessment: 0-10  Pain Score: 0 - No pain  Cognition:  Cognition  Overall Cognitive Status: Impaired  Orientation Level:  (Oriented to self, stated \"highscool\" for place and then corrected to hospital with increased cues. Unable to report month but knew year.)  Following Commands:  (Increased cues needed to follow commands, overall 75% command following)  Cognition Comments: Decreased insight into situation    General Assessments:      Activity Tolerance  Endurance: Tolerates 10 - 20 min exercise with multiple rests  Early Mobility/Exercise Safety Screen: Proceed with mobilization - No exclusion criteria met    Sensation  Light Touch: No apparent deficits    Strength  Strength Comments: Bilat hip flexion limited post ABD surgery, bilat knee ext 4/5, DF/PF 4/5    Postural Control  Trunk Control: CGA for EOB sitting with appropriate midline position    Static Sitting Balance  Static Sitting-Balance Support: No upper extremity supported  Static Sitting-Level of Assistance: Contact guard  Static " Sitting-Comment/Number of Minutes: x8  Dynamic Sitting Balance  Dynamic Sitting-Balance Support: Bilateral upper extremity supported  Dynamic Sitting-Comments: CGA    Static Standing Balance  Static Standing-Comment/Number of Minutes: Not tested  Functional Assessments:  Bed Mobility  Bed Mobility: Yes  Bed Mobility 1  Bed Mobility 1: Supine to sitting, Sitting to supine  Level of Assistance 1: Maximum assistance  Bed Mobility Comments 1: x1 assist with HOB elevated and use of draw sheet  Bed Mobility 2  Bed Mobility  2: Rolling right  Level of Assistance 2: Maximum assistance    Transfers  Transfer: No (Returned to supine due to pt's need for bedpan)       Outcome Measures:  [facility] Basic Mobility  Turning from your back to your side while in a flat bed without using bedrails: A lot  Moving from lying on your back to sitting on the side of a flat bed without using bedrails: A lot  Moving to and from bed to chair (including a wheelchair): A lot  Standing up from a chair using your arms (e.g. wheelchair or bedside chair): A lot  To walk in hospital room: Total  Climbing 3-5 steps with railing: Total  Basic Mobility - Total Score: 10    FSS-ICU  Ambulation: Unable to attempt due to weakness  Rolling: Moderate assistance (performs 50 - 74% of task)  Sitting: Minimal assistance (performs 75% or more of task)  Transfer Sit-to-Stand: Maximal assistance (performs 25% - 49% of task)  Transfer Supine-to-Sit: Maximal assistance (performs 25% - 49% of task)  Total Score: 11    ICU Mobility Screen  Early Mobility/Exercise Safety Screen: Proceed with mobilization - No exclusion criteria met    Encounter Problems       Encounter Problems (Active)       Balance       STG - Maintains dynamic standing balance with upper extremity support on LRAD with SBA (Progressing)       Start:  10/16/23    Expected End:  11/06/23               Mobility       STG - Patient will ambulate 50 ft with LRAD and SBA (Progressing)       Start:  10/16/23     Expected End:  11/06/23               Transfers       STG - Patient will perform bed mobility with SBA (Progressing)       Start:  10/16/23    Expected End:  11/06/23            STG - Patient will transfer sit to and from stand with LRAD and SBA  (Progressing)       Start:  10/16/23    Expected End:  11/06/23                   Education Documentation  Mobility Training, taught by Antonella Brooke PT at 10/16/2023  1:55 PM.  Learner: Patient  Readiness: Acceptance  Method: Explanation, Demonstration  Response: Needs Reinforcement    Education Comments  No comments found.

## 2023-10-16 NOTE — PROGRESS NOTES
"Chung Goodman is a 67 y.o. male on day 9 of admission presenting with SBO (small bowel obstruction) (CMS/HCC).    Subjective   Insulin infusion stopped overnight  PEG tube clamped after extubation yesterday  Hypertensive with SBP in the 160's,responds better to hydralazine than labetalol   Objective     Physical Exam  Constitutional:       General: He is not in acute distress.     Appearance: Normal appearance.   HENT:      Head: Normocephalic and atraumatic.   Eyes:      Pupils: Pupils are equal, round, and reactive to light.   Cardiovascular:      Rate and Rhythm: Normal rate and regular rhythm.      Pulses: Normal pulses.      Heart sounds: Normal heart sounds.   Pulmonary:      Effort: No respiratory distress.      Breath sounds: Normal breath sounds.      Comments: On 3 lnc   Abdominal:      General: Abdomen is flat. There is no distension.      Palpations: Abdomen is soft.      Tenderness: Tenderness: appropriately.      Comments: Midline incision, staples in place with some removed by surgery, open area packed with gauze.   Genitourinary:     Comments: Portillo in place with clear dark yellow urine output  Musculoskeletal:      Cervical back: Neck supple.      Right lower leg: No edema.      Left lower leg: No edema.   Skin:     General: Skin is warm and dry.   Neurological:      Mental Status: He is alert.     Last Recorded Vitals  Blood pressure (!) 164/93, pulse 76, temperature 36.2 °C (97.2 °F), temperature source Temporal, resp. rate 21, height 1.765 m (5' 9.49\"), weight 70.5 kg (155 lb 6.8 oz), SpO2 94 %.  Intake/Output last 3 Shifts:  I/O last 3 completed shifts:  In: 958.9 (13.6 mL/kg) [I.V.:58.1 (0.8 mL/kg); IV Piggyback:400]  Out: 3480 (49.4 mL/kg) [Urine:3100 (1.2 mL/kg/hr); Emesis/NG output:325; Drains:55]  Weight: 70.5 kg     Relevant Results  Scheduled medications  aspirin, 150 mg, rectal, Daily  azithromycin, 250 mg, intravenous, q24h  fat emulsion fish oil/plant based, 250 mL, intravenous, " Daily  heparin (porcine), 5,000 Units, subcutaneous, q8h  heparin flush, 50 Units, intra-catheter, q12h  insulin glargine, 20 Units, subcutaneous, Nightly  insulin lispro, 0-15 Units, subcutaneous, q4h  insulin regular, 0-10 Units, intravenous, Once  ipratropium-albuteroL, 3 mL, nebulization, q6h  labetaloL, 10 mg, intravenous, Once  labetaloL, 20 mg, intravenous, Once  lidocaine, 1 patch, transdermal, Daily  [START ON 10/18/2023] methylPREDNISolone sodium succinate (PF), 4 mg, intravenous, q24h  methylPREDNISolone sodium succinate (PF), 40 mg, intravenous, q24h  micafungin, 100 mg, intravenous, q24h  pantoprazole, 40 mg, intravenous, BID  piperacillin-tazobactam, 3.375 g, intravenous, q6h  tacrolimus, 1 mg, sublingual, q24h  tacrolimus, 2 mg, sublingual, Daily    Continuous medications  Adult Clinimix TPN, 83 mL/hr    PRN medications  PRN medications: alteplase, calcium gluconate, calcium gluconate, dextrose 10 % in water (D10W), dextrose, glucagon, heparin flush, heparin lock flush (porcine), HYDROmorphone, labetaloL, magnesium sulfate, magnesium sulfate, oxygen    Results for orders placed or performed during the hospital encounter of 10/07/23 (from the past 24 hour(s))   Blood Gas Arterial Full Panel   Result Value Ref Range    POCT pH, Arterial 7.44 (H) 7.38 - 7.42 pH    POCT pCO2, Arterial 43 (H) 38 - 42 mm Hg    POCT pO2, Arterial 127 (H) 85 - 95 mm Hg    POCT SO2, Arterial 99 94 - 100 %    POCT Oxy Hemoglobin, Arterial 97.3 94.0 - 98.0 %    POCT Hematocrit Calculated, Arterial 41.0 41.0 - 52.0 %    POCT Sodium, Arterial 143 136 - 145 mmol/L    POCT Potassium, Arterial 3.4 (L) 3.5 - 5.3 mmol/L    POCT Chloride, Arterial 109 (H) 98 - 107 mmol/L    POCT Ionized Calcium, Arterial 1.35 (H) 1.10 - 1.33 mmol/L    POCT Glucose, Arterial 121 (H) 74 - 99 mg/dL    POCT Lactate, Arterial 0.9 0.4 - 2.0 mmol/L    POCT Base Excess, Arterial 4.4 (H) -2.0 - 3.0 mmol/L    POCT HCO3 Calculated, Arterial 29.2 (H) 22.0 - 26.0  mmol/L    POCT Hemoglobin, Arterial 13.8 13.5 - 17.5 g/dL    POCT Anion Gap, Arterial 8 (L) 10 - 25 mmo/L    Patient Temperature 37.0 degrees Celsius    FiO2 30 %   POCT GLUCOSE   Result Value Ref Range    POCT Glucose 105 (H) 74 - 99 mg/dL   POCT GLUCOSE   Result Value Ref Range    POCT Glucose 107 (H) 74 - 99 mg/dL   Blood Gas Arterial Full Panel   Result Value Ref Range    POCT pH, Arterial 7.45 (H) 7.38 - 7.42 pH    POCT pCO2, Arterial 41 38 - 42 mm Hg    POCT pO2, Arterial 145 (H) 85 - 95 mm Hg    POCT SO2, Arterial 99 94 - 100 %    POCT Oxy Hemoglobin, Arterial 97.5 94.0 - 98.0 %    POCT Hematocrit Calculated, Arterial 29.0 (L) 41.0 - 52.0 %    POCT Sodium, Arterial 143 136 - 145 mmol/L    POCT Potassium, Arterial 3.6 3.5 - 5.3 mmol/L    POCT Chloride, Arterial 111 (H) 98 - 107 mmol/L    POCT Ionized Calcium, Arterial 1.38 (H) 1.10 - 1.33 mmol/L    POCT Glucose, Arterial 107 (H) 74 - 99 mg/dL    POCT Lactate, Arterial 0.8 0.4 - 2.0 mmol/L    POCT Base Excess, Arterial 4.1 (H) -2.0 - 3.0 mmol/L    POCT HCO3 Calculated, Arterial 28.5 (H) 22.0 - 26.0 mmol/L    POCT Hemoglobin, Arterial 9.6 (L) 13.5 - 17.5 g/dL    POCT Anion Gap, Arterial 7 (L) 10 - 25 mmo/L    Patient Temperature 37.0 degrees Celsius    FiO2 32 %   POCT GLUCOSE   Result Value Ref Range    POCT Glucose 102 (H) 74 - 99 mg/dL   POCT GLUCOSE   Result Value Ref Range    POCT Glucose 116 (H) 74 - 99 mg/dL   POCT GLUCOSE   Result Value Ref Range    POCT Glucose 145 (H) 74 - 99 mg/dL   Blood Gas Arterial Full Panel   Result Value Ref Range    POCT pH, Arterial 7.47 (H) 7.38 - 7.42 pH    POCT pCO2, Arterial 42 38 - 42 mm Hg    POCT pO2, Arterial 135 (H) 85 - 95 mm Hg    POCT SO2, Arterial 99 94 - 100 %    POCT Oxy Hemoglobin, Arterial 96.7 94.0 - 98.0 %    POCT Hematocrit Calculated, Arterial 43.0 41.0 - 52.0 %    POCT Sodium, Arterial 144 136 - 145 mmol/L    POCT Potassium, Arterial 4.0 3.5 - 5.3 mmol/L    POCT Chloride, Arterial 109 (H) 98 - 107 mmol/L     POCT Ionized Calcium, Arterial 1.37 (H) 1.10 - 1.33 mmol/L    POCT Glucose, Arterial 166 (H) 74 - 99 mg/dL    POCT Lactate, Arterial 1.4 0.4 - 2.0 mmol/L    POCT Base Excess, Arterial 6.2 (H) -2.0 - 3.0 mmol/L    POCT HCO3 Calculated, Arterial 30.6 (H) 22.0 - 26.0 mmol/L    POCT Hemoglobin, Arterial 14.3 13.5 - 17.5 g/dL    POCT Anion Gap, Arterial 8 (L) 10 - 25 mmo/L    Patient Temperature 37.0 degrees Celsius    FiO2 32 %   POCT GLUCOSE   Result Value Ref Range    POCT Glucose 151 (H) 74 - 99 mg/dL   Renal Function Panel   Result Value Ref Range    Glucose 175 (H) 74 - 99 mg/dL    Sodium 146 (H) 136 - 145 mmol/L    Potassium 4.0 3.5 - 5.3 mmol/L    Chloride 108 (H) 98 - 107 mmol/L    Bicarbonate 27 21 - 32 mmol/L    Anion Gap 15 10 - 20 mmol/L    Urea Nitrogen 64 (H) 6 - 23 mg/dL    Creatinine 2.76 (H) 0.50 - 1.30 mg/dL    eGFR 24 (L) >60 mL/min/1.73m*2    Calcium 10.0 8.6 - 10.6 mg/dL    Phosphorus 3.7 2.5 - 4.9 mg/dL    Albumin 2.8 (L) 3.4 - 5.0 g/dL   POCT GLUCOSE   Result Value Ref Range    POCT Glucose 196 (H) 74 - 99 mg/dL   POCT GLUCOSE   Result Value Ref Range    POCT Glucose 209 (H) 74 - 99 mg/dL   POCT GLUCOSE   Result Value Ref Range    POCT Glucose 248 (H) 74 - 99 mg/dL   POCT GLUCOSE   Result Value Ref Range    POCT Glucose 284 (H) 74 - 99 mg/dL   Blood Gas Arterial Full Panel Unsolicited   Result Value Ref Range    POCT pH, Arterial 7.45 (H) 7.38 - 7.42 pH    POCT pCO2, Arterial 39 38 - 42 mm Hg    POCT pO2, Arterial 165 (H) 85 - 95 mm Hg    POCT SO2, Arterial 100 94 - 100 %    POCT Oxy Hemoglobin, Arterial 97.4 94.0 - 98.0 %    POCT Hematocrit Calculated, Arterial 26.0 (L) 41.0 - 52.0 %    POCT Sodium, Arterial 142 136 - 145 mmol/L    POCT Potassium, Arterial 4.3 3.5 - 5.3 mmol/L    POCT Chloride, Arterial 109 (H) 98 - 107 mmol/L    POCT Ionized Calcium, Arterial 1.37 (H) 1.10 - 1.33 mmol/L    POCT Glucose, Arterial 336 (H) 74 - 99 mg/dL    POCT Lactate, Arterial 2.1 (H) 0.4 - 2.0 mmol/L    POCT  Base Excess, Arterial 2.9 -2.0 - 3.0 mmol/L    POCT HCO3 Calculated, Arterial 27.1 (H) 22.0 - 26.0 mmol/L    POCT Hemoglobin, Arterial 8.6 (L) 13.5 - 17.5 g/dL    POCT Anion Gap, Arterial 10 10 - 25 mmo/L    Patient Temperature 37.0 degrees Celsius   POCT GLUCOSE   Result Value Ref Range    POCT Glucose 281 (H) 74 - 99 mg/dL   Calcium, Ionized   Result Value Ref Range    POCT Calcium, Ionized 1.36 (H) 1.1 - 1.33 mmol/L   Magnesium   Result Value Ref Range    Magnesium 1.76 1.60 - 2.40 mg/dL   Coagulation Screen   Result Value Ref Range    Protime 12.6 9.8 - 12.8 seconds    INR 1.1 0.9 - 1.1    aPTT 27 27 - 38 seconds   Hepatic function panel   Result Value Ref Range    Albumin 2.6 (L) 3.4 - 5.0 g/dL    Bilirubin, Total 1.5 (H) 0.0 - 1.2 mg/dL    Bilirubin, Direct 0.7 (H) 0.0 - 0.3 mg/dL    Alkaline Phosphatase 145 (H) 33 - 136 U/L    ALT 23 10 - 52 U/L    AST 21 9 - 39 U/L    Total Protein 5.4 (L) 6.4 - 8.2 g/dL   Basic Metabolic Panel   Result Value Ref Range    Glucose 136 (H) 74 - 99 mg/dL    Sodium 149 (H) 136 - 145 mmol/L    Potassium 3.6 3.5 - 5.3 mmol/L    Chloride 110 (H) 98 - 107 mmol/L    Bicarbonate 27 21 - 32 mmol/L    Anion Gap 16 10 - 20 mmol/L    Urea Nitrogen 65 (H) 6 - 23 mg/dL    Creatinine 2.68 (H) 0.50 - 1.30 mg/dL    eGFR 25 (L) >60 mL/min/1.73m*2    Calcium 9.9 8.6 - 10.6 mg/dL   Phosphorus   Result Value Ref Range    Phosphorus 3.4 2.5 - 4.9 mg/dL   CBC   Result Value Ref Range    WBC 12.9 (H) 4.4 - 11.3 x10*3/uL    nRBC 0.0 0.0 - 0.0 /100 WBCs    RBC 2.77 (L) 4.50 - 5.90 x10*6/uL    Hemoglobin 8.1 (L) 13.5 - 17.5 g/dL    Hematocrit 24.9 (L) 41.0 - 52.0 %    MCV 90 80 - 100 fL    MCH 29.2 26.0 - 34.0 pg    MCHC 32.5 32.0 - 36.0 g/dL    RDW 16.9 (H) 11.5 - 14.5 %    Platelets 276 150 - 450 x10*3/uL    MPV 10.5 7.5 - 11.5 fL   Magnesium   Result Value Ref Range    Magnesium 1.75 1.60 - 2.40 mg/dL   Blood Gas Arterial Full Panel   Result Value Ref Range    POCT pH, Arterial 7.47 (H) 7.38 - 7.42  pH    POCT pCO2, Arterial 39 38 - 42 mm Hg    POCT pO2, Arterial 155 (H) 85 - 95 mm Hg    POCT SO2, Arterial 100 94 - 100 %    POCT Oxy Hemoglobin, Arterial 97.3 94.0 - 98.0 %    POCT Hematocrit Calculated, Arterial 35.0 (L) 41.0 - 52.0 %    POCT Sodium, Arterial 145 136 - 145 mmol/L    POCT Potassium, Arterial 3.2 (L) 3.5 - 5.3 mmol/L    POCT Chloride, Arterial 112 (H) 98 - 107 mmol/L    POCT Ionized Calcium, Arterial 1.31 1.10 - 1.33 mmol/L    POCT Glucose, Arterial 136 (H) 74 - 99 mg/dL    POCT Lactate, Arterial 1.5 0.4 - 2.0 mmol/L    POCT Base Excess, Arterial 4.4 (H) -2.0 - 3.0 mmol/L    POCT HCO3 Calculated, Arterial 28.4 (H) 22.0 - 26.0 mmol/L    POCT Hemoglobin, Arterial 11.6 (L) 13.5 - 17.5 g/dL    POCT Anion Gap, Arterial 8 (L) 10 - 25 mmo/L    Patient Temperature 37.0 degrees Celsius    FiO2 30 %   POCT GLUCOSE   Result Value Ref Range    POCT Glucose 160 (H) 74 - 99 mg/dL     Assessment/Plan     Chung Goodman is a 67yoM with PMHx significant for 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 Coli bacteremia. C/f recurrent afferent limb syndrome and consideration of biliary infection. Patient underwent EGD on 10/10 for management of afferent limb syndrome and PEG-J placement for venting/feeding, however course c/b afferent limb perforation, emergently transferred to OR s/p ex-lap, SBR, J-J anastomosis, and G-tube placement. Patient transferred to SICU postoperatively for further management.     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 Acute post-op pain. Arrived to SICU intubated and sedated. NMB reversed in OR.  Following commands. Extubated this morning , Alert and oriented  - ongoing neuro and pain assessments  - PRN 0.2 hydromorphone for pain control ( becomes sedated with 0.4)  - Lidoderm patch  - PT/OT consult  -  Home meds: allopurinol, bupropion XL, citalopram, gabapentin. Continur holding today. 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 Baseline echo (9/26) with EF 55-60%, RV mildly reduced RVSF. Sinus tachycardia resolved, now NSR with frequent PVCs. Hypertensive with SBP in the 160's-170's overnight despite labetalol. Responded better to Hydralazine   - continuous EKG/abp monitoring  -start Hydralazine 10 mg IV q 6  -Labetalol 10 mg q 4 PRN for SBP> 160   - Goal map range 65-90  - Additional volume as indicated  - Continue rectal ASA  - IS/ppx per HF service: [place],  Methylprednisolone 4mg on hold  while on higher dose for COPD exacerbation(home prednisone 5 mg)  - Home meds: ASA, amlodipine, fenofibrate, isosorbide, hydralazine, rosuvastatin.     PULM: History of COPD, pulmonary HTN. Arrived to SICU intubated. Failed multiple SBT 2/2 tachypnea. C/f possible COPD exacerbation therefore started on MP and Azithromycin for possible COPD exacerbation. Also have been diuresing.  Improved respiratory status and eventually extubated on 10/15.  Currently on 3 LNC   - Methylpred 40 mg IV x5 days for COPD exacerbation ( day 4 today)   - Azithromycin 500mg IV x1, then 250mg IV x4 days ( day 4)   - Scheduled duonebs  - Q1h incentive spirometer while awake  - additional pulm toilet prn.   - CXR daily  - ABG prn      GI: Hx of GERD. Pancreatic cancer s/p whipple (5/19/2023). Recurrent SBO with c/f recurrent afferent limb syndrome. Acute transaminitis and hyperbilirubinemia improving. Afferent limb perforation noted during EGD on 10/10, emergently transferred to OR s/p ex-lap, SBR, J-J anastomosis, and G-tube placement. Multiple chidi removed by Dr. Johnson this am, open area packed. Per Dr. oJhnson, there was an area of transverse colon with herniation to the umbilicus that was mobilized and oversewn in the OR with high risk for perforation.  -  Continue TPN/lipids, increase to goal of 83 ml/hr tonight  -Upper GI study through the G tube  -  G-tube to gravity per surgery   -Keep NPO except sips for comfort  - monitor IRVING drain output  - Continue PPI (home med)  - holding home pancreatic enzymes while NPO      : Hx of CKD3. Baseline creatinine ~2-3. JOSY this admission improving. Cr back to baseline . Hypernatremia with Na of 149  - Check renal function panel daily and PRN  - Volume resuscitation as needed  - Maintain U/O >0.5ml/kg/hr  - Maintain Portillo for strict I/Os  - Replete electrolytes per protocol  -      HEME: Acute blood loss anemia. H/H has been stable  - Check CBC and coags daily and PRN  - SCDs for DVT prophylaxis  - Continue SQ heparin  - rectal ASA  - ongoing monitoring for s/s bleeding  - Maintain active T&S (10/12)--> resend today     ENDO: Hx of IDDM2 (lantus 10u daily at home) and amiodarone-induced hyperthyroidism. Hyperglycemia with BG in the 300's 10/14 . Started on insulin infusion, off overnight   - Continue  lantus to 20u daily  -Continue Lispro sliding scale # 3       ID: Sepsis 2/2 E. Coli bacteremia, +BCx 10/5, started Zosyn. Repeat BCxs 10/7 and 10/9 NGTD. Afebrile. Leukocytosis.   - temp q4h, wbc daily  - Transplant ID following, appreciate recs  - continue Zosyn (10/5-), thru 10/18  - Vanco 10/10-10/11  - Continue micafungin (10/11-)  thru 10/18, given bowel perf w/ hx of heart transplant  - ongoing monitoring for s/s infection     Lines:  - L brachial arterial line (10/10, OR)--> remove today  - L subclavian TINO (10/10, OR)  - PIVx2  - R chest mediport   -Place a piccline for TPN and discontinue Subclavian TINO afterwarsds        Dispo: Continue ICU care. Discuss . Stable for floor transfer .Patient seen and discussed with ICU attending     I spent 55 minutes in the professional and overall care of this patient.     Pita Terry PA-C   Team phone [phone]

## 2023-10-16 NOTE — PROGRESS NOTES
"Chung Goodman is a 67 y.o. male w/ PMHx cardiac transplant and pancreatic cancer requiring whipple 5/19/23 on day 9 of admission presenting with SBO (small bowel obstruction) (CMS/HCC).    Subjective     Patient was seen and examined. Had exploratory laparotomy for iatrogenic bowel perforation during endoscopy, had bowel resection and Sullivan anastamosis with gtube placement 10/10. He failed SBT 10/12 and 10/13, was extubated 10/15. Pt more alert and interactive with providers today, wife not at bedside. Output 1.695L urine, 250cc from gastrostomy over the past 24h, BM x1. Pt remains  hemodynamically stable at this time.    Drain: 45cc    Labs significant for Cr 2.68 (2.76), Na 149 (146), Phos 3.4 (3.7). ABG significant for alkalosis 7.45, pCO2 39, pO2 165 and a base excess of 2.9.       Objective     Physical Exam  NEURO: Pt extubated. Responds to name and commands.  HEENT: NCAT, No OGT  CARDIO: RRR  PULM: No signs of respiratory distress, on supplemental O2.  ABD: Soft, mildly distended, Gtube in place with minimal output of gastric contents, Drain with ss output.  Incision packing removed. No purulence or active bleeding, no warmth or erythema. Incision probed and cleaned with swabs, repacked and covered with abdominal pad.     Last Recorded Vitals  Blood pressure (!) 164/93, pulse 76, temperature 36.1 °C (97 °F), temperature source Temporal, resp. rate 21, height 1.765 m (5' 9.49\"), weight 70.5 kg (155 lb 6.8 oz), SpO2 94 %.  Intake/Output last 3 Shifts:  I/O last 3 completed shifts:  In: 958.9 (13.6 mL/kg) [I.V.:58.1 (0.8 mL/kg); IV Piggyback:400]  Out: 3480 (49.4 mL/kg) [Urine:3100 (1.2 mL/kg/hr); Emesis/NG output:325; Drains:55]  Weight: 70.5 kg     Relevant Results  Results for orders placed or performed during the hospital encounter of 10/07/23 (from the past 24 hour(s))   POCT GLUCOSE   Result Value Ref Range    POCT Glucose 131 (H) 74 - 99 mg/dL   Blood Gas Arterial Full Panel   Result Value Ref Range "    POCT pH, Arterial 7.44 (H) 7.38 - 7.42 pH    POCT pCO2, Arterial 43 (H) 38 - 42 mm Hg    POCT pO2, Arterial 127 (H) 85 - 95 mm Hg    POCT SO2, Arterial 99 94 - 100 %    POCT Oxy Hemoglobin, Arterial 97.3 94.0 - 98.0 %    POCT Hematocrit Calculated, Arterial 41.0 41.0 - 52.0 %    POCT Sodium, Arterial 143 136 - 145 mmol/L    POCT Potassium, Arterial 3.4 (L) 3.5 - 5.3 mmol/L    POCT Chloride, Arterial 109 (H) 98 - 107 mmol/L    POCT Ionized Calcium, Arterial 1.35 (H) 1.10 - 1.33 mmol/L    POCT Glucose, Arterial 121 (H) 74 - 99 mg/dL    POCT Lactate, Arterial 0.9 0.4 - 2.0 mmol/L    POCT Base Excess, Arterial 4.4 (H) -2.0 - 3.0 mmol/L    POCT HCO3 Calculated, Arterial 29.2 (H) 22.0 - 26.0 mmol/L    POCT Hemoglobin, Arterial 13.8 13.5 - 17.5 g/dL    POCT Anion Gap, Arterial 8 (L) 10 - 25 mmo/L    Patient Temperature 37.0 degrees Celsius    FiO2 30 %   POCT GLUCOSE   Result Value Ref Range    POCT Glucose 105 (H) 74 - 99 mg/dL   POCT GLUCOSE   Result Value Ref Range    POCT Glucose 107 (H) 74 - 99 mg/dL   Blood Gas Arterial Full Panel   Result Value Ref Range    POCT pH, Arterial 7.45 (H) 7.38 - 7.42 pH    POCT pCO2, Arterial 41 38 - 42 mm Hg    POCT pO2, Arterial 145 (H) 85 - 95 mm Hg    POCT SO2, Arterial 99 94 - 100 %    POCT Oxy Hemoglobin, Arterial 97.5 94.0 - 98.0 %    POCT Hematocrit Calculated, Arterial 29.0 (L) 41.0 - 52.0 %    POCT Sodium, Arterial 143 136 - 145 mmol/L    POCT Potassium, Arterial 3.6 3.5 - 5.3 mmol/L    POCT Chloride, Arterial 111 (H) 98 - 107 mmol/L    POCT Ionized Calcium, Arterial 1.38 (H) 1.10 - 1.33 mmol/L    POCT Glucose, Arterial 107 (H) 74 - 99 mg/dL    POCT Lactate, Arterial 0.8 0.4 - 2.0 mmol/L    POCT Base Excess, Arterial 4.1 (H) -2.0 - 3.0 mmol/L    POCT HCO3 Calculated, Arterial 28.5 (H) 22.0 - 26.0 mmol/L    POCT Hemoglobin, Arterial 9.6 (L) 13.5 - 17.5 g/dL    POCT Anion Gap, Arterial 7 (L) 10 - 25 mmo/L    Patient Temperature 37.0 degrees Celsius    FiO2 32 %   POCT GLUCOSE    Result Value Ref Range    POCT Glucose 102 (H) 74 - 99 mg/dL   POCT GLUCOSE   Result Value Ref Range    POCT Glucose 116 (H) 74 - 99 mg/dL   POCT GLUCOSE   Result Value Ref Range    POCT Glucose 145 (H) 74 - 99 mg/dL   Blood Gas Arterial Full Panel   Result Value Ref Range    POCT pH, Arterial 7.47 (H) 7.38 - 7.42 pH    POCT pCO2, Arterial 42 38 - 42 mm Hg    POCT pO2, Arterial 135 (H) 85 - 95 mm Hg    POCT SO2, Arterial 99 94 - 100 %    POCT Oxy Hemoglobin, Arterial 96.7 94.0 - 98.0 %    POCT Hematocrit Calculated, Arterial 43.0 41.0 - 52.0 %    POCT Sodium, Arterial 144 136 - 145 mmol/L    POCT Potassium, Arterial 4.0 3.5 - 5.3 mmol/L    POCT Chloride, Arterial 109 (H) 98 - 107 mmol/L    POCT Ionized Calcium, Arterial 1.37 (H) 1.10 - 1.33 mmol/L    POCT Glucose, Arterial 166 (H) 74 - 99 mg/dL    POCT Lactate, Arterial 1.4 0.4 - 2.0 mmol/L    POCT Base Excess, Arterial 6.2 (H) -2.0 - 3.0 mmol/L    POCT HCO3 Calculated, Arterial 30.6 (H) 22.0 - 26.0 mmol/L    POCT Hemoglobin, Arterial 14.3 13.5 - 17.5 g/dL    POCT Anion Gap, Arterial 8 (L) 10 - 25 mmo/L    Patient Temperature 37.0 degrees Celsius    FiO2 32 %   POCT GLUCOSE   Result Value Ref Range    POCT Glucose 151 (H) 74 - 99 mg/dL   Renal Function Panel   Result Value Ref Range    Glucose 175 (H) 74 - 99 mg/dL    Sodium 146 (H) 136 - 145 mmol/L    Potassium 4.0 3.5 - 5.3 mmol/L    Chloride 108 (H) 98 - 107 mmol/L    Bicarbonate 27 21 - 32 mmol/L    Anion Gap 15 10 - 20 mmol/L    Urea Nitrogen 64 (H) 6 - 23 mg/dL    Creatinine 2.76 (H) 0.50 - 1.30 mg/dL    eGFR 24 (L) >60 mL/min/1.73m*2    Calcium 10.0 8.6 - 10.6 mg/dL    Phosphorus 3.7 2.5 - 4.9 mg/dL    Albumin 2.8 (L) 3.4 - 5.0 g/dL   POCT GLUCOSE   Result Value Ref Range    POCT Glucose 196 (H) 74 - 99 mg/dL   POCT GLUCOSE   Result Value Ref Range    POCT Glucose 209 (H) 74 - 99 mg/dL   POCT GLUCOSE   Result Value Ref Range    POCT Glucose 248 (H) 74 - 99 mg/dL   POCT GLUCOSE   Result Value Ref Range     POCT Glucose 284 (H) 74 - 99 mg/dL   Blood Gas Arterial Full Panel Unsolicited   Result Value Ref Range    POCT pH, Arterial 7.45 (H) 7.38 - 7.42 pH    POCT pCO2, Arterial 39 38 - 42 mm Hg    POCT pO2, Arterial 165 (H) 85 - 95 mm Hg    POCT SO2, Arterial 100 94 - 100 %    POCT Oxy Hemoglobin, Arterial 97.4 94.0 - 98.0 %    POCT Hematocrit Calculated, Arterial 26.0 (L) 41.0 - 52.0 %    POCT Sodium, Arterial 142 136 - 145 mmol/L    POCT Potassium, Arterial 4.3 3.5 - 5.3 mmol/L    POCT Chloride, Arterial 109 (H) 98 - 107 mmol/L    POCT Ionized Calcium, Arterial 1.37 (H) 1.10 - 1.33 mmol/L    POCT Glucose, Arterial 336 (H) 74 - 99 mg/dL    POCT Lactate, Arterial 2.1 (H) 0.4 - 2.0 mmol/L    POCT Base Excess, Arterial 2.9 -2.0 - 3.0 mmol/L    POCT HCO3 Calculated, Arterial 27.1 (H) 22.0 - 26.0 mmol/L    POCT Hemoglobin, Arterial 8.6 (L) 13.5 - 17.5 g/dL    POCT Anion Gap, Arterial 10 10 - 25 mmo/L    Patient Temperature 37.0 degrees Celsius   POCT GLUCOSE   Result Value Ref Range    POCT Glucose 281 (H) 74 - 99 mg/dL   Calcium, Ionized   Result Value Ref Range    POCT Calcium, Ionized 1.36 (H) 1.1 - 1.33 mmol/L   Magnesium   Result Value Ref Range    Magnesium 1.76 1.60 - 2.40 mg/dL   Coagulation Screen   Result Value Ref Range    Protime 12.6 9.8 - 12.8 seconds    INR 1.1 0.9 - 1.1    aPTT 27 27 - 38 seconds   Hepatic function panel   Result Value Ref Range    Albumin 2.6 (L) 3.4 - 5.0 g/dL    Bilirubin, Total 1.5 (H) 0.0 - 1.2 mg/dL    Bilirubin, Direct 0.7 (H) 0.0 - 0.3 mg/dL    Alkaline Phosphatase 145 (H) 33 - 136 U/L    ALT 23 10 - 52 U/L    AST 21 9 - 39 U/L    Total Protein 5.4 (L) 6.4 - 8.2 g/dL   Basic Metabolic Panel   Result Value Ref Range    Glucose 136 (H) 74 - 99 mg/dL    Sodium 149 (H) 136 - 145 mmol/L    Potassium 3.6 3.5 - 5.3 mmol/L    Chloride 110 (H) 98 - 107 mmol/L    Bicarbonate 27 21 - 32 mmol/L    Anion Gap 16 10 - 20 mmol/L    Urea Nitrogen 65 (H) 6 - 23 mg/dL    Creatinine 2.68 (H) 0.50 - 1.30  mg/dL    eGFR 25 (L) >60 mL/min/1.73m*2    Calcium 9.9 8.6 - 10.6 mg/dL   Phosphorus   Result Value Ref Range    Phosphorus 3.4 2.5 - 4.9 mg/dL   CBC   Result Value Ref Range    WBC 12.9 (H) 4.4 - 11.3 x10*3/uL    nRBC 0.0 0.0 - 0.0 /100 WBCs    RBC 2.77 (L) 4.50 - 5.90 x10*6/uL    Hemoglobin 8.1 (L) 13.5 - 17.5 g/dL    Hematocrit 24.9 (L) 41.0 - 52.0 %    MCV 90 80 - 100 fL    MCH 29.2 26.0 - 34.0 pg    MCHC 32.5 32.0 - 36.0 g/dL    RDW 16.9 (H) 11.5 - 14.5 %    Platelets 276 150 - 450 x10*3/uL    MPV 10.5 7.5 - 11.5 fL   Magnesium   Result Value Ref Range    Magnesium 1.75 1.60 - 2.40 mg/dL   Blood Gas Arterial Full Panel   Result Value Ref Range    POCT pH, Arterial 7.47 (H) 7.38 - 7.42 pH    POCT pCO2, Arterial 39 38 - 42 mm Hg    POCT pO2, Arterial 155 (H) 85 - 95 mm Hg    POCT SO2, Arterial 100 94 - 100 %    POCT Oxy Hemoglobin, Arterial 97.3 94.0 - 98.0 %    POCT Hematocrit Calculated, Arterial 35.0 (L) 41.0 - 52.0 %    POCT Sodium, Arterial 145 136 - 145 mmol/L    POCT Potassium, Arterial 3.2 (L) 3.5 - 5.3 mmol/L    POCT Chloride, Arterial 112 (H) 98 - 107 mmol/L    POCT Ionized Calcium, Arterial 1.31 1.10 - 1.33 mmol/L    POCT Glucose, Arterial 136 (H) 74 - 99 mg/dL    POCT Lactate, Arterial 1.5 0.4 - 2.0 mmol/L    POCT Base Excess, Arterial 4.4 (H) -2.0 - 3.0 mmol/L    POCT HCO3 Calculated, Arterial 28.4 (H) 22.0 - 26.0 mmol/L    POCT Hemoglobin, Arterial 11.6 (L) 13.5 - 17.5 g/dL    POCT Anion Gap, Arterial 8 (L) 10 - 25 mmo/L    Patient Temperature 37.0 degrees Celsius    FiO2 30 %   POCT GLUCOSE   Result Value Ref Range    POCT Glucose 160 (H) 74 - 99 mg/dL           Assessment/Plan   Principal Problem:    SBO (small bowel obstruction) (CMS/HCC)  Active Problems:    Pancreatic cancer (CMS/HCC)    Small bowel obstruction (CMS/HCC)    Pulmonary hypertension (CMS/HCC)    CVA (cerebral vascular accident) (CMS/HCC)    Bowel perforation (CMS/HCC)    Perforated abdominal viscus    Pt is a 68 y/o male w/ PMHx  cardiac transplant in 2017, s/p whipple for 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     Plan:    Neuro  - continue pain management per SICU    CV  - ASA suppository 150mg     Pulm:   - Pt extubated 10/15    GI  - continue monitoring midline incision for signs of infection/dehiscence  - BID dressing changes  - Bedside swallow 10/16, okay for limited CLD amd PO meds if passes  - continue TPN  - G tube to gravity, obtain UGI through G tube today or 10/17    ID  - continue abx per ID    Prophy  - DVT ppx: Heparin    Dispo:  - continued inpatient monitoring, okay for transfer to floor    Discussed with Dr. Huerta, Discussed with Dr. Elizabeth CLARK, MS4

## 2023-10-16 NOTE — CARE PLAN
The patient's goals for the shift include      The clinical goals for the shift include patient will report pain <9/10 by end of shift      Problem: Pain  Goal: Performs ADL's with improved pain control throughout shift  Outcome: Progressing     Problem: Fall/Injury  Goal: Not fall by end of shift  Outcome: Progressing  Goal: Be free from injury by end of the shift  Outcome: Progressing     Problem: Skin  Goal: Prevent/manage excess moisture  Outcome: Progressing  Flowsheets (Taken 10/16/2023 0814)  Prevent/manage excess moisture:   Moisturize dry skin   Monitor for/manage infection if present  Goal: Promote/optimize nutrition  Outcome: Progressing  Flowsheets (Taken 10/16/2023 0814)  Promote/optimize nutrition: Discuss with provider if NPO > 2 days

## 2023-10-16 NOTE — PROGRESS NOTES
"Nutrition Follow-up Note  Assessment    Assessment:  Pt is a 67 year old male s/p EGD on 10/10 for management of afferent limb syndrome and PEG-J placement for venting/feeding, however course c/b afferent limb perforation, emergently transferred to OR s/p ex-lap, SBR, J-J anastomosis, and G-tube placement. Patient transferred to SICU postoperatively for further management.     History:  Food and Nutrient History  Food and Nutrient History: RDN met with pt this afternoon. Pt denies questions regarding TPN. Standard AA 5% and Dextrose 15% running at 40 ml/hr and bag of SMOF lipids at 10.8 ml/hr at time of visit.    Anthropometrics:  Height: 176.5 cm (5' 9.49\")  Weight: 70.5 kg (155 lb 6.8 oz)  BMI (Calculated): 22.63  IBW/kg (Dietitian Calculated): 73 kg    Date/Time Weight   10/16/23 1437 70.5 kg (155 lb 6.8 oz)   10/16/23 0400 70.5 kg (155 lb 6.8 oz)   10/12/23 0600 76.7 kg (169 lb 1.5 oz)    10/10/23 2100 74.1 kg (163 lb 5.8 oz)   10/09/23 1256 72.4 kg (159 lb 9.8 oz)   10/07/23 0418 72.4 kg (159 lb 8.4 oz)   Wt down 1.9 kg since admission    Labs:  Results for orders placed or performed during the hospital encounter of 10/07/23 (from the past 24 hour(s))   POCT GLUCOSE   Result Value Ref Range    POCT Glucose 151 (H) 74 - 99 mg/dL   Renal Function Panel   Result Value Ref Range    Glucose 175 (H) 74 - 99 mg/dL    Sodium 146 (H) 136 - 145 mmol/L    Potassium 4.0 3.5 - 5.3 mmol/L    Chloride 108 (H) 98 - 107 mmol/L    Bicarbonate 27 21 - 32 mmol/L    Anion Gap 15 10 - 20 mmol/L    Urea Nitrogen 64 (H) 6 - 23 mg/dL    Creatinine 2.76 (H) 0.50 - 1.30 mg/dL    eGFR 24 (L) >60 mL/min/1.73m*2    Calcium 10.0 8.6 - 10.6 mg/dL    Phosphorus 3.7 2.5 - 4.9 mg/dL    Albumin 2.8 (L) 3.4 - 5.0 g/dL   POCT GLUCOSE   Result Value Ref Range    POCT Glucose 196 (H) 74 - 99 mg/dL   POCT GLUCOSE   Result Value Ref Range    POCT Glucose 209 (H) 74 - 99 mg/dL   POCT GLUCOSE   Result Value Ref Range    POCT Glucose 248 (H) 74 - 99 mg/dL "   POCT GLUCOSE   Result Value Ref Range    POCT Glucose 284 (H) 74 - 99 mg/dL      POCT GLUCOSE   Result Value Ref Range    POCT Glucose 281 (H) 74 - 99 mg/dL   Calcium, Ionized   Result Value Ref Range    POCT Calcium, Ionized 1.36 (H) 1.1 - 1.33 mmol/L   Magnesium   Result Value Ref Range    Magnesium 1.76 1.60 - 2.40 mg/dL         Result Value Ref Range    Albumin 2.6 (L) 3.4 - 5.0 g/dL    Bilirubin, Total 1.5 (H) 0.0 - 1.2 mg/dL    Bilirubin, Direct 0.7 (H) 0.0 - 0.3 mg/dL    Alkaline Phosphatase 145 (H) 33 - 136 U/L    ALT 23 10 - 52 U/L    AST 21 9 - 39 U/L    Total Protein 5.4 (L) 6.4 - 8.2 g/dL        Pertinent medications:  Scheduled medications  fat emulsion fish oil/plant based, 250 mL, intravenous, Daily  insulin glargine, 20 Units, subcutaneous, Nightly  insulin lispro, 0-15 Units, subcutaneous, q4h  insulin regular, 0-10 Units, intravenous, Once  methylPREDNISolone sodium succinate (PF), 40 mg, intravenous, q24h  pantoprazole, 40 mg, intravenous, BID    Continuous medications  Adult Clinimix TPN, 83 mL/hr  Adult Clinimix TPN, 40 mL/hr, Last Rate: 40 mL/hr (10/16/23 1145)      Energy Needs:  Estimated Energy Needs  Total Energy Estimated Needs (kCal): 1900 kCal  Total Estimated Energy Need per Day (kCal/kg): 25 kCal/kg    Estimated Protein Needs  Total Protein Estimated Needs (g): 100 g  Total Protein Estimated Needs (g/kg): 1.4 g/kg      Diagnosis   Diagnosis:  Malnutrition Diagnosis  Patient has Malnutrition Diagnosis: Yes  Diagnosis Status: Ongoing  Malnutrition Diagnosis: Severe malnutrition related to acute disease or injury  As Evidenced by: <50% estimated intake > 5 days, 10% weight loss over < 1 month and mild/moderate muscle atrophy      Interventions/Recommendations   Interventions/Recommendations:  Nutrition Prescription  Individualized Nutrition Prescription Provided for : TPN   - Increase to goal rate of parenteral nutrition support: will need to advance to 60 ml/hr day 2 and then 83 ml/hr  on day 3(standard TPN formula AA 5% and dextrose 15%)  -SMOF lipids 250 ml x12 hr  - Continue to monitor lytes and replete as needed, POC glucose q6h, strict I/O, daily weights  -Diet per surgery    Monitoring and Evaluation   Monitoring/Evaluation:  Enteral and Parenteral Nutrition Intake: Parenteral nutrition formula/solution  Criteria: pt will receive >/= 80% prescribed enteral feeds    Biochemical Data, Medical Tests and Procedures  Electrolyte and Renal Panel: Magnesium, Phosphorus, Potassium  Criteria: WNL    Criteria: POC glucose 140-180 mg/dl      Follow Up  Time Spent (min): 30 minutes  Last Date of Nutrition Visit: 10/16/23  Nutrition Follow-Up Needed?: Dietitian to reassess per policy  Follow up Comment: TPN

## 2023-10-16 NOTE — CARE PLAN
Piccline placement:    Ok  to proceed with piccline placement despite Hx of CKD 3. Piccline needed for TPN.    Pita Terry PA-C   Team phone [phone]

## 2023-10-17 PROBLEM — K56.609 SBO (SMALL BOWEL OBSTRUCTION) (MULTI): Status: RESOLVED | Noted: 2023-01-01 | Resolved: 2023-01-01

## 2023-10-17 PROBLEM — K63.1 BOWEL PERFORATION (MULTI): Status: RESOLVED | Noted: 2023-01-01 | Resolved: 2023-01-01

## 2023-10-17 PROBLEM — K56.609 SMALL BOWEL OBSTRUCTION (MULTI): Status: RESOLVED | Noted: 2023-01-01 | Resolved: 2023-01-01

## 2023-10-17 PROBLEM — T81.30XA DEHISCENCE OF FASCIA: Status: RESOLVED | Noted: 2023-01-01 | Resolved: 2023-01-01

## 2023-10-17 PROBLEM — R19.8 PERFORATED ABDOMINAL VISCUS: Status: RESOLVED | Noted: 2023-01-01 | Resolved: 2023-01-01

## 2023-10-17 PROBLEM — T81.30XA DEHISCENCE OF FASCIA: Status: ACTIVE | Noted: 2023-01-01

## 2023-10-17 NOTE — SIGNIFICANT EVENT
S:    POD 0 from laparotomy for concern of dehiscence of abdominal incision from a emergent exlap, afferent perforated bowel resection, side to side jj anastomosis on 10/10. Seen in PACU, lethargic but awoke to questions and responsive to name. Able to follow commands including asking birthday, name, squeezing hands, wiggling toes. Reported no pain, felt comfortable. Overall very sleepy.     O:   Vital signs are stable, normotensive, afebrile, no new or worsening oxygen requirement, not tachycardic  Visit Vitals  BP (!) 160/94   Pulse 91   Temp 36 °C (96.8 °F)   Resp 12        Constitutional: no acute distress, arousable  Skin: warm and dry   Neuro: A/O x2, no gross motor and sensory deficits (5/5 UE movements bl, moves toes), answers questions  HEENT: Atraumatic, no scleral icterus  Cardiac: RRR  Pulmonary: Unlabored respirations   Abdomen: Non distended, midline incision with wound vac, draining serosang, appropriately tender R>L to deep palpation, no guarding, no rebound  GI: Voiding    A/P:  Mr. Goodman is a 66yo w history of pancreatic cancer s/p Whipple now POD0 of a laparotomy for fascial dehiscence of prior laparotomy incision this AM. Fascial retention sutures placed. In PACU is mildly lethargic but arousable and neuro intact postoperatively, lethargy likely due to prolonged effects of anesthesia.     - Will continue to optimize pain control as needed.  - Will continue to monitor clinical exam, vitals, I&O's, and labs when available.  - okay for transfer to [address] at this time      Alla Oropeza MD  PGY-1 Vascular Surgery Resident  Surgical Oncology Service  [id number]

## 2023-10-17 NOTE — BRIEF OP NOTE
Date: 10/17/2023  OR Location: [facility] OR    Name: Chung Goodman, : 1955, Age: 67 y.o., MRN: [id number], Sex: male    Diagnosis  Pre-op Diagnosis     * Dehiscence of fascia, sequela [T81.30XS] Post-op Diagnosis     * Dehiscence of fascia, sequela [T81.30XS]     Procedures  Exploration Laparotomy  22546 - ND EXPLORATORY LAPAROTOMY CELIOTOMY W/WO BIOPSY SPX      Surgeons      * Luis Armando Johnson - Primary    Resident/Fellow/Other Assistant:  Malika Montgomery    Procedure Summary  Anesthesia: General  ASA: IV  Anesthesia Staff: Anesthesiologist: Alina Velasco MD  Anesthesia Resident: Grabiel Brown MD  Estimated Blood Loss: 25 mL  Intra-op Medications:   Medication Name Total Dose   sodium chloride 0.9 % irrigation solution 2,000 mL   HYDROmorphone (Dilaudid) injection 0.2 mg 0.6 mg   piperacillin-tazobactam-dextrose (Zosyn) IV 3.375 g 50 mL   labetaloL (Normodyne,Trandate) injection 5 mg 20 mg              Anesthesia Record               Intraprocedure I/O Totals          Intake    lactated Ringer's 900.00 mL    Total Intake 900 mL       Output    Urine 130 mL    Total Output 130 mL       Net    Net Volume 770 mL          Specimen: No specimens collected     Staff:   Circulator: Vera Colon RN; Nyla Guillory RN  Scrub Person: Marybel Paz          Findings: Fascial dehiscence of upper portion of prior laparotomy incision, murky ascites but no obvious feculence or succus; G-tube well approximated to abdominal wall; abdomen widely drained; fascial retention sutures placed    Complications:  None; patient tolerated the procedure well.     Disposition: PACU - hemodynamically stable.  Condition: stable  Specimens Collected: No specimens collected  Attending Attestation:     Luis Armando Johnson  Phone Number: [phone]

## 2023-10-17 NOTE — PROGRESS NOTES
"Chung Goodman is a 67 y.o. 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     Subjective   Overnight, 1 L of fluid drained from midline with fascial dehiscence. Taken back to OR early this morning with murky ascites present. Underwent exlap, washout, drain placement with retention sutures and midline wound vac.       Objective   Physical Exam  Constitutional: Sleepy but arousable on facemask in PACU  Neurological: No focal deficits  Eyes: Non-icteric  Respiratory: No increased work of breathing on face mask  Cardiac: Regular rate  Abdomen: Soft, mildly distended. G tube in place with minimal output, three IRVING drains to bulb suction with serosanguinous output. Midline abdominal wound vac to -75 mmHg   Genitourinary: Portillo in place draining clear yellow urine  Skin: Warm, dry  Musculoskeletal: Moves all extremities  Extremities: No peripheral edema     Last Recorded Vitals  Blood pressure (!) 168/92, pulse 69, temperature 36.1 °C (97 °F), temperature source Tympanic, resp. rate 15, height 1.765 m (5' 9.49\"), weight 70.5 kg (155 lb 6.8 oz), SpO2 100 %.  Intake/Output last 3 Shifts:  I/O last 3 completed shifts:  In: 894.2 (12.7 mL/kg) [IV Piggyback:350]  Out: 2545 (36.1 mL/kg) [Urine:2525 (1 mL/kg/hr); Drains:20]  Weight: 70.5 kg     Relevant Results  Awaiting AM labs.    This patient has a central line   Reason for the central line remaining today? Parenteral medication    This patient has a urinary catheter   Reason for the urinary catheter remaining today? perioperative use for selected surgical procedures       Assessment/Plan   Principal Problem:    SBO (small bowel obstruction) (CMS/HCC)  Active Problems:    Pancreatic cancer (CMS/HCC)    Small bowel obstruction " (CMS/HCC)    Pulmonary hypertension (CMS/HCC)    CVA (cerebral vascular accident) (CMS/HCC)    Bowel perforation (CMS/HCC)    Perforated abdominal viscus    Dehiscence of fascia    Chung Goodman is a 67 y.o. 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 Now s/p ex lap, washout, fascial closure w/abdominal wound vac and drain placement on 10/17 for fascial dehiscence.      Plan  Neuro  - Dilaudid PCA     CV  - ASA suppository 150mg   - Home immunosuppression regimen (tacrolimus, solumedrol)     Pulm:   - Wean O2 as tolerated  - Respiratory  w/duo-nebs TID     GI  - Continue TPN  - Abdominal wound vac to -75 mmHg  - NPO, sips of clears  - G tube to gravity  - Reglan Q6 hours scheduled      ID  - Micafungin, zosyn per ID (tentative end 10/18)    Endo  - Lantus 20 units at night with lispro sliding scale     Prophy  - DVT ppx: Heparin     Dispo:  - Continue Patrice 6, dispo with TPN likely      Discussed with attending surgeon, Dr. Johnson.    Tigist Pendleton MD  PGY3   General Surgery   [facility] pager [phone]

## 2023-10-17 NOTE — ANESTHESIA PREPROCEDURE EVALUATION
Patient: Chung Goodman    Procedure Information       Date/Time: 10/17/23 0445    Procedure: Exploration Laparotomy    Location: [facility] OR 12 / 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)      GI   (+) Gastroesophageal reflux disease      /Renal   (+) Acute on chronic renal failure (CMS/HCC)   (+) Anemia due to stage 3 chronic kidney disease (CMS/HCC)   (+) Chronic kidney disease, stage 4, severely decreased GFR (CMS/HCC)   (+) SOTO (nonalcoholic steatohepatitis)      Neuro/Psych   (+) CVA (cerebral vascular accident) (CMS/HCC)   (+) Generalized anxiety disorder   (+) Peripheral polyneuropathy      Pulmonary   (+) COPD (chronic obstructive pulmonary disease) (CMS/HCC)   (+) Pulmonary hypertension (CMS/HCC)      GI/Hepatic   (+) SOTO (nonalcoholic steatohepatitis)   (+) Pancreatic cancer (CMS/HCC)      Hematology   (+) Anemia due to stage 3 chronic kidney disease (CMS/HCC)   (+) Iron deficiency anemia       Clinical information reviewed:   Tobacco  Allergies  Meds  Problems  Med Hx  Surg Hx   Fam Hx  Soc   Hx        NPO Detail:  No data recorded     Physical Exam    Airway  Mallampati: II  TM distance: >3 FB  Neck ROM: full     Cardiovascular    Dental   (+) upper dentures, lower dentures     Pulmonary    Abdominal          Vitals:    10/17/23 0027   BP: (!) 153/93   Pulse: 88   Resp: 16   Temp: 36.2 °C (97.2 °F)   SpO2: 100%        Chemistry    Lab Results   Component Value Date/Time     (H) 10/16/2023 0328    K 3.6 10/16/2023 0328     (H) 10/16/2023 0328    CO2 27 10/16/2023 0328    BUN 65 (H) 10/16/2023 0328    CREATININE 2.68 (H) 10/16/2023  0328    Lab Results   Component Value Date/Time    CALCIUM 9.9 10/16/2023 0328    ALKPHOS 145 (H) 10/16/2023 0328    AST 21 10/16/2023 0328    ALT 23 10/16/2023 0328    BILITOT 1.5 (H) 10/16/2023 0328          Lab Results   Component Value Date/Time    WBC 12.9 (H) 10/16/2023 0330    HGB 8.1 (L) 10/16/2023 0330    HCT 24.9 (L) 10/16/2023 0330     10/16/2023 0330     Lab Results   Component Value Date/Time    PROTIME 12.6 10/16/2023 0328    INR 1.1 10/16/2023 0328     No results found for this or any previous visit (from the past 4464 hour(s)).  No results found for this or any previous visit from the past 1095 days.    Anesthesia Plan    ASA 4 - emergent     intravenous induction   Anesthetic plan and risks discussed with patient.  Use of blood products discussed with patient who.    Plan discussed with attending.

## 2023-10-17 NOTE — INTERVAL H&P NOTE
H&P reviewed. The patient was examined. There is concern for fascial dehiscence. Plan for return to OR for reexploration, washout, and other indicated procedures.

## 2023-10-17 NOTE — SIGNIFICANT EVENT
Dr. Mcleod at the bedside to check on the patient since the sugammadex was given. Patient's  getting a little stronger but he is still very sleepy. Patient will look around and follow simple commands. Shake his head yes or no but not speak yet. Dr. Mcleod stated to give the patient more time to wake up.

## 2023-10-17 NOTE — PROGRESS NOTES
Subjective Data:  Patient had profuse drainage from abdominal wound incision ~ 1 liter overnight. He was found to have loose fascial sutures. He was taken to the OR for dehiscence of fascia. s/p ex lap, washout, drain placement with retention sutures (10/17). Total urine output: 1.6 liter in the past 24 hours.     Objective Data:  Last Recorded Vitals:  Vitals:    10/17/23 1315 10/17/23 1330 10/17/23 1426 10/17/23 1438   BP: (!) 153/97 (!) 160/94 (!) 164/109 (!) 142/92   BP Location:       Patient Position:       Pulse: 91 92 93 98   Resp: 12 12     Temp: 36 °C (96.8 °F) 36 °C (96.8 °F) 36.4 °C (97.5 °F)    TempSrc:   Temporal    SpO2: 99% 99% 99% 99%   Weight:       Height:         Last Labs:  CBC - 10/17/2023:  7:59 AM  15.1 8.5 316    25.8      CMP - 10/17/2023:  7:59 AM  9.9 5.4 21 --- 1.5   4.6 2.7 23 145      PTT - 10/16/2023:  3:28 AM  1.1   12.6 27     TROPHS   Date/Time Value Ref Range Status   09/26/2023 05:31 AM 18 0 - 53 ng/L Final     Comment:     .  Less than 99th percentile of normal range cutoff-  Female and children under 18 years old <35 ng/L; Male <54 ng/L: Negative  Repeat testing should be performed if clinically indicated.   .  Female and children under 18 years old  ng/L; Male  ng/L:  Consistent with possible cardiac damage and possible increased clinical   risk. Serial measurements may help to assess extent of myocardial damage.   .  >120 ng/L: Consistent with cardiac damage, increased clinical risk and  myocardial infarction. Serial measurements may help assess extent of   myocardial damage.   .   NOTE: Children less than 1 year old may have higher baseline troponin   levels and results should be interpreted in conjunction with the overall   clinical context.  .  NOTE: Troponin I testing is performed using a different   testing methodology at [facility] than at other   [facility] hospitals. Direct result comparisons should only   be made within the same method.      01/17/2023 04:58 PM 13 0 - 20 ng/L Final     Comment:     .  Less than 99th percentile of normal range cutoff-  Female and children under 18 years old <14 ng/L; Male <21 ng/L: Negative  Repeat testing should be performed if clinically indicated.   .  Female and children under 18 years old 14-50 ng/L; Male 21-50 ng/L:  Consistent with possible cardiac damage and possible increased clinical   risk. Serial measurements may help to assess extent of myocardial damage.   .  >50 ng/L: Consistent with cardiac damage, increased clinical risk and  myocardial infarction. Serial measurements may help assess extent of   myocardial damage.   .   NOTE: Children less than 1 year old may have higher baseline troponin   levels and results should be interpreted in conjunction with the overall   clinical context.   .  NOTE: Troponin I testing is performed using a different   testing methodology at [facility] than at other   [facility]. Direct result comparisons should only   be made within the same method.       BNP   Date/Time Value Ref Range Status   05/24/2023 11:41  0 - 99 pg/mL Final     Comment:     .  <100 pg/mL - Heart failure unlikely  100-299 pg/mL - Intermediate probability of acute heart  .               failure exacerbation. Correlate with clinical  .               context and patient history.    >=300 pg/mL - Heart Failure likely. Correlate with clinical  .               context and patient history.   Biotin interference may cause falsely decreased results.   Patients taking a Biotin dose of up to 5 mg/day should   refrain from taking Biotin for 24 hours before sample   collection. Providers may contact their local laboratory   for further information.     10/29/2019 04:07  0 - 99 pg/mL Final     Comment:     .  <100 pg/mL - Heart failure unlikely  100-299 pg/mL - Intermediate probability of acute heart  .               failure exacerbation. Correlate with clinical  .                context and patient history.    >=300 pg/mL - Heart Failure likely. Correlate with clinical  .               context and patient history.  BNP testing is performed using different testing   methodology at [facility] than at other   [facility] hospitals. Direct result comparisons should   only be made within the same method.       HGBA1C   Date/Time Value Ref Range Status   08/15/2023 10:45 AM 8.3 % Final     Comment:          Diagnosis of Diabetes-Adults   Non-Diabetic: < or = 5.6%   Increased risk for developing diabetes: 5.7-6.4%   Diagnostic of diabetes: > or = 6.5%  .       Monitoring of Diabetes                Age (y)     Therapeutic Goal (%)   Adults:          >18           <7.0   Pediatrics:    13-18           <7.5                   7-12           <8.0                   0- 6            7.5-8.5   American Diabetes Association. Diabetes Care 33(S1), Jan 2010.     05/16/2023 04:29 PM 7.4 % Final     Comment:          Diagnosis of Diabetes-Adults   Non-Diabetic: < or = 5.6%   Increased risk for developing diabetes: 5.7-6.4%   Diagnostic of diabetes: > or = 6.5%  .       Monitoring of Diabetes                Age (y)     Therapeutic Goal (%)   Adults:          >18           <7.0   Pediatrics:    13-18           <7.5                   7-12           <8.0                   0- 6            7.5-8.5   American Diabetes Association. Diabetes Care 33(S1), Jan 2010.     10/20/2020 01:11 PM 9.4 4.3 - 5.6 % Final     Comment:     American Diabetes Association guidelines indicate that patients with HgbA1c in   the range 5.7-6.4% are at increased risk for development of diabetes, and   intervention by lifestyle modification may be beneficial. HgbA1c greater or   equal to 6.5% is considered diagnostic of diabetes.   08/09/2016 03:42 PM 8.1 4.2 - 6.5 % Final     VLDL   Date/Time Value Ref Range Status   08/15/2023 10:45 AM 15 0 - 40 mg/dL Final   05/16/2023 04:29 PM 20 0 - 40 mg/dL Final   04/27/2023 01:01 PM 38 0 - 40  mg/dL Final      Last I/O:  I/O last 3 completed shifts:  In: 894.2 (12.7 mL/kg) [IV Piggyback:350]  Out: 2545 (36.1 mL/kg) [Urine:2525 (1 mL/kg/hr); Drains:20]  Weight: 70.5 kg     Inpatient Medications:  Scheduled medications   Medication Dose Route Frequency    aspirin  150 mg rectal Daily    fat emulsion fish oil/plant based  250 mL intravenous Daily    heparin (porcine)  5,000 Units subcutaneous q8h    heparin flush  50 Units intra-catheter q12h    hydrALAZINE  10 mg intravenous q6h    insulin glargine  20 Units subcutaneous Nightly    insulin lispro  0-15 Units subcutaneous q4h    insulin regular  0-10 Units intravenous Once    ipratropium-albuteroL  3 mL nebulization TID    lidocaine  1 mL infiltration Once    lidocaine  1 patch transdermal Daily    [START ON 10/18/2023] methylPREDNISolone sodium succinate (PF)  4 mg intravenous q24h    methylPREDNISolone sodium succinate (PF)  40 mg intravenous q24h    metoclopramide  10 mg intravenous q6h SCOTT    micafungin  100 mg intravenous q24h    pantoprazole  40 mg intravenous BID    piperacillin-tazobactam  3.375 g intravenous q6h    tacrolimus  1 mg sublingual q24h    tacrolimus  2 mg sublingual Daily     PRN medications   Medication    alteplase    alteplase    dextrose 10 % in water (D10W)    dextrose    glucagon    heparin flush    heparin lock flush (porcine)    naloxone    oxygen     Continuous Medications   Medication Dose Last Rate    Adult Clinimix TPN  83 mL/hr 83 mL/hr (10/16/23 2246)    HYDROmorphone         Physical Exam:  Physical Exam  Constitutional:       General: He is not in acute distress.     Appearance: Normal appearance.   HENT:      Head: Normocephalic and atraumatic.   Eyes:      Extraocular Movements: Extraocular movements intact.      Conjunctiva/sclera: Conjunctivae normal.   Cardiovascular:      Rate and Rhythm: Normal rate and regular rhythm.      Heart sounds: Normal heart sounds.   Pulmonary:      Effort: No respiratory distress.       Breath sounds: Normal breath sounds. No wheezing or rales.   Abdominal:      General: Bowel sounds are normal.      Comments: +midline abdominal dressing, +PEG intact     Skin:     General: Skin is warm and dry.        CMP:  Recent Labs     10/12/23  0558 10/13/23  0009 10/13/23  1609 10/14/23  0114 10/14/23  1355 10/15/23  0401 10/15/23  1517 10/16/23  0328 10/16/23  0330 10/17/23  0759    144 143 144 144 148* 146* 149*  --  148*   K 4.3 3.8 4.0 4.1 3.4* 3.4* 4.0 3.6  --  4.4    107 106 104 105 108* 108* 110*  --  110*   CO2 25 27 24 26 27 26 27 27  --  28   ANIONGAP 16 14 17 18 15 17 15 16  --  14   BUN 48* 52* 61* 62* 76* 70* 64* 65*  --  59*   CREATININE 3.21* 3.53* 3.39* 3.39* 3.53* 3.19* 2.76* 2.68*  --  2.17*   EGFR 20* 18* 19* 19* 18* 21* 24* 25*  --  33*   MG 2.93* 2.58*  --  2.43* 2.34 1.98  --  1.76 1.75 1.91       Recent Labs     04/28/23  1639 04/29/23  0945 05/02/23  0036 05/02/23  0603 05/16/23  1629 05/19/23  1810 09/23/23  0830 09/23/23  1806 10/09/23  0212 10/10/23  0621 10/13/23  0009 10/13/23  1005 10/13/23  1609 10/14/23  1355 10/15/23  0401 10/15/23  1517 10/16/23  0328 10/17/23  0759   ALBUMIN 4.1   < > 3.7   < > 4.1   < > 3.5   < > 3.6  3.7   < > 2.6*  2.7* 2.4*   < > 2.7* 2.7* 2.8* 2.6* 2.7*   ALKPHOS 514*   < > 491*   < > 152*   < > 108   < > 601*  601*   < > 202* 170*  --  169* 172*  --  145*  --    *   < > 327*   < > 45   < > 69*   < > 163*  161*   < > 44 38  --  30 28  --  23  --    *   < > 250*   < > 43*   < > 138*   < > 221*  228*   < > 38 28  --  19 22  --  21  --    BILITOT 5.1*   < > 4.7*   < > 1.4*   < > 0.6   < > 3.8*  3.8*   < > 2.9* 2.4*  --  1.5* 1.6*  --  1.5*  --    LIPASE 2,472*  --  8,160*  --  2,036*  --  76  --  210*  --   --   --   --   --   --   --   --   --     < > = values in this interval not displayed.       CBC:  Recent Labs     10/13/23  0009 10/13/23  0536 10/13/23  1609 10/14/23  0114 10/14/23  1355 10/15/23  0401 10/16/23  0330  10/17/23  0759   WBC 17.3* 17.9* 16.3* 15.4* 14.7* 15.8* 12.9* 15.1*   HGB 6.9* 8.0* 8.5* 8.2* 8.1* 8.5* 8.1* 8.5*   HCT 20.9* 24.4* 25.2* 24.8* 24.4* 25.5* 24.9* 25.8*    182 193 200 208 266 276 316   MCV 90 88 86 87 87 86 90 88       COAG:   Recent Labs     05/20/23  0148 05/20/23  1515 10/07/23  0227 10/08/23  0531 10/09/23  0222 10/11/23  0007 10/12/23  0558 10/13/23  0009 10/14/23  0114 10/16/23  0328   INR 1.1   < > 1.1 1.1 1.1 1.2* 1.3* 1.2* 1.0 1.1   FIBRINOGEN 362  --   --   --   --   --   --   --   --   --     < > = values in this interval not displayed.       ABO:   Recent Labs     10/16/23  1308   ABO B       HEME/ENDO:  Recent Labs     12/01/17  1158 12/04/17  0333 03/30/18  1203 10/18/18  1437 03/21/19  1201 07/19/19  1202 11/01/19  0601 02/25/20  1308 04/02/20  0915 07/28/22  0859 04/27/23  1301 05/16/23  1629 08/15/23  1045   FERRITIN 1,297*  --   --   --   --   --  845*  --   --   --   --   --   --    IRONSAT 7*  --  14*  --  23*  --  11*  --   --   --   --   --   --    TSH  --    < >  --    < > 1.08   < >  --  0.91  --  1.06  --  0.69 1.21   HGBA1C  --    < >  --    < > 9.2   < >  --  8.6   < > 8.4* 8.0* 7.4* 8.3*    < > = values in this interval not displayed.        CARDIAC:   Recent Labs     12/04/17  0333 10/29/19  0407 01/17/23  1658 05/24/23  1141 09/26/23  0531 10/05/23  0756   LDH  --   --   --   --  147 305*   TROPHS  --   --  13  --  18  --    * 410*  --  373*  --   --        Recent Labs     04/27/23  1301 05/16/23  1629 08/15/23  1045 10/09/23  0212   CHOL 200* 172 121  --    LDLF 117* 95 61  --    HDL 45.2 57.3 45.8  --    TRIG 189* 99 73 345*       MICRO:   Recent Labs     04/29/23  0945   CRP 2.11*       Susceptibility data from last 90 days.  Collected Specimen Info Organism Ampicillin Cefazolin Ciprofloxacin Gentamicin Levofloxacin Piperacillin/Tazobactam Trimethoprim/Sulfamethoxazole   10/05/23 Blood culture from Peripheral Venipuncture Escherichia coli S S S S S S S    10/05/23 Blood culture from Peripheral Venipuncture Escherichia coli              Assessment/Plan   Chung Goodman is a 67-year-old man with history of NICM secondary to valvular disease, s/p mitral valve repair with [place] [employer]omedics Annuloflex band II with 36 mm, s/p bicaval OHT (3/24/2017) with post-op complicated by atrial arrhythmia, RV failure, chronotropic incompetence, pancreatic adenocarcinoma, s/p Whipple procedure (5/19/23), currently on chemotherapy (modified FOLFIRINOX), HTN, DM type 2, HLD, CKD Stage 3B (baseline creatinine 2), amiodarone-induced hyperthyroidism, JE, MDD, transferred from [facility] for recurrent SBO. Blood culture positive for E coli. [Immunosuppressive regimen at home: Tacrolimus 4 mg/3 mg (Tacrolimus Goal 5-8), prednisone 5 mg daily. ] No rejection history. Latest DSE: negative (5/2023). On 10/16, he had drainage from abdominal wound suture. He was taken to the OR for repair of fascial dehiscence. S/p ex lap, washout, drain placement with retention sutures (10/17).     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 Coli bacteremia, currently on IV antibiotic  # s/p EGD with dilation of afferent limb anastomotic stenosis (10/10/23)  #s/p Ex-lap, small bowel resection, jejuno-jejunostomy, G-tube (10/10/23)  # Perforated afferent limb  # Fascial dehiscence, s/p ex lap, washout, drain placement with retention sutures (10/17)     Recommendations:  - Continue Tacrolimus 2 mg SL at 0630, 1 mg SL at 1830. Tacrolimus target goal: 5-8.   - Check daily Tacrolimus trough level at 0600.  - Continue methylprednisolone 4 mg IV daily while NPO.   - Plan is to transition to oral tacrolimus 4mg at 0630 and 3mg at 1830 and oral prednisone 5mg daily once patient is  able to tolerate oral medications    HF service will continue to follow.     Patient was seen at the bedside with HF attending, Dr. RITESH Rodriguez.     Signed:    Alberto Nj MD  Heart Failure service

## 2023-10-17 NOTE — OP NOTE
Exploration Laparotomy Operative Note     Date: 10/17/2023  OR Location: [facility] OR    Name: Chung Goodman, : 1955, Age: 67 y.o., MRN: [id number], Sex: male    Diagnosis  Pre-op Diagnosis     * Dehiscence of fascia, sequela [T81.30XS] Post-op Diagnosis     * Dehiscence of fascia, sequela [T81.30XS]     Procedures  Exploration Laparotomy  [id number] - [place] EXPLORATORY LAPAROTOMY CELIOTOMY W/WO BIOPSY SPX      Surgeons      * Luis Armando AWAD Winter - Primary    Resident/Fellow/Other Assistant:  No surgical staff documented.    Procedure Summary  Anesthesia: General  ASA: IV  Anesthesia Staff: Anesthesiologist: Alina Velasco MD  Anesthesia Resident: Grabiel Brown MD  Estimated Blood Loss: Minimal  Intra-op Medications:   Medication Name Total Dose   sodium chloride 0.9 % irrigation solution 2,000 mL   lactated Ringer's infusion 33.33 mL   oxygen (O2) therapy 200 L   piperacillin-tazobactam-dextrose (Zosyn) IV 3.375 g 50 mL   labetaloL (Normodyne,Trandate) injection 5 mg 20 mg   HYDROmorphone (Dilaudid) injection 0.2 mg 0.6 mg   oxygen (O2) therapy 210 L              Anesthesia Record               Intraprocedure I/O Totals          Intake    lactated Ringer's 900.00 mL    Total Intake 900 mL       Output    Urine 130 mL    Total Output 130 mL       Net    Net Volume 770 mL          Specimen: No specimens collected     Staff:   Circulator: Vera Colon RN; Nyla Guillory RN  Scrub Person: Marybel Paz         Drains and/or Catheters:   Gastrostomy/Enterostomy Gastrostomy 2 20 Fr. LUQ (Active)   Surrounding Skin Dry;Intact 10/17/23 0221   Drain Status Open to gravity drainage 10/16/23 1200   Drainage Appearance Clear 10/16/23 1200   Site Description Healing 10/16/23 1200   Dressing Status Clean;Dry 10/17/23 0221   Dressing Type Dry dressing 10/16/23 1200   Output (mL) 0 mL 10/15/23 2000       Urethral Catheter Other (Comment) 16 Fr. (Active)   Site Assessment Clean;Skin intact 10/17/23 0116   Collection  Container Standard drainage bag 10/16/23 1200   Securement Method Securing device (Describe) 10/16/23 1200   Reason for Continuing Urinary Catheterization accurate hourly measurement of urine volume in a critically ill patient that cannot be assessed by other volumes and urine collection strategies 10/16/23 0800   Output (mL) 250 mL 10/17/23 0027       [REMOVED] Closed/Suction Drain RUQ Bulb (Removed)   Site Description Reddened 10/16/23 1200   Dressing Status Clean;Dry 10/16/23 1200   Drainage Appearance Serosanguineous 10/16/23 1200   Status To bulb suction 10/16/23 1200   Output (mL) 10 mL 10/16/23 0400   Intake (ml) 15 ml 10/14/23 0000       [REMOVED] NG/OG/Feeding Tube Nasogastric 16 Fr Right nostril (Removed)       [REMOVED] NG/OG/Feeding Tube Nasogastric 16 Fr Right nostril (Removed)       [REMOVED] NG/OG/Feeding Tube Nasogastric 16 Fr Right nostril (Removed)       [REMOVED] NG/OG/Feeding Tube Nasogastric 18 Fr Right nostril (Removed)   Tube Status Unclamped 10/10/23 0300   Placement Verification Measurements 10/08/23 2014   Gastric Aspirate Grassy green (gastric) 10/08/23 2014   Distal Tube Measurement 65 cm 10/10/23 0300   Site Assessment Clean;Dry;Intact 10/10/23 0401   Drainage Appearance Green 10/10/23 0300   NG/OG Interventions Air injected into blue air vent port 10/10/23 0300   Response To Intervention No resistance met 10/10/23 0300   Tube Securement Taped to nostril center 10/10/23 0300   Output (mL) 100 mL 10/10/23 0900       Tourniquet Times:         Implants:     Findings: The patient had murky ascites emanating from his upper incision.  This was concerning for acute dehiscence.  This dehiscence was likely secondary to the wound infection, from a previous perforation 1 week ago as well as the patient's immunosuppression and malnutrition.    Indications: Chung Goodman is an 67 y.o. male who is having surgery for Dehiscence of fascia, sequela [T81.30XS].     The patient was seen in the  preoperative area. The risks, benefits, complications, treatment options, non-operative alternatives, expected recovery and outcomes were discussed with the patient. The possibilities of reaction to medication, pulmonary aspiration, injury to surrounding structures, bleeding, recurrent infection, the need for additional procedures, failure to diagnose a condition, and creating a complication requiring transfusion or operation were discussed with the patient. The patient concurred with the proposed plan, giving informed consent.  The site of surgery was properly noted/marked if necessary per policy. The patient has been actively warmed in preoperative area. Preoperative antibiotics have been ordered and given within 1 hours of incision. Venous thrombosis prophylaxis have been ordered including bilateral sequential compression devices    Procedure Details: The patient was taken to the operating room after consent was obtained.  Antibiotics were given.  The patient was intubated without incident under general anesthesia.  After timeout and preoperative briefing, we prepped the abdomen with Betadine solution.  We then remove the staples.  We carefully remove the sutures that were in place and the fascia and open the abdomen through its previous incision.  We irrigated the abdomen with 500 cc of sterile solution.  We suction back the irrigant.  We carefully inspected all 4 quadrants.  The abdominal viscera was socked in.  The G-tube site was pexied without any dehiscence of the stomach from the abdominal wall.  There is no evidence of succus entericus.  There was some murky ascites fluid in the left upper quadrant as well as a little bit in the general peritoneal cavity.  At this point, we placed 3 drains, 19 Thai.  We placed them in the left upper quadrant right lower quadrant and left lower quadrant.  These were the pockets at risk for abscess formation.  We then closed the abdominal fascia.  We closed the fascia  with interrupted #2 nylon's.  We also used red rubber bumper and retention sutures using #2 nylon x3.  We left the skin open and applied a black VAC sponge.    The patient was extubated without incident and was transferred to do a monitored bed in a satisfactory condition.    I discussed the operative findings with the patient's wife in the waiting area.    All counts reported as correct.  Complications:  None; patient tolerated the procedure well.    Disposition: PACU - hemodynamically stable.  Condition: stable         Additional Details:     Attending Attestation:     Luis Armando Johnson  Phone Number: [phone]

## 2023-10-17 NOTE — SIGNIFICANT EVENT
Notified by nursing around 0315 that patient is having copious drainage from midline abdominal incision wounds. On arrival to bedside, pt is HDS, denies abdominal pain, but did report some burning from midline wound. Wife at bedside revealed that patient's entire gown and hospital bedding was soaked with serous fluid.     Exam:  Patient uncomfortable but nontoxic appearing  Abdomen soft, minimally tender to palpation  Midline laparotomy wound with intermittent staples intact. Inferior aspect of wound is open, packing removed, draining serous fluid. Upon probing, fascia appears intact however fascial sutures are loose. Small, 1 inch opening along superior aspect of wound seems to be draining fluid more profusely. Able to probe deep into wound but challenging to visualize, unable to r/o fascial dehiscence.     Plan:  OR for reexploration and washout of wound    Seen with chief, Dr. Merchant. Discussed with Dr. Johnson.    Malika Ferreira MD  PGY-2 General Surgery  Surgical Oncology

## 2023-10-17 NOTE — CONSULTS
SUPPORTIVE AND PALLIATIVE ONCOLOGY CONSULT    Consults  SERVICE DATE: 10/17/2023      PALLIATIVE MEDICINE OUTPATIENT PROVIDER:  No  CURRENT ATTENDING PROVIDER: Luis Armando Johnson MD     Medical Oncologist: MD Kianna Tinajero MD   Radiation Oncologist: No care team member to display  Primary Physician: Elena Rodriguez  [phone]    REASON FOR CONSULT/CHIEF CONSULT COMPLAINT: pain management    Subjective   HISTORY OF PRESENT ILLNESS: Chung Goodman is a 67 y.o. male with history of heart transplant X 2017, pancreatic cancer s/p Whipple's on 5/19/2023, recurrent pancreatitis, CKD 3, recent admission for SBO(9/23-9/29), admitted to [facility] on 10/5 with emesis, fever and scans showed SBO and blood cultures grew E. coli.  Patient was transferred to [facility] on 10/7 for further management of small bowel obstruction.  Patient had venting PEG and NG tube on 10/10 however complicated by SBO perforation and emergent laparotomy with partial SB resection.  On 10/17 early morning found to have fascial dehiscence of abdominal  incision and underwent emergency laparotomy.  Fascial retention sutures placed.  Supportive oncology consulted for pain management    Pain Assessment:  Onset:.  Today Hours.  Post op after surgery today    Location:  abdomen at the surgical site  Duration:  acute post op just came out of the OR  Characteristics:   Rating: Severe   Descriptors:  unable to describe patient is still coming out of anesthesia medication  effect   Aggravating:  movement     Treatment/Home Regimen: Oxycodone IR 5 mg every 4-6 hours as needed and tramdol 50 mg twice daily as needed prn.  and patient used to take approximately 3 to 4 tablets of oxycodone/tramadol with pain relief.   Intolerances:Chung Goodman is allergic to amiodarone and morphine.   Personal Pain Goal:    unable to assess   Interference with Function: Very Much   Coping Strategies:  Music   Emotional Response:  Unable to  assess   Barriers to Pain Management:  None    Symptom Assessment:  Unable to obtain secondary to sedation due to anesthesia.  However wife was able to provide update  Pain: As above  Headache: none  Dizziness:none  Lack of energy: a little  Difficulty sleeping: none  Worrying: none  Anxiety: none  Depression:  Unable to assess  Pain in mouth/swallowing: none  Dry mouth: a little  Taste changes: none  Shortness of breath: none  Lack of appetite: none   Nausea: none  Vomiting: none  Constipation: none  Diarrhea: none  Sore muscles: none  Numbness or tingling in hands/feet/other: none  Weight loss: somewhat due to decreased ability to eat secondary to nausea vomiting and diarrhea prior to hospitalization  Other:  None    Opioid Use  Past 24 h prn opioid use: Hydromorphone 0.2 mg IV x 7 doses = 1.4 mg = 17.5 OME  Total 24h OME use: 17.5 mg OME    Information obtained from: interview of patient and interview of family  ______________________________________________________________________     Oncology History   Pancreatic cancer (CMS/HCC)   8/16/2023 -  Chemotherapy    FOLFIRINOX (Fluorouracil Continuous Infusion / Leucovorin / Irinotecan / Oxaliplatin), 14 Day Cycles     9/21/2023 Initial Diagnosis    Pancreatic cancer (CMS/HCC)         Past Medical History:   Diagnosis Date    Myopia, bilateral 06/06/2017    Bilateral myopia    Other disorders of electrolyte and fluid balance, not elsewhere classified 11/03/2014    Electrolyte and fluid disorder    Other visual disturbances 06/06/2017    Blurred vision, bilateral    Personal history of diseases of the blood and blood-forming organs and certain disorders involving the immune mechanism 06/06/2017    History of immunocompromised state    Personal history of diseases of the blood and blood-forming organs and certain disorders involving the immune mechanism 12/17/2014    History of anemia    Personal history of diseases of the skin and subcutaneous tissue 08/01/2014     History of contact dermatitis    Personal history of other diseases of the circulatory system 08/21/2017    History of pulmonary hypertension    Personal history of other mental and behavioral disorders 11/07/2017    History of depression    Personal history of other mental and behavioral disorders 06/21/2018    History of anxiety    Personal history of other specified conditions 07/26/2017    History of bradycardia    Personal history of other specified conditions 09/04/2014    History of abnormal weight loss    Personal history of urinary (tract) infections 05/12/2017    History of urinary tract infection    Restlessness and agitation     Restlessness and agitation    Rheumatic mitral valve disease, unspecified 12/01/2017    Rheumatic mitral valve disease, unspecified    Right heart failure, unspecified (CMS/[facility]) 05/22/2017    RVF (right ventricular failure)    Unspecified exotropia 09/12/2014    Consecutive exotropia    Unspecified intermittent heterotropia 06/06/2017    Exotropia, intermittent     Past Surgical History:   Procedure Laterality Date    AORTIC VALVE REPLACEMENT  09/03/2014    Aortic Valve Replacement    MITRAL VALVE REPLACEMENT  12/17/2014    Mitral Valve Replacement    OTHER SURGICAL HISTORY  09/06/2013    Mitral Valve Repair    OTHER SURGICAL HISTORY  02/12/2016    Cardio-defib Pulse Generator Implantation Date    OTHER SURGICAL HISTORY  02/12/2016    Cardio-Defib Pulse Gen Venous Attach Electrode To Prev Placed Defibrillator    OTHER SURGICAL HISTORY  12/01/2017    Cardiac Transplant Procedures    OTHER SURGICAL HISTORY  12/01/2017    Sternotomy    OTHER SURGICAL HISTORY  12/17/2014    Cardiac Cath Procedure Outcome: Successful     Family History   Problem Relation Name Age of Onset    COPD Mother      Other (systemic lupus erythematosus) Mother      Hypertension Father          benign    Arthritis Other Family History     Ulcerative colitis Other Family History     Gout Other Family History      Psoriasis Other Family History     Other (systemic lupus erythematosus) Other Family History         SOCIAL HISTORY  Support system wife no kids,   Hobbies music, and   Spiritual Practice believes in God  Social History:  reports that he quit smoking about 22 years ago. His smoking use included cigarettes. He has never used smokeless tobacco. He reports that he does not drink alcohol and does not use drugs.      Jewish and Importance of Jewish:  Buddhism     REVIEW OF SYSTEMS  Review of systems negative unless noted in HPI.       Objective     Palliative Performance Scale % (PPS)  70 prior to hospitalization     Current Outpatient Medications   Medication Instructions    acetaminophen (Tylenol) 325 mg tablet 2 tab(s) orally every 6 hours, As needed, Pain - Mild (1-3)    Adult Clinimix TPN 83 mL/hr (83 mL/hr), intravenous, Daily TPN    albuterol 90 mcg/actuation inhaler use 2 (TWO) puffs FOUR TIMES DAILY    allopurinol (Zyloprim) 100 mg tablet     aspirin 81 mg chewable tablet CHEW AND SWALLOW 1 TABLET DAILY.    blood sugar diagnostic (OneTouch Verio test strips) strip TEST 3 TIMES DAILY    buPROPion XL (WELLBUTRIN XL) 150 mg, oral, Daily RT    busPIRone (BUSPAR) 5 mg, oral, 3 times daily    calcium carbonate 600 mg calcium (1,500 mg) tablet 1 tablet, oral, 2 times daily    cholecalciferol (Vitamin D-3) 125 MCG (5000 UT) capsule 1 capsule, oral, Daily    citalopram (CeleXA) 40 mg tablet     diphenoxylate-atropine (Lomotil) 2.5-0.025 mg tablet 2 tablets 4 times a day as needed for diarrhea.    fat emulsion fish oil/plant based (SMOFlipid) 20 % emulsion 50 g, intravenous, Daily    fenofibrate (Triglide) 160 mg tablet 1 tablet, oral, Daily    ferrous sulfate 325 (65 Fe) MG tablet 1 tablet, oral, Daily    gabapentin (Neurontin) 100 mg capsule Take 2 capsules (200 mg) by mouth once daily at bedtime.    heparin (porcine) 5,000 Units, subcutaneous, Every 8 hours    heparin flush 50 Units, intra-catheter, Every 12 hours     hydrALAZINE (Apresoline) 50 mg tablet 1 tablet, oral, 2 times daily    icosapent ethyL (Vascepa) 1 gram capsule 1 capsule, oral, 2 times daily    insulin glargine (Lantus U-100 Insulin) 100 unit/mL injection Inject 10 Units under the skin once daily in the morning.    insulin lispro (HumaLOG) 100 unit/mL injection subcutaneous, 3 times daily with meals, Using a sliding scale    lidocaine-prilocaine (Emla) 2.5-2.5 % cream Apply topically to affected area 30 min prior to port    magnesium oxide (Mag-Ox) 400 mg (241.3 mg magnesium) tablet Take 2 tablets (800 mg) by mouth 2 times a day.    multivitamin with minerals tablet 1 tablet DAILY (route: oral)    oxygen (O2) 3 L/min, inhalation, Every 24 hours    pantoprazole (PROTONIX) 40 mg, oral, 2 times daily    rosuvastatin (CRESTOR) 40 mg, oral, Nightly    tacrolimus (Prograf) 1 mg capsule TAKE 4 CAPSULES every morning and TAKE 3 CAPSULES every evening     Scheduled medications   aspirin, 150 mg, rectal, Daily  fat emulsion fish oil/plant based, 250 mL, intravenous, Daily  heparin (porcine), 5,000 Units, subcutaneous, q8h  heparin flush, 50 Units, intra-catheter, q12h  hydrALAZINE, 10 mg, intravenous, q6h  insulin glargine, 20 Units, subcutaneous, Nightly  insulin lispro, 0-15 Units, subcutaneous, q4h  insulin regular, 0-10 Units, intravenous, Once  ipratropium-albuteroL, 3 mL, nebulization, TID  lidocaine, 1 mL, infiltration, Once  lidocaine, 1 patch, transdermal, Daily  [START ON 10/18/2023] methylPREDNISolone sodium succinate (PF), 4 mg, intravenous, q24h  methylPREDNISolone sodium succinate (PF), 40 mg, intravenous, q24h  metoclopramide, 10 mg, intravenous, q6h SCOTT  micafungin, 100 mg, intravenous, q24h  pantoprazole, 40 mg, intravenous, BID  piperacillin-tazobactam, 3.375 g, intravenous, q6h  tacrolimus, 1 mg, sublingual, q24h  tacrolimus, 2 mg, sublingual, Daily      Continuous medications  Adult Clinimix TPN, 83 mL/hr, Last Rate: 83 mL/hr (10/16/23 2246)      PRN  medications  PRN medications: alteplase, alteplase, dextrose 10 % in water (D10W), dextrose, glucagon, heparin flush, heparin lock flush (porcine), HYDROmorphone, HYDROmorphone, naloxone, oxygen     Allergies:   Allergies   Allergen Reactions    Amiodarone Other     Adverse reaction    Morphine Other     Itching         PHYSICAL EXAMINATION  Vital Signs:   Vital signs reviewed  Vitals:    10/17/23 1827   BP: 147/90   Pulse: 98   Resp:    Temp: 36.6 °C (97.9 °F)   SpO2: 100%     Pain Score: 0 - No pain     Physical Exam  Constitutional:       Appearance: Normal appearance.      Comments: Patient was little sleepy since he is postop however was able to answer questions appropriately and was alert   HENT:      Head: Normocephalic and atraumatic.      Nose: Nose normal.      Mouth/Throat:      Mouth: Mucous membranes are moist.   Eyes:      Extraocular Movements: Extraocular movements intact.      Conjunctiva/sclera: Conjunctivae normal.      Pupils: Pupils are equal, round, and reactive to light.   Cardiovascular:      Rate and Rhythm: Normal rate and regular rhythm.      Heart sounds: Normal heart sounds.   Pulmonary:      Effort: Pulmonary effort is normal.   Abdominal:      General: Abdomen is flat.      Palpations: Abdomen is soft.      Comments: Abdominal binder present   Musculoskeletal:      Cervical back: Neck supple.      Comments: Could not be assessed   Skin:     General: Skin is warm and dry.   Neurological:      General: No focal deficit present.      Mental Status: He is alert and oriented to person, place, and time.   Psychiatric:         Mood and Affect: Mood normal.               Results from last 7 days   Lab Units 10/17/23  0759 10/16/23  0330 10/15/23  0401 10/12/23  0558 10/11/23  0007   WBC AUTO x10*3/uL 15.1* 12.9* 15.8*   < > 15.5*   HEMOGLOBIN g/dL 8.5* 8.1* 8.5*   < > 9.1*   HEMATOCRIT % 25.8* 24.9* 25.5*   < > 27.6*   PLATELETS AUTO x10*3/uL 316 276 266   < > 284   LYMPHO PCT MAN %  --   --    --   --  1.6   MONO PCT MAN %  --   --   --   --  3.9   EOSINO PCT MAN %  --   --   --   --  0.0    < > = values in this interval not displayed.     Results from last 7 days   Lab Units 10/17/23  0759 10/16/23  0328 10/15/23  1517 10/15/23  0401 10/14/23  1355   SODIUM mmol/L 148* 149* 146* 148* 144   POTASSIUM mmol/L 4.4 3.6 4.0 3.4* 3.4*   CHLORIDE mmol/L 110* 110* 108* 108* 105   CO2 mmol/L 28 27 27 26 27   BUN mg/dL 59* 65* 64* 70* 76*   CREATININE mg/dL 2.17* 2.68* 2.76* 3.19* 3.53*   CALCIUM mg/dL 9.9 9.9 10.0 9.8 9.5   PROTEIN TOTAL g/dL  --  5.4*  --  5.6* 5.3*   BILIRUBIN TOTAL mg/dL  --  1.5*  --  1.6* 1.5*   ALK PHOS U/L  --  145*  --  172* 169*   ALT U/L  --  23  --  28 30   AST U/L  --  21  --  22 19   GLUCOSE mg/dL 107* 136* 175* 140* 255*     XR ABDOMEN 1 VIEW;  10/16/2023   Impression:     1.  Contrast again only noted within the gastric body. No contrast  material seen in the small bowel, secondary to high-grade small bowel  obstruction.  2. Postsurgical changes as described above.     XR CHEST 1 VIEW;  10/16/2023     Impression:     Interval removal of ET tube. Limited pulmonary inflation. Streaky  bibasilar infiltrates or atelectasis.     CT ABDOMEN PELVIS WO IV CONTRAST;  10/9/2023     mpression:     1. No significant change in appearance of small-bowel obstruction  with a transition point in the central abdomen. No evidence of  pneumatosis.  2. Postsurgical changes status post Whipple procedure with  dislodgement of the pancreatic duct stent seen in the small bowel  similar compared to prior and mild hilar hepatic region fat stranding  similar compared to prior.  3. Additional chronic findings similar to previous imaging from  10/05/2023 and as detailed above.       ASSESSMENT/PLAN  Chung Goodman is a 67 y.o. male with history of heart transplant X 2017, pancreatic cancer s/p Whipple's on 5/19/2023, recurrent pancreatitis, CKD 3, recent admission for SBO(9/23-9/29), admitted to [facility] on  10/5 with emesis, fever and scans showed SBO and blood cultures grew E. coli.  Patient was transferred to [facility] on 10/7 for further management of small bowel obstruction.  Patient had venting PEG and NG tube on 10/10 however complicated by SBO perforation and emergent laparotomy with partial SB resection.  On 10/17 early morning found to have fascial dehiscence of abdominal  incision and underwent emergency laparotomy.  Fascial retention sutures placed.  Supportive oncology consulted for pain management    Pain  Pain is: acute on chronic and post-operative  Type: visceral and somatic  Pain control: sub-optimally controlled  Home regimen: Gabapentin 200 mg p.o. nightly  Oxycodone IR 5 mg every 4-6 hours as needed and tramdol 50 mg twice daily as needed prn.  and patient  Intolerances/previously tried: Morphine itching  Personalized pain goal: Could not be established since patient was sleepy.  ORT-OUD Score: Total Score: 1  due to mental health history of depression indicating Opioid Risk Category: Low  of future opioid use disorder.  Past 24 h prn opioid use: Hydromorphone 0.2 mg IV x 7 doses = 1.4 mg = 17.5 OME  Total 24h OME use: 17.5 mg OME  Recommend Dilaudid PCA as below  Dose 0.2 mg  Lockout to 10 minutes  Nurse breakthrough 0.4 mg every hour as needed  We will consider adding gabapentin 200 mg p.o. nightly from tomorrow  Continue Lidoderm patch     Nausea   At risk for nausea with vomiting related to Postoperative  Continue Reglan 10 mg IV every 6 hours scheduled    Constipation  At risk for constipation related to opioids,    Currently postop day 0  Will monitor    Altered Mood  Chronic depression   controlled with home regimen  Home regimen:   Wellbutrin  mg p.o. daily  BuSpar 5 mg p.o. 3 times daily  Will consider restarting home medications tomorrow once he is more awake      Weight loss prior to admission secondary to nausea vomiting related to likely obstruction and cancer along with  diarrhea  Monitor    Supportive Interventions: Interventions: Music Therapy: referral placed pain management, Art Therapy: referral placed, Social work referral for: Advance Directives    Introduction to Supportive and Palliative Oncology:  Spoke with wife at bedside  Introduced the role and philosophy of Supportive and Palliative oncology in the evaluation and management of symptoms during cancer treatment  Palliative care was introduced as a service for patients with serious illness to help with symptoms, assist with goals of care conversations, navigate complex decision making, improve quality of life for patients, and provide support both patients and families.  Patient seemed to appreciate the extra layer of support.     Medical Decision Making/Goals of Care/Advance Care Planning:  Patient's current clinical condition, including diagnosis, prognosis, and management plan, and goals of care were discussed.   Life limiting disease: metastatic malignancy  Family: Supportive lives at home with wife  Performance status: Major limitations due to pain and disease process  Joys/meaning/strength: Music, believes in God  Understanding of health: Demonstrates good prognostic understanding of disease process, understands that 4 months ago patient had Whipple's procedure and then was admitted 2 weeks ago due to concern for bowel obstruction.  1 week ago patient had revision of the pulse however developed complications and had to redo Whipple's.  Last night he had leakage from abdominal area and hence went for emergency surgery early morning today.  Wife also understands that patient has recurrence of cancer  Information:Wants full disclosure  Goals: symptom control  Code status discussion: We had conversation regarding resuscitation and intubation in the event of cardiac arrest.  Wife stated that she has not discussed this with her  however feels that resuscitation and intubation may not be appropriate.  She will  discuss with her       Advance Directives  Existence of Advance Directives:Yes, but NOT documented in medical record  Decision maker: Surrogate decision maker is wife  Code Status: Full code  Living will none    Disposition  Please  start the process of having prior authorization with meds to beds deliver medications to patient prior to discharge via [facility] pharmacy. Prescriptions will need to be sent 48-72 hours prior to discharge so that a prior authorization can be completed.     Discharge date: unknown pending acute issues, pain control, and symptom control  Will request an appointment with Outpatient Supportive Oncology       Signature and billing  Thank you for allowing us to participate in the care of this patient. Recommendations will be communicated back to the consulting service by way of shared electronic medical record or face-to-face.    Medical complexity was high level due to due to complexity of problems, extensive data review, and high risk of management/treatment.    Time:     I spent 30  minutes in the care of this patient in discussing advance care planning/ goals of care discussions (discussing pt's beliefs, values and goals/wishes for aggressive medical care, desire for hospice vs palliative care)        DATA   Diagnostic tests and information reviewed for today's visit:  Conversation with primary team, Most recent labs and imaging results, Medications       Plan of Care discussed with: Provider and Family/Significant Other: Wife    Thank you for asking Supportive and Palliative Oncology to assist with care of this patient.  We will continue to follow  Please contact us for additional questions or concerns.      SIGNATURE: Lyndsey Shipley MD  PAGER/CONTACT:  Contact information:  Supportive and Palliative Oncology  Monday-Friday 8 AM-5 PM  Epic Secure chat or pager [phone].  After hours and weekends:  pager [phone]

## 2023-10-17 NOTE — SIGNIFICANT EVENT
1305- Dr. Mcleod was at the bedside to see patient and stated the patient can go to the floor.  Service was also at the bedside at 1230 and Dr. Oropeza stated ok for patient to go to floor.

## 2023-10-17 NOTE — ANESTHESIA PROCEDURE NOTES
Airway  Date/Time: 10/17/2023 5:20 AM  Urgency: elective    Airway not difficult    Staffing  Performed: resident   Authorized by: Alina Velasco MD    Performed by: Grabiel Brown MD  Patient location during procedure: OR    Indications and Patient Condition  Indications for airway management: anesthesia  Spontaneous ventilation: present  Sedation level: deep  Preoxygenated: yes  Patient position: sniffing  MILS not maintained throughout  Mask difficulty assessment: 1 - vent by mask    Final Airway Details  Final airway type: endotracheal airway      Successful airway: ETT  Cuffed: yes   Successful intubation technique: direct laryngoscopy  Facilitating devices/methods: intubating stylet  Endotracheal tube insertion site: oral  Blade: Smiin  Blade size: #4  ETT size (mm): 7.5  Cormack-Lehane Classification: grade I - full view of glottis  Placement verified by: capnometry   Measured from: teeth  ETT to teeth (cm): 22  Number of attempts at approach: 1

## 2023-10-18 NOTE — CONSULTS
Inpatient consult to Endocrinology  Consult performed by: Leslie Saucedo MD  Consult ordered by: Tigist Veras, APRN-CNP  Reason for consult: T2DM management        Reason For Consult  T2DM management     History Of Present Illness  Chung Goodman is a 67 y.o. M with a PMH of cardiac transplant (3/24/17) secondary to NICM due to valvular disease, pancreatic adenocarcinoma s/p Whipple surgery (5/19/23) currently on modified FOLFIRINOX, h/o recurrent pancreatitis, HTN, DMII, HLD, COPD, CKDIII, amiodarone-induced hyperthyroidism, JE, MDD transferred from [facility] to [facility] 10/7/23 due to recurrent symptomatic afferent limb syndrome in addition to recent fevers, E. Coli bacteremia, and LFT derangements. Patient has had complicated hospital course thus far, he is now s/p upper endoscopy with iatrogenic perforation and emergent exploratory laparotomy, afferent perforated bowel resection, side to side jj anastomosis and serosal patch with g tube on 10/10. And again s/p ex lap, washout, fascial closure w/abdominal wound vac and drain placement on 10/17 for fascial dehiscence. He remains on IV antibiotics and IV antifungals as well per ID.      Endocrinology was consulted for T2DM management.     Diabetes History  Endocrinologist: Dr. Loya, last seen 8/9/23  Last A1c: 8.3% (8/15/23), 7.4% (5/16/23), 8.0% (4/27/23), 8.4% (7/28/22), 9.8% (10/11/21)  Home regimen: Insulin glargine 10 units daily, insulin lispro sliding scale 1:50>200 (per most recent discharge summary on 9/29); prior per Dr. Loya's note in August, he was to take insulin glargine 12 units daily, insulin lispro 2 units TID w/ meals, and insulin lispro sliding scale 1:50>200. Previously on Freestyle calixto as well.   Accuchecks: Freestyle calixto, unknown what his home BGs were running prior to admission  Hypoglycemia: Unknown, unable to ask patient  Complications: No known Retinopathy (up to date on eye exam) or neuropathy (monofilament 6/6  bilaterally in 8/2023). Has some nephropathy and CKD (last urine alb/Cr 887.6 ug/mg crt on 7/2022),   Cr GFR: ~b/l Cr around 2.0, eGFR ~30s  Statin: Rosuvastatin 40mg daily  Ace: None   ASA: ASA 81mg daily    Current Diet: Continuous TPN at 83mL/hr (started on 10/9 at 40mL/hr and increased to 83mL/hr in the PM of 10/16 until present) and continuous fat emulsion 20% IV infusion 50gm at 10.42 mL/hr (10/9-10/11 and restarted 10/16 until present)   Hospital regimen: Insulin glargine 15 units QHS and insulin lispro sliding scale #1 (1:50>150) Q4H   Steroids: Prednisone 5mg daily at home. While inpatient:  -10/10: Dexamethasone 4mg IV x1  -10/12: Methylprednisolone 4mg IV daily  -10/13-10/16: Methylprednisolone 40mg IV daily   -10/17: Dexamethasone 4mg IV x1  -10/18: Methylprednisolone 4mg IV daily (received at 0007)     Pt very sleepy and unable to provide history. Wife at bedside able to provide some of the above information, rest obtained from chart review.     Of note, patient also has history of amiodarone-induced hyperthyroidism dating back to 2015. He has been off amiodarone since 2016. He was on methimazole from 3761-8663. His TFTs have remained WNL since, with most recent TSH 1.21 (8/15/23).    See HPI for ROS  10 point ROS unable to be obtained 2/2 to patient mental status.     Home Management    Results from Most Recent A1C  TR HGBA1C (Data Conversion)   Date/Time Value Ref Range Status   08/09/2016 03:42 PM 8.1 (H) 4.2 - 6.5 % Final     Hemoglobin A1C   Date/Time Value Ref Range Status   08/15/2023 10:45 AM 8.3 (A) % Final     Comment:          Diagnosis of Diabetes-Adults   Non-Diabetic: < or = 5.6%   Increased risk for developing diabetes: 5.7-6.4%   Diagnostic of diabetes: > or = 6.5%  .       Monitoring of Diabetes                Age (y)     Therapeutic Goal (%)   Adults:          >18           <7.0   Pediatrics:    13-18           <7.5                   7-12           <8.0                   0- 6             7.5-8.5   American Diabetes Association. Diabetes Care 33(S1), Jan 2010.     10/20/2020 01:11 PM 9.4 (H) 4.3 - 5.6 % Final     Comment:     American Diabetes Association guidelines indicate that patients with HgbA1c in   the range 5.7-6.4% are at increased risk for development of diabetes, and   intervention by lifestyle modification may be beneficial. HgbA1c greater or   equal to 6.5% is considered diagnostic of diabetes.        Diabetes Problem List Entries with Dates  Problem List:  2023-06: Type 2 diabetes mellitus with diabetic chronic kidney   disease (CMS/[facility])    No current facility-administered medications on file prior to encounter.     Current Outpatient Medications on File Prior to Encounter   Medication Sig Dispense Refill    acetaminophen (Tylenol) 325 mg tablet 2 tab(s) orally every 6 hours, As needed, Pain - Mild (1-3)      albuterol 90 mcg/actuation inhaler use 2 (TWO) puffs FOUR TIMES DAILY      allopurinol (Zyloprim) 100 mg tablet       aspirin 81 mg chewable tablet CHEW AND SWALLOW 1 TABLET DAILY.      blood sugar diagnostic (OneTouch Verio test strips) strip TEST 3 TIMES DAILY      buPROPion XL (Wellbutrin XL) 150 mg 24 hr tablet Take 1 tablet (150 mg) by mouth once daily.      busPIRone (Buspar) 5 mg tablet Take 1 tablet (5 mg) by mouth 3 times a day.      calcium carbonate 600 mg calcium (1,500 mg) tablet Take 1 tablet (600 mg) by mouth 2 times a day.      cholecalciferol (Vitamin D-3) 125 MCG (5000 UT) capsule Take 1 capsule (125 mcg) by mouth once daily.      citalopram (CeleXA) 40 mg tablet       diphenoxylate-atropine (Lomotil) 2.5-0.025 mg tablet 2 tablets 4 times a day as needed for diarrhea.      fenofibrate (Triglide) 160 mg tablet Take 1 tablet (160 mg) by mouth once daily.      ferrous sulfate 325 (65 Fe) MG tablet Take 1 tablet (325 mg) by mouth once daily.      gabapentin (Neurontin) 100 mg capsule Take 2 capsules (200 mg) by mouth once daily at bedtime.      hydrALAZINE  (Apresoline) 50 mg tablet Take 1 tablet (50 mg) by mouth 2 times a day.      icosapent ethyL (Vascepa) 1 gram capsule Take 1 capsule (1 g) by mouth twice a day.      insulin glargine (Lantus U-100 Insulin) 100 unit/mL injection Inject 10 Units under the skin once daily in the morning.      insulin lispro (HumaLOG) 100 unit/mL injection Inject under the skin 3 times a day with meals. Using a sliding scale      lidocaine-prilocaine (Emla) 2.5-2.5 % cream Apply topically to affected area 30 min prior to port      magnesium oxide (Mag-Ox) 400 mg (241.3 mg magnesium) tablet Take 2 tablets (800 mg) by mouth 2 times a day.      multivitamin with minerals tablet 1 tablet DAILY (route: oral)      pantoprazole (ProtoNix) 40 mg EC tablet Take 1 tablet (40 mg) by mouth twice a day.      rosuvastatin (Crestor) 40 mg tablet Take 1 tablet (40 mg) by mouth once daily at bedtime.      tacrolimus (Prograf) 1 mg capsule TAKE 4 CAPSULES every morning and TAKE 3 CAPSULES every evening          Past Medical History  He has a past medical history of Myopia, bilateral (06/06/2017), Other disorders of electrolyte and fluid balance, not elsewhere classified (11/03/2014), Other visual disturbances (06/06/2017), Personal history of diseases of the blood and blood-forming organs and certain disorders involving the immune mechanism (06/06/2017), Personal history of diseases of the blood and blood-forming organs and certain disorders involving the immune mechanism (12/17/2014), Personal history of diseases of the skin and subcutaneous tissue (08/01/2014), Personal history of other diseases of the circulatory system (08/21/2017), Personal history of other mental and behavioral disorders (11/07/2017), Personal history of other mental and behavioral disorders (06/21/2018), Personal history of other specified conditions (07/26/2017), Personal history of other specified conditions (09/04/2014), Personal history of urinary (tract) infections  "(05/12/2017), Restlessness and agitation, Rheumatic mitral valve disease, unspecified (12/01/2017), Right heart failure, unspecified (CMS/HCC) (05/22/2017), Unspecified exotropia (09/12/2014), and Unspecified intermittent heterotropia (06/06/2017).    Surgical History  He has a past surgical history that includes Other surgical history (09/06/2013); Other surgical history (02/12/2016); Other surgical history (02/12/2016); Other surgical history (12/01/2017); Other surgical history (12/01/2017); Other surgical history (12/17/2014); Mitral valve replacement (12/17/2014); and Aortic valve replacement (09/03/2014).     Social History  He reports that he quit smoking about 22 years ago. His smoking use included cigarettes. He has never used smokeless tobacco. He reports that he does not drink alcohol and does not use drugs.    Family History  Family History   Problem Relation Name Age of Onset    COPD Mother      Other (systemic lupus erythematosus) Mother      Hypertension Father          benign    Arthritis Other Family History     Ulcerative colitis Other Family History     Gout Other Family History     Psoriasis Other Family History     Other (systemic lupus erythematosus) Other Family History         Allergies  Amiodarone and Morphine    Physical Exam  General: Patient very sleepy, unable to awaken by voice  HEENT: EOMI, PERRL. Oral cavity mucosa moist  Lungs: No increased WOB   Heart: S1 and S2, regular.  Abd: Distended, wrapped in abdominal binder, wound vac draining dark brown liquid  Ext: no LE edema.  Skin: warm, dry.  Neuro: Somnolent       ROS, PMH, FH/SH, surgical history and allergies have been reviewed.    Last Recorded Vitals  Blood pressure 159/90, pulse 102, temperature 36.5 °C (97.7 °F), temperature source Temporal, resp. rate 16, height 1.765 m (5' 9.49\"), weight 70.5 kg (155 lb 6.8 oz), SpO2 95 %.    Relevant Results  Results from last 7 days   Lab Units 10/18/23  0913 10/18/23  0516 10/18/23  0407 " 10/18/23  0031 10/17/23  2002 10/17/23  1821 10/17/23  0805 10/17/23  0759 10/16/23  0739 10/16/23  0328 10/15/23  1612 10/15/23  1517 10/15/23  0544 10/15/23  0401   POCT GLUCOSE mg/dL 215*  --  202* 310* 338* 318*   < >  --    < >  --    < >  --    < >  --    GLUCOSE mg/dL  --  166*  --   --   --   --   --  107*  --  136*  --  175*  --  140*    < > = values in this interval not displayed.      Scheduled medications  aspirin, 150 mg, rectal, Daily  fat emulsion fish oil/plant based, 250 mL, intravenous, Daily  heparin (porcine), 5,000 Units, subcutaneous, q8h  heparin flush, 50 Units, intra-catheter, q12h  hydrALAZINE, 10 mg, intravenous, q6h  insulin glargine, 15 Units, subcutaneous, Nightly  insulin regular, 0-5 Units, subcutaneous, q6h  insulin regular, 5 Units, subcutaneous, q6h  lidocaine, 1 mL, infiltration, Once  lidocaine, 1 patch, transdermal, Daily  methylPREDNISolone sodium succinate (PF), 4 mg, intravenous, q24h  metoclopramide, 10 mg, intravenous, q6h SCOTT  micafungin, 100 mg, intravenous, q24h  pantoprazole, 40 mg, intravenous, BID  piperacillin-tazobactam, 3.375 g, intravenous, q6h  tacrolimus, 1 mg, sublingual, q24h  tacrolimus, 2 mg, sublingual, Daily      Continuous medications  Adult Clinimix TPN, 83 mL/hr, Last Rate: 83 mL/hr (10/17/23 2212)      PRN medications  PRN medications: alteplase, alteplase, dextrose 10 % in water (D10W), dextrose, glucagon, heparin flush, heparin lock flush (porcine), HYDROmorphone, HYDROmorphone, ipratropium-albuteroL, naloxone, oxygen     Results for orders placed or performed during the hospital encounter of 10/07/23 (from the past 24 hour(s))   POCT GLUCOSE   Result Value Ref Range    POCT Glucose 169 (H) 74 - 99 mg/dL   POCT GLUCOSE   Result Value Ref Range    POCT Glucose 318 (H) 74 - 99 mg/dL   POCT GLUCOSE   Result Value Ref Range    POCT Glucose 338 (H) 74 - 99 mg/dL   POCT GLUCOSE   Result Value Ref Range    POCT Glucose 310 (H) 74 - 99 mg/dL   POCT GLUCOSE    Result Value Ref Range    POCT Glucose 202 (H) 74 - 99 mg/dL   Tacrolimus level   Result Value Ref Range    Tacrolimus  4.5 <=15.0 ng/mL   CBC   Result Value Ref Range    WBC 17.0 (H) 4.4 - 11.3 x10*3/uL    nRBC 0.0 0.0 - 0.0 /100 WBCs    RBC 3.03 (L) 4.50 - 5.90 x10*6/uL    Hemoglobin 8.7 (L) 13.5 - 17.5 g/dL    Hematocrit 26.9 (L) 41.0 - 52.0 %    MCV 89 80 - 100 fL    MCH 28.7 26.0 - 34.0 pg    MCHC 32.3 32.0 - 36.0 g/dL    RDW 16.8 (H) 11.5 - 14.5 %    Platelets 382 150 - 450 x10*3/uL    MPV 11.3 7.5 - 11.5 fL   Comprehensive Metabolic Panel   Result Value Ref Range    Glucose 166 (H) 74 - 99 mg/dL    Sodium 148 (H) 136 - 145 mmol/L    Potassium 4.3 3.5 - 5.3 mmol/L    Chloride 110 (H) 98 - 107 mmol/L    Bicarbonate 25 21 - 32 mmol/L    Anion Gap 17 10 - 20 mmol/L    Urea Nitrogen 66 (H) 6 - 23 mg/dL    Creatinine 2.36 (H) 0.50 - 1.30 mg/dL    eGFR 29 (L) >60 mL/min/1.73m*2    Calcium 9.5 8.6 - 10.6 mg/dL    Albumin 2.5 (L) 3.4 - 5.0 g/dL    Alkaline Phosphatase 114 33 - 136 U/L    Total Protein 5.6 (L) 6.4 - 8.2 g/dL    AST 15 9 - 39 U/L    Bilirubin, Total 1.2 0.0 - 1.2 mg/dL    ALT 17 10 - 52 U/L   Magnesium   Result Value Ref Range    Magnesium 1.77 1.60 - 2.40 mg/dL   POCT GLUCOSE   Result Value Ref Range    POCT Glucose 215 (H) 74 - 99 mg/dL      Lab Results   Component Value Date    TSH 1.21 08/15/2023    TSH 0.69 05/16/2023    TSH 1.06 07/28/2022    TSH 0.91 02/25/2020    TSH 0.62 10/05/2019    FREET4 1.24 02/25/2020    FREET4 1.34 10/05/2019    FREET4 1.22 07/19/2019    FREET4 0.97 03/21/2019    FREET4 1.21 10/18/2018          Lab Results   Component Value Date    HGBA1C 8.3 (A) 08/15/2023    HGBA1C 7.4 (A) 05/16/2023    HGBA1C 8.0 (A) 04/27/2023    HGBA1C 8.4 (A) 07/28/2022    HGBA1C 9.8 (A) 10/11/2021        Assessment/Plan   Chung Goodman is a 67 y.o. M with a PMH of cardiac transplant (3/24/17) secondary to NICM due to valvular disease, pancreatic adenocarcinoma s/p Whipple surgery (5/19/23)  currently on modified FOLFIRINOX, h/o recurrent pancreatitis, HTN, DMII, HLD, COPD, CKDIII, amiodarone-induced hyperthyroidism, JE, MDD transferred from [facility] to [facility] 10/7/23 due to recurrent symptomatic afferent limb syndrome in addition to recent fevers, E. Coli bacteremia, and LFT derangements. Patient has had complicated hospital course thus far, he is now s/p upper endoscopy with iatrogenic perforation and emergent exploratory laparotomy, afferent perforated bowel resection, side to side jj anastomosis and serosal patch with g tube on 10/10. And again s/p ex lap, washout, fascial closure w/abdominal wound vac and drain placement on 10/17 for fascial dehiscence. He remains on IV antibiotics and IV antifungals as well per ID.    Endocrinology was consulted for T2DM management.     Endocrinologist: Dr. Loya, last seen 8/9/23  Last A1c: 8.3% (8/15/23), 7.4% (5/16/23), 8.0% (4/27/23), 8.4% (7/28/22), 9.8% (10/11/21)  Home regimen: Insulin glargine 10 units daily, insulin lispro sliding scale 1:50>200 (per most recent discharge summary on 9/29); prior per Dr. Loya's note in August, he was to take insulin glargine 12 units daily, insulin lispro 2 units TID w/ meals, and insulin lispro sliding scale 1:50>200. Previously on Freestyle calixto as well.   Accuchecks: Freestyle calixto, unknown what his home BGs were running prior to admission  Hypoglycemia: Unknown, unable to ask patient    Current Diet: Continuous TPN at 83mL/hr (started on 10/9 at 40mL/hr and increased to 83mL/hr in the PM of 10/16 until present; held on 10/17 for emergent surgical procedure) and continuous fat emulsion 20% IV infusion 50gm at 10.42 mL/hr (10/9-10/11 and restarted 10/16 until present; held on 10/17 for emergent surgical procedure)   Steroids: Prednisone 5mg daily at home.  While inpatient:  -10/10: Dexamethasone 4mg IV x1  -10/12: Methylprednisolone 4mg IV daily  -10/13-10/16: Methylprednisolone 40mg IV daily   -10/17:  Dexamethasone 4mg IV x1  -10/18-present: Methylprednisolone 4mg IV daily (received at 0007)     #Hx of amiodarone-induced hyperthyroidism:   ::Diagnosed in 2015. He has been off amiodarone since 2016. He was on methimazole from 7420-0090. His TFTs have remained WNL since, with most recent TSH 1.21 (8/15/23).    RECOMMENDATIONS:  -As patient has been acutely ill, do not recommend rechecking TFTs at this time  -Can continue outpatient follow-up     #T2DM   ::Patient's insulin requirements have varied quite a bit as he has been on continuous TPN/lipids which have been intermittently held for procedures (most recently on 10/17) and has been exposed to various doses of steroids (currently planned to be on methylprednisolone 4mg IV daily, received first dose 10/18 at 0007). TDD in past 24 hours ~56 units (though TPN held intermittently and steroid changed during that time).     RECOMMENDATIONS:  -Updated HbA1C pending  -Goal -180  -DECREASE insulin glargine from 20 units to 15 units Q24H  -START scheduled regular insulin 5 units Q6H  -START regular insulin sliding scale #1 (1:50>150) Q6H  -STOP insulin lispro sliding scale #3 Q4H  -Accuchecks Q6H   -Hypoglycemia protocol    IF TPN AND LIPIDS HELD:   -Goal -180  -CHANGE Insulin glargine to 10 units Q24H  -HOLD scheduled regular insulin   -CHANGE regular insulin sliding scale #1 (1:50>150) Q6H to insulin lispro sliding scale #1 (1:50>150) Q4H  -Accuchecks Q4H   -Hypoglycemia protocol     -Please contact/secure message if/when pt's TPN changes or if TF/D5 are started, and 1-2 days prior to anticipated discharge for optimal planning and transition to home regimen    Discharge recommendations:  -TBD, patient was on insulin at home therefore this is not a new start  -Patient will need to establish with new endocrinologist (last seen by Dr. Loya in August 2023)    Endocrine will continue to follow.  Plan communicated with primary team via phone.  Please message on  secure chat (8AM-5PM) or page [phone] with any questions or concerns.  Patient seen, discussed, and examined with Dr. Jacob who agrees with the management plan.

## 2023-10-18 NOTE — PROGRESS NOTES
"Chung Goodman is a 67 y.o. 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 RTOR on 10/17 for fascial dehiscence with 1L murky ascites drained, drain placed, and retention sutures with midline wound vac.    Subjective   Overnight, pt continued to be sleepy. PCA was discontinued as a result and pt to receive PRN pain medication. This AM, pt is more interactive but still very sleepy. Output 1.27L urine, 185 from gastrostomy, no BM recorded.    Drains:  RLQ: 105cc  LLQ: 160cc  LLat: 300cc  Midline wound vac: 50cc       Objective   Physical Exam  Constitutional: Sleepy but arousable on RA  Neurological: No focal deficits  Eyes: Non-icteric  Respiratory: No increased work of breathing on face mask  Cardiac: Regular rate  Abdomen: Soft, mildly distended. G tube in place with minimal output, three IRVING drains to bulb suction with serosanguinous output. Midline abdominal wound vac to -75 mmHg   Genitourinary: Portillo in place draining clear yellow urine  Skin: Warm, dry  Musculoskeletal: Moves all extremities  Extremities: No peripheral edema     Last Recorded Vitals  Blood pressure 159/90, pulse 102, temperature 36.5 °C (97.7 °F), temperature source Temporal, resp. rate 16, height 1.765 m (5' 9.49\"), weight 70.5 kg (155 lb 6.8 oz), SpO2 95 %.  Intake/Output last 3 Shifts:  I/O last 3 completed shifts:  In: 2797 (39.7 mL/kg) [I.V.:1690 (24 mL/kg)]  Out: 2570 (36.5 mL/kg) [Urine:1770 (0.7 mL/kg/hr); Emesis/NG output:185; Drains:615]  Weight: 70.5 kg     Relevant Results  AM labs significant for WBC 17.0 and Hgb 8.7. CMP remarkable for Cr 2.36 from 2.17. Mg 1.77 from 1.9.    This patient has a central line   Reason for the central line remaining today? Parenteral medication    This " patient has a urinary catheter   Reason for the urinary catheter remaining today? perioperative use for selected surgical procedures       Assessment/Plan   Active Problems:    Pancreatic cancer (CMS/HCC)    Pulmonary hypertension (CMS/HCC)    CVA (cerebral vascular accident) (CMS/HCC)    Chung Goodman is a 67 y.o. 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 Now s/p ex lap, washout, fascial closure w/abdominal wound vac and drain placement on 10/17 for fascial dehiscence.     Plan  Neuro  - Dilaudid IV q2h PRN  - return to PCA if less sleepy and pain does not improve  - Follow up Supportive Onc recs     CV  - ASA suppository 150mg   - Home immunosuppression regimen (tacrolimus, solumedrol)     Pulm:   - Respiratory  w/duo-nebs TID  - Continue pulse oximetry to monitor respiratory status 2/2 sleepiness  - Encourage incentive spirometry 10x/h     GI  - Continue TPN at goal for 24h, begin cycling 10/19  - Abdominal wound vac to -75 mmHg  - NPO, sips of clears  - G tube to gravity  - Reglan Q6 hours scheduled      ID  - Micafungin, zosyn per ID (tentative end 10/18)  - COVID test     Endo  - Lantus 20 units at night with lispro sliding scale  - Consult Endo for assistance with glucose control as well as plan for outpatient management, appreciate recs     Prophy  - DVT ppx: Heparin  - Encourage ambulation     Dispo:  - Continue Patrice 6, dispo with TPN likely    Discussed with attending surgeon, Dr. Johnson.    Natividad Florentino, MS4    Medical student note reviewed and edited where appropriate.     Tigist Pendleton MD  PGY3   General Surgery   [facility] pager [phone]

## 2023-10-18 NOTE — NURSING NOTE
"1230 wife asking for pain meds. notified wife pt not due for pain meds until 0130    0115 wife requesting for pain meds to be given early, requesting to see MD. Pt unable to give pain level. Per wife pt moaning, however not witnessed by RN.    0130 MD at bedside notified pt unable to give early and RN to come to room since meds due at this time    0143 medication administered; pt wife upset pain med given late. Apologized and notified wife I was in another pt room and awaiting MD to come to bedside as requested. Wife stated \"so you are just going to keep putting it off on the doctor instead of just saying you are the reason why his meds are late\". I reiterated to pts wife the reasons pain meds were late. Wife began yelling stated \"just give him the meds, if you don't want to be his nurse get someone else who does\". Notified wife that I'm here to take care of her  and not withholding medication from him and to call if her or the pt needed anything else.   "

## 2023-10-18 NOTE — PROGRESS NOTES
SUPPORTIVE AND PALLIATIVE ONCOLOGY INPATIENT FOLLOW-UP      SERVICE DATE: 10/18/2023    Subjective   HISTORY OF PRESENT ILLNESS:  Chung Goodman is a 67 y.o. male on hospital day #11 with history of heart transplant X 2017, pancreatic cancer s/p Whipple's on 2023, recurrent pancreatitis, CKD 3, recent admission for SBO (-), who was admitted to [facility] on 10/5 with emesis, fever. Scans showed SBO and blood cultures grew E. coli.  Patient was transferred to [facility] on 10/7 for further management of SBO. Pt had venting PEG and NG tube on 10/10, however course c/b SBO with perf requiring emergent laparotomy with partial SB resection. On 10/17 pt found to have fascial dehiscence of abdominal incision and underwent emergency laparotomy with fascial retention sutures placed. Supportive oncology consulted for pain management.    Upon assessment, pt remains extremely lethargic and not participating in interview. RR currently 14. Pt's wife present at the bedside and states she feels that his overall mental status has been improving, but that pt gets very drowsy after PRN hydromorphone administration.     Pain Assessment:  Onset: Acute post operative pain r/t emergency laparotomy performed on 10/17   Location: Abdomen/surgical site  Duration:  Chronic continuous cancer pain, acute continuous post-op pain with flare-ups    Characteristics:  Treatment/Home Regimen: Gabapentin 200 mg PO HS, Oxycodone IR 5 mg every 4-6 hours PRN, and tramdol 50 mg BID PRN.   Intolerances: Pt allergic to morphine (rx: itching).  Emotional Response:  No acute emotional response noted  Barriers to Pain Management: Side effects: lethargy    NPAT: 0  Emotion: 0  Movement: 0  Facial Cues: 0  Positioning/Guardin    Past 24h opioid use:    Hydromorphone PCA d/c'd 10/17 @ 1900 --not documentation of amount received  Hydromorphone 0.2mg IV PRN x 5 = 1mg = 20 OME    Documented pain scores ranging from 6-10 out of 10 over last  24h    Symptom Assessment:  Unable to complete ROS r/t AMS and lack of pt participation in interview.    Pt's wife denies noticing any additional symptoms other than pain at this time.     Information obtained from: chart review, interview of patient, and interview of family  ______________________________________________________________________        Objective     Estimated Creatinine Clearance: 30.3 mL/min (A) (by C-G formula based on SCr of 2.36 mg/dL (H)).     PHYSICAL EXAMINATION  Vital Signs:   Vital signs reviewed  Vitals:    10/18/23 0922   BP: 159/90   Pulse: 102   Resp: 16   Temp: 36.5 °C (97.7 °F)   SpO2: 95%     Pain Score: 6     Physical Exam  Vitals and nursing note reviewed.   Constitutional:       Comments: Lethargic, ill appearing male laying in bed, in no acute distress, not participating in interview r/t lethargy and AMS.     HENT:      Head:      Comments: Normocephalic, atraumatic.       Mouth/Throat:      Comments: Extremely dry, tan, cracked tongue.  Eyes:      Comments: Sclera clear.   Neck:      Comments: Trachea midline.  Pulmonary:      Comments: Symmetrical chest rise. Regular rate and shallow depth of respirations. Room air.   Abdominal:      Comments: Abdominal binder present. Three bulb suction drains noted. Wound vac appears in place underneath abdominal binder. Gastrostomy/Enterostomy draining to gravity. Green bile present in drainage bag. No kinks or leakage noted from any tubing.   Genitourinary:     Comments: Portillo catheter in place. No trauma noted around penis. Clear, yellow drainage noted in bag.   Musculoskeletal:      Comments: Generalized muscle weakness and atrophy. No edema appreciated in arms or legs.   Skin:     Capillary Refill: Capillary refill takes less than 2 seconds.      Comments: No lesions, rash, or abrasions present on visible skin. Skin color appropriate for ethnicity.   Neurological:      Comments: Unable to establish orientation due to lethargy and lack of  pt participation, generalized weakness. Not following commands at this time.    Psychiatric:      Comments: Flat affect, no acute emotional disturbances noted. Pt lethargic and not participating in interview.        ASSESSMENT/PLAN    Chung Goodman is a 67 y.o. male on hospital day #11 with history of heart transplant X 2017, pancreatic cancer s/p Whipple's on 5/19/2023, recurrent pancreatitis, CKD 3, recent admission for SBO (9/23-9/29), who was admitted to [facility] on 10/5 with emesis, fever. Scans showed SBO and blood cultures grew E. coli.  Patient was transferred to [facility] on 10/7 for further management of SBO. Pt had venting PEG and NG tube on 10/10, however course c/b SBO with perf requiring emergent laparotomy with partial SB resection. On 10/17 pt found to have fascial dehiscence of abdominal incision and underwent emergency laparotomy with fascial retention sutures placed. Supportive oncology consulted for pain management.    Neoplasm Related Pain  Acute Post Operative Pain  Pain is: Chronic cancer related, acute post-operative  Type: Visceral and somatic  Pain control: Sub-optimally controlled  Home regimen: Gabapentin 200 mg PO HS, Oxycodone IR 5 mg q4-6h PRN, Tramdol 50mg BID PRN  Intolerances/previously tried: Morphine allergy (rx: itching)  Personalized pain goal: Could not be established since patient was sleepy.  ORT-OUD Score: Total Score: 1  due to mental health history of depression indicating Opioid Risk Category: Low  of future opioid use disorder.  Past 24 h prn opioid use: Hydromorphone 0.2 mg IV x 7 doses = 1.4 mg = 17.5 OME  Total 24h OME use: 20 OME  (10/18) Estimated Creatinine Clearance: 30.3 mL/min (A) (by C-G formula based on SCr of 2.36 mg/dL (H)).  (10/18) LFTs WNL  Hydromorphone PCA d/c'd by primary 10/17 @ 1900 r/t pt lethargy  Change Hydromorphone 0.2mg IV q2h PRN for moderate (4-6) pain and severe (7-10) pain  Discontinue Hydromorphone 0.4mg IV q2h PRN for severe (7-10)  pain--concerns for pt lethargy at this time.  May consider starting home gabapentin 200mg PO HS when mentation improves  Continue lidocaine 4% patch daily     Nausea  At risk for nausea with vomiting related to SBO, malignancy, and acute postoperative period   Last documented EKG (5/31/23) in eMAR showing QTc of 495  Reduce Reglan dose to 5 mg IV q6h, Estimated Creatinine Clearance: 30.3 mL/min (A) (by C-G formula based on SCr of 2.36 mg/dL (H)).--Lexicomp recommending reducing pt's total daily dose by 50% for CrCl of 10 to 60 ml/min     Constipation  At risk for constipation related to opioids, immobility, malignancy   Post op day #1--Gastrostomy/Enterostomy LUQ open to gravity  Last BM: unknown date  Will continue monitor     Altered Mood  Hx chronic depression   Well controlled with home regimen  Home regimen: Wellbutrin XL 150mg PO daily, Buspar 5mg PO TID  May consider starting home Wellbutrin XL 150mg PO daily when mentation improves  May consider starting home Buspar 5mg PO TID when mentation improves     Altered Nutrition   Weight loss prior to admission 2/2 n/v likely r/t SBO, malignancy, and diarrhea  10% weight loss over < 1 month  Monitor weight per unit protocol  TPN per primary, pt currently NPO with small sips of clears allowed  Dietician following     Supportive Interventions: Interventions: Music Therapy: referral placed pain management, Art Therapy: referral placed, Social work referral for: Advance Directives     Introduction to Supportive and Palliative Oncology:  Spoke with wife at bedside  Introduced the role and philosophy of Supportive and Palliative oncology in the evaluation and management of symptoms during cancer treatment  Palliative care was introduced as a service for patients with serious illness to help with symptoms, assist with goals of care conversations, navigate complex decision making, improve quality of life for patients, and provide support both patients and  families.  Patient seemed to appreciate the extra layer of support.      Medical Decision Making/Goals of Care/Advance Care Planning:  (10/18) Patient's current clinical condition, including diagnosis, prognosis, and management plan, and goals of care were discussed.   Life limiting disease: metastatic malignancy  Family: Supportive lives at home with wife  Performance status: Major limitations due to pain and disease process  Joys/meaning/strength: Music, believes in God  Understanding of health: Demonstrates good prognostic understanding of disease process, understands that 4 months ago patient had Whipple's procedure and then was admitted 2 weeks ago due to concern for bowel obstruction.  1 week ago patient had revision of the pulse however developed complications and had to redo Whipple's.  Last night he had leakage from abdominal area and hence went for emergency surgery early morning today.  Wife also understands that patient has recurrence of cancer  Information: Wants full disclosure  Goals: Symptom control  Code status discussion: We had conversation regarding resuscitation and intubation in the event of cardiac arrest.  Wife stated that she has not discussed this with her  however feels that resuscitation and intubation may not be appropriate.  She will discuss with her .     Advance Directives  Existence of Advance Directives: Yes, but NOT documented in medical record  Decision maker: Surrogate decision maker is wife  Code Status: Full code  Living will: None     Disposition  Please  start the process of having prior authorization with meds to beds deliver medications to patient prior to discharge via [facility] pharmacy. Prescriptions will need to be sent 48-72 hours prior to discharge so that a prior authorization can be completed.      Discharge date: unknown pending acute issues, pain control, and symptom control  TBD if pt will need an appointment with Outpatient Supportive Oncology       Signature and billing  Thank you for allowing us to participate in the care of this patient. Recommendations will be communicated back to the consulting service by way of shared electronic medical record or face-to-face.    Medical complexity was high level due to due to complexity of problems, extensive data review, and high risk of management/treatment.    I spent 70 minutes in the care of this patient which included chart review, interviewing patient/family, discussion with primary team, coordination of care, and documentation.    DATA   Diagnostic tests and information reviewed for today's visit:  Conversation with pt's wife, Most recent labs and imaging results, Most recent EKG, Medications     Some elements copied from Supportive Oncology note on 10/17/2023, the elements have been updated and all reflect current decision making from today, 10/18/2023.    Plan of Care discussed with: Selena Ochoa CNP    Thank you for asking Supportive and Palliative Oncology to assist with care of this patient.  We will continue to follow.  Please contact us for additional questions or concerns.    SIGNATURE: MAX Roberts-FERDINAND  PAGER/CONTACT: [id number]  Contact information:  Supportive and Palliative Oncology  Monday-Friday 8 AM-5 PM  Epic Secure chat or pager [phone].  After hours and weekends:  pager [phone]

## 2023-10-18 NOTE — PROGRESS NOTES
Physical Therapy                 Therapy Communication Note    Patient Name: Chung Goodman  MRN: [id number]  Today's Date: 10/18/2023     Discipline: Physical Therapy    Missed Visit Reason: Missed Visit Reason: Other (Comment) (NP in room, reports that pt is not appropriate for therapy at this time.)    Missed Time: Attempt 1130    Comment:

## 2023-10-18 NOTE — PROGRESS NOTES
Subjective Data:  Patient reports pain on the abdominal incision site. PCA pump was turned off. He missed his morning Tacrolimus dose on 10/17. He was in the OR at that time. His -160s/80-90s mmHg.     Objective Data:  Last Recorded Vitals:  Vitals:    10/18/23 0417 10/18/23 0906 10/18/23 0922 10/18/23 1401   BP: 167/81  159/90 160/89   BP Location: Left arm  Left arm    Patient Position: Lying  Lying    Pulse: 106  102 105   Resp: 16  16 16   Temp: 36.3 °C (97.3 °F)  36.5 °C (97.7 °F) 36.5 °C (97.7 °F)   TempSrc: Temporal  Temporal Temporal   SpO2: 97% 95% 95% 96%   Weight:       Height:         Last Labs:  CBC - 10/18/2023:  5:16 AM  17.0 8.7 382    26.9      CMP - 10/18/2023:  5:16 AM  9.5 5.6 15 --- 1.2   4.6 2.5 17 114      PTT - 10/16/2023:  3:28 AM  1.1   12.6 27     TROPHS   Date/Time Value Ref Range Status   09/26/2023 05:31 AM 18 0 - 53 ng/L Final     Comment:     .  Less than 99th percentile of normal range cutoff-  Female and children under 18 years old <35 ng/L; Male <54 ng/L: Negative  Repeat testing should be performed if clinically indicated.   .  Female and children under 18 years old  ng/L; Male  ng/L:  Consistent with possible cardiac damage and possible increased clinical   risk. Serial measurements may help to assess extent of myocardial damage.   .  >120 ng/L: Consistent with cardiac damage, increased clinical risk and  myocardial infarction. Serial measurements may help assess extent of   myocardial damage.   .   NOTE: Children less than 1 year old may have higher baseline troponin   levels and results should be interpreted in conjunction with the overall   clinical context.  .  NOTE: Troponin I testing is performed using a different   testing methodology at [facility] than at other   [facility]. Direct result comparisons should only   be made within the same method.     01/17/2023 04:58 PM 13 0 - 20 ng/L Final     Comment:     .  Less than 99th percentile  of normal range cutoff-  Female and children under 18 years old <14 ng/L; Male <21 ng/L: Negative  Repeat testing should be performed if clinically indicated.   .  Female and children under 18 years old 14-50 ng/L; Male 21-50 ng/L:  Consistent with possible cardiac damage and possible increased clinical   risk. Serial measurements may help to assess extent of myocardial damage.   .  >50 ng/L: Consistent with cardiac damage, increased clinical risk and  myocardial infarction. Serial measurements may help assess extent of   myocardial damage.   .   NOTE: Children less than 1 year old may have higher baseline troponin   levels and results should be interpreted in conjunction with the overall   clinical context.   .  NOTE: Troponin I testing is performed using a different   testing methodology at [facility] than at other   [facility]. Direct result comparisons should only   be made within the same method.       BNP   Date/Time Value Ref Range Status   05/24/2023 11:41  0 - 99 pg/mL Final     Comment:     .  <100 pg/mL - Heart failure unlikely  100-299 pg/mL - Intermediate probability of acute heart  .               failure exacerbation. Correlate with clinical  .               context and patient history.    >=300 pg/mL - Heart Failure likely. Correlate with clinical  .               context and patient history.   Biotin interference may cause falsely decreased results.   Patients taking a Biotin dose of up to 5 mg/day should   refrain from taking Biotin for 24 hours before sample   collection. Providers may contact their local laboratory   for further information.     10/29/2019 04:07  0 - 99 pg/mL Final     Comment:     .  <100 pg/mL - Heart failure unlikely  100-299 pg/mL - Intermediate probability of acute heart  .               failure exacerbation. Correlate with clinical  .               context and patient history.    >=300 pg/mL - Heart Failure likely. Correlate with clinical  .                context and patient history.  BNP testing is performed using different testing   methodology at [facility] than at other   [facility] hospitals. Direct result comparisons should   only be made within the same method.       HGBA1C   Date/Time Value Ref Range Status   10/18/2023 05:16 AM 7.3 see below % Final   08/15/2023 10:45 AM 8.3 % Final     Comment:          Diagnosis of Diabetes-Adults   Non-Diabetic: < or = 5.6%   Increased risk for developing diabetes: 5.7-6.4%   Diagnostic of diabetes: > or = 6.5%  .       Monitoring of Diabetes                Age (y)     Therapeutic Goal (%)   Adults:          >18           <7.0   Pediatrics:    13-18           <7.5                   7-12           <8.0                   0- 6            7.5-8.5   American Diabetes Association. Diabetes Care 33(S1), Jan 2010.     05/16/2023 04:29 PM 7.4 % Final     Comment:          Diagnosis of Diabetes-Adults   Non-Diabetic: < or = 5.6%   Increased risk for developing diabetes: 5.7-6.4%   Diagnostic of diabetes: > or = 6.5%  .       Monitoring of Diabetes                Age (y)     Therapeutic Goal (%)   Adults:          >18           <7.0   Pediatrics:    13-18           <7.5                   7-12           <8.0                   0- 6            7.5-8.5   American Diabetes Association. Diabetes Care 33(S1), Jan 2010.     10/20/2020 01:11 PM 9.4 4.3 - 5.6 % Final     Comment:     American Diabetes Association guidelines indicate that patients with HgbA1c in   the range 5.7-6.4% are at increased risk for development of diabetes, and   intervention by lifestyle modification may be beneficial. HgbA1c greater or   equal to 6.5% is considered diagnostic of diabetes.   08/09/2016 03:42 PM 8.1 4.2 - 6.5 % Final     VLDL   Date/Time Value Ref Range Status   08/15/2023 10:45 AM 15 0 - 40 mg/dL Final   05/16/2023 04:29 PM 20 0 - 40 mg/dL Final   04/27/2023 01:01 PM 38 0 - 40 mg/dL Final      Last I/O:  I/O last 3 completed  shifts:  In: 2797 (39.7 mL/kg) [I.V.:1690 (24 mL/kg)]  Out: 2570 (36.5 mL/kg) [Urine:1770 (0.7 mL/kg/hr); Emesis/NG output:185; Drains:615]  Weight: 70.5 kg     Inpatient Medications:  Scheduled medications   Medication Dose Route Frequency    aspirin  150 mg rectal Daily    fat emulsion fish oil/plant based  250 mL intravenous Daily    heparin (porcine)  5,000 Units subcutaneous q8h    heparin flush  50 Units intra-catheter q12h    hydrALAZINE  10 mg intravenous q6h    insulin glargine  15 Units subcutaneous Nightly    insulin regular  0-5 Units subcutaneous q6h    insulin regular  5 Units subcutaneous q6h    lidocaine  1 mL infiltration Once    lidocaine  1 patch transdermal Daily    methylPREDNISolone sodium succinate (PF)  4 mg intravenous q24h    metoclopramide  10 mg intravenous q6h SCOTT    micafungin  100 mg intravenous q24h    pantoprazole  40 mg intravenous BID    piperacillin-tazobactam  3.375 g intravenous q6h    tacrolimus  1 mg sublingual q24h    tacrolimus  2 mg sublingual Daily     PRN medications   Medication    alteplase    alteplase    dextrose 10 % in water (D10W)    dextrose    glucagon    heparin flush    heparin lock flush (porcine)    HYDROmorphone    HYDROmorphone    ipratropium-albuteroL    naloxone    oxygen     Continuous Medications   Medication Dose Last Rate    Adult Clinimix TPN  83 mL/hr 83 mL/hr (10/18/23 1427)     Physical Exam:  Physical Exam  Constitutional:       General: He is not in acute distress.     Appearance: Normal appearance.   HENT:      Head: Normocephalic and atraumatic.   Eyes:      Extraocular Movements: Extraocular movements intact.      Conjunctiva/sclera: Conjunctivae normal.   Cardiovascular:      Rate and Rhythm: Normal rate and regular rhythm.      Heart sounds: Normal heart sounds.   Pulmonary:      Effort: No respiratory distress.      Breath sounds: Normal breath sounds. No wheezing or rales.   Abdominal:      General: Bowel sounds are normal.      Comments:  +midline abdominal dressing, +PEG intact. + 3 IRVING drains with serosanguinous fluid     Skin:     General: Skin is warm and dry.        CMP:  Recent Labs     10/13/23  0009 10/13/23  1609 10/14/23  0114 10/14/23  1355 10/15/23  0401 10/15/23  1517 10/16/23  0328 10/16/23  0330 10/17/23  0759 10/18/23  0516    143 144 144 148* 146* 149*  --  148* 148*   K 3.8 4.0 4.1 3.4* 3.4* 4.0 3.6  --  4.4 4.3    106 104 105 108* 108* 110*  --  110* 110*   CO2 27 24 26 27 26 27 27  --  28 25   ANIONGAP 14 17 18 15 17 15 16  --  14 17   BUN 52* 61* 62* 76* 70* 64* 65*  --  59* 66*   CREATININE 3.53* 3.39* 3.39* 3.53* 3.19* 2.76* 2.68*  --  2.17* 2.36*   EGFR 18* 19* 19* 18* 21* 24* 25*  --  33* 29*   MG 2.58*  --  2.43* 2.34 1.98  --  1.76 1.75 1.91 1.77       Recent Labs     04/28/23  1639 04/29/23  0945 05/02/23  0036 05/02/23  0603 05/16/23  1629 05/19/23  1810 09/23/23  0830 09/23/23  1806 10/09/23  0212 10/10/23  0621 10/13/23  1005 10/13/23  1609 10/14/23  1355 10/15/23  0401 10/15/23  1517 10/16/23  0328 10/17/23  0759 10/18/23  0516   ALBUMIN 4.1   < > 3.7   < > 4.1   < > 3.5   < > 3.6  3.7   < > 2.4*   < > 2.7* 2.7* 2.8* 2.6* 2.7* 2.5*   ALKPHOS 514*   < > 491*   < > 152*   < > 108   < > 601*  601*   < > 170*  --  169* 172*  --  145*  --  114   *   < > 327*   < > 45   < > 69*   < > 163*  161*   < > 38  --  30 28  --  23  --  17   *   < > 250*   < > 43*   < > 138*   < > 221*  228*   < > 28  --  19 22  --  21  --  15   BILITOT 5.1*   < > 4.7*   < > 1.4*   < > 0.6   < > 3.8*  3.8*   < > 2.4*  --  1.5* 1.6*  --  1.5*  --  1.2   LIPASE 2,472*  --  8,160*  --  2,036*  --  76  --  210*  --   --   --   --   --   --   --   --   --     < > = values in this interval not displayed.       CBC:  Recent Labs     10/13/23  0536 10/13/23  1609 10/14/23  0114 10/14/23  1355 10/15/23  0401 10/16/23  0330 10/17/23  0759 10/18/23  0516   WBC 17.9* 16.3* 15.4* 14.7* 15.8* 12.9* 15.1* 17.0*   HGB 8.0* 8.5* 8.2* 8.1*  8.5* 8.1* 8.5* 8.7*   HCT 24.4* 25.2* 24.8* 24.4* 25.5* 24.9* 25.8* 26.9*    193 200 208 266 276 316 382   MCV 88 86 87 87 86 90 88 89       COAG:   Recent Labs     05/20/23  0148 05/20/23  1515 10/07/23  0227 10/08/23  0531 10/09/23  0222 10/11/23  0007 10/12/23  0558 10/13/23  0009 10/14/23  0114 10/16/23  0328   INR 1.1   < > 1.1 1.1 1.1 1.2* 1.3* 1.2* 1.0 1.1   FIBRINOGEN 362  --   --   --   --   --   --   --   --   --     < > = values in this interval not displayed.       ABO:   Recent Labs     10/16/23  1308   ABO B       HEME/ENDO:  Recent Labs     12/01/17  1158 12/04/17  0333 03/30/18  1203 10/18/18  1437 03/21/19  1201 07/19/19  1202 11/01/19  0601 02/25/20  1308 04/02/20  0915 07/28/22  0859 04/27/23  1301 05/16/23  1629 08/15/23  1045 10/18/23  0516   FERRITIN 1,297*  --   --   --   --   --  845*  --   --   --   --   --   --   --    IRONSAT 7*  --  14*  --  23*  --  11*  --   --   --   --   --   --   --    TSH  --    < >  --    < > 1.08   < >  --  0.91  --  1.06  --  0.69 1.21  --    HGBA1C  --    < >  --    < > 9.2   < >  --  8.6   < > 8.4* 8.0* 7.4* 8.3* 7.3*    < > = values in this interval not displayed.        CARDIAC:   Recent Labs     12/04/17  0333 10/29/19  0407 01/17/23  1658 05/24/23  1141 09/26/23  0531 10/05/23  0756   LDH  --   --   --   --  147 305*   TROPHS  --   --  13  --  18  --    * 410*  --  373*  --   --        Recent Labs     04/27/23  1301 05/16/23  1629 08/15/23  1045 10/09/23  0212   CHOL 200* 172 121  --    LDLF 117* 95 61  --    HDL 45.2 57.3 45.8  --    TRIG 189* 99 73 345*       MICRO:   Recent Labs     04/29/23  0945   CRP 2.11*       Susceptibility data from last 90 days.  Collected Specimen Info Organism Ampicillin Cefazolin Ciprofloxacin Gentamicin Levofloxacin Piperacillin/Tazobactam Trimethoprim/Sulfamethoxazole   10/05/23 Blood culture from Peripheral Venipuncture Escherichia coli S S S S S S S   10/05/23 Blood culture from Peripheral Venipuncture  Escherichia coli              Assessment/Plan   Chung Goodman is a 67-year-old man with history of NICM secondary to valvular disease, s/p mitral valve repair with [place] [employer] Annuloflex band II with 36 mm, s/p bicaval OHT (3/24/2017) with post-op complicated by atrial arrhythmia, RV failure, chronotropic incompetence, pancreatic adenocarcinoma, s/p Whipple procedure (5/19/23), currently on chemotherapy (modified FOLFIRINOX), HTN, DM type 2, HLD, CKD Stage 3B (baseline creatinine 2), amiodarone-induced hyperthyroidism, JE, MDD, transferred from [facility] for recurrent SBO. Blood culture positive for E coli. [Immunosuppressive regimen at home: Tacrolimus 4 mg/3 mg (Tacrolimus Goal 5-8), prednisone 5 mg daily. ] No rejection history. Latest DSE: negative (5/2023). On 10/16, he had drainage from abdominal wound suture. He was taken to the OR for repair of fascial dehiscence. S/p ex lap, washout, drain placement with retention sutures (10/17).     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 Coli bacteremia, currently on IV antibiotic  # s/p EGD with dilation of afferent limb anastomotic stenosis (10/10/23)  #s/p Ex-lap, small bowel resection, jejuno-jejunostomy, G-tube (10/10/23)  # Perforated afferent limb  # Fascial dehiscence, s/p ex lap, washout, drain placement with retention sutures (10/17)     Recommendations:  - Latest Tacrolimus (10/18): 4.50. Missed Tacrolimus morning dose yesterday as he was in the OR.   - Will continue Tacrolimus 2 mg SL at 0630, 1 mg SL at 1830. Tacrolimus target goal: 5-8.   - Check daily Tacrolimus trough level at 0600.  - Continue methylprednisolone 4 mg IV daily while NPO.   - Plan is to transition to oral tacrolimus 4mg at 0630 and 3mg at  1830 and oral prednisone 5mg daily once patient is able to tolerate oral medications    HF service will continue to follow.     Patient was seen at the bedside with HF attending, Dr. RITESH Rodriguez.     Signed:    Alberto Nj MD  Heart Failure service

## 2023-10-18 NOTE — CARE PLAN
Problem: Pain  Goal: Takes deep breaths with improved pain control throughout the shift  Outcome: Progressing  Goal: Turns in bed with improved pain control throughout the shift  Outcome: Progressing  Goal: Walks with improved pain control throughout the shift  Outcome: Progressing  Goal: Performs ADL's with improved pain control throughout shift  Outcome: Progressing  Goal: Participates in PT with improved pain control throughout the shift  Outcome: Progressing  Goal: Free from opioid side effects throughout the shift  Outcome: Progressing  Goal: Free from acute confusion related to pain meds throughout the shift  Outcome: Progressing     Problem: Fall/Injury  Goal: Not fall by end of shift  Outcome: Progressing  Goal: Be free from injury by end of the shift  Outcome: Progressing  Goal: Verbalize understanding of personal risk factors for fall in the hospital  Outcome: Progressing  Goal: Verbalize understanding of risk factor reduction measures to prevent injury from fall in the home  Outcome: Progressing  Goal: Use assistive devices by end of the shift  Outcome: Progressing  Goal: Pace activities to prevent fatigue by end of the shift  Outcome: Progressing     Problem: Safety - Medical Restraint  Goal: Remains free of injury from restraints (Restraint for Interference with Medical Device)  Outcome: Progressing  Goal: Free from restraint(s) (Restraint for Interference with Medical Device)  Outcome: Progressing  Goal: Free from restraint(s) (Restraint for Interference with Medical Device)  Outcome: Progressing     Problem: Skin  Goal: Decreased wound size/increased tissue granulation at next dressing change  10/18/2023 1050 by Mel De Leon RN  Outcome: Progressing  Flowsheets (Taken 10/17/2023 1452 by Shira Roman RN)  Decreased wound size/increased tissue granulation at next dressing change: Promote sleep for wound healing  10/18/2023 1047 by Mel De Leon RN  Flowsheets (Taken 10/17/2023  1452 by Shira Roman RN)  Decreased wound size/increased tissue granulation at next dressing change: Promote sleep for wound healing  10/18/2023 1046 by Mel De Leon RN  Outcome: Progressing  Goal: Participates in plan/prevention/treatment measures  10/18/2023 1050 by Mel De Leon RN  Outcome: Progressing  Flowsheets (Taken 10/17/2023 1452 by Shira Roman RN)  Participates in plan/prevention/treatment measures: Discuss with provider PT/OT consult  10/18/2023 1047 by Mel De Leon RN  Flowsheets (Taken 10/17/2023 1452 by Shira Roman RN)  Participates in plan/prevention/treatment measures: Discuss with provider PT/OT consult  10/18/2023 1046 by Mel De Leon RN  Outcome: Progressing  Goal: Prevent/manage excess moisture  10/18/2023 1050 by Mel De Leon RN  Outcome: Progressing  Flowsheets (Taken 10/16/2023 0814 by Isamar Apodaca RN)  Prevent/manage excess moisture:   Moisturize dry skin   Monitor for/manage infection if present  10/18/2023 1047 by Mel De Leon RN  Flowsheets (Taken 10/16/2023 0814 by Isamar Apodaca RN)  Prevent/manage excess moisture:   Moisturize dry skin   Monitor for/manage infection if present  10/18/2023 1046 by Mel De Leon RN  Outcome: Progressing  Goal: Prevent/minimize sheer/friction injuries  10/18/2023 1050 by Mel De Leon RN  Outcome: Progressing  Flowsheets (Taken 10/17/2023 1452 by Shira Roman RN)  Prevent/minimize sheer/friction injuries:   Complete micro-shifts as needed if patient unable. Adjust patient position to relieve pressure points, not a full turn   Increase activity/out of bed for meals  10/18/2023 1047 by Mel De Leon RN  Flowsheets (Taken 10/17/2023 1452 by Shira Roman RN)  Prevent/minimize sheer/friction injuries:   Complete micro-shifts as needed if patient unable. Adjust patient position to relieve pressure points, not a full turn   Increase activity/out of bed  for meals  10/18/2023 1046 by Mel De Leon RN  Outcome: Progressing  Goal: Promote/optimize nutrition  10/18/2023 1050 by Mel De Leon RN  Outcome: Progressing  Flowsheets (Taken 10/17/2023 1452 by Shira Roman RN)  Promote/optimize nutrition:   Assist with feeding   Consume > 50% meals/supplements  10/18/2023 1047 by Mel De Leon RN  Flowsheets (Taken 10/17/2023 1452 by Shira Roman RN)  Promote/optimize nutrition:   Assist with feeding   Consume > 50% meals/supplements  10/18/2023 1046 by Mel De Leon RN  Outcome: Progressing  Goal: Promote skin healing  10/18/2023 1050 by Mel De Leon RN  Outcome: Progressing  Flowsheets (Taken 10/17/2023 1452 by Shira Roman RN)  Promote skin healing:   Assess skin/pad under line(s)/device(s)   Protective dressings over bony prominences  10/18/2023 1047 by Mel De Leon RN  Flowsheets (Taken 10/17/2023 1452 by Shira Roman RN)  Promote skin healing:   Assess skin/pad under line(s)/device(s)   Protective dressings over bony prominences  10/18/2023 1046 by Mel De Leon RN  Outcome: Progressing

## 2023-10-18 NOTE — CARE PLAN
Problem: Pain  Goal: Takes deep breaths with improved pain control throughout the shift  10/18/2023 0607 by Caren Pardo RN  Outcome: Progressing  10/18/2023 0553 by Caren Pardo RN  Outcome: Progressing  Goal: Turns in bed with improved pain control throughout the shift  10/18/2023 0607 by Caren Pardo RN  Outcome: Progressing  10/18/2023 0553 by Caren Pardo RN  Outcome: Progressing  Goal: Walks with improved pain control throughout the shift  10/18/2023 0607 by Caren Pardo RN  Outcome: Progressing  10/18/2023 0553 by Caren Pardo RN  Outcome: Progressing  Goal: Performs ADL's with improved pain control throughout shift  10/18/2023 0607 by Caren Pardo RN  Outcome: Progressing  10/18/2023 0553 by Caren Pardo RN  Outcome: Progressing  Goal: Participates in PT with improved pain control throughout the shift  10/18/2023 0607 by Caren Pardo RN  Outcome: Progressing  10/18/2023 0553 by Caren Pardo RN  Outcome: Progressing  Goal: Free from opioid side effects throughout the shift  10/18/2023 0607 by Caren Pardo RN  Outcome: Progressing  10/18/2023 0553 by Caren Pardo RN  Outcome: Progressing  Goal: Free from acute confusion related to pain meds throughout the shift  10/18/2023 0607 by Caren Pardo RN  Outcome: Progressing  10/18/2023 0553 by Caren Pardo RN  Outcome: Progressing     Problem: Fall/Injury  Goal: Not fall by end of shift  10/18/2023 0607 by Caren Pardo RN  Outcome: Progressing  10/18/2023 0553 by Caren Pardo RN  Outcome: Progressing  Goal: Be free from injury by end of the shift  10/18/2023 0607 by Caren Pardo RN  Outcome: Progressing  10/18/2023 0553 by Caren Pardo RN  Outcome: Progressing  Goal: Verbalize understanding of personal risk factors for fall in the hospital  10/18/2023 0607 by Caren Pardo RN  Outcome: Progressing  10/18/2023 0553 by Caren Pardo RN  Outcome: Progressing  Goal:  Verbalize understanding of risk factor reduction measures to prevent injury from fall in the home  10/18/2023 0607 by Caren Pardo RN  Outcome: Progressing  10/18/2023 0553 by Caren Pardo RN  Outcome: Progressing  Goal: Use assistive devices by end of the shift  10/18/2023 0607 by Caren Pardo RN  Outcome: Progressing  10/18/2023 0553 by Caren Pardo RN  Outcome: Progressing  Goal: Pace activities to prevent fatigue by end of the shift  10/18/2023 0607 by Caren Pardo RN  Outcome: Progressing  10/18/2023 0553 by Caren Pardo RN  Outcome: Progressing     Problem: Safety - Medical Restraint  Goal: Remains free of injury from restraints (Restraint for Interference with Medical Device)  10/18/2023 0607 by Caren Pardo RN  Outcome: Progressing  10/18/2023 0553 by Caren Pardo RN  Outcome: Progressing  Goal: Free from restraint(s) (Restraint for Interference with Medical Device)  10/18/2023 0607 by Caren Pardo RN  Outcome: Progressing  10/18/2023 0553 by Caren Pardo RN  Outcome: Progressing  Goal: Free from restraint(s) (Restraint for Interference with Medical Device)  10/18/2023 0607 by Caren Pardo RN  Outcome: Progressing  10/18/2023 0553 by Caren Pardo RN  Outcome: Progressing     Problem: Skin  Goal: Decreased wound size/increased tissue granulation at next dressing change  10/18/2023 0607 by Caren Pardo RN  Outcome: Progressing  10/18/2023 0553 by Caren Pardo RN  Outcome: Progressing  Goal: Participates in plan/prevention/treatment measures  10/18/2023 0607 by Caren Pardo RN  Outcome: Progressing  10/18/2023 0553 by Caren Pardo RN  Outcome: Progressing  Goal: Prevent/manage excess moisture  10/18/2023 0607 by Caren Pardo RN  Outcome: Progressing  10/18/2023 0553 by Caren Pardo RN  Outcome: Progressing  Goal: Prevent/minimize sheer/friction injuries  10/18/2023 0607 by Caren Pardo RN  Outcome:  Progressing  10/18/2023 0553 by Caren Pardo RN  Outcome: Progressing  Goal: Promote/optimize nutrition  10/18/2023 0607 by Caren Pardo RN  Outcome: Progressing  10/18/2023 0553 by Caren Pardo RN  Outcome: Progressing  Goal: Promote skin healing  10/18/2023 0607 by Caren Pardo RN  Outcome: Progressing  10/18/2023 0553 by Caren Pardo RN  Outcome: Progressing

## 2023-10-18 NOTE — ANESTHESIA POSTPROCEDURE EVALUATION
Patient: Chung Goodman    Procedure Summary       Date: 10/17/23 Room / Location: [facility] OR 12 / [facility] OR    Anesthesia Start: 0512 Anesthesia Stop: 0710    Procedure: Exploration Laparotomy (Abdomen) Diagnosis:       Dehiscence of fascia, sequela      (Dehiscence of fascia, sequela [T81.30XS])    Surgeons: Luis Armando Johnson MD Responsible Provider: Alina Velasco MD    Anesthesia Type: general ASA Status: 4 - Emergent            Anesthesia Type: general    Vitals Value Taken Time   /94 10/17/23 1337   Temp 36 °C (96.8 °F) 10/17/23 1330   Pulse 93 10/17/23 1342   Resp 6 10/17/23 1342   SpO2 100 % 10/17/23 1342   Vitals shown include unvalidated device data.    Anesthesia Post Evaluation    Patient location during evaluation: bedside  Level of consciousness: awake  Airway patency: patent  Cardiovascular status: acceptable  Respiratory status: acceptable and face mask        No notable events documented.

## 2023-10-18 NOTE — CARE PLAN
Problem: Pain  Goal: Takes deep breaths with improved pain control throughout the shift  Outcome: Progressing  Goal: Turns in bed with improved pain control throughout the shift  Outcome: Progressing  Goal: Walks with improved pain control throughout the shift  Outcome: Progressing  Goal: Performs ADL's with improved pain control throughout shift  Outcome: Progressing  Goal: Participates in PT with improved pain control throughout the shift  Outcome: Progressing  Goal: Free from opioid side effects throughout the shift  Outcome: Progressing  Goal: Free from acute confusion related to pain meds throughout the shift  Outcome: Progressing     Problem: Fall/Injury  Goal: Not fall by end of shift  Outcome: Progressing  Goal: Be free from injury by end of the shift  Outcome: Progressing  Goal: Verbalize understanding of personal risk factors for fall in the hospital  Outcome: Progressing  Goal: Verbalize understanding of risk factor reduction measures to prevent injury from fall in the home  Outcome: Progressing  Goal: Use assistive devices by end of the shift  Outcome: Progressing  Goal: Pace activities to prevent fatigue by end of the shift  Outcome: Progressing     Problem: Safety - Medical Restraint  Goal: Remains free of injury from restraints (Restraint for Interference with Medical Device)  Outcome: Progressing  Goal: Free from restraint(s) (Restraint for Interference with Medical Device)  Outcome: Progressing  Goal: Free from restraint(s) (Restraint for Interference with Medical Device)  Outcome: Progressing     Problem: Skin  Goal: Decreased wound size/increased tissue granulation at next dressing change  Outcome: Progressing  Goal: Participates in plan/prevention/treatment measures  Outcome: Progressing  Goal: Prevent/manage excess moisture  Outcome: Progressing  Goal: Prevent/minimize sheer/friction injuries  Outcome: Progressing  Goal: Promote/optimize nutrition  Outcome: Progressing  Goal: Promote skin  healing  Outcome: Progressing      The clinical goals for the shift include pt pain to be managed during shift    Pt remained safe and free from injury overnight. VSS on RA. Tele/ WNL. Adequate urine o/p. Pain managed with PRNs. MD came to bedside overnight for pts pain per wife and change of orientation status. Team not concerned, aware that pt has been drowsy since OR. No further concerns at this time. Care relinquished to oncoming RN.

## 2023-10-18 NOTE — CARE PLAN
Problem: Pain  Goal: Takes deep breaths with improved pain control throughout the shift  Outcome: Progressing  Goal: Turns in bed with improved pain control throughout the shift  Outcome: Progressing  Goal: Walks with improved pain control throughout the shift  Outcome: Progressing  Goal: Performs ADL's with improved pain control throughout shift  Outcome: Progressing  Goal: Participates in PT with improved pain control throughout the shift  Outcome: Progressing  Goal: Free from opioid side effects throughout the shift  Outcome: Progressing  Goal: Free from acute confusion related to pain meds throughout the shift  Outcome: Progressing     Problem: Fall/Injury  Goal: Not fall by end of shift  Outcome: Progressing  Goal: Be free from injury by end of the shift  Outcome: Progressing  Goal: Verbalize understanding of personal risk factors for fall in the hospital  Outcome: Progressing  Goal: Verbalize understanding of risk factor reduction measures to prevent injury from fall in the home  Outcome: Progressing  Goal: Use assistive devices by end of the shift  Outcome: Progressing  Goal: Pace activities to prevent fatigue by end of the shift  Outcome: Progressing     Problem: Safety - Medical Restraint  Goal: Remains free of injury from restraints (Restraint for Interference with Medical Device)  Outcome: Progressing  Goal: Free from restraint(s) (Restraint for Interference with Medical Device)  Outcome: Progressing  Goal: Free from restraint(s) (Restraint for Interference with Medical Device)  Outcome: Progressing     Problem: Skin  Goal: Decreased wound size/increased tissue granulation at next dressing change  Outcome: Progressing  Goal: Participates in plan/prevention/treatment measures  Outcome: Progressing  Goal: Prevent/manage excess moisture  Outcome: Progressing  Goal: Prevent/minimize sheer/friction injuries  Outcome: Progressing  Goal: Promote/optimize nutrition  Outcome: Progressing  Goal: Promote skin  healing  Outcome: Progressing

## 2023-10-18 NOTE — PROGRESS NOTES
Spiritual Care Visit     introduced self and spiritual care services to patient and patient's spouse, explaining services available and checking in to see how patient is doing today. Patient's spouse spoke about their salud, their ongoing prayer for healing and a miracle, and she spoke about all the patient has been through over the last week in particular.       made a referral to the music therapist per patient's spouse request.      will continue to follow and provide support. Please reach out with any needs/concerns.     Rev. Adelfo De Luna, Supportive Oncology

## 2023-10-19 NOTE — PROGRESS NOTES
Music Therapy Note    Chung Goodman was referred by LESIA MelgozaDiv    Therapy Session  Referral Type: New referral this admission  Visit Type: New visit  Session Start Time: 1618  Session End Time: 1623  Intervention Delivery: In-person  Conflict of Service: None  Family Present for Session: Spouse/Significant Other  Family Participation: Supportive     Pre-assessment  Unable to Assess Reason: Outcomes not applicable         Treatment/Interventions  Music Therapy Interventions: Assessment  Interruption: No  Patient Fell Asleep at End of Session: No    Post-assessment  Unable to Assess Reason: Did not provide expressive therapy intervention  Continue Visiting: Yes  Total Session Time (min): 5 minutes    Narrative  Assessment Detail: Patient generally somnolent at time of MT's arrival. Patient spouse present and receptive to music therapist's introduction of self and services. 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  Follow-up: MT to continue to follow.    Education Documentation  No documentation found.

## 2023-10-19 NOTE — PROGRESS NOTES
Physical Therapy    Physical Therapy Treatment    Patient Name: Chung Goodman  MRN: [id number]  Today's Date: 10/19/2023  Time Calculation  Start Time: 1049  Stop Time: 1059  Time Calculation (min): 10 min       Assessment/Plan   PT Assessment  PT Assessment Results: Decreased strength, Decreased range of motion, Decreased endurance, Impaired balance, Decreased mobility, Impaired judgement, Decreased safety awareness  Rehab Prognosis: Excellent  Evaluation/Treatment Tolerance: Patient tolerated treatment well  Medical Staff Made Aware: Yes  End of Session Communication: Bedside nurse  Assessment Comment: Pt limited on todays date due to impaired mentation. Remains appropriate for continued PT while in house to further assess and progress mobility. Also remains appropriate for MOD intensity PT after discharge 5 days/wk.  End of Session Patient Position: Bed, 3 rail up     PT Plan  Treatment/Interventions: Bed mobility, Transfer training, Gait training, Stair training, Neuromuscular re-education, Strengthening, Endurance training, Therapeutic exercise, Home exercise program, Therapeutic activity, Positioning, Postural re-education  PT Plan: Skilled PT  PT Frequency: 5 times per week  PT Discharge Recommendations: Moderate intensity level of continued care  PT Recommended Transfer Status: Assist x2  PT - OK to Discharge: Yes      General Visit Information:   PT  Visit  PT Received On: 10/19/23  General  Family/Caregiver Present: Yes (Wife present in bathroom throughout majority of visit getting cleaned up.)  Patient Position Received: Bed, 2 rail up  General Comment: Pt with limited ability to participate due to increased lethargy. Reportedly with impaired mentation from pain medications. Not able to tolerate attempts for funcitonal mobility, visit focus on supine ROM. Pt remains appropriate for MOD intensity PT after discharge.    Subjective   Precautions  Medical Precautions: Abdominal precautions, Fall  precautions    Objective   Pain:     Cognition:  Cognition  Overall Cognitive Status: Impaired  Arousal/Alertness: Localized responses  Orientation Level: Unable to assess  Postural Control:     Activity Tolerance:     Treatments:  Therapeutic Exercise  Therapeutic Exercise Performed: Yes  Therapeutic Exercise Activity 1: Supine passive AP, HS, hip abduction, hip int/ext rotation x 20 reps each LE with grossly max assist.  Therapeutic Exercise Activity 2: passive gastroc soleus stretch in supine for 45-60 seconds each LE.      Bed Mobility  Bed Mobility: No (Pt not able to safely tolerate attempts to transfer in/out of bed due to lack of command following from impaired mentation on this date.)    Outcome Measures:  [facility] Basic Mobility  Turning from your back to your side while in a flat bed without using bedrails: Total  Moving from lying on your back to sitting on the side of a flat bed without using bedrails: Total  Moving to and from bed to chair (including a wheelchair): Total  Standing up from a chair using your arms (e.g. wheelchair or bedside chair): Total  To walk in hospital room: Total  Climbing 3-5 steps with railing: Total  Basic Mobility - Total Score: 6    Education Documentation  No documentation found.  Education Comments  No comments found.        OP EDUCATION:       Encounter Problems       Encounter Problems (Active)       Balance       STG - Maintains dynamic standing balance with upper extremity support on LRAD with SBA (Progressing)       Start:  10/16/23    Expected End:  11/06/23               Mobility       STG - Patient will ambulate 50 ft with LRAD and SBA (Progressing)       Start:  10/16/23    Expected End:  11/06/23               Transfers       STG - Patient will perform bed mobility with SBA (Progressing)       Start:  10/16/23    Expected End:  11/06/23            STG - Patient will transfer sit to and from stand with LRAD and SBA  (Progressing)       Start:  10/16/23    Expected  End:  11/06/23

## 2023-10-19 NOTE — CARE PLAN
Problem: Skin  Goal: Decreased wound size/increased tissue granulation at next dressing change  Outcome: Progressing  Flowsheets (Taken 10/19/2023 1245)  Decreased wound size/increased tissue granulation at next dressing change:   Promote sleep for wound healing   Protective dressings over bony prominences  Goal: Participates in plan/prevention/treatment measures  Outcome: Progressing  Flowsheets (Taken 10/19/2023 1245)  Participates in plan/prevention/treatment measures:   Increase activity/out of bed for meals   Elevate heels  Goal: Prevent/manage excess moisture  Outcome: Progressing  Flowsheets (Taken 10/19/2023 1245)  Prevent/manage excess moisture:   Cleanse incontinence/protect with barrier cream   Moisturize dry skin   Follow provider orders for dressing changes  Goal: Prevent/minimize sheer/friction injuries  Outcome: Progressing  Flowsheets (Taken 10/18/2023 1051 by Mel De Leon, RN)  Prevent/minimize sheer/friction injuries:   Use pull sheet   HOB 30 degrees or less   Turn/reposition every 2 hours/use positioning/transfer devices  Goal: Promote/optimize nutrition  Outcome: Progressing  Flowsheets (Taken 10/18/2023 1051 by Mel De Leon, RN)  Promote/optimize nutrition:   Assist with feeding   Monitor/record intake including meals  Goal: Promote skin healing  Outcome: Progressing  Flowsheets (Taken 10/18/2023 1051 by Mel De Leon, RN)  Promote skin healing:   Assess skin/pad under line(s)/device(s)   Turn/reposition every 2 hours/use positioning/transfer devices

## 2023-10-19 NOTE — HOSPITAL COURSE
Pt is a 67 y.o. male with PMHx cardiac transplant (2017) s/p Whipple for pancreatic cancer on 5/19 who was admitted on 10/7 with presentation concerning for recurrent SBO. Pt was bounce back from previous admission for the same. Pt underwent EGD 9/26 which showed concern for benign stenosis of afferent limb, dilated with endoscopy. NG tube placed upon readmission with plan for EGD 10/10 for GJ dilation and decompression. EGD complicated by iatrogenic perforation of the afferent limb requiring emergent exploratory laparotomy, afferent perforated bowel resection, side to side JJ anastomosis, and serosal patch with an open G-tube placement. Pt unable to be extubated in the OR and was transferred to ICU for further care. Pt failed SBTs on 10/12, 10/13, and 10/14, was successfully extubated on 10/15. Nonpurulent drainage from midline incision noted on 10/13, close monitoring and BID dressing changes instituted. Pt deemed stable for floor and transferred to floor on 10/16. Overnight, 1L of fluid was drained from the midline with fascial dehiscence necessitating take back for exploratory laparotomy, washout, drain placement with retention sutures, and a midline wound vac early AM 10/17. Pt was able to be extubated but remained somnolent, PCA discontinued 2/2 inability to self-administer. PCA was restarted 10/19 given increasing alertness. Portillo discontinued 10/19. Pt hyperglycemic overnight to 250-250. Endocrinology consulted and pt started on NPH and regular insulin on sliding scale. SW consulted for assistance in transition to LTAC. Pt was discharged to LTAC on POD 10/27.

## 2023-10-19 NOTE — PROGRESS NOTES
"[facility]  HEART FAILURE PROGRESS NOTE    Chung Goodman/[id number]    Admit Date: 10/7/2023  Hospital Length of Stay: 12   Primary Service: Surgical oncology  Primary HF Cardiologist: Jennifer    INTERVAL EVENTS / PERTINENT ROS:   Patient seen and examined at bedside. Interval events reviewed still reporting severe pain.    MEDICATIONS  Infusions:  Adult Clinimix TPN, Last Rate: Stopped (10/18/23 2229)  HYDROmorphone      Scheduled:  acetaminophen, 1,000 mg, q6h SCOTT  aspirin, 150 mg, Daily  fat emulsion fish oil/plant based, 250 mL, Daily  heparin (porcine), 5,000 Units, q8h  heparin flush, 50 Units, q12h  hydrALAZINE, 10 mg, q6h  insulin NPH (Isophane), 10 Units, Nightly  insulin NPH (Isophane), 20 Units, Daily  insulin regular, 0-10 Units, q6h  insulin regular, 4 Units, q6h  lidocaine, 1 mL, Once  lidocaine, 1 patch, Daily  methylPREDNISolone sodium succinate (PF), 4 mg, q24h  metoclopramide, 10 mg, q6h SCOTT  micafungin, 100 mg, q24h  nystatin, 4 mL, BID  pantoprazole, 40 mg, BID  piperacillin-tazobactam, 3.375 g, q6h  tacrolimus, 1 mg, q24h  tacrolimus, 2 mg, Daily      PRN:  alteplase, 2 mg, PRN  alteplase, 2 mg, PRN  dextrose 10 % in water (D10W), 0.3 g/kg/hr, Once PRN  dextrose, 25 g, q15 min PRN  glucagon, 1 mg, q15 min PRN  heparin flush, 50 Units, PRN  heparin lock flush (porcine), 500 Units, Once PRN  ipratropium-albuteroL, 3 mL, q4h PRN  naloxone, 0.2 mg, PRN  oxygen, , Continuous PRN - O2/gases        PHYSICAL EXAM:   Visit Vitals  /86   Pulse 95   Temp 36.7 °C (98.1 °F)   Resp 18   Ht 1.765 m (5' 9.49\")   Wt 70.5 kg (155 lb 6.8 oz)   SpO2 97%   BMI 22.63 kg/m²   Smoking Status Former   BSA 1.86 m²     Wt Readings from Last 5 Encounters:   10/16/23 70.5 kg (155 lb 6.8 oz)   10/05/23 78 kg (171 lb 15.3 oz)   09/28/23 80 kg (176 lb 5.9 oz)   06/13/23 80.9 kg (178 lb 6 oz)   06/08/23 82.3 kg (181 lb 6 oz)     INTAKE/OUTPUT:  I/O last 3 completed shifts:  In: 1878 (26.6 " mL/kg) [Other:10]  Out: 4005 (56.8 mL/kg) [Urine:2925 (1.2 mL/kg/hr); Emesis/NG output:460; Drains:620]  Weight: 70.5 kg      Physical Exam  Constitutional:       Comments: Chronically ill appearing   HENT:      Head: Normocephalic.      Mouth/Throat:      Mouth: Mucous membranes are moist.   Eyes:      Extraocular Movements: Extraocular movements intact.      Conjunctiva/sclera: Conjunctivae normal.   Cardiovascular:      Rate and Rhythm: Normal rate and regular rhythm.      Pulses: Normal pulses.      Heart sounds: No murmur heard.  Pulmonary:      Effort: Pulmonary effort is normal. No respiratory distress.      Breath sounds: Normal breath sounds.   Abdominal:      General: Bowel sounds are normal.      Comments: +midline abdominal dressing, +PEG intact. + 3 IRVING drains with serosanguinous fluid   Musculoskeletal:      Cervical back: Normal range of motion.   Skin:     General: Skin is warm and dry.   Neurological:      General: No focal deficit present.      Mental Status: He is alert.   Psychiatric:         Mood and Affect: Mood normal.     DATA:  CMP:  Results from last 7 days   Lab Units 10/19/23  0549 10/18/23  0516 10/17/23  0759 10/16/23  0330 10/16/23  0328 10/15/23  1517 10/15/23  0401 10/14/23  1355 10/14/23  0114 10/13/23  1609 10/13/23  1005 10/13/23  0009   SODIUM mmol/L 146* 148* 148*  --  149* 146* 148* 144 144 143  --  144   POTASSIUM mmol/L 4.0 4.3 4.4  --  3.6 4.0 3.4* 3.4* 4.1 4.0  --  3.8   CHLORIDE mmol/L 112* 110* 110*  --  110* 108* 108* 105 104 106  --  107   CO2 mmol/L 26 25 28  --  27 27 26 27 26 24  --  27   ANION GAP mmol/L 12 17 14  --  16 15 17 15 18 17  --  14   BUN mg/dL 58* 66* 59*  --  65* 64* 70* 76* 62* 61*  --  52*   CREATININE mg/dL 2.28* 2.36* 2.17*  --  2.68* 2.76* 3.19* 3.53* 3.39* 3.39*  --  3.53*   EGFR mL/min/1.73m*2 31* 29* 33*  --  25* 24* 21* 18* 19* 19*  --  18*   MAGNESIUM mg/dL 1.88 1.77 1.91 1.75 1.76  --  1.98 2.34 2.43*  --   --  2.58*   ALBUMIN g/dL 2.5* 2.5*  "2.7*  --  2.6* 2.8* 2.7* 2.7* 2.6* 2.7* 2.4* 2.6*  2.7*   ALT U/L 16 17  --   --  23  --  28 30  --   --  38 44   AST U/L 17 15  --   --  21  --  22 19  --   --  28 38   BILIRUBIN TOTAL mg/dL 1.1 1.2  --   --  1.5*  --  1.6* 1.5*  --   --  2.4* 2.9*     CBC:  Results from last 7 days   Lab Units 10/19/23  0549 10/18/23  0516 10/17/23  0759 10/16/23  0330 10/15/23  0401 10/14/23  1355 10/14/23  0114 10/13/23  1609   WBC AUTO x10*3/uL 17.9* 17.0* 15.1* 12.9* 15.8* 14.7* 15.4* 16.3*   HEMOGLOBIN g/dL 7.6* 8.7* 8.5* 8.1* 8.5* 8.1* 8.2* 8.5*   HEMATOCRIT % 23.8* 26.9* 25.8* 24.9* 25.5* 24.4* 24.8* 25.2*   PLATELETS AUTO x10*3/uL 356 382 316 276 266 208 200 193   MCV fL 92 89 88 90 86 87 87 86     COAG:   Results from last 7 days   Lab Units 10/16/23  0328 10/14/23  0114 10/13/23  0009   INR  1.1 1.0 1.2*     ABO: No results found for: \"ABO\"  HEME/ENDO:  Results from last 7 days   Lab Units 10/18/23  0516   HEMOGLOBIN A1C % 7.3*      CARDIAC:       No lab exists for component: \"CK\", \"CKMBP\"    Prior to Admission Meds:  Medications Prior to Admission   Medication Sig Dispense Refill Last Dose    acetaminophen (Tylenol) 325 mg tablet 2 tab(s) orally every 6 hours, As needed, Pain - Mild (1-3)       albuterol 90 mcg/actuation inhaler use 2 (TWO) puffs FOUR TIMES DAILY       allopurinol (Zyloprim) 100 mg tablet        aspirin 81 mg chewable tablet CHEW AND SWALLOW 1 TABLET DAILY.       blood sugar diagnostic (OneTouch Verio test strips) strip TEST 3 TIMES DAILY       buPROPion XL (Wellbutrin XL) 150 mg 24 hr tablet Take 1 tablet (150 mg) by mouth once daily.       busPIRone (Buspar) 5 mg tablet Take 1 tablet (5 mg) by mouth 3 times a day.       calcium carbonate 600 mg calcium (1,500 mg) tablet Take 1 tablet (600 mg) by mouth 2 times a day.       cholecalciferol (Vitamin D-3) 125 MCG (5000 UT) capsule Take 1 capsule (125 mcg) by mouth once daily.       citalopram (CeleXA) 40 mg tablet        diphenoxylate-atropine (Lomotil) " 2.5-0.025 mg tablet 2 tablets 4 times a day as needed for diarrhea.       fenofibrate (Triglide) 160 mg tablet Take 1 tablet (160 mg) by mouth once daily.       ferrous sulfate 325 (65 Fe) MG tablet Take 1 tablet (325 mg) by mouth once daily.       gabapentin (Neurontin) 100 mg capsule Take 2 capsules (200 mg) by mouth once daily at bedtime.       hydrALAZINE (Apresoline) 50 mg tablet Take 1 tablet (50 mg) by mouth 2 times a day.       icosapent ethyL (Vascepa) 1 gram capsule Take 1 capsule (1 g) by mouth twice a day.       insulin glargine (Lantus U-100 Insulin) 100 unit/mL injection Inject 10 Units under the skin once daily in the morning.       insulin lispro (HumaLOG) 100 unit/mL injection Inject under the skin 3 times a day with meals. Using a sliding scale       lidocaine-prilocaine (Emla) 2.5-2.5 % cream Apply topically to affected area 30 min prior to port       magnesium oxide (Mag-Ox) 400 mg (241.3 mg magnesium) tablet Take 2 tablets (800 mg) by mouth 2 times a day.       multivitamin with minerals tablet 1 tablet DAILY (route: oral)       pantoprazole (ProtoNix) 40 mg EC tablet Take 1 tablet (40 mg) by mouth twice a day.       rosuvastatin (Crestor) 40 mg tablet Take 1 tablet (40 mg) by mouth once daily at bedtime.       tacrolimus (Prograf) 1 mg capsule TAKE 4 CAPSULES every morning and TAKE 3 CAPSULES every evening          ASSESSMENT AND PLAN:   Chung Goodman is a 67-year-old man with history of NICM secondary to valvular disease, s/p mitral valve repair with [place] [employer] Annuloflex band II with 36 mm, s/p bicaval OHT (3/24/2017) with post-op complicated by atrial arrhythmia, RV failure, chronotropic incompetence, pancreatic adenocarcinoma, s/p Whipple procedure (5/19/23), currently on chemotherapy (modified FOLFIRINOX), HTN, DM type 2, HLD, CKD Stage 3B (baseline creatinine 2), amiodarone-induced hyperthyroidism, JE, MDD, transferred from [facility] for recurrent SBO. Blood culture positive for  E coli. [Immunosuppressive regimen at home: Tacrolimus 4 mg/3 mg (Tacrolimus Goal 5-8), prednisone 5 mg daily. ] No rejection history. Latest DSE: negative (5/2023). On 10/16, he had drainage from abdominal wound suture. He was taken to the OR for repair of fascial dehiscence. S/p ex lap, washout, drain placement with retention sutures (10/17).     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 Coli bacteremia, currently on IV antibiotic  # s/p EGD with dilation of afferent limb anastomotic stenosis (10/10/23)  #s/p Ex-lap, small bowel resection, jejuno-jejunostomy, G-tube (10/10/23)  # Perforated afferent limb  # Fascial dehiscence, s/p ex lap, washout, drain placement with retention sutures (10/17)     Recommendations:  - Latest Tacrolimus (10/19): 4.0.    - Will continue Tacrolimus 2 mg SL at 0630, 1 mg SL at 1830. Tacrolimus target goal: 5-8.   - Continue current dosing given renal dysfunction and other acute issues.  - Check daily Tacrolimus trough level at 0600.  - Continue methylprednisolone 4 mg IV daily while NPO.   - Plan is to transition to oral tacrolimus 4mg at 0630 and 3mg at 1830 and oral prednisone 5mg daily once patient is able to tolerate oral medications     HF service will continue to follow.     Patient was seen at the bedside with HF attending, Dr. RITESH Rodriguez.    Luis Armando Sewell DO PGY-4  Heart Failure Fellow

## 2023-10-19 NOTE — PROGRESS NOTES
"Chung Goodman is a 67 y.o. male on day 12 of admission presenting with SBO (small bowel obstruction) (CMS/Formerly [facility]).    Subjective   Overnight had multiple instances of prolonged pain. Robaxin given and PCA restarted.     Remains sleepy but improved mental status this morning. Able to answer questions, more awake than yesterday. Occasionally forgets to press his PCA. Moderate abdominal pain but improved after PCA started. Wife at bedside.        Objective     Physical Exam  Constitutional:       General: He is not in acute distress.     Appearance: He is not toxic-appearing.   HENT:      Head: Normocephalic and atraumatic.      Nose: Nose normal.      Mouth/Throat:      Pharynx: Oropharynx is clear. No oropharyngeal exudate.   Eyes:      General: No scleral icterus.     Extraocular Movements: Extraocular movements intact.      Conjunctiva/sclera: Conjunctivae normal.      Pupils: Pupils are equal, round, and reactive to light.   Cardiovascular:      Rate and Rhythm: Normal rate.   Pulmonary:      Effort: Pulmonary effort is normal. No respiratory distress.      Breath sounds: Normal breath sounds. No wheezing.   Abdominal:      General: There is no distension.      Palpations: Abdomen is soft. There is no mass.      Tenderness: There is no abdominal tenderness. There is no guarding.      Comments: Midline abdominal incision with wound vac, minimal output. IRVING drains x3 with serosanguinous drainage. Appropriately tender.    Musculoskeletal:         General: Normal range of motion.      Right lower leg: No edema.      Left lower leg: No edema.   Skin:     General: Skin is warm and dry.   Neurological:      General: No focal deficit present.      Mental Status: He is oriented to person, place, and time. He is lethargic.      Cranial Nerves: No cranial nerve deficit.         Last Recorded Vitals  Blood pressure 155/83, pulse 92, temperature 36.5 °C (97.7 °F), resp. rate 18, height 1.765 m (5' 9.49\"), weight 70.5 kg (155 " lb 6.8 oz), SpO2 97 %.  Intake/Output last 3 Shifts:  I/O last 3 completed shifts:  In: 1878 (26.6 mL/kg) [Other:10]  Out: 4005 (56.8 mL/kg) [Urine:2925 (1.2 mL/kg/hr); Emesis/NG output:460; Drains:620]  Weight: 70.5 kg     Relevant Results  Scheduled medications  acetaminophen, 1,000 mg, intravenous, q6h SCOTT  aspirin, 150 mg, rectal, Daily  fat emulsion fish oil/plant based, 250 mL, intravenous, Daily  heparin (porcine), 5,000 Units, subcutaneous, q8h  heparin flush, 50 Units, intra-catheter, q12h  hydrALAZINE, 10 mg, intravenous, q6h  insulin NPH (Isophane), 10 Units, subcutaneous, Nightly  insulin NPH (Isophane), 20 Units, subcutaneous, Daily  insulin regular, 0-10 Units, subcutaneous, q6h  insulin regular, 4 Units, subcutaneous, q6h  lidocaine, 1 mL, infiltration, Once  lidocaine, 1 patch, transdermal, Daily  magnesium sulfate, 2 g, intravenous, Once  methylPREDNISolone sodium succinate (PF), 4 mg, intravenous, q24h  metoclopramide, 10 mg, intravenous, q6h SCOTT  micafungin, 100 mg, intravenous, q24h  nystatin, 4 mL, Swish & Swallow, BID  pantoprazole, 40 mg, intravenous, BID  piperacillin-tazobactam, 3.375 g, intravenous, q6h  tacrolimus, 1 mg, sublingual, q24h  tacrolimus, 2 mg, sublingual, Daily      Continuous medications  Adult Clinimix TPN, 83 mL/hr, Last Rate: Stopped (10/18/23 2229)  HYDROmorphone,       PRN medications  PRN medications: alteplase, alteplase, dextrose 10 % in water (D10W), dextrose, glucagon, heparin flush, heparin lock flush (porcine), ipratropium-albuteroL, naloxone, oxygen  Results for orders placed or performed during the hospital encounter of 10/07/23 (from the past 24 hour(s))   SARS-CoV-2 RT PCR   Result Value Ref Range    Coronavirus 2019, PCR Not Detected Not Detected   POCT GLUCOSE   Result Value Ref Range    POCT Glucose 294 (H) 74 - 99 mg/dL   POCT GLUCOSE   Result Value Ref Range    POCT Glucose 242 (H) 74 - 99 mg/dL   POCT GLUCOSE   Result Value Ref Range    POCT Glucose 279  (H) 74 - 99 mg/dL   POCT GLUCOSE   Result Value Ref Range    POCT Glucose 349 (H) 74 - 99 mg/dL   POCT GLUCOSE   Result Value Ref Range    POCT Glucose 258 (H) 74 - 99 mg/dL   CBC   Result Value Ref Range    WBC 17.9 (H) 4.4 - 11.3 x10*3/uL    nRBC 0.0 0.0 - 0.0 /100 WBCs    RBC 2.58 (L) 4.50 - 5.90 x10*6/uL    Hemoglobin 7.6 (L) 13.5 - 17.5 g/dL    Hematocrit 23.8 (L) 41.0 - 52.0 %    MCV 92 80 - 100 fL    MCH 29.5 26.0 - 34.0 pg    MCHC 31.9 (L) 32.0 - 36.0 g/dL    RDW 17.0 (H) 11.5 - 14.5 %    Platelets 356 150 - 450 x10*3/uL    MPV 11.6 (H) 7.5 - 11.5 fL   Comprehensive Metabolic Panel   Result Value Ref Range    Glucose 279 (H) 74 - 99 mg/dL    Sodium 146 (H) 136 - 145 mmol/L    Potassium 4.0 3.5 - 5.3 mmol/L    Chloride 112 (H) 98 - 107 mmol/L    Bicarbonate 26 21 - 32 mmol/L    Anion Gap 12 10 - 20 mmol/L    Urea Nitrogen 58 (H) 6 - 23 mg/dL    Creatinine 2.28 (H) 0.50 - 1.30 mg/dL    eGFR 31 (L) >60 mL/min/1.73m*2    Calcium 9.7 8.6 - 10.6 mg/dL    Albumin 2.5 (L) 3.4 - 5.0 g/dL    Alkaline Phosphatase 110 33 - 136 U/L    Total Protein 5.7 (L) 6.4 - 8.2 g/dL    AST 17 9 - 39 U/L    Bilirubin, Total 1.1 0.0 - 1.2 mg/dL    ALT 16 10 - 52 U/L   Magnesium   Result Value Ref Range    Magnesium 1.88 1.60 - 2.40 mg/dL   POCT GLUCOSE   Result Value Ref Range    POCT Glucose 283 (H) 74 - 99 mg/dL       Assessment/Plan   Active Problems:    Pancreatic cancer (CMS/HCC)    Pulmonary hypertension (CMS/HCC)    CVA (cerebral vascular accident) (CMS/HCC)    Chung ROSA Goodman is a 67 y.o. 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 Now s/p ex lap, washout, fascial closure w/abdominal wound vac and drain placement on 10/17 for fascial dehiscence.      Plan  Neuro  - Dilaudid IV q2h  PRN  - PCA restarted overnight  - Follow up Supportive Onc recs     CV  - ASA suppository 150mg   - Home immunosuppression regimen (tacrolimus, solumedrol). Tacrolimus target goal 5-8  - Per HF, continue methylprednisolone 4 mg IV daily while NPO.   - Plan is to transition to oral tacrolimus 4mg at 0630 and 3mg at 1830 and oral prednisone 5mg daily once patient is able to tolerate oral medications    Pulm:   - Respiratory  w/duo-nebs TID  - Continue pulse oximetry to monitor respiratory status 2/2 sleepiness  - Encourage incentive spirometry 10x/h     GI  - Continue TPN at goal, plan for home continuous  - Abdominal wound vac to -75 mmHg  - NPO, sips of clears  - G tube to gravity  - Reglan Q6 hours scheduled   - dc TOR rashid     ID  - Micafungin, zosyn per ID. Originally 7 day course per ID, will continue given takeback to OR.   - COVID test - negative     Endo  - hyperglycemic overnight 250-350  - per endo - stop glargine, start NPH 20u at 11:00 with methylpred, and NPH 10u at 23:00, start regular insulin 4u q6hr for TPN carb coverage and adjust ssi to regular insulin ssi#2 q6h to correlate with standing dose timing      - DVT ppx: Heparin SQ  - Encourage ambulation     Dispo:  - Continue Patrice 6, dispo with TPN likely     Discussed with attending surgeon, Dr. Johnosn.    Alla Oropeza MD  PGY-1 Vascular Surgery Resident  Surgical Oncology Service  [id number]

## 2023-10-19 NOTE — PROGRESS NOTES
SUPPORTIVE AND PALLIATIVE ONCOLOGY INPATIENT FOLLOW-UP      SERVICE DATE: 10/19/2023    Subjective   HISTORY OF PRESENT ILLNESS: Chung Goodman is a 67 y.o. male who presents with  SBO s/p surgery    Dilaudid PCA started last night at 3 AM  Patient was awake and alert to self and place sitting on the chair at the time of my visit.  Wife at the bedside  Patient denied pain at the time of my visit and mostly answered questions in yes or no.  Wife stated that he has been complaining of pain however could give me the details.  She further stated that he himself wanted to get up out of bed and sit in chair.    Per nursing, wife has been pressing the PCA button and patient has been oriented to self and situation.  I discussed with wife about possibly stopping the PCA pump getting upset and the fact that the PCA pump is helpful for his pain since sometimes it may take more time for nursing to give him the pain medication.  She further stated that the primary team started the pain pump      Information obtained from: interview of patient, interview of family, and discussion with RN  ______________________________________________________________________        Objective       PHYSICAL EXAMINATION  Vital Signs:   Vital signs reviewed  Vitals:    10/19/23 0839   BP: 155/83   Pulse: 92   Resp: 18   Temp: 36.5 °C (97.7 °F)   SpO2: 97%     Pain Score: 8      Physical Exam  Constitutional:       Appearance: Normal appearance.      Comments: Mild lethargy, sitting in chair, alert oriented to self and situation.  I personally could not have meaningful conversation    HENT:      Head: Normocephalic and atraumatic.      Right Ear: Tympanic membrane normal.      Left Ear: Tympanic membrane normal.      Nose: Nose normal.      Mouth/Throat:      Mouth: Mucous membranes are dry.   Eyes:      Extraocular Movements: Extraocular movements intact.      Conjunctiva/sclera: Conjunctivae normal.      Pupils: Pupils are equal, round, and  reactive to light.   Cardiovascular:      Rate and Rhythm: Normal rate and regular rhythm.      Heart sounds: Normal heart sounds.   Pulmonary:      Effort: Pulmonary effort is normal.   Abdominal:      General: Abdomen is flat.      Palpations: Abdomen is soft.      Comments: Abdominal binder present suction drains.  PEG tube draining to gravity.   Genitourinary:     Comments: Portillo in place with clear urine  Musculoskeletal:      Cervical back: Normal range of motion and neck supple.      Comments: Generalized muscle weakness.  No edema   Skin:     General: Skin is warm and dry.   Neurological:      Mental Status: He is alert.      Comments: Alert and oriented to self and place.  Could not follow commands due to mild lethargy   Psychiatric:      Comments: Flat affect        Scheduled medications  acetaminophen, 1,000 mg, intravenous, q6h SCOTT  aspirin, 150 mg, rectal, Daily  fat emulsion fish oil/plant based, 250 mL, intravenous, Daily  heparin (porcine), 5,000 Units, subcutaneous, q8h  heparin flush, 50 Units, intra-catheter, q12h  hydrALAZINE, 10 mg, intravenous, q6h  insulin glargine, 15 Units, subcutaneous, Nightly  insulin lispro, 0-20 Units, subcutaneous, q4h  lidocaine, 1 mL, infiltration, Once  lidocaine, 1 patch, transdermal, Daily  magnesium sulfate, 2 g, intravenous, Once  methylPREDNISolone sodium succinate (PF), 4 mg, intravenous, q24h  metoclopramide, 10 mg, intravenous, q6h SCOTT  micafungin, 100 mg, intravenous, q24h  nystatin, 4 mL, Swish & Swallow, BID  pantoprazole, 40 mg, intravenous, BID  piperacillin-tazobactam, 3.375 g, intravenous, q6h  tacrolimus, 1 mg, sublingual, q24h  tacrolimus, 2 mg, sublingual, Daily      Continuous medications  Adult Clinimix TPN, 83 mL/hr, Last Rate: Stopped (10/18/23 2229)  HYDROmorphone,       PRN medications  PRN medications: alteplase, alteplase, dextrose 10 % in water (D10W), dextrose, glucagon, heparin flush, heparin lock flush (porcine), ipratropium-albuteroL,  naloxone, oxygen  Results from last 7 days   Lab Units 10/19/23  0549 10/18/23  0516 10/17/23  0759   WBC AUTO x10*3/uL 17.9* 17.0* 15.1*   HEMOGLOBIN g/dL 7.6* 8.7* 8.5*   HEMATOCRIT % 23.8* 26.9* 25.8*   PLATELETS AUTO x10*3/uL 356 382 316     Results from last 7 days   Lab Units 10/19/23  0549 10/18/23  0516 10/17/23  0759 10/16/23  0328   SODIUM mmol/L 146* 148* 148* 149*   POTASSIUM mmol/L 4.0 4.3 4.4 3.6   CHLORIDE mmol/L 112* 110* 110* 110*   CO2 mmol/L 26 25 28 27   BUN mg/dL 58* 66* 59* 65*   CREATININE mg/dL 2.28* 2.36* 2.17* 2.68*   CALCIUM mg/dL 9.7 9.5 9.9 9.9   PROTEIN TOTAL g/dL 5.7* 5.6*  --  5.4*   BILIRUBIN TOTAL mg/dL 1.1 1.2  --  1.5*   ALK PHOS U/L 110 114  --  145*   ALT U/L 16 17  --  23   AST U/L 17 15  --  21   GLUCOSE mg/dL 279* 166* 107* 136*           XR ABDOMEN 1 VIEW;  10/16/2023   mpression:     1.  Contrast again only noted within the gastric body. No contrast  material seen in the small bowel, secondary to high-grade small bowel  obstruction.  2. Postsurgical changes as described above.       ASSESSMENT/PLAN    Chung Goodman is a 67 y.o. male with history of heart transplant X 2017, pancreatic cancer s/p Whipple's on 5/19/2023 on chemotherapy FOLFIRINOX, moderate malnutrition secondary to chronic disease, recurrent pancreatitis, CKD 3, recent admission for SBO (9/23-9/29), who was admitted to [facility] on 10/5 with emesis, fever. Scans showed SBO and blood cultures grew E. coli.  Patient was transferred to [facility] on 10/7 for further management of SBO. Pt had EGD with dilatation of afferent limb anastomotic stenosis on 10/10, however course c/b SBO with perf requiring emergent laparotomy with partial SB resection, jejuno-jejunostomy, G-tube. On 10/17 pt found to have fascial dehiscence of abdominal incision and underwent emergency laparotomy with fascial retention sutures placed. Supportive oncology consulted for pain management.     Neoplasm Related Pain  Acute Post Operative Pain.   Fair control  Type: Visceral and somatic  Home regimen: Gabapentin 200 mg PO HS, Oxycodone IR 5 mg q4-6h PRN, Tramdol 50mg BID PRN  Intolerances/previously tried: Morphine allergy (rx: itching)  Personalized pain goal: Could not be established since patient was lethargic.  ORT-OUD Score: Total Score: 1  due to mental health history of depression indicating Opioid Risk Category: Low  of future opioid use disorder.  Creatinine Clearance: 31.43 mL/min (A)   (10/18) LFTs WNL  Continue Tylenol 1000 mg IV every 6 hours scheduled   Dilaudid PCA started  Dose 0.2 mg  Lockout interval every 30 minutes  Demand dose pressed 33  Demand dose received 14= 14 X0.2= 2.8 mg IV Dilaudid/24 hours= 35 mg OME  Decision made to continue PCA overnight after conversation for pain management  Would recommend decreasing the demand dose to 0.1 mg and change lock out interval to 60 minutes if needed overnight  Would highly recommend stopping PCA tomorrow morning and request primary team to reassess and convey the same to wife  Will consider starting home gabapentin 200mg PO HS when mentation improves  Continue lidocaine 4% patch daily  Dilaudid 0.2 mg IV every 2 hours as needed.  Used 7 doses/24 hours.  This was stopped once the Dilaudid PCA was started     Nausea  At risk for nausea with vomiting related to SBO, malignancy, and acute postoperative period   Last documented EKG (5/31/23) in eMAR showing QTc of 495  Reduce Reglan dose to 5 mg IV q6h, Creatinine Clearance: 31.4 mL/min (A) -     Risk for opioid-induced constipation aggravated by decreased functional status    -Gastrostomy open to gravity  Last BM: unknown  Will continue monitor     chronic depression   Well controlled with home regimen  Home regimen: Wellbutrin XL 150mg PO daily, Buspar 5mg PO TID  Will consider starting home Wellbutrin XL 150mg PO daily when starts taking p.o.  Will starting home Buspar 5mg PO TID when mentation improves     Medical Decision Making/Goals of  Care/Advance Care Planning:  (Per my prior conversation 10/18) Patient's current clinical condition, including diagnosis, prognosis, and management plan, and goals of care were discussed.   Life limiting disease: metastatic malignancy  Family: Supportive lives at home with wife  Performance status: Major limitations due to pain and disease process  Joys/meaning/strength: Music, believes in God  Understanding of health: Demonstrates good prognostic understanding of disease process, wife understands that 4 months ago patient had Whipple's procedure and then was admitted 2 weeks ago due to concern for bowel obstruction.  1 week ago patient had revision of the whipple however developed complications and had to redo Whipple's.  Last night he had leakage from abdominal area and hence went for emergency surgery early morning today.  Wife also understands that patient has recurrence of cancer  Information: Wants full disclosure  Goals: Symptom control  Code status discussion: We had conversation regarding resuscitation and intubation in the event of cardiac arrest.  Wife stated that she has not discussed this with her  however feels that resuscitation and intubation may not be appropriate.  She will discuss with her .     Advance Directives  Existence of Advance Directives: Yes, but NOT documented in medical record  Decision maker: Surrogate decision maker is wife  Code Status: Full code  Living will: None    Supportive Interventions: Interventions: Music Therapy: referral placed pain management, Art Therapy: referral placed, Social work referral for: Advance Directives   has been following    Disposition  Please  start the process of having prior authorization with meds to beds deliver medications to patient prior to discharge via [facility] pharmacy. Prescriptions will need to be sent 48-72 hours prior to discharge so that a prior authorization can be completed.     Discharge date: unknown pending acute  issues and pain control  Will assess if patient needs an appointment with Outpatient Supportive Oncology       Signature and billing  Thank you for allowing us to participate in the care of this patient. Recommendations will be communicated back to the consulting service by way of shared electronic medical record or face-to-face.    Medical complexity was high level due to due to complexity of problems, extensive data review, and high risk of management/treatment.    I spent 50 minutes in the care of this patient which included chart review, interviewing patient/family, discussion with primary team, coordination of care, and documentation.      DATA   Diagnostic tests and information reviewed for today's visit:  Conversation with primary team       Plan of Care discussed with: Provider    Thank you for asking Supportive and Palliative Oncology to assist with care of this patient.  We will continue to follow  Please contact us for additional questions or concerns.      SIGNATURE: Lyndsey Shipley MD  PAGER/CONTACT:  Contact information:  Supportive and Palliative Oncology  Monday-Friday 8 AM-5 PM  Epic Secure chat or pager [phone].  After hours and weekends:  pager [phone]

## 2023-10-19 NOTE — PROGRESS NOTES
SW met with the patient and his wife to discuss planning for his care at discharge. Patient shared that he is beginning to feel a little better. We discussed that patient will need to transition to the next LOC at discharge. SW provided information about LTACH facility and pt. and wife were interested and they were provided with LTACH list. Pt.'s wife shared she would like to discuss with her sister before she makes a choice. SW shared that as soon as she makes a choice to please update DOMONIQUE so the the referral can be placed in [facility] to determine bed availability. Will continue to follow.

## 2023-10-19 NOTE — SIGNIFICANT EVENT
Patient's wife reports that patient was having continued pain throughout the night. States patient begins to moan in discomfort one hour after being giving his pain medications. He was given a one time dose of Robaxin which did not alleviate pain. Patient awake in bed and in discomfort on evaluation at 0300. Abdomen was soft, diffusely tender throughout, and nondistended. G tube in place. IRVING drains x3 to bulb suction with serosanguinous output. Midline abdominal wound vac to -75 mmHg.    Plan:  - PCA 0.2 dilaudid q30 minutes    Patient was seen and evaluated with Dr. Merchant. He was discussed with Dr. Johnson.    Shyla Pritchett MD  Surgical Oncology  [id number]

## 2023-10-19 NOTE — PROGRESS NOTES
"Chung Goodman is a 67 y.o. male on day 12 of admission presenting with SBO (small bowel obstruction) (CMS/[facility]).    Subjective   Pt was seen and examined. Pt and his wife Krystle were made aware of insulin regimen change. Pt had previously been managed by  Endocrinologist whom is now employed in another health system, pt's wife would prefer to continue endocrine care at  after d/c     I have reviewed histories, allergies and medications have been reviewed        Objective   Review of Systems   Constitutional: Negative.    HENT: Negative.     Eyes: Negative.    Respiratory: Negative.     Cardiovascular: Negative.    Gastrointestinal: Negative.    Endocrine: Negative.    Musculoskeletal: Negative.    Neurological: Negative.    Psychiatric/Behavioral: Negative.       Physical Exam  Constitutional:       Appearance: He is normal weight. He is ill-appearing.   HENT:      Head: Normocephalic.      Mouth/Throat:      Mouth: Mucous membranes are dry.   Cardiovascular:      Rate and Rhythm: Normal rate and regular rhythm.   Pulmonary:      Effort: Pulmonary effort is normal.      Breath sounds: Normal breath sounds.   Abdominal:      General: Abdomen is flat.      Palpations: Abdomen is soft.   Musculoskeletal:         General: Normal range of motion.   Neurological:      General: No focal deficit present.      Mental Status: He is disoriented.   Psychiatric:         Mood and Affect: Mood normal.         Behavior: Behavior normal.         Last Recorded Vitals  Blood pressure 155/83, pulse 92, temperature 36.5 °C (97.7 °F), resp. rate 18, height 1.765 m (5' 9.49\"), weight 70.5 kg (155 lb 6.8 oz), SpO2 97 %.  Intake/Output last 3 Shifts:  I/O last 3 completed shifts:  In: 1878 (26.6 mL/kg) [Other:10]  Out: 4005 (56.8 mL/kg) [Urine:2925 (1.2 mL/kg/hr); Emesis/NG output:460; Drains:620]  Weight: 70.5 kg     Relevant Results  Results from last 7 days   Lab Units 10/19/23  0851 10/19/23  0549 10/19/23  0543 10/19/23  0128 " 10/18/23  2203 10/18/23  1711 10/18/23  0913 10/18/23  0516 10/17/23  0805 10/17/23  0759 10/16/23  0739 10/16/23  0328 10/15/23  1612 10/15/23  1517   POCT GLUCOSE mg/dL 283*  --  258* 349* 279* 242*   < >  --    < >  --    < >  --    < >  --    GLUCOSE mg/dL  --  279*  --   --   --   --   --  166*  --  107*  --  136*  --  175*    < > = values in this interval not displayed.     Scheduled medications  acetaminophen, 1,000 mg, intravenous, q6h SCOTT  aspirin, 150 mg, rectal, Daily  fat emulsion fish oil/plant based, 250 mL, intravenous, Daily  heparin (porcine), 5,000 Units, subcutaneous, q8h  heparin flush, 50 Units, intra-catheter, q12h  hydrALAZINE, 10 mg, intravenous, q6h  insulin glargine, 15 Units, subcutaneous, Nightly  insulin lispro, 0-20 Units, subcutaneous, q4h  lidocaine, 1 mL, infiltration, Once  lidocaine, 1 patch, transdermal, Daily  magnesium sulfate, 2 g, intravenous, Once  methylPREDNISolone sodium succinate (PF), 4 mg, intravenous, q24h  metoclopramide, 10 mg, intravenous, q6h SCOTT  micafungin, 100 mg, intravenous, q24h  nystatin, 4 mL, Swish & Swallow, BID  pantoprazole, 40 mg, intravenous, BID  piperacillin-tazobactam, 3.375 g, intravenous, q6h  tacrolimus, 1 mg, sublingual, q24h  tacrolimus, 2 mg, sublingual, Daily      Continuous medications  Adult Clinimix TPN, 83 mL/hr, Last Rate: Stopped (10/18/23 2229)  HYDROmorphone,       PRN medications  PRN medications: alteplase, alteplase, dextrose 10 % in water (D10W), dextrose, glucagon, heparin flush, heparin lock flush (porcine), ipratropium-albuteroL, naloxone, oxygen    Results for orders placed or performed during the hospital encounter of 10/07/23 (from the past 24 hour(s))   SARS-CoV-2 RT PCR   Result Value Ref Range    Coronavirus 2019, PCR Not Detected Not Detected   POCT GLUCOSE   Result Value Ref Range    POCT Glucose 294 (H) 74 - 99 mg/dL   POCT GLUCOSE   Result Value Ref Range    POCT Glucose 242 (H) 74 - 99 mg/dL   POCT GLUCOSE   Result  Value Ref Range    POCT Glucose 279 (H) 74 - 99 mg/dL   POCT GLUCOSE   Result Value Ref Range    POCT Glucose 349 (H) 74 - 99 mg/dL   POCT GLUCOSE   Result Value Ref Range    POCT Glucose 258 (H) 74 - 99 mg/dL   CBC   Result Value Ref Range    WBC 17.9 (H) 4.4 - 11.3 x10*3/uL    nRBC 0.0 0.0 - 0.0 /100 WBCs    RBC 2.58 (L) 4.50 - 5.90 x10*6/uL    Hemoglobin 7.6 (L) 13.5 - 17.5 g/dL    Hematocrit 23.8 (L) 41.0 - 52.0 %    MCV 92 80 - 100 fL    MCH 29.5 26.0 - 34.0 pg    MCHC 31.9 (L) 32.0 - 36.0 g/dL    RDW 17.0 (H) 11.5 - 14.5 %    Platelets 356 150 - 450 x10*3/uL    MPV 11.6 (H) 7.5 - 11.5 fL   Comprehensive Metabolic Panel   Result Value Ref Range    Glucose 279 (H) 74 - 99 mg/dL    Sodium 146 (H) 136 - 145 mmol/L    Potassium 4.0 3.5 - 5.3 mmol/L    Chloride 112 (H) 98 - 107 mmol/L    Bicarbonate 26 21 - 32 mmol/L    Anion Gap 12 10 - 20 mmol/L    Urea Nitrogen 58 (H) 6 - 23 mg/dL    Creatinine 2.28 (H) 0.50 - 1.30 mg/dL    eGFR 31 (L) >60 mL/min/1.73m*2    Calcium 9.7 8.6 - 10.6 mg/dL    Albumin 2.5 (L) 3.4 - 5.0 g/dL    Alkaline Phosphatase 110 33 - 136 U/L    Total Protein 5.7 (L) 6.4 - 8.2 g/dL    AST 17 9 - 39 U/L    Bilirubin, Total 1.1 0.0 - 1.2 mg/dL    ALT 16 10 - 52 U/L   Magnesium   Result Value Ref Range    Magnesium 1.88 1.60 - 2.40 mg/dL   POCT GLUCOSE   Result Value Ref Range    POCT Glucose 283 (H) 74 - 99 mg/dL       Results from last 7 days   Lab Units 10/19/23  0549 10/18/23  0516   HEMOGLOBIN A1C %  --  7.3*   SODIUM mmol/L 146* 148*   POTASSIUM mmol/L 4.0 4.3   CHLORIDE mmol/L 112* 110*   CO2 mmol/L 26 25   BUN mg/dL 58* 66*   CREATININE mg/dL 2.28* 2.36*   CALCIUM mg/dL 9.7 9.5   ALBUMIN g/dL 2.5* 2.5*   PROTEIN TOTAL g/dL 5.7* 5.6*   BILIRUBIN TOTAL mg/dL 1.1 1.2   ALK PHOS U/L 110 114   ALT U/L 16 17   AST U/L 17 15   GLUCOSE mg/dL 279* 166*       Assessment/Plan   Active Problems:    Pancreatic cancer (CMS/HCC)    Pulmonary hypertension (CMS/HCC)    CVA (cerebral vascular accident)  (CMS/[facility])     RECOMMENDATIONS:  - Updated HbA1C pending  - Goal -180  - Stop Glargine and Start NPH at 20u at 11am with methylpredisdone and 10u of NPH at 11pm  - start scheduled regular insulin 4 units Q6H  - adjust regular insulin ssi #2 q6hr correlated with 4u standing dose order    - Please contact/secure message if/when pt's TPN changes or if TF/D5 are started, and 1-2 days prior to anticipated discharge for optimal planning and transition to home regimen    - Accuchecks Q6H   - Hypoglycemia protocol     IF TPN AND LIPIDS HELD:   -Goal -180  -CHANGE NPH to 10 units q12hr  -HOLD scheduled regular insulin   -CHANGE regular insulin sliding scale #1 (1:50>150) Q6H to insulin lispro sliding scale #1 (1:50>150) Q4H  -Accuchecks Q6H   -Hypoglycemia protocol      -Please contact/secure message if/when pt's TPN changes or if TF/D5 are started, and 1-2 days prior to anticipated discharge for optimal planning and transition to home regimen     Discharge recommendations:  -TBD, patient was on insulin at home therefore this is not a new start  -Patient will need to establish with new endocrinologist (last seen by Dr. Loya in August 2023)     We spent 40 minutes professionally on the treatment of this patient    BURKE Lyons-S2  Steph Arroyo PA-C

## 2023-10-20 NOTE — PROGRESS NOTES
"SUPPORTIVE AND PALLIATIVE ONCOLOGY INPATIENT FOLLOW-UP      SERVICE DATE: 10/20/2023    Subjective   HISTORY OF PRESENT ILLNESS:  Chung Goodman is a 67 y.o. male on hospital day #13 with history of heart transplant X 2017, pancreatic cancer s/p Whipple's on 5/19/2023, recurrent pancreatitis, CKD 3, recent admission for SBO (9/23-9/29), who was admitted to [facility] on 10/5 with emesis, fever. Scans showed SBO and blood cultures grew E. coli.  Patient was transferred to [facility] on 10/7 for further management of SBO. Pt had venting PEG and NG tube on 10/10, however course c/b SBO with perf requiring emergent laparotomy with partial SB resection. On 10/17 pt found to have fascial dehiscence of abdominal incision and underwent emergency laparotomy with fascial retention sutures placed. Supportive oncology following for pain management.     Upon assessment, pt markedly more awake than previously assessments and up in the chair. Despite this, pt still only minimally responsive when conversation attempted. Pt visibly anxious, fidgety, and restless. Pt's wife at bedside and continues to endorse pt having significant abdominal pain which she feels is more well controlled with hydromorphone PCA. Brought up switching pt to 0.2mg/hr basal hydromorphone gtt due to pt's inability to push the button at this time due to AMS. Pt's wife agreeable so long as pt's pain under control.     Pain Assessment:   **Minimal pt participation, some information obtained from conversation with pt's wife.     Onset: Acute post operative pain r/t emergency laparotomy performed on 10/17.   Location: Abdomen/surgical site  Duration:  Continuous with flare-ups    Characteristics:  Rating:  \"my pain is crazy\"  Descriptors: See \"rating\"  Aggravating: Movement   Relieving: Analgesics --see MAR, Positioning, and Modifying activity  Treatment/Home Regimen: Gabapentin 200 mg PO HS, Oxycodone IR 5 mg every 4-6 hours PRN, and tramdol 50 mg BID PRN.  "   Intolerances: Pt allergic to morphine (rx: itching).   Interference with Function: Very Much  Coping Strategies: Relaxation and Distraction  Emotional Response: Anxiety and Restlessness  Barriers to Pain Management:  Fluctuating mental status    NPAT: 3  Emotion: 1  Movement: 2  Facial Cues: 0  Positioning/Guardin    Symptom Assessment:  Unable to obtain complete ROS from pt secondary to pt's AMS, lack of participation  Pain:very much   Anxiety: somewhat  Restlessness: somewhat    Information obtained from: chart review, interview of patient, interview of family, discussion with RN, and discussion with primary team  ______________________________________________________________________      Objective     Estimated Creatinine Clearance: 32.1 mL/min (A) (by C-G formula based on SCr of 2.23 mg/dL (H)).     PHYSICAL EXAMINATION  Vital Signs:   Vital signs reviewed  Vitals:    10/20/23 0801   BP: 153/83   Pulse: 91   Resp: 16   Temp: 36.4 °C (97.5 °F)   SpO2: 98%     Pain Score: 8     Physical Exam  Constitutional:       Comments: Alert, confused, ill appearing male sitting in chair, visibly restless and anxious, not participating in interview due to lack of participation and AMS.   HENT:      Head:      Comments: Normocephalic, atraumatic.     Mouth/Throat:      Comments: Extremely dry, tan, cracked tongue.  Eyes:      Comments: Sclera clear, EOM intact.   Neck:      Comments: Trachea midline.  Pulmonary:      Comments: Symmetrical chest rise. Regular rate and depth of respirations. Room air.   Abdominal:      Comments: Abdominal binder present. Three bulb suction drains noted. Wound vac appears in place underneath abdominal binder. Gastrostomy/Enterostomy draining to gravity. Green bile present in drainage bag. No kinks or leakage noted from any tubing.   Genitourinary:     Comments: Portillo catheter in place. Clear, yellow drainage noted in bag.  Musculoskeletal:      Comments: Generalized muscle weakness and  atrophy. Up x1 minimal assist to chair. No edema appreciated in arms or legs.    Skin:     Capillary Refill: Capillary refill takes less than 2 seconds.      Comments: No lesions, rash, or abrasions present on visible skin. Skin color appropriate for ethnicity.   Neurological:      Comments: Unable to establish orientation due to AMS and lack of pt participation, generalized weakness. Only intermittently following commands at this time.     Psychiatric:      Comments: Mostly flat affect, occasionally smiling when spoken to. Intermittently anxious and restless appearing. Limited findings as pt not participating in interview.       ASSESSMENT/PLAN    Chung Goodman is a 67 y.o. male on hospital day #13 with history of heart transplant X 2017, pancreatic cancer s/p Whipple's on 5/19/2023, recurrent pancreatitis, CKD 3, recent admission for SBO (9/23-9/29), who was admitted to [facility] on 10/5 with emesis, fever. Scans showed SBO and blood cultures grew E. coli.  Patient was transferred to [facility] on 10/7 for further management of SBO. Pt had venting PEG and NG tube on 10/10, however course c/b SBO with perf requiring emergent laparotomy with partial SB resection. On 10/17 pt found to have fascial dehiscence of abdominal incision and underwent emergency laparotomy with fascial retention sutures placed. Supportive oncology following for pain management.     Neoplasm Related Pain  Acute Post Operative Pain  Type: Visceral and somatic  Home regimen: Gabapentin 200 mg PO HS, Oxycodone IR 5 mg q4-6h PRN, Tramdol 50mg BID PRN  Intolerances/previously tried: Morphine allergy (rx: itching)  Personalized pain goal: Could not be established since patient was lethargic.  ORT-OUD Score: Total Score: 1  due to mental health history of depression indicating Opioid Risk Category: Low  of future opioid use disorder.  Estimated Creatinine Clearance: 32.1 mL/min (A) (by C-G formula based on SCr of 2.23 mg/dL (H)).  (10/19) LFTs  WNL  Pt's ability to successfully take PO medication pending RN swallow eval, may consider transitioning to PO pain medication when pt mentation resolves enough to more consistently follow commands.  Continue Tylenol 1000 mg IV every 6 hours scheduled   Recommend switching from hydromorphone demand PCA to hydromorphone IV continuous basal rate of 0.2mg/hr with RN boluses of 0.2mg IV available q30min. This would eliminate issue of wife bolusing pt by having the ability to use PCA button.   Dilaudid PCA started by primary (10/18 @ 0300)  Dose 0.2 mg  Lockout interval every 30 minutes  Demand dose requested = 33  Demand dose received 28 = 28 X 0.2 = 5.6 mg IV Dilaudid hours= 112 OME  Will consider starting home gabapentin 200mg PO HS when mentation improves  Continue lidocaine 4% patch daily  Dilaudid 0.2 mg IV every 2 hours as needed.  Used 7 doses/24 hours. This was stopped once the Dilaudid PCA was started     Nausea  At risk for nausea with vomiting related to SBO, malignancy, and acute postoperative period   Last documented EKG (5/31/23) in eMAR showing QTc of 495  Reduce Reglan dose to 5 mg IV q6h, Estimated Creatinine Clearance: 32.1 mL/min (A) (by C-G formula based on SCr of 2.23 mg/dL (H)).--Lexicomp recommending reducing pt's total daily dose by 50% for CrCl of 10 to 60 ml/min     Constipation  At risk for constipation related to opioids, immobility, malignancy   Post op day #3--Gastrostomy/Enterostomy LUQ open to gravity  Last BM: unknown  Will continue monitor    Altered Mood  Hx chronic depression   Well controlled with home regimen  Home regimen: Wellbutrin XL 150mg PO daily, Buspar 5mg PO TID  May consider starting home Wellbutrin XL 150mg PO daily when mentation improves  May consider starting home Buspar 5mg PO TID when mentation improves     Altered Nutrition   Weight loss prior to admission 2/2 n/v likely r/t SBO, malignancy, and diarrhea.  10% weight loss over < 1 month  Monitor weight per unit  protocol  Started on clear liquid diet, pending RN swallow eval  TPN per primary, pt currently NPO with small sips of clears allowed  Dietician following     Medical Decision Making/Goals of Care/Advance Care Planning:  (10/18: Per conversation with Lyndsey Shipley MD)  Patient's current clinical condition, including diagnosis, prognosis, and management plan, and goals of care were discussed.   Life limiting disease: metastatic malignancy  Family: Supportive lives at home with wife  Performance status: Major limitations due to pain and disease process  Joys/meaning/strength: Music, believes in God  Understanding of health: Demonstrates good prognostic understanding of disease process, wife understands that 4 months ago patient had Whipple's procedure and then was admitted 2 weeks ago due to concern for bowel obstruction.  1 week ago patient had revision of the whipple however developed complications and had to redo Whipple's.  Last night he had leakage from abdominal area and hence went for emergency surgery early morning today.  Wife also understands that patient has recurrence of cancer  Information: Wants full disclosure  Goals: Symptom control  Code status discussion: We had conversation regarding resuscitation and intubation in the event of cardiac arrest.  Wife stated that she has not discussed this with her  however feels that resuscitation and intubation may not be appropriate.  She will discuss with her .     Advance Directives  Existence of Advance Directives: Yes, but NOT documented in medical record  Decision maker: Surrogate decision maker is wife  Code Status: Full code  Living will: None     Supportive Interventions: Interventions: Music Therapy: referral placed pain management, Art Therapy: referral placed, Social work referral for: Advance Directives   has been following     Disposition  Please start the process of having prior authorization with meds to beds deliver medications to  patient prior to discharge via [facility]. Prescriptions will need to be sent 48-72 hours prior to discharge so that a prior authorization can be completed.      Discharge date: unknown pending acute issues and pain control  Will assess if patient needs an appointment with Outpatient Supportive Oncology     Signature and billing  Thank you for allowing us to participate in the care of this patient. Recommendations will be communicated back to the consulting service by way of shared electronic medical record or face-to-face.    Medical complexity was high level due to due to complexity of problems, extensive data review, and high risk of management/treatment.    I spent 65 minutes in the care of this patient which included chart review, interviewing patient/family, discussion with primary team, coordination of care, and documentation.    DATA   Diagnostic tests and information reviewed for today's visit:  Conversation with primary team, Conversation with RN, Most recent labs and imaging results, Most recent EKG, Medications     Some elements copied from my note on 10/18 and Lyndsey Shipley's note on 10/19, the elements have been updated and all reflect current decision making from today, 10/20/2023.    Plan of Care discussed with: bedside RN, primary team, and Juana Aranda CNP    Thank you for asking Supportive and Palliative Oncology to assist with care of this patient.  We will continue to follow  Please contact us for additional questions or concerns.      SIGNATURE: PINO Roberts  PAGER/CONTACT:  Contact information:  Supportive and Palliative Oncology  Monday-Friday 8 AM-5 PM  Epic Secure chat or pager [phone].  After hours and weekends:  pager [phone]

## 2023-10-20 NOTE — PROGRESS NOTES
Wound Care Progress Note     Visit Date: 10/20/2023      Patient Name: Chung Goodman         MRN: [id number]             Reason for Visit: VAC dressing change     Wound Assessment:  Wound 10/17/23 Other (comment) Abdomen Medial;Upper (Active)   Date First Assessed/Time First Assessed: 10/17/23 0525   Present on Original Admission: No  Primary Wound Type: (c) Other (comment)  Location: Abdomen  Wound Location Orientation: Medial;Upper      Assessments 10/20/2023 12:09 PM   Wound Image     Site Assessment Yellow;Tan;Pink;Red;Fragile;Granulation   Yani-Wound Assessment Other (Comment)   Non-staged Wound Description Full thickness   Wound Length (cm) 23.5 cm   Wound Width (cm) 2 cm   Wound Surface Area (cm^2) 47 cm^2   Wound Depth (cm) 1.5 cm   Wound Volume (cm^3) 70.5 cm^3   Margins Attached edges;Well-defined edges   Drainage Description Serosanguineous   Drainage Amount Small   Dressing Vacuum dressing   Dressing Status Other (Comment)       Active Orders   Date Order Priority Status Authorizing Provider   10/19/23 1757 Inpatient Consult to Wound and Ostomy Nurse Routine Active Hi Huerta MD     - Reason for Consult?:    Wound Vac     Negative Pressure Wound Therapy Abdomen Medial;Upper (Active)   Placement Date/Time: 10/17/23 0630   Placed by: monika camacho  Wound Type: Dehisced  Location: Abdomen  Wound Location Orientation: Medial;Upper   Number of days: 3     Negative Pressure Wound Therapy Abdomen Medial;Upper (Active)   Dressing Type Black foam;White foam 10/20/23 1210   Cycle Continuous 10/20/23 1210   Target Pressure (mmHg) 75 10/20/23 1210   Canister Changed No 10/20/23 1210   Output (mL) 150 mL 10/19/23 1922     Assessment/Intervention: Wound assessed with MDs at bedside. Wound appears clean with retention sutures in place. Small area of tan/yellow fascia visible in superior portion. This was covered covered with white foam, and then a single strip of black foam was placed into defect.  Dressing sealed with retention sutures excluded from dressing. Therapy resumed at -75mmHg. Pt confused. Supportive and helpful wife at bedside.     Sacral wound noted, suspicious for deep tissue injury. Photo in chart. Mepilex foam placed. Discussed importance of turning/offloading.     Wound Team Plan: VAC dressing change 2x/wk. Mepilex foam to sacrum. Change q3 days and PRN. Turn pt q2 hours to offload sacrum.     Jolie Elliott RN, CWON  10/20/2023  12:10 PM

## 2023-10-20 NOTE — PROGRESS NOTES
"Chung Goodman is a 67 y.o. male on day 13 of admission presenting with SBO (small bowel obstruction) (CMS/[facility]).    Subjective   Pt was seen and examined. Pt and his wife Krystle were made aware of insulin regimen change. Pt had previously been managed by  Endocrinologist whom is now employed in another health system, pt's wife would prefer to continue endocrine care at  after d/c     I have reviewed histories, allergies and medications have been reviewed        Objective   Review of Systems   Constitutional: Negative.    HENT: Negative.     Eyes: Negative.    Respiratory: Negative.     Cardiovascular: Negative.    Gastrointestinal: Negative.    Endocrine: Negative.    Musculoskeletal: Negative.    Neurological: Negative.    Psychiatric/Behavioral: Negative.       Physical Exam  Constitutional:       Appearance: He is normal weight. He is ill-appearing.   HENT:      Head: Normocephalic.      Mouth/Throat:      Mouth: Mucous membranes are dry.   Cardiovascular:      Rate and Rhythm: Normal rate and regular rhythm.   Pulmonary:      Effort: Pulmonary effort is normal.      Breath sounds: Normal breath sounds.   Abdominal:      General: Abdomen is flat.      Palpations: Abdomen is soft.   Musculoskeletal:         General: Normal range of motion.   Neurological:      General: No focal deficit present.      Mental Status: He is disoriented.   Psychiatric:         Mood and Affect: Mood normal.         Behavior: Behavior normal.         Last Recorded Vitals  Blood pressure 143/84, pulse 94, temperature 36.6 °C (97.9 °F), resp. rate 16, height 1.765 m (5' 9.49\"), weight 70.5 kg (155 lb 6.8 oz), SpO2 98 %.  Intake/Output last 3 Shifts:  I/O last 3 completed shifts:  In: 4472.2 (63.4 mL/kg) [I.V.:50 (0.7 mL/kg); Other:10]  Out: 3600 (51.1 mL/kg) [Urine:2495 (1 mL/kg/hr); Emesis/NG output:275; Drains:830]  Weight: 70.5 kg     Relevant Results  Results from last 7 days   Lab Units 10/20/23  1142 10/20/23  0805 " 10/20/23  0539 10/20/23  0524 10/19/23  2105 10/19/23  1734 10/19/23  0851 10/19/23  0549 10/18/23  0913 10/18/23  0516 10/17/23  0805 10/17/23  0759 10/16/23  0739 10/16/23  0328   POCT GLUCOSE mg/dL 130* 168*  --  155* 167* 148*   < >  --    < >  --    < >  --    < >  --    GLUCOSE mg/dL  --   --  240*  --   --   --   --  279*  --  166*  --  107*  --  136*    < > = values in this interval not displayed.       Scheduled medications  acetaminophen, 1,000 mg, intravenous, q6h SCOTT  aspirin, 150 mg, rectal, Daily  fat emulsion fish oil/plant based, 250 mL, intravenous, Daily  heparin (porcine), 5,000 Units, subcutaneous, q8h  heparin flush, 50 Units, intra-catheter, q12h  hydrALAZINE, 10 mg, intravenous, q6h  [START ON 10/21/2023] insulin NPH (Isophane), 10 Units, subcutaneous, Daily  insulin NPH (Isophane), 20 Units, subcutaneous, Nightly  insulin regular, 0-10 Units, subcutaneous, q6h  insulin regular, 4 Units, subcutaneous, q6h  lidocaine, 1 mL, infiltration, Once  lidocaine, 1 patch, transdermal, Daily  methylPREDNISolone sodium succinate (PF), 4 mg, intravenous, q24h  metoclopramide, 5 mg, intravenous, q6h SCOTT  micafungin, 100 mg, intravenous, q24h  nystatin, 4 mL, Swish & Swallow, BID  pantoprazole, 40 mg, intravenous, BID  piperacillin-tazobactam, 3.375 g, intravenous, q6h  tacrolimus, 1 mg, sublingual, q24h  tacrolimus, 2 mg, sublingual, Daily      Continuous medications  Adult Clinimix TPN, 83 mL/hr, Last Rate: 83 mL/hr (10/19/23 2206)  HYDROmorphone,       PRN medications  PRN medications: alteplase, alteplase, dextrose 10 % in water (D10W), dextrose, glucagon, heparin flush, heparin lock flush (porcine), ipratropium-albuteroL, naloxone, naloxone, oxygen    Results for orders placed or performed during the hospital encounter of 10/07/23 (from the past 24 hour(s))   POCT GLUCOSE   Result Value Ref Range    POCT Glucose 148 (H) 74 - 99 mg/dL   POCT GLUCOSE   Result Value Ref Range    POCT Glucose 167 (H) 74 -  99 mg/dL   POCT GLUCOSE   Result Value Ref Range    POCT Glucose 155 (H) 74 - 99 mg/dL   Tacrolimus level   Result Value Ref Range    Tacrolimus  9.1 <=15.0 ng/mL   Renal Function Panel   Result Value Ref Range    Glucose 240 (H) 74 - 99 mg/dL    Sodium 147 (H) 136 - 145 mmol/L    Potassium 4.0 3.5 - 5.3 mmol/L    Chloride 111 (H) 98 - 107 mmol/L    Bicarbonate 24 21 - 32 mmol/L    Anion Gap 16 10 - 20 mmol/L    Urea Nitrogen 54 (H) 6 - 23 mg/dL    Creatinine 2.23 (H) 0.50 - 1.30 mg/dL    eGFR 32 (L) >60 mL/min/1.73m*2    Calcium 9.9 8.6 - 10.6 mg/dL    Phosphorus 4.8 2.5 - 4.9 mg/dL    Albumin 2.4 (L) 3.4 - 5.0 g/dL   Magnesium   Result Value Ref Range    Magnesium 2.19 1.60 - 2.40 mg/dL   CBC   Result Value Ref Range    WBC 16.4 (H) 4.4 - 11.3 x10*3/uL    nRBC 0.0 0.0 - 0.0 /100 WBCs    RBC 2.46 (L) 4.50 - 5.90 x10*6/uL    Hemoglobin 7.2 (L) 13.5 - 17.5 g/dL    Hematocrit 23.3 (L) 41.0 - 52.0 %    MCV 95 80 - 100 fL    MCH 29.3 26.0 - 34.0 pg    MCHC 30.9 (L) 32.0 - 36.0 g/dL    RDW 17.3 (H) 11.5 - 14.5 %    Platelets 357 150 - 450 x10*3/uL    MPV 11.6 (H) 7.5 - 11.5 fL   POCT GLUCOSE   Result Value Ref Range    POCT Glucose 168 (H) 74 - 99 mg/dL   POCT GLUCOSE   Result Value Ref Range    POCT Glucose 130 (H) 74 - 99 mg/dL       Results from last 7 days   Lab Units 10/20/23  0539 10/19/23  0549 10/18/23  0516   HEMOGLOBIN A1C %  --   --  7.3*   SODIUM mmol/L 147* 146* 148*   POTASSIUM mmol/L 4.0 4.0 4.3   CHLORIDE mmol/L 111* 112* 110*   CO2 mmol/L 24 26 25   BUN mg/dL 54* 58* 66*   CREATININE mg/dL 2.23* 2.28* 2.36*   CALCIUM mg/dL 9.9 9.7 9.5   ALBUMIN g/dL 2.4* 2.5* 2.5*   PROTEIN TOTAL g/dL  --  5.7* 5.6*   BILIRUBIN TOTAL mg/dL  --  1.1 1.2   ALK PHOS U/L  --  110 114   ALT U/L  --  16 17   AST U/L  --  17 15   GLUCOSE mg/dL 240* 279* 166*         Assessment/Plan   Active Problems:    Pancreatic cancer (CMS/HCC)    Pulmonary hypertension (CMS/HCC)    CVA (cerebral vascular accident) (CMS/HCC)      RECOMMENDATIONS:  - Updated HbA1C pending  - Goal -180  - adjust NPH at 20u at 11pm with methylpredisdone and 10u of NPH at 11am  - continue scheduled regular insulin 4 units Q6H  - continue regular insulin ssi #2 q6hr correlated with 4u standing dose order    - Please contact/secure message if/when pt's TPN changes or if TF/D5 are started, and 1-2 days prior to anticipated discharge for optimal planning and transition to home regimen    - POCT glucose Q6H   - Hypoglycemia protocol     IF TPN AND LIPIDS HELD:   -Goal -180  -CHANGE NPH to 10 units q12hr  -HOLD scheduled regular insulin   -CHANGE regular insulin sliding scale #1 (1:50>150) Q6H to insulin lispro sliding scale #1 (1:50>150) Q4H  -Accuchecks Q6H   -Hypoglycemia protocol     IF METHYLPREDNISOLONE IS STOPPED:   -Goal -180  -CHANGE NPH to 10 units q12hr  - CONTINUE Regular insulin 4u q6hr while TPN is running  -CHANGE regular insulin sliding scale #1 (1:50>150) Q6H to insulin lispro sliding scale #1 (1:50>150) Q4H  -Accuchecks Q6H   -Hypoglycemia protocol     -Please contact/secure message if/when pt's TPN changes or if TF/D5 are started, and 1-2 days prior to anticipated discharge for optimal planning and transition to home regimen     Discharge recommendations:  -TBD, patient was on insulin at home therefore this is not a new start  -Patient will need to establish with new endocrinologist (last seen by Dr. Loya in August 2023)     We spent 30 minutes professionally on the treatment of this patient    Steph Arroyo PA-C

## 2023-10-20 NOTE — PROGRESS NOTES
"Chung Goodman is a 67 y.o. male on day 13 of admission presenting with SBO (small bowel obstruction) (CMS/HCC).    Subjective   Patient has been referred to [facility] a LTACH facility.        Objective   LSW was assigned this patient today to follow up on his referral for long-term care. LSW reached out to the accepting facility who reported that they will accept patient, but a Rev Code is needed. LSW was able to submit the code. However, when this LSW submitted the pre-cert there was no information to follow that would suggest that the patient required long-term care.   Physical Exam    Last Recorded Vitals  Blood pressure 143/84, pulse 94, temperature 36.6 °C (97.9 °F), resp. rate 16, height 1.765 m (5' 9.49\"), weight 70.5 kg (155 lb 6.8 oz), SpO2 98 %.  Intake/Output last 3 Shifts:  I/O last 3 completed shifts:  In: 4472.2 (63.4 mL/kg) [I.V.:50 (0.7 mL/kg); Other:10]  Out: 3600 (51.1 mL/kg) [Urine:2495 (1 mL/kg/hr); Emesis/NG output:275; Drains:830]  Weight: 70.5 kg     Relevant Results  {If you would like to pull in Medications, type .meds     If you would like to pull in Lab results for the last 24 hours, type .[id number]    If you would like to pull in Imaging results, type .[id number] :99}    {Link to Stroke Scoring tools - Link :99}     This patient currently has cardiac telemetry ordered; if you would like to modify or discontinue the telemetry order, click here to go to the orders activity to modify/discontinue the order.  This patient has a central line   Reason for the central line remaining today? {Reasons for tunneled CL:[id number]}                 Assessment/Plan   Active Problems:    Pancreatic cancer (CMS/HCC)    Pulmonary hypertension (CMS/HCC)    CVA (cerebral vascular accident) (CMS/HCC)    The goal and plan is to referred patient to a long-ter care facility.     {This patient does not have an ACP note on file for this encounter, please fill one out - Advance Care Planning Activity " :99}      Kashif Lewis, [facility]

## 2023-10-20 NOTE — PROGRESS NOTES
"Chung Goodman is a 67 y.o. male on day 13 of admission presenting with SBO (small bowel obstruction) (CMS/HCC).    Subjective   No acute events overnight. Pt more alert and responsive this AM. Able to answer questions. Moderate abdominal pain but well managed on PCA. Wife at bedside. Pt denies N/V.            Objective     Physical Exam  Constitutional:       General: He is not in acute distress.     Appearance: He is not toxic-appearing.   HENT:      Head: Normocephalic and atraumatic.      Nose: Nose normal.      Mouth/Throat:      Pharynx: Oropharynx is clear. No oropharyngeal exudate.   Eyes:      General: No scleral icterus.     Extraocular Movements: Extraocular movements intact.      Conjunctiva/sclera: Conjunctivae normal.      Pupils: Pupils are equal, round, and reactive to light.   Cardiovascular:      Rate and Rhythm: Normal rate.   Pulmonary:      Effort: Pulmonary effort is normal. No respiratory distress.      Breath sounds: Normal breath sounds. No wheezing.   Abdominal:      General: There is no distension.      Palpations: Abdomen is soft. There is no mass.      Tenderness: There is no abdominal tenderness. There is no guarding.      Comments: Midline abdominal incision with wound vac, minimal output. IRVING drains x3 with serosanguinous drainage. Appropriately tender.    Musculoskeletal:         General: Normal range of motion.      Right lower leg: No edema.      Left lower leg: No edema.   Skin:     General: Skin is warm and dry.   Neurological:      General: No focal deficit present.      Mental Status: He is alert and oriented to person, place, and time.      Cranial Nerves: No cranial nerve deficit.         Last Recorded Vitals  Blood pressure 136/78, pulse 94, temperature 36.5 °C (97.7 °F), temperature source Temporal, resp. rate 16, height 1.765 m (5' 9.49\"), weight 70.5 kg (155 lb 6.8 oz), SpO2 97 %.  Intake/Output last 3 Shifts:  I/O last 3 completed shifts:  In: 4472.2 (63.4 mL/kg) " [I.V.:50 (0.7 mL/kg); Other:10]  Out: 3600 (51.1 mL/kg) [Urine:2495 (1 mL/kg/hr); Emesis/NG output:275; Drains:830]  Weight: 70.5 kg     Relevant Results  Scheduled medications  acetaminophen, 1,000 mg, intravenous, q6h SCOTT  aspirin, 150 mg, rectal, Daily  fat emulsion fish oil/plant based, 250 mL, intravenous, Daily  heparin (porcine), 5,000 Units, subcutaneous, q8h  heparin flush, 50 Units, intra-catheter, q12h  hydrALAZINE, 10 mg, intravenous, q6h  insulin NPH (Isophane), 10 Units, subcutaneous, Nightly  insulin NPH (Isophane), 20 Units, subcutaneous, Daily  insulin regular, 0-10 Units, subcutaneous, q6h  insulin regular, 4 Units, subcutaneous, q6h  lidocaine, 1 mL, infiltration, Once  lidocaine, 1 patch, transdermal, Daily  methylPREDNISolone sodium succinate (PF), 4 mg, intravenous, q24h  metoclopramide, 10 mg, intravenous, q6h SCOTT  micafungin, 100 mg, intravenous, q24h  nystatin, 4 mL, Swish & Swallow, BID  pantoprazole, 40 mg, intravenous, BID  piperacillin-tazobactam, 3.375 g, intravenous, q6h  tacrolimus, 1 mg, sublingual, q24h  tacrolimus, 2 mg, sublingual, Daily      Continuous medications  Adult Clinimix TPN, 83 mL/hr, Last Rate: 83 mL/hr (10/19/23 2206)  HYDROmorphone,       PRN medications  PRN medications: alteplase, alteplase, dextrose 10 % in water (D10W), dextrose, glucagon, heparin flush, heparin lock flush (porcine), ipratropium-albuteroL, naloxone, oxygen  Results for orders placed or performed during the hospital encounter of 10/07/23 (from the past 24 hour(s))   POCT GLUCOSE   Result Value Ref Range    POCT Glucose 283 (H) 74 - 99 mg/dL   POCT GLUCOSE   Result Value Ref Range    POCT Glucose 194 (H) 74 - 99 mg/dL   POCT GLUCOSE   Result Value Ref Range    POCT Glucose 148 (H) 74 - 99 mg/dL   POCT GLUCOSE   Result Value Ref Range    POCT Glucose 167 (H) 74 - 99 mg/dL   POCT GLUCOSE   Result Value Ref Range    POCT Glucose 155 (H) 74 - 99 mg/dL   Renal Function Panel   Result Value Ref Range     Glucose 240 (H) 74 - 99 mg/dL    Sodium 147 (H) 136 - 145 mmol/L    Potassium 4.0 3.5 - 5.3 mmol/L    Chloride 111 (H) 98 - 107 mmol/L    Bicarbonate 24 21 - 32 mmol/L    Anion Gap 16 10 - 20 mmol/L    Urea Nitrogen 54 (H) 6 - 23 mg/dL    Creatinine 2.23 (H) 0.50 - 1.30 mg/dL    eGFR 32 (L) >60 mL/min/1.73m*2    Calcium 9.9 8.6 - 10.6 mg/dL    Phosphorus 4.8 2.5 - 4.9 mg/dL    Albumin 2.4 (L) 3.4 - 5.0 g/dL   Magnesium   Result Value Ref Range    Magnesium 2.19 1.60 - 2.40 mg/dL   CBC   Result Value Ref Range    WBC 16.4 (H) 4.4 - 11.3 x10*3/uL    nRBC 0.0 0.0 - 0.0 /100 WBCs    RBC 2.46 (L) 4.50 - 5.90 x10*6/uL    Hemoglobin 7.2 (L) 13.5 - 17.5 g/dL    Hematocrit 23.3 (L) 41.0 - 52.0 %    MCV 95 80 - 100 fL    MCH 29.3 26.0 - 34.0 pg    MCHC 30.9 (L) 32.0 - 36.0 g/dL    RDW 17.3 (H) 11.5 - 14.5 %    Platelets 357 150 - 450 x10*3/uL    MPV 11.6 (H) 7.5 - 11.5 fL       Assessment/Plan   Active Problems:    Pancreatic cancer (CMS/HCC)    Pulmonary hypertension (CMS/HCC)    CVA (cerebral vascular accident) (CMS/HCC)    Chung Goodman is a 67 y.o. 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 Now s/p ex lap, washout, fascial closure w/abdominal wound vac and drain placement on 10/17 for fascial dehiscence. On PCA for uncontrolled pain. Wound vac on abdominal incision.      Drains:  R: 95cc  L anterior: 175cc  L lateral: 185cc  Wound vac: 150cc    Labs significant for Na 147 from 146, Cl 111 from 112, Cr 2.23 from 2.28, WBC 16.4 from 17.9, Hgb 7.2 from 7.6.     Plan  Neuro  - Multimodal pain management with 1000mg IV Tylenol q6h and PCA 0.5mg/mL  - Appreciate Supportive Onc recs     CV  - ASA suppository 150mg   - Home immunosuppression regimen (tacrolimus, solumedrol).  Tacrolimus target goal 5-8  - Per HF, continue methylprednisolone 4 mg IV daily while NPO.   - Plan is to transition to oral tacrolimus 4mg at 0630 and 3mg at 1830 and oral prednisone 5mg daily once patient is able to tolerate oral medications  - will reach out to HF regarding tacrolimus dosage and formulation. Patient will be on TPN and G tube to gravity given gastric outlet obstruction.     Pulm:   - Respiratory  w/duo-nebs TID  - Continue pulse oximetry to monitor respiratory status 2/2 sleepiness  - Encourage incentive spirometry 10x/h     GI  - Continue TPN at goal, plan for home continuous  - Abdominal wound vac to -75 mmHg  - F/up wound care team for vac change   - Assess wound bed while removed  - Advance to limited CLD for palliation  - G tube to gravity  - Reglan Q6 hours scheduled      ID  - Micafungin, zosyn per ID. Originally 7 day course per ID, will continue given takeback to OR.   - COVID test - negative     Endo  - Appreciate recs for glucose control  - NPH 20u at 11:00 with methylpred, and NPH 10u at 23:00, regular insulin 4u q6hr for TPN carb coverage and ssi to regular insulin ssi#2 q6h to correlate with standing dose timing      - DVT ppx: Heparin SQ  - Encourage ambulation  - Patient denied from LTAC. Rec'd for SNF by PT. Will discuss options with SW and wife.      Dispo:  - Continue Patrice 6, dispo with TPN      Discussed with attending surgeon, Dr. Johnson.    Natividad Florentino, MS4    Note reviewed and edited as needed.    Alla Oropeza MD  PGY-1 Vascular Surgery Resident  Surgical Oncology Service  [id number]

## 2023-10-20 NOTE — PROGRESS NOTES
Music Therapy Note    Chung Goodman     Therapy Session  Referral Type: New referral this admission  Visit Type: Follow-up visit  Session Start Time: 1419  Session End Time: 1446  Intervention Delivery: In-person  Conflict of Service: None  Number of family members present: 1  Family Present for Session: Spouse/Significant Other  Family Participation: Supportive     Pre-assessment  Unable to Assess Reason: Physical limitation  Other Pain Scale: FLACC  Pain Rating: FLACC (Rest) - Face: No particular expression or smile  Pain Rating: FLACC (Rest) - Legs: Normal position or relaxed  Pain Rating: FLACC (Rest) - Activity: Lying quietly, normal position, moves easily  Pain Rating: FLACC (Rest) - Cry: No cry (Awake or asleep)  Pain Rating: FLACC (Rest) - Consolability: Content, relaxed  Score: FLACC (Rest): 0  Mood/Affect: Flat/blunted         Treatment/Interventions  Areas of Focus: Normalization  Music Therapy Interventions: Passive musical engagement  Interruption: No  Patient Fell Asleep at End of Session: No    Post-assessment  Unable to Assess Reason: Physical limitation  Other Pain Scale: FLACC  Pain Rating: FLACC (Rest) - Face: No particular expression or smile  Pain Rating: FLACC (Rest) - Legs: Normal position or relaxed  Pain Rating: FLACC (Rest) - Activity: Lying quietly, normal position, moves easily  Pain Rating: FLACC (Rest) - Cry: No cry (Awake or asleep)  Pain Rating: FLACC (Rest) - Consolability: Content, relaxed  Score: FLACC (Rest): 0  Santacruz-Baker FACES Pain Rating: Hurts little bit  Mood/Affect: Flat/blunted  Continue Visiting: Yes  Total Session Time (min): 27 minutes    Narrative  Assessment Detail: Patient presented with eyes open but level of alertness difficult for this MT to assess. Patient spouse present at bedside. Patient spouse asked pt if he would like music and pt was able to nod.  Plan: MT engaged pt in passive music engagement as a means of normalization of environment.  Intervention:  Passive music engagement  Evaluation: Patient remained calm and did not appear to be in distress throughout session.  Follow-up: MT to continue to follow.    Education Documentation  No documentation found.

## 2023-10-20 NOTE — CARE PLAN
Problem: Pain  Goal: Takes deep breaths with improved pain control throughout the shift  10/20/2023 0927 by Joss Fields RN  Outcome: Progressing  10/20/2023 0926 by Joss Fields RN  Outcome: Progressing  10/20/2023 0925 by Joss Fields RN  Outcome: Progressing  Goal: Turns in bed with improved pain control throughout the shift  10/20/2023 0927 by Joss Fields RN  Outcome: Progressing  10/20/2023 0926 by Joss Fields RN  Outcome: Progressing  10/20/2023 0925 by Joss Fields RN  Outcome: Progressing  Goal: Walks with improved pain control throughout the shift  10/20/2023 0927 by oJss Fields RN  Outcome: Progressing  10/20/2023 0926 by Joss Fields RN  Outcome: Progressing  10/20/2023 0925 by Joss Fields RN  Outcome: Progressing  Goal: Performs ADL's with improved pain control throughout shift  10/20/2023 0927 by Joss Fields RN  Outcome: Progressing  10/20/2023 0926 by Joss Fields RN  Outcome: Progressing  10/20/2023 0925 by Joss Fields RN  Outcome: Progressing  Goal: Participates in PT with improved pain control throughout the shift  10/20/2023 0927 by Joss Fields RN  Outcome: Progressing  10/20/2023 0926 by Joss Fields RN  Outcome: Progressing  10/20/2023 0925 by Joss Fields RN  Outcome: Progressing  Goal: Free from opioid side effects throughout the shift  10/20/2023 0927 by Joss Fields RN  Outcome: Progressing  10/20/2023 0926 by Joss Fields RN  Outcome: Progressing  10/20/2023 0925 by Joss Fields RN  Outcome: Progressing  Goal: Free from acute confusion related to pain meds throughout the shift  10/20/2023 0927 by Joss Fields RN  Outcome: Progressing  10/20/2023 0926 by Joss Fields RN  Outcome: Progressing  10/20/2023 0925 by Joss Fields RN  Outcome: Progressing     Problem: Fall/Injury  Goal: Not fall by end of shift  10/20/2023 0927 by Joss Fields RN  Outcome: Progressing  10/20/2023 0926 by Kamika Fields, RN  Outcome: Progressing  10/20/2023 0925 by  Joss Fields RN  Outcome: Progressing  Goal: Be free from injury by end of the shift  10/20/2023 0927 by Joss Fields RN  Outcome: Progressing  10/20/2023 0926 by Joss Fields RN  Outcome: Progressing  10/20/2023 0925 by Joss Fields RN  Outcome: Progressing  Goal: Verbalize understanding of personal risk factors for fall in the hospital  10/20/2023 0927 by Joss Fields RN  Outcome: Progressing  10/20/2023 0926 by Joss Fields RN  Outcome: Progressing  10/20/2023 0925 by Joss Fields RN  Outcome: Progressing  Goal: Verbalize understanding of risk factor reduction measures to prevent injury from fall in the home  10/20/2023 0927 by Joss Fields RN  Outcome: Progressing  10/20/2023 0926 by Joss Fields RN  Outcome: Progressing  10/20/2023 0925 by Joss Fields RN  Outcome: Progressing  Goal: Use assistive devices by end of the shift  10/20/2023 0927 by Joss Fields RN  Outcome: Progressing  10/20/2023 0926 by Joss Fields RN  Outcome: Progressing  10/20/2023 0925 by Joss Fields RN  Outcome: Progressing  Goal: Pace activities to prevent fatigue by end of the shift  10/20/2023 0927 by Joss Fields RN  Outcome: Progressing  10/20/2023 0926 by Joss Fields RN  Outcome: Progressing  10/20/2023 0925 by Joss Fields RN  Outcome: Progressing     Problem: Safety - Medical Restraint  Goal: Remains free of injury from restraints (Restraint for Interference with Medical Device)  10/20/2023 0927 by Joss Fields RN  Outcome: Progressing  10/20/2023 0926 by Joss Fields RN  Outcome: Progressing  10/20/2023 0925 by Joss Fields RN  Outcome: Progressing  Goal: Free from restraint(s) (Restraint for Interference with Medical Device)  10/20/2023 0927 by Joss Fields RN  Outcome: Progressing  10/20/2023 0926 by Joss Fields RN  Outcome: Progressing  10/20/2023 0925 by Joss Fields RN  Outcome: Progressing  Goal: Free from restraint(s) (Restraint for Interference with Medical Device)  10/20/2023  0927 by Joss Fields RN  Outcome: Progressing  10/20/2023 0926 by Joss Fields RN  Outcome: Progressing  10/20/2023 0925 by Joss Fields RN  Outcome: Progressing     Problem: Skin  Goal: Decreased wound size/increased tissue granulation at next dressing change  10/20/2023 0927 by Joss Fields RN  Outcome: Progressing  10/20/2023 0926 by Joss Fields RN  Outcome: Progressing  10/20/2023 0925 by Joss Fields RN  Outcome: Progressing  Goal: Participates in plan/prevention/treatment measures  10/20/2023 0927 by Joss Fields RN  Outcome: Progressing  10/20/2023 0926 by Joss Fields RN  Outcome: Progressing  10/20/2023 0925 by Joss Fields RN  Outcome: Progressing  Goal: Prevent/manage excess moisture  10/20/2023 0927 by Joss Fields RN  Outcome: Progressing  10/20/2023 0926 by Joss Fields RN  Outcome: Progressing  10/20/2023 0925 by Joss Fields RN  Outcome: Progressing  Goal: Prevent/minimize sheer/friction injuries  10/20/2023 0927 by Joss Fields RN  Outcome: Progressing  10/20/2023 0926 by Joss Fields RN  Outcome: Progressing  10/20/2023 0925 by Joss Fields RN  Outcome: Progressing  Goal: Promote/optimize nutrition  10/20/2023 0927 by Joss Fields RN  Outcome: Progressing  10/20/2023 0926 by Joss Fields RN  Outcome: Progressing  10/20/2023 0925 by Joss Fields RN  Outcome: Progressing  Goal: Promote skin healing  10/20/2023 0927 by Joss Fields RN  Outcome: Progressing  10/20/2023 0926 by Joss Fields RN  Outcome: Progressing  10/20/2023 0925 by Joss Fields RN  Outcome: Progressing

## 2023-10-20 NOTE — PROGRESS NOTES
Physical Therapy                 Therapy Communication Note    Patient Name: Chung Goodman  MRN: [id number]  Today's Date: 10/20/2023     Discipline: Physical Therapy    Missed Visit Reason: Missed Visit Reason: Patient refused (Pt said he was too tired to participate in therapy despite encouragement)    Missed Time: Attempt    Comment:

## 2023-10-21 NOTE — PROGRESS NOTES
"Chung Goodman is a 67 y.o. male on day 14 of admission presenting with SBO (small bowel obstruction) (CMS/HCC).    Subjective   No acute overnight events. Per partner did not sleep last night. Pain well controlled.     Objective   Physical Exam  Constitutional: Awake, alert, no acute distress  Neurological: No focal deficits  Eyes: Non-icteric  Respiratory: No increased work of breathing on room air  Cardiac: Regular rate  Abdomen: Midline abdominal incision with wound vac, minimal output. IRVING drains x2 with serosanguinous drainage. Appropriately tender.    Genitourinary: Voiding spontaneously  Skin: Warm, dry  Musculoskeletal: Moves all extremities  Extremities: No peripheral edema   Psychological: Appropriate mood/affect      Last Recorded Vitals  Blood pressure 122/56, pulse 58, temperature 36.9 °C (98.4 °F), resp. rate 18, height 1.765 m (5' 9.49\"), weight 70.5 kg (155 lb 6.8 oz), SpO2 96 %.  Intake/Output last 3 Shifts:  I/O last 3 completed shifts:  In: 6066.6 (86.1 mL/kg) [I.V.:4.6 (0.1 mL/kg); Other:40; IV Piggyback:250]  Out: 735 (10.4 mL/kg) [Urine:110 (0 mL/kg/hr); Emesis/NG output:70; Drains:555]  Weight: 70.5 kg     Relevant Results  Results for orders placed or performed during the hospital encounter of 10/07/23 (from the past 24 hour(s))   POCT GLUCOSE   Result Value Ref Range    POCT Glucose 130 (H) 74 - 99 mg/dL   POCT GLUCOSE   Result Value Ref Range    POCT Glucose 155 (H) 74 - 99 mg/dL   POCT GLUCOSE   Result Value Ref Range    POCT Glucose 162 (H) 74 - 99 mg/dL   POCT GLUCOSE   Result Value Ref Range    POCT Glucose 219 (H) 74 - 99 mg/dL   Type And Screen   Result Value Ref Range    ABO TYPE B     Rh TYPE POS     ANTIBODY SCREEN NEG    Magnesium   Result Value Ref Range    Magnesium 1.91 1.60 - 2.40 mg/dL   CBC   Result Value Ref Range    WBC 16.5 (H) 4.4 - 11.3 x10*3/uL    nRBC 0.0 0.0 - 0.0 /100 WBCs    RBC 2.33 (L) 4.50 - 5.90 x10*6/uL    Hemoglobin 7.0 (L) 13.5 - 17.5 g/dL    Hematocrit " 22.7 (L) 41.0 - 52.0 %    MCV 97 80 - 100 fL    MCH 30.0 26.0 - 34.0 pg    MCHC 30.8 (L) 32.0 - 36.0 g/dL    RDW 17.7 (H) 11.5 - 14.5 %    Platelets 418 150 - 450 x10*3/uL    MPV 11.6 (H) 7.5 - 11.5 fL   Renal Function Panel   Result Value Ref Range    Glucose 396 (H) 74 - 99 mg/dL    Sodium 143 136 - 145 mmol/L    Potassium 4.5 3.5 - 5.3 mmol/L    Chloride 109 (H) 98 - 107 mmol/L    Bicarbonate 24 21 - 32 mmol/L    Anion Gap 15 10 - 20 mmol/L    Urea Nitrogen 57 (H) 6 - 23 mg/dL    Creatinine 2.14 (H) 0.50 - 1.30 mg/dL    eGFR 33 (L) >60 mL/min/1.73m*2    Calcium 9.7 8.6 - 10.6 mg/dL    Phosphorus 6.3 (H) 2.5 - 4.9 mg/dL    Albumin 2.4 (L) 3.4 - 5.0 g/dL   POCT GLUCOSE   Result Value Ref Range    POCT Glucose 138 (H) 74 - 99 mg/dL   POCT GLUCOSE   Result Value Ref Range    POCT Glucose 137 (H) 74 - 99 mg/dL     Assessment/Plan   Active Problems:    Pancreatic cancer (CMS/HCC)    Pulmonary hypertension (CMS/HCC)    CVA (cerebral vascular accident) (CMS/HCC)    Chung Goodman is a 67 y.o. 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 Now s/p ex lap, washout, fascial closure w/abdominal wound vac and drain placement on 10/17 for fascial dehiscence. On PCA for uncontrolled pain. Wound vac on abdominal incision. One right sided IRVING removed 10/20. White count stable at 16.5. Hemoglobin 7.0.     Plan  Neuro  - Multimodal pain management with 1000mg IV Tylenol q6h and PCA 0.5mg/mL  - Appreciate Supportive Onc recs     CV  - ASA suppository 150mg   - Home immunosuppression regimen (tacrolimus, solumedrol). Tacrolimus target goal 5-8  - Sublingual tacrolimus (2 mg AM, 1 mg PM)  - Per HF, continue methylprednisolone 4 mg IV daily while NPO.      Pulm:   - Respiratory   w/duo-nebs TID  - Encourage incentive spirometry 10x/h     GI  - Continue TPN at goal, plan for home continuous  - Abdominal wound vac to -75 mmHg  - F/up wound care team for vac change  - Limited CLD for palliation  - G tube to gravity  - Reglan Q6 hours scheduled      ID  - Micafungin, zosyn per ID. Originally 7 day course per ID, will continue given takeback to OR.   - COVID test - negative    Heme  - Transfuse 1 upRBC for hemoglobin of 7.0     Endo  - Appreciate recs for glucose control  - NPH 20u at 11:00 with methylpred, and NPH 10u at 23:00, regular insulin 4u q6hr for TPN carb coverage and ssi to regular insulin ssi#2 q6h to correlate with standing dose timing     - DVT ppx: Heparin SQ  - Encourage ambulation  - Patient denied from LTAC. Rec'd for SNF by PT. Will discuss options with SW and wife.      Dispo:  - Continue Patrice 6, dispo with TPN     Discussed with attending surgeon, Dr. Sarkar.    Tigist Pendleton MD  [id number]   General Surgery   [facility] pager [phone]

## 2023-10-21 NOTE — PROGRESS NOTES
"[facility]  HEART FAILURE PROGRESS NOTE    Chung Goodman/[id number]    Admit Date: 10/7/2023  Hospital Length of Stay: 14   Primary Service: Surgical oncology  Primary HF Cardiologist: Jennifer CHÁVEZ EVENTS / PERTINENT ROS:   Patient seen and examined at bedside. Somnolent on exam. Wife at bedside, answered her questions.    MEDICATIONS  Infusions:  Adult Clinimix TPN, Last Rate: 83 mL/hr (10/20/23 2046)  HYDROmorphone      Scheduled:  acetaminophen, 1,000 mg, q6h SCOTT  aspirin, 150 mg, Daily  fat emulsion fish oil/plant based, 250 mL, Daily  heparin (porcine), 5,000 Units, q8h  heparin flush, 50 Units, q12h  hydrALAZINE, 10 mg, q6h  insulin NPH (Isophane), 10 Units, Daily  insulin NPH (Isophane), 20 Units, Nightly  insulin regular, 0-10 Units, q6h  insulin regular, 4 Units, q6h  lidocaine, 1 mL, Once  lidocaine, 1 patch, Daily  methylPREDNISolone sodium succinate (PF), 4 mg, q24h  metoclopramide, 5 mg, q6h SCOTT  micafungin, 100 mg, q24h  nystatin, 4 mL, BID  pantoprazole, 40 mg, BID  piperacillin-tazobactam, 3.375 g, q6h  tacrolimus, 1 mg, q24h  tacrolimus, 2 mg, Daily      PRN:  alteplase, 2 mg, PRN  alteplase, 2 mg, PRN  dextrose 10 % in water (D10W), 0.3 g/kg/hr, Once PRN  dextrose, 25 g, q15 min PRN  glucagon, 1 mg, q15 min PRN  heparin flush, 50 Units, PRN  heparin lock flush (porcine), 500 Units, Once PRN  ipratropium-albuteroL, 3 mL, q4h PRN  naloxone, 0.2 mg, PRN  naloxone, 0.2 mg, PRN  oxygen, , Continuous PRN - O2/gases        PHYSICAL EXAM:   Visit Vitals  /78   Pulse 90   Temp 36.2 °C (97.2 °F) (Temporal)   Resp 18   Ht 1.765 m (5' 9.49\")   Wt 70.5 kg (155 lb 6.8 oz)   SpO2 98%   BMI 22.63 kg/m²   Smoking Status Former   BSA 1.86 m²     Wt Readings from Last 5 Encounters:   10/16/23 70.5 kg (155 lb 6.8 oz)   10/05/23 78 kg (171 lb 15.3 oz)   09/28/23 80 kg (176 lb 5.9 oz)   06/13/23 80.9 kg (178 lb 6 oz)   06/08/23 82.3 kg (181 lb 6 oz)     INTAKE/OUTPUT:  I/O " last 3 completed shifts:  In: 5549.6 (78.7 mL/kg) [I.V.:53.4 (0.8 mL/kg); Other:40; IV Piggyback:250]  Out: 1770 (25.1 mL/kg) [Urine:1095 (0.4 mL/kg/hr); Emesis/NG output:20; Drains:655]  Weight: 70.5 kg      Physical Exam  Constitutional:       Comments: Chronically ill appearing   HENT:      Head: Normocephalic.      Mouth/Throat:      Mouth: Mucous membranes are moist.   Eyes:      Extraocular Movements: Extraocular movements intact.      Conjunctiva/sclera: Conjunctivae normal.   Cardiovascular:      Rate and Rhythm: Normal rate and regular rhythm.      Pulses: Normal pulses.      Heart sounds: No murmur heard.  Pulmonary:      Effort: Pulmonary effort is normal. No respiratory distress.      Breath sounds: Normal breath sounds.   Abdominal:      General: Bowel sounds are normal.      Comments: +midline abdominal dressing, +PEG intact. + 3 IRVING drains with serosanguinous fluid   Musculoskeletal:      Cervical back: Normal range of motion.   Skin:     General: Skin is warm and dry.   Neurological:      General: No focal deficit present.      Mental Status: He is alert.      Comments: Somnolent   Psychiatric:         Mood and Affect: Mood normal.     DATA:  CMP:  Results from last 7 days   Lab Units 10/20/23  0539 10/19/23  0549 10/18/23  0516 10/17/23  0759 10/16/23  0330 10/16/23  0328 10/15/23  1517 10/15/23  0401 10/14/23  1355 10/14/23  0114   SODIUM mmol/L 147* 146* 148* 148*  --  149* 146* 148* 144 144   POTASSIUM mmol/L 4.0 4.0 4.3 4.4  --  3.6 4.0 3.4* 3.4* 4.1   CHLORIDE mmol/L 111* 112* 110* 110*  --  110* 108* 108* 105 104   CO2 mmol/L 24 26 25 28  --  27 27 26 27 26   ANION GAP mmol/L 16 12 17 14  --  16 15 17 15 18   BUN mg/dL 54* 58* 66* 59*  --  65* 64* 70* 76* 62*   CREATININE mg/dL 2.23* 2.28* 2.36* 2.17*  --  2.68* 2.76* 3.19* 3.53* 3.39*   EGFR mL/min/1.73m*2 32* 31* 29* 33*  --  25* 24* 21* 18* 19*   MAGNESIUM mg/dL 2.19 1.88 1.77 1.91 1.75 1.76  --  1.98 2.34 2.43*   ALBUMIN g/dL 2.4* 2.5* 2.5*  "2.7*  --  2.6* 2.8* 2.7* 2.7* 2.6*   ALT U/L  --  16 17  --   --  23  --  28 30  --    AST U/L  --  17 15  --   --  21  --  22 19  --    BILIRUBIN TOTAL mg/dL  --  1.1 1.2  --   --  1.5*  --  1.6* 1.5*  --        CBC:  Results from last 7 days   Lab Units 10/20/23  0539 10/19/23  0549 10/18/23  0516 10/17/23  0759 10/16/23  0330 10/15/23  0401 10/14/23  1355 10/14/23  0114   WBC AUTO x10*3/uL 16.4* 17.9* 17.0* 15.1* 12.9* 15.8* 14.7* 15.4*   HEMOGLOBIN g/dL 7.2* 7.6* 8.7* 8.5* 8.1* 8.5* 8.1* 8.2*   HEMATOCRIT % 23.3* 23.8* 26.9* 25.8* 24.9* 25.5* 24.4* 24.8*   PLATELETS AUTO x10*3/uL 357 356 382 316 276 266 208 200   MCV fL 95 92 89 88 90 86 87 87       COAG:   Results from last 7 days   Lab Units 10/16/23  0328 10/14/23  0114   INR  1.1 1.0       ABO: No results found for: \"ABO\"  HEME/ENDO:  Results from last 7 days   Lab Units 10/18/23  0516   HEMOGLOBIN A1C % 7.3*        CARDIAC:       No lab exists for component: \"CK\", \"CKMBP\"    Prior to Admission Meds:  Medications Prior to Admission   Medication Sig Dispense Refill Last Dose    acetaminophen (Tylenol) 325 mg tablet 2 tab(s) orally every 6 hours, As needed, Pain - Mild (1-3)       albuterol 90 mcg/actuation inhaler use 2 (TWO) puffs FOUR TIMES DAILY       allopurinol (Zyloprim) 100 mg tablet        aspirin 81 mg chewable tablet CHEW AND SWALLOW 1 TABLET DAILY.       blood sugar diagnostic (OneTouch Verio test strips) strip TEST 3 TIMES DAILY       buPROPion XL (Wellbutrin XL) 150 mg 24 hr tablet Take 1 tablet (150 mg) by mouth once daily.       busPIRone (Buspar) 5 mg tablet Take 1 tablet (5 mg) by mouth 3 times a day.       calcium carbonate 600 mg calcium (1,500 mg) tablet Take 1 tablet (600 mg) by mouth 2 times a day.       cholecalciferol (Vitamin D-3) 125 MCG (5000 UT) capsule Take 1 capsule (125 mcg) by mouth once daily.       citalopram (CeleXA) 40 mg tablet        diphenoxylate-atropine (Lomotil) 2.5-0.025 mg tablet 2 tablets 4 times a day as needed " for diarrhea.       fenofibrate (Triglide) 160 mg tablet Take 1 tablet (160 mg) by mouth once daily.       ferrous sulfate 325 (65 Fe) MG tablet Take 1 tablet (325 mg) by mouth once daily.       gabapentin (Neurontin) 100 mg capsule Take 2 capsules (200 mg) by mouth once daily at bedtime.       hydrALAZINE (Apresoline) 50 mg tablet Take 1 tablet (50 mg) by mouth 2 times a day.       icosapent ethyL (Vascepa) 1 gram capsule Take 1 capsule (1 g) by mouth twice a day.       insulin glargine (Lantus U-100 Insulin) 100 unit/mL injection Inject 10 Units under the skin once daily in the morning.       insulin lispro (HumaLOG) 100 unit/mL injection Inject under the skin 3 times a day with meals. Using a sliding scale       lidocaine-prilocaine (Emla) 2.5-2.5 % cream Apply topically to affected area 30 min prior to port       magnesium oxide (Mag-Ox) 400 mg (241.3 mg magnesium) tablet Take 2 tablets (800 mg) by mouth 2 times a day.       multivitamin with minerals tablet 1 tablet DAILY (route: oral)       pantoprazole (ProtoNix) 40 mg EC tablet Take 1 tablet (40 mg) by mouth twice a day.       rosuvastatin (Crestor) 40 mg tablet Take 1 tablet (40 mg) by mouth once daily at bedtime.       tacrolimus (Prograf) 1 mg capsule TAKE 4 CAPSULES every morning and TAKE 3 CAPSULES every evening          ASSESSMENT AND PLAN:   Chung Goodman is a 67-year-old man with history of NICM secondary to valvular disease, s/p mitral valve repair with [place] [employer] Annuloflex band II with 36 mm, s/p bicaval OHT (3/24/2017) with post-op complicated by atrial arrhythmia, RV failure, chronotropic incompetence, pancreatic adenocarcinoma, s/p Whipple procedure (5/19/23), currently on chemotherapy (modified FOLFIRINOX), HTN, DM type 2, HLD, CKD Stage 3B (baseline creatinine 2), amiodarone-induced hyperthyroidism, JE, MDD, transferred from [facility] for recurrent SBO. Blood culture positive for E coli. [Immunosuppressive regimen at home: Tacrolimus  4 mg/3 mg (Tacrolimus Goal 5-8), prednisone 5 mg daily. ] No rejection history. Latest DSE: negative (5/2023). On 10/16, he had drainage from abdominal wound suture. He was taken to the OR for repair of fascial dehiscence. S/p ex lap, washout, drain placement with retention sutures (10/17).     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 Coli bacteremia, currently on IV antibiotic  # s/p EGD with dilation of afferent limb anastomotic stenosis (10/10/23)  #s/p Ex-lap, small bowel resection, jejuno-jejunostomy, G-tube (10/10/23)  # Perforated afferent limb  # Fascial dehiscence, s/p ex lap, washout, drain placement with retention sutures (10/17)     Recommendations:  - Latest Tacrolimus (10/20): 9.1.  Watch for now.  - Will continue Tacrolimus 2 mg SL at 0630, 1 mg SL at 1830. Tacrolimus target goal: 5-8.   - Will discuss with pharmacy regarding optimal dosing strategy as primary team plans to go to G-tube administrations.  - Continue current dosing given renal dysfunction and other acute issues.  - Check daily Tacrolimus trough level at 0600.  - Continue methylprednisolone 4 mg IV daily while NPO.      HF service will continue to follow.     Patient was seen at the bedside with HF attending, Dr. RITESH Rodriguez.    Maritza Atkinson DO

## 2023-10-22 NOTE — PROGRESS NOTES
"[facility]  HEART FAILURE PROGRESS NOTE    Chung Goodman/[id number]    Admit Date: 10/7/2023  Hospital Length of Stay: 14   Primary Service: Surgical oncology  Primary HF Cardiologist: Jennifer CHÁVEZ EVENTS / PERTINENT ROS:   Patient seen and examined at bedside. More awake today, but does lose focus with prolonged conversation. He had no acute complaints this morning, answered his family's questions at bedside.    MEDICATIONS  Infusions:  Adult Clinimix TPN, Last Rate: 83 mL/hr (10/21/23 2235)  HYDROmorphone      Scheduled:  aspirin, 150 mg, Daily  fat emulsion fish oil/plant based, 250 mL, Daily  heparin (porcine), 5,000 Units, q8h  heparin flush, 50 Units, q12h  hydrALAZINE, 10 mg, q6h  insulin NPH (Isophane), 10 Units, Daily  insulin NPH (Isophane), 20 Units, Nightly  insulin regular, 0-10 Units, q6h  insulin regular, 4 Units, q6h  lidocaine, 1 mL, Once  lidocaine, 1 patch, Daily  methylPREDNISolone sodium succinate (PF), 4 mg, q24h  metoclopramide, 5 mg, q6h SCOTT  micafungin, 100 mg, q24h  nystatin, 4 mL, BID  pantoprazole, 40 mg, BID  piperacillin-tazobactam, 3.375 g, q6h  tacrolimus, 1 mg, q24h  tacrolimus, 2 mg, Daily      PRN:  alteplase, 2 mg, PRN  alteplase, 2 mg, PRN  dextrose 10 % in water (D10W), 0.3 g/kg/hr, Once PRN  dextrose, 25 g, q15 min PRN  glucagon, 1 mg, q15 min PRN  heparin flush, 50 Units, PRN  heparin lock flush (porcine), 500 Units, Once PRN  ipratropium-albuteroL, 3 mL, q4h PRN  naloxone, 0.2 mg, PRN  naloxone, 0.2 mg, PRN  oxygen, , Continuous PRN - O2/gases        PHYSICAL EXAM:   Visit Vitals  /74 (BP Location: Left arm, Patient Position: Lying)   Pulse 99   Temp 36.3 °C (97.3 °F) (Tympanic)   Resp 16   Ht 1.765 m (5' 9.49\")   Wt 70.5 kg (155 lb 6.8 oz)   SpO2 100%   BMI 22.63 kg/m²   Smoking Status Former   BSA 1.86 m²     Wt Readings from Last 5 Encounters:   10/16/23 70.5 kg (155 lb 6.8 oz)   10/05/23 78 kg (171 lb 15.3 oz)   09/28/23 80 " kg (176 lb 5.9 oz)   06/13/23 80.9 kg (178 lb 6 oz)   06/08/23 82.3 kg (181 lb 6 oz)     INTAKE/OUTPUT:  I/O last 3 completed shifts:  In: 4270.7 (60.6 mL/kg) [I.V.:54.6 (0.8 mL/kg); Blood:350; Other:40; IV Piggyback:600]  Out: 395 (5.6 mL/kg) [Emesis/NG output:270; Drains:125]  Weight: 70.5 kg      Physical Exam  Constitutional:       Comments: Chronically ill appearing   HENT:      Head: Normocephalic.      Mouth/Throat:      Mouth: Mucous membranes are moist.   Eyes:      Extraocular Movements: Extraocular movements intact.      Conjunctiva/sclera: Conjunctivae normal.   Cardiovascular:      Rate and Rhythm: Normal rate and regular rhythm.      Pulses: Normal pulses.      Heart sounds: No murmur heard.  Pulmonary:      Effort: Pulmonary effort is normal. No respiratory distress.      Breath sounds: Normal breath sounds.   Abdominal:      General: Bowel sounds are normal.      Comments: +midline abdominal dressing, +PEG intact. + 3 IRVING drains with serosanguinous fluid   Musculoskeletal:      Cervical back: Normal range of motion.   Skin:     General: Skin is warm and dry.   Neurological:      General: No focal deficit present.      Mental Status: He is alert.   Psychiatric:         Mood and Affect: Mood normal.     DATA:  CMP:  Results from last 7 days   Lab Units 10/21/23  0544 10/20/23  0539 10/19/23  0549 10/18/23  0516 10/17/23  0759 10/16/23  0330 10/16/23  0328 10/15/23  1517 10/15/23  0401   SODIUM mmol/L 143 147* 146* 148* 148*  --  149* 146* 148*   POTASSIUM mmol/L 4.5 4.0 4.0 4.3 4.4  --  3.6 4.0 3.4*   CHLORIDE mmol/L 109* 111* 112* 110* 110*  --  110* 108* 108*   CO2 mmol/L 24 24 26 25 28  --  27 27 26   ANION GAP mmol/L 15 16 12 17 14  --  16 15 17   BUN mg/dL 57* 54* 58* 66* 59*  --  65* 64* 70*   CREATININE mg/dL 2.14* 2.23* 2.28* 2.36* 2.17*  --  2.68* 2.76* 3.19*   EGFR mL/min/1.73m*2 33* 32* 31* 29* 33*  --  25* 24* 21*   MAGNESIUM mg/dL 1.91 2.19 1.88 1.77 1.91 1.75 1.76  --  1.98   ALBUMIN g/dL  "2.4* 2.4* 2.5* 2.5* 2.7*  --  2.6* 2.8* 2.7*   ALT U/L  --   --  16 17  --   --  23  --  28   AST U/L  --   --  17 15  --   --  21  --  22   BILIRUBIN TOTAL mg/dL  --   --  1.1 1.2  --   --  1.5*  --  1.6*       CBC:  Results from last 7 days   Lab Units 10/21/23  0544 10/20/23  0539 10/19/23  0549 10/18/23  0516 10/17/23  0759 10/16/23  0330 10/15/23  0401   WBC AUTO x10*3/uL 16.5* 16.4* 17.9* 17.0* 15.1* 12.9* 15.8*   HEMOGLOBIN g/dL 7.0* 7.2* 7.6* 8.7* 8.5* 8.1* 8.5*   HEMATOCRIT % 22.7* 23.3* 23.8* 26.9* 25.8* 24.9* 25.5*   PLATELETS AUTO x10*3/uL 418 357 356 382 316 276 266   MCV fL 97 95 92 89 88 90 86       COAG:   Results from last 7 days   Lab Units 10/16/23  0328   INR  1.1       ABO:   ABO TYPE   Date Value Ref Range Status   10/21/2023 B  Final     HEME/ENDO:  Results from last 7 days   Lab Units 10/18/23  0516   HEMOGLOBIN A1C % 7.3*        CARDIAC:       No lab exists for component: \"CK\", \"CKMBP\"    Prior to Admission Meds:  Medications Prior to Admission   Medication Sig Dispense Refill Last Dose    acetaminophen (Tylenol) 325 mg tablet 2 tab(s) orally every 6 hours, As needed, Pain - Mild (1-3)       albuterol 90 mcg/actuation inhaler use 2 (TWO) puffs FOUR TIMES DAILY       allopurinol (Zyloprim) 100 mg tablet        aspirin 81 mg chewable tablet CHEW AND SWALLOW 1 TABLET DAILY.       blood sugar diagnostic (OneTouch Verio test strips) strip TEST 3 TIMES DAILY       buPROPion XL (Wellbutrin XL) 150 mg 24 hr tablet Take 1 tablet (150 mg) by mouth once daily.       busPIRone (Buspar) 5 mg tablet Take 1 tablet (5 mg) by mouth 3 times a day.       calcium carbonate 600 mg calcium (1,500 mg) tablet Take 1 tablet (600 mg) by mouth 2 times a day.       cholecalciferol (Vitamin D-3) 125 MCG (5000 UT) capsule Take 1 capsule (125 mcg) by mouth once daily.       citalopram (CeleXA) 40 mg tablet        diphenoxylate-atropine (Lomotil) 2.5-0.025 mg tablet 2 tablets 4 times a day as needed for diarrhea.       " fenofibrate (Triglide) 160 mg tablet Take 1 tablet (160 mg) by mouth once daily.       ferrous sulfate 325 (65 Fe) MG tablet Take 1 tablet (325 mg) by mouth once daily.       gabapentin (Neurontin) 100 mg capsule Take 2 capsules (200 mg) by mouth once daily at bedtime.       hydrALAZINE (Apresoline) 50 mg tablet Take 1 tablet (50 mg) by mouth 2 times a day.       icosapent ethyL (Vascepa) 1 gram capsule Take 1 capsule (1 g) by mouth twice a day.       insulin glargine (Lantus U-100 Insulin) 100 unit/mL injection Inject 10 Units under the skin once daily in the morning.       insulin lispro (HumaLOG) 100 unit/mL injection Inject under the skin 3 times a day with meals. Using a sliding scale       lidocaine-prilocaine (Emla) 2.5-2.5 % cream Apply topically to affected area 30 min prior to port       magnesium oxide (Mag-Ox) 400 mg (241.3 mg magnesium) tablet Take 2 tablets (800 mg) by mouth 2 times a day.       multivitamin with minerals tablet 1 tablet DAILY (route: oral)       pantoprazole (ProtoNix) 40 mg EC tablet Take 1 tablet (40 mg) by mouth twice a day.       rosuvastatin (Crestor) 40 mg tablet Take 1 tablet (40 mg) by mouth once daily at bedtime.       tacrolimus (Prograf) 1 mg capsule TAKE 4 CAPSULES every morning and TAKE 3 CAPSULES every evening          ASSESSMENT AND PLAN:   Chung Goodman is a 67-year-old man with history of NICM secondary to valvular disease, s/p mitral valve repair with [place] Carbomedics Annuloflex band II with 36 mm, s/p bicaval OHT (3/24/2017) with post-op complicated by atrial arrhythmia, RV failure, chronotropic incompetence, pancreatic adenocarcinoma, s/p Whipple procedure (5/19/23), currently on chemotherapy (modified FOLFIRINOX), HTN, DM type 2, HLD, CKD Stage 3B (baseline creatinine 2), amiodarone-induced hyperthyroidism, JE, MDD, transferred from [facility] for recurrent SBO. Blood culture positive for E coli. [Immunosuppressive regimen at home: Tacrolimus 4 mg/3 mg  (Tacrolimus Goal 5-8), prednisone 5 mg daily. ] No rejection history. Latest DSE: negative (5/2023). On 10/16, he had drainage from abdominal wound suture. He was taken to the OR for repair of fascial dehiscence. S/p ex lap, washout, drain placement with retention sutures (10/17).     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 Coli bacteremia, currently on IV antibiotic  # s/p EGD with dilation of afferent limb anastomotic stenosis (10/10/23)  #s/p Ex-lap, small bowel resection, jejuno-jejunostomy, G-tube (10/10/23)  # Perforated afferent limb  # Fascial dehiscence, s/p ex lap, washout, drain placement with retention sutures (10/17)     Recommendations:  - Latest Tacrolimus (10/20): 7.9  Watch for now.  - Will continue Tacrolimus 2 mg SL at 0630, 1 mg SL at 1830. Tacrolimus target goal: 5-8.   - Continue current regimen of SL tacrolimus and 4mg IV solumedrol as long as primary team planning to use G-tube.  - Continue current dosing given renal dysfunction and other acute issues.  - Check daily Tacrolimus trough level at 0600.     HF service will continue to follow.     Patient was seen at the bedside with HF attending, Dr. Galina Atkinson DO

## 2023-10-22 NOTE — PROGRESS NOTES
"Chung Goodman is a 67 y.o. male on day 15 of admission presenting with SBO (small bowel obstruction) (CMS/HCC).    Subjective   Pt was seen and examined. Sitter at bedside. Patient confused and unable to answer questions.     I have reviewed histories, allergies and medications have been reviewed        Objective   Review of Systems   Reason unable to perform ROS: Patient is confused.     Physical Exam  Constitutional:       Appearance: He is normal weight. He is ill-appearing.   HENT:      Head: Normocephalic.      Mouth/Throat:      Mouth: Mucous membranes are dry.   Cardiovascular:      Rate and Rhythm: Normal rate and regular rhythm.   Pulmonary:      Effort: Pulmonary effort is normal.      Breath sounds: Normal breath sounds.   Abdominal:      General: Abdomen is flat.      Palpations: Abdomen is soft.   Musculoskeletal:         General: Normal range of motion.   Neurological:      General: No focal deficit present.      Mental Status: He is disoriented.   Psychiatric:         Mood and Affect: Mood normal.         Behavior: Behavior normal.         Last Recorded Vitals  Blood pressure 140/61, pulse 98, temperature 36.7 °C (98.1 °F), temperature source Temporal, resp. rate 16, height 1.765 m (5' 9.49\"), weight 70.5 kg (155 lb 6.8 oz), SpO2 100 %.  Intake/Output last 3 Shifts:  I/O last 3 completed shifts:  In: 4374.3 (62 mL/kg) [I.V.:51.2 (0.7 mL/kg); Blood:350; IV Piggyback:350]  Out: 760 (10.8 mL/kg) [Urine:200 (0.1 mL/kg/hr); Emesis/NG output:475; Drains:85]  Weight: 70.5 kg     Relevant Results  Results from last 7 days   Lab Units 10/22/23  1211 10/22/23  0627 10/22/23  0541 10/22/23  0539 10/22/23  0434 10/22/23  0100 10/21/23  0600 10/21/23  0544 10/20/23  0805 10/20/23  0539 10/19/23  0851 10/19/23  0549 10/18/23  0913 10/18/23  0516   POCT GLUCOSE mg/dL 164* 169* 59* 51*  --  157*   < >  --    < >  --    < >  --    < >  --    GLUCOSE mg/dL  --   --   --   --  239*  --   --  396*  --  240*  --  " 279*  --  166*    < > = values in this interval not displayed.       Scheduled medications  aspirin, 150 mg, rectal, Daily  fat emulsion fish oil/plant based, 250 mL, intravenous, Daily  heparin (porcine), 5,000 Units, subcutaneous, q8h  heparin flush, 50 Units, intra-catheter, q12h  hydrALAZINE, 10 mg, intravenous, q6h  insulin NPH (Isophane), 15 Units, subcutaneous, Nightly  [START ON 10/23/2023] insulin NPH (Isophane), 7 Units, subcutaneous, Daily  insulin regular, 0-10 Units, subcutaneous, q6h  insulin regular, 4 Units, subcutaneous, q6h  lidocaine, 1 mL, infiltration, Once  lidocaine, 1 patch, transdermal, Daily  methylPREDNISolone sodium succinate (PF), 4 mg, intravenous, q24h  micafungin, 100 mg, intravenous, q24h  nystatin, 4 mL, Swish & Swallow, BID  pantoprazole, 40 mg, intravenous, BID  piperacillin-tazobactam, 3.375 g, intravenous, q6h  tacrolimus, 1 mg, sublingual, q24h  tacrolimus, 2 mg, sublingual, Daily      Continuous medications  Adult Clinimix TPN, 83 mL/hr, Last Rate: 83 mL/hr (10/21/23 2235)  HYDROmorphone,       PRN medications  PRN medications: alteplase, alteplase, dextrose 10 % in water (D10W), dextrose, glucagon, heparin flush, heparin lock flush (porcine), ipratropium-albuteroL, naloxone, naloxone, OLANZapine, oxygen    Results for orders placed or performed during the hospital encounter of 10/07/23 (from the past 24 hour(s))   POCT GLUCOSE   Result Value Ref Range    POCT Glucose 76 74 - 99 mg/dL   Prepare RBC: 1 Units   Result Value Ref Range    PRODUCT CODE L1732B52     Unit Number [id number]     Unit ABO B     Unit RH POS     XM INTEP COMP     Dispense Status TR     Blood Expiration Date November 16, 2023 23:59 EST     PRODUCT BLOOD TYPE 7300     UNIT VOLUME 400    POCT GLUCOSE   Result Value Ref Range    POCT Glucose 79 74 - 99 mg/dL   POCT GLUCOSE   Result Value Ref Range    POCT Glucose 157 (H) 74 - 99 mg/dL   Tacrolimus level   Result Value Ref Range    Tacrolimus  8.5 <=15.0  ng/mL   CBC   Result Value Ref Range    WBC 16.7 (H) 4.4 - 11.3 x10*3/uL    nRBC 0.0 0.0 - 0.0 /100 WBCs    RBC 2.56 (L) 4.50 - 5.90 x10*6/uL    Hemoglobin 7.5 (L) 13.5 - 17.5 g/dL    Hematocrit 24.7 (L) 41.0 - 52.0 %    MCV 97 80 - 100 fL    MCH 29.3 26.0 - 34.0 pg    MCHC 30.4 (L) 32.0 - 36.0 g/dL    RDW 18.4 (H) 11.5 - 14.5 %    Platelets 424 150 - 450 x10*3/uL    MPV 11.2 7.5 - 11.5 fL   Comprehensive Metabolic Panel   Result Value Ref Range    Glucose 239 (H) 74 - 99 mg/dL    Sodium 146 (H) 136 - 145 mmol/L    Potassium 4.1 3.5 - 5.3 mmol/L    Chloride 112 (H) 98 - 107 mmol/L    Bicarbonate 20 (L) 21 - 32 mmol/L    Anion Gap 18 10 - 20 mmol/L    Urea Nitrogen 54 (H) 6 - 23 mg/dL    Creatinine 2.54 (H) 0.50 - 1.30 mg/dL    eGFR 27 (L) >60 mL/min/1.73m*2    Calcium 9.6 8.6 - 10.6 mg/dL    Albumin 2.4 (L) 3.4 - 5.0 g/dL    Alkaline Phosphatase 111 33 - 136 U/L    Total Protein 5.7 (L) 6.4 - 8.2 g/dL    AST 20 9 - 39 U/L    Bilirubin, Total 0.9 0.0 - 1.2 mg/dL    ALT 14 10 - 52 U/L   Magnesium   Result Value Ref Range    Magnesium 2.05 1.60 - 2.40 mg/dL   POCT GLUCOSE   Result Value Ref Range    POCT Glucose 51 (L) 74 - 99 mg/dL   POCT GLUCOSE   Result Value Ref Range    POCT Glucose 59 (L) 74 - 99 mg/dL   POCT GLUCOSE   Result Value Ref Range    POCT Glucose 169 (H) 74 - 99 mg/dL   POCT GLUCOSE   Result Value Ref Range    POCT Glucose 164 (H) 74 - 99 mg/dL       Results from last 7 days   Lab Units 10/22/23  0434 10/19/23  0549 10/18/23  0516   HEMOGLOBIN A1C %  --   --  7.3*   SODIUM mmol/L 146*   < > 148*   POTASSIUM mmol/L 4.1   < > 4.3   CHLORIDE mmol/L 112*   < > 110*   CO2 mmol/L 20*   < > 25   BUN mg/dL 54*   < > 66*   CREATININE mg/dL 2.54*   < > 2.36*   CALCIUM mg/dL 9.6   < > 9.5   ALBUMIN g/dL 2.4*   < > 2.5*   PROTEIN TOTAL g/dL 5.7*   < > 5.6*   BILIRUBIN TOTAL mg/dL 0.9   < > 1.2   ALK PHOS U/L 111   < > 114   ALT U/L 14   < > 17   AST U/L 20   < > 15   GLUCOSE mg/dL 239*   < > 166*    < > = values  in this interval not displayed.         Assessment/Plan   Active Problems:    Pancreatic cancer (CMS/HCC)    Pulmonary hypertension (CMS/HCC)    CVA (cerebral vascular accident) (CMS/HCC)    Update 10/22: Patient with hypoglycemia overnight. NPH doses may be too high. Additionally, it appears that the TPN was held for a few hours when the bags were begin changed.     RECOMMENDATIONS:  - Goal -180  - DECREASE NPH at 11AM from 10 units to 7 units  - DECREASE NPH at 11PM with methylprednisolone from 20 units to 15 units  - continue scheduled regular insulin 4 units Q6H  - continue regular insulin ssi #2 q6hr correlated with 4u standing dose order  - POCT glucose Q6H   - Hypoglycemia protocol     IF TPN AND LIPIDS HELD:   -Goal -180  -CHANGE NPH to 7 units q12hr  -HOLD scheduled regular insulin   -CHANGE regular insulin sliding scale from #2 (1:50>150) Q6H to insulin lispro sliding scale #1 (1:50>150) Q4H  -Accuchecks Q6H   -Hypoglycemia protocol     IF METHYLPREDNISOLONE IS STOPPED:   -Goal -180  -CHANGE NPH to 10 units q12hr  - CONTINUE Regular insulin 4u q6hr while TPN is running  -CHANGE regular insulin sliding scale #2 (1:50>150) Q6H to insulin lispro sliding scale #1 (1:50>150) Q4H  -Accuchecks Q6H   -Hypoglycemia protocol      Discharge recommendations:  -TBD, patient was on insulin at home therefore this is not a new start  -Patient will need to establish with new endocrinologist (last seen by Dr. Loya in August 2023)     Endocrine will continue to follow.  Plan communicated with primary team via phone.  Please message on secure chat (8AM-5PM) or page [phone] with any questions or concerns.  Patient discussed with Dr. Chisholm who agrees with the management plan.

## 2023-10-22 NOTE — CARE PLAN
Problem: Fall/Injury  Goal: Verbalize understanding of risk factor reduction measures to prevent injury from fall in the home  Outcome: Not Progressing   The patient's goals for the shift include      The clinical goals for the shift include patient will be free of falls this shift    Over the shift patient was aox1. Sitter at bedside. He was very agitated standing up from bed to chair and while in the chair he would stand up trying to walk stating he is going home. He removed all his clothing every time we attempted to put a gown on him. He would not leave his tele box on. He would disconnected it constantly. Patient would have incontinent episodes. Was given pericare. BS was 69 D50 given recheck was 187. Still needs constant supervision due to safety.     Goal: Verbalize understanding of risk factor reduction measures to prevent injury from fall in the home  Outcome: Not Progressing

## 2023-10-22 NOTE — SIGNIFICANT EVENT
Overnight, team called to patient bedside for episodes of restlessness and agitation. Patient able to state name but unable to say where he was. Continued to try get out of bed but was able to be reoriented. Some leakage around G tube, appeared to be gastric contents. G tube appeared to be occluded with no output, flushed and 300cc of gastric contents immediately drained. IRVING drain bulbs pulled off by patient multiple times over night, re attached.     Vitals: Mildly tachy to low 100s, afebrile, -160/70-80s    Constitutional: restless, able to answer questions intermittently, anxious  Neuro: A/O x2, motor and sensory grossly intact  Psych: anxious, distracted, limited clear thought process  HEENT: No deformities, no scleral icterus   Cardiac: tachy  Pulmonary: unlabored respirations   Abdomen: soft, flat, midline incision with wound vac on suction and serosang. 2 IRVING drains with minimal serosang output. G tube with mild thick brown drainage around bumper. Tender in right upper quadrant. Non peritonitic, no guarding.   Skin: warm and dry overall    Extremities: no swelling noted  MSK: moving all four    Plan  - ordered sitter, very helpful and was playing music for the patient  - morning labs obtained early, WBC stable and Hgb stable. Bump in creatinine  - 5mg haldol inj given for persistent agitation and patient safety (patient continued to try get out of bed)  - LR bolus ordered    Patient seen with chief Dr. Robison.    Alla Oropeza MD  PGY-1 Vascular Surgery Resident  Surgical Oncology Service  [phone]

## 2023-10-22 NOTE — CONSULTS
"Inpatient consult to Psychiatry  Consult performed by: Ruthy Aponte MD  Consult ordered by: Luis Armando Johnson MD  Reason for consult: delirium         HISTORY OF PRESENT ILLNESS:  Chung Goodman is a 67 y.o. male with PPHx of MDD, JE and a PMHx of history of heart transplant (2017 for NICM 2/2 valvular disease) and pancreatic cancer s/p whipple (5/19/2023) currently receiving modified FOLFIRINOX (follows with Dr. Dunaway), and recent admission for SBO who presented as a transfer from [facility] for recurrent SBO.  Psychiatry consulted today for management of delirium.     On interview, patient lying in bed, mumbling to self. He is oriented only to self. Reports current location as \"on a cruise\", current year as \"1904\" and responds \"I dont know\" regarding current location. Unable to further participate in evaluation.     Per bedside sitter, patient has been waxing and waning in levels of consciousness. Reports that earlier in the day he had been attempting to leave his bed, pull out his IV lines, and disrobe himself. Also reports that he has been \"calling out for his mother\".        PSYCHIATRIC REVIEW OF SYSTEMS  Depression:  unable to assess  Anxiety:  unable to assess  Camille:  unable to assess  Psychosis:  unable to assess  Delirium: confusion, disorientation, impaired recent memory, fluctuating or reduced consciousness, lethargy and fatigue, hypervigilance, speech or language disturbance, sleep-wake cycle disturbance, emotional disturbance , increased or decreased psychomotor activity, and difficulty directing, focusing, sustaining or shifting attention    Trauma:  unable to assess    PSYCHIATRIC HISTORY  Prior diagnoses: per chart review, MDD, JE  Prior hospitalizations: none in chart  History of suicide attempts: none in chart  History of self-harm: none in chart  History of trauma/abuse/loss: none in chart  History of violence: none in chart  Current psychiatrist: none in chart  Current psychiatric medications: " none in chart  Past psychiatric medications: none in chart  Family psychiatric history: unknown      SUBSTANCE USE HISTORY   He reports that he quit smoking about 22 years ago. His smoking use included cigarettes. He has never used smokeless tobacco. He reports that he does not drink alcohol and does not use drugs.    Unable to obtain further history    SOCIAL HISTORY  Social History     Socioeconomic History    Marital status:      Spouse name: None    Number of children: None    Years of education: None    Highest education level: None   Occupational History    None   Tobacco Use    Smoking status: Former     Types: Cigarettes     Quit date:      Years since quittin.8    Smokeless tobacco: Never   Substance and Sexual Activity    Alcohol use: Never    Drug use: Never    Sexual activity: None   Other Topics Concern    None   Social History Narrative    None     Social Determinants of Health     Financial Resource Strain: Low Risk  (10/21/2023)    Overall Financial Resource Strain (CARDIA)     Difficulty of Paying Living Expenses: Not hard at all   Food Insecurity: Not on file   Transportation Needs: No Transportation Needs (10/21/2023)    PRAPARE - Transportation     Lack of Transportation (Medical): No     Lack of Transportation (Non-Medical): No   Physical Activity: Not on file   Stress: Not on file   Social Connections: Not on file   Intimate Partner Violence: Not on file   Housing Stability: Low Risk  (10/21/2023)    Housing Stability Vital Sign     Unable to Pay for Housing in the Last Year: No     Number of Places Lived in the Last Year: 1     Unstable Housing in the Last Year: No      Unable to obtain further information    PAST MEDICAL HISTORY  Past Medical History:   Diagnosis Date    Myopia, bilateral 2017    Bilateral myopia    Other disorders of electrolyte and fluid balance, not elsewhere classified 2014    Electrolyte and fluid disorder    Other visual disturbances  06/06/2017    Blurred vision, bilateral    Personal history of diseases of the blood and blood-forming organs and certain disorders involving the immune mechanism 06/06/2017    History of immunocompromised state    Personal history of diseases of the blood and blood-forming organs and certain disorders involving the immune mechanism 12/17/2014    History of anemia    Personal history of diseases of the skin and subcutaneous tissue 08/01/2014    History of contact dermatitis    Personal history of other diseases of the circulatory system 08/21/2017    History of pulmonary hypertension    Personal history of other mental and behavioral disorders 11/07/2017    History of depression    Personal history of other mental and behavioral disorders 06/21/2018    History of anxiety    Personal history of other specified conditions 07/26/2017    History of bradycardia    Personal history of other specified conditions 09/04/2014    History of abnormal weight loss    Personal history of urinary (tract) infections 05/12/2017    History of urinary tract infection    Restlessness and agitation     Restlessness and agitation    Rheumatic mitral valve disease, unspecified 12/01/2017    Rheumatic mitral valve disease, unspecified    Right heart failure, unspecified (CMS/HCC) 05/22/2017    RVF (right ventricular failure)    Unspecified exotropia 09/12/2014    Consecutive exotropia    Unspecified intermittent heterotropia 06/06/2017    Exotropia, intermittent        PAST SURGICAL HISTORY  Past Surgical History:   Procedure Laterality Date    AORTIC VALVE REPLACEMENT  09/03/2014    Aortic Valve Replacement    MITRAL VALVE REPLACEMENT  12/17/2014    Mitral Valve Replacement    OTHER SURGICAL HISTORY  09/06/2013    Mitral Valve Repair    OTHER SURGICAL HISTORY  02/12/2016    Cardio-defib Pulse Generator Implantation Date    OTHER SURGICAL HISTORY  02/12/2016    Cardio-Defib Pulse Gen Venous Attach Electrode To Prev Placed Defibrillator     "OTHER SURGICAL HISTORY  12/01/2017    Cardiac Transplant Procedures    OTHER SURGICAL HISTORY  12/01/2017    Sternotomy    OTHER SURGICAL HISTORY  12/17/2014    Cardiac Cath Procedure Outcome: Successful          FAMILY HISTORY  Family History   Problem Relation Name Age of Onset    COPD Mother      Other (systemic lupus erythematosus) Mother      Hypertension Father          benign    Arthritis Other Family History     Ulcerative colitis Other Family History     Gout Other Family History     Psoriasis Other Family History     Other (systemic lupus erythematosus) Other Family History         ALLERGIES  Amiodarone and Morphine    OARRS REVIEW  OARRS checked: No data recorded       OBJECTIVE    VITALS      10/21/2023     6:03 PM 10/21/2023    10:12 PM 10/22/2023     1:05 AM 10/22/2023     5:47 AM 10/22/2023     8:59 AM 10/22/2023     1:36 PM 10/22/2023     4:45 PM   Vitals   Systolic 168 148 163 146 137 140 136   Diastolic 78 74 81 64 70 61 73   Heart Rate 96 99 101 107 94 98 92   Temp 36.3 °C (97.3 °F)  37 °C (98.6 °F) 36.9 °C (98.4 °F) 36.8 °C (98.2 °F) 36.7 °C (98.1 °F) 36.3 °C (97.3 °F)   Resp 16 16 18 16 16 16 16        MENTAL STATUS EXAM  Appearance: AA male tall, lying in fetal position on bed, partially unclothed  Attitude: confused  Behavior: Poor eye contact. No agitated or aggressive behavior.  Motor Activity: No agitation or retardation. No EPS/TD.  Speech: Low volume, soft, mumbling  Mood: “I'm fine\"  Affect: constricted  Thought Process: unable to assess  Thought Content: unable to assess  Thought Perception: unable to assess  Cognition: Alert. Oriented only to self  Insight: Poor  Judgement: Poor       MEDICAL REVIEW OF SYSTEMS  Unable to perform 2/2 AMS    HOME MEDICATIONS  Medication Documentation Review Audit       Reviewed by Diana Childers MD (Anesthesiologist) on 10/10/23 at 1128      Medication Order Taking? Sig Documenting Provider Last Dose Status   acetaminophen (Tylenol) 325 mg tablet " [id number]  2 tab(s) orally every 6 hours, As needed, Pain - Mild (1-3) Historical Provider, MD  Active   albuterol 90 mcg/actuation inhaler [id number]  use 2 (TWO) puffs FOUR TIMES DAILY Historical MD Radha  Active   allopurinol (Zyloprim) 100 mg tablet [id number]   Brandie Provider, MD  Active   aspirin 81 mg chewable tablet [id number]  CHEW AND SWALLOW 1 TABLET DAILY. Brandie Provider, MD  Active   blood sugar diagnostic (OneTouch Verio test strips) strip [id number]  TEST 3 TIMES DAILY Brandie Garcia MD  Active   buPROPion XL (Wellbutrin XL) 150 mg 24 hr tablet [id number]  Take 1 tablet (150 mg) by mouth once daily. Brandie Provider, MD  Active   busPIRone (Buspar) 5 mg tablet [id number]  Take 1 tablet (5 mg) by mouth 3 times a day. Brandie Provider, MD  Active   calcium carbonate 600 mg calcium (1,500 mg) tablet [id number]  Take 1 tablet (600 mg) by mouth 2 times a day. Brandie Garcia MD  Active   cholecalciferol (Vitamin D-3) 125 MCG (5000 UT) capsule [id number]  Take 1 capsule (125 mcg) by mouth once daily. Historical MD Radha  Active   citalopram (CeleXA) 40 mg tablet [id number]   Brandie Provider, MD  Active   diphenoxylate-atropine (Lomotil) 2.5-0.025 mg tablet [id number]  2 tablets 4 times a day as needed for diarrhea. Brandie Garcia MD  Active   fenofibrate (Triglide) 160 mg tablet [id number]  Take 1 tablet (160 mg) by mouth once daily. Brandie Garcia MD  Active   ferrous sulfate 325 (65 Fe) MG tablet [id number]  Take 1 tablet (325 mg) by mouth once daily. Brandie Garcia MD  Active   gabapentin (Neurontin) 100 mg capsule [id number]  Take 2 capsules (200 mg) by mouth once daily at bedtime. Brandie Provider, MD  Active   hydrALAZINE (Apresoline) 50 mg tablet [id number]  Take 1 tablet (50 mg) by mouth 2 times a day. Brandie Garcia MD  Active   icosapent ethyL (Vascepa) 1 gram capsule [id number]  Take 1 capsule (1 g) by mouth twice a day. Brandie Garcia MD   Active   insulin glargine (Lantus U-100 Insulin) 100 unit/mL injection [id number]  Inject 10 Units under the skin once daily in the morning. Historical Provider, MD  Active   insulin lispro (HumaLOG) 100 unit/mL injection [id number]  Inject under the skin 3 times a day with meals. Using a sliding scale Brandie Garcia MD  Active   lidocaine-prilocaine (Emla) 2.5-2.5 % cream [id number]  Apply topically to affected area 30 min prior to port Brandie Provider, MD  Active   magnesium oxide (Mag-Ox) 400 mg (241.3 mg magnesium) tablet [id number]  Take 2 tablets (800 mg) by mouth 2 times a day. Historical Provider, MD  Active   multivitamin with minerals tablet [id number]  1 tablet DAILY (route: oral) Historical Provider, MD  Active   pantoprazole (ProtoNix) 40 mg EC tablet [id number]  Take 1 tablet (40 mg) by mouth twice a day. Brandie Provider, MD  Active   rosuvastatin (Crestor) 40 mg tablet [id number]  Take 1 tablet (40 mg) by mouth once daily at bedtime. Historical Provider, MD  Active   tacrolimus (Prograf) 1 mg capsule [id number]  TAKE 4 CAPSULES every morning and TAKE 3 CAPSULES every evening Historical Provider, MD  Active                     CURRENT MEDICATIONS  Scheduled medications  aspirin, 150 mg, rectal, Daily  fat emulsion fish oil/plant based, 250 mL, intravenous, Daily  heparin (porcine), 5,000 Units, subcutaneous, q8h  heparin flush, 50 Units, intra-catheter, q12h  hydrALAZINE, 10 mg, intravenous, q6h  insulin NPH (Isophane), 15 Units, subcutaneous, Nightly  [START ON 10/23/2023] insulin NPH (Isophane), 7 Units, subcutaneous, Daily  insulin regular, 0-10 Units, subcutaneous, q6h  insulin regular, 4 Units, subcutaneous, q6h  lidocaine, 1 mL, infiltration, Once  lidocaine, 1 patch, transdermal, Daily  methylPREDNISolone sodium succinate (PF), 4 mg, intravenous, q24h  micafungin, 100 mg, intravenous, q24h  nystatin, 4 mL, Swish & Swallow, BID  pantoprazole, 40 mg, intravenous,  BID  piperacillin-tazobactam, 3.375 g, intravenous, q6h  tacrolimus, 1 mg, sublingual, q24h  tacrolimus, 2 mg, sublingual, Daily        Continuous medications  Adult Clinimix TPN, 83 mL/hr, Last Rate: 83 mL/hr (10/21/23 2235)  HYDROmorphone,         PRN medications  PRN medications: alteplase, alteplase, dextrose 10 % in water (D10W), dextrose, glucagon, heparin flush, heparin lock flush (porcine), ipratropium-albuteroL, naloxone, naloxone, OLANZapine, oxygen     LABS  Results for orders placed or performed during the hospital encounter of 10/07/23 (from the past 24 hour(s))   Prepare RBC: 1 Units   Result Value Ref Range    PRODUCT CODE M2300R52     Unit Number [id number]     Unit ABO B     Unit RH POS     XM INTEP COMP     Dispense Status TR     Blood Expiration Date November 16, 2023 23:59 EST     PRODUCT BLOOD TYPE 7300     UNIT VOLUME 400    POCT GLUCOSE   Result Value Ref Range    POCT Glucose 79 74 - 99 mg/dL   POCT GLUCOSE   Result Value Ref Range    POCT Glucose 157 (H) 74 - 99 mg/dL   Tacrolimus level   Result Value Ref Range    Tacrolimus  8.5 <=15.0 ng/mL   CBC   Result Value Ref Range    WBC 16.7 (H) 4.4 - 11.3 x10*3/uL    nRBC 0.0 0.0 - 0.0 /100 WBCs    RBC 2.56 (L) 4.50 - 5.90 x10*6/uL    Hemoglobin 7.5 (L) 13.5 - 17.5 g/dL    Hematocrit 24.7 (L) 41.0 - 52.0 %    MCV 97 80 - 100 fL    MCH 29.3 26.0 - 34.0 pg    MCHC 30.4 (L) 32.0 - 36.0 g/dL    RDW 18.4 (H) 11.5 - 14.5 %    Platelets 424 150 - 450 x10*3/uL    MPV 11.2 7.5 - 11.5 fL   Comprehensive Metabolic Panel   Result Value Ref Range    Glucose 239 (H) 74 - 99 mg/dL    Sodium 146 (H) 136 - 145 mmol/L    Potassium 4.1 3.5 - 5.3 mmol/L    Chloride 112 (H) 98 - 107 mmol/L    Bicarbonate 20 (L) 21 - 32 mmol/L    Anion Gap 18 10 - 20 mmol/L    Urea Nitrogen 54 (H) 6 - 23 mg/dL    Creatinine 2.54 (H) 0.50 - 1.30 mg/dL    eGFR 27 (L) >60 mL/min/1.73m*2    Calcium 9.6 8.6 - 10.6 mg/dL    Albumin 2.4 (L) 3.4 - 5.0 g/dL    Alkaline Phosphatase 111 33 -  136 U/L    Total Protein 5.7 (L) 6.4 - 8.2 g/dL    AST 20 9 - 39 U/L    Bilirubin, Total 0.9 0.0 - 1.2 mg/dL    ALT 14 10 - 52 U/L   Magnesium   Result Value Ref Range    Magnesium 2.05 1.60 - 2.40 mg/dL   POCT GLUCOSE   Result Value Ref Range    POCT Glucose 51 (L) 74 - 99 mg/dL   POCT GLUCOSE   Result Value Ref Range    POCT Glucose 59 (L) 74 - 99 mg/dL   POCT GLUCOSE   Result Value Ref Range    POCT Glucose 169 (H) 74 - 99 mg/dL   POCT GLUCOSE   Result Value Ref Range    POCT Glucose 164 (H) 74 - 99 mg/dL   POCT GLUCOSE   Result Value Ref Range    POCT Glucose 69 (L) 74 - 99 mg/dL   POCT GLUCOSE   Result Value Ref Range    POCT Glucose 187 (H) 74 - 99 mg/dL        IMAGING  No results found.     PSYCHIATRIC RISK ASSESSMENT  Violence Risk Factors:  male and impulsivity  Acute Risk of Harm to Others is Considered: low  Suicide Risk Factors: male, age > 65 years old , and chronic pain  Protective Factors: marriage/partnership  Acute Risk of Harm to Self is Considered: Low    ASSESSMENT AND PLAN  Chung Goodman is a 67 y.o. male with PPHx of MDD, JE and a PMHx of history of heart transplant (2017 for NICM 2/2 valvular disease) and pancreatic cancer s/p whipple (5/19/2023) currently receiving modified FOLFIRINOX (follows with Dr. Dunaway), and recent admission for SBO who presented as a transfer from [facility] for recurrent SBO.  Psychiatry was consulted on 10/22/23 for management of delirium.  On initial assessment, patient noted to be lying in bed, partially clothed, oriented only to self. Per pts sitter, pt has been waxing and waning in levels of consciousness, and had earlier in the day been attempting to leave his bed, remove his clothes, and remove all of his IV lines. Pt with PCA pump for pain control which is likely contributing to his current presentation, as well as SBO, fluctuating glc levels. Pt received 5mg of haldol earlier today for management of agitation. Presentation likely delirium 2/2 pain, pain  "medications, glc levels, SBO status. Psychiatry will continue to follow.     EKG (10/22): QTc of 497    IMPRESSION  Delirium, mixed, multifactorial    RECOMMENDATIONS  Safety:  - Patient lacks the capacity to leave AMA at this time and thus cannot leave AMA. Call CODE VIOLET if patient attempts to leave AMA.  - To evaluate decision-making capacity, recommend use of the Capacity Evaluation Tool. Search “ IP Capacity Evaluation under SmartText\" unless the patient has a legal guardian, in which case all decisions per the legal guardian.  - Continue 1:1 sitter for delirium (reorientation, redirection, falls risk).  - As with all hospitalized patients, would recommend delirium precautions, as below.    Work-up:  Recommend UA, B12, Folate, TSH, Vitamin D    Medications:  -Thiamine 500mg IV TID x3 days followed by 100mg PO daily    -REC NO ADDITIONAL HALOPERIDOL, due to prolonged Qtc interval as well as potential to worsening restlessness  -Olanzapine 2.5mg PO/IM Q6H PRN for agitation.   -Repeat 12lead EKG for Qtc interval if patient receives olanzapine    Please do not administer BZD within 2 hrs of olanzapine administration.     Ancillary Services:  - Please offer , pet/music therapy consult when appropriate    - Psychiatry will continue to follow daily.  Thank you for allowing us to participate in the care of this patient. Please page [phone] with any questions or concerns.  Patient  staffed/discussed with Dr. Welsh, who agrees with above plan.    Medication Consent  Medication Consent: n/a; consult service    Ruthy Aponte MD   "

## 2023-10-22 NOTE — NURSING NOTE
Patient blood sugar is ordered Q4; at the time when checked, it was 157 at 1:00am. Gave insulin per orders. Blood sugar was rechecked at 5:39am and it was 59. Doctor notified, D50 was given; after 15 minutes blood sugar rechecked and it was 169.     I called the wife to update her on her husbands care. Voice message was left.

## 2023-10-22 NOTE — PROGRESS NOTES
[facility]  DEPARTMENT OF SURGERY    PROGRESS NOTE    SUBJECTIVE  Delirium overnight requiring Haldol and placement of a sitter in his room. Interactive this AM. One episode of hypoglycemia early in the morning.    OBJECTIVE  Vitals:  Temp:  [35.8 °C (96.4 °F)-37 °C (98.6 °F)] 36.8 °C (98.2 °F)  Heart Rate:  [] 94  Resp:  [16-18] 16  BP: (132-168)/(64-87) 137/70    I/Os:  I/O last 3 completed shifts:  In: 4374.3 (62 mL/kg) [I.V.:51.2 (0.7 mL/kg); Blood:350; IV Piggyback:350]  Out: 760 (10.8 mL/kg) [Urine:200 (0.1 mL/kg/hr); Emesis/NG output:475; Drains:85]  Weight: 70.5 kg     Exam:  Constitutional: Awake, alert, no acute distress  Neurological: No focal deficits  Eyes: Non-icteric  Respiratory: No increased work of breathing on room air  Cardiac: Regular rate  Abdomen: Midline abdominal incision with wound vac, minimal output. IRVING drain with serosanguinous drainage. Mildly tender to palpation. PEG in place, thin liquid output.  Genitourinary: Voiding spontaneously  Skin: Warm, dry  Musculoskeletal: Moves all extremities  Extremities: No peripheral edema         Psychological: Appropriate mood/affect    Labs:  Results for orders placed or performed during the hospital encounter of 10/07/23 (from the past 24 hour(s))   POCT GLUCOSE   Result Value Ref Range    POCT Glucose 189 (H) 74 - 99 mg/dL   POCT GLUCOSE   Result Value Ref Range    POCT Glucose 76 74 - 99 mg/dL   Prepare RBC: 1 Units   Result Value Ref Range    PRODUCT CODE C0692T05     Unit Number [id number]     Unit ABO B     Unit RH POS     XM INTEP COMP     Dispense Status TR     Blood Expiration Date November 16, 2023 23:59 EST     PRODUCT BLOOD TYPE 7300     UNIT VOLUME 400    POCT GLUCOSE   Result Value Ref Range    POCT Glucose 79 74 - 99 mg/dL   POCT GLUCOSE   Result Value Ref Range    POCT Glucose 157 (H) 74 - 99 mg/dL   CBC   Result Value Ref Range    WBC 16.7 (H) 4.4 - 11.3 x10*3/uL    nRBC 0.0 0.0 - 0.0  /100 WBCs    RBC 2.56 (L) 4.50 - 5.90 x10*6/uL    Hemoglobin 7.5 (L) 13.5 - 17.5 g/dL    Hematocrit 24.7 (L) 41.0 - 52.0 %    MCV 97 80 - 100 fL    MCH 29.3 26.0 - 34.0 pg    MCHC 30.4 (L) 32.0 - 36.0 g/dL    RDW 18.4 (H) 11.5 - 14.5 %    Platelets 424 150 - 450 x10*3/uL    MPV 11.2 7.5 - 11.5 fL   Comprehensive Metabolic Panel   Result Value Ref Range    Glucose 239 (H) 74 - 99 mg/dL    Sodium 146 (H) 136 - 145 mmol/L    Potassium 4.1 3.5 - 5.3 mmol/L    Chloride 112 (H) 98 - 107 mmol/L    Bicarbonate 20 (L) 21 - 32 mmol/L    Anion Gap 18 10 - 20 mmol/L    Urea Nitrogen 54 (H) 6 - 23 mg/dL    Creatinine 2.54 (H) 0.50 - 1.30 mg/dL    eGFR 27 (L) >60 mL/min/1.73m*2    Calcium 9.6 8.6 - 10.6 mg/dL    Albumin 2.4 (L) 3.4 - 5.0 g/dL    Alkaline Phosphatase 111 33 - 136 U/L    Total Protein 5.7 (L) 6.4 - 8.2 g/dL    AST 20 9 - 39 U/L    Bilirubin, Total 0.9 0.0 - 1.2 mg/dL    ALT 14 10 - 52 U/L   Magnesium   Result Value Ref Range    Magnesium 2.05 1.60 - 2.40 mg/dL   POCT GLUCOSE   Result Value Ref Range    POCT Glucose 51 (L) 74 - 99 mg/dL   POCT GLUCOSE   Result Value Ref Range    POCT Glucose 59 (L) 74 - 99 mg/dL   POCT GLUCOSE   Result Value Ref Range    POCT Glucose 169 (H) 74 - 99 mg/dL         ASSESSMENT:  67 y.o. 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 Now s/p ex lap, washout, fascial closure w/abdominal wound vac and drain placement on 10/17 for fascial dehiscence. On PCA for uncontrolled pain. Wound vac on abdominal incision. One right sided IRVING removed 10/20. Delirium overnight.     Plan  Neuro/Psych  - Multimodal pain management with 1000mg IV Tylenol q6h and PCA 0.5mg/mL  - Appreciate Supportive Onc recs  - Psychiatry recommendations for management of  delirium  - Olanzapine started at 2.5 mg q6h prn  - Sitter     CV  - ASA suppository 150mg   - Home immunosuppression regimen (tacrolimus, solumedrol). Tacrolimus target goal 5-8  - Sublingual tacrolimus (2 mg AM, 1 mg PM)  - Per HF, continue methylprednisolone 4 mg IV daily while NPO.      Pulm:   - Respiratory  w/duo-nebs TID  - Encourage incentive spirometry 10x/h     GI  - Continue TPN at goal, plan for home continuous rate  - Abdominal wound vac to -75 mmHg  - F/up wound care team for vac change  - Limited CLD for palliation  - PEG tube to gravity; flush if clogged or output slowed down     ID  - Micafungin, zosyn per ID. Originally 7 day course per ID, will continue given takeback to OR.   - COVID test - negative     Heme  - No indication for transfusion at this time     Endo  - Endocrine consult, appreciate recs; anticipate changes to AM NPH    Ppx   - DVT ppx: Heparin SQ  - Encourage ambulation    Dispo:  - Patient denied from LTAC. Rec'd for SNF by PT. Will discuss options with SW and wife.   - Continue Patrice 6, dispo with TPN      Seen and discussed with attending surgeon, Dr. Antonina Jordan MD  General Surgery PGY4  Surgical Oncology - [facility] Service [id number]

## 2023-10-23 NOTE — PROGRESS NOTES
Physical Therapy    Physical Therapy Treatment    Patient Name: Chung Goodman  MRN: [id number]  Today's Date: 10/23/2023  Time Calculation  Start Time: 0907  Stop Time: 0935  Time Calculation (min): 28 min     Assessment/Plan   PT Assessment  PT Assessment Results: Decreased strength, Decreased range of motion, Decreased endurance, Impaired balance, Decreased mobility, Impaired judgement, Decreased safety awareness  Rehab Prognosis: Fair  Evaluation/Treatment Tolerance: Patient tolerated treatment well  Medical Staff Made Aware: Yes  End of Session Communication: Bedside nurse  Assessment Comment: Pt limited due to impaired mentation. Pt remains appropriate for continued PT while in house to further assess and progress mobility. Pt remains appropriate for continued therapy upon discharge at a moderate intensity level to focus on improved functional mobility, balance and activity tolerance.  End of Session Patient Position: Up in chair  PT Plan  Inpatient/Swing Bed or Outpatient: Inpatient  PT Plan  Treatment/Interventions: Bed mobility, Transfer training, Gait training, Stair training, Neuromuscular re-education, Strengthening, Endurance training, Therapeutic exercise, Home exercise program, Therapeutic activity, Positioning, Postural re-education  PT Plan: Skilled PT  PT Frequency: 5 times per week  PT Discharge Recommendations: Moderate intensity level of continued care  PT Recommended Transfer Status: Assist x2  PT - OK to Discharge: Yes    General Visit Information:   PT  Visit  PT Received On: 10/23/23  Response to Previous Treatment: Patient unable to report, no changes reported from family or staff  General  Reason for Referral: s/p Ex-lap, small bowel resection, jejuno-jejunostomy, G-tube (10/10/23)  Past Medical History Relevant to Rehab: PMH: NICM secondary to valvular disease, s/p mitral valve repair, s/p bicaval OHT (3/24/2017) with post-op complicated by atrial arrhythmia, RV failure, chronotropic  incompetence, Pancreatic adenocarcinoma, s/p Whipple procedure (5/19/23), currently on chemotx (modified FOLFIRINOX), HTN, DM type 2, HLD, CKD Stage 3B, amiodarone-induced hyperthyroidism, JE, MDD  Family/Caregiver Present: Yes (wife present)  Caregiver Feedback:  (Pt's wife reports that Pt has a history of getting delirium after being in the hospital for at least 2 weeks and that is mentality is much better at home.)  Patient Position Received: Up in chair  General Comment: Pt with limited ability to participate due to impaired mentation. Reportedly with impaired mentation from pain medications and delirium from prolonged hospital stay.    Subjective   Precautions:  Precautions  Medical Precautions: Abdominal precautions, Fall precautions    Objective   Pain:  Pain Assessment  Pain Assessment: 0-10  Pain Score: 0 - No pain  Cognition:  Cognition  Overall Cognitive Status: Impaired  Orientation Level: Disoriented to place, Disoriented to time, Disoriented to situation  Activity Tolerance:  Activity Tolerance  Endurance: Tolerates 10 - 20 min exercise with multiple rests  Treatments:  Therapeutic Exercise  Therapeutic Exercise Performed: Yes  Therapeutic Exercise Activity 1: Seated Bilateral LE: ankle pumps, LAQ, marching x8-10 each (Pt really struggled to stay on task-requiring MAX verbal/tactile cuing.)    Ambulation/Gait Training  Ambulation/Gait Training Performed: Yes  Ambulation/Gait Training 1  Surface 1: Level tile  Device 1: Rolling walker  Assistance 1: Moderate assistance, Minimum assistance (2 person arm in arm)  Quality of Gait 1: Narrow base of support  Comments/Distance (ft) 1: 5 feet x2 (Pt very unsteady and impulsive-refusing to use FWW during ambulation.)  Transfers  Transfer: Yes  Transfer 1  Transfer From 1: Sit to, Stand to  Transfer to 1: Sit, Stand  Transfer Device 1: Walker  Transfer Level of Assistance 1: Moderate assistance    Outcome Measures:  [facility] Basic Mobility  Turning from your back  to your side while in a flat bed without using bedrails: A lot  Moving from lying on your back to sitting on the side of a flat bed without using bedrails: A lot  Moving to and from bed to chair (including a wheelchair): A lot  Standing up from a chair using your arms (e.g. wheelchair or bedside chair): A lot  To walk in hospital room: A lot  Climbing 3-5 steps with railing: Total  Basic Mobility - Total Score: 11    Education Documentation  Mobility Training, taught by Trixie Swanson PTA at 10/23/2023 11:19 AM.  Learner: Significant Other, Patient  Readiness: Acceptance  Method: Explanation, Demonstration  Response: Needs Reinforcement    Education Comments  No comments found.      Encounter Problems       Encounter Problems (Active)       Balance       STG - Maintains dynamic standing balance with upper extremity support on LRAD with SBA (Progressing)       Start:  10/16/23    Expected End:  11/06/23               Mobility       STG - Patient will ambulate 50 ft with LRAD and SBA (Progressing)       Start:  10/16/23    Expected End:  11/06/23               Transfers       STG - Patient will perform bed mobility with SBA (Progressing)       Start:  10/16/23    Expected End:  11/06/23            STG - Patient will transfer sit to and from stand with LRAD and SBA  (Progressing)       Start:  10/16/23    Expected End:  11/06/23

## 2023-10-23 NOTE — CARE PLAN
Pt will remain safe and free from harm by the end of 10/23    The patient's goals for the shift include  Problem: Fall/Injury  Goal: Not fall by end of shift  10/23/2023 1830 by Katherin Campo RN  Outcome: Progressing  10/23/2023 1459 by Katherin Campo RN  Outcome: Progressing  10/23/2023 1457 by Katherin Campo RN  Outcome: Progressing

## 2023-10-23 NOTE — PROGRESS NOTES
"Chung Goodman is a 67 y.o. male on day 16 of admission presenting with SBO (small bowel obstruction) (CMS/[facility]). Psychiatry consulted for management of delirium.       Subjective   Patient seen in his room. He participates briefly in interview, able to say that he slept the entire night, but does not participate with orientation questions and other attempt at interview.     Per sitter, patient had a few episodes of grabbing and swatting with hands on care. He has not been sleeping the entire night. No concerns for physical agitation during shift.     Per wife at bedside, patient has had episodes of delirium before during various other hospitalizations that have previously resolved. States that medications were started recently, but has not really noticed a change. Notes that patient becomes upset and stops talking when he cannot answer questions due to confusion.     Per MAR, patient given Olanzapine 2.5mg IM at 2036 overnight for agitation.        Objective     Last Recorded Vitals  Blood pressure 150/84, pulse 105, temperature 37.3 °C (99.1 °F), temperature source Temporal, resp. rate 19, height 1.765 m (5' 9.49\"), weight 70.5 kg (155 lb 6.8 oz), SpO2 100 %.      Mental Status Exam  Appearance: AA male tall, lying in fetal position on bed, partially unclothed  Attitude: concerned  Behavior: Staring eye contact. No agitated or aggressive behavior.  Motor Activity: No agitation or retardation. No EPS/TD.  Speech: Low volume, soft, mumbling, answers briefly  Mood: did not communicate a mood  Affect: constricted  Thought Process: limited  Thought Content: limited  Thought Perception: limited  Cognition: Alert. Oriented only to self  Insight: Poor  Judgement: Poor       Psychiatric Risk Assessment  Acute Risk of Harm to Others is Considered: moderate   Acute Risk of Harm to Self is Considered: low    Relevant Results  Scheduled medications  aspirin, 150 mg, rectal, Daily  fat emulsion fish oil/plant based, 250 mL, " intravenous, Daily  heparin (porcine), 5,000 Units, subcutaneous, q8h  heparin flush, 50 Units, intra-catheter, q12h  hydrALAZINE, 10 mg, intravenous, q6h  insulin NPH (Isophane), 15 Units, subcutaneous, Nightly  insulin NPH (Isophane), 7 Units, subcutaneous, Daily  insulin regular, 0-10 Units, subcutaneous, q6h  insulin regular, 4 Units, subcutaneous, q6h  lidocaine, 1 mL, infiltration, Once  lidocaine, 1 patch, transdermal, Daily  magnesium sulfate, 1 g, intravenous, Once  melatonin, 5 mg, oral, Nightly  methylPREDNISolone sodium succinate (PF), 4 mg, intravenous, q24h  micafungin, 100 mg, intravenous, q24h  nystatin, 4 mL, Swish & Swallow, BID  pantoprazole, 40 mg, intravenous, BID  piperacillin-tazobactam, 3.375 g, intravenous, q6h  tacrolimus, 1 mg, sublingual, q24h  tacrolimus, 2 mg, sublingual, Daily  thiamine, 500 mg, intravenous, TID      Continuous medications  Adult Clinimix TPN, 83 mL/hr, Last Rate: Stopped (10/22/23 2042)  HYDROmorphone,       PRN medications  PRN medications: alteplase, alteplase, dextrose 10 % in water (D10W), dextrose, glucagon, heparin flush, heparin lock flush (porcine), ipratropium-albuteroL, naloxone, naloxone, OLANZapine, oxygen    Results for orders placed or performed during the hospital encounter of 10/07/23 (from the past 24 hour(s))   POCT GLUCOSE   Result Value Ref Range    POCT Glucose 69 (L) 74 - 99 mg/dL   POCT GLUCOSE   Result Value Ref Range    POCT Glucose 187 (H) 74 - 99 mg/dL   POCT GLUCOSE   Result Value Ref Range    POCT Glucose 129 (H) 74 - 99 mg/dL   POCT GLUCOSE   Result Value Ref Range    POCT Glucose 113 (H) 74 - 99 mg/dL   Urinalysis with Reflex Microscopic and Culture   Result Value Ref Range    Color, Urine Yellow Straw, Yellow    Appearance, Urine Clear Clear    Specific Gravity, Urine 1.017 1.005 - 1.035    pH, Urine 6.0 5.0, 5.5, 6.0, 6.5, 7.0, 7.5, 8.0    Protein, Urine 100 (2+) (N) NEGATIVE mg/dL    Glucose, Urine NEGATIVE NEGATIVE mg/dL    Blood,  Urine SMALL (1+) (A) NEGATIVE    Ketones, Urine NEGATIVE NEGATIVE mg/dL    Bilirubin, Urine NEGATIVE NEGATIVE    Urobilinogen, Urine <2.0 <2.0 mg/dL    Nitrite, Urine NEGATIVE NEGATIVE    Leukocyte Esterase, Urine NEGATIVE NEGATIVE   Extra Urine Gray Tube   Result Value Ref Range    Extra Tube Hold for add-ons.    Urinalysis Microscopic   Result Value Ref Range    WBC, Urine 1-5 1-5, NONE /HPF    RBC, Urine 1-2 NONE, 1-2, 3-5 /HPF   Tacrolimus level   Result Value Ref Range    Tacrolimus  10.1 <=15.0 ng/mL   CBC   Result Value Ref Range    WBC 16.0 (H) 4.4 - 11.3 x10*3/uL    nRBC 0.0 0.0 - 0.0 /100 WBCs    RBC 2.45 (L) 4.50 - 5.90 x10*6/uL    Hemoglobin 7.3 (L) 13.5 - 17.5 g/dL    Hematocrit 23.3 (L) 41.0 - 52.0 %    MCV 95 80 - 100 fL    MCH 29.8 26.0 - 34.0 pg    MCHC 31.3 (L) 32.0 - 36.0 g/dL    RDW 17.9 (H) 11.5 - 14.5 %    Platelets 433 150 - 450 x10*3/uL    MPV 11.2 7.5 - 11.5 fL   Renal function panel   Result Value Ref Range    Glucose 102 (H) 74 - 99 mg/dL    Sodium 145 136 - 145 mmol/L    Potassium 4.4 3.5 - 5.3 mmol/L    Chloride 111 (H) 98 - 107 mmol/L    Bicarbonate 20 (L) 21 - 32 mmol/L    Anion Gap 18 10 - 20 mmol/L    Urea Nitrogen 53 (H) 6 - 23 mg/dL    Creatinine 2.59 (H) 0.50 - 1.30 mg/dL    eGFR 26 (L) >60 mL/min/1.73m*2    Calcium 9.4 8.6 - 10.6 mg/dL    Phosphorus 4.8 2.5 - 4.9 mg/dL    Albumin 2.6 (L) 3.4 - 5.0 g/dL   Magnesium   Result Value Ref Range    Magnesium 1.84 1.60 - 2.40 mg/dL   Vitamin B12   Result Value Ref Range    Vitamin B12 1,954 (H) 211 - 911 pg/mL   Folate   Result Value Ref Range    Folate, Serum 11.1 >5.0 ng/mL   TSH   Result Value Ref Range    Thyroid Stimulating Hormone 0.79 0.44 - 3.98 mIU/L   Vitamin D 25-Hydroxy,Total (for eval of Vitamin D levels)   Result Value Ref Range    Vitamin D, 25-Hydroxy, Total 53 30 - 100 ng/mL   POCT GLUCOSE   Result Value Ref Range    POCT Glucose 130 (H) 74 - 99 mg/dL          Assessment/Plan   Chung Goodman is a 67 y.o. male with  "PPHx of MDD, JE and a PMHx of history of heart transplant (2017 for NICM 2/2 valvular disease) and pancreatic cancer s/p whipple (5/19/2023) currently receiving modified FOLFIRINOX (follows with Dr. Dunaway), and recent admission for SBO who presented as a transfer from [facility] for recurrent SBO.  Psychiatry was consulted on 10/22/23 for management of delirium.    On initial assessment, patient noted to be lying in bed, partially clothed, oriented only to self. Per pts sitter, pt has been waxing and waning in levels of consciousness, and had earlier in the day been attempting to leave his bed, remove his clothes, and remove all of his IV lines. Pt with PCA pump for pain control which is likely contributing to his current presentation, as well as SBO, fluctuating glc levels. Pt received 5mg of haldol earlier today for management of agitation. Presentation likely delirium 2/2 pain, pain medications, glc levels, SBO status. Psychiatry will continue to follow.     Update 10/23: Patient given one episode of IM olanzapine PRN overnight. Patient history is notable for resolving delirium as hospital treatment progresses. Given recency of recommendations, will maintain current medication recommendations with no changes today.      EKG (10/22): QTc of 497     IMPRESSION  Delirium, mixed, multifactorial     RECOMMENDATIONS  Safety:  - Patient lacks the capacity to leave AMA at this time and thus cannot leave AMA. Call CODE VIOLET if patient attempts to leave AMA.  - To evaluate decision-making capacity, recommend use of the Capacity Evaluation Tool. Search “ IP Capacity Evaluation under SmartText\" unless the patient has a legal guardian, in which case all decisions per the legal guardian.  - Continue 1:1 sitter for delirium (reorientation, redirection, falls risk).  - As with all hospitalized patients, would recommend delirium precautions, as below.   -- Minimize use of benzos, opiates, anticholinergics, as these may worsen " mental status   -- Would use caution with narcotic pain medications   -- Would still adequately controlling pain, as uncontrolled pain is also a risk factor for delirium   -- Reinforce sleep hygiene; encourage patient to stay awake during the day   -- Keep curtains/blinds open during the day and closed at night.   -- Would recommend reorienting/redirecting patient as much as possible,    -- Aim for consistent staffing, familiar objects, avoiding bright lights and loud noises, etc.       Work-up:  Recommend UA, B12, Folate, TSH, Vitamin D     Medications:  -Thiamine 500mg IV TID x3 days followed by 100mg PO daily     -REC NO ADDITIONAL HALOPERIDOL, due to prolonged Qtc interval as well as potential to worsening restlessness  -Olanzapine 2.5mg PO/IM Q6H PRN for agitation.   -Repeat 12lead EKG for Qtc interval if patient receives olanzapine     Please do not administer BZD within 2 hrs of olanzapine administration.      Ancillary Services:  - Please offer , pet/music therapy consult when appropriate    - Psychiatry will continue to follow daily.  Thank you for allowing us to participate in the care of this patient. Please page [phone] with any questions or concerns.     Medication Consent  Medication Consent: n/a; consult service    Patient discussed with Dr. Silva who agrees with above plan.       Chung Santacruz MD  Adult Psychiatry, PGY-2  [facility]

## 2023-10-23 NOTE — CARE PLAN
The patient's goals for the shift include      The clinical goals for the shift include pt will remain free of harm to self, pt will remain safe with q1h rounding and 1:1 care.    Problem: Safety - Medical Restraint  Goal: Remains free of injury from restraints (Restraint for Interference with Medical Device)  Outcome: Progressing  Goal: Free from restraint(s) (Restraint for Interference with Medical Device)  Outcome: Progressing     Over the shift, the patient did not make progress toward the following goals. Barriers to progression include pt unable to redirect at this time; pulling at lines + surgical dressing site. Recommendations to address these barriers include frequent monitoring/reorienting patient, bed alarm engaged, safe environment for pt, pt with bilateral soft wrist restraints in place per orders- ROM performed, no evidence of skin breakdown noted. pt offered PO intake frequently.

## 2023-10-23 NOTE — CONSULTS
SUPPORTIVE AND PALLIATIVE ONCOLOGY CONSULT    Inpatient consult to [facility]  Consult performed by: PINO Ruby DNP  Consult ordered by: PINO Pierre      Inpatient consult to [facility]  Consult performed by: PINO Ruby DNP  Consult ordered by: Hi Huerta MD        Consult completed by Dr. Lyndsey Shipley on 10/17/2023, please see her note from 10/17/2023 for full consult.      SIGNATURE: PINO Ruby DNP  PAGER/CONTACT:  Contact information:  Supportive and Palliative Oncology  Monday-Friday 8 AM-5 PM  Epic Secure chat or pager [phone].  After hours and weekends:  pager [phone]

## 2023-10-23 NOTE — PROGRESS NOTES
"Chung Goodman is a 67 y.o. male on day 16 of admission presenting with SBO (small bowel obstruction) (CMS/HCC).    Subjective   Pt was seen and examined. Sitter and wife at bedside. Patient now sleeping heavily, typically is confused and unable to answer questions.     I have reviewed histories, allergies and medications have been reviewed.  No changes today.       Objective   Review of Systems   Reason unable to perform ROS: Patient is confused.     Physical Exam  Constitutional:       Appearance: He is normal weight. He is ill-appearing.   HENT:      Head: Normocephalic.      Mouth/Throat:      Mouth: Mucous membranes are dry.   Cardiovascular:      Rate and Rhythm: Normal rate and regular rhythm.   Pulmonary:      Effort: Pulmonary effort is normal.      Breath sounds: Normal breath sounds.   Abdominal:      General: Abdomen is flat.      Palpations: Abdomen is soft.   Musculoskeletal:         General: Normal range of motion.   Neurological:      General: No focal deficit present.      Mental Status: He is disoriented.   Psychiatric:         Mood and Affect: Mood normal.         Behavior: Behavior normal.         Last Recorded Vitals  Blood pressure 150/84, pulse 105, temperature 37.3 °C (99.1 °F), temperature source Temporal, resp. rate 19, height 1.765 m (5' 9.49\"), weight 70.5 kg (155 lb 6.8 oz), SpO2 100 %.  Intake/Output last 3 Shifts:  I/O last 3 completed shifts:  In: 1917.1 (27.2 mL/kg) [IV Piggyback:500]  Out: 1263 (17.9 mL/kg) [Urine:550 (0.2 mL/kg/hr); Emesis/NG output:450; Drains:263]  Weight: 70.5 kg     Relevant Results  Results from last 7 days   Lab Units 10/23/23  0529 10/23/23  0450 10/23/23  0039 10/22/23  2041 10/22/23  1704 10/22/23  1619 10/22/23  0539 10/22/23  0434 10/21/23  0600 10/21/23  0544 10/20/23  0805 10/20/23  0539 10/19/23  0851 10/19/23  0549   POCT GLUCOSE mg/dL 130*  --  113* 129* 187* 69*   < >  --    < >  --    < >  --    < >  --    GLUCOSE mg/dL  --  102*  --   --   -- "   --   --  239*  --  396*  --  240*  --  279*    < > = values in this interval not displayed.       Scheduled medications  aspirin, 150 mg, rectal, Daily  fat emulsion fish oil/plant based, 250 mL, intravenous, Daily  heparin (porcine), 5,000 Units, subcutaneous, q8h  heparin flush, 50 Units, intra-catheter, q12h  hydrALAZINE, 10 mg, intravenous, q6h  insulin NPH (Isophane), 15 Units, subcutaneous, Nightly  insulin NPH (Isophane), 7 Units, subcutaneous, Daily  insulin regular, 0-10 Units, subcutaneous, q6h  insulin regular, 4 Units, subcutaneous, q6h  lidocaine, 1 mL, infiltration, Once  lidocaine, 1 patch, transdermal, Daily  magnesium sulfate, 1 g, intravenous, Once  melatonin, 5 mg, oral, Nightly  methylPREDNISolone sodium succinate (PF), 4 mg, intravenous, q24h  micafungin, 100 mg, intravenous, q24h  nystatin, 4 mL, Swish & Swallow, BID  pantoprazole, 40 mg, intravenous, BID  piperacillin-tazobactam, 3.375 g, intravenous, q6h  tacrolimus, 1 mg, sublingual, q24h  tacrolimus, 2 mg, sublingual, Daily  thiamine, 500 mg, intravenous, TID      Continuous medications  Adult Clinimix TPN, 83 mL/hr, Last Rate: Stopped (10/22/23 2042)  HYDROmorphone,       PRN medications  PRN medications: alteplase, alteplase, dextrose 10 % in water (D10W), dextrose, glucagon, heparin flush, heparin lock flush (porcine), ipratropium-albuteroL, naloxone, naloxone, OLANZapine, oxygen    Results for orders placed or performed during the hospital encounter of 10/07/23 (from the past 24 hour(s))   POCT GLUCOSE   Result Value Ref Range    POCT Glucose 164 (H) 74 - 99 mg/dL   POCT GLUCOSE   Result Value Ref Range    POCT Glucose 69 (L) 74 - 99 mg/dL   POCT GLUCOSE   Result Value Ref Range    POCT Glucose 187 (H) 74 - 99 mg/dL   POCT GLUCOSE   Result Value Ref Range    POCT Glucose 129 (H) 74 - 99 mg/dL   POCT GLUCOSE   Result Value Ref Range    POCT Glucose 113 (H) 74 - 99 mg/dL   Urinalysis with Reflex Microscopic and Culture   Result Value  denies pain/discomfort Ref Range    Color, Urine Yellow Straw, Yellow    Appearance, Urine Clear Clear    Specific Gravity, Urine 1.017 1.005 - 1.035    pH, Urine 6.0 5.0, 5.5, 6.0, 6.5, 7.0, 7.5, 8.0    Protein, Urine 100 (2+) (N) NEGATIVE mg/dL    Glucose, Urine NEGATIVE NEGATIVE mg/dL    Blood, Urine SMALL (1+) (A) NEGATIVE    Ketones, Urine NEGATIVE NEGATIVE mg/dL    Bilirubin, Urine NEGATIVE NEGATIVE    Urobilinogen, Urine <2.0 <2.0 mg/dL    Nitrite, Urine NEGATIVE NEGATIVE    Leukocyte Esterase, Urine NEGATIVE NEGATIVE   Extra Urine Gray Tube   Result Value Ref Range    Extra Tube Hold for add-ons.    Urinalysis Microscopic   Result Value Ref Range    WBC, Urine 1-5 1-5, NONE /HPF    RBC, Urine 1-2 NONE, 1-2, 3-5 /HPF   CBC   Result Value Ref Range    WBC 16.0 (H) 4.4 - 11.3 x10*3/uL    nRBC 0.0 0.0 - 0.0 /100 WBCs    RBC 2.45 (L) 4.50 - 5.90 x10*6/uL    Hemoglobin 7.3 (L) 13.5 - 17.5 g/dL    Hematocrit 23.3 (L) 41.0 - 52.0 %    MCV 95 80 - 100 fL    MCH 29.8 26.0 - 34.0 pg    MCHC 31.3 (L) 32.0 - 36.0 g/dL    RDW 17.9 (H) 11.5 - 14.5 %    Platelets 433 150 - 450 x10*3/uL    MPV 11.2 7.5 - 11.5 fL   Renal function panel   Result Value Ref Range    Glucose 102 (H) 74 - 99 mg/dL    Sodium 145 136 - 145 mmol/L    Potassium 4.4 3.5 - 5.3 mmol/L    Chloride 111 (H) 98 - 107 mmol/L    Bicarbonate 20 (L) 21 - 32 mmol/L    Anion Gap 18 10 - 20 mmol/L    Urea Nitrogen 53 (H) 6 - 23 mg/dL    Creatinine 2.59 (H) 0.50 - 1.30 mg/dL    eGFR 26 (L) >60 mL/min/1.73m*2    Calcium 9.4 8.6 - 10.6 mg/dL    Phosphorus 4.8 2.5 - 4.9 mg/dL    Albumin 2.6 (L) 3.4 - 5.0 g/dL   Magnesium   Result Value Ref Range    Magnesium 1.84 1.60 - 2.40 mg/dL   Vitamin B12   Result Value Ref Range    Vitamin B12 1,954 (H) 211 - 911 pg/mL   Folate   Result Value Ref Range    Folate, Serum 11.1 >5.0 ng/mL   TSH   Result Value Ref Range    Thyroid Stimulating Hormone 0.79 0.44 - 3.98 mIU/L   Vitamin D 25-Hydroxy,Total (for eval of Vitamin D levels)   Result Value Ref Range     Vitamin D, 25-Hydroxy, Total 53 30 - 100 ng/mL   POCT GLUCOSE   Result Value Ref Range    POCT Glucose 130 (H) 74 - 99 mg/dL       Results from last 7 days   Lab Units 10/23/23  0450 10/22/23  0434 10/19/23  0549 10/18/23  0516   HEMOGLOBIN A1C %  --   --   --  7.3*   SODIUM mmol/L 145 146*   < > 148*   POTASSIUM mmol/L 4.4 4.1   < > 4.3   CHLORIDE mmol/L 111* 112*   < > 110*   CO2 mmol/L 20* 20*   < > 25   BUN mg/dL 53* 54*   < > 66*   CREATININE mg/dL 2.59* 2.54*   < > 2.36*   CALCIUM mg/dL 9.4 9.6   < > 9.5   ALBUMIN g/dL 2.6* 2.4*   < > 2.5*   PROTEIN TOTAL g/dL  --  5.7*   < > 5.6*   BILIRUBIN TOTAL mg/dL  --  0.9   < > 1.2   ALK PHOS U/L  --  111   < > 114   ALT U/L  --  14   < > 17   AST U/L  --  20   < > 15   GLUCOSE mg/dL 102* 239*   < > 166*    < > = values in this interval not displayed.         Assessment/Plan   Active Problems:    Pancreatic cancer (CMS/[facility])    Pulmonary hypertension (CMS/[facility])    CVA (cerebral vascular accident) (CMS/[facility])    Update 10/22: Patient with hypoglycemia overnight. NPH doses may be too high. Additionally, it appears that the TPN was held for a few hours when the bags were begin changed.     RECOMMENDATIONS:  - Goal -180  - continue NPH at 11AM as 7 units  - continue NPH at 11PM with methylprednisolone as 15 units  - continue scheduled regular insulin 4 units Q6H  - continue regular insulin ssi #2 q6hr correlated with 4u standing dose order  - POCT glucose Q6H   - Hypoglycemia protocol     IF TPN AND LIPIDS HELD:   -Goal -180  -CHANGE NPH to 7 units q12hr  -HOLD scheduled regular insulin   -CHANGE regular insulin sliding scale from #2 (1:50>150) Q6H to insulin lispro sliding scale #1 (1:50>150) Q4H  -Accuchecks Q6H   -Hypoglycemia protocol     IF METHYLPREDNISOLONE IS STOPPED:   -Goal -180  -CHANGE NPH to 10 units q12hr  - CONTINUE Regular insulin 4u q6hr while TPN is running  -CHANGE regular insulin sliding scale #2 (1:50>150) Q6H to insulin lispro sliding  scale #1 (1:50>150) Q4H  -Accuchecks Q6H   -Hypoglycemia protocol      Discharge recommendations:  -TBD, patient was on insulin at home therefore this is not a new start  -Patient will need to establish with new endocrinologist (last seen by Dr. Loya in August 2023)     I have spent 30 minutes professionally toward the treatment of this patient.    Steph Arroyo PA-C

## 2023-10-23 NOTE — SIGNIFICANT EVENT
Paged by nursing staff that patient was getting out of bed; requesting to be evaluated for possible restraints. Patient seen and examined at 830 pm. Patient comfortable in bed, calm. Oriented to person but not place or time. Per nursing staff, he is not redirectable. He has a female CTA at bedside, but is reportedly too strong if he attempts to get out of bed.    Patient was seen by psychiatry earlier in the day for delirium and recommended to get IM zyprexa for agitation. He had not received this medication to date    - Discussed with nursing leadership on the floor and she will attempt to find a male CTA or sitter to help keep patient safe in bed overnight  - Will administer PRN zyprexa and monitor for effect  - Very hesitant to place patient in soft restraints at this time before exhausting all other options  - Will follow-up    Shyla QIU [facility]  Surg onc    -----------------------------------------------    Patient remains agitated at 2200 after Zyprexa given at 2036. He continues to get out of bed despite having a male sitter. Will place patient in soft arm restraints and continue to reevaluate for restraints to be removed once safe. Patient's wife, Krystle Goodman, was notified and all questions answered.    Patient was discussed with Dr. Sarkar.    Shyla Pritchett  JJ [facility]  Surg Onc  [phone]    -----------------------------------------------     Paged at 0000 for continued agitation. Patient continues to attempt to get out of bed. He was seen and evaluated. He remains only oriented to self. Per nurse, no change in behavior since getting the Zyprexa. He was given an additional one time dose of Zyprexa 2.5 mg IM injection. Paged Psychiatry service to get additional recommendations. Per Psychiatry, if additional dose of Zyprexa does not improves symptoms, increase dose to Zyprexa 5 mg q6h IM injection.    Was seen with Dr. Harris.    Shyla Pritchett MD  Surgical Oncology, [facility]  [phone]

## 2023-10-23 NOTE — PROGRESS NOTES
"[facility]  HEART FAILURE PROGRESS NOTE    Chung Goodman/[id number]    Admit Date: 10/7/2023  Hospital Length of Stay: 15   Primary Service: Surgical oncology  Primary HF Cardiologist: Jennifer    INTERVAL EVENTS / PERTINENT ROS:   No overnight events.    MEDICATIONS  Infusions:  Adult Clinimix TPN, Last Rate: Stopped (10/22/23 2042)  HYDROmorphone      Scheduled:  aspirin, 150 mg, Daily  fat emulsion fish oil/plant based, 250 mL, Daily  heparin (porcine), 5,000 Units, q8h  heparin flush, 50 Units, q12h  hydrALAZINE, 10 mg, q6h  insulin NPH (Isophane), 15 Units, Nightly  [START ON 10/23/2023] insulin NPH (Isophane), 7 Units, Daily  insulin regular, 0-10 Units, q6h  insulin regular, 4 Units, q6h  lidocaine, 1 mL, Once  lidocaine, 1 patch, Daily  melatonin, 5 mg, Nightly  methylPREDNISolone sodium succinate (PF), 4 mg, q24h  micafungin, 100 mg, q24h  nystatin, 4 mL, BID  pantoprazole, 40 mg, BID  piperacillin-tazobactam, 3.375 g, q6h  sterile water, ,   tacrolimus, 1 mg, q24h  tacrolimus, 2 mg, Daily  thiamine, 500 mg, TID      PRN:  alteplase, 2 mg, PRN  alteplase, 2 mg, PRN  dextrose 10 % in water (D10W), 0.3 g/kg/hr, Once PRN  dextrose, 25 g, q15 min PRN  glucagon, 1 mg, q15 min PRN  heparin flush, 50 Units, PRN  heparin lock flush (porcine), 500 Units, Once PRN  ipratropium-albuteroL, 3 mL, q4h PRN  naloxone, 0.2 mg, PRN  naloxone, 0.2 mg, PRN  OLANZapine, 2.5 mg, q6h PRN  oxygen, , Continuous PRN - O2/gases  sterile water, ,         PHYSICAL EXAM:   Visit Vitals  /78 (BP Location: Left arm, Patient Position: Lying)   Pulse 100   Temp 37.3 °C (99.1 °F) (Temporal)   Resp 16   Ht 1.765 m (5' 9.49\")   Wt 70.5 kg (155 lb 6.8 oz)   SpO2 100%   BMI 22.63 kg/m²   Smoking Status Former   BSA 1.86 m²     Wt Readings from Last 5 Encounters:   10/16/23 70.5 kg (155 lb 6.8 oz)   10/05/23 78 kg (171 lb 15.3 oz)   09/28/23 80 kg (176 lb 5.9 oz)   06/13/23 80.9 kg (178 lb 6 oz)   06/08/23 " 82.3 kg (181 lb 6 oz)     INTAKE/OUTPUT:  I/O last 3 completed shifts:  In: 3596.2 (51 mL/kg) [I.V.:50 (0.7 mL/kg); Blood:350; IV Piggyback:850]  Out: 875 (12.4 mL/kg) [Urine:200 (0.1 mL/kg/hr); Emesis/NG output:600; Drains:75]  Weight: 70.5 kg      Physical Exam  Constitutional:       Comments: Chronically ill appearing   HENT:      Head: Normocephalic.      Mouth/Throat:      Mouth: Mucous membranes are moist.   Eyes:      Extraocular Movements: Extraocular movements intact.      Conjunctiva/sclera: Conjunctivae normal.   Cardiovascular:      Rate and Rhythm: Normal rate and regular rhythm.      Pulses: Normal pulses.      Heart sounds: No murmur heard.  Pulmonary:      Effort: Pulmonary effort is normal. No respiratory distress.      Breath sounds: Normal breath sounds.   Abdominal:      General: Bowel sounds are normal.      Comments: +midline abdominal dressing, +PEG intact. + 3 IRVING drains with serosanguinous fluid   Musculoskeletal:      Cervical back: Normal range of motion.   Skin:     General: Skin is warm and dry.   Neurological:      General: No focal deficit present.      Mental Status: He is alert.   Psychiatric:         Mood and Affect: Mood normal.     DATA:  CMP:  Results from last 7 days   Lab Units 10/22/23  0434 10/21/23  0544 10/20/23  0539 10/19/23  0549 10/18/23  0516 10/17/23  0759 10/16/23  0330 10/16/23  0328   SODIUM mmol/L 146* 143 147* 146* 148* 148*  --  149*   POTASSIUM mmol/L 4.1 4.5 4.0 4.0 4.3 4.4  --  3.6   CHLORIDE mmol/L 112* 109* 111* 112* 110* 110*  --  110*   CO2 mmol/L 20* 24 24 26 25 28  --  27   ANION GAP mmol/L 18 15 16 12 17 14  --  16   BUN mg/dL 54* 57* 54* 58* 66* 59*  --  65*   CREATININE mg/dL 2.54* 2.14* 2.23* 2.28* 2.36* 2.17*  --  2.68*   EGFR mL/min/1.73m*2 27* 33* 32* 31* 29* 33*  --  25*   MAGNESIUM mg/dL 2.05 1.91 2.19 1.88 1.77 1.91 1.75 1.76   ALBUMIN g/dL 2.4* 2.4* 2.4* 2.5* 2.5* 2.7*  --  2.6*   ALT U/L 14  --   --  16 17  --   --  23   AST U/L 20  --   --   "17 15  --   --  21   BILIRUBIN TOTAL mg/dL 0.9  --   --  1.1 1.2  --   --  1.5*       CBC:  Results from last 7 days   Lab Units 10/22/23  0434 10/21/23  0544 10/20/23  0539 10/19/23  0549 10/18/23  0516 10/17/23  0759 10/16/23  0330   WBC AUTO x10*3/uL 16.7* 16.5* 16.4* 17.9* 17.0* 15.1* 12.9*   HEMOGLOBIN g/dL 7.5* 7.0* 7.2* 7.6* 8.7* 8.5* 8.1*   HEMATOCRIT % 24.7* 22.7* 23.3* 23.8* 26.9* 25.8* 24.9*   PLATELETS AUTO x10*3/uL 424 418 357 356 382 316 276   MCV fL 97 97 95 92 89 88 90       COAG:   Results from last 7 days   Lab Units 10/16/23  0328   INR  1.1       ABO:   ABO TYPE   Date Value Ref Range Status   10/21/2023 B  Final     HEME/ENDO:  Results from last 7 days   Lab Units 10/18/23  0516   HEMOGLOBIN A1C % 7.3*        CARDIAC:       No lab exists for component: \"CK\", \"CKMBP\"    Prior to Admission Meds:  Medications Prior to Admission   Medication Sig Dispense Refill Last Dose    acetaminophen (Tylenol) 325 mg tablet 2 tab(s) orally every 6 hours, As needed, Pain - Mild (1-3)       albuterol 90 mcg/actuation inhaler use 2 (TWO) puffs FOUR TIMES DAILY       allopurinol (Zyloprim) 100 mg tablet        aspirin 81 mg chewable tablet CHEW AND SWALLOW 1 TABLET DAILY.       blood sugar diagnostic (OneTouch Verio test strips) strip TEST 3 TIMES DAILY       buPROPion XL (Wellbutrin XL) 150 mg 24 hr tablet Take 1 tablet (150 mg) by mouth once daily.       busPIRone (Buspar) 5 mg tablet Take 1 tablet (5 mg) by mouth 3 times a day.       calcium carbonate 600 mg calcium (1,500 mg) tablet Take 1 tablet (600 mg) by mouth 2 times a day.       cholecalciferol (Vitamin D-3) 125 MCG (5000 UT) capsule Take 1 capsule (125 mcg) by mouth once daily.       citalopram (CeleXA) 40 mg tablet        diphenoxylate-atropine (Lomotil) 2.5-0.025 mg tablet 2 tablets 4 times a day as needed for diarrhea.       fenofibrate (Triglide) 160 mg tablet Take 1 tablet (160 mg) by mouth once daily.       ferrous sulfate 325 (65 Fe) MG tablet Take " 1 tablet (325 mg) by mouth once daily.       gabapentin (Neurontin) 100 mg capsule Take 2 capsules (200 mg) by mouth once daily at bedtime.       hydrALAZINE (Apresoline) 50 mg tablet Take 1 tablet (50 mg) by mouth 2 times a day.       icosapent ethyL (Vascepa) 1 gram capsule Take 1 capsule (1 g) by mouth twice a day.       insulin glargine (Lantus U-100 Insulin) 100 unit/mL injection Inject 10 Units under the skin once daily in the morning.       insulin lispro (HumaLOG) 100 unit/mL injection Inject under the skin 3 times a day with meals. Using a sliding scale       lidocaine-prilocaine (Emla) 2.5-2.5 % cream Apply topically to affected area 30 min prior to port       magnesium oxide (Mag-Ox) 400 mg (241.3 mg magnesium) tablet Take 2 tablets (800 mg) by mouth 2 times a day.       multivitamin with minerals tablet 1 tablet DAILY (route: oral)       pantoprazole (ProtoNix) 40 mg EC tablet Take 1 tablet (40 mg) by mouth twice a day.       rosuvastatin (Crestor) 40 mg tablet Take 1 tablet (40 mg) by mouth once daily at bedtime.       tacrolimus (Prograf) 1 mg capsule TAKE 4 CAPSULES every morning and TAKE 3 CAPSULES every evening          ASSESSMENT AND PLAN:   Chung Goodman is a 67-year-old man with history of NICM secondary to valvular disease, s/p mitral valve repair with [place] [employer] Annuloflex band II with 36 mm, s/p bicaval OHT (3/24/2017) with post-op complicated by atrial arrhythmia, RV failure, chronotropic incompetence, pancreatic adenocarcinoma, s/p Whipple procedure (5/19/23), currently on chemotherapy (modified FOLFIRINOX), HTN, DM type 2, HLD, CKD Stage 3B (baseline creatinine 2), amiodarone-induced hyperthyroidism, JE, MDD, transferred from [facility] for recurrent SBO. Blood culture positive for E coli. [Immunosuppressive regimen at home: Tacrolimus 4 mg/3 mg (Tacrolimus Goal 5-8), prednisone 5 mg daily. ] No rejection history. Latest DSE: negative (5/2023). On 10/16, he had drainage from  abdominal wound suture. He was taken to the OR for repair of fascial dehiscence. S/p ex lap, washout, drain placement with retention sutures (10/17).     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 Coli bacteremia, currently on IV antibiotic  # s/p EGD with dilation of afferent limb anastomotic stenosis (10/10/23)  #s/p Ex-lap, small bowel resection, jejuno-jejunostomy, G-tube (10/10/23)  # Perforated afferent limb  # Fascial dehiscence, s/p ex lap, washout, drain placement with retention sutures (10/17)     Recommendations:  - Latest Tacrolimus (10/20): 8.5.  Watch for now.  - Will continue Tacrolimus 2 mg SL at 0630, 1 mg SL at 1830. Tacrolimus target goal: 5-8.   - Continue current regimen of SL tacrolimus and 4mg IV solumedrol as long as primary team planning to use G-tube.  - Continue current dosing given renal dysfunction and other acute issues.  - Check daily Tacrolimus trough level at 0600.     HF service will continue to follow.     Patient was seen at the bedside with HF attending, Dr. Galina Atkinson DO

## 2023-10-23 NOTE — PROGRESS NOTES
Chung Goodman is a 67 y.o. male on day 16 of admission presenting with SBO (small bowel obstruction) (CMS/HCC).    Subjective   Overnight, unable to sleep and disoriented. Trying to get out of bed despite sitter. Patient placed in soft restraints for safety. Psych notified overnight.    This AM patient resting in bed but remains disoriented, able to be reoriented but is distracted. Poor situational awareness. Reports mild abdominal pain.        Objective     Physical Exam  Constitutional:       General: He is not in acute distress.     Appearance: He is not toxic-appearing.   HENT:      Head: Normocephalic and atraumatic.      Nose: Nose normal.      Mouth/Throat:      Pharynx: Oropharynx is clear. No oropharyngeal exudate.   Eyes:      General: No scleral icterus.     Extraocular Movements: Extraocular movements intact.      Conjunctiva/sclera: Conjunctivae normal.      Pupils: Pupils are equal, round, and reactive to light.   Cardiovascular:      Rate and Rhythm: Tachycardia present.   Pulmonary:      Effort: Pulmonary effort is normal. No respiratory distress.      Breath sounds: Normal breath sounds. No wheezing.   Abdominal:      General: Abdomen is flat. There is no distension.      Palpations: Abdomen is soft. There is no mass.      Tenderness: There is no abdominal tenderness. There is no guarding.      Comments: Midline abdominal incision with wound vac to -75 suction. Retention sutures in place. 2 IRVING drains on either side of abdomen, draining serosang. L sided G tube to gravity with clear/white output, able to be flushed. Not tender to palpation.    Musculoskeletal:         General: Normal range of motion.      Right lower leg: No edema.      Left lower leg: No edema.   Neurological:      General: No focal deficit present.      Mental Status: He is disoriented and confused.      Cranial Nerves: No cranial nerve deficit.   Psychiatric:         Attention and Perception: He is inattentive.         Mood  "and Affect: Mood is anxious. Affect is labile.         Behavior: Behavior is uncooperative and hyperactive.         Cognition and Memory: Cognition is impaired.         Judgment: Judgment is inappropriate.         Last Recorded Vitals  Blood pressure 158/80, pulse 105, temperature 37 °C (98.6 °F), temperature source Temporal, resp. rate 19, height 1.765 m (5' 9.49\"), weight 70.5 kg (155 lb 6.8 oz), SpO2 98 %.  Intake/Output last 3 Shifts:  I/O last 3 completed shifts:  In: 1917.1 (27.2 mL/kg) [IV Piggyback:500]  Out: 1263 (17.9 mL/kg) [Urine:550 (0.2 mL/kg/hr); Emesis/NG output:450; Drains:263]  Weight: 70.5 kg     Relevant Results  Scheduled medications  aspirin, 150 mg, rectal, Daily  fat emulsion fish oil/plant based, 250 mL, intravenous, Daily  heparin (porcine), 5,000 Units, subcutaneous, q8h  heparin flush, 50 Units, intra-catheter, q12h  hydrALAZINE, 10 mg, intravenous, q6h  insulin NPH (Isophane), 15 Units, subcutaneous, Nightly  insulin NPH (Isophane), 7 Units, subcutaneous, Daily  insulin regular, 0-10 Units, subcutaneous, q6h  insulin regular, 4 Units, subcutaneous, q6h  lidocaine, 1 mL, infiltration, Once  lidocaine, 1 patch, transdermal, Daily  melatonin, 5 mg, oral, Nightly  methylPREDNISolone sodium succinate (PF), 4 mg, intravenous, q24h  micafungin, 100 mg, intravenous, q24h  nystatin, 4 mL, Swish & Swallow, BID  pantoprazole, 40 mg, intravenous, BID  piperacillin-tazobactam, 3.375 g, intravenous, q6h  sterile water, , ,   tacrolimus, 1 mg, sublingual, q24h  tacrolimus, 2 mg, sublingual, Daily  thiamine, 500 mg, intravenous, TID      Continuous medications  Adult Clinimix TPN, 83 mL/hr, Last Rate: Stopped (10/22/23 2042)  HYDROmorphone,       PRN medications  PRN medications: alteplase, alteplase, dextrose 10 % in water (D10W), dextrose, glucagon, heparin flush, heparin lock flush (porcine), ipratropium-albuteroL, naloxone, naloxone, OLANZapine, oxygen, sterile water  Results for orders placed or " performed during the hospital encounter of 10/07/23 (from the past 24 hour(s))   POCT GLUCOSE   Result Value Ref Range    POCT Glucose 164 (H) 74 - 99 mg/dL   POCT GLUCOSE   Result Value Ref Range    POCT Glucose 69 (L) 74 - 99 mg/dL   POCT GLUCOSE   Result Value Ref Range    POCT Glucose 187 (H) 74 - 99 mg/dL   POCT GLUCOSE   Result Value Ref Range    POCT Glucose 129 (H) 74 - 99 mg/dL   POCT GLUCOSE   Result Value Ref Range    POCT Glucose 113 (H) 74 - 99 mg/dL   Urinalysis with Reflex Microscopic and Culture   Result Value Ref Range    Color, Urine Yellow Straw, Yellow    Appearance, Urine Clear Clear    Specific Gravity, Urine 1.017 1.005 - 1.035    pH, Urine 6.0 5.0, 5.5, 6.0, 6.5, 7.0, 7.5, 8.0    Protein, Urine 100 (2+) (N) NEGATIVE mg/dL    Glucose, Urine NEGATIVE NEGATIVE mg/dL    Blood, Urine SMALL (1+) (A) NEGATIVE    Ketones, Urine NEGATIVE NEGATIVE mg/dL    Bilirubin, Urine NEGATIVE NEGATIVE    Urobilinogen, Urine <2.0 <2.0 mg/dL    Nitrite, Urine NEGATIVE NEGATIVE    Leukocyte Esterase, Urine NEGATIVE NEGATIVE   Extra Urine Gray Tube   Result Value Ref Range    Extra Tube Hold for add-ons.    Urinalysis Microscopic   Result Value Ref Range    WBC, Urine 1-5 1-5, NONE /HPF    RBC, Urine 1-2 NONE, 1-2, 3-5 /HPF   CBC   Result Value Ref Range    WBC 16.0 (H) 4.4 - 11.3 x10*3/uL    nRBC 0.0 0.0 - 0.0 /100 WBCs    RBC 2.45 (L) 4.50 - 5.90 x10*6/uL    Hemoglobin 7.3 (L) 13.5 - 17.5 g/dL    Hematocrit 23.3 (L) 41.0 - 52.0 %    MCV 95 80 - 100 fL    MCH 29.8 26.0 - 34.0 pg    MCHC 31.3 (L) 32.0 - 36.0 g/dL    RDW 17.9 (H) 11.5 - 14.5 %    Platelets 433 150 - 450 x10*3/uL    MPV 11.2 7.5 - 11.5 fL   Renal function panel   Result Value Ref Range    Glucose 102 (H) 74 - 99 mg/dL    Sodium 145 136 - 145 mmol/L    Potassium 4.4 3.5 - 5.3 mmol/L    Chloride 111 (H) 98 - 107 mmol/L    Bicarbonate 20 (L) 21 - 32 mmol/L    Anion Gap 18 10 - 20 mmol/L    Urea Nitrogen 53 (H) 6 - 23 mg/dL    Creatinine 2.59 (H) 0.50 -  1.30 mg/dL    eGFR 26 (L) >60 mL/min/1.73m*2    Calcium 9.4 8.6 - 10.6 mg/dL    Phosphorus 4.8 2.5 - 4.9 mg/dL    Albumin 2.6 (L) 3.4 - 5.0 g/dL   Magnesium   Result Value Ref Range    Magnesium 1.84 1.60 - 2.40 mg/dL   Vitamin B12   Result Value Ref Range    Vitamin B12 1,954 (H) 211 - 911 pg/mL   Folate   Result Value Ref Range    Folate, Serum 11.1 >5.0 ng/mL   TSH   Result Value Ref Range    Thyroid Stimulating Hormone 0.79 0.44 - 3.98 mIU/L   Vitamin D 25-Hydroxy,Total (for eval of Vitamin D levels)   Result Value Ref Range    Vitamin D, 25-Hydroxy, Total 53 30 - 100 ng/mL   POCT GLUCOSE   Result Value Ref Range    POCT Glucose 130 (H) 74 - 99 mg/dL       Assessment/Plan   Active Problems:    Pancreatic cancer (CMS/HCC)    Pulmonary hypertension (CMS/HCC)    CVA (cerebral vascular accident) (CMS/HCC)    67 y.o. 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 Now s/p ex lap, washout, fascial closure w/abdominal wound vac and drain placement on 10/17 for fascial dehiscence. On PCA for uncontrolled pain. Wound vac on abdominal incision. One right sided IRVING removed 10/20. Continued hyperactive delirium, in soft restrains with sitter.      Plan  Neuro/Psych  - Multimodal pain management with 1000mg IV Tylenol q6h and PCA 0.5mg/mL  - Appreciate Supportive Onc recs  - Psychiatry recommendations for management of delirium  - Olanzapine started at 2.5 mg q6h prn  - thiamine 500mg TID x3d followed by 100mg PO daily (start 10/25)  - Sitter     CV  - ASA suppository 150mg   - Home immunosuppression regimen (tacrolimus, solumedrol). Tacrolimus target goal 5-8  - Sublingual tacrolimus (2 mg AM, 1 mg PM)  - Per HF, continue methylprednisolone 4 mg IV daily while NPO.      Pulm:   -  Respiratory  w/duo-nebs TID  - Encourage incentive spirometry 10x/h     GI  - Continue TPN at goal, plan for home continuous rate  - Abdominal wound vac to -75 mmHg  - L IRVING drain out today  - F/up wound care team for vac change  - Limited CLD for palliation  - PEG tube to gravity; flush if clogged or output slowed down     ID  - Micafungin, zosyn per ID. Originally 7 day course per ID, will continue given takeback to OR.   - COVID test - negative     Heme  - No indication for transfusion at this time     Endo  - Endocrine consult, appreciate recs     Ppx   - DVT ppx: Heparin SQ  - Encourage ambulation     Dispo:  - Patient denied from LTAC. Rec'd for SNF by PT. Will discuss options with SW and wife.   - Continue Patrice 6, dispo with TPN      Pt d/w Dr. Johnson.    Alla Oropeza MD  PGY-1 Vascular Surgery Resident  Surgical Oncology Service  [phone]

## 2023-10-23 NOTE — PROGRESS NOTES
"Nutrition Follow Up Assessment:    Since last surgery on 10/10, pt needed to be taken back to OR. Is now s/p ex lap, washout, fascial closure w/abdominal wound vac and drain placement on 10/17 for fascial dehiscence.  G-tube currently to gravity.    Endocrinology following for glycemic control. Psych also now consulted and following d/t delirium, pt with sitter at bedside.    Currently on diet order of sips of clears for comfort. TPN order of the standard Clinimix E 5/15, now at goal rate of 83ml/hr (per order) + 250mls SMOFlipids/day continues. Lytes WNL, glucose levels <180. Total bili WNL. No TG level drawn since pt started on TPN.    This service attempted to meet with pt x 2 today--PT working with pt at first attempt then nursing providing care at second attempt. Chart review performed.    Nutrition History:    Noted with abdominal tenderness + loose stools. No n/v noted at this time.    Anthropometrics:  Height: 176.5 cm (5' 9.49\")  Weight: 70.5 kg (155 lb 6.8 oz)  BMI (Calculated): 22.63  IBW/kg (Dietitian Calculated): 72.7 kg  Percent of IBW: 97 %    Objective/Subjective Weight History:     Weights (since admit):  10/07-72.36kg (admit wt)  10/16-70.5kg (wt at last nutrition visit)  10/22-70.5kg (current wt)--wt appears stable at this time    Nutrition Focused Physical Exam Findings:  defer: completed at initial nutrition visit on 10/09  Edema:  Edema: none   Physical Findings:  Skin: Positive (surgical incisions to midline abdomen + wound vac; stage 2 pressure injury to sacrum)    Objective Data:  Nutrition Significant Labs: labs reviewed by this writer today    Nutrition Specific Mediations: meds reviewed by this writer today    I/O:     Intake/Output Summary (Last 24 hours) at 10/23/2023 1354  Last data filed at 10/23/2023 1347  Gross per 24 hour   Intake 251.64 ml   Output 1163 ml   Net -911.36 ml     G-tube outputs: 450mls on 10/22  BM: last documented BM on 10/23/23    Dietary Orders (From admission, " onward)       Start     Ordered    10/20/23 1057  Adult diet Clear Liquid  Diet effective now        Comments: Sips for comfort   Question:  Diet type  Answer:  Clear Liquid    10/20/23 1056                  Estimated Needs:   Total Energy Estimated Needs (kCal): 2000 kCal (2000+ (71 (CBW) x ~28+))   Total Protein Estimated Needs (g): 105 g (105+ (71 (CBW) x ~1.5g/kg+))   Total Fluid Estimated Needs (mL): 2000 mL (1ml/kcal or per MD/team)      Nutrition Diagnosis:  Malnutrition Diagnosis  Patient has Malnutrition Diagnosis: Yes  Diagnosis Status: Ongoing  Malnutrition Diagnosis: Severe malnutrition related to acute disease or injury  As Evidenced by: <50% estimated intakes for >5 days PTA, 10% wt loss in <1 mos PTA, areas of mild/moderate muscle and adipose losses noted during initial nutrition visit    Additional Assessment: Despite pt being on TPN meeting >75% estimated nutritional needs, d/t h/o poor PO + significant wt/muscle/adipose losses, feel severe malnutrition ongoing.    Nutrition Interventions and Recommendations:        Nutrition Prescription:  Individualized Nutrition Prescription Provided for : parenteral nutrition    1. Continue TPN (Clinimix E 5/15 + MVI and trace elements) @ goal rate of 83ml/hr + 250mls SMOFlipids/day  --Provides 1992mls (excluding lipids), 298g dextrose, 99g pro, 1909kcals, 26% fat--meets 95% estimated kcal needs, 94% estimated pro needs    2. If cycled TPN desired:  --Continue Clinimix E 5/15 with MVI and trace elements  --First Hour=95ml/hr  --Next 10 Hours=180mls/hr  --Last Hour=95ml/hr  --Continue 250mls SMOFlipids/day  --Provides 1990 mls (excluding lipids), 298g dextrose, 99g pro, 1909kcals, 26% fat--meets 95% estimated kcal needs, 94% estimated pro needs    TPN Monitoring:  --RFP + mag daily  --Accuchecks Q6H or per team  --LFTs and TG level at least once/week  --Daily weights    3. PO diet/PO diet advancement, per team. If c/f dysphagia, considering confusion, would make  pt NPO.        Nutrition Prescription Goals:   Food and/or Nutrient Delivery Interventions  Interventions: Parenteral nutrition/ IV fluids  Parenteral Nutrition/IV Fluids: Other (Comment)  Goal: continue TPN + lipids @ current volume  Formula: TPN standard - AA 5%, dextrose 15%  Electrolytes: Standard  Elements: Multivitamin, Trace elements    Coordination of Nutrition Care by a Nutrition Professional  Collaboration and Referral of Nutrition Care: Collaboration by nutrition professional with other providers (requested TG level for tomorrow am + requested MVI be added back to TPN (if medically appropriate), unclear as to why it was taken out)    Nutrition Education:  n/a    Monitoring/Evaluation:   Food/Nutrient Related History Monitoring  Monitoring and Evaluation Plan: Enteral and parenteral nutrition intake  Enteral and Parenteral Nutrition Intake: Parenteral nutrition intake  Criteria: meet >75% estimated nutritional needs via PN    Body Composition/Growth/Weight History  Monitoring and Evaluation Plan: Weight  Weight: Measured weight  Criteria: daily wts; maintain stable wts    Biochemical Data, Medical Tests and Procedures  Monitoring and Evaluation Plan: Electrolyte/renal panel, Glucose/endocrine profile  Electrolyte and Renal Panel: Magnesium, Phosphorus, Potassium  Criteria: lytes WNL  Glucose/Endocrine Profile: Glucose, casual, Glucose, fasting  Criteria: glucose levels     Time Spent/Follow-up Reminder:   Follow Up  Time Spent (min): 90 minutes  Last Date of Nutrition Visit: 10/23/23  Nutrition Follow-Up Needed?: Dietitian to reassess per policy  Follow up Comment: TPN ongoing

## 2023-10-23 NOTE — PROGRESS NOTES
SUPPORTIVE AND PALLIATIVE ONCOLOGY INPATIENT FOLLOW-UP    SERVICE DATE: 10/23/2023    Subjective   HISTORY OF PRESENT ILLNESS: Chung Goodman is a 67 y.o. male on hospital day #16 with history of heart transplant X 2017, pancreatic cancer s/p Whipple's on 5/19/2023, recurrent pancreatitis, CKD 3, recent admission for SBO (9/23-9/29), who was admitted to [facility] on 10/5 with emesis, fever. Scans showed SBO and blood cultures grew E. coli.  Patient was transferred to [facility] on 10/7 for further management of SBO. Pt had venting PEG and NG tube on 10/10, however course c/b SBO with perf requiring emergent laparotomy with partial SB resection. On 10/17 pt found to have fascial dehiscence of abdominal incision and underwent emergency laparotomy with fascial retention sutures placed. Course complicated by delirium, psych following. Supportive oncology following for pain management.     Upon assessment, spoke with pt's wife and bedside sitter who both feel pt's pain is still uncontrolled. Pt visibly tense and guarding abdomen. Explained to wife that pt unable to have PCA at this time due to his inability to push the button independently. Discussed recs to slight increasely pt's basal rate hydromorphone gtt from 0.2mg/hr to 0.3mg/hr. Pt's wife agreeable and understands that if pt's mentation would worsen as a result of the medication increase, primary team may need to stop or decrease the hydromorphone drip. Pt's wife and bedside sitter both stating pt unable to reliably take PO medication at this time, so PO pain medication transition not feasible at this time.     Pain Assessment:  **Some elements of pain assessment supplemented through interview of pt's wife.  Onset: Acute post operative pain r/t emergency laparotomy performed on 10/17   Location: Abdomen/surgical site  Duration:  Chronic continuous cancer pain, acute continuous post-op pain with flare-ups    Characteristics:  Aggravating:  Mostly movement   "  Relieving: Analgesics --MAR, Positioning, and Modifying activity  Treatment/Home Regimen: Gabapentin 200 mg PO HS, Oxycodone IR 5 mg every 4-6 hours PRN, and tramdol 50 mg BID PRN.     Intolerances: Pt allergic to morphine (rx: itching)   Personal Pain Goal:  \"less pain,\" \"I want him to be able to sleep\"    Interference with Function: Very Much  Emotional Response: Anxiety, Fear, Psychological distress, and Social withdrawal  Barriers to Pain Management: Side effects, AMS, inability to take PO meds    NPAT: 7  Emotion: 1  Movement: 3  Facial Cues: 1  Positioning/Guardin    Past 24h:   Hydromorphone 0.2mg/hr gtt = 0.2mg x 24h = 4.8mg = 96 OME  24h Total = 96 OME    Symptom Assessment:  Unable to obtain secondary to unresponsiveness and AMS    Information obtained from: chart review, interview of patient, and interview of family  ______________________________________________________________________      Objective     Estimated Creatinine Clearance: 27.6 mL/min (A) (by C-G formula based on SCr of 2.59 mg/dL (H)).    PHYSICAL EXAMINATION  Vital Signs:   Vital signs reviewed  Vitals:    10/23/23 0500   BP: 158/80   Pulse: 105   Resp: 19   Temp: 37 °C (98.6 °F)   SpO2: 98%     Pain Score: 8     Physical Exam  Constitutional:       Comments: Awake, confused, minimally talkative, ill appearing male laying in bed, visibly tense and shivering. Pt not participating in interview due to AMS.   HENT:      Head:      Comments: Normocephalic, atraumatic.     Mouth/Throat:      Comments: Moist, tan, cracked tongue.  Eyes:      Comments: Sclera clear, EOM intact.    Neck:      Comments: Trachea midline.  Pulmonary:      Comments: Symmetrical chest rise. Regular rate and depth of respirations. Room air.   Abdominal:      Comments: Abdominal binder present. Gastrostomy/Enterostomy draining to gravity. Green bile present in drainage bag. No kinks or leakage noted from any tubing.    Musculoskeletal:      Comments: Generalized " muscle weakness and atrophy. Visibly tense muscles. No edema appreciated in arms or legs.     Skin:     Capillary Refill: Capillary refill takes less than 2 seconds.      Comments: No lesions, rash, or abrasions present on visible skin. Skin color appropriate for ethnicity.    Neurological:      Comments: A&Ox2--knows who he is and that he is in hospital, generalized weakness. Intermittently following commands at this time. Minimally verbal responses.   Psychiatric:      Comments: Flat affect with tense facial muscles. Intermittently restless, frequently repositioning. Limited findings as pt only minimally participating in interview.          ASSESSMENT/PLAN    Chung Goodman is a 67 y.o. male on hospital day #16 with history of heart transplant X 2017, pancreatic cancer s/p Whipple's on 5/19/2023, recurrent pancreatitis, CKD 3, recent admission for SBO (9/23-9/29), who was admitted to [facility] on 10/5 with emesis, fever. Scans showed SBO and blood cultures grew E. coli.  Patient was transferred to [facility] on 10/7 for further management of SBO. Pt had venting PEG and NG tube on 10/10, however course c/b SBO with perf requiring emergent laparotomy with partial SB resection. On 10/17 pt found to have fascial dehiscence of abdominal incision and underwent emergency laparotomy with fascial retention sutures placed. Course complicated by delirium, psych following. Supportive oncology following for pain management.     Neoplasm Related Pain  Acute Post Operative Pain  Type: Visceral and somatic  Home regimen: Gabapentin 200 mg PO HS, Oxycodone IR 5 mg q4-6h PRN, Tramdol 50mg BID PRN  Intolerances/previously tried: Morphine allergy (rx: itching)  Personalized pain goal: Could not be established since patient was lethargic.  ORT-OUD Score: Total Score: 1  due to mental health history of depression indicating Opioid Risk Category: Low  of future opioid use disorder.  Estimated Creatinine Clearance: 27.6 mL/min (A) (by C-G  formula based on SCr of 2.59 mg/dL (H)).  (10/22) LFTs WNL  Past 24h OME = 96 OME  Pt unable to successful take PO medications at this time (10/23)  Increase hydromorphone IV continuous basal rate to 0.3mg/hr with RN boluses of 0.3mg IV available q30min. Monitor for increased sedation or delirium.  Previous issues with wife bolusing pt with PCA pump, avoid.  Continue lidocaine 4% patch daily  Will consider starting home gabapentin 200mg PO HS when mentation improves    Nausea  At risk for nausea with vomiting related to SBO, malignancy, and acute postoperative period   EKG present from 10/22/23 in paper chart, QTc of 497  Avoid QTc prolonging anti-nausea medications     Constipation  At risk for constipation related to opioids, immobility, malignancy   Post op day #3--Gastrostomy/Enterostomy LUQ open to gravity  Last BM: 10/23, loose brown  Encourage mobility, PT/OT following  May add dulcolax suppository if needed (plt WNL)     Altered Mood  AMS--unknown etiology  Hx chronic depression   Well controlled with home regimen  Home regimen: Wellbutrin XL 150mg PO daily, Buspar 5mg PO TID  Baseline mentation A&Ox3 with WNL mood/judgement per conversation with wife.  May consider starting home Wellbutrin XL 150mg PO daily when mentation improves  May consider starting home Buspar 5mg PO TID when mentation improves  Psych consulted, appreciate recs  Olanzapine 2.5mg IM q6h PRN for agitation per psych  Delirium protocol  Recommended wife bring in familiar pictures/items to help reorient pt  See pain control recs, possible that uncontrolled pain contributing to delirium    Oral Thrush  Continue nystatin per primary team     Altered Nutrition   Weight loss prior to admission 2/2 n/v likely r/t SBO, malignancy, and diarrhea.  10% weight loss over < 1 month  Monitor weight per unit protocol  Started on clear liquid diet, pending RN swallow eval  TPN per primary, pt currently NPO with small sips of clears allowed  Dietician  following     Medical Decision Making/Goals of Care/Advance Care Planning:  (10/18: Per conversation with Lyndsey Shipley MD)  Patient's current clinical condition, including diagnosis, prognosis, and management plan, and goals of care were discussed.   Life limiting disease: metastatic malignancy  Family: Supportive lives at home with wife  Performance status: Major limitations due to pain and disease process  Joys/meaning/strength: Music, believes in God  Understanding of health: Demonstrates good prognostic understanding of disease process, wife understands that 4 months ago patient had Whipple's procedure and then was admitted 2 weeks ago due to concern for bowel obstruction.  1 week ago patient had revision of the whipple however developed complications and had to redo Whipple's.  Last night he had leakage from abdominal area and hence went for emergency surgery early morning today.  Wife also understands that patient has recurrence of cancer  Information: Wants full disclosure  Goals: Symptom control  Code status discussion: We had conversation regarding resuscitation and intubation in the event of cardiac arrest.  Wife stated that she has not discussed this with her  however feels that resuscitation and intubation may not be appropriate.  She will discuss with her .     Advance Directives  Existence of Advance Directives: Yes, but NOT documented in medical record  Decision maker: Surrogate decision maker is wife  Code Status: Full code  Living will: None     Supportive Interventions: Interventions: Music Therapy: referral placed pain management, Art Therapy: referral placed, Social work referral for: Advance Directives   has been following     Disposition  Please start the process of having prior authorization with meds to beds deliver medications to patient prior to discharge via [facility] pharmacy. Prescriptions will need to be sent 48-72 hours prior to discharge so that a prior  authorization can be completed.      Discharge date: unknown pending acute issues and pain control  Will assess if patient needs an appointment with Outpatient Supportive Oncology     Signature and billing  Thank you for allowing us to participate in the care of this patient. Recommendations will be communicated back to the consulting service by way of shared electronic medical record or face-to-face.    Medical complexity was high level due to due to complexity of problems, extensive data review, and high risk of management/treatment.    I spent 60 minutes in the care of this patient which included chart review, interviewing patient/family, discussion with primary team, coordination of care, and documentation.    DATA   Diagnostic tests and information reviewed for today's visit:  Conversation with primary team via eMAR, Most recent labs and imaging results, Most recent EKG, Medications     Some elements copied from my note on 10/20/23, the elements have been updated and all reflect current decision making from today, 10/23/2023.    Plan of Care discussed with: primary team via eMAR, pt's wife    Thank you for asking Supportive and Palliative Oncology to assist with care of this patient.  We will continue to follow.  Please contact us for additional questions or concerns.    SIGNATURE: MAX Roberts-FERDINAND  PAGER/CONTACT:  Contact information:  Supportive and Palliative Oncology  Monday-Friday 8 AM-5 PM  Epic Secure chat or pager [phone].  After hours and weekends: pager [phone]

## 2023-10-24 PROBLEM — R78.81 GRAM-NEGATIVE BACTEREMIA: Status: ACTIVE | Noted: 2023-01-01

## 2023-10-24 PROBLEM — K31.1: Status: ACTIVE | Noted: 2023-01-01

## 2023-10-24 NOTE — PROGRESS NOTES
Physical Therapy                 Therapy Communication Note    Patient Name: Chung Goodman  MRN: [id number]  Today's Date: 10/24/2023     Discipline: Physical Therapy    Missed Visit Reason: Missed Visit Reason: Other (Comment) (Pt has been agitated, per RN is currently calm and receiving blood then PICC placement.)    Missed Time: Attempt 1528    Comment:

## 2023-10-24 NOTE — PROGRESS NOTES
SUPPORTIVE AND PALLIATIVE ONCOLOGY INPATIENT FOLLOW-UP    SERVICE DATE: 10/24/2023    Subjective   HISTORY OF PRESENT ILLNESS: Chung Goodman is a 67 y.o. male on hospital day #17 with history of heart transplant X 2017, pancreatic cancer s/p Whipple's on 5/19/2023, recurrent pancreatitis, CKD 3, recent admission for SBO (9/23-9/29), who was admitted to [facility] on 10/5 with emesis, fever. Scans showed SBO and blood cultures grew E. coli.  Patient was transferred to [facility] on 10/7 for further management of SBO. Pt had venting PEG and NG tube on 10/10, however course c/b SBO with perf requiring emergent laparotomy with partial SB resection. On 10/17 pt found to have fascial dehiscence of abdominal incision and underwent emergency laparotomy with fascial retention sutures placed. Course complicated by delirium, psych following. Supportive oncology following for pain management.     Upon assessment, pt's pain appears more controlled than yesterday, but pt continues to be minimally responsive and delirious. Pt's wife at bedside and endorses that she feel's pt's pain is under better control. Discussed with pt's wife that I feel transition from hydromorphone gtt to fentanyl patch would be appropriate at this time. Wife agreeable to this plan. Discussed code status with pt's wife (whom is his surrogate decision maker) and outlined different options available. Advised pt's wife that I feel it would be reasonable to transition pt to DNR-CCA, DNI given his tentative, but slightly improving, clinical state. Pt's wife agrees with this, but states she would like a few hours to think over the decision. See ACP Note for further details.     Pain Assessment:  *Elements of pain assessment supplemented by interview of pt's wife d/t pt's AMS  Onset: Acute post operative pain r/t emergency laparotomy performed on 10/17   Location: Abdomen/surgical site  Duration:  Chronic continuous cancer pain, acute continuous post-op  "pain    Characteristics:  Aggravating: Movement   Relieving: Analgesics --MAR, Positioning, and Modifying activity  Treatment/Home Regimen: Gabapentin 200 mg PO HS, Oxycodone IR 5 mg every 4-6 hours PRN, and tramdol 50 mg BID PRN.     Intolerances: Pt allergic to morphine (rx: itching)   Personal Pain Goal:  \"less pain\" \"to be more comfortable\"    Interference with Function: Somewhat  Coping Strategies: Distraction  Emotional Response:  Intermittently anxiety, restlessness, and agitation--incidence less than previously  Barriers to Pain Management: Side effects, AMS, inability to take PO meds     NPAT: 2  Emotion: 0  Movement: 2  Facial Cues: 0  Positioning/Guardin     Past 24h:   Hydromorphone 0.2mg/hr gtt = 0.2mg x 8.5h = 1.7mg = 21.25 - 34 OME  Hydromorphone 0.3mg/hr gtt = 0.3mg x 15.5h = 4.65mg = 58.13 - 93 OME  24h Total Range = 80 - 127 OME    Symptom Assessment:  Unable to obtain secondary to unresponsiveness and AMS    Information obtained from: chart review, interview of patient, interview of family, and discussion with primary team  ______________________________________________________________________      Objective     Estimated Creatinine Clearance: 24.9 mL/min (A) (by C-G formula based on SCr of 2.87 mg/dL (H)).    (10/23) BMI: 22.63     PHYSICAL EXAMINATION  Vital Signs:   Vital signs reviewed  Vitals:    10/24/23 0520   BP:    Pulse:    Resp:    Temp: 36.9 °C (98.4 °F)   SpO2:      Pain Score: 0 - No pain     Physical Exam  Constitutional:       Comments: Awake, confused, minimally talkative, ill appearing male laying in bed, slightly restless. Pt only partially participating in interview due to AMS.    HENT:      Head:      Comments: Normocephalic, atraumatic.     Mouth/Throat:      Comments: Moist, tan, cracked tongue.  Eyes:      Comments: Sclera clear, EOM intact.     Neck:      Comments: Trachea midline.  Pulmonary:      Comments: Symmetrical chest rise. Regular rate and depth of " respirations. Room air.  Abdominal:      Comments: Abdominal binder present. Gastrostomy/Enterostomy draining to gravity. Green bile present in drainage bag. No kinks or leakage noted from any tubing.     Musculoskeletal:      Comments: Generalized muscle weakness and atrophy. Up x1 assist. No edema appreciated in arms or legs.      Skin:     Capillary Refill: Capillary refill takes less than 2 seconds.      Comments: No lesions, rash, or abrasions present on visible skin. Skin color appropriate for ethnicity.     Neurological:      Comments: A&Ox1-2--knows who he is and that he is in hospital but thinks he is at [facility], generalized weakness. Intermittently following commands at this time. Minimal verbal responses.    Psychiatric:      Comments: Mostly flat affect, intermittently restless. Limited findings as pt only minimally participating in interview.          ASSESSMENT/PLAN    Chung Goodman is a 67 y.o. male on hospital day #17 with history of heart transplant X 2017, pancreatic cancer s/p Whipple's on 5/19/2023, recurrent pancreatitis, CKD 3, recent admission for SBO (9/23-9/29), who was admitted to [facility] on 10/5 with emesis, fever. Scans showed SBO and blood cultures grew E. coli.  Patient was transferred to [facility] on 10/7 for further management of SBO. Pt had venting PEG and NG tube on 10/10, however course c/b SBO with perf requiring emergent laparotomy with partial SB resection. On 10/17 pt found to have fascial dehiscence of abdominal incision and underwent emergency laparotomy with fascial retention sutures placed. Course complicated by delirium, psych following. Supportive oncology following for pain management.      Neoplasm Related Pain  Acute Post Operative Pain  Type: Visceral and somatic  Home regimen: Gabapentin 200 mg PO HS, Oxycodone IR 5 mg q4-6h PRN, Tramdol 50mg BID PRN  Intolerances/previously tried: Morphine allergy (rx: itching)  Personalized pain goal: Could not be  established since patient was lethargic.  ORT-OUD Score: Total Score: 1  due to mental health history of depression indicating Opioid Risk Category: Low  of future opioid use disorder.  Estimated Creatinine Clearance: 24.9 mL/min (A) (by C-G formula based on SCr of 2.87 mg/dL (H)).  Past 24h OME Range = 80 - 127 OME  (10/22) LFTs WNL  (10/23 1630) Hydromorphone IV continuous basal rate increased from 0.2mg/hr to 0.3mg/hr. Instructions given to monitor for increased sedation or delirium.  Recommend starting fentanyl patch 50mcg TD q72h, then 6h after fentanyl patch placed reduce hydromorphone gtt to 0.1mg/hr, then 12h after fentanyl patch placed turn hydromorphone gtt off.  Start oxycodone PO solution 5mg for moderate (4-6) pain.  Start oxycodone PO solution 10mg for severe (7-10) pain.  Previous issues with wife bolusing pt with PCA pump, avoid.  Continue lidocaine 4% patch daily  Will consider starting home gabapentin 200mg PO HS if mentation improves     Nausea  At risk for nausea with vomiting related to SBO, malignancy, and acute postoperative period   EKG present from 10/22/23 in paper chart, QTc of 497  Avoid QTc prolonging anti-nausea medications     Constipation  At risk for constipation related to opioids, immobility, malignancy   Post op day #3--Gastrostomy/Enterostomy LUQ open to gravity  Last BM: 10/23, loose brown  Encourage mobility, PT/OT following  May add dulcolax suppository if needed (plt WNL)     Altered Mood  AMS--unknown etiology  Hx chronic depression   Well controlled with home regimen  Home regimen: Wellbutrin XL 150mg PO daily, Buspar 5mg PO TID  Baseline mentation A&Ox3 with WNL mood/judgement per conversation with wife.  May consider starting home Wellbutrin XL 150mg PO daily if mentation improves  May consider starting home Buspar 5mg PO TID if mentation improves  Psych consulted, appreciate recs  Olanzapine 2.5mg IM q6h PRN for agitation per psych  Delirium protocol  Recommended wife  bring in familiar pictures/items to help reorient pt  See pain control recs, possible that uncontrolled pain contributing to delirium     Oral Thrush  Continue nystatin per primary team     Altered Nutrition   Weight loss prior to admission 2/2 n/v likely r/t SBO, malignancy, and diarrhea.  10% weight loss over < 1 month  (10/23) BMI: 22.63   Monitor weight per unit protocol  TPN per primary, pt currently NPO with small sips of clears allowed  Dietician following     Medical Decision Making/Goals of Care/Advance Care Planning:  (10/18: Per conversation with Lyndsey Shipley MD)  Patient's current clinical condition, including diagnosis, prognosis, and management plan, and goals of care were discussed.   Life limiting disease: metastatic malignancy  Family: Supportive lives at home with wife  Performance status: Major limitations due to pain and disease process  Joys/meaning/strength: Music, believes in God  Understanding of health: Demonstrates good prognostic understanding of disease process, wife understands that 4 months ago patient had Whipple's procedure and then was admitted 2 weeks ago due to concern for bowel obstruction.  1 week ago patient had revision of the whipple however developed complications and had to redo Whipple's.  Last night he had leakage from abdominal area and hence went for emergency surgery early morning today.  Wife also understands that patient has recurrence of cancer  Information: Wants full disclosure  Goals: Symptom control  Code status discussion: We had conversation regarding resuscitation and intubation in the event of cardiac arrest.  Wife stated that she has not discussed this with her  however feels that resuscitation and intubation may not be appropriate.  She will discuss with her .    (10/24) See documented ACP note.     Advance Directives  Existence of Advance Directives: Yes, but NOT documented in medical record  Decision maker: Surrogate decision maker is  wife  Code Status: Full code  Living will: None     Supportive Interventions: Interventions: Music Therapy: referral placed, pain management, Art Therapy: referral placed, Social work referral for: Advance Directives   has been following     Disposition  Please start the process of having prior authorization with meds to beds deliver medications to patient prior to discharge via [facility] pharmacy. Prescriptions will need to be sent 48-72 hours prior to discharge so that a prior authorization can be completed.      Discharge date: Unknown pending acute issues and pain control  Will assess if patient needs an appointment with Outpatient Supportive Oncology.     Signature and billing  Thank you for allowing us to participate in the care of this patient. Recommendations will be communicated back to the consulting service by way of shared electronic medical record or face-to-face.    Medical complexity was high level due to due to complexity of problems, extensive data review, and high risk of management/treatment.    I spent 50 minutes in the care of this patient which included chart review, interviewing patient/family, discussion with primary team, coordination of care, and documentation.    DATA   Diagnostic tests and information reviewed for today's visit:  Conversation with primary team, Most recent labs and imaging results, Most recent EKG, Medications     Some elements copied from my note on 10/23/2023, the elements have been updated and all reflect current decision making from today, 10/24/2023.    Plan of Care discussed with: primary team via secure chat, Selena Ochoa CNP, pt's wife    Thank you for asking Supportive and Palliative Oncology to assist with care of this patient.  We will continue to follow  Please contact us for additional questions or concerns.    SIGNATURE: PINO Roberts  PAGER/CONTACT:  Contact information:  Supportive and Palliative Oncology  Monday-Friday 8 AM-5 PM  Epic  Secure chat or pager [phone].  After hours and weekends:  pager [phone]

## 2023-10-24 NOTE — PROGRESS NOTES
"Chung Goodman is a 67 y.o. male on day 17 of admission presenting with SBO (small bowel obstruction) (CMS/Formerly [facility]).    Subjective   Wound vac occluded overnight, cannister replaced. No acute delirium, slept overnight. No other issues.        Objective     Physical Exam  Vitals reviewed.   Constitutional:       General: He is not in acute distress.     Appearance: He is not toxic-appearing.   HENT:      Head: Normocephalic and atraumatic.      Nose: Nose normal.      Mouth/Throat:      Pharynx: Oropharynx is clear. No oropharyngeal exudate.   Eyes:      General: No scleral icterus.     Extraocular Movements: Extraocular movements intact.      Conjunctiva/sclera: Conjunctivae normal.      Pupils: Pupils are equal, round, and reactive to light.   Cardiovascular:      Rate and Rhythm: Normal rate.   Pulmonary:      Effort: Pulmonary effort is normal. No respiratory distress.      Breath sounds: Normal breath sounds. No wheezing.   Abdominal:      General: Abdomen is flat. There is no distension.      Palpations: Abdomen is soft. There is no mass.      Tenderness: There is abdominal tenderness. There is no guarding.      Comments: Midline incision with retention sutures. Wound vac in place to -75. R IRVING drain with minimal serosang drainage. Moderately tender to palpation.    Musculoskeletal:         General: Normal range of motion.      Right lower leg: No edema.      Left lower leg: No edema.   Skin:     General: Skin is warm and dry.   Neurological:      Mental Status: He is alert. He is disoriented.      Cranial Nerves: No cranial nerve deficit.   Psychiatric:         Attention and Perception: He is inattentive.         Mood and Affect: Mood is anxious. Affect is blunt.         Cognition and Memory: Cognition is impaired. Memory is impaired.       Last Recorded Vitals  Blood pressure 143/73, pulse 94, temperature 36.9 °C (98.4 °F), temperature source Temporal, resp. rate 18, height 1.765 m (5' 9.49\"), weight 70.5 kg " (155 lb 6.8 oz), SpO2 97 %.  Intake/Output last 3 Shifts:  I/O last 3 completed shifts:  In: 3427.9 (48.6 mL/kg) [I.V.:100 (1.4 mL/kg); IV Piggyback:650]  Out: 1973 (28 mL/kg) [Urine:1250 (0.5 mL/kg/hr); Emesis/NG output:50; Drains:673]  Weight: 70.5 kg     Relevant Results  Scheduled medications  aspirin, 150 mg, rectal, Daily  fat emulsion fish oil/plant based, 250 mL, intravenous, Daily  heparin (porcine), 5,000 Units, subcutaneous, q8h  heparin flush, 50 Units, intra-catheter, q12h  hydrALAZINE, 10 mg, intravenous, q6h  insulin NPH (Isophane), 15 Units, subcutaneous, Nightly  insulin NPH (Isophane), 7 Units, subcutaneous, Daily  insulin regular, 0-10 Units, subcutaneous, q6h  insulin regular, 4 Units, subcutaneous, q6h  lidocaine, 1 mL, infiltration, Once  lidocaine, 5 mL, infiltration, Once  lidocaine, 1 patch, transdermal, Daily  melatonin, 5 mg, oral, Nightly  methylPREDNISolone sodium succinate (PF), 4 mg, intravenous, q24h  micafungin, 100 mg, intravenous, q24h  nystatin, 4 mL, Swish & Swallow, BID  pantoprazole, 40 mg, intravenous, BID  piperacillin-tazobactam, 3.375 g, intravenous, q6h  tacrolimus, 0.5 mg, sublingual, q24h  tacrolimus, 1.5 mg, sublingual, Daily  thiamine, 500 mg, intravenous, TID      Continuous medications  Adult Clinimix TPN, 83 mL/hr, Last Rate: 83 mL/hr (10/24/23 0359)  HYDROmorphone,       PRN medications  PRN medications: alteplase, alteplase, alteplase, dextrose 10 % in water (D10W), dextrose, glucagon, heparin flush, heparin lock flush (porcine), ipratropium-albuteroL, naloxone, naloxone, OLANZapine, oxygen  Results for orders placed or performed during the hospital encounter of 10/07/23 (from the past 24 hour(s))   POCT GLUCOSE   Result Value Ref Range    POCT Glucose 139 (H) 74 - 99 mg/dL   POCT GLUCOSE   Result Value Ref Range    POCT Glucose 132 (H) 74 - 99 mg/dL       Assessment/Plan   Active Problems:    Pancreatic cancer (CMS/HCC)    Pulmonary hypertension (CMS/HCC)    CVA  (cerebral vascular accident) (CMS/HCC)    67 y.o. 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 Now s/p ex lap, washout, fascial closure w/abdominal wound vac and drain placement on 10/17 for fascial dehiscence. On PCA for uncontrolled pain. Wound vac on abdominal incision. One right sided IRVING removed 10/20. Continued hyperactive delirium, with sitter.      Plan  Neuro/Psych  - Multimodal pain management with 1000mg IV Tylenol q6h and PCA 0.5mg/mL  - Appreciate Supportive Onc recs for fentanyl patch  - Appreciate Psychiatry recommendations for management of delirium  - Olanzapine started at 2.5 mg q6h prn. EKG PRN for prolonged QT  - thiamine 500mg TID x3d followed by 100mg PO daily (start 10/25)  - Sitter     CV  - ASA suppository 150mg   - Home immunosuppression regimen (tacrolimus, solumedrol). Tacrolimus target goal 5-8  - Sublingual tacrolimus (2 mg AM, 1 mg PM)  - Per HF, continue methylprednisolone 4 mg IV daily while NPO.      Pulm:   - Respiratory  w/duo-nebs TID  - Encourage incentive spirometry 10x/h     GI  - Continue TPN at goal, plan for home continuous rate  - Abdominal wound vac to low wall suction - will send sample for bilirubin  - R IRVING drain out today  - F/up wound care team for vac change  - Limited CLD for palliation  - PEG tube to gravity; flush if clogged or output slowed down     ID  - Micafungin, zosyn per ID. Originally 7 day course per ID, will continue given takeback to OR.   - COVID test - negative     Heme  - Hgb 6.9 --> transfuse 1U prbc  - post transfusion H and H     Endo  - Endocrine is consulted, appreciate recs     Ppx   - DVT ppx: Heparin SQ  - Encourage ambulation     Dispo:  - Patient accepted to LTAC. Family wishes to self-refer  to hospice. Consult to in patient hospice  - GOC discussion, regarding code status. Currently Full code.   - Continue Patrice 6    D/w Dr. Elizabeth Oropzea MD  PGY-1 Vascular Surgery Resident  Surgical Oncology Service  [id number]

## 2023-10-24 NOTE — CARE PLAN
The patient's goals for the shift include      The clinical goals for the shift include patient will have pain improvements    Over the shift, the patient did not make progress toward the following goals. Barriers to progression include patient can be having the wife at bed side. Recommendations to address these barriers include having the wife at bedside.

## 2023-10-24 NOTE — PROGRESS NOTES
[facility]  HEART FAILURE PROGRESS NOTE    Chung Goodman/[id number]    Admit Date: 10/7/2023  Hospital Length of Stay: 17   Primary Service: Surgical oncology  Primary HF Cardiologist: Jennifer    INTERVAL EVENTS / PERTINENT ROS:   No major overnight events. Continues to have spells of agitation overnight and somnolence during the day. D/w primary team, ongoing goals of care discussions with family and involvement of palliative care today planned.     MEDICATIONS  Infusions:  Adult Clinimix TPN, Last Rate: 83 mL/hr (10/24/23 1540)  HYDROmorphone      Scheduled:  aspirin, 150 mg, Daily  fat emulsion fish oil/plant based, 250 mL, Daily  fentaNYL, 1 patch, q72h  heparin (porcine), 5,000 Units, q8h  heparin flush, 50 Units, q12h  heparin flush, 50 Units, q12h  insulin NPH (Isophane), 15 Units, Nightly  insulin NPH (Isophane), 7 Units, Daily  insulin regular, 0-10 Units, q6h  insulin regular, 4 Units, q6h  lidocaine, 1 mL, Once  lidocaine, 5 mL, Once  lidocaine, 1 patch, Daily  melatonin, 5 mg, Nightly  methylPREDNISolone sodium succinate (PF), 4 mg, q24h  micafungin, 100 mg, q24h  nystatin, 4 mL, BID  pantoprazole, 40 mg, BID  piperacillin-tazobactam, 3.375 g, q6h  tacrolimus, 0.5 mg, q24h  tacrolimus, 1.5 mg, Daily  thiamine, 500 mg, TID      PRN:  alteplase, 2 mg, PRN  alteplase, 2 mg, PRN  alteplase, 2 mg, PRN  alteplase, 2 mg, PRN  dextrose 10 % in water (D10W), 0.3 g/kg/hr, Once PRN  dextrose, 25 g, q15 min PRN  glucagon, 1 mg, q15 min PRN  heparin flush, 50 Units, PRN  heparin lock flush (porcine), 500 Units, Once PRN  heparin lock flush (porcine), 500 Units, Once PRN  hydrALAZINE, 20 mg, q6h PRN  ipratropium-albuteroL, 3 mL, q4h PRN  naloxone, 0.2 mg, PRN  naloxone, 0.2 mg, PRN  OLANZapine, 2.5 mg, q6h PRN  oxyCODONE, 10 mg, q6h PRN  oxyCODONE, 5 mg, q6h PRN  oxygen, , Continuous PRN - O2/gases        PHYSICAL EXAM:   Visit Vitals  /80   Pulse 86   Temp 36.9 °C (98.4 °F)  "(Temporal)   Resp 18   Ht 1.765 m (5' 9.49\")   Wt 70.5 kg (155 lb 6.8 oz)   SpO2 97%   BMI 22.63 kg/m²   Smoking Status Former   BSA 1.86 m²     Wt Readings from Last 5 Encounters:   10/23/23 70.5 kg (155 lb 6.8 oz)   10/05/23 78 kg (171 lb 15.3 oz)   09/28/23 80 kg (176 lb 5.9 oz)   06/13/23 80.9 kg (178 lb 6 oz)   06/08/23 82.3 kg (181 lb 6 oz)     INTAKE/OUTPUT:  I/O last 3 completed shifts:  In: 3427.9 (48.6 mL/kg) [I.V.:100 (1.4 mL/kg); IV Piggyback:650]  Out: 1973 (28 mL/kg) [Urine:1250 (0.5 mL/kg/hr); Emesis/NG output:50; Drains:673]  Weight: 70.5 kg      Physical Exam  Constitutional:       Appearance: He is normal weight.      Comments: Chronically ill appearing, sleeping   HENT:      Head: Normocephalic.      Mouth/Throat:      Mouth: Mucous membranes are moist.   Cardiovascular:      Rate and Rhythm: Normal rate and regular rhythm.      Pulses: Normal pulses.      Heart sounds: No murmur heard.  Pulmonary:      Effort: Pulmonary effort is normal. No respiratory distress.      Breath sounds: Normal breath sounds. No wheezing or rales.   Abdominal:      General: Bowel sounds are normal. There is no distension.      Tenderness: There is no abdominal tenderness.      Comments: +midline abdominal dressing, +PEG intact. + 3 IRVING drains with serosanguinous fluid   Musculoskeletal:      Cervical back: Normal range of motion.      Right lower leg: No edema.      Left lower leg: No edema.   Skin:     General: Skin is warm and dry.      Capillary Refill: Capillary refill takes less than 2 seconds.   Neurological:      General: No focal deficit present.     DATA:  CMP:  Results from last 7 days   Lab Units 10/24/23  0612 10/23/23  0450 10/22/23  0434 10/21/23  0544 10/20/23  0539 10/19/23  0549 10/18/23  0516   SODIUM mmol/L 141 145 146* 143 147* 146* 148*   POTASSIUM mmol/L 4.7 4.4 4.1 4.5 4.0 4.0 4.3   CHLORIDE mmol/L 109* 111* 112* 109* 111* 112* 110*   CO2 mmol/L 21 20* 20* 24 24 26 25   ANION GAP mmol/L 16 18 18 " "15 16 12 17   BUN mg/dL 50* 53* 54* 57* 54* 58* 66*   CREATININE mg/dL 2.87* 2.59* 2.54* 2.14* 2.23* 2.28* 2.36*   EGFR mL/min/1.73m*2 23* 26* 27* 33* 32* 31* 29*   MAGNESIUM mg/dL 1.88 1.84 2.05 1.91 2.19 1.88 1.77   ALBUMIN g/dL 2.4* 2.6* 2.4* 2.4* 2.4* 2.5* 2.5*   ALT U/L  --   --  14  --   --  16 17   AST U/L  --   --  20  --   --  17 15   BILIRUBIN TOTAL mg/dL  --   --  0.9  --   --  1.1 1.2       CBC:  Results from last 7 days   Lab Units 10/24/23  0612 10/23/23  0450 10/22/23  0434 10/21/23  0544 10/20/23  0539 10/19/23  0549 10/18/23  0516   WBC AUTO x10*3/uL 12.9* 16.0* 16.7* 16.5* 16.4* 17.9* 17.0*   HEMOGLOBIN g/dL 6.9* 7.3* 7.5* 7.0* 7.2* 7.6* 8.7*   HEMATOCRIT % 21.9* 23.3* 24.7* 22.7* 23.3* 23.8* 26.9*   PLATELETS AUTO x10*3/uL 374 433 424 418 357 356 382   MCV fL 93 95 97 97 95 92 89       COAG:         ABO:   ABO TYPE   Date Value Ref Range Status   10/24/2023 B  Final     HEME/ENDO:  Results from last 7 days   Lab Units 10/23/23  0450 10/18/23  0516   TSH mIU/L 0.79  --    HEMOGLOBIN A1C %  --  7.3*        CARDIAC:       No lab exists for component: \"CK\", \"CKMBP\"    Prior to Admission Meds:  Medications Prior to Admission   Medication Sig Dispense Refill Last Dose    acetaminophen (Tylenol) 325 mg tablet 2 tab(s) orally every 6 hours, As needed, Pain - Mild (1-3)       albuterol 90 mcg/actuation inhaler use 2 (TWO) puffs FOUR TIMES DAILY       allopurinol (Zyloprim) 100 mg tablet        aspirin 81 mg chewable tablet CHEW AND SWALLOW 1 TABLET DAILY.       blood sugar diagnostic (OneTouch Verio test strips) strip TEST 3 TIMES DAILY       buPROPion XL (Wellbutrin XL) 150 mg 24 hr tablet Take 1 tablet (150 mg) by mouth once daily.       busPIRone (Buspar) 5 mg tablet Take 1 tablet (5 mg) by mouth 3 times a day.       calcium carbonate 600 mg calcium (1,500 mg) tablet Take 1 tablet (600 mg) by mouth 2 times a day.       cholecalciferol (Vitamin D-3) 125 MCG (5000 UT) capsule Take 1 capsule (125 mcg) by " mouth once daily.       citalopram (CeleXA) 40 mg tablet        diphenoxylate-atropine (Lomotil) 2.5-0.025 mg tablet 2 tablets 4 times a day as needed for diarrhea.       fenofibrate (Triglide) 160 mg tablet Take 1 tablet (160 mg) by mouth once daily.       ferrous sulfate 325 (65 Fe) MG tablet Take 1 tablet (325 mg) by mouth once daily.       gabapentin (Neurontin) 100 mg capsule Take 2 capsules (200 mg) by mouth once daily at bedtime.       hydrALAZINE (Apresoline) 50 mg tablet Take 1 tablet (50 mg) by mouth 2 times a day.       icosapent ethyL (Vascepa) 1 gram capsule Take 1 capsule (1 g) by mouth twice a day.       insulin glargine (Lantus U-100 Insulin) 100 unit/mL injection Inject 10 Units under the skin once daily in the morning.       insulin lispro (HumaLOG) 100 unit/mL injection Inject under the skin 3 times a day with meals. Using a sliding scale       lidocaine-prilocaine (Emla) 2.5-2.5 % cream Apply topically to affected area 30 min prior to port       magnesium oxide (Mag-Ox) 400 mg (241.3 mg magnesium) tablet Take 2 tablets (800 mg) by mouth 2 times a day.       multivitamin with minerals tablet 1 tablet DAILY (route: oral)       pantoprazole (ProtoNix) 40 mg EC tablet Take 1 tablet (40 mg) by mouth twice a day.       rosuvastatin (Crestor) 40 mg tablet Take 1 tablet (40 mg) by mouth once daily at bedtime.       tacrolimus (Prograf) 1 mg capsule TAKE 4 CAPSULES every morning and TAKE 3 CAPSULES every evening          ASSESSMENT AND PLAN:   Chung Goodman is a 67-year-old man with history of NICM secondary to valvular disease, s/p mitral valve repair with [place] Carbomedics Annuloflex band II with 36 mm, s/p bicaval OHT (3/24/2017) with post-op complicated by atrial arrhythmia, RV failure, chronotropic incompetence, pancreatic adenocarcinoma, s/p Whipple procedure (5/19/23), currently on chemotherapy (modified FOLFIRINOX), HTN, DM type 2, HLD, CKD Stage 3B (baseline creatinine 2), amiodarone-induced  hyperthyroidism, JE, MDD, transferred from [facility] for recurrent SBO. Blood culture positive for E coli. [Immunosuppressive regimen at home: Tacrolimus 4 mg/3 mg (Tacrolimus Goal 5-8), prednisone 5 mg daily. ] No rejection history. Latest DSE: negative (5/2023). On 10/16, he had drainage from abdominal wound suture. He was taken to the OR for repair of fascial dehiscence. S/p ex lap, washout, drain placement with retention sutures (10/17).     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 Coli bacteremia, currently on IV antibiotic  # s/p EGD with dilation of afferent limb anastomotic stenosis (10/10/23)  #s/p Ex-lap, small bowel resection, jejuno-jejunostomy, G-tube (10/10/23)  # Perforated afferent limb  # Fascial dehiscence, s/p ex lap, washout, drain placement with retention sutures (10/17)  # Hypertension, agitation possibly 2/2 ?steroid use, ?hospital acquired delirium     Recommendations:  - Latest Tacrolimus (10/24): 7.7  - Continue Tacrolimus 1.5 mg SL at 0630, 0.5 mg SL at 1830. Tacrolimus target goal: 5-8.   - Following 6AM troughs.   - Continue current regimen of SL tacrolimus and 4mg IV solumedrol as long as primary team planning to use G-tube.   - Has been having systemic BP upwards of 180 systolic. Unclear cause, but may be related to agitation and/or steroid use. Does not appear to be in pain. No fevers documented.   - Recommend increasing IV hydralazine to 20mg q6h as needed with hold parameters.  - Ongoing GOC discussions with family, considering transition to DNR-CCA per chart review.     HF service will continue to follow.     Patient was seen at the bedside with HF attending, Dr. Dwight Atkinson,     HF Attending  documentation    Immunosuppression at goal and will maintain Tacrolimus dose.  Remains delirious  Increase Hydralazine dose as above

## 2023-10-24 NOTE — ACP (ADVANCE CARE PLANNING)
Time Statement: Total face to face time spent on advance care planning was 18 minutes with 18 minutes spent in counseling, including the explanation.     Advance Care Planning   (10/24) Discussed code status with pt's wife (whom is his surrogate decision maker) and outlined different options available (Full Code, DNR-CCA, DNI, and DNR-CC). Pt's wife stating that she is not ready to fully transition to comfort care, but feels DNR-CCA, DNI (no compressions, no intubation) would be appropriate.  Advised pt's wife that I feel it would be very reasonable to transition pt to DNR-CCA, DNI at this time given his tentative, but slightly improving, clinical state. Pt's wife agrees with this, but states she would like a few hours to think over and finalize her decision. I let the wife know that I would revisit code status conversation with her tomorrow morning, but if she would like to transition to DNR-CCA, DNI before then to let RN and primary team know.

## 2023-10-24 NOTE — PROCEDURES
Vascular Access Team Procedure Note     Visit Date: 10/24/2023      Patient Name: Chung Goodman         MRN: [id number]             Procedure:Discussed with wife and patient the risks and benefits of procedure. Procedure performed using Modified Seldinger Technique and maximum sterility. Unable to place device due to meeting resistance. Would suggest removal of left subclavian device and can re-attempt PICC.       Litzy Wilde RN  10/24/2023  4:32 PM

## 2023-10-24 NOTE — PROGRESS NOTES
"[facility]  HEART FAILURE PROGRESS NOTE    Chung Goodman/[id number]    Admit Date: 10/7/2023  Hospital Length of Stay: 16   Primary Service: Surgical oncology  Primary HF Cardiologist: Jennifer    INTERVAL EVENTS / PERTINENT ROS:   No overnight events.    MEDICATIONS  Infusions:  Adult Clinimix TPN, Last Rate: 83 mL/hr (10/23/23 2141)  HYDROmorphone      Scheduled:  aspirin, 150 mg, Daily  fat emulsion fish oil/plant based, 250 mL, Daily  heparin (porcine), 5,000 Units, q8h  heparin flush, 50 Units, q12h  hydrALAZINE, 10 mg, q6h  insulin NPH (Isophane), 15 Units, Nightly  insulin NPH (Isophane), 7 Units, Daily  insulin regular, 0-10 Units, q6h  insulin regular, 4 Units, q6h  lidocaine, 1 mL, Once  lidocaine, 5 mL, Once  lidocaine, 1 patch, Daily  melatonin, 5 mg, Nightly  methylPREDNISolone sodium succinate (PF), 4 mg, q24h  micafungin, 100 mg, q24h  nystatin, 4 mL, BID  pantoprazole, 40 mg, BID  piperacillin-tazobactam, 3.375 g, q6h  [START ON 10/24/2023] tacrolimus, 0.5 mg, q24h  [START ON 10/24/2023] tacrolimus, 1.5 mg, Daily  thiamine, 500 mg, TID      PRN:  alteplase, 2 mg, PRN  alteplase, 2 mg, PRN  alteplase, 2 mg, PRN  dextrose 10 % in water (D10W), 0.3 g/kg/hr, Once PRN  dextrose, 25 g, q15 min PRN  glucagon, 1 mg, q15 min PRN  heparin flush, 50 Units, PRN  heparin lock flush (porcine), 500 Units, Once PRN  HYDROmorphone, 0.1 mg, q6h PRN  ipratropium-albuteroL, 3 mL, q4h PRN  naloxone, 0.2 mg, PRN  naloxone, 0.2 mg, PRN  OLANZapine, 2.5 mg, q6h PRN  oxygen, , Continuous PRN - O2/gases        PHYSICAL EXAM:   Visit Vitals  /86 (BP Location: Left arm, Patient Position: Lying)   Pulse 97   Temp 36.5 °C (97.7 °F) (Temporal)   Resp 16   Ht 1.765 m (5' 9.49\")   Wt 70.5 kg (155 lb 6.8 oz)   SpO2 100%   BMI 22.63 kg/m²   Smoking Status Former   BSA 1.86 m²     Wt Readings from Last 5 Encounters:   10/23/23 70.5 kg (155 lb 6.8 oz)   10/05/23 78 kg (171 lb 15.3 oz)   09/28/23 80 " kg (176 lb 5.9 oz)   06/13/23 80.9 kg (178 lb 6 oz)   06/08/23 82.3 kg (181 lb 6 oz)     INTAKE/OUTPUT:  I/O last 3 completed shifts:  In: 3550.1 (50.4 mL/kg) [I.V.:100 (1.4 mL/kg); IV Piggyback:1150]  Out: 1378 (19.5 mL/kg) [Urine:900 (0.4 mL/kg/hr); Emesis/NG output:225; Drains:253]  Weight: 70.5 kg      Physical Exam  Constitutional:       Appearance: He is normal weight.      Comments: Chronically ill appearing, sleeping   HENT:      Head: Normocephalic.      Mouth/Throat:      Mouth: Mucous membranes are moist.   Cardiovascular:      Rate and Rhythm: Normal rate and regular rhythm.      Pulses: Normal pulses.      Heart sounds: No murmur heard.  Pulmonary:      Effort: Pulmonary effort is normal. No respiratory distress.      Breath sounds: Normal breath sounds. No wheezing or rales.   Abdominal:      General: Bowel sounds are normal. There is no distension.      Tenderness: There is no abdominal tenderness.      Comments: +midline abdominal dressing, +PEG intact. + 3 IRVING drains with serosanguinous fluid   Musculoskeletal:      Cervical back: Normal range of motion.      Right lower leg: No edema.      Left lower leg: No edema.   Skin:     General: Skin is warm and dry.      Capillary Refill: Capillary refill takes less than 2 seconds.   Neurological:      General: No focal deficit present.     DATA:  CMP:  Results from last 7 days   Lab Units 10/23/23  0450 10/22/23  0434 10/21/23  0544 10/20/23  0539 10/19/23  0549 10/18/23  0516 10/17/23  0759   SODIUM mmol/L 145 146* 143 147* 146* 148* 148*   POTASSIUM mmol/L 4.4 4.1 4.5 4.0 4.0 4.3 4.4   CHLORIDE mmol/L 111* 112* 109* 111* 112* 110* 110*   CO2 mmol/L 20* 20* 24 24 26 25 28   ANION GAP mmol/L 18 18 15 16 12 17 14   BUN mg/dL 53* 54* 57* 54* 58* 66* 59*   CREATININE mg/dL 2.59* 2.54* 2.14* 2.23* 2.28* 2.36* 2.17*   EGFR mL/min/1.73m*2 26* 27* 33* 32* 31* 29* 33*   MAGNESIUM mg/dL 1.84 2.05 1.91 2.19 1.88 1.77 1.91   ALBUMIN g/dL 2.6* 2.4* 2.4* 2.4* 2.5* 2.5*  "2.7*   ALT U/L  --  14  --   --  16 17  --    AST U/L  --  20  --   --  17 15  --    BILIRUBIN TOTAL mg/dL  --  0.9  --   --  1.1 1.2  --        CBC:  Results from last 7 days   Lab Units 10/23/23  0450 10/22/23  0434 10/21/23  0544 10/20/23  0539 10/19/23  0549 10/18/23  0516 10/17/23  0759   WBC AUTO x10*3/uL 16.0* 16.7* 16.5* 16.4* 17.9* 17.0* 15.1*   HEMOGLOBIN g/dL 7.3* 7.5* 7.0* 7.2* 7.6* 8.7* 8.5*   HEMATOCRIT % 23.3* 24.7* 22.7* 23.3* 23.8* 26.9* 25.8*   PLATELETS AUTO x10*3/uL 433 424 418 357 356 382 316   MCV fL 95 97 97 95 92 89 88       COAG:         ABO:   ABO TYPE   Date Value Ref Range Status   10/21/2023 B  Final     HEME/ENDO:  Results from last 7 days   Lab Units 10/23/23  0450 10/18/23  0516   TSH mIU/L 0.79  --    HEMOGLOBIN A1C %  --  7.3*        CARDIAC:       No lab exists for component: \"CK\", \"CKMBP\"    Prior to Admission Meds:  Medications Prior to Admission   Medication Sig Dispense Refill Last Dose    acetaminophen (Tylenol) 325 mg tablet 2 tab(s) orally every 6 hours, As needed, Pain - Mild (1-3)       albuterol 90 mcg/actuation inhaler use 2 (TWO) puffs FOUR TIMES DAILY       allopurinol (Zyloprim) 100 mg tablet        aspirin 81 mg chewable tablet CHEW AND SWALLOW 1 TABLET DAILY.       blood sugar diagnostic (OneTouch Verio test strips) strip TEST 3 TIMES DAILY       buPROPion XL (Wellbutrin XL) 150 mg 24 hr tablet Take 1 tablet (150 mg) by mouth once daily.       busPIRone (Buspar) 5 mg tablet Take 1 tablet (5 mg) by mouth 3 times a day.       calcium carbonate 600 mg calcium (1,500 mg) tablet Take 1 tablet (600 mg) by mouth 2 times a day.       cholecalciferol (Vitamin D-3) 125 MCG (5000 UT) capsule Take 1 capsule (125 mcg) by mouth once daily.       citalopram (CeleXA) 40 mg tablet        diphenoxylate-atropine (Lomotil) 2.5-0.025 mg tablet 2 tablets 4 times a day as needed for diarrhea.       fenofibrate (Triglide) 160 mg tablet Take 1 tablet (160 mg) by mouth once daily.       " ferrous sulfate 325 (65 Fe) MG tablet Take 1 tablet (325 mg) by mouth once daily.       gabapentin (Neurontin) 100 mg capsule Take 2 capsules (200 mg) by mouth once daily at bedtime.       hydrALAZINE (Apresoline) 50 mg tablet Take 1 tablet (50 mg) by mouth 2 times a day.       icosapent ethyL (Vascepa) 1 gram capsule Take 1 capsule (1 g) by mouth twice a day.       insulin glargine (Lantus U-100 Insulin) 100 unit/mL injection Inject 10 Units under the skin once daily in the morning.       insulin lispro (HumaLOG) 100 unit/mL injection Inject under the skin 3 times a day with meals. Using a sliding scale       lidocaine-prilocaine (Emla) 2.5-2.5 % cream Apply topically to affected area 30 min prior to port       magnesium oxide (Mag-Ox) 400 mg (241.3 mg magnesium) tablet Take 2 tablets (800 mg) by mouth 2 times a day.       multivitamin with minerals tablet 1 tablet DAILY (route: oral)       pantoprazole (ProtoNix) 40 mg EC tablet Take 1 tablet (40 mg) by mouth twice a day.       rosuvastatin (Crestor) 40 mg tablet Take 1 tablet (40 mg) by mouth once daily at bedtime.       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 for recurrent SBO. Blood culture positive for E coli. [Immunosuppressive regimen at home: Tacrolimus 4 mg/3 mg (Tacrolimus Goal 5-8), prednisone 5 mg daily. ] No rejection history. Latest DSE: negative  (5/2023). On 10/16, he had drainage from abdominal wound suture. He was taken to the OR for repair of fascial dehiscence. S/p ex lap, washout, drain placement with retention sutures (10/17).     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 Coli bacteremia, currently on IV antibiotic  # s/p EGD with dilation of afferent limb anastomotic stenosis (10/10/23)  #s/p Ex-lap, small bowel resection, jejuno-jejunostomy, G-tube (10/10/23)  # Perforated afferent limb  # Fascial dehiscence, s/p ex lap, washout, drain placement with retention sutures (10/17)     Recommendations:  - Latest Tacrolimus (10/20): 10.1.  - Reduce Tacrolimus to  1.5 mg SL at 0630, 0.5 mg SL at 1830. Tacrolimus target goal: 5-8.   - Anticipate decrease level in tomorrow, will follow 6AM troughs.   - Continue current regimen of SL tacrolimus and 4mg IV solumedrol as long as primary team planning to use G-tube.  - Continue current dosing given renal dysfunction and other acute issues.     HF service will continue to follow.     Patient was seen at the bedside with HF attending, Dr. Dwight Atkinson DO

## 2023-10-24 NOTE — PROGRESS NOTES
"Chung Goodman is a 67 y.o. male on day 17 of admission presenting with Bowel perforation (CMS/HCC). Psychiatry consulted for management of delirium.       Subjective     Per sitter, patient has maintained behavioral control with no sleep issues reported. He has shown moments of grabbing with hands-on care, but is redirectable. He is able to express appreciation for extra support provided by sitter.    Per wife at bedside, patient is much improved since yesterday, noting that patient has been more engaging and sitting up in bed. She states that his episodes of delirium tend to happen after multiple weeks in the hospital and resolves as he goes home.     Per MAR, no psychiatric PRNs given overnight.     Patient is seen in his room with wife and sitter at bedside. Patient is able to follow one-step commands and attends to interview. He knows that he is at a hospital and the year is 2023, but is not able to endorse any other orientation questions. He is not able to endorse any other complaints or concerns at this time. He is able to recognize and identify his wife.          Objective     Last Recorded Vitals  Blood pressure 179/61, pulse 89, temperature 36.7 °C (98.1 °F), resp. rate 18, height 1.765 m (5' 9.49\"), weight 70.5 kg (155 lb 6.8 oz), SpO2 98 %.      Mental Status Exam  Appearance: AA male, frail, seated in bed, wearing hospital attire.   Attitude: calm, cooperative, slightly confused  Behavior: Appropriate eye contact. No agitated or aggressive behavior.  Motor Activity: No agitation or retardation. No EPS/TD.  Speech: Low volume, soft, mumbling, answers briefly  Mood: \"okay\"   Affect: constricted, mood-congruent  Thought Process: limited  Thought Content: limited  Thought Perception: limited  Cognition: Alert. Oriented only to self, place and year  Insight: limited  Judgement: limited      Psychiatric Risk Assessment  Acute Risk of Harm to Others is Considered: moderate   Acute Risk of Harm to Self is " Considered: low    Relevant Results  Scheduled medications  aspirin, 150 mg, rectal, Daily  fat emulsion fish oil/plant based, 250 mL, intravenous, Daily  heparin (porcine), 5,000 Units, subcutaneous, q8h  heparin flush, 50 Units, intra-catheter, q12h  heparin flush, 50 Units, intra-catheter, q12h  hydrALAZINE, 10 mg, intravenous, q6h  insulin NPH (Isophane), 15 Units, subcutaneous, Nightly  insulin NPH (Isophane), 7 Units, subcutaneous, Daily  insulin regular, 0-10 Units, subcutaneous, q6h  insulin regular, 4 Units, subcutaneous, q6h  lidocaine, 1 mL, infiltration, Once  lidocaine, 5 mL, infiltration, Once  lidocaine, 1 patch, transdermal, Daily  melatonin, 5 mg, oral, Nightly  methylPREDNISolone sodium succinate (PF), 4 mg, intravenous, q24h  micafungin, 100 mg, intravenous, q24h  nystatin, 4 mL, Swish & Swallow, BID  pantoprazole, 40 mg, intravenous, BID  piperacillin-tazobactam, 3.375 g, intravenous, q6h  tacrolimus, 0.5 mg, sublingual, q24h  tacrolimus, 1.5 mg, sublingual, Daily  thiamine, 500 mg, intravenous, TID      Continuous medications  Adult Clinimix TPN, 83 mL/hr, Last Rate: 83 mL/hr (10/24/23 0813)  HYDROmorphone,       PRN medications  PRN medications: alteplase, alteplase, alteplase, alteplase, dextrose 10 % in water (D10W), dextrose, glucagon, heparin flush, heparin lock flush (porcine), heparin lock flush (porcine), ipratropium-albuteroL, naloxone, naloxone, OLANZapine, oxygen    Results for orders placed or performed during the hospital encounter of 10/07/23 (from the past 24 hour(s))   POCT GLUCOSE   Result Value Ref Range    POCT Glucose 139 (H) 74 - 99 mg/dL   POCT GLUCOSE   Result Value Ref Range    POCT Glucose 132 (H) 74 - 99 mg/dL   Type and Screen   Result Value Ref Range    ABO TYPE B     Rh TYPE POS     ANTIBODY SCREEN NEG    Triglycerides   Result Value Ref Range    Triglycerides 213 (H) 0 - 149 mg/dL   CBC   Result Value Ref Range    WBC 12.9 (H) 4.4 - 11.3 x10*3/uL    nRBC 0.0 0.0 -  0.0 /100 WBCs    RBC 2.35 (L) 4.50 - 5.90 x10*6/uL    Hemoglobin 6.9 (L) 13.5 - 17.5 g/dL    Hematocrit 21.9 (L) 41.0 - 52.0 %    MCV 93 80 - 100 fL    MCH 29.4 26.0 - 34.0 pg    MCHC 31.5 (L) 32.0 - 36.0 g/dL    RDW 18.1 (H) 11.5 - 14.5 %    Platelets 374 150 - 450 x10*3/uL    MPV 11.4 7.5 - 11.5 fL   Renal Function Panel   Result Value Ref Range    Glucose 147 (H) 74 - 99 mg/dL    Sodium 141 136 - 145 mmol/L    Potassium 4.7 3.5 - 5.3 mmol/L    Chloride 109 (H) 98 - 107 mmol/L    Bicarbonate 21 21 - 32 mmol/L    Anion Gap 16 10 - 20 mmol/L    Urea Nitrogen 50 (H) 6 - 23 mg/dL    Creatinine 2.87 (H) 0.50 - 1.30 mg/dL    eGFR 23 (L) >60 mL/min/1.73m*2    Calcium 9.1 8.6 - 10.6 mg/dL    Phosphorus 5.2 (H) 2.5 - 4.9 mg/dL    Albumin 2.4 (L) 3.4 - 5.0 g/dL   Magnesium   Result Value Ref Range    Magnesium 1.88 1.60 - 2.40 mg/dL          Assessment/Plan   Chung Goodman is a 67 y.o. male with PPHx of MDD, JE and a PMHx of history of heart transplant (2017 for NICM 2/2 valvular disease) and pancreatic cancer s/p whipple (5/19/2023) currently receiving modified FOLFIRINOX (follows with Dr. Dunaway), and recent admission for SBO who presented as a transfer from [facility] for recurrent SBO.  Psychiatry was consulted on 10/22/23 for management of delirium.    On initial assessment, patient noted to be lying in bed, partially clothed, oriented only to self. Per pts sitter, pt has been waxing and waning in levels of consciousness, and had earlier in the day been attempting to leave his bed, remove his clothes, and remove all of his IV lines. Pt with PCA pump for pain control which is likely contributing to his current presentation, as well as SBO, fluctuating glc levels. Pt received 5mg of haldol earlier today for management of agitation. Presentation likely delirium 2/2 pain, pain medications, glc levels, SBO status. Psychiatry will continue to follow.     Update 10/23: Patient given one episode of IM olanzapine PRN overnight.  "Patient history is notable for resolving delirium as hospital treatment progresses. Given recency of recommendations, will maintain current medication recommendations with no changes today.     Update 10/24: Delirium improving, with increased engagement on interview today. No PRNs needed overnight for agitation. No changes to medication recommendations, sitter is no longer required from a psychiatric standpoint as patient has demonstrated behavioral control.      EKG (10/22): QTc of 497     IMPRESSION  Delirium, mixed, multifactorial     RECOMMENDATIONS  Safety:  - Patient lacks the capacity to leave AMA at this time and thus cannot leave AMA. Call CODE VIOLET if patient attempts to leave AMA.  - To evaluate decision-making capacity, recommend use of the Capacity Evaluation Tool. Search “ IP Capacity Evaluation under SmartText\" unless the patient has a legal guardian, in which case all decisions per the legal guardian.  - Patient DOES NOT not require a 1:1 sitter from a psychiatric perspective at this time.  - Defer to primary team decision for 1:1 sitter for safety precautions.   - As with all hospitalized patients, would recommend delirium precautions, as below.   -- Minimize use of benzos, opiates, anticholinergics, as these may worsen mental status   -- Would use caution with narcotic pain medications   -- Would still adequately controlling pain, as uncontrolled pain is also a risk factor for delirium   -- Reinforce sleep hygiene; encourage patient to stay awake during the day   -- Keep curtains/blinds open during the day and closed at night.   -- Would recommend reorienting/redirecting patient as much as possible,    -- Aim for consistent staffing, familiar objects, avoiding bright lights and loud noises, etc.       Work-up:  Recommend UA, B12, Folate, TSH, Vitamin D     Medications:  -Thiamine 500mg IV TID x3 days followed by 100mg PO daily     -REC NO ADDITIONAL HALOPERIDOL, due to prolonged Qtc interval as " well as potential to worsening restlessness  -Olanzapine 2.5mg PO/IM Q6H PRN for agitation.   -Repeat 12lead EKG for Qtc interval if patient receives olanzapine     Please do not administer BZD within 2 hrs of olanzapine administration.      Ancillary Services:  - Please offer , pet/music therapy consult when appropriate    - Psychiatry will continue to follow daily.  Thank you for allowing us to participate in the care of this patient. Please page [phone] with any questions or concerns.     Medication Consent  Medication Consent: n/a; consult service    Patient discussed with Dr. Silva who agrees with above plan.       Chung Santacruz MD  Adult Psychiatry, PGY-2  [facility]

## 2023-10-24 NOTE — PROGRESS NOTES
is a 67 y.o. male on day 17 of admission presenting with .    Subjective   SW spoke with the patient's wife and they want to meet with R and they want to take the patient home with hospice services. Pt.'s wife shared that her cousin works at [facility] and their goal is to keep the patient comfortable. Referral placed in [facility] and R will be meeting with the family today at 2:30 pm and the team has been updated.       Objective     Physical Exam    Last Recorded Vitals    Intake/Output last 3 Shifts:      Relevant Results                           Assessment/Plan                 Kanwal Liu LCSW

## 2023-10-25 NOTE — PROGRESS NOTES
Art Therapy Note    Chung Goodman     Therapy Session  Referral Type: New referral this admission  Visit Type: New visit  Session Start Time: 1603  Session End Time: 1604  Intervention Delivery: In-person  Conflict of Service: Undergoing procedure  Number of family members present: 0  Family Present for Session: None              Treatment/Interventions       Post-assessment  Total Session Time (min): 1 minutes    Narrative  Assessment Detail: ATR made a visit to [facility]iott to follow up on a referral made.  Upon entering room a sign posted on the door stated, stop do not enter, sterile procedure in progress.  ATR will attempt to make a visit another time.    Education Documentation  No documentation found.

## 2023-10-25 NOTE — PROGRESS NOTES
Physical Therapy                 Therapy Communication Note    Patient Name: Chung Goodman  MRN: [id number]  Today's Date: 10/25/2023     Discipline: Physical Therapy    Missed Visit Reason: Missed Visit Reason:  (RN reports that despite medication recently given, pt pain is not under control at this time.)    Missed Time: Attempt 1417    Comment:

## 2023-10-25 NOTE — PROGRESS NOTES
Chung Goodman is a 67 y.o. male on day 18 of admission presenting with Bowel perforation (CMS/HCC).    Subjective   No acute events overnight. Post transfusion Hgb 7.4 yesterday evening. Orientable, mild abdominal pain but well managed.        Objective     Physical Exam  Constitutional:       General: He is not in acute distress.     Appearance: He is not toxic-appearing.   HENT:      Head: Normocephalic and atraumatic.      Nose: Nose normal.      Mouth/Throat:      Pharynx: Oropharynx is clear. No oropharyngeal exudate.   Eyes:      General: No scleral icterus.     Extraocular Movements: Extraocular movements intact.      Conjunctiva/sclera: Conjunctivae normal.      Pupils: Pupils are equal, round, and reactive to light.   Cardiovascular:      Rate and Rhythm: Normal rate.   Pulmonary:      Effort: Pulmonary effort is normal. No respiratory distress.      Breath sounds: Normal breath sounds. No wheezing.   Abdominal:      General: Abdomen is flat. There is no distension.      Palpations: Abdomen is soft. There is no mass.      Tenderness: There is no abdominal tenderness. There is no guarding.      Comments: Midline incision with wound vac placed to suction, minimal thick green/brown drainage. Mildly tender, diffusely. No distension. Retention sutures in place. Left sided G tube to gravity with clear white drainage. Some thick drainage around G tube site.    Musculoskeletal:         General: Normal range of motion.      Right lower leg: No edema.      Left lower leg: No edema.   Neurological:      General: No focal deficit present.      Mental Status: He is disoriented.      Cranial Nerves: No cranial nerve deficit.   Psychiatric:         Attention and Perception: He is inattentive.         Mood and Affect: Affect is blunt.         Behavior: Behavior is slowed and withdrawn.         Cognition and Memory: Cognition is impaired. Memory is impaired.         Last Recorded Vitals  Blood pressure (!) 150/94,  "pulse 93, temperature 36.8 °C (98.2 °F), temperature source Temporal, resp. rate 18, height 1.765 m (5' 9.49\"), weight 70.5 kg (155 lb 6.8 oz), SpO2 95 %.  Intake/Output last 3 Shifts:  I/O last 3 completed shifts:  In: 4377.6 (62.1 mL/kg) [I.V.:100 (1.4 mL/kg); Blood:336.3; IV Piggyback:650]  Out: 1690 (24 mL/kg) [Urine:1100 (0.4 mL/kg/hr); Drains:590]  Weight: 70.5 kg     Relevant Results  .Scheduled medications  aspirin, 150 mg, rectal, Daily  fat emulsion fish oil/plant based, 250 mL, intravenous, Daily  fentaNYL, 1 patch, transdermal, q72h  heparin (porcine), 5,000 Units, subcutaneous, q8h  heparin flush, 50 Units, intra-catheter, q12h  heparin flush, 50 Units, intra-catheter, q12h  insulin NPH (Isophane), 15 Units, subcutaneous, Nightly  insulin NPH (Isophane), 7 Units, subcutaneous, Daily  insulin regular, 0-10 Units, subcutaneous, q6h  insulin regular, 4 Units, subcutaneous, q6h  lidocaine, 1 mL, infiltration, Once  lidocaine, 1 patch, transdermal, Daily  melatonin, 5 mg, oral, Nightly  methylPREDNISolone sodium succinate (PF), 4 mg, intravenous, q24h  micafungin, 100 mg, intravenous, q24h  nystatin, 4 mL, Swish & Swallow, BID  pantoprazole, 40 mg, intravenous, BID  piperacillin-tazobactam, 3.375 g, intravenous, q6h  tacrolimus, 0.5 mg, sublingual, q24h  tacrolimus, 1.5 mg, sublingual, Daily  thiamine, 500 mg, intravenous, TID      Continuous medications  Adult Clinimix TPN, 83 mL/hr, Last Rate: 83 mL/hr (10/25/23 0053)      PRN medications  PRN medications: alteplase, alteplase, alteplase, alteplase, dextrose 10 % in water (D10W), dextrose, glucagon, heparin flush, heparin lock flush (porcine), heparin lock flush (porcine), hydrALAZINE, ipratropium-albuteroL, naloxone, naloxone, OLANZapine, oxyCODONE, oxyCODONE, oxygen  Results for orders placed or performed during the hospital encounter of 10/07/23 (from the past 24 hour(s))   POCT GLUCOSE   Result Value Ref Range    POCT Glucose 188 (H) 74 - 99 mg/dL "   Prepare RBC: 1 Units   Result Value Ref Range    PRODUCT CODE D3474H21     Unit Number [id number]     Unit ABO B     Unit RH POS     XM INTEP COMP     Dispense Status TR     Blood Expiration Date November 18, 2023 23:59 EST     PRODUCT BLOOD TYPE 7300     UNIT VOLUME 350    Hemoglobin and hematocrit, blood   Result Value Ref Range    Hemoglobin 7.4 (L) 13.5 - 17.5 g/dL    Hematocrit 23.5 (L) 41.0 - 52.0 %   POCT GLUCOSE   Result Value Ref Range    POCT Glucose 235 (H) 74 - 99 mg/dL   POCT GLUCOSE   Result Value Ref Range    POCT Glucose 68 (L) 74 - 99 mg/dL   POCT GLUCOSE   Result Value Ref Range    POCT Glucose 173 (H) 74 - 99 mg/dL         Assessment/Plan   Principal Problem:    Bowel perforation (CMS/HCC)  Active Problems:    Pancreatic cancer (CMS/[facility])    COPD (chronic obstructive pulmonary disease) (CMS/[facility])    Chronic kidney disease, stage 4, severely decreased GFR (CMS/[facility])    Cardiac defibrillator in place    Type 2 diabetes mellitus with diabetic chronic kidney disease (CMS/[facility])    Hypertension    Gastroesophageal reflux disease    Amiodarone-induced hyperthyroidism    History of heart transplant (CMS/[facility])    Pulmonary hypertension (CMS/HCC)    CVA (cerebral vascular accident) (CMS/[facility])    Gram-negative bacteremia    Complete gastric outlet obstruction    67 y.o. 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 Now s/p ex lap, washout, fascial closure w/abdominal wound vac and drain placement on 10/17 for fascial dehiscence. PCA discontinued yesterday, now on fentanyl patch. GOC and hospice discussion, family would like to proceed with hospice if available with TPN. Patient less agitated this morning.    Plan  Neuro/Psych  - Multimodal pain management with  1000mg IV Tylenol q6h and fentanyl patch 50mcg q72h  - Appreciate Supportive Onc recs  - Appreciate Psychiatry recommendations for management of delirium. DC sitter  - Olanzapine started at 2.5 mg q6h prn. EKG PRN for prolonged QT  - thiamine 500mg TID x3d followed by 100mg PO daily (start 10/25)     CV  - ASA suppository 150mg   - Home immunosuppression regimen (tacrolimus, solumedrol). Tacrolimus target goal 5-8  - Sublingual tacrolimus (2 mg AM, 1 mg PM)  - Per HF, continue methylprednisolone 4 mg IV daily while NPO.   - hydralazine 10mg q8h with hold parameters     Pulm:   - Respiratory  w/duo-nebs TID  - Encourage incentive spirometry 10x/h     GI  - Continue TPN at goal, plan for home continuous rate  - Abdominal wound vac to suction - will send sample for bilirubin  - wound care team for vac change  - Limited CLD for palliation  - PEG tube to gravity; flush if clogged or output slowed down     ID  - Micafungin, zosyn per ID. Originally 7 day course per ID, continued for additional 7d course. DC given stable AM WBC.   - COVID test - negative     Heme  - 1U prbc transfused 10/24     Endo  - Endocrine is consulted, appreciate recs     Ppx   - DVT ppx: Heparin SQ  - Encourage ambulation     Dispo:  - Patient accepted to LTAC. Family wishes to proceed with hospice. Consult to in patient hospice  - GOC discussion, regarding code status. Currently Full code.   - Continue Patrice 6     D/w Dr. Elizabeth Oropeza MD  PGY-1 Vascular Surgery Resident  Surgical Oncology Service  [phone]

## 2023-10-25 NOTE — PRE-PROCEDURE NOTE
Interventional Radiology Preprocedure Note    Indication for procedure: The primary encounter diagnosis was SBO (small bowel obstruction) (CMS/HCC). Diagnoses of Cholangitis, Bowel perforation (CMS/HCC), Perforated abdominal viscus, Dehiscence of fascia, sequela, Protein-calorie malnutrition, unspecified severity (CMS/HCC), Hypoxia, Dehiscence of fascia, initial encounter, Encounter for central line care, Encounter for deep vein thrombosis (DVT) prophylaxis, Type 2 diabetes mellitus with stage 3a chronic kidney disease, with long-term current use of insulin (CMS/[facility]), and Acute respiratory disease were also pertinent to this visit.    Relevant review of systems: Limited 2/2 AMS, otherwise negative    Relevant Labs:   Lab Results   Component Value Date    CREATININE 2.98 (H) 10/25/2023    EGFR 22 (L) 10/25/2023    INR 1.1 10/16/2023    PROTIME 12.6 10/16/2023       Planned Sedation/Anesthesia: Minimal    Airway assessment: normal    Directed physical examination:    Resting comfortably in bed.     Mallampati: II (hard and soft palate, upper portion of tonsils anduvula visible)    ASA Score: ASA 3 - Patient with moderate systemic disease with functional limitations    Benefits, risks and alternatives of procedure and planned sedation have been discussed with the patient and/or their representative. All questions answered and they agree to proceed.

## 2023-10-25 NOTE — PROGRESS NOTES
"Chung Goodman is a 67 y.o. male on day 18 of admission presenting with Bowel perforation (CMS/HCC). Psychiatry consulted for management of delirium.       Subjective     Per MAR, patient received PRN Olanzapine 2.5mg IM at 2252 for agitation.     Per nursing, patient has been responding well to gospel music as a nonpharmacologic remedy for agitation. He has been calm besides the one time PRN last night with no other behavioral concerns. He is able to make needs known.     Patient seen in his room. He was more engaged with conversation today and able to interact meaningfully to superficial interview. Patient gives concrete responses and still appears to be confused when giving responses. Denies any psychiatric concerns.        Objective     Last Recorded Vitals  Blood pressure (!) 185/96, pulse 103, temperature 36.6 °C (97.9 °F), resp. rate 18, height 1.765 m (5' 9.49\"), weight 70.5 kg (155 lb 6.8 oz), SpO2 100 %.      Mental Status Exam  Appearance: AA male, frail, seated in bed, wearing hospital attire.   Attitude: calm, cooperative, confused but improving  Behavior: Appropriate eye contact. No agitated or aggressive behavior.  Motor Activity: No agitation or retardation. No EPS/TD.  Speech: greater degree of conversation and spontaneity  Mood: \"fine\"   Affect: constricted, mood-congruent  Thought Process: superficially organized and linear  Thought Content: interview focused. Does not endorse SI/HI  Thought Perception: Does not endorse AVH. Does not appear RIS.   Cognition: Alert. Oriented to self, place, year and month.  Insight: limited  Judgement: limited      Psychiatric Risk Assessment  Acute Risk of Harm to Others is Considered: moderate   Acute Risk of Harm to Self is Considered: low    Relevant Results  Scheduled medications  aspirin, 150 mg, rectal, Daily  fat emulsion fish oil/plant based, 250 mL, intravenous, Daily  fentaNYL, 1 patch, transdermal, q72h  heparin (porcine), 5,000 Units, subcutaneous, " q8h  heparin flush, 50 Units, intra-catheter, q12h  heparin flush, 50 Units, intra-catheter, q12h  insulin NPH (Isophane), 15 Units, subcutaneous, Nightly  insulin NPH (Isophane), 7 Units, subcutaneous, Daily  insulin regular, 0-10 Units, subcutaneous, q6h  insulin regular, 4 Units, subcutaneous, q6h  lidocaine, 1 mL, infiltration, Once  lidocaine, 1 patch, transdermal, Daily  melatonin, 5 mg, oral, Nightly  methylPREDNISolone sodium succinate (PF), 4 mg, intravenous, q24h  nystatin, 4 mL, Swish & Swallow, BID  pantoprazole, 40 mg, intravenous, BID  tacrolimus, 0.5 mg, sublingual, q24h  tacrolimus, 1.5 mg, sublingual, Daily      Continuous medications  Adult Clinimix TPN, 83 mL/hr, Last Rate: 83 mL/hr (10/25/23 0053)      PRN medications  PRN medications: alteplase, alteplase, alteplase, alteplase, dextrose 10 % in water (D10W), dextrose, glucagon, heparin flush, heparin lock flush (porcine), heparin lock flush (porcine), hydrALAZINE, ipratropium-albuteroL, naloxone, naloxone, OLANZapine, oxyCODONE, oxyCODONE, oxygen    Results for orders placed or performed during the hospital encounter of 10/07/23 (from the past 24 hour(s))   Prepare RBC: 1 Units   Result Value Ref Range    PRODUCT CODE Q7211U89     Unit Number [id number]     Unit ABO B     Unit RH POS     XM INTEP COMP     Dispense Status TR     Blood Expiration Date November 18, 2023 23:59 EST     PRODUCT BLOOD TYPE 7300     UNIT VOLUME 350    Hemoglobin and hematocrit, blood   Result Value Ref Range    Hemoglobin 7.4 (L) 13.5 - 17.5 g/dL    Hematocrit 23.5 (L) 41.0 - 52.0 %   POCT GLUCOSE   Result Value Ref Range    POCT Glucose 235 (H) 74 - 99 mg/dL   POCT GLUCOSE   Result Value Ref Range    POCT Glucose 68 (L) 74 - 99 mg/dL   Tacrolimus level   Result Value Ref Range    Tacrolimus  5.9 <=15.0 ng/mL   POCT GLUCOSE   Result Value Ref Range    POCT Glucose 173 (H) 74 - 99 mg/dL   CBC   Result Value Ref Range    WBC 12.5 (H) 4.4 - 11.3 x10*3/uL    nRBC 0.0  0.0 - 0.0 /100 WBCs    RBC 2.75 (L) 4.50 - 5.90 x10*6/uL    Hemoglobin 8.1 (L) 13.5 - 17.5 g/dL    Hematocrit 24.2 (L) 41.0 - 52.0 %    MCV 88 80 - 100 fL    MCH 29.5 26.0 - 34.0 pg    MCHC 33.5 32.0 - 36.0 g/dL    RDW 18.6 (H) 11.5 - 14.5 %    Platelets 339 150 - 450 x10*3/uL    MPV 11.1 7.5 - 11.5 fL   Renal Function Panel   Result Value Ref Range    Glucose 186 (H) 74 - 99 mg/dL    Sodium 139 136 - 145 mmol/L    Potassium 4.5 3.5 - 5.3 mmol/L    Chloride 106 98 - 107 mmol/L    Bicarbonate 22 21 - 32 mmol/L    Anion Gap 16 10 - 20 mmol/L    Urea Nitrogen 48 (H) 6 - 23 mg/dL    Creatinine 2.98 (H) 0.50 - 1.30 mg/dL    eGFR 22 (L) >60 mL/min/1.73m*2    Calcium 8.9 8.6 - 10.6 mg/dL    Phosphorus 4.2 2.5 - 4.9 mg/dL    Albumin 2.4 (L) 3.4 - 5.0 g/dL   Magnesium   Result Value Ref Range    Magnesium 1.65 1.60 - 2.40 mg/dL   POCT GLUCOSE   Result Value Ref Range    POCT Glucose 138 (H) 74 - 99 mg/dL          Assessment/Plan   Chung Goodman is a 67 y.o. male with PPHx of MDD, JE and a PMHx of history of heart transplant (2017 for NICM 2/2 valvular disease) and pancreatic cancer s/p ipple (5/19/2023) currently receiving modified FOLFIRINOX (follows with Dr. Dunaway), and recent admission for SBO who presented as a transfer from [facility] for recurrent SBO.  Psychiatry was consulted on 10/22/23 for management of delirium.    On initial assessment, patient noted to be lying in bed, partially clothed, oriented only to self. Per pts sitter, pt has been waxing and waning in levels of consciousness, and had earlier in the day been attempting to leave his bed, remove his clothes, and remove all of his IV lines. Pt with PCA pump for pain control which is likely contributing to his current presentation, as well as SBO, fluctuating glc levels. Pt received 5mg of haldol earlier today for management of agitation. Presentation likely delirium 2/2 pain, pain medications, glc levels, SBO status. Psychiatry will continue to follow.  "    Update 10/23: Patient given one episode of IM olanzapine PRN overnight. Patient history is notable for resolving delirium as hospital treatment progresses. Given recency of recommendations, will maintain current medication recommendations with no changes today.     Update 10/24: Delirium improving, with increased engagement on interview today. No PRNs needed overnight for agitation. No changes to medication recommendations, sitter is no longer required from a psychiatric standpoint as patient has demonstrated behavioral control.     Update 10/25: Delirium continues to be improving with greater interaction on interview albeit with concrete and confused. One PRN needed over night, but responding well with music for calming. No changes to medication today.      EKG (10/22): QTc of 497     IMPRESSION  Delirium, mixed, multifactorial     RECOMMENDATIONS  Safety:  - Patient lacks the capacity to leave AMA at this time and thus cannot leave AMA. Call CODE VIOLET if patient attempts to leave AMA.  - To evaluate decision-making capacity, recommend use of the Capacity Evaluation Tool. Search “ IP Capacity Evaluation under SmartText\" unless the patient has a legal guardian, in which case all decisions per the legal guardian.  - Patient DOES NOT not require a 1:1 sitter from a psychiatric perspective at this time.  - Defer to primary team decision for 1:1 sitter for safety precautions.   - As with all hospitalized patients, would recommend delirium precautions, as below.   -- Minimize use of benzos, opiates, anticholinergics, as these may worsen mental status   -- Would use caution with narcotic pain medications   -- Would still adequately controlling pain, as uncontrolled pain is also a risk factor for delirium   -- Reinforce sleep hygiene; encourage patient to stay awake during the day   -- Keep curtains/blinds open during the day and closed at night.   -- Would recommend reorienting/redirecting patient as much as " possible,    -- Aim for consistent staffing, familiar objects, avoiding bright lights and loud noises, etc.       Work-up:  Recommend UA, B12, Folate, TSH, Vitamin D     Medications:  -Thiamine 500mg IV TID x3 days followed by 100mg PO daily (Now complete)     -REC NO ADDITIONAL HALOPERIDOL, due to prolonged Qtc interval as well as potential to worsening restlessness  -Olanzapine 2.5mg PO/IM Q6H PRN for agitation.   -Repeat 12lead EKG for Qtc interval if patient receives olanzapine     Please do not administer BZD within 2 hrs of olanzapine administration.      Ancillary Services:  - Please offer , pet/music therapy consult when appropriate    - Psychiatry will continue to follow daily.  Thank you for allowing us to participate in the care of this patient. Please page [phone] with any questions or concerns.     Medication Consent  Medication Consent: n/a; consult service    Patient discussed with Dr. Silva who agrees with above plan.       Chung Santacruz MD  Adult Psychiatry, PGY-2  [facility]

## 2023-10-25 NOTE — PROGRESS NOTES
SUPPORTIVE [facility] PALLIATIVE ONCOLOGY INPATIENT FOLLOW-UP      SERVICE DATE: 10/25/2023    Subjective   HISTORY OF PRESENT ILLNESS: Chung Goodman is a 67 y.o. male who presents with management for small bowel obstruction    Per chart review pain better controlled however patient continues to be delirious.  Fentanyl patch started yesterday  CODE STATUS conversation yesterday.  Wife leaning towards DNR/DNI    Pain Assessment:  Patient denied any pain however per nursing patient complains of pain and moans when turned     Location:  Abdomen  Duration:  Postsurgery abdominal pain        Breathing 1   negative vocalization 1  Facial expression 0  Body language 1  Consolability 0    Total 3/10    Symptom Assessment:  Unable to obtain secondary to delirium      Information obtained from: chart review, interview of patient, interview of family, discussion with RN, and discussion with primary team  ______________________________________________________________________        Objective       PHYSICAL EXAMINATION  Vital Signs:   Vital signs reviewed  Vitals:    10/25/23 0700   BP: (!) 153/97   Pulse: (!) 111   Resp: 18   Temp:    SpO2: 93%     Pain Score: 3      Physical Exam  Constitutional:       Comments: Awake delirious laying in bed.  Surgeons at the bedside with plans to remove wound VAC   HENT:      Head: Normocephalic and atraumatic.      Mouth/Throat:      Mouth: Mucous membranes are moist.   Eyes:      Extraocular Movements: Extraocular movements intact.      Pupils: Pupils are equal, round, and reactive to light.   Cardiovascular:      Rate and Rhythm: Tachycardia present.   Pulmonary:      Effort: Pulmonary effort is normal.   Abdominal:      Comments: Midline incision.  Gastrostomy draining to gravity.  Plan to remove the wound VAC and place a pouch/bag   Musculoskeletal:      Cervical back: Neck supple.      Comments: Generalized weakness.  No edema   Skin:     General: Skin is warm and dry.   Neurological:       Mental Status: He is disoriented.      Comments: Alert oriented to self and place.  Knows he that he is in the hospital.  Follows commands and was able to raise his hands and lower extremities to verbal command   Psychiatric:      Comments: Intermittently restless       Estimated CrCl by C-G formula: 24 mL/min (A)          Scheduled medications  aspirin, 150 mg, rectal, Daily  fat emulsion fish oil/plant based, 250 mL, intravenous, Daily  fentaNYL, 1 patch, transdermal, q72h  heparin (porcine), 5,000 Units, subcutaneous, q8h  heparin flush, 50 Units, intra-catheter, q12h  heparin flush, 50 Units, intra-catheter, q12h  insulin NPH (Isophane), 15 Units, subcutaneous, Nightly  insulin NPH (Isophane), 7 Units, subcutaneous, Daily  insulin regular, 0-10 Units, subcutaneous, q6h  insulin regular, 4 Units, subcutaneous, q6h  lidocaine, 1 mL, infiltration, Once  lidocaine, 1 patch, transdermal, Daily  melatonin, 5 mg, oral, Nightly  methylPREDNISolone sodium succinate (PF), 4 mg, intravenous, q24h  micafungin, 100 mg, intravenous, q24h  nystatin, 4 mL, Swish & Swallow, BID  pantoprazole, 40 mg, intravenous, BID  piperacillin-tazobactam, 3.375 g, intravenous, q6h  tacrolimus, 0.5 mg, sublingual, q24h  tacrolimus, 1.5 mg, sublingual, Daily  thiamine, 500 mg, intravenous, TID      Continuous medications  Adult Clinimix TPN, 83 mL/hr, Last Rate: 83 mL/hr (10/25/23 0053)      PRN medications  PRN medications: alteplase, alteplase, alteplase, alteplase, dextrose 10 % in water (D10W), dextrose, glucagon, heparin flush, heparin lock flush (porcine), heparin lock flush (porcine), hydrALAZINE, ipratropium-albuteroL, naloxone, naloxone, OLANZapine, oxyCODONE, oxyCODONE, oxygen      Results from last 7 days   Lab Units 10/25/23  0643 10/24/23  1800 10/24/23  0612 10/23/23  0450   WBC AUTO x10*3/uL 12.5*  --  12.9* 16.0*   HEMOGLOBIN g/dL 8.1* 7.4* 6.9* 7.3*   HEMATOCRIT % 24.2* 23.5* 21.9* 23.3*   PLATELETS AUTO x10*3/uL 339  --  374  433     Results from last 7 days   Lab Units 10/25/23  0643 10/24/23  0612 10/23/23  0450 10/22/23  0434 10/20/23  0539 10/19/23  0549   SODIUM mmol/L 139 141 145 146*   < > 146*   POTASSIUM mmol/L 4.5 4.7 4.4 4.1   < > 4.0   CHLORIDE mmol/L 106 109* 111* 112*   < > 112*   CO2 mmol/L 22 21 20* 20*   < > 26   BUN mg/dL 48* 50* 53* 54*   < > 58*   CREATININE mg/dL 2.98* 2.87* 2.59* 2.54*   < > 2.28*   CALCIUM mg/dL 8.9 9.1 9.4 9.6   < > 9.7   PROTEIN TOTAL g/dL  --   --   --  5.7*  --  5.7*   BILIRUBIN TOTAL mg/dL  --   --   --  0.9  --  1.1   ALK PHOS U/L  --   --   --  111  --  110   ALT U/L  --   --   --  14  --  16   AST U/L  --   --   --  20  --  17   GLUCOSE mg/dL 186* 147* 102* 239*   < > 279*    < > = values in this interval not displayed.         ASSESSMENT/PLAN  Chung Goodman is a 67 y.o. male with history of heart transplant X 2017, pancreatic cancer s/p Whipple's on 5/19/2023 on chemotherapy FOLFIRINOX, moderate malnutrition secondary to chronic disease, recurrent pancreatitis, CKD 3, recent admission for SBO (9/23-9/29), who was admitted to [facility] on 10/5 with emesis, fever. Scans showed SBO and blood cultures grew E. coli.  Patient was transferred to [facility] on 5 mg for further management of SBO. Pt had EGD with dilatation of afferent limb anastomotic stenosis on 10/10, however course c/b SBO with perf requiring emergent laparotomy with partial SB resection, jejuno-jejunostomy, G-tube. On 10/17 pt found to have fascial dehiscence of abdominal incision and underwent emergency laparotomy with fascial retention sutures placed. Supportive oncology consulted for pain management.     Neoplasm Related Pain  Acute Post Operative Pain.  Fair control  Type: Visceral and somatic  Home regimen: Gabapentin 200 mg PO HS, Oxycodone IR 5 mg q4-6h PRN, Tramdol 50mg BID PRN  Intolerances/previously tried: Morphine allergy (rx: itching)  Personalized pain goal: Could not be established since patient was  lethargic.  ORT-OUD Score: Total Score: 1  due to mental health history of depression indicating Opioid Risk Category: Low  of future opioid use disorder.  Creatinine Clearance: 24 mL/min (A)   (10/18) LFTs WNL  Fentanyl patch 50 mcg transdermal every 72 hours placed yesterday on 10/24 at around 9 PM and Dilaudid PCA was tapered off  Recommend changing oxycodone elixir p.o. 5 mg every 6 hours to every 3 hours as needed for moderate pain.  Used 0 dose /24 hours  Recommend changing oxycodone elixir 10 mg every 6 hours to every 3 hours as needed severe pain.  Used 0 dose /24 hours  Continue lidocaine 4% patch daily  Will consider starting home gabapentin 200mg PO HS when mentation improves    Nausea  At risk for nausea with vomiting related to SBO, malignancy, and acute postoperative period   Last documented EKG (10/22/2023) in paper chart QTc of 497 -     Risk for opioid-induced constipation aggravated by decreased functional status    -Gastrostomy open to gravity  Last BM: unknown  Encourage ambulation.  PT OT following   will continue monitor     chronic depression   Acute delirium could be secondary to uncontrolled pain, decreased sleep  Well controlled with home regimen  Home regimen: Wellbutrin XL 150mg PO daily, Buspar 5mg PO TID  Psych consulted  Recommend changing Zyprexa 2.5 mg IM/PO to Zyprexa Zydis 2.5 mg sublingual every 6 hours as needed for agitation  Will defer starting home Wellbutrin XL 150mg PO daily and Buspar 5mg PO TID to psych    Delirium protocol  Pain management as above  Continue melatonin 5 mg p.o. nightly    Oral thrush  Continue nystatin per primary team    Medical Decision Making/Goals of Care/Advance Care Planning:     10/25/23    Per chart review family would like to proceed with hospice if TPN can be provided.      Per , [facility]  awaiting would accept the patient including TPN and a wound vac.  Awaiting for [facility] RN visit today.     Per my conversation with wife Krystle over the  phone who talk to the hospice coordinator on phone, her understanding is plan to enroll with hospice services with TPN.  Wife would like to bring him home so that his delirium gets better however hospice team was recommending placement in facility because of his delirium.  Wife further stated that family is making a schedule to ensure someone is always present with him 24/7.  Hence she is not clear about the disposition plan yet.      When I tried to talk to her about the CODE STATUS, she deferred the conversation by stating that multiple providers are asking her the same question and that she is not willing to make any decision at this time and is hoping that her 's mental status improves so he can make decisions for himself.  She was getting ready to come to the hospital and we decided to further conversations when she comes to the hospital    (Per my prior conversation 10/18) Patient's current clinical condition, including diagnosis, prognosis, and management plan, and goals of care were discussed.   Life limiting disease: metastatic malignancy  Family: Supportive lives at home with wife  Performance status: Major limitations due to pain and disease process  Joys/meaning/strength: Music, believes in God  Understanding of health: Demonstrates good prognostic understanding of disease process, wife understands that 4 months ago patient had Whipple's procedure and then was admitted 2 weeks ago due to concern for bowel obstruction.  1 week ago patient had revision of the whipple however developed complications and had to redo Whipple's.  Last night he had leakage from abdominal area and hence went for emergency surgery early morning today.  Wife also understands that patient has recurrence of cancer  Information: Wants full disclosure  Goals: Symptom control  Code status discussion: We had conversation regarding resuscitation and intubation in the event of cardiac arrest.  Wife stated that she has not discussed  this with her  however feels that resuscitation and intubation may not be appropriate.  She will discuss with her .     Advance Directives  Existence of Advance Directives: Yes, but NOT documented in medical record  Decision maker: Surrogate decision maker is wife  Code Status: Full code  Living will: None     Supportive Interventions: Interventions: Music Therapy: referral placed pain management, Art Therapy: referral placed, Social work referral for: Advance Directives   has been following     Disposition    Discharge date: unknown pending acute issues and pain control       Signature and billing  Thank you for allowing us to participate in the care of this patient. Recommendations will be communicated back to the consulting service by way of shared electronic medical record or face-to-face.     Medical Decision Making was high level due to high complexity of problems, extensive data review, and high risk of management/treatment.     Time:     I spent 30  minutes in the care of this patient in discussing advance care planning/ goals of care discussions (discussing pt's beliefs, values and goals/wishes for aggressive medical care, desire for hospice vs palliative care)        DATA   Diagnostic tests and information reviewed for today's visit:  Conversation with primary team        Plan of Care discussed with: Provider, wife     Thank you for asking Supportive and Palliative Oncology to assist with care of this patient.  We will continue to follow  Please contact us for additional questions or concerns.       SIGNATURE: Lyndsey Shipley MD  PAGER/CONTACT:  Contact information:  [facility]ive [facility] Palliative Oncology  Monday-Friday 8 AM-5 PM  Epic Secure chat or pager [phone].  After hours and weekends:  pager [phone]

## 2023-10-25 NOTE — POST-PROCEDURE NOTE
Interventional Radiology Brief Postprocedure Note    Attending: Dr. Ayush Chao MD    Assistant: None    Diagnosis: Delirium with need for TPN on discharge in setting of CKD    Description of procedure: Successful placement of a right tunneled PICC line. Line is ready for use. See PACs for further details.      Anesthesia:  Local    Complications: None    Estimated Blood Loss: none    No specimens collected      See detailed result report with images in PACS.    The patient tolerated the procedure well without incident or complication and is in stable condition.

## 2023-10-25 NOTE — DOCUMENTATION CLARIFICATION NOTE
PATIENT:               ABRIL GOODMAN  ACCT #:                  [id number]  MRN:                       [id number]  :                       1955  ADMIT DATE:       10/7/2023 12:25 AM  DISCH DATE:  RESPONDING PROVIDER #:        [id number]          PROVIDER RESPONSE TEXT:    I concur with the pathology report findings and they are clinically significant    CDI QUERY TEXT:    [facility]_Path Results Simple    Instruction:    Based on your assessment of the patient and the clinical information, please provide the requested documentation by clicking on the appropriate radio button and enter any additional information if prompted.    Question: Please document whether you concur or do not concur with the 10/10/23 pathology report findings    When answering this query, please exercise your independent professional judgment. The fact that a question is being asked, does not imply that any particular answer is desired or expected.    The patient's clinical indicators include:  Clinical Information:  ?Abril Goodman is an 67 y.o. male who is having surgery for Perforated abdominal viscus? (10/10/23 Operative Report)    Surgical Pathology from 10/10/23 resulted as -    ?Result:  A. Soft tissue mass, biopsy:  - metastatic adenocarcinoma involving fibroadipose tissue (see comment).    B. Small bowel, segmental resection:  - segment of small intestine with a transmural defect, mucosal ischemic-type changes and acute serositis.    Part A: The patient's history of pancreatic adenocarcinoma, status-post Whipple resection ([id number]) and chemotherapy, is noted. The current adenocarcinoma is morphologically compatible with metastasis from the patient's pancreatic cancer?    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 (10/10/23 H&P)  Options provided:  -- I concur with the pathology report findings and they are clinically significant  -- I do not concur with the pathology report findings  -- Other - I will add my own diagnosis  -- Refer to Clinical Documentation Reviewer    Query created by: Nya Alfaro on 10/24/2023 11:58 AM      Electronically signed by:  DAMION PRINGLE 10/25/2023 11:00 AM

## 2023-10-25 NOTE — PROGRESS NOTES
10/25/23 1231   Discharge Planning   Living Arrangements Spouse/significant other   Support Systems Spouse/significant other   Type of Residence Private residence   Type of Home Care Services Hospice   Patient expects to be discharged to: Home with [facility]     Patient's family met with [facility] on 10/24/23 and the plan is for the patient to go home with hospice services. Patient will need TPN and wound vac at discharge and [facility] representative will be returning today to arrange for hospice services at home. Will continue to follow.

## 2023-10-26 NOTE — PROGRESS NOTES
"Chung Goodman is a 67 y.o. male on day 19 of admission presenting with Bowel perforation (CMS/HCC). Psychiatry consulted for management of delirium.       Subjective     Overnight, patient had an episode of agitation during a dressing change. Patient was redirectable with reassurance and did not require PRNs at that time. Restraints were discussed but were not needed.     Per nursing, patient had an episode of agitation early this morning at 0546 that required PRNs. He had been calm and interacting with family since this morning.      Patient seen in room daughter and son-in-law at bedside. Patient endorses remembering this writer as a doctor, but is unable to say which team this writer is from. He does not have any recollection of the events of lasts night. He believes his daughter is his wife who was previously at bedside on multiple days previously. He denies any concerns at this time.        Objective     Last Recorded Vitals  Blood pressure (!) 179/92, pulse 92, temperature 36.4 °C (97.5 °F), resp. rate 20, height 1.765 m (5' 9.49\"), weight 70.5 kg (155 lb 6.8 oz), SpO2 96 %.      Mental Status Exam  Appearance: AA male, frail, seated in bed, wearing hospital attire with abdominal binder  Attitude: calm, cooperative, improving confusion   Behavior: Appropriate eye contact. No agitated or aggressive behavior.  Motor Activity: No agitation or retardation. No EPS/TD.  Speech: spontaneous, slightly raspy  Mood: \"fine\"   Affect: full-ranged, euthymic, mood-congruent  Thought Process: superficially organized and linear, confused  Thought Content: interview focused. Does not endorse SI/HI  Thought Perception: Does not endorse AVH. Does not appear RIS.   Cognition: Alert. Oriented to self, place, states year is 2024, month.   Insight: limited  Judgement: limited      Psychiatric Risk Assessment  Acute Risk of Harm to Others is Considered: moderate   Acute Risk of Harm to Self is Considered: low    Relevant " Results  Scheduled medications  aspirin, 150 mg, rectal, Daily  fat emulsion fish oil/plant based, 250 mL, intravenous, Daily  fentaNYL, 1 patch, transdermal, q72h  heparin (porcine), 5,000 Units, subcutaneous, q8h  heparin flush, 50 Units, intra-catheter, q12h  insulin NPH (Isophane), 15 Units, subcutaneous, Nightly  insulin NPH (Isophane), 7 Units, subcutaneous, Daily  insulin regular, 0-10 Units, subcutaneous, q6h  insulin regular, 4 Units, subcutaneous, q6h  lidocaine, 1 mL, infiltration, Once  lidocaine, 1 patch, transdermal, Daily  melatonin, 5 mg, oral, Nightly  methylPREDNISolone sodium succinate (PF), 4 mg, intravenous, q24h  micafungin, 100 mg, intravenous, q24h  nystatin, 4 mL, Swish & Swallow, BID  pantoprazole, 40 mg, intravenous, BID  piperacillin-tazobactam, 3.375 g, intravenous, q6h  tacrolimus, 0.5 mg, sublingual, q24h  tacrolimus, 1.5 mg, sublingual, Daily      Continuous medications  Adult Clinimix TPN, 83 mL/hr, Last Rate: 83 mL/hr (10/25/23 2005)      PRN medications  PRN medications: alteplase, alteplase, dextrose 10 % in water (D10W), dextrose, glucagon, heparin flush, heparin lock flush (porcine), hydrALAZINE, ipratropium-albuteroL, naloxone, OLANZapine, oxyCODONE, oxyCODONE    Results for orders placed or performed during the hospital encounter of 10/07/23 (from the past 24 hour(s))   POCT GLUCOSE   Result Value Ref Range    POCT Glucose 161 (H) 74 - 99 mg/dL   POCT GLUCOSE   Result Value Ref Range    POCT Glucose 157 (H) 74 - 99 mg/dL   POCT GLUCOSE   Result Value Ref Range    POCT Glucose 161 (H) 74 - 99 mg/dL          Assessment/Plan   Chung Goodman is a 67 y.o. male with PPHx of MDD, JE and a PMHx of history of heart transplant (2017 for NICM 2/2 valvular disease) and pancreatic cancer s/p whipple (5/19/2023) currently receiving modified FOLFIRINOX (follows with Dr. Dunaway), and recent admission for SBO who presented as a transfer from [facility] for recurrent SBO.  Psychiatry was  consulted on 10/22/23 for management of delirium.    On initial assessment, patient noted to be lying in bed, partially clothed, oriented only to self. Per pts sitter, pt has been waxing and waning in levels of consciousness, and had earlier in the day been attempting to leave his bed, remove his clothes, and remove all of his IV lines. Pt with PCA pump for pain control which is likely contributing to his current presentation, as well as SBO, fluctuating glc levels. Pt received 5mg of haldol earlier today for management of agitation. Presentation likely delirium 2/2 pain, pain medications, glc levels, SBO status. Psychiatry will continue to follow.     Update 10/23: Patient given one episode of IM olanzapine PRN overnight. Patient history is notable for resolving delirium as hospital treatment progresses. Given recency of recommendations, will maintain current medication recommendations with no changes today.     Update 10/24: Delirium improving, with increased engagement on interview today. No PRNs needed overnight for agitation. No changes to medication recommendations, sitter is no longer required from a psychiatric standpoint as patient has demonstrated behavioral control.     Update 10/25: Delirium continues to be improving with greater interaction on interview albeit with concrete and confused. One PRN needed over night, but responding well with music for calming. No changes to medication today.     Update 10/26: Patient with episodes of agitation especially with hands-on care. Remains calm conversant but confused on interview. Continue current recommendations with use of non-pharmacological means such as music and gentle verbal redirection as patient has shown responsiveness to such means before using PRN medications. Certainly, physical restraints and Code Violet should be used judiciously when all other options have been exhausted. Agree with starting home Wellbutrin and Buspar, no changes to PRNs for  "agitation.       EKG (10/22): QTc of 497     IMPRESSION  Delirium, mixed, multifactorial     RECOMMENDATIONS  Safety:  - Patient lacks the capacity to leave AMA at this time and thus cannot leave AMA. Call CODE VIOLET if patient attempts to leave AMA.  - To evaluate decision-making capacity, recommend use of the Capacity Evaluation Tool. Search “ IP Capacity Evaluation under SmartText\" unless the patient has a legal guardian, in which case all decisions per the legal guardian.  - Patient DOES NOT not require a 1:1 sitter from a psychiatric perspective at this time.  - Defer to primary team decision for 1:1 sitter for safety precautions.   - As with all hospitalized patients, would recommend delirium precautions, as below.   -- Minimize use of benzos, opiates, anticholinergics, as these may worsen mental status   -- Would use caution with narcotic pain medications   -- Would still adequately controlling pain, as uncontrolled pain is also a risk factor for delirium   -- Reinforce sleep hygiene; encourage patient to stay awake during the day   -- Keep curtains/blinds open during the day and closed at night.   -- Would recommend reorienting/redirecting patient as much as possible,    -- Aim for consistent staffing, familiar objects, avoiding bright lights and loud noises, etc.       Work-up:  Recommend UA, B12, Folate, TSH, Vitamin D     Medications:  - Agree with home dosing of Bupropion XL 150mg PO daily and Buspar 5mg PO TID.   - Thiamine 500mg IV TID x3 days followed by 100mg PO daily (Now complete)     -REC NO ADDITIONAL HALOPERIDOL, due to prolonged Qtc interval as well as potential to worsening restlessness  -Olanzapine 2.5mg PO/IM Q6H PRN for agitation.   -Repeat 12lead EKG for Qtc interval if patient receives olanzapine     Please do not administer BZD within 2 hrs of olanzapine administration.      Ancillary Services:  - Please offer , pet/music therapy consult when appropriate    - Psychiatry will " continue to follow daily.  Thank you for allowing us to participate in the care of this patient. Please page [phone] with any questions or concerns.     Medication Consent  Medication Consent: n/a; consult service    Patient discussed with Dr. Silva who agrees with above plan.       Chung Santacruz MD  Adult Psychiatry, PGY-2  [facility]

## 2023-10-26 NOTE — PROGRESS NOTES
[facility]  HEART FAILURE PROGRESS NOTE    Chung Goodman/[id number]    Admit Date: 10/7/2023  Hospital Length of Stay: 18   Primary Service: Surgical oncology  Primary HF Cardiologist: Jennifer    INTERVAL EVENTS / PERTINENT ROS:   No major overnight events. Continues to have spells of agitation overnight and somnolence during the day. D/w primary team, ongoing goals of care discussions with family and involvement of palliative care today planned.     MEDICATIONS  Infusions:  Adult Clinimix TPN, Last Rate: 83 mL/hr (10/25/23 2005)      Scheduled:  aspirin, 150 mg, Daily  fat emulsion fish oil/plant based, 250 mL, Daily  fentaNYL, 1 patch, q72h  heparin (porcine), 5,000 Units, q8h  heparin flush, 50 Units, q12h  heparin flush, 50 Units, q12h  insulin NPH (Isophane), 15 Units, Nightly  insulin NPH (Isophane), 7 Units, Daily  insulin regular, 0-10 Units, q6h  insulin regular, 4 Units, q6h  lidocaine, 1 mL, Once  lidocaine, 1 patch, Daily  melatonin, 5 mg, Nightly  methylPREDNISolone sodium succinate (PF), 4 mg, q24h  micafungin, 100 mg, q24h  nystatin, 4 mL, BID  pantoprazole, 40 mg, BID  piperacillin-tazobactam, 3.375 g, q6h  tacrolimus, 0.5 mg, q24h  tacrolimus, 1.5 mg, Daily      PRN:  alteplase, 2 mg, PRN  alteplase, 2 mg, PRN  alteplase, 2 mg, PRN  alteplase, 2 mg, PRN  dextrose 10 % in water (D10W), 0.3 g/kg/hr, Once PRN  dextrose, 25 g, q15 min PRN  glucagon, 1 mg, q15 min PRN  heparin flush, 50 Units, PRN  heparin lock flush (porcine), 500 Units, Once PRN  heparin lock flush (porcine), 500 Units, Once PRN  hydrALAZINE, 20 mg, q6h PRN  ipratropium-albuteroL, 3 mL, q4h PRN  naloxone, 0.2 mg, PRN  naloxone, 0.2 mg, PRN  OLANZapine, 2.5 mg, q6h PRN  oxyCODONE, 10 mg, q6h PRN  oxyCODONE, 5 mg, q6h PRN  oxygen, , Continuous PRN - O2/gases        PHYSICAL EXAM:   Visit Vitals  BP (!) 142/98 (BP Location: Left arm, Patient Position: Lying)   Pulse 90   Temp 36.2 °C (97.2 °F)   Resp 22  "  Ht 1.765 m (5' 9.49\")   Wt 70.5 kg (155 lb 6.8 oz)   SpO2 100%   BMI 22.63 kg/m²   Smoking Status Former   BSA 1.86 m²     Wt Readings from Last 5 Encounters:   10/23/23 70.5 kg (155 lb 6.8 oz)   10/05/23 78 kg (171 lb 15.3 oz)   09/28/23 80 kg (176 lb 5.9 oz)   06/13/23 80.9 kg (178 lb 6 oz)   06/08/23 82.3 kg (181 lb 6 oz)     INTAKE/OUTPUT:  I/O last 3 completed shifts:  In: 4003.8 (56.8 mL/kg) [P.O.:50; Blood:336.3; IV Piggyback:50]  Out: 950 (13.5 mL/kg) [Urine:350 (0.1 mL/kg/hr); Emesis/NG output:200; Drains:400]  Weight: 70.5 kg      Physical Exam  Constitutional:       Appearance: He is normal weight.      Comments: Chronically ill appearing   HENT:      Head: Normocephalic.      Mouth/Throat:      Mouth: Mucous membranes are moist.   Cardiovascular:      Rate and Rhythm: Normal rate and regular rhythm.      Pulses: Normal pulses.      Heart sounds: No murmur heard.  Pulmonary:      Effort: Pulmonary effort is normal. No respiratory distress.      Breath sounds: Normal breath sounds. No wheezing or rales.   Abdominal:      General: Bowel sounds are normal. There is no distension.      Tenderness: There is no abdominal tenderness.      Comments: +midline abdominal dressing, +PEG intact. + 3 IRVING drains with serosanguinous fluid   Musculoskeletal:      Cervical back: Normal range of motion.      Right lower leg: No edema.      Left lower leg: No edema.   Skin:     General: Skin is warm and dry.      Capillary Refill: Capillary refill takes less than 2 seconds.   Neurological:      General: No focal deficit present.      Comments: Disoriented and confused.     DATA:  CMP:  Results from last 7 days   Lab Units 10/25/23  0643 10/24/23  0612 10/23/23  0450 10/22/23  0434 10/21/23  0544 10/20/23  0539 10/19/23  0549   SODIUM mmol/L 139 141 145 146* 143 147* 146*   POTASSIUM mmol/L 4.5 4.7 4.4 4.1 4.5 4.0 4.0   CHLORIDE mmol/L 106 109* 111* 112* 109* 111* 112*   CO2 mmol/L 22 21 20* 20* 24 24 26   ANION GAP mmol/L " "16 16 18 18 15 16 12   BUN mg/dL 48* 50* 53* 54* 57* 54* 58*   CREATININE mg/dL 2.98* 2.87* 2.59* 2.54* 2.14* 2.23* 2.28*   EGFR mL/min/1.73m*2 22* 23* 26* 27* 33* 32* 31*   MAGNESIUM mg/dL 1.65 1.88 1.84 2.05 1.91 2.19 1.88   ALBUMIN g/dL 2.4* 2.4* 2.6* 2.4* 2.4* 2.4* 2.5*   ALT U/L  --   --   --  14  --   --  16   AST U/L  --   --   --  20  --   --  17   BILIRUBIN TOTAL mg/dL  --   --   --  0.9  --   --  1.1       CBC:  Results from last 7 days   Lab Units 10/25/23  0643 10/24/23  1800 10/24/23  0612 10/23/23  0450 10/22/23  0434 10/21/23  0544 10/20/23  0539 10/19/23  0549   WBC AUTO x10*3/uL 12.5*  --  12.9* 16.0* 16.7* 16.5* 16.4* 17.9*   HEMOGLOBIN g/dL 8.1* 7.4* 6.9* 7.3* 7.5* 7.0* 7.2* 7.6*   HEMATOCRIT % 24.2* 23.5* 21.9* 23.3* 24.7* 22.7* 23.3* 23.8*   PLATELETS AUTO x10*3/uL 339  --  374 433 424 418 357 356   MCV fL 88  --  93 95 97 97 95 92       COAG:         ABO:   ABO TYPE   Date Value Ref Range Status   10/24/2023 B  Final     HEME/ENDO:  Results from last 7 days   Lab Units 10/23/23  0450   TSH mIU/L 0.79        CARDIAC:       No lab exists for component: \"CK\", \"CKMBP\"    Prior to Admission Meds:  Medications Prior to Admission   Medication Sig Dispense Refill Last Dose    acetaminophen (Tylenol) 325 mg tablet 2 tab(s) orally every 6 hours, As needed, Pain - Mild (1-3)       albuterol 90 mcg/actuation inhaler use 2 (TWO) puffs FOUR TIMES DAILY       allopurinol (Zyloprim) 100 mg tablet        aspirin 81 mg chewable tablet CHEW AND SWALLOW 1 TABLET DAILY.       blood sugar diagnostic (OneTouch Verio test strips) strip TEST 3 TIMES DAILY       buPROPion XL (Wellbutrin XL) 150 mg 24 hr tablet Take 1 tablet (150 mg) by mouth once daily.       busPIRone (Buspar) 5 mg tablet Take 1 tablet (5 mg) by mouth 3 times a day.       calcium carbonate 600 mg calcium (1,500 mg) tablet Take 1 tablet (600 mg) by mouth 2 times a day.       cholecalciferol (Vitamin D-3) 125 MCG (5000 UT) capsule Take 1 capsule (125 " mcg) by mouth once daily.       citalopram (CeleXA) 40 mg tablet        diphenoxylate-atropine (Lomotil) 2.5-0.025 mg tablet 2 tablets 4 times a day as needed for diarrhea.       fenofibrate (Triglide) 160 mg tablet Take 1 tablet (160 mg) by mouth once daily.       ferrous sulfate 325 (65 Fe) MG tablet Take 1 tablet (325 mg) by mouth once daily.       gabapentin (Neurontin) 100 mg capsule Take 2 capsules (200 mg) by mouth once daily at bedtime.       hydrALAZINE (Apresoline) 50 mg tablet Take 1 tablet (50 mg) by mouth 2 times a day.       icosapent ethyL (Vascepa) 1 gram capsule Take 1 capsule (1 g) by mouth twice a day.       insulin glargine (Lantus U-100 Insulin) 100 unit/mL injection Inject 10 Units under the skin once daily in the morning.       insulin lispro (HumaLOG) 100 unit/mL injection Inject under the skin 3 times a day with meals. Using a sliding scale       lidocaine-prilocaine (Emla) 2.5-2.5 % cream Apply topically to affected area 30 min prior to port       magnesium oxide (Mag-Ox) 400 mg (241.3 mg magnesium) tablet Take 2 tablets (800 mg) by mouth 2 times a day.       multivitamin with minerals tablet 1 tablet DAILY (route: oral)       pantoprazole (ProtoNix) 40 mg EC tablet Take 1 tablet (40 mg) by mouth twice a day.       rosuvastatin (Crestor) 40 mg tablet Take 1 tablet (40 mg) by mouth once daily at bedtime.       tacrolimus (Prograf) 1 mg capsule TAKE 4 CAPSULES every morning and TAKE 3 CAPSULES every evening          ASSESSMENT AND PLAN:   Chung Goodman is a 67-year-old man with history of NICM secondary to valvular disease, s/p mitral valve repair with [place] Carbomedics Annuloflex band II with 36 mm, s/p bicaval OHT (3/24/2017) with post-op complicated by atrial arrhythmia, RV failure, chronotropic incompetence, pancreatic adenocarcinoma, s/p Whipple procedure (5/19/23), currently on chemotherapy (modified FOLFIRINOX), HTN, DM type 2, HLD, CKD Stage 3B (baseline creatinine 2),  amiodarone-induced hyperthyroidism, JE, MDD, transferred from [facility] for recurrent SBO. Blood culture positive for E coli. [Immunosuppressive regimen at home: Tacrolimus 4 mg/3 mg (Tacrolimus Goal 5-8), prednisone 5 mg daily. ] No rejection history. Latest DSE: negative (5/2023). On 10/16, he had drainage from abdominal wound suture. He was taken to the OR for repair of fascial dehiscence. S/p ex lap, washout, drain placement with retention sutures (10/17).     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 Coli bacteremia, currently on IV antibiotic  # s/p EGD with dilation of afferent limb anastomotic stenosis (10/10/23)  #s/p Ex-lap, small bowel resection, jejuno-jejunostomy, G-tube (10/10/23)  # Perforated afferent limb  # Fascial dehiscence, s/p ex lap, washout, drain placement with retention sutures (10/17)  # Hypertension, agitation possibly 2/2 ?steroid use, ?hospital acquired delirium     Recommendations:  - Latest Tacrolimus (10/25): 5.9  - Continue Tacrolimus 1.5 mg SL at 0630, 0.5 mg SL at 1830. Tacrolimus target goal: 5-8.   - Following 6AM troughs. Please ensure they are drawn at 6AM otherwise it is not a true trough.  - Continue current regimen of SL tacrolimus and 4mg IV solumedrol as long as primary team planning to use G-tube.   - Ongoing GOC discussions with family, appears the plan is going home with hospice.    HF service will sign off. Please call with any questions.     Patient was seen at the bedside with HF attending, Dr. Dwight Atkinson, DO

## 2023-10-26 NOTE — PROGRESS NOTES
10/26/23 1300   Discharge Planning   Living Arrangements Spouse/significant other   Support Systems Spouse/significant other   Type of Residence Private residence   Type of Home Care Services Hospice     LUIS Robertson, from [facility] contacted  this morning and shared that they are speaking with the pt.'s wife and recommending transfer to [facility], However, the pt.'s wife wants to take him home and shared the medical team was in agreement.  contacted Dr. Gonzales and provided updates and he shared he thinks transfer to hospice [facility] is the best plan and will call the wife.  Ailyn from [facility] contacted  and shared the pt.'s wife will have a tour of the facility today at 2:00pm and meet with the Medical Director. Will continue to follow for support.

## 2023-10-26 NOTE — CONSULTS
Wound Care Consult     Visit Date: 10/26/2023      Patient Name: Chung Goodman         MRN: [id number]           YOB: 1955     Reason for Consult: apply wound manager to distal incision         Wound History: PMHx significant for NICM 2/2 valvular disease s/p OHT (2017), s/p MV repair, HTN, HLD, CKD3, IDDM2, amiodarone-induced hyperthyroidism, COPD, and pancreatic cancer s/p whipple (5/19/2023) continues to receive modified FOLFIRINOX, recently admitted 9/23-9/29 for SBO treated with NG tube and conservative management with no surgical intervention, then presented to [facility] ED on 10/5 with abdominal pain, nausea, vomiting, fever, and chills, sepsis 2/2 E. Coli bacteremia (started on Zosyn), found to have recurrent SBO, transferred to [facility] on 10/7 for further monitoring and management given recent whipple.      Pertinent Labs:   Albumin   Date Value Ref Range Status   10/25/2023 2.4 (L) 3.4 - 5.0 g/dL Final   01/12/2018 4.3 3.4 - 5.0 g/dL Final     Albumin (Data Conversion)   Date Value Ref Range Status   02/14/2018 66.8 mg/dL Final     ALBUMIN (MG/L) IN URINE   Date Value Ref Range Status   07/28/2022 1,491.2 Not Established mg/L Final       Wound Assessment:  Wound 10/10/23 Incision Abdomen Medial;Anterior (Active)   Site Assessment Firth 10/26/23 1800   Yani-Wound Assessment Intact 10/26/23 1800   State of Healing Closed wound edges 10/25/23 1006   Margins Well-defined edges 10/26/23 1800   Closure Dehisced 10/26/23 0000   Sutures/Staple Line Non approximated 10/26/23 1800   Drainage Description Brown 10/26/23 1800   Drainage Amount Small 10/26/23 1800   Dressing Kerlix/rolled gauze 10/26/23 1800   Dressing Status Clean 10/26/23 1800       Wound 10/17/23 Other (comment) Abdomen Medial;Upper (Active)   Wound Image   10/20/23 1209   Site Assessment Unable to assess 10/25/23 1006   Yani-Wound Assessment Other (Comment) 10/23/23 2100   Non-staged Wound Description Full thickness 10/20/23 4412    Wound Length (cm) 23.5 cm 10/20/23 1209   Wound Width (cm) 2 cm 10/20/23 1209   Wound Surface Area (cm^2) 47 cm^2 10/20/23 1209   Wound Depth (cm) 1.5 cm 10/20/23 1209   Wound Volume (cm^3) 70.5 cm^3 10/20/23 1209   Margins Attached edges;Well-defined edges 10/20/23 1209   Drainage Description Brown;Yellow 10/25/23 2035   Drainage Amount Scant 10/25/23 1006   Dressing Kerlix/rolled gauze;Gauze;ABD 10/25/23 2035   Dressing Status New drainage;Other (Comment) 10/25/23 2035       Wound 10/20/23 Pressure Injury Sacrum Mid (Active)   Wound Image   10/20/23 1215   Site Assessment Dry;Pink;Red 10/26/23 0000   Yani-Wound Assessment Dry 10/26/23 0000   Pressure Injury Stage 2 10/26/23 0000   Wound Length (cm) 3 cm 10/20/23 1215   Wound Width (cm) 4 cm 10/20/23 1215   Wound Surface Area (cm^2) 12 cm^2 10/20/23 1215   Drainage Description None 10/26/23 0000   Drainage Amount None 10/26/23 0000   Dressing Open to air 10/25/23 1006   Dressing Changed Reinforced 10/24/23 0822       Wound Team Summary Assessment:   Wound location: midline abdomen                           undermining: not probed     tracking: not probed                                        Wound type: surgical   Wound bed: pink wound. Surgical team managing proximal wound   Draining: mucous brown drainage   Periwound skin: intact  Pouch: Hollihesive, Active Life pouch applied, stomahesive paste     Recommendation: Change every 5-7 days         Wound Team Plan: Please review recommendations and enter into EMR. Wound/Osotmy team will help with pouching while patient is  inpatient      Lizz HIDALGO   10/26/2023  7:00 PM

## 2023-10-26 NOTE — CARE PLAN
Problem: Pain  Goal: Takes deep breaths with improved pain control throughout the shift  Outcome: Progressing  Goal: Turns in bed with improved pain control throughout the shift  Outcome: Progressing  Goal: Walks with improved pain control throughout the shift  Outcome: Progressing  Goal: Performs ADL's with improved pain control throughout shift  Outcome: Progressing  Goal: Participates in PT with improved pain control throughout the shift  Outcome: Progressing  Goal: Free from opioid side effects throughout the shift  Outcome: Progressing  Goal: Free from acute confusion related to pain meds throughout the shift  Outcome: Progressing   The patient's goals for the shift include      The clinical goals for the shift include patient will have pain improvements

## 2023-10-26 NOTE — CARE PLAN
Problem: Skin  Goal: Decreased wound size/increased tissue granulation at next dressing change  10/26/2023 0105 by Mallory Gar RN  Flowsheets (Taken 10/26/2023 0105)  Decreased wound size/increased tissue granulation at next dressing change: Promote sleep for wound healing  10/26/2023 0102 by Mallory Gar RN  Outcome: Progressing  10/26/2023 0057 by Mallory Gar RN  Outcome: Progressing  Goal: Participates in plan/prevention/treatment measures  10/26/2023 0106 by Mallory Gar RN  Flowsheets (Taken 10/25/2023 1800)  Participates in plan/prevention/treatment measures: Elevate heels  10/26/2023 0102 by Mallory Gar RN  Outcome: Progressing  10/26/2023 0057 by Mallory Gar RN  Outcome: Progressing  Flowsheets (Taken 10/25/2023 1800)  Participates in plan/prevention/treatment measures: Elevate heels  Goal: Prevent/manage excess moisture  10/26/2023 0106 by Mallory Gar RN  Flowsheets (Taken 10/25/2023 1800)  Prevent/manage excess moisture:   Cleanse incontinence/protect with barrier cream   Follow provider orders for dressing changes  10/26/2023 0102 by Mallory Gar RN  Outcome: Progressing  10/26/2023 0057 by Mallory Gar RN  Outcome: Progressing  Flowsheets (Taken 10/25/2023 1800)  Prevent/manage excess moisture:   Cleanse incontinence/protect with barrier cream   Follow provider orders for dressing changes  Goal: Prevent/minimize sheer/friction injuries  10/26/2023 0106 by Mallory Gar RN  Flowsheets (Taken 10/25/2023 1800)  Prevent/minimize sheer/friction injuries:   Use pull sheet   Turn/reposition every 2 hours/use positioning/transfer devices  10/26/2023 0102 by Mallory Gar RN  Outcome: Progressing  10/26/2023 0057 by Mallory aGr RN  Outcome: Progressing  Flowsheets (Taken 10/25/2023 1800)  Prevent/minimize sheer/friction injuries:   Use pull sheet   Turn/reposition every 2 hours/use positioning/transfer devices  Goal: Promote/optimize nutrition  10/26/2023 0106  by Mallory Gar RN  Flowsheets (Taken 10/25/2023 1800)  Promote/optimize nutrition: Offer water/supplements/favorite foods  10/26/2023 0102 by Mlalory Gar RN  Outcome: Progressing  10/26/2023 0057 by Mallory Gar RN  Outcome: Progressing  Flowsheets (Taken 10/25/2023 1800)  Promote/optimize nutrition: Offer water/supplements/favorite foods  Goal: Promote skin healing  10/26/2023 0106 by Mallory Gar RN  Flowsheets (Taken 10/25/2023 1800)  Promote skin healing:   Turn/reposition every 2 hours/use positioning/transfer devices   Assess skin/pad under line(s)/device(s)  10/26/2023 0102 by Mallory Gar RN  Outcome: Progressing  10/26/2023 0057 by Mallory Gar RN  Outcome: Progressing  Flowsheets (Taken 10/25/2023 1800)  Promote skin healing:   Turn/reposition every 2 hours/use positioning/transfer devices   Assess skin/pad under line(s)/device(s)   The patient's goals for the shift include      The clinical goals for the shift include patient will have pain improvements

## 2023-10-26 NOTE — CARE PLAN
Problem: Pain  Goal: Takes deep breaths with improved pain control throughout the shift  10/26/2023 0111 by Mallory Gar RN  Outcome: Progressing  10/26/2023 0110 by Mallory Gar RN  Outcome: Progressing  10/26/2023 0102 by Mallory Gar RN  Outcome: Progressing  10/26/2023 0057 by Mallory Gar RN  Outcome: Progressing  Goal: Turns in bed with improved pain control throughout the shift  10/26/2023 0111 by Mallory Gar RN  Outcome: Progressing  10/26/2023 0110 by Mallory Gar RN  Outcome: Progressing  10/26/2023 0102 by Mallory Gar RN  Outcome: Progressing  10/26/2023 0057 by Mallory Gar RN  Outcome: Progressing  Goal: Walks with improved pain control throughout the shift  10/26/2023 0111 by Mallory Gar RN  Outcome: Progressing  10/26/2023 0110 by Mallory Gar RN  Outcome: Progressing  10/26/2023 0102 by Mallory Gar RN  Outcome: Progressing  10/26/2023 0057 by Mallory Gar RN  Outcome: Progressing  Goal: Performs ADL's with improved pain control throughout shift  10/26/2023 0111 by Mallory Gar RN  Outcome: Progressing  10/26/2023 0110 by Mallory Gar RN  Outcome: Progressing  10/26/2023 0102 by Mallory Gar RN  Outcome: Progressing  10/26/2023 0057 by Mallory Gar RN  Outcome: Progressing  Goal: Participates in PT with improved pain control throughout the shift  10/26/2023 0111 by Mallory Gar RN  Outcome: Progressing  10/26/2023 0110 by Mallory Gar RN  Outcome: Progressing  10/26/2023 0102 by Mallory Gar RN  Outcome: Progressing  10/26/2023 0057 by Mallory Gar RN  Outcome: Progressing  Goal: Free from opioid side effects throughout the shift  10/26/2023 0111 by Mallory Gar RN  Outcome: Progressing  10/26/2023 0110 by Mallory Gar RN  Outcome: Progressing  10/26/2023 0102 by Mallory Gar RN  Outcome: Progressing  10/26/2023 0057 by Mallory Matthews, RN  Outcome: Progressing  Goal: Free from acute confusion related to  pain meds throughout the shift  10/26/2023 0111 by Mallory Gar RN  Outcome: Progressing  10/26/2023 0110 by Mallory Gar RN  Outcome: Progressing  10/26/2023 0102 by Mallory Gar RN  Outcome: Progressing  10/26/2023 0057 by Mallory Gar RN  Outcome: Progressing   The patient's goals for the shift include      The clinical goals for the shift include patient will have pain improvements

## 2023-10-26 NOTE — CARE PLAN
Problem: Pain  Goal: Takes deep breaths with improved pain control throughout the shift  10/26/2023 0113 by Mallory Gar RN  Outcome: Progressing  10/26/2023 0111 by Mallory Gar RN  Outcome: Progressing  10/26/2023 0110 by Mallory Gar RN  Outcome: Progressing  10/26/2023 0102 by Mallory Gar RN  Outcome: Progressing  10/26/2023 0057 by Mallory aGr RN  Outcome: Progressing  Goal: Turns in bed with improved pain control throughout the shift  10/26/2023 0113 by Mallory Gar RN  Outcome: Progressing  10/26/2023 0111 by Mallory Gar RN  Outcome: Progressing  10/26/2023 0110 by Mallory Gar RN  Outcome: Progressing  10/26/2023 0102 by Mallory Gar RN  Outcome: Progressing  10/26/2023 0057 by Mallory Gar RN  Outcome: Progressing  Goal: Walks with improved pain control throughout the shift  10/26/2023 0113 by Mallory Gar RN  Outcome: Progressing  10/26/2023 0111 by Mallory Gar RN  Outcome: Progressing  10/26/2023 0110 by Mallory Gar RN  Outcome: Progressing  10/26/2023 0102 by Mallory Gar RN  Outcome: Progressing  10/26/2023 0057 by Mallory Gar RN  Outcome: Progressing  Goal: Performs ADL's with improved pain control throughout shift  10/26/2023 0113 by Mallory Gar RN  Outcome: Progressing  10/26/2023 0111 by Mallory Gar RN  Outcome: Progressing  10/26/2023 0110 by Mallory Gar RN  Outcome: Progressing  10/26/2023 0102 by Mallory Gar RN  Outcome: Progressing  10/26/2023 0057 by Mallory Gar RN  Outcome: Progressing  Goal: Participates in PT with improved pain control throughout the shift  10/26/2023 0113 by Mallory Gar RN  Outcome: Progressing  10/26/2023 0111 by Mallory Gar RN  Outcome: Progressing  10/26/2023 0110 by Mallory Gar RN  Outcome: Progressing  10/26/2023 0102 by Mallory Gar RN  Outcome: Progressing  10/26/2023 0057 by Mallory Rin, RN  Outcome: Progressing  Goal: Free from opioid side effects  throughout the shift  10/26/2023 0113 by Mallory Gar RN  Outcome: Progressing  10/26/2023 0111 by Mallory Gar RN  Outcome: Progressing  10/26/2023 0110 by Mallory Gar RN  Outcome: Progressing  10/26/2023 0102 by Mallory Gar RN  Outcome: Progressing  10/26/2023 0057 by Mallory Gar RN  Outcome: Progressing  Goal: Free from acute confusion related to pain meds throughout the shift  10/26/2023 0113 by Mallory Gar RN  Outcome: Progressing  10/26/2023 0111 by Mallory Gar RN  Outcome: Progressing  10/26/2023 0110 by Mallory Gar RN  Outcome: Progressing  10/26/2023 0102 by Mallory Gar RN  Outcome: Progressing  10/26/2023 0057 by Mallory Gar RN  Outcome: Progressing   The patient's goals for the shift include      The clinical goals for the shift include patient will have pain improvements    .

## 2023-10-26 NOTE — PROGRESS NOTES
[facility]IVE [facility] PALLIATIVE ONCOLOGY INPATIENT FOLLOW-UP      SERVICE DATE: 10/26/23     SUBJECTIVE:  Interval Events: Code violet last night  Since patient is agitated and tried to leave the room multiple times.  Given Zyprexa at midnight and in the morning.  Blood pressure has been high    Pain Assessment:  Per nursing patient was moaning in pain today and is now resting after receiving Dilaudid  Location:  Abdomen  Duration:  Postsurgery abdominal pain    PainAD  Breathing 1  Negative vocalization 2  Facial expression 0  Body language 0  Consolability 0    TOTAL 3/10 with dilaudid iv      Symptom Assessment:  Unable to obtain secondary to Encephalopathy     Information obtained from: chart review, discussion with primary team, and Bedside sitter  ______________________________________________________________________        OBJECTIVE:    Lab Results   Component Value Date    WBC 12.5 (H) 10/25/2023    HGB 8.1 (L) 10/25/2023    HCT 24.2 (L) 10/25/2023    MCV 88 10/25/2023     10/25/2023      Lab Results   Component Value Date    GLUCOSE 186 (H) 10/25/2023    CALCIUM 8.9 10/25/2023     10/25/2023    K 4.5 10/25/2023    CO2 22 10/25/2023     10/25/2023    BUN 48 (H) 10/25/2023    CREATININE 2.98 (H) 10/25/2023     Lab Results   Component Value Date    ALT 14 10/22/2023    AST 20 10/22/2023    ALKPHOS 111 10/22/2023    BILITOT 0.9 10/22/2023     Estimated Creatinine Clearance: 24 mL/min (A) (by C-G formula based on SCr of 2.98 mg/dL (H)).     Scheduled medications  aspirin, 150 mg, rectal, Daily  fat emulsion fish oil/plant based, 250 mL, intravenous, Daily  fentaNYL, 1 patch, transdermal, q72h  heparin (porcine), 5,000 Units, subcutaneous, q8h  heparin flush, 50 Units, intra-catheter, q12h  insulin NPH (Isophane), 15 Units, subcutaneous, Nightly  insulin NPH (Isophane), 7 Units, subcutaneous, Daily  insulin regular, 0-10 Units, subcutaneous, q6h  insulin regular, 4 Units, subcutaneous,  q6h  lidocaine, 1 mL, infiltration, Once  lidocaine, 1 patch, transdermal, Daily  melatonin, 5 mg, oral, Nightly  methylPREDNISolone sodium succinate (PF), 4 mg, intravenous, q24h  micafungin, 100 mg, intravenous, q24h  nystatin, 4 mL, Swish & Swallow, BID  pantoprazole, 40 mg, intravenous, BID  piperacillin-tazobactam, 3.375 g, intravenous, q6h  tacrolimus, 0.5 mg, sublingual, q24h  tacrolimus, 1.5 mg, sublingual, Daily      Continuous medications  Adult Clinimix TPN, 83 mL/hr, Last Rate: 83 mL/hr (10/25/23 2005)      PRN medications  PRN medications: alteplase, alteplase, dextrose 10 % in water (D10W), dextrose, glucagon, heparin flush, heparin lock flush (porcine), hydrALAZINE, ipratropium-albuteroL, naloxone, OLANZapine, oxyCODONE, oxyCODONE   }     PHYSICAL EXAMINATION:    Vital Signs:   Vital signs reviewed  Visit Vitals  BP (!) 165/95 (BP Location: Right arm, Patient Position: Lying)   Pulse 99   Temp 36.5 °C (97.7 °F)   Resp 24        Pain Score: 9  PAINAD Score:  [0]        Physical Exam   Constitutional:       Comments: Resting in bed delirious laying in bed.    HENT:      Head: Normocephalic and atraumatic.      Mouth/Throat:      Mouth: Mucous membranes are moist.   Eyes:      Extraocular Movements: Extraocular movements intact.      Pupils: Pupils are equal, round, and reactive to light.   Cardiovascular:      Rate and Rhythm: Tachycardia present.   Pulmonary:      Effort: Pulmonary effort is normal.   Abdominal:      Comments: Abdominal binder.  Gastrostomy draining to gravity.  Musculoskeletal:      Cervical back: Neck supple.      Comments: Generalized weakness.  No edema   Skin:     General: Skin is warm and dry.   Neurological:      Mental Status: He is disoriented.      Comments: Alert oriented to self and place.  Knows he that he is in the hospital.  Follows commands and was able to raise his hands and lower extremities to verbal command   Psychiatric:      Comments: Intermittently restless         ASSESSMENT/PLAN  Chung Goodman is a 67 y.o. male with history of heart transplant X 2017, pancreatic cancer s/p Whipple's on 5/19/2023 on chemotherapy FOLFIRINOX, moderate malnutrition secondary to chronic disease, recurrent pancreatitis, CKD 3, recent admission for SBO (9/23-9/29), who was admitted to [facility] on 10/5 with emesis, fever. Scans showed SBO and blood cultures grew E. coli.  Patient was transferred to [facility] on 5 mg for further management of SBO. Pt had EGD with dilatation of afferent limb anastomotic stenosis on 10/10, however course c/b SBO with perf requiring emergent laparotomy with partial SB resection, jejuno-jejunostomy, G-tube. On 10/17 pt found to have fascial dehiscence of abdominal incision and underwent emergency laparotomy with fascial retention sutures placed. Supportive oncology consulted for pain management.     Neoplasm Related Pain  Acute Post Operative Pain.  Fair control  Type: Visceral and somatic  Home regimen: Gabapentin 200 mg PO HS, Oxycodone IR 5 mg q4-6h PRN, Tramdol 50mg BID PRN  Intolerances/previously tried: Morphine allergy (rx: itching)  Personalized pain goal: Could not be established since patient was lethargic.  ORT-OUD Score: Total Score: 1  due to mental health history of depression indicating Opioid Risk Category: Low  of future opioid use disorder.  Creatinine Clearance: 24 mL/min (A)   (10/18) LFTs WNL  Recommend starting home gabapentin 200 mg p.o. nightly  Continue Fentanyl patch 50 mcg transdermal every 72 hours placed on 10/24 at around 9 PM   Recommend Dilaudid 0.2 mg IV Every 3 hours as needed for breakthrough pain  Continue oxycodone elixir p.o. 5 mg every 6 hours as needed for moderate pain.  Used 1 dose /24 hours=5 milligrams oxycodone/24 hours  Continue oxycodone elixir 10 mg every 6 hours as needed severe pain.  Used 3 dose /24 hours= 30 mg oxycodone/24 hours  Total 35 mg oxycodone/24 hours= approximately 44 mg OME  Continue lidocaine 4% patch  daily     Risk for opioid-induced constipation aggravated by decreased functional status    -Gastrostomy open to gravity  Last BM: unknown  Encourage ambulation.  PT OT following   will continue monitor     Chronic depression   Acute delirium could be secondary to uncontrolled pain, decreased sleep  Well controlled with home regimen  Home regimen: Wellbutrin XL 150mg PO daily, Buspar 5mg PO TID  Psych consulted  Recommend changing Zyprexa 2.5 mg IM/PO to Zyprexa Zydis 2.5 mg sublingual every 6 hours as needed for agitation  Recommend starting home Wellbutrin XL 150mg PO daily and Buspar 5mg PO TID per psychiatry    Delirium protocol  Pain management as above  Continue melatonin 5 mg p.o. nightly     Oral thrush  Continue nystatin per primary team     Medical Decision Making/Goals of Care/Advance Care Planning: Wife plans to visit inpatient hospice facility [facility].  Wife wanted to take him home however the best plan is to go to hospice house in the best interest of patient. Plan to discharge with TPN.     10/25/23     Per chart review family would like to proceed with hospice if TPN can be provided.       Per , [facility]  awaiting would accept the patient including TPN and a wound vac.  Awaiting for [facility] RN visit today.      Per my conversation with wife Krystle over the phone who talk to the hospice coordinator on phone, her understanding is plan to enroll with hospice services with TPN.  Wife would like to bring him home so that his delirium gets better however hospice team was recommending placement in facility because of his delirium.  Wife further stated that family is making a schedule to ensure someone is always present with him 24/7.  Hence she is not clear about the disposition plan yet.       When I tried to talk to her about the CODE STATUS, she deferred the conversation by stating that multiple providers are asking her the same question and that she is not willing to make any decision at this time and  is hoping that her 's mental status improves so he can make decisions for himself.  She was getting ready to come to the hospital and we decided to further conversations when she comes to the hospital     (Per my prior conversation 10/18) Patient's current clinical condition, including diagnosis, prognosis, and management plan, and goals of care were discussed.   Life limiting disease: metastatic malignancy  Family: Supportive lives at home with wife  Performance status: Major limitations due to pain and disease process  Joys/meaning/strength: Music, believes in God  Understanding of health: Demonstrates good prognostic understanding of disease process, wife understands that 4 months ago patient had Whipple's procedure and then was admitted 2 weeks ago due to concern for bowel obstruction.  1 week ago patient had revision of the whipple however developed complications and had to redo Whipple's.  Last night he had leakage from abdominal area and hence went for emergency surgery early morning today.  Wife also understands that patient has recurrence of cancer  Information: Wants full disclosure  Goals: Symptom control  Code status discussion: We had conversation regarding resuscitation and intubation in the event of cardiac arrest.  Wife stated that she has not discussed this with her  however feels that resuscitation and intubation may not be appropriate.  She will discuss with her .     Advance Directives  Existence of Advance Directives: Yes, but NOT documented in medical record  Decision maker: Surrogate decision maker is wife  Code Status: Full code  Living will: None     Supportive Interventions: Interventions: Music Therapy: referral placed pain management, Art Therapy: referral placed, Social work referral for: Advance Directives   has been following     Disposition   Discharge date: unknown pending acute issues and pain control      Discharge date: unknown pending acute issues and  pain control      Signature and billing:  Thank you for allowing us to participate in the care of this patient. Recommendations will be communicated back to the consulting service by way of shared electronic medical record or face-to-face.    Medical complexity was high level due to due to complexity of problems, extensive data review, and high risk of management/treatment.    I spent 50 minutes in the care of this patient which included chart review, interviewing patient/family, discussion with primary team, coordination of care, and documentation.    Data:   Diagnostic tests and information reviewed for today's visit:  No new labs    Plan of Care discussed with: Provider and Consultants: psych    Thank you for asking Supportive and Palliative Oncology to assist with care of this patient.  We will continue to follow  Please contact us for additional questions or concerns.      SIGNATURE: Lyndsey Shipley MD   PAGER/CONTACT:  Contact information:  [facility]ive [facility] Palliative Oncology  Monday-Friday 8 AM-5 PM  Epic Secure chat or pager [phone].  After hours and weekends:  pager [phone]

## 2023-10-26 NOTE — PROGRESS NOTES
Physical Therapy                 Therapy Communication Note    Patient Name: Chung Goodman  MRN: [id number]  Today's Date: 10/26/2023     Discipline: Physical Therapy    Missed Visit Reason: Missed Visit Reason:  (RN hold due to agitation.)    Missed Time: Attempt 1409    Comment:

## 2023-10-26 NOTE — SIGNIFICANT EVENT
I was called to the bedside by Nurse Fabi Gar due to concerns about the patient becoming agitated during a dressing change. A code violet had just occurred, and the patient was examined by Dr. Huerta. Nurse Dunlap asked if a resident could assist with the dressing change and whether the abdominal binder could be left off. I emphasized the necessity of the abdominal binder to prevent the patient from disturbing the dressing and PEG, and to provide post-operative compression following a procedure on 10/17 for fascial dehiscence.    The patient initially resisted and Nurse Dunlap inquired about alternatives to the prescribed PRN Zyprexa, which hadn't been administered since 10/24 at 22:52. She also suggested soft restraints or involving  police to restrain the patient. I stressed the importance of following Psychiatry recommendations and exhausting all options before resorting to restraints or police intervention. We reached out to the patient's wife, Krystle Goodman, who spoke to the patient over the phone, offering distraction and reassurance. Another nurse and his sitter was able to hold his hands while I quickly changed his dressings and placed a new abdominal binder. Zyprexa wasn't required.    I was paged again at 21:03 due to the wife's concerns. Dr. Harris and I went to the bedside to address her concerns about the  police being suggested to restrain the patient, which she overheard on the phone call. We reassured her that it wasn't a suggestion from the surgical physician team and that the patient could be redirected over time. Nurse Dunlap later asked if heparin could be withheld due to concerns about the patient becoming agitated, but we insisted it must be given for DVT prophylaxis given the patient's high risk due to a history of cancer and recent surgery. Dr. Harris and I observed the administration of heparin, and the patient received the injection without any issues or agitation.  Zyprexa was not required during this encounter.    Shyla Pritchett MD  Surgical Oncology, [facility]  [id number]

## 2023-10-26 NOTE — CARE PLAN
Problem: Pain  Goal: Takes deep breaths with improved pain control throughout the shift  10/26/2023 0102 by Mallory Gar RN  Outcome: Progressing  10/26/2023 0057 by Mallory Gar RN  Outcome: Progressing  Goal: Turns in bed with improved pain control throughout the shift  10/26/2023 0102 by Mallory Gar RN  Outcome: Progressing  10/26/2023 0057 by Mallory Gar RN  Outcome: Progressing  Goal: Walks with improved pain control throughout the shift  10/26/2023 0102 by Mallory Gar RN  Outcome: Progressing  10/26/2023 0057 by Mallory Gar RN  Outcome: Progressing  Goal: Performs ADL's with improved pain control throughout shift  10/26/2023 0102 by Mallory Gar RN  Outcome: Progressing  10/26/2023 0057 by Mallory Gar RN  Outcome: Progressing  Goal: Participates in PT with improved pain control throughout the shift  10/26/2023 0102 by Mallory Gar RN  Outcome: Progressing  10/26/2023 0057 by Mallory Gar RN  Outcome: Progressing  Goal: Free from opioid side effects throughout the shift  10/26/2023 0102 by Mallory Gar RN  Outcome: Progressing  10/26/2023 0057 by Mallory Gar RN  Outcome: Progressing  Goal: Free from acute confusion related to pain meds throughout the shift  10/26/2023 0102 by Mallory Gar RN  Outcome: Progressing  10/26/2023 0057 by Mallory Gar RN  Outcome: Progressing   The patient's goals for the shift include      The clinical goals for the shift include patient will have pain improvements

## 2023-10-27 NOTE — PROGRESS NOTES
"Chung Goodman is a 67 y.o. male on day 20 of admission presenting with Bowel perforation (CMS/HCC). Psychiatry consulted for management of delirium.       Subjective   Patient seen in room, wife is at bedside. Patient is calm and pleasant, states that he's \"fine.\" He is briefly engaged in conversation with no concerns of sleep or mood. He experiences dry heaving and retching into vomitus basin which terminates the interview for patient comfort.     Per nursing, no behavioral concerns or issues with sleep reported. No PRNs required overnight.          Objective     Last Recorded Vitals  Blood pressure (!) 153/92, pulse 91, temperature 36.6 °C (97.9 °F), temperature source Temporal, resp. rate 24, height 1.765 m (5' 9.49\"), weight 70.5 kg (155 lb 6.8 oz), SpO2 95 %.      Mental Status Exam  Appearance: AA male, frail, in beds under covers  Attitude: calm, cooperative  Behavior: Appropriate eye contact. No agitated or aggressive behavior.  Motor Activity: No PMA/PMR. No EPS/TD.  Speech: spontaneous, conversational volume and rate, slightly raspy  Mood: \"fine\"   Affect: constricted, mood-congruent  Thought Process: superficially organized and linear, confused  Thought Content: interview focused. Does not endorse SI/HI  Thought Perception: Does not endorse AVH. Does not appear RIS.   Cognition: Alert. Orientation not formally assessed due to limited interview  Insight: limited  Judgement: limited      Psychiatric Risk Assessment  Acute Risk of Harm to Others is Considered: moderate   Acute Risk of Harm to Self is Considered: low    Relevant Results  Scheduled medications  aspirin, 150 mg, rectal, Daily  fat emulsion fish oil/plant based, 250 mL, intravenous, Daily  fentaNYL, 1 patch, transdermal, q72h  heparin (porcine), 5,000 Units, subcutaneous, q8h  heparin flush, 50 Units, intra-catheter, q12h  insulin NPH (Isophane), 15 Units, subcutaneous, Nightly  insulin NPH (Isophane), 7 Units, subcutaneous, Daily  insulin " regular, 0-10 Units, subcutaneous, q6h  insulin regular, 4 Units, subcutaneous, q6h  lidocaine, 1 mL, infiltration, Once  lidocaine, 1 patch, transdermal, Daily  magnesium sulfate, 4 g, intravenous, Once  melatonin, 5 mg, oral, Nightly  methylPREDNISolone sodium succinate (PF), 4 mg, intravenous, q24h  micafungin, 100 mg, intravenous, q24h  nystatin, 4 mL, Swish & Swallow, BID  pantoprazole, 40 mg, intravenous, BID  piperacillin-tazobactam, 3.375 g, intravenous, q6h  tacrolimus, 0.5 mg, sublingual, q24h  tacrolimus, 1.5 mg, sublingual, Daily      Continuous medications  Adult Clinimix TPN, 83 mL/hr, Last Rate: 83 mL/hr (10/27/23 0500)      PRN medications  PRN medications: alteplase, alteplase, dextrose 10 % in water (D10W), dextrose, glucagon, heparin flush, heparin lock flush (porcine), hydrALAZINE, HYDROmorphone, ipratropium-albuteroL, naloxone, OLANZapine, ondansetron, oxyCODONE, oxyCODONE    Results for orders placed or performed during the hospital encounter of 10/07/23 (from the past 24 hour(s))   POCT GLUCOSE   Result Value Ref Range    POCT Glucose 112 (H) 74 - 99 mg/dL   POCT GLUCOSE   Result Value Ref Range    POCT Glucose 225 (H) 74 - 99 mg/dL   POCT GLUCOSE   Result Value Ref Range    POCT Glucose 128 (H) 74 - 99 mg/dL   Tacrolimus level   Result Value Ref Range    Tacrolimus  4.0 <=15.0 ng/mL   Tacrolimus level   Result Value Ref Range    Tacrolimus  3.6 <=15.0 ng/mL   CBC   Result Value Ref Range    WBC 15.0 (H) 4.4 - 11.3 x10*3/uL    nRBC 0.0 0.0 - 0.0 /100 WBCs    RBC 2.87 (L) 4.50 - 5.90 x10*6/uL    Hemoglobin 8.5 (L) 13.5 - 17.5 g/dL    Hematocrit 25.5 (L) 41.0 - 52.0 %    MCV 89 80 - 100 fL    MCH 29.6 26.0 - 34.0 pg    MCHC 33.3 32.0 - 36.0 g/dL    RDW 18.1 (H) 11.5 - 14.5 %    Platelets 317 150 - 450 x10*3/uL    MPV 11.4 7.5 - 11.5 fL   Renal Function Panel   Result Value Ref Range    Glucose 133 (H) 74 - 99 mg/dL    Sodium 136 136 - 145 mmol/L    Potassium 4.1 3.5 - 5.3 mmol/L    Chloride 104  98 - 107 mmol/L    Bicarbonate 21 21 - 32 mmol/L    Anion Gap 15 10 - 20 mmol/L    Urea Nitrogen 39 (H) 6 - 23 mg/dL    Creatinine 2.53 (H) 0.50 - 1.30 mg/dL    eGFR 27 (L) >60 mL/min/1.73m*2    Calcium 9.0 8.6 - 10.6 mg/dL    Phosphorus 3.9 2.5 - 4.9 mg/dL    Albumin 2.4 (L) 3.4 - 5.0 g/dL   Magnesium   Result Value Ref Range    Magnesium 1.50 (L) 1.60 - 2.40 mg/dL   POCT GLUCOSE   Result Value Ref Range    POCT Glucose 200 (H) 74 - 99 mg/dL          Assessment/Plan   Chung Goodman is a 67 y.o. male with PPHx of MDD, JE and a PMHx of history of heart transplant (2017 for NICM 2/2 valvular disease) and pancreatic cancer s/p whipple (5/19/2023) currently receiving modified FOLFIRINOX (follows with Dr. Dunaway), and recent admission for SBO who presented as a transfer from [facility] for recurrent SBO.  Psychiatry was consulted on 10/22/23 for management of delirium.    On initial assessment, patient noted to be lying in bed, partially clothed, oriented only to self. Per pts sitter, pt has been waxing and waning in levels of consciousness, and had earlier in the day been attempting to leave his bed, remove his clothes, and remove all of his IV lines. Pt with PCA pump for pain control which is likely contributing to his current presentation, as well as SBO, fluctuating glc levels. Pt received 5mg of haldol earlier today for management of agitation. Presentation likely delirium 2/2 pain, pain medications, glc levels, SBO status. Psychiatry will continue to follow.     Update 10/23: Patient given one episode of IM olanzapine PRN overnight. Patient history is notable for resolving delirium as hospital treatment progresses. Given recency of recommendations, will maintain current medication recommendations with no changes today.     Update 10/24: Delirium improving, with increased engagement on interview today. No PRNs needed overnight for agitation. No changes to medication recommendations, sitter is no longer required from  "a psychiatric standpoint as patient has demonstrated behavioral control.     Update 10/25: Delirium continues to be improving with greater interaction on interview albeit with concrete and confused. One PRN needed over night, but responding well with music for calming. No changes to medication today.     Update 10/26: Patient with episodes of agitation especially with hands-on care. Remains calm conversant but confused on interview. Continue current recommendations with use of non-pharmacological means such as music and gentle verbal redirection as patient has shown responsiveness to such means before using PRN medications. Certainly, physical restraints and Code Violet should be used judiciously when all other options have been exhausted. Agree with starting home Wellbutrin and Buspar, no changes to PRNs for agitation.     Update 10/27: No PRNs required overnight, patient's delirium and agitation appears to improved. Continue with homegoing medications recommendations which can be administered in crushed form.       EKG (10/22): QTc of 497     IMPRESSION  Delirium, mixed, multifactorial     RECOMMENDATIONS  Safety:  - Patient lacks the capacity to leave AMA at this time and thus cannot leave AMA. Call CODE VIOLET if patient attempts to leave AMA.  - To evaluate decision-making capacity, recommend use of the Capacity Evaluation Tool. Search “ IP Capacity Evaluation under SmartText\" unless the patient has a legal guardian, in which case all decisions per the legal guardian.  - Patient DOES NOT not require a 1:1 sitter from a psychiatric perspective at this time.  - Defer to primary team decision for 1:1 sitter for safety precautions.   - As with all hospitalized patients, would recommend delirium precautions, as below.   -- Minimize use of benzos, opiates, anticholinergics, as these may worsen mental status   -- Would use caution with narcotic pain medications   -- Would still adequately controlling pain, as " uncontrolled pain is also a risk factor for delirium   -- Reinforce sleep hygiene; encourage patient to stay awake during the day   -- Keep curtains/blinds open during the day and closed at night.   -- Would recommend reorienting/redirecting patient as much as possible,    -- Aim for consistent staffing, familiar objects, avoiding bright lights and loud noises, etc.       Work-up:  - No further workup at this time     Medications:  - Agree with home dosing of Bupropion XL 150mg PO daily and Buspar 5mg PO TID, which can be administered in crushed form due to absorption concerns.    - Thiamine 500mg IV TID x3 days followed by 100mg PO daily (Now complete)     -REC NO ADDITIONAL HALOPERIDOL, due to prolonged Qtc interval as well as potential to worsening restlessness  -Olanzapine 2.5mg PO/IM Q6H PRN for agitation.   -Repeat 12lead EKG for Qtc interval if patient receives olanzapine     Please do not administer BZD within 2 hrs of olanzapine administration.      Ancillary Services:  - Please offer , pet/music therapy consult when appropriate    - Psychiatry will sign off  Thank you for allowing us to participate in the care of this patient. Please page [phone] with any questions or concerns.     Medication Consent  Medication Consent: n/a; consult service    Patient discussed with Dr. Welsh who agrees with above plan.       Chung Santacruz MD  Adult Psychiatry, PGY-2  [facility]

## 2023-10-27 NOTE — DISCHARGE SUMMARY
Discharge Diagnosis  Bowel perforation (CMS/HCC)    Issues Requiring Follow-Up  Wound management (EC Fistula)    Hospital Course  Pt is a 67 y.o. male with PMHx cardiac transplant (2017) s/p Whipple for pancreatic cancer on 5/19 who was admitted on 10/7 with presentation concerning for recurrent SBO. Pt was bounce back from previous admission for the same. Pt underwent EGD 9/26 which showed concern for benign stenosis of afferent limb, dilated with endoscopy. NG tube placed upon readmission with plan for EGD 10/10 for GJ dilation and decompression. EGD complicated by iatrogenic perforation of the afferent limb requiring emergent exploratory laparotomy, afferent perforated bowel resection, side to side JJ anastomosis, and serosal patch with an open G-tube placement. Pt unable to be extubated in the OR and was transferred to ICU for further care. Pt failed SBTs on 10/12, 10/13, and 10/14, was successfully extubated on 10/15. Nonpurulent drainage from midline incision noted on 10/13, close monitoring and BID dressing changes instituted. Pt deemed stable for floor and transferred to floor on 10/16. Overnight, 1L of fluid was drained from the midline with fascial dehiscence necessitating take back for exploratory laparotomy, washout, drain placement with retention sutures, and a midline wound vac early AM 10/17. Pt was able to be extubated but remained somnolent, PCA discontinued 2/2 inability to self-administer. PCA was restarted 10/19 given increasing alertness. Portillo discontinued 10/19. Pt hyperglycemic overnight to 250-250. Endocrinology consulted and pt started on NPH and regular insulin on sliding scale. SW consulted for assistance in transition to LTAC. Pt was discharged to LTAC on POD 10/27.    Pertinent Physical Exam At Time of Discharge  Neurological: Awake, alert, delirious   Respiratory/Thorax: even, unlabored  Genitourinary: voiding (incontinent)  Gastrointestinal: soft, NT, ND.  Gtube to gravity.  Midline  "open - superior portion packed with NS moist Kerlix; inferior portion with wound manager in place with stool.  Skin: warm, dry  Musculoskeletal: BAILEY  Eyes: non-icteric  Extremities: no edema     BP (!) 153/92 (BP Location: Right arm, Patient Position: Lying)   Pulse 91   Temp 36.6 °C (97.9 °F) (Temporal)   Resp 24   Ht 1.765 m (5' 9.49\")   Wt 70.5 kg (155 lb 6.8 oz)   SpO2 95%   BMI 22.63 kg/m²        Outpatient Follow-Up  Future Appointments   Date Time Provider Department Center   3/19/2024 11:00 AM Félix Mcnally MD [id number]       Tigist Veras, APRN-CNP  "

## 2023-10-27 NOTE — PROGRESS NOTES
SUPPORTIVE [facility]ALL[facility]ATIVE ONCOLOGY INPATIENT FOLLOW-UP      SERVICE DATE: 10/27/23     SUBJECTIVE:  Interval Events:  Patient has been having high blood pressure      Pain Assessment:  Patient seems more comfortable today  Per nursing patient denies any pain  Location:  Abdomen  Duration:  Postsurgery abdominal pain     PainAD  Breathing 0  Negative vocalization 1  Facial expression 0  Body language 0  Consolability 0     1/10    Symptom Assessment:  Unable to obtain secondary to Encephalopathy    Information obtained from: chart review and discussion with RN  ______________________________________________________________________        OBJECTIVE:    Lab Results   Component Value Date    WBC 15.0 (H) 10/27/2023    HGB 8.5 (L) 10/27/2023    HCT 25.5 (L) 10/27/2023    MCV 89 10/27/2023     10/27/2023      Lab Results   Component Value Date    GLUCOSE 133 (H) 10/27/2023    CALCIUM 9.0 10/27/2023     10/27/2023    K 4.1 10/27/2023    CO2 21 10/27/2023     10/27/2023    BUN 39 (H) 10/27/2023    CREATININE 2.53 (H) 10/27/2023     Lab Results   Component Value Date    ALT 14 10/22/2023    AST 20 10/22/2023    ALKPHOS 111 10/22/2023    BILITOT 0.9 10/22/2023     Estimated Creatinine Clearance: 28.3 mL/min (A) (by C-G formula based on SCr of 2.53 mg/dL (H)).     Scheduled medications  aspirin, 150 mg, rectal, Daily  fat emulsion fish oil/plant based, 250 mL, intravenous, Daily  fentaNYL, 1 patch, transdermal, q72h  heparin (porcine), 5,000 Units, subcutaneous, q8h  heparin flush, 50 Units, intra-catheter, q12h  insulin NPH (Isophane), 15 Units, subcutaneous, Nightly  insulin NPH (Isophane), 7 Units, subcutaneous, Daily  insulin regular, 0-10 Units, subcutaneous, q6h  insulin regular, 4 Units, subcutaneous, q6h  lidocaine, 1 mL, infiltration, Once  lidocaine, 1 patch, transdermal, Daily  melatonin, 5 mg, oral, Nightly  methylPREDNISolone sodium succinate (PF), 4 mg, intravenous, q24h  micafungin, 100 mg,  intravenous, q24h  nystatin, 4 mL, Swish & Swallow, BID  pantoprazole, 40 mg, intravenous, BID  piperacillin-tazobactam, 3.375 g, intravenous, q6h  tacrolimus, 0.5 mg, sublingual, q24h  tacrolimus, 1.5 mg, sublingual, Daily      Continuous medications  Adult Clinimix TPN, 83 mL/hr, Last Rate: 83 mL/hr (10/27/23 0500)      PRN medications  PRN medications: alteplase, alteplase, dextrose 10 % in water (D10W), dextrose, glucagon, heparin flush, heparin lock flush (porcine), hydrALAZINE, HYDROmorphone, ipratropium-albuteroL, naloxone, OLANZapine, oxyCODONE, oxyCODONE   }     PHYSICAL EXAMINATION:    Vital Signs:   Vital signs reviewed  Visit Vitals  /87 (BP Location: Right arm, Patient Position: Lying)   Pulse 90   Temp 37 °C (98.6 °F) (Temporal)   Resp 22        PAINAD Score:  [0]        Physical Exam   Constitutional:       Comments: Resting in bed delirious laying in bed.    HENT:      Head: Normocephalic and atraumatic.      Mouth/Throat:      Mouth: Mucous membranes are moist.   Eyes:      Extraocular Movements: Extraocular movements intact.      Pupils: Pupils are equal, round, and reactive to light.   Cardiovascular:      Rate and Rhythm: Tachycardia present.   Pulmonary:      Effort: Pulmonary effort is normal.   Abdominal:      Comments: Abdominal binder.  Gastrostomy draining to gravity.  Musculoskeletal:      Cervical back: Neck supple.      Comments: Generalized weakness.  No edema   Skin:     General: Skin is warm and dry.   Neurological:      Mental Status: He is disoriented.      Comments: Alert oriented to self and place.  Knows he that he is in the hospital.  Follows commands and was able to raise his hands and lower extremities to verbal command   Psychiatric:      Comments: relaxed    ASSESSMENT/PLAN:  Chung Goodman is a 67 y.o. male with history of heart transplant X 2017, pancreatic cancer s/p Whipple's on 5/19/2023 on chemotherapy FOLFIRINOX, moderate malnutrition secondary to chronic  disease, recurrent pancreatitis, CKD 3, recent admission for SBO (9/23-9/29), who was admitted to [facility] on 10/5 with emesis, fever. Scans showed SBO and blood cultures grew E. coli.  Patient was transferred to [facility] on 5 mg for further management of SBO. Pt had EGD with dilatation of afferent limb anastomotic stenosis on 10/10, however course c/b SBO with perf requiring emergent laparotomy with partial SB resection, jejuno-jejunostomy, G-tube. On 10/17 pt found to have fascial dehiscence of abdominal incision and underwent emergency laparotomy with fascial retention sutures placed. Supportive oncology consulted for pain management.     Neoplasm Related Pain  Acute Post Operative Pain.  Fair control  Type: Visceral and somatic  Home regimen: Gabapentin 200 mg PO HS, Oxycodone IR 5 mg q4-6h PRN, Tramdol 50mg BID PRN  Intolerances/previously tried: Morphine allergy (rx: itching)  Personalized pain goal: Could not be established since patient was lethargic.  ORT-OUD Score: Total Score: 1  due to mental health history of depression indicating Opioid Risk Category: Low  of future opioid use disorder.  Creatinine Clearance: 28.3 mL/min (A)   (10/18) LFTs WNL  Recommend starting home gabapentin 200 mg p.o. nightly  Continue Fentanyl patch 50 mcg transdermal every 72 hours placed on 10/24 at around 9 PM   Continue Dilaudid 0.2 mg IV Every 3 hours as needed for breakthrough pain.  Used 0 dose/24 hrs  Continue oxycodone elixir p.o. 5 mg every 6 hours as needed for moderate pain.  Used 0 dose /24 hours  Continue oxycodone elixir 10 mg every 6 hours as needed severe pain.  Used 2 dose /24 hours= 20 mg oxycodone/24 hours  Total 35 mg oxycodone/24 hours= approximately 25 mg OME  Continue lidocaine 4% patch daily     Risk for opioid-induced constipation aggravated by decreased functional status    -Gastrostomy open to gravity  Last BM: unknown  Encourage ambulation.  PT OT following      Chronic depression   Acute delirium could  be secondary to uncontrolled pain, decreased sleep  Well controlled with home regimen  Home regimen: Wellbutrin XL 150mg PO daily, Buspar 5mg PO TID  Psych consulted  Recommend changing Zyprexa 2.5 mg IM to Zyprexa Zydis 2.5 mg sublingual every 6 hours as needed for agitation  Recommend starting home Wellbutrin XL 150mg PO daily and Buspar 5mg PO TID per psychiatry    Delirium protocol  Pain management as above  Continue melatonin 5 mg p.o. nightly     Oral thrush  Continue nystatin per primary team     Medical Decision Making/Goals of Care/Advance Care Planning:     Wife did not plan to enroll patient in hospice and the current plan is to discharge to LTAC today    10/26  Wife plans to visit inpatient hospice facility [facility].  Wife wanted to take him home however the best plan is to go to hospice house in the best interest of patient. Plan to discharge with TPN.     10/25/23     Per chart review family would like to proceed with hospice if TPN can be provided.       Per , [facility]  awaiting would accept the patient including TPN and a wound vac.  Awaiting for [facility] RN visit today.      Per my conversation with wife Krystle over the phone who talk to the hospice coordinator on phone, her understanding is plan to enroll with hospice services with TPN.  Wife would like to bring him home so that his delirium gets better however hospice team was recommending placement in facility because of his delirium.  Wife further stated that family is making a schedule to ensure someone is always present with him 24/7.  Hence she is not clear about the disposition plan yet.       When I tried to talk to her about the CODE STATUS, she deferred the conversation by stating that multiple providers are asking her the same question and that she is not willing to make any decision at this time and is hoping that her 's mental status improves so he can make decisions for himself.  She was getting ready to come to the hospital  and we decided to further conversations when she comes to the hospital     (Per my prior conversation 10/18) Patient's current clinical condition, including diagnosis, prognosis, and management plan, and goals of care were discussed.   Life limiting disease: metastatic malignancy  Family: Supportive lives at home with wife  Performance status: Major limitations due to pain and disease process  Joys/meaning/strength: Music, believes in God  Understanding of health: Demonstrates good prognostic understanding of disease process, wife understands that 4 months ago patient had Whipple's procedure and then was admitted 2 weeks ago due to concern for bowel obstruction.  1 week ago patient had revision of the whipple however developed complications and had to redo Whipple's.  Last night he had leakage from abdominal area and hence went for emergency surgery early morning today.  Wife also understands that patient has recurrence of cancer  Information: Wants full disclosure  Goals: Symptom control  Code status discussion: We had conversation regarding resuscitation and intubation in the event of cardiac arrest.  Wife stated that she has not discussed this with her  however feels that resuscitation and intubation may not be appropriate.  She will discuss with her .     Advance Directives  Existence of Advance Directives: Yes, but NOT documented in medical record  Decision maker: Surrogate decision maker is wife  Code Status: Full code  Living will: None     Supportive Interventions: Interventions: Music Therapy: referral placed pain management, Art Therapy: referral placed, Social work referral for: Advance Directives   has been following     Disposition   Discharge date: Today to LTAC      Signature and billing:  Thank you for allowing us to participate in the care of this patient. Recommendations will be communicated back to the consulting service by way of shared electronic medical record or  face-to-face.     Medical complexity was high level due to due to complexity of problems, extensive data review, and high risk of management/treatment.     I spent 50 minutes in the care of this patient which included chart review, interviewing patient/family, discussion with primary team, coordination of care, and documentation.     Data:   Diagnostic tests and information reviewed for today's visit:  labs     Plan of Care discussed with: Provider and Consultants: psych     Thank you for asking [facility] Pall[facility] Oncology to assist with care of this patient.  We will continue to follow while patient is in the hospital  Please contact us for additional questions or concerns.       SIGNATURE: Lyndsey Shipley MD   PAGER/CONTACT:  Contact information:  [facility]  Monday-Friday 8 AM-5 PM  Epic Secure chat or pager [phone].  After hours and weekends:  pager [phone]

## 2023-10-27 NOTE — PROGRESS NOTES
10/27/23 1300   Discharge Planning   Home or Post Acute Services Post acute facilities (Rehab/SNF/etc)   Type of Post Acute Facility Services Other (Comment)  (LTACH)   Patient expects to be discharged to: [facility]   Does the patient need discharge transport arranged? Yes   RoundTrip coordination needed? Yes   Has discharge transport been arranged? Yes   What day is the transport expected? 10/27/23   What time is the transport expected? 1600  ([facility] [id number])     Pt/wife requested pt be discharged to [facility] as planned last week, no longer wanting hospice. LSW confirmed with facility they can still accept and auth is good through 11/6. LSW scheduled transport in Roundtrip for 4pm today with [facility]. Pt/ wife aware. RN aware and given report #. Blue sheet given to . LSW to cont to follow through discharge.

## 2023-10-27 NOTE — CARE PLAN
Problem: Pain  Goal: Takes deep breaths with improved pain control throughout the shift  Outcome: Progressing  Goal: Turns in bed with improved pain control throughout the shift  Outcome: Progressing  Goal: Walks with improved pain control throughout the shift  Outcome: Progressing  Goal: Performs ADL's with improved pain control throughout shift  Outcome: Progressing  Goal: Participates in PT with improved pain control throughout the shift  Outcome: Progressing  Goal: Free from opioid side effects throughout the shift  Outcome: Progressing  Goal: Free from acute confusion related to pain meds throughout the shift  Outcome: Progressing       The clinical goals for the shift include safety of pt and staff maintained

## 2023-10-27 NOTE — PROGRESS NOTES
"Chung Goodman is a 67 y.o. male on day 20 of admission presenting with Bowel perforation (CMS/HCC).    Subjective   Pt was seen and examined. wife at bedside. Patient now sleeping heavily, discussed with wife that pt will discharge to LTACH on current insulin regimen.      I have reviewed histories, allergies and medications have been reviewed.  No changes today.       Objective   Review of Systems   Reason unable to perform ROS: Patient is confused.     Physical Exam  Constitutional:       Appearance: He is normal weight. He is ill-appearing.   HENT:      Head: Normocephalic.      Mouth/Throat:      Mouth: Mucous membranes are dry.   Cardiovascular:      Rate and Rhythm: Normal rate and regular rhythm.   Pulmonary:      Effort: Pulmonary effort is normal.      Breath sounds: Normal breath sounds.   Abdominal:      General: Abdomen is flat.      Palpations: Abdomen is soft.   Musculoskeletal:         General: Normal range of motion.   Neurological:      General: No focal deficit present.      Mental Status: He is disoriented.   Psychiatric:         Mood and Affect: Mood normal.         Behavior: Behavior normal.         Last Recorded Vitals  Blood pressure (!) 153/92, pulse 91, temperature 36.6 °C (97.9 °F), temperature source Temporal, resp. rate 24, height 1.765 m (5' 9.49\"), weight 70.5 kg (155 lb 6.8 oz), SpO2 95 %.  Intake/Output last 3 Shifts:  I/O last 3 completed shifts:  In: 2564.9 (36.4 mL/kg) [IV Piggyback:250]  Out: 151 (2.1 mL/kg) [Emesis/NG output:150; Stool:1]  Weight: 70.5 kg     Relevant Results  Results from last 7 days   Lab Units 10/27/23  1145 10/27/23  0708 10/27/23  0543 10/27/23  0008 10/26/23  1650 10/26/23  1334 10/25/23  1108 10/25/23  0643 10/24/23  1147 10/24/23  0612 10/23/23  0529 10/23/23  0450 10/22/23  0539 10/22/23  0434   POCT GLUCOSE mg/dL 200*  --  128* 225* 112* 253*   < >  --    < >  --    < >  --    < >  --    GLUCOSE mg/dL  --  133*  --   --   --   --   --  186*  --  " 147*  --  102*  --  239*    < > = values in this interval not displayed.     Scheduled medications  aspirin, 150 mg, rectal, Daily  fat emulsion fish oil/plant based, 250 mL, intravenous, Daily  fentaNYL, 1 patch, transdermal, q72h  heparin (porcine), 5,000 Units, subcutaneous, q8h  heparin flush, 50 Units, intra-catheter, q12h  insulin NPH (Isophane), 15 Units, subcutaneous, Nightly  insulin NPH (Isophane), 7 Units, subcutaneous, Daily  insulin regular, 0-10 Units, subcutaneous, q6h  insulin regular, 4 Units, subcutaneous, q6h  lidocaine, 1 mL, infiltration, Once  lidocaine, 1 patch, transdermal, Daily  magnesium sulfate, 4 g, intravenous, Once  melatonin, 5 mg, oral, Nightly  methylPREDNISolone sodium succinate (PF), 4 mg, intravenous, q24h  micafungin, 100 mg, intravenous, q24h  nystatin, 4 mL, Swish & Swallow, BID  pantoprazole, 40 mg, intravenous, BID  piperacillin-tazobactam, 3.375 g, intravenous, q6h  tacrolimus, 0.5 mg, sublingual, q24h  tacrolimus, 1.5 mg, sublingual, Daily      Continuous medications  Adult Clinimix TPN, 83 mL/hr, Last Rate: 83 mL/hr (10/27/23 0500)      PRN medications  PRN medications: alteplase, alteplase, dextrose 10 % in water (D10W), dextrose, glucagon, heparin flush, heparin lock flush (porcine), hydrALAZINE, HYDROmorphone, ipratropium-albuteroL, naloxone, OLANZapine, ondansetron, oxyCODONE, oxyCODONE    Results for orders placed or performed during the hospital encounter of 10/07/23 (from the past 24 hour(s))   POCT GLUCOSE   Result Value Ref Range    POCT Glucose 112 (H) 74 - 99 mg/dL   POCT GLUCOSE   Result Value Ref Range    POCT Glucose 225 (H) 74 - 99 mg/dL   POCT GLUCOSE   Result Value Ref Range    POCT Glucose 128 (H) 74 - 99 mg/dL   Tacrolimus level   Result Value Ref Range    Tacrolimus  4.0 <=15.0 ng/mL   Tacrolimus level   Result Value Ref Range    Tacrolimus  3.6 <=15.0 ng/mL   CBC   Result Value Ref Range    WBC 15.0 (H) 4.4 - 11.3 x10*3/uL    nRBC 0.0 0.0 - 0.0 /100  WBCs    RBC 2.87 (L) 4.50 - 5.90 x10*6/uL    Hemoglobin 8.5 (L) 13.5 - 17.5 g/dL    Hematocrit 25.5 (L) 41.0 - 52.0 %    MCV 89 80 - 100 fL    MCH 29.6 26.0 - 34.0 pg    MCHC 33.3 32.0 - 36.0 g/dL    RDW 18.1 (H) 11.5 - 14.5 %    Platelets 317 150 - 450 x10*3/uL    MPV 11.4 7.5 - 11.5 fL   Renal Function Panel   Result Value Ref Range    Glucose 133 (H) 74 - 99 mg/dL    Sodium 136 136 - 145 mmol/L    Potassium 4.1 3.5 - 5.3 mmol/L    Chloride 104 98 - 107 mmol/L    Bicarbonate 21 21 - 32 mmol/L    Anion Gap 15 10 - 20 mmol/L    Urea Nitrogen 39 (H) 6 - 23 mg/dL    Creatinine 2.53 (H) 0.50 - 1.30 mg/dL    eGFR 27 (L) >60 mL/min/1.73m*2    Calcium 9.0 8.6 - 10.6 mg/dL    Phosphorus 3.9 2.5 - 4.9 mg/dL    Albumin 2.4 (L) 3.4 - 5.0 g/dL   Magnesium   Result Value Ref Range    Magnesium 1.50 (L) 1.60 - 2.40 mg/dL   POCT GLUCOSE   Result Value Ref Range    POCT Glucose 200 (H) 74 - 99 mg/dL       Results from last 7 days   Lab Units 10/27/23  0708 10/25/23  0643 10/24/23  0612 10/23/23  0450 10/22/23  0434   SODIUM mmol/L 136   < > 141   < > 146*   POTASSIUM mmol/L 4.1   < > 4.7   < > 4.1   CHLORIDE mmol/L 104   < > 109*   < > 112*   CO2 mmol/L 21   < > 21   < > 20*   BUN mg/dL 39*   < > 50*   < > 54*   CREATININE mg/dL 2.53*   < > 2.87*   < > 2.54*   CALCIUM mg/dL 9.0   < > 9.1   < > 9.6   ALBUMIN g/dL 2.4*   < > 2.4*   < > 2.4*   PROTEIN TOTAL g/dL  --   --   --   --  5.7*   BILIRUBIN TOTAL mg/dL  --   --   --   --  0.9   ALK PHOS U/L  --   --   --   --  111   ALT U/L  --   --   --   --  14   AST U/L  --   --   --   --  20   GLUCOSE mg/dL 133*   < > 147*   < > 239*   TRIGLYCERIDES mg/dL  --   --  213*  --   --     < > = values in this interval not displayed.       Assessment/Plan   Principal Problem:    Bowel perforation (CMS/HCC)  Active Problems:    Pancreatic cancer (CMS/HCC)    COPD (chronic obstructive pulmonary disease) (CMS/HCC)    Chronic kidney disease, stage 4, severely decreased GFR (CMS/HCC)    Cardiac  defibrillator in place    Type 2 diabetes mellitus with diabetic chronic kidney disease (CMS/[facility])    Hypertension    Gastroesophageal reflux disease    Amiodarone-induced hyperthyroidism    History of heart transplant (CMS/[facility])    Pulmonary hypertension (CMS/HCC)    CVA (cerebral vascular accident) (CMS/[facility])    Gram-negative bacteremia    Complete gastric outlet obstruction    Update 10/22: Patient with hypoglycemia overnight. NPH doses may be too high. Additionally, it appears that the TPN was held for a few hours when the bags were begin changed.     RECOMMENDATIONS:  - Goal -180  - continue NPH at 11AM as 7 units  - continue NPH at 11PM with methylprednisolone as 15 units  - continue scheduled regular insulin 4 units Q6H  - continue regular insulin ssi #2 q6hr correlated with 4u standing dose order  - POCT glucose Q6H   - Hypoglycemia protocol     IF TPN AND LIPIDS HELD:   -Goal -180  -CHANGE NPH to 7 units q12hr  -HOLD scheduled regular insulin   -CHANGE regular insulin sliding scale from #2 (1:50>150) Q6H to insulin lispro sliding scale #1 (1:50>150) Q4H  -Accuchecks Q6H   -Hypoglycemia protocol     IF METHYLPREDNISOLONE IS STOPPED:   -Goal -180  -CHANGE NPH to 10 units q12hr  - CONTINUE Regular insulin 4u q6hr while TPN is running  -CHANGE regular insulin sliding scale #2 (1:50>150) Q6H to insulin lispro sliding scale #1 (1:50>150) Q4H  -Accuchecks Q6H   -Hypoglycemia protocol      Discharge recommendations:  -patient may discharge to [facility] on current insulin regimen.     I have spent 20 minutes professionally toward the treatment of this patient.    Randi Tyson PA-C

## 2023-10-27 NOTE — CARE PLAN
Problem: Pain  Goal: Takes deep breaths with improved pain control throughout the shift  10/27/2023 1511 by Celeste Blue RN  Outcome: Progressing  10/27/2023 1511 by Celeste Blue RN  Outcome: Progressing  Goal: Turns in bed with improved pain control throughout the shift  10/27/2023 1511 by Celeste Blue RN  Outcome: Progressing  10/27/2023 1511 by Celeste Blue RN  Outcome: Progressing  Goal: Walks with improved pain control throughout the shift  10/27/2023 1511 by Celeste Blue RN  Outcome: Progressing  10/27/2023 1511 by Celeste Blue RN  Outcome: Progressing  Goal: Performs ADL's with improved pain control throughout shift  10/27/2023 1511 by Celeste Blue RN  Outcome: Progressing  10/27/2023 1511 by Celeste Blue RN  Outcome: Progressing  Goal: Participates in PT with improved pain control throughout the shift  10/27/2023 1511 by Celeste Blue RN  Outcome: Progressing  10/27/2023 1511 by Ceelste Blue RN  Outcome: Progressing  Goal: Free from opioid side effects throughout the shift  10/27/2023 1511 by Celeste Blue RN  Outcome: Progressing  10/27/2023 1511 by Celeste Blue RN  Outcome: Progressing  Goal: Free from acute confusion related to pain meds throughout the shift  10/27/2023 1511 by Celeste Blue RN  Outcome: Progressing  10/27/2023 1511 by Celeste Blue RN  Outcome: Progressing       The clinical goals for the shift include safety of pt and staff maintained

## 2023-12-13 LAB
HOLD SPECIMEN: NORMAL
HOLD SPECIMEN: NORMAL

## 2023-12-20 ENCOUNTER — TELEPHONE (OUTPATIENT)
Dept: RESPIRATORY THERAPY | Facility: HOSPITAL | Age: 68
End: 2023-12-20

## 2023-12-20 ENCOUNTER — TELEPHONE (OUTPATIENT)
Dept: TRANSPLANT | Facility: HOSPITAL | Age: 68
End: 2023-12-20

## 2024-03-06 NOTE — CONSULTS
Service:   Service: Nephrology     Consult:  Consult requested by (Attending Name): Luis Armando Johnson   Reason: h/o heart transplant 6 years prior. now POD  1 whipple with JOSY on CKD and hyperkalemia 6.3     History of Present Illness:   Admission Reason: Adenocarcinoma of pancreas for  Whipple's procedure   HPI:    ABRIL PÉREZ is a 67 year old Male with a past medical history of nonischemic cardiomyopathy secondary to valvular disease s/p bicaval heart transplant on  3/24/2017, diabetes mellitus type 2, amiodarone induced hyperthyroidism, GERD, gout, CKD stage III (baseline creatinine around 2 ) likely from chronic CNI use, pancreatic cancer admitted for   whipple?s with  done on 5/19.  Had an episode of 5 to 10  sec PEA arrest during the operation and ROSC with epi and admitted to SICU postop for close monitoring.  Transplant nephrology was consulted by SICU team on late evening of 5/20  for hyperkalemia of 6.3 on labs and oliguria.  Creatinine at the time of admission was  2.1 which was stable until yesterday a.m. which trended up to 3.1 on repeat labs.   Was having good urine output until 5/19 which had reduced yesterday morning which is now improving with the fluid resuscitation.  He received hyperkalemia treatment with IV fluids, calcium gluconate, insulin/diuretics with which potassium levels improved  to 4.8 to 4.6 today.  His urine output also improved and he had 2.5 L urine output yesterday.  Creatinine is also downtrending to 2.9 today and his blood sugar levels with adjustment in his insulin.  Of note he was also getting IV fluids with LR which  has been changed to normal saline.  Patient was seen this morning.  Family at bedside.  Has been having pain and  on PCA pump.  Feeling nauseous slightly but no vomiting.  Denies any headache or dizziness.  Has a Portillo catheter in place with cellulitis yellow urine.  Denies any fever or  chills.  Maintaining good O2 saturations on 2 L nasal  cannula.    Review of systems: All other systems were reviewed and were negative except dictated in the HPI    Past medical history: As above  Past surgical history: Appendectomy, small bowel surgery, heart transplant in 2017, AVR, MV repair, Whipple's procedure(5/19)  Family history: No known family history of CKD  Social history: Former smoker.  Denies any current alcohol tobacco or illicit drug abuse              Review Family/Social History and ROS:   Social History:    Smoking Status: former smoker  (1)   Alcohol Use: denies, unknown (1)   Drug Use: denies  (1)   Drug 2 Use: denies  (1)            Allergies:  ·  morphine : Itching    Objective:     Objective Information:        T   P  R  BP   MAP  SpO2   Value  36.6  104  23  153/87   105  97%  Date/Time 5/21 4:00 5/21 13:00 5/21 13:00 5/21 13:00  5/21 13:00 5/21 13:00  Range  (35.5C - 37.2C )  (98 - 110 )  (17 - 42 )  (115 - 167 )/ (70 - 96 )  (85 - 112 )  (89% - 99% )   As of 21-May-2023 12:00:00, patient is on 2 L/min of oxygen via nasal cannula.  Highest temp of 37.2 C was recorded at 5/20 8:00    Physical Exam by System:    Constitutional: Alert and oriented x3 in mild distress  due to pain   Eyes: Nonicteric   ENMT: Dry mucous membranes   Head/Neck: Supple   Respiratory/Thorax: Bilateral good air entry anteriorly  and slightly decreased breath sounds at bases   Cardiovascular: S1, S2 heard, no rubs   Gastrointestinal: Soft with some voluntary guarding  due to pain but no rebound tenderness.  Tenderness present   Genitourinary: Portillo in place with light yellow urine   Musculoskeletal: Moving all extremities good   Extremities: No edema bilateral lower  extremities   Neurological: Alert and awake in no obvious focal  neurological deficits   Psychological: Appropriate mood and behavior   Skin: No rash     Medications:    Medications:          Continuous Medications       --------------------------------    1. Sodium Chloride 0.9% Infusion:  1000  mL   IntraVenous  <Continuous>         Scheduled Medications       --------------------------------    1. Acetaminophen:  650  mg  Oral  Every 6 Hours    2. Allopurinol:  100  mg  Oral  Every 24 Hours    3. Citalopram (CELEXA):  40  mg  Oral  Daily    4. Fenofibrate:  160  mg  Oral  Daily    5. Gabapentin:  200  mg  Oral  At Bedtime    6. Heparin SubCutaneous:  5000  unit(s)  SubCutaneous  Every 8 Hours    7. hydrALAZINE (APRESOLINE):  50  mg  Oral  Every 12 Hours    8. Insulin Glargine (Lantus) Injectable:  10  unit(s)  SubCutaneous  Every 24 Hours    9. Insulin Lispro Moderate Corrective Scale:  unit(s)  SubCutaneous  Every 4 Hours    10. Pantoprazole Injectable:  40  mg  IntraVenous Push  Every 24 Hours    11. Piperacillin - Tazobactam 2.25 grams/Iso-osmotic 50 mL Premix IVPB:  50  mL  IntraVenous Piggyback  Every 6 Hours    12. predniSONE:  5  mg  Oral  Every 24 Hours    13. Rosuvastatin:  40  mg  Oral  Every Night    14. Sodium Chloride 0.9% Injectable Flush:  10  mL  IntraVenous Flush  Every 12 Hours    15. Tacrolimus:  3  mg  Oral  <User Schedule>         PRN Medications       --------------------------------    1. Calcium Gluconate 1 gram/ NaCL 0.67% 50 mL Premix IVPB:  50  mL  IntraVenous Piggyback  Every 6 Hours    2. Calcium Gluconate 2 gram/ NaCL 0.67% 100 mL Premix IVPB:  100  mL  IntraVenous Piggyback  Every 6 Hours    3. Dextrose 50% in Water Injectable:  25  gram(s)  IntraVenous Push  Every 15 Minutes    4. Glucagon Injectable:  1  mg  IntraMuscular  Every 15 Minutes    5. Heparin Flush 10 unit/ mL PF Injectable:  5  mL  IntraVenous Flush  Every 12 Hours    6. Heparin Flush 10 unit/ mL PF Injectable PRN:  5  mL  IntraVenous Flush  According to Flush Policy    7. HYDROmorphone Injectable:  0.2  mg  IntraVenous Push  Every 4 Hours    8. Magnesium Sulfate 2 gram/Sterile Water 50 mL Premix Soln:  2  gram(s)  IntraVenous Piggyback  Every 6 Hours    9. Magnesium Sulfate 4 gram/Sterile Water 100 mL Premix Soln:   4  gram(s)  IntraVenous Piggyback  Every 6 Hours    10. Naloxone Injectable:  0.2  mg  IntraVenous Push  Once    11. oxyCODONE Immediate Release:  10  mg  Oral  Every 4 Hours    12. oxyCODONE Immediate Release:  5  mg  Oral  Every 4 Hours    13. Sodium Chloride 0.9% Injectable Flush:  10  mL  IntraVenous Flush  Every 8 Hours and as Needed    14. Sodium Chloride 0.9% Injectable Flush PRN:  10  mL  IntraVenous Flush  According to Flush Policy    15. Sodium Chloride 0.9% Injectable Flush PRN:  20  mL  IntraVenous Flush  According to Flush Policy         Conditional Medication Orders       --------------------------------    1. Perflutren Lipid Microsphere (Activated) 1.3 mL / NaCL 0.9% T.V. 10 mL Injectable:  0.5  mL  IntraVenous Push  Once         Currently Suspended Medications       --------------------------------    1. HYDROmorphone PCA 15 mg/ NaCL 0.9% 30 mL:  2.6  IV PCA  <Continuous>      Recent Lab Results:    Results:        I have reviewed these laboratory results:    Renal Function Panel  Trending View      Result 21-May-2023 14:30:00  21-May-2023 06:01:00    Glucose, Serum 179   H   206   H       138    K 4.6   4.6       107    Bicarbonate, Serum 25   24    Anion Gap, Serum 13   12    BUN 50   H   49   H    CREAT 3.06   H   2.93   H    GFR Male 21   A   23   A    Calcium, Serum 8.3   L   8.2   L    Phosphorus, Serum 2.9   2.4   L    ALB 2.9   L   3.2   L        Hepatic Function Panel  21-May-2023 06:01:00      Result Value    Aspartate Transaminase, Serum  99   H   ALB  3.2   L   T Bili  1.0    Bilirubin, Serum Direct - Conjugated  0.5   H   ALKP  67    Alanine Aminotransferase, Serum  117   H   T Pro  4.8   L     Complete Blood Count  21-May-2023 06:01:00      Result Value    White Blood Cell Count  21.2   H   Nucleated Erythrocyte Count  0.0    Red Blood Cell Count  2.76   L   HGB  8.2   L   HCT  26.9   L   MCV  97    MCHC  30.5   L   PLT  214    RDW-CV  14.5      Calcium, Ionized Level   21-May-2023 06:01:00      Result Value    Calcium, Ionized Level  1.14      Tacrolimus, Blood  21-May-2023 06:00:00      Result Value    Tacrolimus, Blood  3.0        Radiology Results:    Results:        Impression:    1.  Continued low lung volumes with bibasilar atelectasis.        Xray Chest 1 View [May 21 2023  9:24AM]        Assessment:    ABRIL PÉREZ is a 67 year old Male with a past medical history of nonischemic cardiomyopathy secondary to valvular disease s/p bicaval heart transplant on  3/24/2017, diabetes mellitus type 2, amiodarone induced hyperthyroidism, GERD, gout, CKD stage III (baseline creatinine around 2 ) likely from chronic CNI use, pancreatic cancer admitted for   whipple?s with  done on 5/19.  Had an episode of 5 to10   seconds PEA arrest during the operation and ROSC with epi and admitted to SICU postop for close monitoring. Transplant nephrology consulted for JOSY on CKD with oliguria and hyperkalemia for assistance in management    #JOSY on CKD stage IIIb: Improving  JOSY likely secondary to hemodynamic JOSY in the setting of suspected brief PEA arrest and hypotension Intra-Op and poor oral intake leading to acute tubular injury  -CKD from chronic CNI use with baseline creatinine around 2 recently  Creatinine peaked to 3.2 which is downtrending to 2.9 with improving urine output  Hyperkalemia likely secondary to recent surgery, elevated blood sugar levels with renal dysfunction.  Likely contributed by IV fluids with LR as well    Recommendations:  Continue maintenance IV fluids with normal saline while n.p.o. at current rate with close monitoring of respiratory status and urine output  -Good blood sugar control to prevent recurrence of hyperkalemia  -No acute indications for dialysis now but will closely monitor on daily basis  -Low potassium/renal tube feeds when tube feeds/diet is started  -Strict intake and output monitoring  -Avoid nephrotoxins, contrast and  "hypotension  -Renal dosing for medications    #NICM s/p bicaval heart transplantation: Immunosuppression as per heart transplant team    #Anemia: Likely secondary to recent surgery.  Check iron studies and will give AMANDA as indicated once iron replete    #BMD: Corrected calcium magnesium levels are optimal.  Slightly low phos levels continue to monitor and replete as needed    #Adenocarcinoma for pancreas s/p Whipple surgery      We will continue to follow.   will be on service starting Monday    Consult Status:  Consult Order ID: [id number]       Electronic Signatures:  Ananth Hampton)  (Signed 21-May-2023 16:46)   Authored: Service, History of Present Illness, Review  Family/Social History and ROS, Allergies, Objective, Assessment/Recommendations, Note Completion      Last Updated: 21-May-2023 16:46 by Ananth Hampton)    References:  1.  Data Referenced From \"Consult-Heart Failure\" 20-May-2023 10:34   "

## 2024-03-06 NOTE — CONSULTS
Service:   Service: Heart Failure     Consult   Consult requested by (Attending Name): Yunier Sarkar   Reason: hx of cardiac transplant here with sbo     History of Present Illness:   Admission Reason: abdominal pain   HPI:    Chung Goodman is a 67-year-old man with history of  NICM secondary to valvular disease status post bicaval OHT (3/24/2017; status post mitral valve repair with [employer] AnnuloFlex  band #36 mm with induction) complicated by atrial arrhythmias, volume overload, right ventricular failure, acute on chronic kidney disease requiring RRT, chronotropic incompetence,  CKD, T2DM, amiodarone induced hyperthyroidism, psychosis, and most recently pancreatic cancer status post Whipple procedure (5/19/2023),  who presented to [facility] for abdominal pain. Abdominal pain started 2-3 weeks ago and has progressively worsened to intractable pain yesterday, associated with non-bloody emesis, nausea, and occasional  diarrhea. Patient presented to the hospital because the pain was no longer bearable and became constant, he was anorexic over the course of the 24hours prior to presentation. He has had multiple  episodes of emesis since initiating chemotherapy and initially attributed his symptoms to chemotherapy. Denies chest pain , shortness of breath, fevers, chills, dysuria.  Imaging studies are consistent with small bowel obstruction, though patient has never stopped passing flatus. Patient has been admitted to the surgical oncology service for further evaluation and management.  He has been NPO, NG tube to suction draining brown fluid, receiving analgesics for abdominal pain, with self-reported improvement of abdominal pain and vomiting. Endorses he is hungry and wants to return home soon.     Review of Systems:  CONSTITUTIONAL: No fever, chills, sweats.  NEURO: Denies numbness, tingling, headache.   HEENT: Denies sore throat, changes in vision.   CARDIO: Denies chest pain, palpitations.  PULM: No  cough. Denies shortness of breath.  GI: +abdominal pain, no nausea, no diarrhea.  : Denies painful urination.   MSK: No joints swelling. Denies recent trauma.   SKIN: No rash    Medications:  HumaLOG 100 units/mL injectable solution: use per sliding scale with meals (max 40 units/day)  allopurinol 100 mg oral tablet: 1 tab(s) oral once a day  calcium carbonate 1.5 g oral tablet, chewable: 1 tab(s) oral 2 times a day  citalopram 40 mg oral tablet: 1 tab(s) oral once a day  aspirin 81 mg oral delayed release tablet: 1 tab(s) oral once a day  fenofibrate 160 mg oral tablet: 1 tab(s) oral once a day  ferrous sulfate 325 mg oral tablet: 1 tab(s) oral once a day  gabapentin 100 mg oral capsule: 2 cap(s) oral once a day (at bedtime)  hydrALAZINE 50 mg oral tablet: 2 tab(s) oral 2 times a day  Multiple Vitamins with Minerals oral tablet: 1 tab(s) oral once a day  omega-3 polyunsaturated fatty acids 1000 mg oral capsule: 1 cap(s) oral 2 times a day  pantoprazole 40 mg oral delayed release tablet: 1 tab(s) oral 2 times a day  tacrolimus 1 mg oral capsule: 4 cap(s) oral once a day (in the morning)  tacrolimus 1 mg oral capsule: 3.5 cap(s) oral once a day (in the evening)  rosuvastatin 40 mg oral tablet: 1 tab(s) oral once a day  cholecalciferol 125 mcg oral capsule: 1 cap(s) oral once a day  predniSONE 5 mg oral tablet: 1 tab(s) oral once a day  Zenpep 40,000 units-126,000 units-168,000 units oral delayed release capsule: 2 cap(s) oral 2 times a day (with meals)  acetaminophen 325 mg oral tablet: 2 tab(s) orally every 6 hours  insulin glargine 100 units/mL subcutaneous solution: 10 unit(s) subcutaneous every 24 hours  amLODIPine 5 mg oral tablet: 1 tab(s) orally once a day   ondansetron 8 mg oral tablet: 1 tab(s) orally every 8 hrs as needed for nausea 30  prochlorperazine 10 mg oral tablet: 1 tab(s) orally every 6 hrs as needed for nausea   lidocaine-prilocaine 2.5%-2.5% topical cream: Apply topically to affected area 30  min prior to port   Lomotil 2.5 mg-0.025 mg oral tablet: 2 tab(s) orally four times a day   C25 - pancreas cancer   magnesium oxide 400 mg oral tablet: 2 tab(s) orally 2 times a day    Allergies: Morphine  Past medical history: as above in HPI  Past surgical history: Whipple procedure (5/2023), OHT (2017)  Social history: Denies tobacco use, alcohol use disorder, illicit substance use. Lives at home with his wife.   Family history: COPD, SLE, colitis, psoriasis                                                                                                                               Review Family/Social History and ROS:     Review Family/Social History and ROS:       I have reviewed the family and social history and review of systems from the History and Physical.           Allergies:  ·  morphine : Itching    Objective:     Objective Information:          Pain reported at 9/24 8:25: 8 = Severe      T   P  R  BP   MAP  SpO2   Value  36.4  73  18  159/79   87  96%  Date/Time 9/24 15:11 9/24 15:11 9/24 15:11 9/24 15:11  9/23 18:09 9/24 15:11  Range  (36C - 36.7C )  (73 - 86 )  (16 - 18 )  (108 - 159 )/ (70 - 88 )  (84 - 90 )  (95% - 98% )    Physical Exam by System     Constitutional: Well developed, middle-aged man,  awake/alert/oriented x3, no acute distress, alert and cooperative   Eyes: PERRL, EOMI, clear sclera   ENMT: NG tube in place draining brown liquid to suction  container   Head/Neck: Neck supple, no apparent injury, no JVD,  no lymphadenopathy   Respiratory/Thorax: Clear to auscultation bilaterally,  no wheezes or crackles   Cardiovascular: Regular, rate and rhythm, no murmurs,  2+ equal pulses of the extremities, normal S1 and S2   Gastrointestinal: Nondistended, soft, mild tenderness  to palpation LLQ   Musculoskeletal: ROM intact, no joint swelling, normal  strength   Extremities: normal extremities, no cyanosis, edema,  contusions or wounds, no clubbing   Neurological: alert and oriented x3, intact  senses,  motor, response and reflexes, normal strength   Psychological: Appropriate mood and behavior   Skin: Warm and dry, no lesions, no rashes     Refresh Home Medications List   ·  Select to Refresh Home Medication List Refresh Home Medications     Medications     Medications:          Continuous Medications       --------------------------------    1. Lactated Ringers Infusion:  1000  mL  IntraVenous  <Continuous>         Scheduled Medications       --------------------------------    1. Heparin SubCutaneous:  5000  unit(s)  SubCutaneous  Every 8 Hours    2. Insulin Lispro Mild Corrective Scale:  unit(s)  SubCutaneous  Every 4 Hours    3. methylPREDNISolone Sodium Succinate Injectable:  4  mg  IntraVenous Push  Every Morning    4. Pantoprazole Injectable:  40  mg  IntraVenous Push  Every 24 Hours    5. Tacrolimus:  4  mg  SubLingual  <User Schedule>    6. Tacrolimus:  3  mg  SubLingual  <User Schedule>    7. Tacrolimus:  1.5  mg  SubLingual  Once         PRN Medications       --------------------------------    1. Glucagon Injectable:  1  mg  IntraMuscular  Every 15 Minutes    2. Melatonin:  5  mg  Oral  Daily 1800    3. Phenol Topical Spray:  1  spray(s)  Topical  4 Times a Day    4. Sodium Chloride 0.9% Injectable Flush:  10  mL  IntraVenous Flush  Every 8 Hours and as Needed        Recent Lab Results     Results:        I have reviewed these laboratory results:    Glucose_POCT  Trending View      Result 24-Sep-2023 16:22:00  24-Sep-2023 12:46:00  24-Sep-2023 08:16:00  23-Sep-2023 23:36:00    Glucose-POCT 145   H   136   H   147   H   140   H        Complete Blood Count  24-Sep-2023 07:50:00      Result Value    White Blood Cell Count  9.6    Nucleated Erythrocyte Count  0.0    Red Blood Cell Count  3.08   L   HGB  9.3   L   HCT  27.2   L   MCV  88    MCHC  34.2    PLT  194    RDW-CV  14.6   H     Basic Metabolic Panel  24-Sep-2023 07:50:00      Result Value    Glucose, Serum  164   H   NA  137    K  4.6     CL  103    Bicarbonate, Serum  25    Anion Gap, Serum  14    BUN  37   H   CREAT  2.62   H   GFR Male  26   A   Calcium, Serum  8.9      Magnesium, Serum  24-Sep-2023 07:50:00      Result Value    Magnesium, Serum  1.98      Lipase, Serum  23-Sep-2023 08:30:00      Result Value    Lipase, Serum  76      Lactate, Level  23-Sep-2023 08:30:00      Result Value    Lactate, Level  3.3   H       Radiology Results     Results:        Impression:    1. Nonspecific relative paucity bowel gas.  2. Nonspecific bibasilar opacities which may reflect a component of  chronic lung disease with or without superimposed  infectious/inflammatory infiltrateand atelectasis.  3. Medical devices as above.      Xray Abdomen AP View [Sep 24 2023  8:19AM]      Impression:  Xray Abdomen AP View [Sep 24 2023  3:53AM]      Impression:  Xray Abdomen AP View [Sep 24 2023  3:51AM]      Impression:     NG tube present with tip in mid stomach.        Signed by Haroldo James MD     Xray Abdomen AP View [Sep 23 2023  2:19PM]      Impression:    Findings consistent with mid and distal multifocal at least partial  small bowel obstruction, most likely from adhesions.     Previous Whipple procedure. The previous stent in the pancreatic  remnant is now in a loop of jejunum in the mid right abdomen.     Eventration of the right diaphragm. Stable scarring at the lung  bases, right greater than left, and along the right minor fissure.     Cardiomegaly.     Very mild stable nonspecific retroperitoneal adenopathy, most likely  reactive.     Nonspecific edematous changes in the mesenteric fat of the abdomen  and upper pelvis, right greater than left. Small amount of  nonspecific perihepatic ascites.     MACRO:  None     CT Abdomen and Pelvis with IV Contrast [Sep 23 2023 10:41AM]      Impression:  CT Abdomen and Pelvis with IV Contrast [Sep 23 2023  9:33AM]        Assessment:    Chung Goodman is a 67-year-old man with history of  NICM secondary to valvular  disease status post bicaval OHT (3/24/2017; status post mitral valve repair with [employer] AnnuloFlex  band #36 mm with induction) complicated by atrial arrhythmias, volume overload, right ventricular failure, acute on chronic kidney disease requiring RRT, chronotropic incompetence,  CKD, T2DM, amiodarone induced hyperthyroidism, psychosis, and most recently pancreatic cancer status post Whipple procedure (5/19/2023),  who presented to [facility] for abdominal pain, admitted for small bowel obstruction. Heart Failure/Transplant service has been consulted for further management of post-cardiac transplant medications.       Assessment  Acute small bowel obstruction  Pancreatic adenocarcinoma status post Whipple procedure and on chemotherapy  NICM status post bicaval OHT (2017)  CKD      Recommendations    - Continue tacrolimus 4mg sublingual @0630 and 3mg @1830  - Check tacrolimus level 0600 daily  - Continue methylprednisolone 4mg IV daily  - Patient may receive fluid boluses as needed  - Heart Failure/Transplant service will follow along with you very closely   - Since patient may require invasive procedures, agree with remaining on surgical oncology service for now until stable for medical management        Patient examined and discussed with attending physician, Dr. Sukhi Lujan.    Signed,  Cheryl Velasco MD  Heart Failure Fellow PGY4          Consult Status   Consult Status    (select all that apply): initial  consult complete, will follow   Consult Order ID: [id number]     Attestation:   Note Completion   I am a:  Resident/Fellow   Attending Attestation I saw and evaluated the patient.  I personally obtained the key and critical portions of the history and physical exam or was physically present for key and  critical portions performed by the resident/fellow. I reviewed the resident/fellow?s documentation and discussed the patient with the resident/fellow.  I agree with the resident/fellow?s medical  decision making as documented in their note  with the exception/addition of the following:   I personally evaluated the patient on 24-Sep-2023   Comments/ Additional Findings    briefly patient with history of OHT recent diagnosis of pancreatic cancer s/p resection who recently started on chemotherapy admitted with worsening  nausea/abdominal pain found to have SBO, currently managed with NGT and suction but under consideration for surgery/endoscopic intervention    regarding immunosuppression would switch MMF to IV, convert prednisone to methylpred and give tacrolimus SL (empty on tongue) at 50% dose for now, please obtain daily levels prior to AM dose until he is able to take medications by mouth    we will continue to follow        Electronic Signatures for Addendum Section:   Cheryl Velasco (MD (Fellow)) (Signed Addendum 25-Sep-2023 08:52)   - Continue Tacrolimus 2mg sublingual qAM and 1.5mg sublingual qPM     Electronic Signatures:  Sukhi Lujan)  (Signed 24-Sep-2023 23:09)   Authored: Note Completion   Co-Signer: Service, History of Present Illness, Review Family/Social History and ROS, Allergies, Objective, Assessment/Recommendations,  Note Completion  Cheryl Velasco (Fellow))  (Signed 24-Sep-2023 20:59)   Authored: Service, History of Present Illness, Review  Family/Social History and ROS, Allergies, Objective, Assessment/Recommendations, Note Completion      Last Updated: 25-Sep-2023 08:52 by Cheryl Velasco (MD (Fellow))

## 2024-03-06 NOTE — CONSULTS
Service:   Service: Anesthesia - Pain     Consult:  Consult requested by (Attending Name): Dr. Johnson   Reason: Postoperative analgesia     History of Present Illness:   HPI:    Mr. Goodman is a 67 year old male who presents for pancreatectomy with Dr. Johnson. Acute Pain consulted for block for postoperative pain control.     Anticipated Postop Pain Issues -   Palliative: typically relieved with IV analgesics and regional local anesthetics  Provocative: typically with movement  Quality: typically burning and aching  Radiation: typically none  Severity: typically severe 8-10/10  Timing: typically constant    PMH:  Hypertension, hyperlipidemia heart failure, arrhythmias, valvular disease, CKD, GERD, diabetes insulin-dependent, hypothyroidism, pancreatic mass    PSH:  MVR, heart transplant, laparoscopic appendectomy    FHx:  Hypertension, lupus    SHx:  Former smoker.  History of alcoholism.  Denies illicit drug use    Allergies: Morphine (itching)    Review of Systems  Gen: No fatigue, anorexia, insomnia, fever.   Eyes: No vision loss, double vision, drainage, eye pain.   ENT: No pharyngitis, dry mouth, no hearing changes or ear discharge  Cardiac: No chest pain, palpitations, syncope, near syncope.   Pulmonary: No shortness of breath, cough, hemoptysis.   Heme/lymph: No swollen glands, fever, bleeding.   GI: No abdominal pain, change in bowel habits, melena, hematemesis, hematochezia, nausea, vomiting, diarrhea.   : No discharge, dysuria, frequency, urgency, hematuria.  Endo: No polyuria or weight loss.   Musculoskeletal: Negative for any pain or loss of ROM/weakness  Skin: No rashes or lesions  Neuro: Normal speech, no numbness or weakness. No gait difficulties  Review of systems is otherwise negative unless stated above or in history of present illness.             Allergies:  ·  morphine : Itching    Objective:   Physical Exam Narrative:  ·  Physical Exam:    Physical Exam:  Constitutional:  no distress,  alert and cooperative  Eyes: clear sclera  Head/Neck: No apparent injury, trachea midline  Respiratory/Thorax: Patent airways, thorax symmetric, breathing comfortably  Cardiovascular: no pitting edema  Gastrointestinal: Nondistended  Musculoskeletal: ROM intact  Extremities: no clubbing  Neurological: alert, jack x4  Psychological: Appropriate affect.    Medications:    Medications:          Continuous Medications       --------------------------------    1. Ropivacaine 0.2%/ Ambit Pump 500 mL:  1000  mg  Peripheral Nerve  <Continuous>         Scheduled Medications       --------------------------------  No scheduled medications are active         PRN Medications       --------------------------------  No PRN medications are active        Recent Lab Results:    Results:        I have reviewed these laboratory results:    Complete Blood Count + Differential  16-May-2023 16:29:00      Result Value    White Blood Cell Count  6.1    Nucleated Erythrocyte Count  0.0    Red Blood Cell Count  3.70   L   HGB  11.2   L   HCT  35.2   L   MCV  95    MCHC  31.8   L   PLT  247    RDW-CV  13.9    Neutrophil %  73.5    Immature Granulocytes %  0.3    Lymphocyte %  16.3    Monocyte %  6.7    Eosinophil %  2.9    Basophil %  0.3    Neutrophil Count  4.51    Lymphocyte Count  1.00   L   Monocyte Count  0.41    Eosinophil Count  0.18    Basophil Count  0.02      Comprehensive Metabolic Panel  16-May-2023 16:29:00      Result Value    Glucose, Serum  95    NA  141    K  4.8    CL  107    Bicarbonate, Serum  24    Anion Gap, Serum  15    BUN  34   H   CREAT  2.29   H   GFR Male  30   A   Calcium, Serum  9.3    ALB  4.1    ALKP  152   H   T Pro  6.2   L   T Bili  1.4   H   Alanine Aminotransferase, Serum  45    Aspartate Transaminase, Serum  43   H         Assessment:    Mr. Goodman is a 67 year old male who presents for pancreatectomy with Dr. Johnson. Acute Pain consulted for block for postoperative pain control.     Plan/Recs:  - BL  ZEKE with catheters placed preoperatively on 5/19  - Ambit ball with Ropivacaine 0.2%/NaCl 0.9% 500mL, Rate 7 cc/hr bilaterally  - Ambit medication will not interfere with pain medication prescribed by primary team.   - Please be aware of local anesthetic toxic dose and absorption variability before considering lidocaine patches  - Acute pain service will follow while catheters in place  - Rest of pain management per primary team    Acute Pain Service Resident.    Attestation:   Note Completion:  I am a:  Resident/Fellow   Attending Attestation I saw and evaluated the patient.  I personally obtained the key and critical portions of the history and physical exam or was physically present for key and  critical portions performed by the resident/fellow. I reviewed the resident/fellow?s documentation and discussed the patient with the resident/fellow.  I agree with the resident/fellow?s medical decision making as documented in the note.     I personally evaluated the patient on 19-May-2023         Electronic Signatures:  Yunier Durán (MD (Resident))  (Signed 19-May-2023 11:16)   Entered: History of Present Illness, Objective, Assessment/Recommendations,  Note Completion   Authored: Service, History of Present Illness, Allergies, Objective, Assessment/Recommendations, Note Completion  Otoniel Liu (DO (Resident))  (Signed 19-May-2023 11:11)   Authored: Service, History of Present Illness, Allergies  Marilou Li)  (Signed 19-May-2023 11:51)   Authored: Note Completion   Co-Signer: Service, History of Present Illness, Allergies, Objective, Assessment/Recommendations, Note Completion      Last Updated: 19-May-2023 11:51 by Marilou Li)

## 2024-03-06 NOTE — CONSULTS
Service:   Service: Heart Failure     Consult:  Consult requested by (Attending Name): Luis Armando Johnson   Reason: cardiac transplant patient, preop optimization  with fluids - coming in dehydrated     History of Present Illness:   HPI:    ABRIL GOODMAN is a 67 year old Male who presents to SICU sp a [place] with  for pancreatic adenocarcinoma     Mr. 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 His  post-transplant course was complicated by atrial arrhythmias, volume overload, right ventricular failure, acute on chronic kidney  disease requiring temporary RRT, chronotropic incompetence, and psychosis. HF team was consulted for IC therapy management.     Past Medical/Surgical History:        Medical History:   H/O heart transplant: Onset Date: 19-Sep-2018   Diabetes mellitus:    Dyslipidemia:    Gout:    H/O: CVA:    Aortic valve regurgitation:        Surg History:   Heart replaced by transplant:     Review Family/Social History and ROS:     Review Family/Social History and ROS:      No family/social history has been recorded on this patient.       No ROS has been documented on this patient.    Social History:    Smoking Status: former smoker  (1)   Alcohol Use: denies, unknown (1)   Drug Use: denies  (1)   Drug 2 Use: denies  (1)     Constitutional: NEGATIVE: Fever, Chills, Anorexia,  Weight Loss, Malaise     Respiratory: NEGATIVE: Dry Cough, Productive Cough,  Hemoptysis, Wheezing, Shortness of Breath     Cardiac: NEGATIVE: Chest Pain, Dyspnea on Exertion,  Orthopnea, Palpitations, Syncope     Gastrointestinal: POSITIVE: Abdominal Pain; NEGATIVE:  Nausea, Vomiting, Diarrhea, Constipation              Allergies:  ·  morphine : Itching    Objective:     Objective Information:        T    P  R  BP   MAP  SpO2   Value  37.2  106  27  135/82   97  94%  Date/Time 5/20 8:00 5/20 10:00 5/20 10:00 5/20 10:00  5/20 10:00 5/20 10:00  Range  (35.9C - 37.2C )  (83 - 107 )  (19 - 42 )  (112 - 189 )/ (77 - 119 )  (88 - 137 )  (89% - 100% )   As of 19-May-2023 20:00:00, patient is on 2 L/min of oxygen via room air.  Highest temp of 37.2 C was recorded at 5/20 0:00      ---- Intake and Output  -----  Mn/Dy/Year Time  Intake   Output  Net  May 20, 2023 6:00 am  1150   765  385  May 19, 2023 10:00 pm  1200   315  885    The Intake and Output Totals for the last 24 hours are:      Intake   Output  Net      2350   1080  1270        Pain reported at 5/20 9:00: 10 = Severe    Physical Exam by System:    Constitutional: Well developed, awake/alert/oriented  x3, no distress, alert and cooperative   Head/Neck: Neck supple, no apparent injury, thyroid  without mass or tenderness, No JVD, trachea midline, no bruits   Respiratory/Thorax: Patent airways, CTAB, normal  breath sounds with good chest expansion, thorax symmetric   Cardiovascular: Regular, rate and rhythm, no murmurs,  2+ equal pulses of the extremities, normal S 1and S 2   Gastrointestinal: Surgical drain in place with SS  fluid   Extremities: normal extremities, no cyanosis edema,  contusions or wounds, no clubbing       Assessment:     Mr. 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 His  post-transplant course was complicated by atrial arrhythmias, volume overload, right ventricular failure, acute on chronic kidney  disease requiring temporary RRT, chronotropic incompetence, and psychosis.     Cardiac workup:   -Transplant Date: 3/24/17  - Donor: HCV -/-, CMV -/-, Toxo +/-  - Post transplant issues/complications: atrial arrhythmias, volume overload, right  ventricular failure, acute on chronic kidney disease  requiring RRT, chronotropic incompetence, and psychosis  - TTE 4/29/23, LVEF 55%, Normal RV function, MV not assessed     Active problems:   # Pancreatic adenocarcinoma S/p Whipple   # NICM secondary to valvular disease s/p bicaval OHT (3/24/17; s/p mitral valve repair w/[employer] AnnuloFlex band #36  mm w/induction)  # s/p mitral valve repair w/[employer] AnnuloFlex band #36 mm w/induction)  # CKD stage IIIb  # HTN/DLD   # Gout   # Type II DM   # Chronic Normocytic anemia    Recommendations:   - Start Tacrolimus SL 3 mg every 12 hours (at 6:30 AM and 6:30 PM), obtain fk levels at 6 AM daily. Target is 5-8   - We will start ASA, prednisone, and statin when ok with surgery       This case was seen and discussed with Dr. Barone. Formal attestation to follow.     Dylan Styles MD. HF Fellow.       Consult Status:  Consult Order ID: [id number]     Attestation:   Note Completion:  I am a:  Resident/Fellow   Attending Attestation I saw and evaluated the patient.  I personally obtained the key and critical portions of the history and physical exam or was physically present for key and  critical portions performed by the resident/fellow. I reviewed the resident/fellow?s documentation and discussed the patient with the resident/fellow.  I agree with the resident/fellow?s medical decision making as documented in the note.     I personally evaluated the patient on 20-May-2023         Electronic Signatures:  Dylan Styles (Fellow))  (Signed 20-May-2023 10:58)   Authored: Service, History of Present Illness, Past Medical/Surgical  History, Review Family/Social History and ROS, Allergies, Objective, Assessment/Recommendations, Note Completion  Daniel Barone (MD)  (Signed 21-May-2023 12:38)   Authored: Note Completion   Co-Signer: History of Present Illness, Past Medical/Surgical History, Review Family/Social History and ROS, Objective,  "Assessment/Recommendations,  Note Completion      Last Updated: 21-May-2023 12:38 by Daniel Barone (MD)    References:  1.  Data Referenced From \"Patient Profile - Adult v2\" 19-May-2023 18:38   "

## 2024-03-06 NOTE — CONSULTS
Service:   Service: Gastroenterology     Consult:  Consult requested by (Attending Name): Yunier Sarkar   Reason: sbo secondary to stenotic area of BPL limb  after whipple with duodenaljejunostomy for pancreatic CA. Eval for EGD and possible stent placement.     History of Present Illness:   HPI:    ABRIL PÉREZ is a 67 year old Male with PMH heart transplant in 2017, pancreatic cancer s/p whipple 5/19/2023 currently on mFOLFIRINOX , T2DM, HLD, osteoporosis   who was transferred from [facility] to [facility] for SBO on 9/23/2023. GI is consulted for endoscopic evaluation of both limbs with concern of afferent limb obstruction and question of cancer recurrence and evaluation for possible enteric stenting of the obstruction  vs venting J-tube.    He had whipple in 5/2023. Intra-op course was complicated by PEA arrest for 5-10 seconds, achieved ROSC with epinephrine.  post-op course complicated by a peripancreatic leak requiring drain placement. Drain was removed 6/13/2023. First dose of mFOLFIRINOX  was 8/16/23, had some fatigue/nausea/diarrhea so planning for dose-reduction with 2nd dose, which he received 9/22/23.  Last  Had new acute onset abdominal pain starting at 3am yesterday which woke him up out of sleep. He had severe N/V and vomited 5x  at home before going to the ED.            Review Family/Social History and ROS:     Review Family/Social History and ROS:      No family/social history has been recorded on this patient.       No ROS has been documented on this patient.    Social History:    Social History: denies smoking, alcohol and drug  use            Allergies:  ·  morphine : Itching    Objective:     Objective Information:    5/1/2023 ERCP/EUS    EUS  Impression:            - There was dilation in the lower third of the main                          bile duct which measured up to 16 mm.                         - A mass was identified in the pancreatic head. Fine                          needle  biopsy performed.                         - There was no evidence of significant pathology in                          the visualized portion of the liver.                         - The celiac trunk was endosonographically normal.    Impression  Findings:       The  film was normal. The esophagus was successfully intubated        under direct vision without detailed examination of the pharynx, larynx,        and associated structures, and upper GI tract. The upper GI tract was        grossly normal. The major papilla was small. The ventral pancreatic duct        was inadvertently cannulated with the Fusion OMNI sphincterotome without        any complications. A 0.025 inch x 450 cm straight Visiglide wire passed        successfully into the body of the pancreatic duct from the major        papilla. One 5 Fr by 5 cm plastic stent with a single external pigtail        and a single internal flap was placed 4 cm into the ventral pancreatic        duct to assist with biliary cannulation. Clear fluid flowed through the        stent. The stent was in good position. A 0.025 inch x 450 cm straight        Visiglide wire was passed into the biliary tree. The Fusion OMNI        sphincterotome was passed over the guidewire and the bile duct was then        deeply cannulated. Contrast was injected. I personally interpreted the        bile duct images. There was brisk flow of contrast through the ducts.        Image quality was excellent. Contrast extended to the bifurcation. The        middle third of the main bile duct was markedly dilated, acquired. The        lower third of the main bile duct contained a single severe stenosis 30        mm in length. A 3 mm biliary sphincterotomy was made with a monofilament        traction (standard) sphincterotome using ERBE electrocautery. There was        no post-sphincterotomy bleeding. One 10 Fr by 7 cm plastic stent with a        single external flap and a single internal flap was  placed 6 cm into the        common bile duct. The stent was in good position. The PD stent placed to        assist with cannulation was removed from the pancreatic duct using a        Raptor grasping device.  Moderate Sedation:       N/A  Impression:            - The major papilla appeared to be small.                         - A single severe biliary stricture was found in the                          lower third of the main bile duct. The stricture was                          malignant appearing.                         - The middle third of the main bile duct was markedly                          dilated, acquired.                         - One plastic stent was placed into the ventral                          pancreatic duct placed to assist with cannulation                          (removed upon procedure competion).                         - A biliary sphincterotomy was performed.                         - One plastic stent was placed into the common bile                          duct.    Physical Exam Narrative:  ·  Physical Exam:    Gen - awake, alert, oriented x3, in no acute distress  HEENT - NC/AT, EOMI, PERRL, sclera clear, MMM, no lesions, NGT in place to LIWS with feculant output  CV - RRR, normal S1, S2, no m/r/g, chest wall without deformity  Lungs - CTAB, no rhonchi, wheezes, or rales, no increased work of breathing, bilateral air exchange adequate, saturating well on   Abd - Soft, nontender, nondistended, BS normoactive, no HSM  Ext - WWP, no edema, pulse 2+ and symmetric  Skin - Warm, dry, no rash  Neuro - A&Ox3, CN II-XII intact bilaterally, strength 5/5 in all extremities, SILT, finger to nose adequate, gait         Medications:    Medications:          Continuous Medications       --------------------------------    1. Lactated Ringers Infusion:  1000  mL  IntraVenous  <Continuous>         Scheduled Medications       --------------------------------    1. Heparin SubCutaneous:  5000  unit(s)   SubCutaneous  Every 8 Hours    2. Insulin Lispro Mild Corrective Scale:  unit(s)  SubCutaneous  Every 4 Hours    3. methylPREDNISolone Sodium Succinate Injectable:  4  mg  IntraVenous Push  Every Morning    4. Pantoprazole Injectable:  40  mg  IntraVenous Push  Every 24 Hours    5. Tacrolimus:  2  mg  SubLingual  <User Schedule>    6. Tacrolimus:  1.5  mg  SubLingual  <User Schedule>         PRN Medications       --------------------------------    1. Glucagon Injectable:  1  mg  IntraMuscular  Every 15 Minutes    2. Phenol Topical Spray:  1  spray(s)  Topical  4 Times a Day    3. Sodium Chloride 0.9% Injectable Flush:  10  mL  IntraVenous Flush  Every 8 Hours and as Needed        Recent Lab Results:    Results:        I have reviewed these laboratory results:    Glucose_POCT  24-Sep-2023 16:22:00      Result Value    Glucose-POCT  145   H     Complete Blood Count  24-Sep-2023 07:50:00      Result Value    White Blood Cell Count  9.6    Nucleated Erythrocyte Count  0.0    Red Blood Cell Count  3.08   L   HGB  9.3   L   HCT  27.2   L   MCV  88    MCHC  34.2    PLT  194    RDW-CV  14.6   H     Basic Metabolic Panel  24-Sep-2023 07:50:00      Result Value    Glucose, Serum  164   H   NA  137    K  4.6    CL  103    Bicarbonate, Serum  25    Anion Gap, Serum  14    BUN  37   H   CREAT  2.62   H   GFR Male  26   A   Calcium, Serum  8.9      Magnesium, Serum  24-Sep-2023 07:50:00      Result Value    Magnesium, Serum  1.98      Complete Blood Count + Differential  23-Sep-2023 08:30:00      Result Value    White Blood Cell Count  9.3    Red Blood Cell Count  3.50   L   HGB  10.3   L   HCT  30.3   L   MCV  87    MCHC  34.0    PLT  238    RDW-CV  14.9   H   Neutrophil %  97.2    Immature Granulocytes %  0.8    Lymphocyte %  1.3    Monocyte %  0.3    Eosinophil %  0.3    Basophil %  0.1    Neutrophil Count  9.03   H   Lymphocyte Count  0.12   L   Monocyte Count  0.03   L   Eosinophil Count  0.03    Basophil Count  0.01       Comprehensive Metabolic Panel  23-Sep-2023 08:30:00      Result Value    Glucose, Serum  160   H   NA  134   L   K  3.7    CL  98    Bicarbonate, Serum  25    Anion Gap, Serum  15    BUN  33   H   CREAT  2.38   H   GFR Male  29   A   Calcium, Serum  8.6    ALB  3.5    ALKP  108    T Pro  5.7   L   T Bili  0.6    Alanine Aminotransferase, Serum  69   H   Aspartate Transaminase, Serum  138   H     Lipase, Serum  23-Sep-2023 08:30:00      Result Value    Lipase, Serum  76      Lactate, Level  23-Sep-2023 08:30:00      Result Value    Lactate, Level  3.3   H       Radiology Results:    Results:        Impression:    1. Nonspecific relative paucity bowel gas.  2. Nonspecific bibasilar opacities which may reflect a component of  chronic lung disease with or without superimposed  infectious/inflammatory infiltrateand atelectasis.  3. Medical devices as above.      Xray Abdomen AP View [Sep 24 2023  8:19AM]      Impression:    Findings consistent with mid and distal multifocal at least partial  small bowel obstruction, most likely from adhesions.     Previous Whipple procedure. The previous stent in the pancreatic  remnant is now in a loop of jejunum in the mid right abdomen.     Eventration of the right diaphragm. Stable scarring at the lung  bases, right greater than left, and along the right minor fissure.     Cardiomegaly.     Very mild stable nonspecific retroperitoneal adenopathy, most likely  reactive.     Nonspecific edematous changes in the mesenteric fat of the abdomen  and upper pelvis, right greater than left. Small amount of  nonspecific perihepatic ascites.     MACRO:  None     CT Abdomen and Pelvis with IV Contrast [Sep 23 2023 10:41AM]      Impression:  CT Abdomen and Pelvis with IV Contrast [Sep 23 2023  9:33AM]      Impression:    1.  Postsurgical changes of a Whipple procedure and drain placement  with multiple fluid collections as described above within the upper  abdomen in the region of the  surgical site. Sterility of these fluid  collections is unable to be assessed. No bowel dilatation to suggest  obstruction at the anastomotic sites.  2. Small right pleural effusion.  3. Patulous distal esophagus mild circumferential wall thickening,  correlate with concern for gastroesophageal reflux.  4. Air is present within the urinary bladder, which does not  demonstrate wall thickening. Correlate with recent instrumentation  and urinalysis with concern for cystitis.  5. Other findings as above.      CT Abdomen and Pelvis without Contrast [May 28 2023 12:15PM]              Assessment:    ABRIL PÉREZ is a 67 year old Male with PMH heart transplant in 2017, pancreatic cancer s/p whipple 5/19/2023 currently on mFOLFIRINOX , T2DM, HLD, osteoporosis   who was transferred from [facility] to [facility] for SBO on 9/23/2023.  CT imaging shows multifocal obstruction. GI is consulted for endoscopic evaluation of both limbs with concern of afferent limb obstruction and question of cancer recurrence and evaluation  for possible enteric stenting of the obstruction vs venting J-tube.    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      Recommendations:  -No planned endoscopic intervention tomorrow (Monday)  -NGT and diet management per primary team  -Ongoing multidisciplinary discussion for best management        Patient discussed with advanced attending Dr. Bergman. GI will continue to follow. Please do not hesitate to page [phone] with any questions or concerns between the hours of 7am-5pm on weekdays.  On weekends, after-hours and holidays, please page [phone] to  reach the hepatology fellow on-call with any urgent questions.      Consult Status:  Consult Order ID: [id number]     Attestation:   Note Completion:  I am a:  Resident/Fellow   Attending Attestation I reviewed the resident/fellow?s documentation and discussed the patient with the resident/fellow.  I agree with the resident/fellow?s medical  decision making as documented in the note.          Electronic Signatures:  Jose Bergman (MD (Fellow))  (Signed 19-Oct-2023 08:55)   Authored: Note Completion   Co-Signer: Service, History of Present Illness, Review Family/Social History and ROS, Allergies, Objective, Assessment/Recommendations  Lenora Almanza (MD (Fellow))  (Signed 24-Sep-2023 21:57)   Authored: Service, History of Present Illness, Review  Family/Social History and ROS, Allergies, Objective, Assessment/Recommendations, Note Completion      Last Updated: 19-Oct-2023 08:55 by Jose Bergman (MD (Fellow))

## 2024-03-21 ENCOUNTER — APPOINTMENT (OUTPATIENT)
Dept: ENDOCRINOLOGY | Facility: CLINIC | Age: 69
End: 2024-03-21
Payer: COMMERCIAL

## 2024-05-06 NOTE — OP NOTE
PREOPERATIVE DIAGNOSIS:  Pancreatic cancer.    POSTOPERATIVE DIAGNOSIS:  Pancreatic cancer.  TNM stage pending.    OPERATION/PROCEDURE:  1. Exploratory laparotomy.  2. Pylorus preserving pancreaticoduodenectomy.  3. Pancreaticojejunostomy.  4. Hepaticojejunostomy.  5. Duodenal jejunostomy.  6. Abdominal drainage.    SURGEON:  Luis Armando Johnson MD.    ASSISTANT(S):  rickey Garzon 82 modifier as no other qualified resident was  immediately available for the case.  Other modifiers-22, it was coded  as such because the patient has a heart transplantation which  increased the risk of the operation significantly and also increased  the need to perform the operation with minimal blood loss in a  relatively shorter timeframe.     ANESTHESIA:    LOCATION:  [facility].    INDICATION FOR SURGERY:  The patient has obstructive jaundice and resectable pancreatic  cancer.  Preoperative intravenous antibiotics and subcutaneous  heparin were administered.  ZA Stockings were applied.  Preoperative  consent was obtained.     PROCEDURE DETAILS:  The patient was brought to the operating room, placed on the  operating table in supine position.  After successful induction of  general endotracheal anesthesia, appropriate monitoring lines were  placed.  Sequential compression devices were applied, and Portillo  catheter was inserted in a sterile fashion in the bladder.  The  abdomen was prepped and draped in the routine fashion with  chlorhexidine.  The patient was identified per protocol.  A time-out  and preoperative briefing were performed.  I made a vertical midline  incision from the xiphoid to the umbilicus with a skin knife and  cautiously entered the abdomen with electrocautery.  A thorough  exploration of the abdominal cavity was performed.  There was no  evidence of carcinomatosis or metastatic disease.  The stomach was  normal with nasogastric tube in place.  Exploration  of the duodenum  and head of pancreas revealed a firm mass.  The small bowel was run  from ligament of Treitz to the ileocecal valve, was normal.  The  colon was visualized and palpated from the cecum to the sigmoid with  no evidence of any metastatic disease.  The liver was carefully  assessed, and there were no abnormalities.  The gallbladder was in  place.     We placed our self-retaining retractors to protect our skin edges and  carefully assessed the resection zone.  Found no contraindication to  resection.  We began by getting into the lesser sac.  We took down  the gastrocolic ligament off the colon and preserved the greater  omentum.  We mobilized the stomach off the anterior surface of the  pancreas, making sure to preserve the gastroepiploic and middle colic  vascular pedicles staying between these structures.  We dissected out  and mobilized the inferior border of the pancreas and began our  tunnel inferiorly into the pancreatic neck, dissected the pancreas  off the SMV.  At this point, a generous Kocher maneuver was  performed.  We elevated the duodenum and head of the pancreas out of  the retroperitoneum.  Normal structures dissected freely off the  inferior vena cava.  We were able go to the retroperitoneum all the  way around to the aorta.     We identified the right gastric artery and vein, ligated and divided  these structures.  We worked to dissect the duodenum off the anterior  aspect of the pancreatic head and neck.  We divided the duodenum 2 cm  distal to the pylorus with a ROMELIA staple gun.  At this point, the  gastroduodenal artery was identified and test clamped.  We noted a  good palpable pulse in the proper hepatic artery.  We then doubly  tied and ligated this structure and divided it.  This allowed us to  expose the portal vein.  We dissected this from the superior aspect  of the pancreatic neck.  We identified the downstream gastroduodenal  artery or right gastroepiploic vascular pedicle  and ligated and  divided this structure.     We then worked along the plane between the uncinate process of the  pancreas and the transverse mesocolon and identified and dissected  out the superior mesenteric vein inferior to the pancreatic neck.  We  elevated the inferior aspect of the pancreatic neck.  We were able to  complete the plane around the pancreatic neck to the level of the SMV  portal vein confluence with a Penrose drain.  Four stay sutures were  placed along the superior and inferior aspect of the pancreatic neck,  and we divided the structure.  At this point, we turned our attention  to the remnant pancreas, and we took an additional 6 mm of pancreatic  neck to get a frozen section analysis.  It was negative for any  cancer, pancreatic neck at this point having completely divided with  electrocautery without incident.     We then took down the gallbladder from the gallbladder fossa with  electrocautery.  The cystic artery and duct were identified, doubly  ligated and divided.  We removed the gallbladder from the operative  field without incident.  A large titanium clip was placed in the  cystic duct to reinforce the closure.     At this point, we went to the level of ligament of Treitz and took  down the ligament with electrocautery.  The proximal jejunum was  divided 20 cm below the ligament with a ROMELIA staple gun.  The short  jejunal vessels at the proximalmost jejunum were taken over clamps  and 2-0 silk ties.  The retained jejunum was stapled and oversewn  with interrupted 3-0 silk pop-off sutures.  The duodenal jejunal  junction was then mobilized behind the mesenteric vessels of the  patient's right side allowing us to carefully separate Whipple  specimen from the visceral vessels.     We next returned to the hepatoduodenal ligament, dissected out the  extrahepatic biliary tree.  We identified the bile duct, elevated off  the portal vein and encircled it with a vessel loop.  We had divided  the  bile duct with electrocautery.  This having been accomplished, we  then meticulously dissected the pancreatic neck and head free from  the right lateral aspect of the SMV and portal vein staying right on  the vein and  the specimen from the visceral vein and going  towards the superior mesenteric artery.  The retroperitoneal  attachments and uncinate process were divided with clamps and 2-0  silk ties.     Notably during part of our dissection, the patient did go into a PEA  cycle for about 10 seconds.  Ultimately, it was identified that the  patient was trying with resuscitation and the pulse returned very  quickly with substantial pressure.  This indicates the challenge  operating on this gentleman with a heart transplant.  Notably no  gross tumor was left behind after the specimen was removed.  We  marked the specimen in the usual fashion with 2 short black stitches  on the pancreatic neck and with purple dye and one black stitch on  the uncinate margin and a blue stitch on the bile duct margin.     We closed the rent at the level of ligament of Treitz with  interrupted 3-0 silk pop-off sutures.  We brought the retained  jejunum up through a small rent in the right side of the transverse  mesocolon.  We mobilized the pancreatic remnant for a distance of 2  cm and commenced our pancreaticojejunostomy as an end-to-side  anastomosis.  This was performed to the distal end of the available  jejunum as a duct to mucosa anastomosis with an outer layer of 2-0  silk mattress suture and an inner layer of 5-0 PDS suture in  interrupted fashion over a 7-Scottish pancreatic duct stent.  Six  sutures were placed in the duct.  An anastomosis was accomplished  quite nicely to a firm gland with a 7 mm duct.     Approximately 10 cm downstream from the pancreaticojejunostomy, we  performed a standard biliary enteric reconstruction in a running  fashion on the posterior layer with interrupted duct mucosa sutures  as the  posterior inner layer.  The anastomosis was done over an  8-Danish pediatric feeding tube that was removed prior to tying down  the anterior row of interrupted sutures.     Approximately 50 cm downstream from the hepaticojejunostomy, we  tacked the afferent jejunal limb to the right-sided transverse  mesocolic defect.  30 cm downstream, we performed a 2-layer hand-sewn  duodenal jejunostomy in a retrocolic fashion through a separate  left-sided mesenteric defect below the transverse mesocolon.  At the  anastomosis completion, we advanced the anastomosis underneath the  colon and tacked the stomach side to the mesocolon.     At this point, we checked for hemostasis thoroughly through all our  operative sites and were satisfied.  We also had an instrument count,  and there were no abnormalities.     We placed 2 different drains through separate stab incisions, 1 in  the right flank, 1 in the left flank.  Both were 19-Danish fluted  Андрей drains.  They were sewn in place with 2-0 nylon sutures x2.  The one in the right flank was placed near the hepaticojejunostomy  and the one in the left flank was placed in a retrogastric position  at the pancreaticojejunostomy.     We then closed the abdominal fascia with a running looped #1 PDS  suture.  The skin was closed with staples.  Postoperative debriefing  was performed.  A dry dressing was applied to the wound.  All counts  were reported as correct.  The patient's family was given the  findings from the surgery in the family waiting area.     The patient tolerated the procedure well, was taken from the  operating room to monitored bed in a satisfactory condition,  extubated.  I was present from skin incision to skin closure.     SPECIMENS TO PATHOLOGY:  Gallbladder pancreaticoduodenectomy specimen.    ESTIMATED BLOOD LOSS:  200 mL.    IV FLUIDS:  Administered per anesthesia.      Luis Armando Johnson MD    DD:  05/19/2023 23:25:35 EST  DT:  05/20/2023 02:55:51  EST  DICTATION NUMBER:  [id number]  INTERNAL JOB NUMBER:  [id number]    CC:  PCP UNKNOWN  Luis Armando Johnson MD, Fax: [phone]        Electronic Signatures:  Luis Armando Johnson (MD) (Signed on 20-May-2023 06:15)   Authored  Unsigned, Draft (SYS GENERATED) (Entered on 20-May-2023 02:55)   Entered    Last Updated: 20-May-2023 06:15 by Luis Armando Johnson)

## 2024-06-18 NOTE — ADDENDUM NOTE
Addendum  created 06/18/24 1619 by Wanda Valdivia MD    Attestation recorded in Intraprocedure, Intraprocedure Attestations filed

## (undated) DEVICE — SUTURE, SILK, 2-0, TIES, 12-30 IN, BLACK

## (undated) DEVICE — CLIP, LIGATING, W/ADHESIVE PAD, MEDIUM, TITANIUM

## (undated) DEVICE — SUTURE, PDSII, 1, TP-1, VIL, MONO, 48LP

## (undated) DEVICE — SUTURE, SILK, 3-0, 18 IN SH/CR, BLACK

## (undated) DEVICE — Device

## (undated) DEVICE — SUTURE, SILK, 2-0, 30 IN, MH, BLACK

## (undated) DEVICE — STAPLER, ECHELON 3000, 45MM STD

## (undated) DEVICE — HOLSTER, ELECTROSURGERY ACCESSORY, STERILE

## (undated) DEVICE — SUTURE, PROLENE, 4-0 VB/RB-1, 36IN, BLUE

## (undated) DEVICE — SUTURE, VICRYL, 2-0, 54 IN, TIE, VIOLET

## (undated) DEVICE — DRAIN, WOUND, ROUND, W/TROCAR, HOLE PATTERN, 10 IN, MEDIUM/LARGE, 3/16 X 49 IN

## (undated) DEVICE — DRAPE, INCISE, ANTIMICROBIAL, IOBAN 2, LARGE, 17 X 23 IN, DISPOSABLE, STERILE

## (undated) DEVICE — SUTURE, SILK, 2-0, 18 IN, BLACK

## (undated) DEVICE — SUTURE, VICRYL, 2-0, 18 IN, UNDYED

## (undated) DEVICE — TIP, SUCTION, YANKAUER, FLEXIBLE

## (undated) DEVICE — COVER, CART, 45 X 27 X 48 IN, CLEAR

## (undated) DEVICE — COUNTER, NEEDLE, FOAM BLOCK, POP-N-COUNT, W/BLADEGUARD, W/ADHESIVE 40 COUNT, RED

## (undated) DEVICE — SUTURE, CHROMIC, 1, 27 IN, BP-27, BROWN

## (undated) DEVICE — LOOP, VESSEL, MAXI, RED

## (undated) DEVICE — SYRINGE, 20 CC, LUER LOCK, MONOJECT, W/O CAP, LF

## (undated) DEVICE — SUTURE, SILK, 3-0, 30 IN, BR SH, BLACK

## (undated) DEVICE — CLIPPER, SURGICAL BLADE ASSEMBLY, SPECIALITY, SINGLE USE

## (undated) DEVICE — LOOP, VESSEL, MAXI, BLUE

## (undated) DEVICE — SUTURE, ETHILON, 2-0, 18 IN, FS, BLACK, BX/36

## (undated) DEVICE — CLIP, LIGATING, HORIZON, LARGE, TITANIUM

## (undated) DEVICE — SUTURE, SILK, 3-0, 18 IN, MULTIPACK, BLACK

## (undated) DEVICE — SUTURE, SILK, 3-0, 30 IN, SH, CONTROL RELEASE, MULTIPACK, BLACK

## (undated) DEVICE — DRESSING, NON-ADHERENT, TELFA, OUCHLESS, 3 X 8 IN, STERILE

## (undated) DEVICE — PAD, GROUNDING, ELECTROSURGICAL, W/9 FT CABLE, POLYHESIVE II, ADULT, LF

## (undated) DEVICE — DRAIN, CHANNEL W/TROCAR 15F

## (undated) DEVICE — STAPLER, ENDO ECHELON 45MM RELOAD, WHITE, REUSABLE

## (undated) DEVICE — BAG, DRAINAGE, BILE, W/T-TUBE ADAPTER, W/LATEX BELTS, SMALL, 9 OZ

## (undated) DEVICE — SUTURE, PROLENE, 6-0, 30 IN, C-1, CV-11, BLUE

## (undated) DEVICE — CUTTER,  PROXIMATE LINEAR RELOAD, 55MM, BLUE

## (undated) DEVICE — SUTURE, PROLENE, 4-0, TAPER POINT, SH/SH BLUE 36IN

## (undated) DEVICE — DRAPE, TIBURON W/ADHESIVE, 19 X 30

## (undated) DEVICE — PAD, GROUNDING, ELECTROSURGICAL, DUAL

## (undated) DEVICE — TUBE, FEEDING, 20FR GASTROSTOMY, ADULT

## (undated) DEVICE — GOWN, SURGICAL, SMARTGOWN, XLARGE, STERILE

## (undated) DEVICE — RELOAD, STAPLER, ENDOSCOPIC, GIA ROTICULATOR 45-2.5, TI, WHITE

## (undated) DEVICE — SUTURE, PDS II, 5-0, 30 IN, C1, DA, VIOLET

## (undated) DEVICE — CATHETER TRAY, SURESTEP, 16FR, URINE METER W/STATLOCK

## (undated) DEVICE — STAPLER, LINEAR, 3.5 60MM, RELOADABLE, BLUE

## (undated) DEVICE — BAG, DRAINAGE, ANTI-REFLUX CHAMBER, 2000ML

## (undated) DEVICE — DRESSING, ADHESIVE, ISLAND, TELFA, 2 X 3.75 IN, LF

## (undated) DEVICE — EVACUATOR, WOUND, SUCTION, CLOSED, JACKSON-PRATT, 100 CC, SILICONE

## (undated) DEVICE — TRAY, MINOR, SINGLE BASIN, STERILE

## (undated) DEVICE — SUTURE, VICRYL, 3-0, 27 IN, SH, VIOLET

## (undated) DEVICE — SUTURE, PDS II, 1, 96 IN, XLH, VIOLET

## (undated) DEVICE — CUTTER,  PROX LINEAR, 55MM, REG TISSUE, W/ SAFETY LOCK OUT

## (undated) DEVICE — SPONGE, LAP, XRAY DECT, 18IN X 18IN, W/MASTER DMT, STERILE

## (undated) DEVICE — TISSEEL FIBRIN SEALANT, PRIMA, FROZEN, 10ML

## (undated) DEVICE — SUTURE, VICRYL, 3-0, 27 IN, SH

## (undated) DEVICE — SUTURE, PDS II, 1, 36 IN, CT-1, VIOLET

## (undated) DEVICE — SUTURE, PDS II, 5-0, 27 IN, RB-1, VIOLET

## (undated) DEVICE — SUTURE, VICRYL, 3-0, 54 IN, TIE, VIOLET

## (undated) DEVICE — DRAPE, PAD, INSTRUMENT, MAGNETIC, MEDIUM, 10 X 16 IN, DISPOSABLE

## (undated) DEVICE — SUTURE, VICRYL, 3-0, 18 IN, UNDYED

## (undated) DEVICE — INSERT, CLAMP EVERGRIP 33

## (undated) DEVICE — CLEANER, ELECTROSURGICAL, TIP, 5 X 5 CM, LF

## (undated) DEVICE — ELECTRODE, ELECTROSURGICAL, NEEDLE, 1.1 IN, LF

## (undated) DEVICE — COVER, TABLE, UHC

## (undated) DEVICE — DRAPE, INSTRUMENT, W/POUCH, STERI DRAPE, 7 X 11 IN, DISPOSABLE, STERILE

## (undated) DEVICE — MANIFOLD, 4 PORT NEPTUNE STANDARD

## (undated) DEVICE — SUTURE, VICRYL 2, TAPER POINT, TP-1 VIOLET 4-27 INCH

## (undated) DEVICE — CATHETER, RED RUBBER 16FR